# Patient Record
Sex: FEMALE | Race: BLACK OR AFRICAN AMERICAN | NOT HISPANIC OR LATINO | Employment: OTHER | URBAN - METROPOLITAN AREA
[De-identification: names, ages, dates, MRNs, and addresses within clinical notes are randomized per-mention and may not be internally consistent; named-entity substitution may affect disease eponyms.]

---

## 2017-01-10 DIAGNOSIS — I10 ESSENTIAL HYPERTENSION: ICD-10-CM

## 2017-01-10 RX ORDER — LOSARTAN POTASSIUM 50 MG/1
50 TABLET ORAL 2 TIMES DAILY
Qty: 180 TABLET | Refills: 3 | Status: SHIPPED | OUTPATIENT
Start: 2017-01-10 | End: 2017-10-24 | Stop reason: SDUPTHER

## 2017-01-12 ENCOUNTER — OFFICE VISIT (OUTPATIENT)
Dept: PODIATRY | Facility: CLINIC | Age: 77
End: 2017-01-12
Payer: MEDICARE

## 2017-01-12 ENCOUNTER — OFFICE VISIT (OUTPATIENT)
Dept: INTERNAL MEDICINE | Facility: CLINIC | Age: 77
End: 2017-01-12
Payer: MEDICARE

## 2017-01-12 VITALS
DIASTOLIC BLOOD PRESSURE: 72 MMHG | HEART RATE: 64 BPM | SYSTOLIC BLOOD PRESSURE: 124 MMHG | BODY MASS INDEX: 29.55 KG/M2 | WEIGHT: 183.88 LBS | OXYGEN SATURATION: 97 % | HEIGHT: 66 IN

## 2017-01-12 VITALS
WEIGHT: 184.63 LBS | RESPIRATION RATE: 18 BRPM | DIASTOLIC BLOOD PRESSURE: 72 MMHG | SYSTOLIC BLOOD PRESSURE: 154 MMHG | HEIGHT: 66 IN | BODY MASS INDEX: 29.67 KG/M2 | HEART RATE: 61 BPM

## 2017-01-12 DIAGNOSIS — E11.69 MIXED DIABETIC HYPERLIPIDEMIA ASSOCIATED WITH TYPE 2 DIABETES MELLITUS: Chronic | ICD-10-CM

## 2017-01-12 DIAGNOSIS — R80.9 DIABETES MELLITUS WITH PROTEINURIA: Chronic | ICD-10-CM

## 2017-01-12 DIAGNOSIS — L84 CORN OR CALLUS: ICD-10-CM

## 2017-01-12 DIAGNOSIS — I70.0 AORTIC ARCH ATHEROSCLEROSIS: Chronic | ICD-10-CM

## 2017-01-12 DIAGNOSIS — K63.5 POLYP OF COLON, UNSPECIFIED PART OF COLON, UNSPECIFIED TYPE: Chronic | ICD-10-CM

## 2017-01-12 DIAGNOSIS — I77.9 LEFT-SIDED CAROTID ARTERY DISEASE: Chronic | ICD-10-CM

## 2017-01-12 DIAGNOSIS — E04.2 MULTINODULAR THYROID: Chronic | ICD-10-CM

## 2017-01-12 DIAGNOSIS — Z00.00 ENCOUNTER FOR PREVENTIVE HEALTH EXAMINATION: Primary | ICD-10-CM

## 2017-01-12 DIAGNOSIS — F41.9 ANXIETY: ICD-10-CM

## 2017-01-12 DIAGNOSIS — E78.2 MIXED DIABETIC HYPERLIPIDEMIA ASSOCIATED WITH TYPE 2 DIABETES MELLITUS: Chronic | ICD-10-CM

## 2017-01-12 DIAGNOSIS — E11.29 DIABETES MELLITUS WITH PROTEINURIA: Chronic | ICD-10-CM

## 2017-01-12 DIAGNOSIS — N18.30 DIABETES MELLITUS WITH STAGE 3 CHRONIC KIDNEY DISEASE: Chronic | ICD-10-CM

## 2017-01-12 DIAGNOSIS — E11.22 DIABETES MELLITUS WITH STAGE 3 CHRONIC KIDNEY DISEASE: Chronic | ICD-10-CM

## 2017-01-12 DIAGNOSIS — I51.89 LEFT VENTRICULAR DIASTOLIC DYSFUNCTION WITH PRESERVED SYSTOLIC FUNCTION: ICD-10-CM

## 2017-01-12 DIAGNOSIS — K21.9 HIATAL HERNIA WITH GERD: Chronic | ICD-10-CM

## 2017-01-12 DIAGNOSIS — H40.1132 PRIMARY OPEN ANGLE GLAUCOMA OF BOTH EYES, MODERATE STAGE: Chronic | ICD-10-CM

## 2017-01-12 DIAGNOSIS — M20.10 HALLUX ABDUCTO VALGUS, UNSPECIFIED LATERALITY: ICD-10-CM

## 2017-01-12 DIAGNOSIS — B35.1 ONYCHOMYCOSIS DUE TO DERMATOPHYTE: ICD-10-CM

## 2017-01-12 DIAGNOSIS — K44.9 HIATAL HERNIA WITH GERD: Chronic | ICD-10-CM

## 2017-01-12 DIAGNOSIS — E11.49 TYPE II DIABETES MELLITUS WITH NEUROLOGICAL MANIFESTATIONS: Chronic | ICD-10-CM

## 2017-01-12 DIAGNOSIS — I10 HYPERTENSION, ESSENTIAL: Chronic | ICD-10-CM

## 2017-01-12 DIAGNOSIS — E11.49 TYPE II DIABETES MELLITUS WITH NEUROLOGICAL MANIFESTATIONS: Primary | ICD-10-CM

## 2017-01-12 PROCEDURE — 99999 PR PBB SHADOW E&M-EST. PATIENT-LVL III: CPT | Mod: PBBFAC,,, | Performed by: PODIATRIST

## 2017-01-12 PROCEDURE — 3045F PR MOST RECENT HEMOGLOBIN A1C LEVEL 7.0-9.0%: CPT | Mod: S$GLB,,, | Performed by: PODIATRIST

## 2017-01-12 PROCEDURE — 3078F DIAST BP <80 MM HG: CPT | Mod: S$GLB,,, | Performed by: PODIATRIST

## 2017-01-12 PROCEDURE — 3074F SYST BP LT 130 MM HG: CPT | Mod: S$GLB,,, | Performed by: INTERNAL MEDICINE

## 2017-01-12 PROCEDURE — 11721 DEBRIDE NAIL 6 OR MORE: CPT | Mod: 59,Q9,S$GLB, | Performed by: PODIATRIST

## 2017-01-12 PROCEDURE — 1160F RVW MEDS BY RX/DR IN RCRD: CPT | Mod: S$GLB,,, | Performed by: PODIATRIST

## 2017-01-12 PROCEDURE — 1126F AMNT PAIN NOTED NONE PRSNT: CPT | Mod: S$GLB,,, | Performed by: PODIATRIST

## 2017-01-12 PROCEDURE — 11056 PARNG/CUTG B9 HYPRKR LES 2-4: CPT | Mod: Q9,S$GLB,, | Performed by: PODIATRIST

## 2017-01-12 PROCEDURE — 99499 UNLISTED E&M SERVICE: CPT | Mod: S$GLB,,, | Performed by: INTERNAL MEDICINE

## 2017-01-12 PROCEDURE — 99999 PR PBB SHADOW E&M-EST. PATIENT-LVL IV: CPT | Mod: PBBFAC,,, | Performed by: INTERNAL MEDICINE

## 2017-01-12 PROCEDURE — 3077F SYST BP >= 140 MM HG: CPT | Mod: S$GLB,,, | Performed by: PODIATRIST

## 2017-01-12 PROCEDURE — 1159F MED LIST DOCD IN RCRD: CPT | Mod: S$GLB,,, | Performed by: PODIATRIST

## 2017-01-12 PROCEDURE — 3066F NEPHROPATHY DOC TX: CPT | Mod: S$GLB,,, | Performed by: PODIATRIST

## 2017-01-12 PROCEDURE — G0439 PPPS, SUBSEQ VISIT: HCPCS | Mod: S$GLB,,, | Performed by: INTERNAL MEDICINE

## 2017-01-12 PROCEDURE — 1157F ADVNC CARE PLAN IN RCRD: CPT | Mod: S$GLB,,, | Performed by: PODIATRIST

## 2017-01-12 PROCEDURE — 3078F DIAST BP <80 MM HG: CPT | Mod: S$GLB,,, | Performed by: INTERNAL MEDICINE

## 2017-01-12 PROCEDURE — 99499 UNLISTED E&M SERVICE: CPT | Mod: S$GLB,,, | Performed by: PODIATRIST

## 2017-01-12 PROCEDURE — 99213 OFFICE O/P EST LOW 20 MIN: CPT | Mod: 25,S$GLB,, | Performed by: PODIATRIST

## 2017-01-12 NOTE — MR AVS SNAPSHOT
[facility]  [address] [facility] [address]  Phone: [phone]  Fax: [phone]                  Saul Kirkpatrick   2017 1:00 PM   Office Visit    Description:  Female : 1940   Provider:  Shannen Muñoz DPM   Department:  [facility]           Reason for Visit     Follow-up                To Do List           Future Appointments        Provider Department Dept Phone    2017 2:00 PM Carina Aparicio MD [facility] [phone]    2/10/2017 9:15 AM PERIMETRY, [facility] Ophthalmology [phone]    2/10/2017 9:45 AM Lina Taveras MD [facility] Ophthalmology [phone]    2017 1:20 PM Shannen Muñoz DPM [facility] [phone]      Goals (5 Years of Data)              10/26/16    6/29/16    11/17/15    HEMOGLOBIN A1C < 7.0   8.0  9.4  7.5      [facility] On Call     [facility] On Call Nurse Care Line - / Assistance  Registered nurses in the [facility] On Call Center provide clinical advisement, health education, appointment booking, and other advisory services.  Call for this free service at [phone].             Medications           Message regarding Medications     Verify the changes and/or additions to your medication regime listed below are the same as discussed with your clinician today.  If any of these changes or additions are incorrect, please notify your healthcare provider.             Verify that the below list of medications is an accurate representation of the medications you are currently taking.  If none reported, the list may be blank. If incorrect, please contact your healthcare provider. Carry this list with you in case of emergency.           Current Medications     ACCU-CHEK PENNY PLUS TEST STRP Strp 1 strip by Misc.(Non-Drug; Combo Route) route 2 (two) times daily.    ACCU-CHEK FASTCLIX Misc 1 each by Misc.(Non-Drug; Combo Route) route 2 (two) times daily.    alcohol swabs PadM Apply 1 each  "topically as needed.    amlodipine (NORVASC) 10 MG tablet Take 1 tablet (10 mg total) by mouth once daily.    aspirin (ECOTRIN) 81 MG EC tablet Take 1 tablet (81 mg total) by mouth once daily.    atorvastatin (LIPITOR) 80 MG tablet Take 1 tablet (80 mg total) by mouth once daily.    BD ULTRA-FINE CELSO PEN NEEDLES 32 gauge x 5/32" Ndle Uses once daily, on daily insulin injections    blood sugar diagnostic Strp 1 strip by Misc.(Non-Drug; Combo Route) route 3 (three) times daily. PT CHOICE    BLOOD-GLUCOSE METER (CONTOUR USB MISC) by Misc.(Non-Drug; Combo Route) route.    brimonidine 0.2% (ALPHAGAN) 0.2 % Drop Place 1 drop into both eyes 3 (three) times daily.    carvedilol (COREG) 12.5 MG tablet Take 1 tablet (12.5 mg total) by mouth 2 (two) times daily with meals.    cholecalciferol, vitamin D3, 1,000 unit capsule Take 2 capsules (2,000 Units total) by mouth once daily.    clopidogrel (PLAVIX) 75 mg tablet TAKE 1 TABLET BY MOUTH EVERY DAY (BLOOD THINNER)    glimepiride (AMARYL) 4 MG tablet TAKE 1 TABLET BY MOUTH IN THE MORNING WITH BREAKFAST    hydrochlorothiazide (HYDRODIURIL) 12.5 MG Tab Take 1 (12.5 mg) po qd    hyoscyamine (LEVSIN/SL) 0.125 mg Subl Take one or two tablets every 4 hours as needed for abdominal discomfort    losartan (COZAAR) 50 MG tablet Take 1 tablet (50 mg total) by mouth 2 (two) times daily.    meclizine (ANTIVERT) 25 mg tablet Take 1 tablet (25 mg total) by mouth 3 (three) times daily as needed.    metformin (GLUCOPHAGE-XR) 500 MG 24 hr tablet Take 2 tablets (1,000 mg total) by mouth 2 (two) times daily with meals.    alprazolam (XANAX) 0.5 MG tablet Take 1 tablet (0.5 mg total) by mouth 3 (three) times daily as needed for Anxiety.           Clinical Reference Information           Vital Signs - Last Recorded  Most recent update: 1/12/2017 12:58 PM by Neeraj Young LPN    BP Pulse Resp Ht Wt BMI    (!) 154/72 61 18 5' 6" (1.676 m) 83.7 kg (184 lb 10.2 oz) 29.8 kg/m2      Blood Pressure      "     Most Recent Value    BP  (!)  154/72      Allergies as of 1/12/2017     Pravachol [Pravastatin]      Immunizations Administered on Date of Encounter - 1/12/2017     None

## 2017-01-12 NOTE — PROGRESS NOTES
Subjective:      Patient ID: Saul Kirkpatrick is a 76 y.o. female.    Chief Complaint: Follow-up ([facility])    Saul is a 76 y.o. female who presents to the clinic for evaluation and treatment of high risk feet. 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 The patient's chief complaint is long, thick toenails and calluses. This patient has documented high risk feet requiring routine maintenance secondary to diabetes mellitis and those secondary complications of diabetes, as mentioned..    PCP: Penny Randolph MD    Date Last Seen by PCP: 9/20/16    Current shoe gear:  Affected Foot: Extra depth shoes with custome accommodative insoles     Unaffected Foot: Extra depth shoes with custome accommodative insoles    Hemoglobin A1C   Date Value Ref Range Status   10/26/2016 8.0 (H) 4.5 - 6.2 % Final     Comment:     According to ADA guidelines, hemoglobin A1C <7.0% represents  optimal control in non-pregnant diabetic patients.  Different  metrics may apply to specific populations.   Standards of Medical Care in Diabetes - 2016.  For the purpose of screening for the presence of diabetes:  <5.7%     Consistent with the absence of  diabetes  5.7-6.4%  Consistent with increasing risk for diabetes   (prediabetes)  >or=6.5%  Consistent with diabetes  Currently no consensus exists for use of hemoglobin A1C  for diagnosis of diabetes for children.     06/29/2016 9.4 (H) 4.5 - 6.2 % Final   11/17/2015 7.5 (H) 4.5 - 6.2 % Final       ROS        Objective:      Physical Exam   Constitutional: She is oriented to person, place, and time. She appears well-developed and well-nourished. No distress.   Cardiovascular:   Pulses:       Dorsalis pedis pulses are 1+ on the right side, and 1+ on the left side.        Posterior tibial pulses are 1+ on the right side, and 0 on the left side.   Capillary fill time 3-5 seconds to digits bilateral feet.   Musculoskeletal:   Lower extremities:    Deformities: hallux abductovalgus both feet    Normal arch height and rectus feet bilaterally.     5/5 muscle strength and tone in all four quadrants bilaterally.     Pain-free range of motion in all four quadrants with stiffness and limitation bilaterally.     Pain on palpation: None     Neurological: She is alert and oriented to person, place, and time. She displays no Babinski's sign on the right side. She displays no Babinski's sign on the left side.   Plantar protective threshold sensation by touch via 5.07 SWMF diminished to the digits bilaterally.      Skin:   Lower extremities:    Thin, dry, atrophic bilaterally. Digital hair absent bilaterally. Digits cool with proximal foot warmth.    Mild bilateral foot and ankle edema.    No varicosities, pigmentary changes bilaterally.     No wounds, drainage, malodor, erythema, interdigital maceration were noted.     Skin lesions: sub met head 5 bilaterally.     Nails are thick, dystrophic and discolored bilateral feet.   Psychiatric: She has a normal mood and affect.   Nursing note and vitals reviewed.            Assessment:       Encounter Diagnoses   Name Primary?    Type II diabetes mellitus with neurological manifestations  Yes    Onychomycosis due to dermatophyte     Corn or callus     Hallux abducto valgus, unspecified laterality          Plan:       Saul was seen today for follow-up.    Diagnoses and all orders for this visit:    Type II diabetes mellitus with neurological manifestations    Onychomycosis due to dermatophyte    Corn or callus    Hallux abducto valgus, unspecified laterality      I counseled the patient on her conditions, their implications and medical management.    After cleansing with alcohol prep pad, the above mentioned hyperkeratotic lesions were sharply excisionally debrided x2 utilizing #15 blade to a smooth base without incident. Patient tolerated procedure well and reported comfort to the debridement sites. Off-loading cushioned pads were applied and dispensed to the patient with information on how to order additional pads for maintenance care of keratotic build up. Patient will continue to apply prescription urea lotion or over the counter alternatives to areas of recurrent skin build up.    - Shoe inspection. Diabetic Foot Education. Patient reminded of the importance of good nutrition and blood sugar control to help prevent podiatric complications of diabetes. Patient instructed on proper foot hygeine. We discussed wearing proper shoe gear, daily foot inspections, never walking without protective shoe gear, never putting sharp instruments to feet, routine podiatric nail visits every 2-3 months.      - With patient's permission, nails were aggressively reduced and debrided x 10 to their soft tissue attachment mechanically and with electric , removing all offending nail and debris. Patient relates relief following the procedure. She will continue to monitor the areas daily, inspect her feet, wear protective shoe gear when ambulatory, moisturizer to maintain skin integrity and follow in this office in approximately 2-3 months, sooner p.r.n.

## 2017-01-12 NOTE — MR AVS SNAPSHOT
[facility]  [address] [facility] [address]  Phone: [phone]  Fax: [phone]                  Saul Kirkpatrick   2017 2:00 PM   Office Visit    Description:  Female : 1940   Provider:  Carina Aparicio MD   Department:  [facility]           Reason for Visit     Health Risk Assessment           Diagnoses this Visit        Comments    Encounter for preventive health examination    -  Primary     Type 2 diabetes, uncontrolled, with background retinopathy with macular edema         Type II diabetes mellitus with neurological manifestations         Mixed diabetic hyperlipidemia associated with type 2 diabetes mellitus         Diabetes mellitus with proteinuria         Diabetes mellitus with stage 3 chronic kidney disease         Hypertension, essential         Aortic arch atherosclerosis         Primary open angle glaucoma of both eyes, moderate stage         Left-sided carotid artery disease         Left ventricular diastolic dysfunction with preserved systolic function         Multinodular thyroid         Hiatal hernia with GERD         Anxiety         Polyp of colon, unspecified part of colon, unspecified type                To Do List           Future Appointments        Provider Department Dept Phone    2/10/2017 9:15 AM PERIMETRY, [facility] Ophthalmology [phone]    2/10/2017 9:45 AM Lina Taveras MD [facility] Ophthalmology [phone]    2017 1:20 PM Shannen Muñoz DPM [facility] Podiatry [phone]      Goals (5 Years of Data)              10/26/16    6/29/16    11/17/15    HEMOGLOBIN A1C < 7.0   8.0  9.4  7.5      Follow-Up and Disposition     Return in about 2 months (around 3/12/2017).      [facility] On Call     [facility] On Call Nurse Care Line - 24/7 Assistance  Registered nurses in the [facility] On Call Center provide clinical advisement, health education, appointment booking, and other advisory services.  Call  "for this free service at [phone].             Medications           Message regarding Medications     Verify the changes and/or additions to your medication regime listed below are the same as discussed with your clinician today.  If any of these changes or additions are incorrect, please notify your healthcare provider.             Verify that the below list of medications is an accurate representation of the medications you are currently taking.  If none reported, the list may be blank. If incorrect, please contact your healthcare provider. Carry this list with you in case of emergency.           Current Medications     ACCU-CHEK PENNY PLUS TEST STRP Strp 1 strip by Misc.(Non-Drug; Combo Route) route 2 (two) times daily.    ACCU-CHEK FASTCLIX Misc 1 each by Misc.(Non-Drug; Combo Route) route 2 (two) times daily.    alcohol swabs PadM Apply 1 each topically as needed.    amlodipine (NORVASC) 10 MG tablet Take 1 tablet (10 mg total) by mouth once daily.    aspirin (ECOTRIN) 81 MG EC tablet Take 1 tablet (81 mg total) by mouth once daily.    atorvastatin (LIPITOR) 80 MG tablet Take 1 tablet (80 mg total) by mouth once daily.    BD ULTRA-FINE CELSO PEN NEEDLES 32 gauge x 5/32" Ndle Uses once daily, on daily insulin injections    blood sugar diagnostic Strp 1 strip by Misc.(Non-Drug; Combo Route) route 3 (three) times daily. PT CHOICE    BLOOD-GLUCOSE METER (CONTOUR USB MISC) by Misc.(Non-Drug; Combo Route) route.    brimonidine 0.2% (ALPHAGAN) 0.2 % Drop Place 1 drop into both eyes 3 (three) times daily.    carvedilol (COREG) 12.5 MG tablet Take 1 tablet (12.5 mg total) by mouth 2 (two) times daily with meals.    cholecalciferol, vitamin D3, 1,000 unit capsule Take 2 capsules (2,000 Units total) by mouth once daily.    clopidogrel (PLAVIX) 75 mg tablet TAKE 1 TABLET BY MOUTH EVERY DAY (BLOOD THINNER)    glimepiride (AMARYL) 4 MG tablet TAKE 1 TABLET BY MOUTH IN THE MORNING WITH BREAKFAST    hydrochlorothiazide " "(HYDRODIURIL) 12.5 MG Tab Take 1 (12.5 mg) po qd    hyoscyamine (LEVSIN/SL) 0.125 mg Subl Take one or two tablets every 4 hours as needed for abdominal discomfort    losartan (COZAAR) 50 MG tablet Take 1 tablet (50 mg total) by mouth 2 (two) times daily.    meclizine (ANTIVERT) 25 mg tablet Take 1 tablet (25 mg total) by mouth 3 (three) times daily as needed.    metformin (GLUCOPHAGE-XR) 500 MG 24 hr tablet Take 2 tablets (1,000 mg total) by mouth 2 (two) times daily with meals.    alprazolam (XANAX) 0.5 MG tablet Take 1 tablet (0.5 mg total) by mouth 3 (three) times daily as needed for Anxiety.           Clinical Reference Information           Vital Signs - Last Recorded  Most recent update: 1/12/2017  2:10 PM by Gerri Riggins MA    BP Pulse Ht Wt SpO2 BMI    124/72 (BP Location: Left arm, Patient Position: Sitting, BP Method: Manual) 64 5' 6" (1.676 m) 83.4 kg (183 lb 13.8 oz) 97% 29.68 kg/m2      Blood Pressure          Most Recent Value    BP  124/72      Allergies as of 1/12/2017     Pravachol [Pravastatin]      Immunizations Administered on Date of Encounter - 1/12/2017     None      Instructions      Counseling and Referral of Other Preventative  (Italic type indicates deductible and co-insurance are waived)    Patient Name: Saul Kirkpatrick  Today's Date: 1/12/2017      SERVICE LIMITATIONS RECOMMENDATION    Vaccines    · Pneumococcal (once after 65)    · Influenza (annually)    · Hepatitis B (if medium/high risk)    · Prevnar 13      Hepatitis B medium/high risk factors:       - End-stage renal disease       - Hemophiliacs who received Factor VII or         IX concentrates       - Clients of institutions for the mentally             retarded       - Persons who live in the same house as          a HepB carrier       - Homosexual men       - Illicit injectable drug abusers     Pneumococcal: Done, no repeat necessary     Influenza: Done, repeat in one year     Hepatitis B: N/A no risks     Prevnar 13: Done no " repeat necessary    Mammogram (biennial age 50-74)  Annually (age 40 or over)  N/A 75 years old    Pap (up to age 70 and after 70 if unknown history or abnormal study last 10 years)    N/A due to age and s/p hysterectomy     The USPSTF recommends against screening for cervical cancer in women who have had a hysterectomy with removal of the cervix and who do not have a history of a high-grade precancerous lesion (cervical intraepithelial neoplasia [SHANNON] grade 2 or 3) or cervical cancer.     Colorectal cancer screening (to age 75)    · Fecal occult blood test (annual)  · Flexible sigmoidoscopy (5y)  · Screening colonoscopy (10y)  · Barium enema   Last done 9/22/16, recommend to repeat every 5  years    Diabetes self-management training (no USPSTF recommendations)  Requires referral by treating physician for patient with diabetes or renal disease. 10 hours of initial DSMT sessions of no less than 30 minutes each in a continuous 12-month period. 2 hours of follow-up DSMT in subsequent years.  Last done 2016, recommend to repeat every year as needed     Bone mass measurements (age 65 & older, biennial)  Requires diagnosis related to osteoporosis or estrogen deficiency. Biennial benefit unless patient has history of long-term glucocorticoid  Last done 5/7/2013, recommend to repeat every 5  years    Glaucoma screening (no USPSTF recommendation)  Diabetes mellitus, family history   , age 50 or over    American, age 65 or over  Scheduled, see appointments    Medical nutrition therapy for diabetes or renal disease (no recommended schedule)  Requires referral by treating physician for patient with diabetes or renal disease or kidney transplant within the past 3 years.  Can be provided in same year as diabetes self-management training (DSMT), and CMS recommends medical nutrition therapy take place after DSMT. Up to 3 hours for initial year and 2 hours in subsequent years.  Last done 2016, recommend to  repeat every as per PCP based on clinical situation      Cardiovascular screening blood tests (every 5 years)  · Fasting lipid panel  Order as a panel if possible  Last done 10/26/16 , recommend to repeat every 1  years    Diabetes screening tests (at least every 3 years, Medicare covers annually or at 6-month intervals for prediabetic patients)  · Fasting blood sugar (FBS) or glucose tolerance test (GTT)  Patient must be diagnosed with one of the following:       - Hypertension       - Dyslipidemia       - Obesity (BMI 30kg/m2)       - Previous elevated impaired FBS or GTT       ... or any two of the following:       - Overweight (BMI 25 but <30)       - Family history of diabetes       - Age 65 or older       - History of gestational diabetes or birth of baby weighing more than 9 pounds  N/A Has known Diabetes    Abdominal aortic aneurysm screening (once)  · Sonogram   Limited to patients who meet one of the following criteria:       - Men who are 65-75 years old and have smoked more than 100 cigarette in their lifetime       - Anyone with a family history of abdominal aortic aneurysm       - Anyone recommended for screening by the USPSTF  N/A non smoker    HIV screening (annually for increased risk patients)  · HIV-1 and HIV-2 by EIA, or CARMENZA, rapid antibody test or oral mucosa transudate  Patients must be at increased risk for HIV infection per USPSTF guidelines or pregnant. Tests covered annually for patient at increased risk or as requested by the patient. Pregnant patients may receive up to 3 tests during pregnancy.  Risks discussed, screening is not recommended    Smoking cessation counseling (up to 8 sessions per year)  Patients must be asymptomatic of tobacco-related conditions to receive as a preventative service.  nonsmoker    Subsequent annual wellness visit  At least 12 months since last AWV  Return in one year     The following information is provided to all patients.  This information is to help you  find resources for any of the problems found today that may be affecting your health:                Living healthy guide: www.[facility].[email or web address].[facility]      Understanding Diabetes: www.diabetes.org      Eating healthy: www.cdc.gov/healthyweight      CDC home safety checklist: www.cdc.gov/steadi/patient.html      Agency on Aging: www.goea.[place].[facility]      Alcoholics anonymous (AA): www.aa.org      Physical Activity: www.dameon.nih.gov/ys7eupo      Tobacco use: www.quitwithusla.org

## 2017-01-12 NOTE — PATIENT INSTRUCTIONS
Remember the importance of good nutrition and blood sugar control to help prevent foot complications of diabetes and see your internist at least ever six months. Always wear proper shoe gear. Inspect your feet daily. Always call the clinic immediately if you notice something on your feet that is not normal such as a blister, wound, or foreign object stuck in your foot.

## 2017-01-12 NOTE — PATIENT INSTRUCTIONS
Counseling and Referral of Other Preventative  (Italic type indicates deductible and co-insurance are waived)    Patient Name: Saul Kirkpatrick  Today's Date: 1/12/2017      SERVICE LIMITATIONS RECOMMENDATION    Vaccines    · Pneumococcal (once after 65)    · Influenza (annually)    · Hepatitis B (if medium/high risk)    · Prevnar 13      Hepatitis B medium/high risk factors:       - End-stage renal disease       - Hemophiliacs who received Factor VII or         IX concentrates       - Clients of institutions for the mentally             retarded       - Persons who live in the same house as          a HepB carrier       - Homosexual men       - Illicit injectable drug abusers     Pneumococcal: Done, no repeat necessary     Influenza: Done, repeat in one year     Hepatitis B: N/A no risks     Prevnar 13: Done no repeat necessary    Mammogram (biennial age 50-74)  Annually (age 40 or over)  N/A 75 years old    Pap (up to age 70 and after 70 if unknown history or abnormal study last 10 years)    N/A due to age and s/p hysterectomy     The USPSTF recommends against screening for cervical cancer in women who have had a hysterectomy with removal of the cervix and who do not have a history of a high-grade precancerous lesion (cervical intraepithelial neoplasia [SHANNON] grade 2 or 3) or cervical cancer.     Colorectal cancer screening (to age 75)    · Fecal occult blood test (annual)  · Flexible sigmoidoscopy (5y)  · Screening colonoscopy (10y)  · Barium enema   Last done 9/22/16, recommend to repeat every 5  years    Diabetes self-management training (no USPSTF recommendations)  Requires referral by treating physician for patient with diabetes or renal disease. 10 hours of initial DSMT sessions of no less than 30 minutes each in a continuous 12-month period. 2 hours of follow-up DSMT in subsequent years.  Last done 2016, recommend to repeat every year as needed     Bone mass measurements (age 65 & older, biennial)  Requires  diagnosis related to osteoporosis or estrogen deficiency. Biennial benefit unless patient has history of long-term glucocorticoid  Last done 5/7/2013, recommend to repeat every 5  years    Glaucoma screening (no USPSTF recommendation)  Diabetes mellitus, family history   , age 50 or over    American, age 65 or over  Scheduled, see appointments    Medical nutrition therapy for diabetes or renal disease (no recommended schedule)  Requires referral by treating physician for patient with diabetes or renal disease or kidney transplant within the past 3 years.  Can be provided in same year as diabetes self-management training (DSMT), and CMS recommends medical nutrition therapy take place after DSMT. Up to 3 hours for initial year and 2 hours in subsequent years.  Last done 2016, recommend to repeat every as per PCP based on clinical situation      Cardiovascular screening blood tests (every 5 years)  · Fasting lipid panel  Order as a panel if possible  Last done 10/26/16 , recommend to repeat every 1  years    Diabetes screening tests (at least every 3 years, Medicare covers annually or at 6-month intervals for prediabetic patients)  · Fasting blood sugar (FBS) or glucose tolerance test (GTT)  Patient must be diagnosed with one of the following:       - Hypertension       - Dyslipidemia       - Obesity (BMI 30kg/m2)       - Previous elevated impaired FBS or GTT       ... or any two of the following:       - Overweight (BMI 25 but <30)       - Family history of diabetes       - Age 65 or older       - History of gestational diabetes or birth of baby weighing more than 9 pounds  N/A Has known Diabetes    Abdominal aortic aneurysm screening (once)  · Sonogram   Limited to patients who meet one of the following criteria:       - Men who are 65-75 years old and have smoked more than 100 cigarette in their lifetime       - Anyone with a family history of abdominal aortic aneurysm       - Anyone  recommended for screening by the USPSTF  N/A non smoker    HIV screening (annually for increased risk patients)  · HIV-1 and HIV-2 by EIA, or CARMENZA, rapid antibody test or oral mucosa transudate  Patients must be at increased risk for HIV infection per USPSTF guidelines or pregnant. Tests covered annually for patient at increased risk or as requested by the patient. Pregnant patients may receive up to 3 tests during pregnancy.  Risks discussed, screening is not recommended    Smoking cessation counseling (up to 8 sessions per year)  Patients must be asymptomatic of tobacco-related conditions to receive as a preventative service.  nonsmoker    Subsequent annual wellness visit  At least 12 months since last AWV  Return in one year     The following information is provided to all patients.  This information is to help you find resources for any of the problems found today that may be affecting your health:                Living healthy guide: www.[facility].[place].gov      Understanding Diabetes: www.diabetes.org      Eating healthy: www.cdc.gov/healthyweight      CDC home safety checklist: www.cdc.gov/steadi/patient.html      Agency on Aging: www.goea.[place].[facility]      Alcoholics anonymous (AA): www.aa.org      Physical Activity: www.dameon.nih.gov/sd3kkzr      Tobacco use: www.quitwithusla.org

## 2017-01-14 NOTE — PROGRESS NOTES
"Saul Kirkpatrick presented for a  Medicare AWV and comprehensive Health Risk Assessment today. The following components were reviewed and updated:    · Medical history  · Family History  · Social history  · Allergies and Current Medications  · Health Risk Assessment  · Health Maintenance  · Care Team     ** See Completed Assessments for Annual Wellness Visit within the encounter summary.**       The following assessments were completed:  · Living Situation  · CAGE  · Depression Screening  · Timed Get Up and Go  · Whisper Test  · Cognitive Function Screening  · Nutrition Screening  · ADL Screening  · PAQ Screening    Physical Exam    PE:   Visit Vitals    /72 (BP Location: Left arm, Patient Position: Sitting, BP Method: Manual)    Pulse 64    Ht 5' 6" (1.676 m)    Wt 83.4 kg (183 lb 13.8 oz)    SpO2 97%    BMI 29.68 kg/m2       APPEARANCE: Patient is a 76 y.o.female /Black . Awake, alert, in no distress, good historian.   HEENT: PERRLA, EOMI. No facial asymmetry.Tongue midline. Dentures  NECK: Supple. Carotids: 2+, no bruits. No thyromegaly. No cervical adenopathy.   HEART: Regular rate and rhythm with  No murmur , rubs or gallops.   CHEST: Lungs clear to auscultation.   BACK:  No spinous tenderness. No flank tenderness.   ABDOMEN: Bowel sounds normal. Not distended. Soft.   EXTREMITIES: No clubbing, cyanosis or edema. Peripheral pulses intact.Bilateral bunions  NEURO:  Hearing intact.Motor: Symmetric  grasp, flexion and extension of feet. Toes downgoing. Sensory intact to monofilament testing, slightly less but present in feet, symmetric. Finger to nose is intact with no tremor. No spasticity or muscle fasciculations. Gait: No ataxia.   SKIN:  No suspicious lesions or ulcerations.       Diagnoses and health risks identified today and associated recommendations/orders:    1. Encounter for preventive health examination  In terms of above screenings.she feels her food intake is less but her " weight did go up. Copy of weight graph given to patient. Follow up with PCP recommended.    2. Type 2 diabetes, uncontrolled, with background retinopathy with macular edema  This problem is currently not controlled. On metformin, glimepiride. Self checks glucose every other day. Reports sugars in 130-150 range.Please follow up with your PCP as planned to discuss adjustments to your treatment plan.  Had diabetic teaching this past year and regularly.       Ref Range & Units   10/26/16   6/29/16   11/17/15   7/9/15   4/29/15   2/3/15   10/31/14   HgbA1C 4.5 - 6.2 % 8.0 (H) 9.4 (H) 7.5 (H) 8.2 (H) 9.2 (H) 8.3 (H) 8.1 (H)   Est Avg Gluc 68 - 131 mg/dL 183 (H) 223 (H) 169 (H) 189 (H) 217 (H) 192 (H) 186 (H)             3. Type II diabetes mellitus with neurological manifestations  Stable and controlled neuropathy. DM as per #2. .Does get numbness and tingling and burning in feet at times- not enough to take medications. Sensation is intact in toes. Continue current treatment plan as previously prescribed with your PCP.     4. 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 Diabetes control as per #2.  Continue current treatment plan as previously prescribed with your PCP.   Component      Latest Ref Rng & Units 10/26/2016   Cholesterol      120 - 199 mg/dL 168   Triglycerides      30 - 150 mg/dL 43   HDL      40 - 75 mg/dL 53   LDL Cholesterol      63.0 - 159.0 mg/dL 106.4     5. Diabetes mellitus with proteinuria  Ongoing problem. She is on ARB- losartan. Followed by Dr Valenzuela, Nephrology.        Ref Range & Units   10/26/16   9/8/16   Pro Ur Rand 0 - 15 mg/dL 102 (H) 89 (H)   Cr, Ran Ur 15.0 - 325.0 mg/dL 47.0 53.0   Prot/Creat Ratio, Ur 0.00 - 0.20 2.17 (H) 1.68 (H)           6. Diabetes mellitus with stage 3 chronic kidney disease  Stable and controlled CKD3. DM as per #2.  Questions about CKD 3 answered and copy of the labs in table below given to  patient. Continue current treatment plan as previously prescribed with your nephrologist, Dr Valenzuela.       Ref Range & Units   10/26/16   8/24/16   6/29/16   11/17/15   9/9/15   7/9/15   Gluc 70 - 110 mg/dL 165 (H) 195 (H) 245 (H) 156 (H) 205 (H) 145 (H)   Bun 8 - 23 mg/dL 27 (H) 18 13 20 13 11   Cr 0.5 - 1.4 mg/dL 1.2 1.2 1.1 1.2 1.3 0.9   eGFR  Afr Am >60 mL/min/1.73 m^2 51.1 (A) 51.1 (A) 56.8 (A) 51.4 (A) 47 (A) >60.0           7. Hypertension, essential  Stable and controlled.On amlodipine, carvedilol, losartan and HCTZ.  Continue current treatment plan as previously prescribed with your physicians.     8. Aortic arch atherosclerosis  Stable and controlled. CXR from 10/30/2013-documented.--Atherosclerosis is seen in the aortic arch. Not emergent. Discussed need to control BP. Lipids, glucose to avoid further arterial wall buildup. She does not smoke. She already takes statin aspirin and plavix. No TIA or CVA. Possible claudication. Last exercise LORENA was normal from 10/2/2014. Follow up with PCP.     9. Primary open angle glaucoma of both eyes, moderate stage  Stable and controlled on Alphagan eye drops. Continue current treatment plan as previously prescribed with your ophthalmologist.     10. Left-sided carotid artery disease  Stable and controlled. On plavix and aspirin.  Carotid US 7/12/2016-- 0 - 19% right Internal Carotid stenosis.( which is not significant) .. 20 - 39% left Internal Carotid stenosis. Discussed and answered her questions. No TIA or CVA. This is just something to be aware of and monitor over time. Not hemodynamically significant. - just a risk indicator.  Continue current treatment plan as previously prescribed with your PCP.     11. Left ventricular diastolic dysfunction with preserved systolic function  Stable and controlled.  Echo 8/14/2014-Normal LVEF 60-65%.  Normal RV systolic function. + Left ventricular diastolic dysfunction. Continue current treatment plan as previously prescribed  with your physicians.    12. Multinodular thyroid: thyroid u/s 7/16  Stable and controlled. Continue current treatment plan as previously prescribed with your PCP.     13. Hiatal hernia with GERD  Stable and controlled. Continue current treatment plan as previously prescribed with your PCP.     14. Anxiety  Stable and controlled. Uses prn Xanax about twice a year.  Continue current treatment plan as previously prescribed with your PCP.     15. Polyp of colon, unspecified part of colon, unspecified type  Stable and controlled. Last colonoscopy 9/22/2016. Continue current treatment plan as previously prescribed with your PCP.       Provided Mrs Kirkpatrick with a 5-10 year written screening schedule and personal prevention plan. Recommendations were developed using the USPSTF age appropriate recommendations. Education, counseling, and referrals were provided as needed. After Visit Summary printed and given to patient which includes a list of additional screenings\tests needed.    Return in about 2 months (around 3/12/2017).Patient plans to schedule on own -Can do via [facility].    Carina Aparicio MD

## 2017-02-10 ENCOUNTER — CLINICAL SUPPORT (OUTPATIENT)
Dept: OPHTHALMOLOGY | Facility: CLINIC | Age: 77
End: 2017-02-10
Attending: OPHTHALMOLOGY
Payer: MEDICARE

## 2017-02-10 DIAGNOSIS — E11.3299 BACKGROUND DIABETIC RETINOPATHY(362.01): ICD-10-CM

## 2017-02-10 DIAGNOSIS — H40.1132 PRIMARY OPEN ANGLE GLAUCOMA OF BOTH EYES, MODERATE STAGE: Primary | ICD-10-CM

## 2017-02-10 DIAGNOSIS — Z96.1 PSEUDOPHAKIA: ICD-10-CM

## 2017-02-10 DIAGNOSIS — H40.1132 PRIMARY OPEN ANGLE GLAUCOMA OF BOTH EYES, MODERATE STAGE: ICD-10-CM

## 2017-02-10 DIAGNOSIS — H25.11 NUCLEAR SCLEROSIS, RIGHT: ICD-10-CM

## 2017-02-10 DIAGNOSIS — G45.3 AMAUROSIS FUGAX: ICD-10-CM

## 2017-02-10 DIAGNOSIS — G51.4 EYELID MYOKYMIA: ICD-10-CM

## 2017-02-10 PROCEDURE — 99499 UNLISTED E&M SERVICE: CPT | Mod: S$GLB,,, | Performed by: OPHTHALMOLOGY

## 2017-02-10 PROCEDURE — 92133 CPTRZD OPH DX IMG PST SGM ON: CPT | Mod: S$GLB,,, | Performed by: OPHTHALMOLOGY

## 2017-02-10 PROCEDURE — 99999 PR PBB SHADOW E&M-EST. PATIENT-LVL III: CPT | Mod: PBBFAC,,, | Performed by: OPHTHALMOLOGY

## 2017-02-10 PROCEDURE — 92083 EXTENDED VISUAL FIELD XM: CPT | Mod: S$GLB,,, | Performed by: OPHTHALMOLOGY

## 2017-02-10 PROCEDURE — 92014 COMPRE OPH EXAM EST PT 1/>: CPT | Mod: S$GLB,,, | Performed by: OPHTHALMOLOGY

## 2017-02-10 NOTE — LETTER
February 10, 2017      Lorena Duke MD  [address]  [facility] [address]           [facility]  [address]  [facility] [address]  Phone: [phone]  Fax: [phone]          Patient: Saul Kirkpatrick   MR Number: [id number]   YOB: 1940   Date of Visit: 2/10/2017       Dear Dr. Lorena Duke:    Thank you for referring Saul Kirkpatrick to me for evaluation. Attached you will find relevant portions of my assessment and plan of care.    If you have questions, please do not hesitate to call me. I look forward to following Saul Kirkpatrick along with you.    Sincerely,    Lina Taveras MD    Enclosure  CC:  No Recipients    If you would like to receive this communication electronically, please contact [email or web address] or [phone] to request more information on [employer] Link access.    For providers and/or their staff who would like to refer a patient to [facility], please contact us through our one-stop-shop provider referral line, [facility], at [phone].    If you feel you have received this communication in error or would no longer like to receive these types of communications, please e-mail [email or web address]

## 2017-02-10 NOTE — PROGRESS NOTES
HPI     DLS: 10/14/16    77 Y/O F here today for her 4 mo Glaucoma ck with HVF   review. PT states' I feel my near vision is getting worse since last   visit. stj     Meds:  Brimonidine BID OU = PT USES BID AND TID OU    LBS 71 before breakfast 02/09/17  Hemoglobin A1C       Date                     Value               Ref Range             Status                10/26/2016               8.0 (H)             4.5 - 6.2 %           Final              Comment:    According to ADA guidelines, hemoglobin A1C <7.0% represents  optimal   control in non-pregnant diabetic patients.  Different  metrics may apply   to specific populations.   Standards of Medical Care in Diabetes -   2016.  For the purpose of screening for the presence of diabetes:  <5.7%       Consistent with the absence of diabetes  5.7-6.4%  Consistent with   increasing risk for diabetes   (prediabetes)  >or=6.5%  Consistent with   diabetes  Currently no consensus exists for use of hemoglobin A1C  for   diagnosis of diabetes for children.         06/29/2016               9.4 (H)             4.5 - 6.2 %           Final                 11/17/2015               7.5 (H)             4.5 - 6.2 %           Final            ----------    POHx:   1) POAG  2) DM with BDR  3) RLL lesion  4) NS OD  5) PCIOL OS           Last edited by Kady Barclay MA on 2/10/2017 10:46 AM.         Assessment /Plan     For exam results, see Encounter Report.    Primary open angle glaucoma of both eyes, moderate stage    Nuclear sclerosis, right    Background diabetic retinopathy(362.01)    Amaurosis fugax    Eyelid myokymia    Pseudophakia      1. POAG ou   H/O poor compliance   First HVF 1997   First photos 1998     Family history none   Glaucoma meds Has used alphagan in past - poor compliance-stopped on her own   H/O adverse rxn to glaucoma drops none   LASERS No glaucoma laser (+h/o focal OU for BDR)   GLAUCOMA SURGERIES none   OTHER EYE SURGERIES CE/PCIOL OS 2/27/13  CDR 0.8/0.75  "  Tbase 16-20/16-22   Tmax 20/22   Ttarget 17/17   HVF 1997 to 2012 - inf arc/NS od // +changes ? 2/2 focal laser   Gonio +3-4   /621- thick ou (- 4.5 ou)   OCT 2006, 2009, 2012 - RNFL nl ou   HRT 4  tests: 2009 to 2016 Bord inf/temp od // decinf bord temp os /// CDR 0.72 od // 0.72 os  Disc photos 1998, 2000, 2001, 2003, 2003, 2004, 2005, 2006- slides   12/9/2010 - OIS     - Ttoday 19/18   - Test done today HVF/OCT/DFE    2. 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 NS OD    Remove prn   BCVA 20/40   BAT 20/60    4. Pseudophakia OS   S/p CE/PCIOL OS   IOL SN60 WF 20.5  -VA limited 2/2 hx of CSME s/p focal scars  BCVA  20/25  Give Rx glasses - 7/2/2013  Had re-bound iritis - resolved    4. H/O RLL lesion - S/P excision with Jovanni     5. Post Nelda lives in [place] - but still likes to come to [place] for care     6. amaurosis fugax / H/O CVA's  Patient reports stroke like symptoms and amaurosis fugax 10/13-  She was admitted to hospital with very elevated BP-  Patient had MRI at the time showing ischemic disease- last Carotid U/S done 11/12- patient reports that PCP gets one yearly- last U/S showed minimal plaque disease-  H/o stroke- discussed that amaurosis was from elevated BP and if ever recurs needs to report to ER immediately- she voiced understanding    7. Eyelid myokymia  - intermittent - patient also reports eyelid "twitching  "- discussed with patient that myokymia is usually from lack of sleep, caffeine intake, or stress- however nothing to worry about  -  Did not have any "twitching" on exam today    Plan:  Increase brimonidine to TID ou   MRx given- patient would like to think re CEIOL OD- can consider CEIOL with Rey given poor compliance with gtts and moderate glaucoma.   HVF's are inconsistent with the ON exam / OCT ect   Pt wants to wait on phaco od for now // has done well os     Avoid PG's if possible 2/2 h/oCME 2/2 BDR    Refraction  Post op os  is more myopic " than expected - review IOL calc if needs 2nd eye done    Repeat OCT of macula  5/7/2013 - - done no CME os      F/U 6 months - HRT/Gonio    refer to retina - +BDR - s/p laser - Yung    I have seen and personally examined the patient.  I agree with the findings, assessment and plan of the resident and/or fellow.     Lina Taveras MD

## 2017-02-10 NOTE — PROGRESS NOTES
hvf done    Assessment /Plan     For exam results, see Encounter Report.    Primary open angle glaucoma of both eyes, moderate stage  -     Wen Visual Field - OU - Extended - Both Eyes

## 2017-03-09 ENCOUNTER — TELEPHONE (OUTPATIENT)
Dept: INTERNAL MEDICINE | Facility: CLINIC | Age: 77
End: 2017-03-09

## 2017-03-09 DIAGNOSIS — E78.2 MIXED DIABETIC HYPERLIPIDEMIA ASSOCIATED WITH TYPE 2 DIABETES MELLITUS: Chronic | ICD-10-CM

## 2017-03-09 DIAGNOSIS — N18.30 DIABETES MELLITUS WITH STAGE 3 CHRONIC KIDNEY DISEASE: Primary | Chronic | ICD-10-CM

## 2017-03-09 DIAGNOSIS — E11.69 MIXED DIABETIC HYPERLIPIDEMIA ASSOCIATED WITH TYPE 2 DIABETES MELLITUS: Chronic | ICD-10-CM

## 2017-03-09 DIAGNOSIS — E11.22 DIABETES MELLITUS WITH STAGE 3 CHRONIC KIDNEY DISEASE: Primary | Chronic | ICD-10-CM

## 2017-03-09 DIAGNOSIS — E04.2 MULTINODULAR THYROID: Chronic | ICD-10-CM

## 2017-03-09 RX ORDER — GLIMEPIRIDE 4 MG/1
TABLET ORAL
Qty: 90 TABLET | Refills: 0 | Status: SHIPPED | OUTPATIENT
Start: 2017-03-09 | End: 2017-06-07 | Stop reason: SDUPTHER

## 2017-03-10 ENCOUNTER — PATIENT MESSAGE (OUTPATIENT)
Dept: INTERNAL MEDICINE | Facility: CLINIC | Age: 77
End: 2017-03-10

## 2017-05-16 ENCOUNTER — OFFICE VISIT (OUTPATIENT)
Dept: INTERNAL MEDICINE | Facility: CLINIC | Age: 77
End: 2017-05-16
Payer: MEDICARE

## 2017-05-16 ENCOUNTER — TELEPHONE (OUTPATIENT)
Dept: INTERNAL MEDICINE | Facility: CLINIC | Age: 77
End: 2017-05-16

## 2017-05-16 ENCOUNTER — LAB VISIT (OUTPATIENT)
Dept: LAB | Facility: HOSPITAL | Age: 77
End: 2017-05-16
Payer: MEDICARE

## 2017-05-16 VITALS
HEART RATE: 65 BPM | HEIGHT: 66 IN | TEMPERATURE: 98 F | WEIGHT: 175.94 LBS | BODY MASS INDEX: 28.27 KG/M2 | SYSTOLIC BLOOD PRESSURE: 140 MMHG | DIASTOLIC BLOOD PRESSURE: 73 MMHG | OXYGEN SATURATION: 97 %

## 2017-05-16 DIAGNOSIS — E55.9 VITAMIN D INSUFFICIENCY: ICD-10-CM

## 2017-05-16 DIAGNOSIS — T46.6X5A STATIN MYOPATHY: ICD-10-CM

## 2017-05-16 DIAGNOSIS — E11.22 DIABETES MELLITUS WITH STAGE 3 CHRONIC KIDNEY DISEASE: Primary | Chronic | ICD-10-CM

## 2017-05-16 DIAGNOSIS — M60.9 MYOSITIS, UNSPECIFIED MYOSITIS TYPE, UNSPECIFIED SITE: ICD-10-CM

## 2017-05-16 DIAGNOSIS — M79.652 PAIN IN BOTH THIGHS: Primary | ICD-10-CM

## 2017-05-16 DIAGNOSIS — M79.651 PAIN IN BOTH THIGHS: Primary | ICD-10-CM

## 2017-05-16 DIAGNOSIS — I10 HYPERTENSION, ESSENTIAL: Chronic | ICD-10-CM

## 2017-05-16 DIAGNOSIS — N18.30 DIABETES MELLITUS WITH STAGE 3 CHRONIC KIDNEY DISEASE: Primary | Chronic | ICD-10-CM

## 2017-05-16 DIAGNOSIS — G72.0 STATIN MYOPATHY: ICD-10-CM

## 2017-05-16 DIAGNOSIS — M79.652 PAIN IN BOTH THIGHS: ICD-10-CM

## 2017-05-16 DIAGNOSIS — M79.651 PAIN IN BOTH THIGHS: ICD-10-CM

## 2017-05-16 LAB
ALBUMIN SERPL BCP-MCNC: 3.5 G/DL
ALP SERPL-CCNC: 76 U/L
ALT SERPL W/O P-5'-P-CCNC: 9 U/L
ANION GAP SERPL CALC-SCNC: 9 MMOL/L
AST SERPL-CCNC: 16 U/L
BILIRUB SERPL-MCNC: 0.6 MG/DL
BUN SERPL-MCNC: 17 MG/DL
CALCIUM SERPL-MCNC: 9.2 MG/DL
CHLORIDE SERPL-SCNC: 107 MMOL/L
CK SERPL-CCNC: 482 U/L
CO2 SERPL-SCNC: 27 MMOL/L
CREAT SERPL-MCNC: 1.3 MG/DL
EST. GFR  (AFRICAN AMERICAN): 46 ML/MIN/1.73 M^2
EST. GFR  (NON AFRICAN AMERICAN): 39.9 ML/MIN/1.73 M^2
GLUCOSE SERPL-MCNC: 134 MG/DL
POTASSIUM SERPL-SCNC: 4.9 MMOL/L
PROT SERPL-MCNC: 6.9 G/DL
SODIUM SERPL-SCNC: 143 MMOL/L

## 2017-05-16 PROCEDURE — 99999 PR PBB SHADOW E&M-EST. PATIENT-LVL V: CPT | Mod: PBBFAC,,, | Performed by: NURSE PRACTITIONER

## 2017-05-16 PROCEDURE — 1159F MED LIST DOCD IN RCRD: CPT | Mod: S$GLB,,, | Performed by: NURSE PRACTITIONER

## 2017-05-16 PROCEDURE — 80053 COMPREHEN METABOLIC PANEL: CPT

## 2017-05-16 PROCEDURE — 99213 OFFICE O/P EST LOW 20 MIN: CPT | Mod: S$GLB,,, | Performed by: NURSE PRACTITIONER

## 2017-05-16 PROCEDURE — 3077F SYST BP >= 140 MM HG: CPT | Mod: S$GLB,,, | Performed by: NURSE PRACTITIONER

## 2017-05-16 PROCEDURE — 36415 COLL VENOUS BLD VENIPUNCTURE: CPT

## 2017-05-16 PROCEDURE — 1160F RVW MEDS BY RX/DR IN RCRD: CPT | Mod: S$GLB,,, | Performed by: NURSE PRACTITIONER

## 2017-05-16 PROCEDURE — 3078F DIAST BP <80 MM HG: CPT | Mod: S$GLB,,, | Performed by: NURSE PRACTITIONER

## 2017-05-16 PROCEDURE — 1125F AMNT PAIN NOTED PAIN PRSNT: CPT | Mod: S$GLB,,, | Performed by: NURSE PRACTITIONER

## 2017-05-16 PROCEDURE — 99499 UNLISTED E&M SERVICE: CPT | Mod: S$GLB,,, | Performed by: NURSE PRACTITIONER

## 2017-05-16 PROCEDURE — 82550 ASSAY OF CK (CPK): CPT

## 2017-05-16 RX ORDER — ACETAMINOPHEN 325 MG/1
325 TABLET ORAL EVERY 6 HOURS PRN
Refills: 0 | COMMUNITY
Start: 2017-05-16 | End: 2018-02-22

## 2017-05-16 NOTE — MR AVS SNAPSHOT
[facility]  [address]  [facility] [address]  Phone: [phone]  Fax: [phone]                  Saul Kirkpatrick   2017 2:30 PM   Office Visit    Description:  Female : 1940   Provider:  Sneha Moncada NP   [facility]           Reason for Visit     Leg Pain           Diagnoses this Visit        Comments    Pain in both thighs    -  Primary            To Do List           Future Appointments        Provider Department Dept Phone    2017 2:30 PM Sneha Moncada NP [facility] Internal Medicine [phone]    2017 1:20 PM Shannen Muñoz DPM [facility] Podiatry [phone]      Goals (5 Years of Data)              10/26/16    6/29/16    11/17/15    HEMOGLOBIN A1C < 7.0   8.0  9.4  7.5      PURCHASE these Medications (No prescription required)        Start End    acetaminophen (TYLENOL) 325 MG tablet 2017     Sig - Route: Take 1 tablet (325 mg total) by mouth every 6 (six) hours as needed for Pain. - Oral    Class: OTC      [facility]     [facility] Care Line -  Assistance  Unless otherwise directed by your provider, please contact [facility], our nurse care line that is available for  assistance.     Registered nurses in the [facility] provide: appointment scheduling, clinical advisement, health education, and other advisory services.  Call: [phone] (toll free)               Medications           Message regarding Medications     Verify the changes and/or additions to your medication regime listed below are the same as discussed with your clinician today.  If any of these changes or additions are incorrect, please notify your healthcare provider.        START taking these NEW medications        Refills    acetaminophen (TYLENOL) 325 MG tablet 0    Sig: Take 1 tablet (325 mg total) by mouth every 6 (six) hours as needed for Pain.    Class: OTC    Route: Oral           Verify  that the below list of medications is an accurate representation of the medications you are currently taking.  If none reported, the list may be blank. If incorrect, please contact your healthcare provider. Carry this list with you in case of emergency.           Current Medications     alprazolam (XANAX) 0.5 MG tablet Take 1 tablet (0.5 mg total) by mouth 3 (three) times daily as needed for Anxiety.    amlodipine (NORVASC) 10 MG tablet Take 1 tablet (10 mg total) by mouth once daily.    aspirin (ECOTRIN) 81 MG EC tablet Take 1 tablet (81 mg total) by mouth once daily.    atorvastatin (LIPITOR) 80 MG tablet Take 1 tablet (80 mg total) by mouth once daily.    brimonidine 0.2% (ALPHAGAN) 0.2 % Drop Place 1 drop into both eyes 3 (three) times daily.    carvedilol (COREG) 12.5 MG tablet Take 1 tablet (12.5 mg total) by mouth 2 (two) times daily with meals.    cholecalciferol, vitamin D3, 1,000 unit capsule Take 2 capsules (2,000 Units total) by mouth once daily.    clopidogrel (PLAVIX) 75 mg tablet TAKE 1 TABLET BY MOUTH EVERY DAY (BLOOD THINNER)    glimepiride (AMARYL) 4 MG tablet TAKE 1 TABLET BY MOUTH IN THE MORNING WITH BREAKFAST    hydrochlorothiazide (HYDRODIURIL) 12.5 MG Tab Take 1 (12.5 mg) po qd    losartan (COZAAR) 50 MG tablet Take 1 tablet (50 mg total) by mouth 2 (two) times daily.    meclizine (ANTIVERT) 25 mg tablet Take 1 tablet (25 mg total) by mouth 3 (three) times daily as needed.    metformin (GLUCOPHAGE-XR) 500 MG 24 hr tablet Take 2 tablets (1,000 mg total) by mouth 2 (two) times daily with meals.    ACCU-CHEK PENNY PLUS TEST STRP Strp 1 strip by Misc.(Non-Drug; Combo Route) route 2 (two) times daily.    ACCU-CHEK FASTCLIX Misc 1 each by Misc.(Non-Drug; Combo Route) route 2 (two) times daily.    acetaminophen (TYLENOL) 325 MG tablet Take 1 tablet (325 mg total) by mouth every 6 (six) hours as needed for Pain.    alcohol swabs PadM Apply 1 each topically as needed.    BD ULTRA-FINE CELSO PEN NEEDLES  "32 gauge x 5/32" Ndle Uses once daily, on daily insulin injections    blood sugar diagnostic Strp 1 strip by Misc.(Non-Drug; Combo Route) route 3 (three) times daily. PT CHOICE    BLOOD-GLUCOSE METER (CONTOUR USB MISC) by Misc.(Non-Drug; Combo Route) route.    hyoscyamine (LEVSIN/SL) 0.125 mg Subl Take one or two tablets every 4 hours as needed for abdominal discomfort           Clinical Reference Information           Your Vitals Were     BP Pulse Temp Height Weight SpO2    140/73 65 98.2 °F (36.8 °C) (Oral) 5' 6" (1.676 m) 79.8 kg (175 lb 14.8 oz) 97%    BMI                28.4 kg/m2          Blood Pressure          Most Recent Value    BP  (!)  140/73      Allergies as of 5/16/2017     Pravachol [Pravastatin]      Immunizations Administered on Date of Encounter - 5/16/2017     None      Orders Placed During Today's Visit     Future Labs/Procedures Expected by Expires    CK  5/16/2017 7/15/2018    Comprehensive metabolic panel  5/16/2017 5/16/2018      Language Assistance Services     ATTENTION: Language assistance services are available, free of charge. Please call [phone].      ATENCIÓN: Si habla guadalupe, tiene a aaron disposición servicios gratuitos de asistencia lingüística. Llame al [phone].     CHÚ Ý: N?u b?n nói Ti?ng Vi?t, có các d?ch v? h? tr? ngôn ng? mi?n phí dành cho b?n. G?i s? [phone].         [facility] complies with applicable Federal civil rights laws and does not discriminate on the basis of race, color, national origin, age, disability, or sex.        "

## 2017-05-16 NOTE — PROGRESS NOTES
Subjective:       Patient ID: Saul Kirkpatrick is a 77 y.o. female.    Chief Complaint: Leg Pain (Bilateral leg pain, left knee pain)    Ms Kirkpatrick presents today with bilateral, anterior thigh pain.  It began Sunday evening in the left thigh and she felt the pain Monday in the right, though less badly.  She also has pain in the left knee. On mother's day she picked up her disabled daughter and had to lift the wheelchair into there truck.  She has done this before without incident, but can think of no other activities that would have caused muscle strain.      HPI  Review of Systems   Constitutional: Negative for fever.   HENT: Negative for facial swelling.    Eyes: Negative for visual disturbance.   Respiratory: Negative for shortness of breath.    Cardiovascular: Negative for chest pain and leg swelling.   Gastrointestinal: Negative for diarrhea, nausea and vomiting.   Genitourinary: Negative for dysuria.   Musculoskeletal: Positive for arthralgias and myalgias. Negative for gait problem.   Skin: Negative for rash.   Neurological: Negative for headaches.   Psychiatric/Behavioral: Negative for confusion.       Objective:      Physical Exam   Constitutional: She is oriented to person, place, and time. She appears well-developed and well-nourished. No distress.   HENT:   Head: Normocephalic and atraumatic.   Eyes: No scleral icterus.   Neck: Normal range of motion.   Cardiovascular: Normal rate, regular rhythm and normal heart sounds.    Pulses:       Femoral pulses are 2+ on the right side, and 2+ on the left side.       Posterior tibial pulses are 2+ on the right side, and 2+ on the left side.   Pulmonary/Chest: Effort normal and breath sounds normal. No respiratory distress. She has no wheezes.   Musculoskeletal:        Right upper leg: She exhibits no tenderness, no bony tenderness, no swelling, no edema, no deformity and no laceration.        Left upper leg: She exhibits no tenderness, no bony tenderness, no  swelling, no edema, no deformity and no laceration.   Neurological: She is alert and oriented to person, place, and time.   Skin: Skin is warm and dry. She is not diaphoretic.   Psychiatric: She has a normal mood and affect. Her behavior is normal.       Assessment:       1. Pain in both thighs    2. Statin myopathy        Plan:   1. Pain in both thighs  - Comprehensive metabolic panel; Future  - CK; Future  - acetaminophen (TYLENOL) 325 MG tablet; Take 1 tablet (325 mg total) by mouth every 6 (six) hours as needed for Pain.; Refill: 0    2. Statin myopathy  - D/C atorvastatin. PCP and diabetes team notified.  Will coordinate re-starting statin once pain has resolved.     Pt has been given instructions populated from [employer] database and has verbalized understanding of the after visit summary and information contained wherein.    Follow up with a primary care provider. May go to ER for acute shortness of breath, lightheadedness, fever, or any other emergent complaints or changes in condition.

## 2017-05-16 NOTE — TELEPHONE ENCOUNTER
She needs to see me in August BUT she needs labs in 1 month as a follow up from Sneha Boykin in thanks    Please let me know when scheduled

## 2017-05-17 NOTE — TELEPHONE ENCOUNTER
----- Message from Romana Lagunas sent at 5/17/2017  3:33 PM CDT -----  Contact: Self/ [phone]   Type: Returning a call    Who left a message? Claudia     When did the practice call? Today     Comments: pt stated she miss a call from the office. Please call and advise     Thank you

## 2017-05-18 NOTE — TELEPHONE ENCOUNTER
She needs to see me in August for EP BUT she needs labs in 3 weeks as a follow up from Sneha Moncada     Orders in thanks     Please let me know when scheduled, thanks

## 2017-05-31 ENCOUNTER — OFFICE VISIT (OUTPATIENT)
Dept: PODIATRY | Facility: CLINIC | Age: 77
End: 2017-05-31
Payer: MEDICARE

## 2017-05-31 VITALS
HEIGHT: 66 IN | SYSTOLIC BLOOD PRESSURE: 161 MMHG | BODY MASS INDEX: 28.19 KG/M2 | WEIGHT: 175.38 LBS | DIASTOLIC BLOOD PRESSURE: 85 MMHG | HEART RATE: 57 BPM

## 2017-05-31 DIAGNOSIS — B35.1 ONYCHOMYCOSIS DUE TO DERMATOPHYTE: ICD-10-CM

## 2017-05-31 DIAGNOSIS — E11.49 TYPE II DIABETES MELLITUS WITH NEUROLOGICAL MANIFESTATIONS: Primary | ICD-10-CM

## 2017-05-31 DIAGNOSIS — M20.10 HALLUX ABDUCTO VALGUS, UNSPECIFIED LATERALITY: ICD-10-CM

## 2017-05-31 DIAGNOSIS — L84 CORN OR CALLUS: ICD-10-CM

## 2017-05-31 DIAGNOSIS — L90.9 FAT PAD ATROPHY OF FOOT: ICD-10-CM

## 2017-05-31 PROCEDURE — 99499 UNLISTED E&M SERVICE: CPT | Mod: S$GLB,,, | Performed by: PODIATRIST

## 2017-05-31 PROCEDURE — 11056 PARNG/CUTG B9 HYPRKR LES 2-4: CPT | Mod: Q8,S$GLB,, | Performed by: PODIATRIST

## 2017-05-31 PROCEDURE — 1126F AMNT PAIN NOTED NONE PRSNT: CPT | Mod: S$GLB,,, | Performed by: PODIATRIST

## 2017-05-31 PROCEDURE — 11721 DEBRIDE NAIL 6 OR MORE: CPT | Mod: 59,Q8,S$GLB, | Performed by: PODIATRIST

## 2017-05-31 PROCEDURE — 99213 OFFICE O/P EST LOW 20 MIN: CPT | Mod: 25,S$GLB,, | Performed by: PODIATRIST

## 2017-05-31 PROCEDURE — 1159F MED LIST DOCD IN RCRD: CPT | Mod: S$GLB,,, | Performed by: PODIATRIST

## 2017-05-31 PROCEDURE — 99999 PR PBB SHADOW E&M-EST. PATIENT-LVL III: CPT | Mod: PBBFAC,,, | Performed by: PODIATRIST

## 2017-05-31 NOTE — PATIENT INSTRUCTIONS
Remember the importance of good nutrition and blood sugar control to help prevent foot complications of diabetes and see your internist at least ever six months. Always wear proper shoe gear. Inspect your feet daily. Always call the clinic immediately if you notice something on your feet that is not normal such as a blister, wound, or foreign object stuck in your foot.     [facility]    [address] [place] [id number]  Phone: [phone]  Fax: [phone]

## 2017-05-31 NOTE — PROGRESS NOTES
Subjective:      Patient ID: Saul Kirkpatrick is a 77 y.o. female.    Chief Complaint: Diabetic Foot Exam    Saul is a 77 y.o. female who presents to the clinic for evaluation and treatment of high risk feet. Saul has a past medical history of Anemia; Anxiety; Back pain; Cataract; Cerebral atherosclerosis (9/17/2015); Cerebrovascular disease; Colon polyp (2012); Degenerative disc disease; Degenerative disc disease; Depression; Diabetes with neurologic complications; Diabetic retinopathy; Diverticulosis; Gastroesophageal reflux disease without esophagitis (8/13/2015); GERD (gastroesophageal reflux disease); Glaucoma; Hyperlipidemia; Hypertension; Iritis - Left Eye (4/15/2013); Microalbuminuria; Multinodular thyroid; Polyneuropathy; Screening (9/22/2016); Stroke (1999); Thyroid disease; Trouble in sleeping; Type 2 diabetes with peripheral circulatory disorder, controlled; Type 2 diabetes, uncontrolled, with background retinopathy with macular edema (11/18/2015); Type II or unspecified type diabetes mellitus with ophthalmic manifestations, uncontrolled; Type II or unspecified type diabetes mellitus with renal manifestations, uncontrolled; and UARS (upper airway resistance syndrome) (2/5/2015). The patient's chief complaint is long, thick toenails. This patient has documented high risk feet requiring routine maintenance secondary to diabetes mellitis and those secondary complications of diabetes, as mentioned..  She also explains that she is having more pain in her feet.  She had been following up in clinic regularly for routine diabetic foot check but somehow was lost to follow-up.  She now relates her pain in her feet as a burning 5 out of 10 and especially along the pads of her feet.  She states that weightbearing has been very difficult and inquires about diabetic shoes and insoles for better support.     PCP: Penny Randolph MD    Date Last Seen by PCP: 5/16/17  Current shoe gear:  Affected Foot: Casual  shoes     Unaffected Foot: Casual shoes    Hemoglobin A1C   Date Value Ref Range Status   10/26/2016 8.0 (H) 4.5 - 6.2 % Final     Comment:     According to ADA guidelines, hemoglobin A1C <7.0% represents  optimal control in non-pregnant diabetic patients.  Different  metrics may apply to specific populations.   Standards of Medical Care in Diabetes - 2016.  For the purpose of screening for the presence of diabetes:  <5.7%     Consistent with the absence of diabetes  5.7-6.4%  Consistent with increasing risk for diabetes   (prediabetes)  >or=6.5%  Consistent with diabetes  Currently no consensus exists for use of hemoglobin A1C  for diagnosis of diabetes for children.     06/29/2016 9.4 (H) 4.5 - 6.2 % Final   11/17/2015 7.5 (H) 4.5 - 6.2 % Final       Review of Systems   Constitution: Negative for chills and fever.   Cardiovascular: Negative for chest pain.   Respiratory: Negative for shortness of breath.    Gastrointestinal: Negative for nausea and vomiting.           Objective:      Physical Exam   Constitutional: She is oriented to person, place, and time. She appears well-developed and well-nourished. No distress.   Cardiovascular:   Pulses:       Dorsalis pedis pulses are 1+ on the right side, and 1+ on the left side.        Posterior tibial pulses are 1+ on the right side, and 0 on the left side.   Capillary fill time 3-5 seconds to digits bilateral feet.   Musculoskeletal:   Lower extremities:    Deformities: hallux abductovalgus both feet with plantar fat pad atrophy.    Normal arch height and rectus feet bilaterally.     5/5 muscle strength and tone in all four quadrants bilaterally.     Pain-free range of motion in all four quadrants with stiffness and limitation bilaterally.     Pain on palpation: None     Neurological: She is alert and oriented to person, place, and time. She displays no Babinski's sign on the right side. She displays no Babinski's sign on the left side.   Plantar protective threshold  sensation by touch via 5.07 SWMF diminished to the digits bilaterally.      Skin:   Lower extremities:    Thin, dry, atrophic bilaterally. Digital hair absent bilaterally. Digits cool with proximal foot warmth.    Mild bilateral foot and ankle edema.    No varicosities, pigmentary changes bilaterally.     No wounds, drainage, malodor, erythema, interdigital maceration were noted.     Skin lesions: sub met head 5 bilaterally.     Nails are thick, dystrophic and discolored bilateral feet.   Psychiatric: She has a normal mood and affect.   Nursing note and vitals reviewed.            Assessment:       Encounter Diagnoses   Name Primary?    Type II diabetes mellitus with neurological manifestations Yes    Onychomycosis due to dermatophyte     Corn or callus     Hallux abducto valgus, unspecified laterality     Fat pad atrophy of foot          Plan:       Saul was seen today for diabetic foot exam.    Diagnoses and all orders for this visit:    Type II diabetes mellitus with neurological manifestations  -     DIABETIC SHOES FOR HOME USE    Onychomycosis due to dermatophyte  -     DIABETIC SHOES FOR HOME USE    Corn or callus  -     DIABETIC SHOES FOR HOME USE    Hallux abducto valgus, unspecified laterality  -     DIABETIC SHOES FOR HOME USE    Fat pad atrophy of foot      I counseled the patient on her conditions, their implications and medical management.    For fat pad atrophy, patient was guided on appropriate arch support to off load pressure along the ball of her feet. We discussed the use of orthotic inserts to improve weight and pressure distribution along the bottom of the feet. Patient was given a prescription for diabetic extra depth shoes with custom inserts to be casted and fitted for inserts and shoes to accommodate her bunion deformity and fat pad atrophy. Patient will followup after shoe and insert dispensal to check for any discomfort or irritations.    After cleansing with alcohol prep pad, the  above mentioned hyperkeratotic lesions were sharply trimmed x2 utilizing #15 blade to a smooth base without incident. Patient tolerated procedure well and reported comfort to the debridement sites. Patient will continue to use padding and apply prescription urea lotion or over the counter alternatives to areas of recurrent skin build up.    - Shoe inspection. Diabetic Foot Education. Patient reminded of the importance of good nutrition and blood sugar control to help prevent podiatric complications of diabetes. Patient instructed on proper foot hygeine. We discussed wearing proper shoe gear, daily foot inspections, never walking without protective shoe gear, never putting sharp instruments to feet, routine podiatric nail visits every 2-3 months.      - With patient's permission, nails were aggressively reduced and debrided x 10 to their soft tissue attachment mechanically and with electric , removing all offending nail and debris. Patient relates relief following the procedure. She will continue to monitor the areas daily, inspect her feet, wear protective shoe gear when ambulatory, moisturizer to maintain skin integrity and follow in this office in approximately 2-3 months, sooner p.r.n.

## 2017-06-05 ENCOUNTER — TELEPHONE (OUTPATIENT)
Dept: INTERNAL MEDICINE | Facility: CLINIC | Age: 77
End: 2017-06-05

## 2017-06-05 ENCOUNTER — LAB VISIT (OUTPATIENT)
Dept: LAB | Facility: HOSPITAL | Age: 77
End: 2017-06-05
Attending: INTERNAL MEDICINE
Payer: MEDICARE

## 2017-06-05 DIAGNOSIS — E11.22 DIABETES MELLITUS WITH STAGE 3 CHRONIC KIDNEY DISEASE: Chronic | ICD-10-CM

## 2017-06-05 DIAGNOSIS — E55.9 VITAMIN D INSUFFICIENCY: ICD-10-CM

## 2017-06-05 DIAGNOSIS — N18.30 DIABETES MELLITUS WITH STAGE 3 CHRONIC KIDNEY DISEASE: Chronic | ICD-10-CM

## 2017-06-05 DIAGNOSIS — M60.9 MYOSITIS, UNSPECIFIED MYOSITIS TYPE, UNSPECIFIED SITE: ICD-10-CM

## 2017-06-05 LAB
25(OH)D3+25(OH)D2 SERPL-MCNC: 35 NG/ML
ALBUMIN SERPL BCP-MCNC: 3.7 G/DL
ALP SERPL-CCNC: 78 U/L
ALT SERPL W/O P-5'-P-CCNC: 10 U/L
ANION GAP SERPL CALC-SCNC: 8 MMOL/L
AST SERPL-CCNC: 14 U/L
BASOPHILS # BLD AUTO: 0.02 K/UL
BASOPHILS NFR BLD: 0.4 %
BILIRUB SERPL-MCNC: 0.4 MG/DL
BUN SERPL-MCNC: 25 MG/DL
CALCIUM SERPL-MCNC: 9.4 MG/DL
CHLORIDE SERPL-SCNC: 103 MMOL/L
CHOLEST/HDLC SERPL: 3.9 {RATIO}
CK SERPL-CCNC: 460 U/L
CO2 SERPL-SCNC: 26 MMOL/L
CREAT SERPL-MCNC: 1.2 MG/DL
DIFFERENTIAL METHOD: ABNORMAL
EOSINOPHIL # BLD AUTO: 0.2 K/UL
EOSINOPHIL NFR BLD: 3.5 %
ERYTHROCYTE [DISTWIDTH] IN BLOOD BY AUTOMATED COUNT: 14.7 %
EST. GFR  (AFRICAN AMERICAN): 50.7 ML/MIN/1.73 M^2
EST. GFR  (NON AFRICAN AMERICAN): 44 ML/MIN/1.73 M^2
GLUCOSE SERPL-MCNC: 111 MG/DL
HCT VFR BLD AUTO: 33.6 %
HDL/CHOLESTEROL RATIO: 25.9 %
HDLC SERPL-MCNC: 205 MG/DL
HDLC SERPL-MCNC: 53 MG/DL
HGB BLD-MCNC: 11.1 G/DL
LDLC SERPL CALC-MCNC: 135.2 MG/DL
LYMPHOCYTES # BLD AUTO: 1.8 K/UL
LYMPHOCYTES NFR BLD: 37.7 %
MCH RBC QN AUTO: 28.5 PG
MCHC RBC AUTO-ENTMCNC: 33 %
MCV RBC AUTO: 86 FL
MONOCYTES # BLD AUTO: 0.3 K/UL
MONOCYTES NFR BLD: 6.8 %
NEUTROPHILS # BLD AUTO: 2.5 K/UL
NEUTROPHILS NFR BLD: 51.4 %
NONHDLC SERPL-MCNC: 152 MG/DL
PLATELET # BLD AUTO: 219 K/UL
PMV BLD AUTO: 10.7 FL
POTASSIUM SERPL-SCNC: 4 MMOL/L
PROT SERPL-MCNC: 7.2 G/DL
RBC # BLD AUTO: 3.89 M/UL
SODIUM SERPL-SCNC: 137 MMOL/L
TRIGL SERPL-MCNC: 84 MG/DL
TSH SERPL DL<=0.005 MIU/L-ACNC: 1.14 UIU/ML
WBC # BLD AUTO: 4.86 K/UL

## 2017-06-05 PROCEDURE — 82550 ASSAY OF CK (CPK): CPT

## 2017-06-05 PROCEDURE — 80053 COMPREHEN METABOLIC PANEL: CPT

## 2017-06-05 PROCEDURE — 80061 LIPID PANEL: CPT

## 2017-06-05 PROCEDURE — 85025 COMPLETE CBC W/AUTO DIFF WBC: CPT

## 2017-06-05 PROCEDURE — 36415 COLL VENOUS BLD VENIPUNCTURE: CPT | Mod: PO

## 2017-06-05 PROCEDURE — 84443 ASSAY THYROID STIM HORMONE: CPT

## 2017-06-05 PROCEDURE — 82306 VITAMIN D 25 HYDROXY: CPT

## 2017-06-05 PROCEDURE — 83036 HEMOGLOBIN GLYCOSYLATED A1C: CPT

## 2017-06-05 RX ORDER — GLIMEPIRIDE 4 MG/1
TABLET ORAL
Qty: 90 TABLET | Refills: 1 | Status: CANCELLED | OUTPATIENT
Start: 2017-06-05

## 2017-06-06 ENCOUNTER — PATIENT MESSAGE (OUTPATIENT)
Dept: INTERNAL MEDICINE | Facility: CLINIC | Age: 77
End: 2017-06-06

## 2017-06-06 LAB
ESTIMATED AVG GLUCOSE: 166 MG/DL
HBA1C MFR BLD HPLC: 7.4 %

## 2017-06-06 NOTE — TELEPHONE ENCOUNTER
Spoke to pt and advised. Pt advised that she was seen by Sneha Moncada NP for thigh pain and advised to stop Lipitor because of the pain. Placed on recall list for annual in September.

## 2017-06-06 NOTE — TELEPHONE ENCOUNTER
Labs stable other than her cholesterol is high.    Please confirm whether she is taking 80 mg of Lipitor    Also, she needs to see me in September for her annual exam, please schedule and let me know thanks

## 2017-06-07 DIAGNOSIS — E11.22 DIABETES MELLITUS WITH STAGE 3 CHRONIC KIDNEY DISEASE: Chronic | ICD-10-CM

## 2017-06-07 DIAGNOSIS — N18.30 DIABETES MELLITUS WITH STAGE 3 CHRONIC KIDNEY DISEASE: Chronic | ICD-10-CM

## 2017-06-07 RX ORDER — GLIMEPIRIDE 4 MG/1
TABLET ORAL
Qty: 90 TABLET | Refills: 0 | Status: SHIPPED | OUTPATIENT
Start: 2017-06-07 | End: 2017-10-24 | Stop reason: SDUPTHER

## 2017-07-20 ENCOUNTER — TELEPHONE (OUTPATIENT)
Dept: INTERNAL MEDICINE | Facility: CLINIC | Age: 77
End: 2017-07-20

## 2017-07-20 NOTE — TELEPHONE ENCOUNTER
----- Message from Hernan Liz sent at 7/20/2017  8:41 AM CDT -----  Contact: [facility] [phone]  Requesting a call back regarding the above pt, stated that she was admitted last night and did not bring her medication list, would like to know what she's currently taken, please call and advice    Thanks

## 2017-07-20 NOTE — TELEPHONE ENCOUNTER
Spoke to Kathy and she stated that had elevated BP but she will be discharged today the pt stated that she haven't been taken med

## 2017-07-25 ENCOUNTER — HOSPITAL ENCOUNTER (OUTPATIENT)
Dept: CARDIOLOGY | Facility: CLINIC | Age: 77
Discharge: HOME | End: 2017-07-25
Payer: MEDICARE

## 2017-07-25 ENCOUNTER — OFFICE VISIT (OUTPATIENT)
Dept: INTERNAL MEDICINE | Facility: CLINIC | Age: 77
End: 2017-07-25
Payer: MEDICARE

## 2017-07-25 VITALS
HEIGHT: 65 IN | SYSTOLIC BLOOD PRESSURE: 125 MMHG | BODY MASS INDEX: 28.65 KG/M2 | WEIGHT: 171.94 LBS | DIASTOLIC BLOOD PRESSURE: 70 MMHG

## 2017-07-25 DIAGNOSIS — E66.3 OVERWEIGHT (BMI 25.0-29.9): ICD-10-CM

## 2017-07-25 DIAGNOSIS — E78.2 MIXED DIABETIC HYPERLIPIDEMIA ASSOCIATED WITH TYPE 2 DIABETES MELLITUS: Chronic | ICD-10-CM

## 2017-07-25 DIAGNOSIS — N18.30 CKD STAGE 3 DUE TO TYPE 2 DIABETES MELLITUS: ICD-10-CM

## 2017-07-25 DIAGNOSIS — E04.2 MULTINODULAR THYROID: Chronic | ICD-10-CM

## 2017-07-25 DIAGNOSIS — E11.69 MIXED DIABETIC HYPERLIPIDEMIA ASSOCIATED WITH TYPE 2 DIABETES MELLITUS: Chronic | ICD-10-CM

## 2017-07-25 DIAGNOSIS — N18.30 DIABETES MELLITUS WITH STAGE 3 CHRONIC KIDNEY DISEASE: Chronic | ICD-10-CM

## 2017-07-25 DIAGNOSIS — M79.10 MUSCLE PAIN: ICD-10-CM

## 2017-07-25 DIAGNOSIS — E11.22 CKD STAGE 3 DUE TO TYPE 2 DIABETES MELLITUS: ICD-10-CM

## 2017-07-25 DIAGNOSIS — I67.2 CEREBRAL ATHEROSCLEROSIS: ICD-10-CM

## 2017-07-25 DIAGNOSIS — R80.9 DIABETES MELLITUS WITH PROTEINURIA: Chronic | ICD-10-CM

## 2017-07-25 DIAGNOSIS — M81.0 OSTEOPOROSIS WITHOUT CURRENT PATHOLOGICAL FRACTURE, UNSPECIFIED OSTEOPOROSIS TYPE: ICD-10-CM

## 2017-07-25 DIAGNOSIS — I10 HYPERTENSION, ESSENTIAL: Chronic | ICD-10-CM

## 2017-07-25 DIAGNOSIS — I63.00 CEREBROVASCULAR ACCIDENT (CVA) DUE TO THROMBOSIS OF PRECEREBRAL ARTERY: ICD-10-CM

## 2017-07-25 DIAGNOSIS — I10 ESSENTIAL HYPERTENSION: ICD-10-CM

## 2017-07-25 DIAGNOSIS — E11.49 TYPE II DIABETES MELLITUS WITH NEUROLOGICAL MANIFESTATIONS: Chronic | ICD-10-CM

## 2017-07-25 DIAGNOSIS — E55.9 VITAMIN D INSUFFICIENCY: ICD-10-CM

## 2017-07-25 DIAGNOSIS — I77.9 LEFT-SIDED CAROTID ARTERY DISEASE: Chronic | ICD-10-CM

## 2017-07-25 DIAGNOSIS — I67.89 CEREBRAL MICROVASCULAR DISEASE: ICD-10-CM

## 2017-07-25 DIAGNOSIS — I70.0 AORTIC ARCH ATHEROSCLEROSIS: Chronic | ICD-10-CM

## 2017-07-25 DIAGNOSIS — E11.29 DIABETES MELLITUS WITH PROTEINURIA: Chronic | ICD-10-CM

## 2017-07-25 DIAGNOSIS — D53.9 NUTRITIONAL ANEMIA: ICD-10-CM

## 2017-07-25 DIAGNOSIS — D64.9 ANEMIA, UNSPECIFIED TYPE: ICD-10-CM

## 2017-07-25 DIAGNOSIS — E11.22 DIABETES MELLITUS WITH STAGE 3 CHRONIC KIDNEY DISEASE: Chronic | ICD-10-CM

## 2017-07-25 DIAGNOSIS — I51.89 LEFT VENTRICULAR DIASTOLIC DYSFUNCTION WITH PRESERVED SYSTOLIC FUNCTION: ICD-10-CM

## 2017-07-25 DIAGNOSIS — I16.0 HYPERTENSIVE URGENCY: ICD-10-CM

## 2017-07-25 DIAGNOSIS — I63.00 CEREBROVASCULAR ACCIDENT (CVA) DUE TO THROMBOSIS OF PRECEREBRAL ARTERY: Primary | ICD-10-CM

## 2017-07-25 LAB
DIASTOLIC DYSFUNCTION: YES
ESTIMATED PA SYSTOLIC PRESSURE: 29.01
MITRAL VALVE REGURGITATION: ABNORMAL
RETIRED EF AND QEF - SEE NOTES: 60 (ref 55–65)
TRICUSPID VALVE REGURGITATION: ABNORMAL

## 2017-07-25 PROCEDURE — 99215 OFFICE O/P EST HI 40 MIN: CPT | Mod: S$GLB,,, | Performed by: INTERNAL MEDICINE

## 2017-07-25 PROCEDURE — 1125F AMNT PAIN NOTED PAIN PRSNT: CPT | Mod: S$GLB,,, | Performed by: INTERNAL MEDICINE

## 2017-07-25 PROCEDURE — 1159F MED LIST DOCD IN RCRD: CPT | Mod: S$GLB,,, | Performed by: INTERNAL MEDICINE

## 2017-07-25 PROCEDURE — 99999 PR PBB SHADOW E&M-EST. PATIENT-LVL IV: CPT | Mod: PBBFAC,,, | Performed by: INTERNAL MEDICINE

## 2017-07-25 PROCEDURE — 93306 TTE W/DOPPLER COMPLETE: CPT | Mod: S$GLB,,, | Performed by: INTERNAL MEDICINE

## 2017-07-25 PROCEDURE — 99499 UNLISTED E&M SERVICE: CPT | Mod: S$GLB,,, | Performed by: INTERNAL MEDICINE

## 2017-07-25 RX ORDER — ASPIRIN 81 MG/1
81 TABLET ORAL DAILY
Refills: 0 | COMMUNITY
Start: 2017-07-25 | End: 2018-08-14 | Stop reason: SDUPTHER

## 2017-07-25 RX ORDER — ALPRAZOLAM 0.5 MG/1
0.5 TABLET ORAL NIGHTLY PRN
Qty: 30 TABLET | Refills: 0
Start: 2017-07-25 | End: 2017-11-21 | Stop reason: SDUPTHER

## 2017-07-25 RX ORDER — AMLODIPINE BESYLATE 5 MG/1
5 TABLET ORAL DAILY
Qty: 90 TABLET | Refills: 3 | Status: SHIPPED | OUTPATIENT
Start: 2017-07-25 | End: 2017-10-24 | Stop reason: SDUPTHER

## 2017-07-25 NOTE — PROGRESS NOTES
Transitional Care Note  Subjective:       Patient ID: Saul Kirkpatrick is a 77 y.o. female.  Chief Complaint: Hospital Follow Up    Family and/or Caretaker present at visit?  No.  Diagnostic tests reviewed/disposition: No diagnosic tests pending after this hospitalization.  Disease/illness education: yes  Home health/community services discussion/referrals: Patient does not have home health established from hospital visit.  They do not need home health.  If needed, we will set up home health for the patient.   Establishment or re-establishment of referral orders for community resources: No other necessary community resources.   Discussion with other health care providers: No discussion with other health care providers necessary.     Admitted with hypertensive emergency.  [facility].  Records reveal CVA due to thrombosis of precerebral artery.  Hospital chart reveals anemia with a hemoglobin of 10.9 and hematocrit of 32.9.  Creatinine was 1.37.  Other labs acceptable.  Head CT revealed mild periventricular white matter microangiopathic change but no acute intracranial abnormalities.  EKG revealed sinus bradycardia.  Chest x-ray showed no findings.  MRI revealed acute infarction at the left posterior baylee.  She denies any medication non compliance. CT scan, EKG, labs and MRI all done.  Medications adjusted and she was discharged.    BP now doing well.    Carotids to be reassessed.      Labs in June all acceptable other than CKD and chronic anemia; CPK at 460.  Still on lipid meds.      It was not recommended that she see a neurologist.  Medications were adjusted.  No further symptoms since hospitalization.  Ambulating well and eating and drinking well.    She seems to divide her care getting most of her care in [place] but then chooses to come here for her primary care in ophthalmology.  This has led to some lack of coordination of care which I'm trying to work with her about but she does not want to get  her primary care Gerry Fuentes nor her other subspecialty care here.    Patient Active Problem List   Diagnosis    Mixed diabetic hyperlipidemia associated with type 2 diabetes mellitus    Degenerative disc disease    Colon polyp: tubular adenoma 5/13    Multinodular thyroid: thyroid u/s 7/16    POAG (primary open-angle glaucoma) - Both Eyes    Amaurosis fugax    Nuclear sclerosis - Right Eye    Eyelid myokymia    Cerebral microvascular disease: stroke ? 1999 elsewhere; TIA 10/13    Left-sided carotid artery disease    Vitamin D insufficiency    Left ventricular diastolic dysfunction with preserved systolic function    UARS (upper airway resistance syndrome)    Hypertension, essential    Hiatal hernia with GERD    Type 2 diabetes, uncontrolled, with background retinopathy with macular edema    Anxiety    Overweight (BMI 25.0-29.9)    Diabetes mellitus with proteinuria    Type II diabetes mellitus with neurological manifestations    Aortic arch atherosclerosis    Abnormal ankle brachial index    Diabetes mellitus with stage 3 chronic kidney disease    Cerebrovascular accident (CVA) due to thrombosis of precerebral artery    Hypertensive urgency         HPI  Review of Systems   Constitutional: Positive for fatigue. Negative for activity change, appetite change, chills and fever.   HENT: Negative for congestion, hearing loss, sinus pressure and sore throat.    Eyes: Negative for visual disturbance.   Respiratory: Negative for apnea, cough, shortness of breath and wheezing.    Cardiovascular: Negative for chest pain, palpitations and leg swelling.   Gastrointestinal: Negative for abdominal distention, abdominal pain, constipation, diarrhea, nausea and vomiting.   Genitourinary: Negative for dysuria, frequency, hematuria and vaginal bleeding.   Musculoskeletal: Negative for gait problem, joint swelling and myalgias.   Skin: Negative for rash.   Neurological: Negative for dizziness, weakness,  light-headedness and headaches.        No new sx  She states she had a little bit of lightheadedness which is why she went to the emergency room in the first place at [facility]    No headaches.  No blurred vision, double vision, slurred speech or focal weakness.   Hematological: Negative for adenopathy. Does not bruise/bleed easily.   Psychiatric/Behavioral: Negative for confusion, hallucinations, sleep disturbance and suicidal ideas.       Objective:      Physical Exam   Constitutional: She is oriented to person, place, and time. She appears well-developed and well-nourished.   HENT:   Head: Normocephalic and atraumatic.   Right Ear: External ear normal.   Left Ear: External ear normal.   Nose: Nose normal.   Mouth/Throat: Oropharynx is clear and moist. No oropharyngeal exudate.   Eyes: Conjunctivae and EOM are normal. No scleral icterus.   Neck: Normal range of motion. Neck supple. No JVD present. No thyromegaly present.   Carotid bruits bilatrally   Cardiovascular: Normal rate, regular rhythm, normal heart sounds and intact distal pulses.  Exam reveals no gallop.    No murmur heard.  Pulmonary/Chest: Effort normal and breath sounds normal. No respiratory distress. She has no wheezes.   Abdominal: Soft. Bowel sounds are normal. She exhibits no distension and no mass. There is no tenderness. There is no rebound and no guarding.   Musculoskeletal: Normal range of motion. She exhibits no edema or tenderness.   Lymphadenopathy:     She has no cervical adenopathy.   Neurological: She is alert and oriented to person, place, and time. She displays normal reflexes. No cranial nerve deficit. Coordination normal.   Skin: Skin is warm. No rash noted. No erythema.   Psychiatric: She has a normal mood and affect. Her behavior is normal. Judgment and thought content normal.   Nursing note and vitals reviewed.      Assessment:       1. Cerebrovascular accident (CVA) due to thrombosis of precerebral artery    2. Type  II diabetes mellitus with neurological manifestations    3. Cerebral microvascular disease: stroke ? 1999 elsewhere; TIA 10/13    4. Aortic arch atherosclerosis    5. Hypertensive urgency    6. Left-sided carotid artery disease    7. Left ventricular diastolic dysfunction with preserved systolic function    8. Hypertension, essential    9. Diabetes mellitus with proteinuria    10. Diabetes mellitus with stage 3 chronic kidney disease    11. Type 2 diabetes, uncontrolled, with background retinopathy with macular edema    12. Mixed diabetic hyperlipidemia associated with type 2 diabetes mellitus    13. Multinodular thyroid: thyroid u/s 7/16    14. Overweight (BMI 25.0-29.9)    15. Vitamin D insufficiency    16. Anemia, unspecified type    17. Essential hypertension    18. CKD stage 3 due to type 2 diabetes mellitus    19. Nutritional anemia     20. Osteoporosis without current pathological fracture, unspecified osteoporosis type    21. Muscle pain        Plan:         Cerebrovascular accident (CVA) due to thrombosis of precerebral artery  -     CANE FOR HOME USE  -     Ambulatory Referral to Neurology  -     2D Echo w/ Color Flow Doppler; Future    Type II diabetes mellitus with neurological manifestations: Continue regimen, labs and review    Cerebral microvascular disease: stroke ? 1999 elsewhere; TIA 10/13  -     aspirin (ECOTRIN) 81 MG EC tablet; Take 1 tablet (81 mg total) by mouth once daily.; Refill: 0    Aortic arch atherosclerosis: Echo and review    Hypertensive urgency: BP stable at present, continue to monitor closely    Left-sided carotid artery disease; carotid ultrasound    Left ventricular diastolic dysfunction with preserved systolic function: Echo    Hypertension, essential: Continue regimen    Diabetes mellitus with proteinuria    Diabetes mellitus with stage 3 chronic kidney disease; compliance with regimen urged.  Gentle hydration.  Nephrology follow-up  -     Ambulatory consult to  Nephrology    Type 2 diabetes, uncontrolled, with background retinopathy with macular edema: A1c and review    Mixed diabetic hyperlipidemia associated with type 2 diabetes mellitus    Multinodular thyroid: thyroid u/s 7/16  -     US Soft Tissue Head Neck Thyroid; Future; Expected date: 07/25/2017    Overweight (BMI 25.0-29.9); steadily improving.  Activity as tolerated.  Diet reviewed    Vitamin D insufficiency    Anemia, unspecified type  -     Protein electrophoresis, serum; Future; Expected date: 07/25/2017  -     Iron and TIBC; Future; Expected date: 07/25/2017  -     Ferritin; Future; Expected date: 07/25/2017  -     CBC auto differential; Future; Expected date: 07/25/2017  -     Vitamin B12; Future; Expected date: 07/25/2017    Essential hypertension  -     aspirin (ECOTRIN) 81 MG EC tablet; Take 1 tablet (81 mg total) by mouth once daily.; Refill: 0  -     amlodipine (NORVASC) 5 MG tablet; Take 1 tablet (5 mg total) by mouth once daily.  Dispense: 90 tablet; Refill: 3    CKD stage 3 due to type 2 diabetes mellitus  -     Renal function panel; Future; Expected date: 07/25/2017    Nutritional anemia   -     Vitamin B12; Future; Expected date: 07/25/2017    Osteoporosis without current pathological fracture, unspecified osteoporosis type  -     DXA Bone Density Spine And Hip; Future; Expected date: 07/25/2017    Muscle pain  -     CK; Future; Expected date: 07/25/2017    Other orders  -     alprazolam (XANAX) 0.5 MG tablet; Take 1 tablet (0.5 mg total) by mouth nightly as needed for Anxiety (may take no more than 1-2 x weekly for anxiety).  Dispense: 30 tablet; Refill: 0    I will review all studies and determine further tx depending on findings    EP 6 weeks    Patient evaluated for over 60 minutes with this appoinment, including diagnostic testing and treatment.  All questions answered,  chart reviewed and care coordinated.

## 2017-07-28 ENCOUNTER — TELEPHONE (OUTPATIENT)
Dept: INTERNAL MEDICINE | Facility: CLINIC | Age: 77
End: 2017-07-28

## 2017-07-28 DIAGNOSIS — D50.1 IRON DEFICIENCY ANEMIA DUE TO SIDEROPENIC DYSPHAGIA: ICD-10-CM

## 2017-07-28 RX ORDER — OMEPRAZOLE 20 MG/1
20 CAPSULE, DELAYED RELEASE ORAL DAILY
Qty: 30 CAPSULE | Refills: 11 | Status: SHIPPED | OUTPATIENT
Start: 2017-07-28 | End: 2017-07-31 | Stop reason: SDUPTHER

## 2017-07-28 RX ORDER — FERROUS SULFATE 325(65) MG
325 TABLET ORAL 2 TIMES DAILY
Qty: 60 TABLET | Refills: 12
Start: 2017-07-28 | End: 2017-07-31 | Stop reason: SDUPTHER

## 2017-07-28 NOTE — TELEPHONE ENCOUNTER
Please let her know that her labs are acceptable other than anemia and low iron.  She needs to get back on iron twice daily.  When she comes to see me, I will want her to have additional blood work done (orders in).    The anemia may be related to all the labs that were drawn when she was in the hospital, but if it does not improve we may have to do an endoscopy.     She should also take Prilosec 20 mg daily    Be sure to keep all other follow-up appointments.  thanks

## 2017-07-31 RX ORDER — OMEPRAZOLE 20 MG/1
20 CAPSULE, DELAYED RELEASE ORAL DAILY
Qty: 30 CAPSULE | Refills: 11 | Status: SHIPPED | OUTPATIENT
Start: 2017-07-31 | End: 2018-07-31

## 2017-07-31 RX ORDER — FERROUS SULFATE 325(65) MG
325 TABLET ORAL 2 TIMES DAILY
Qty: 60 TABLET | Refills: 12 | Status: SHIPPED | OUTPATIENT
Start: 2017-07-31 | End: 2018-08-02 | Stop reason: SDUPTHER

## 2017-08-02 ENCOUNTER — HOSPITAL ENCOUNTER (OUTPATIENT)
Dept: RADIOLOGY | Facility: HOSPITAL | Age: 77
Discharge: HOME | End: 2017-08-02
Attending: INTERNAL MEDICINE
Payer: MEDICARE

## 2017-08-02 ENCOUNTER — PATIENT MESSAGE (OUTPATIENT)
Dept: INTERNAL MEDICINE | Facility: CLINIC | Age: 77
End: 2017-08-02

## 2017-08-02 DIAGNOSIS — E04.2 MULTINODULAR THYROID: Chronic | ICD-10-CM

## 2017-08-02 PROCEDURE — 76536 US EXAM OF HEAD AND NECK: CPT | Mod: 26,,, | Performed by: RADIOLOGY

## 2017-08-02 PROCEDURE — 76536 US EXAM OF HEAD AND NECK: CPT | Mod: TC,PO

## 2017-08-03 ENCOUNTER — OFFICE VISIT (OUTPATIENT)
Dept: NEPHROLOGY | Facility: CLINIC | Age: 77
End: 2017-08-03
Payer: MEDICARE

## 2017-08-03 VITALS
HEART RATE: 60 BPM | HEIGHT: 66 IN | WEIGHT: 171.75 LBS | DIASTOLIC BLOOD PRESSURE: 62 MMHG | BODY MASS INDEX: 27.6 KG/M2 | SYSTOLIC BLOOD PRESSURE: 132 MMHG

## 2017-08-03 DIAGNOSIS — E11.29 PROTEINURIA DUE TO TYPE 2 DIABETES MELLITUS: ICD-10-CM

## 2017-08-03 DIAGNOSIS — R80.9 PROTEINURIA DUE TO TYPE 2 DIABETES MELLITUS: ICD-10-CM

## 2017-08-03 DIAGNOSIS — N18.30 CKD (CHRONIC KIDNEY DISEASE) STAGE 3, GFR 30-59 ML/MIN: Primary | ICD-10-CM

## 2017-08-03 PROCEDURE — 99214 OFFICE O/P EST MOD 30 MIN: CPT | Mod: S$GLB,,, | Performed by: INTERNAL MEDICINE

## 2017-08-03 PROCEDURE — 1159F MED LIST DOCD IN RCRD: CPT | Mod: S$GLB,,, | Performed by: INTERNAL MEDICINE

## 2017-08-03 PROCEDURE — 99999 PR PBB SHADOW E&M-EST. PATIENT-LVL IV: CPT | Mod: PBBFAC,,, | Performed by: INTERNAL MEDICINE

## 2017-08-03 PROCEDURE — 99499 UNLISTED E&M SERVICE: CPT | Mod: S$GLB,,, | Performed by: INTERNAL MEDICINE

## 2017-08-03 PROCEDURE — 1126F AMNT PAIN NOTED NONE PRSNT: CPT | Mod: S$GLB,,, | Performed by: INTERNAL MEDICINE

## 2017-08-03 NOTE — PATIENT INSTRUCTIONS
Hydrate with 60-65 ounces of water per day.       Avoid NSAID pain medications such as advil, aleve, motrin, ibuprofen, naprosyn, meloxicam, diclofenac, mobic.

## 2017-08-03 NOTE — PROGRESS NOTES
PROGRESS NOTE FOR ESTABLISHED PATIENT    PHYSICIAN REQUESTING THE CONSULT: Dr. Penny Randolph    REASON FOR CONSULTATION: Renal insufficiency    77 y.o. female with history of CKD 3, DM2, HTN, diabetic retinopathy, diastolic CHF, CVA, GERD presents to the renal clinic for evaluation of renal insufficiency.     Patient was last seen by Dr. Valenzuela on 10/31/16.    She presents to renal clinic for follow up.    Patient today has no complaints.           Past Medical History:   Diagnosis Date    Anemia     sickle cell trait    Anxiety     Back pain     Cataract     Cerebral atherosclerosis 9/17/2015    Cerebrovascular disease     Colon polyp 2012    colonoscopy 9/22/2016    Degenerative disc disease     Degenerative disc disease     Depression     Diabetes with neurologic complications     Diabetic retinopathy     Diverticulosis     colonoscopy 9/22/2016    Gastroesophageal reflux disease without esophagitis 8/13/2015    GERD (gastroesophageal reflux disease)     Glaucoma     Hyperlipidemia     Hypertension     Iritis - Left Eye 4/15/2013    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    Microalbuminuria     Multinodular thyroid     Polyneuropathy     Screening 9/22/2016    Stroke 1999    affected her memory but did not have lateralized complaints    Thyroid disease     Trouble in sleeping     Type 2 diabetes with peripheral circulatory disorder, controlled     Type 2 diabetes, uncontrolled, 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/18/2015    Type II or unspecified type diabetes mellitus with ophthalmic manifestations, uncontrolled     Diabetic retinopathy [362.0]    Type II or unspecified type diabetes mellitus with renal manifestations, uncontrolled     UARS (upper airway resistance syndrome) 2/5/2015       Past Surgical History:   Procedure Laterality Date    CATARACT EXTRACTION W/  INTRAOCULAR LENS IMPLANT Left 2/27/13    OS Dr. Taveras    COLONOSCOPY       "COLONOSCOPY N/A 9/22/2016    Procedure: COLONOSCOPY;  Surgeon: Jd Ashton MD;  Location: [facility] ([address] [place] [address]);  Service: Endoscopy;  Laterality: N/A;  OK per Dr Randolph for pt to hold Plavix 5 days prior to procedure/see telephone encounter dated 8/29/16/svn    EYE SURGERY Bilateral 2002 approx    Laser for glaucoma    HYSTERECTOMY  1963     AMEENA/USO- fibroids; no cancer    OOPHORECTOMY  1963    unknown, only removed one       Review of patient's allergies indicates:   Allergen Reactions    Pravachol [pravastatin] Nausea Only       Current Outpatient Prescriptions   Medication Sig Dispense Refill    ACCU-CHEK PENNY PLUS TEST STRP Strp 1 strip by Misc.(Non-Drug; Combo Route) route 2 (two) times daily. 100 strip 6    ACCU-CHEK FASTCLIX Misc 1 each by Misc.(Non-Drug; Combo Route) route 2 (two) times daily. 100 each 6    acetaminophen (TYLENOL) 325 MG tablet Take 1 tablet (325 mg total) by mouth every 6 (six) hours as needed for Pain.  0    alcohol swabs PadM Apply 1 each topically as needed. 100 each 11    alprazolam (XANAX) 0.5 MG tablet Take 1 tablet (0.5 mg total) by mouth nightly as needed for Anxiety (may take no more than 1-2 x weekly for anxiety). 30 tablet 0    amlodipine (NORVASC) 5 MG tablet Take 1 tablet (5 mg total) by mouth once daily. 90 tablet 3    aspirin (ECOTRIN) 81 MG EC tablet Take 1 tablet (81 mg total) by mouth once daily.  0    atorvastatin (LIPITOR) 80 MG tablet Take 1 tablet (80 mg total) by mouth once daily. 90 tablet 2    BD ULTRA-FINE CELSO PEN NEEDLES 32 gauge x 5/32" Ndle Uses once daily, on daily insulin injections 90 each 2    blood sugar diagnostic Strp 1 strip by Misc.(Non-Drug; Combo Route) route 3 (three) times daily. PT CHOICE 300 each 11    BLOOD-GLUCOSE METER (CONTOUR USB MISC) by Misc.(Non-Drug; Combo Route) route.      brimonidine 0.2% (ALPHAGAN) 0.2 % Drop Place 1 drop into both eyes 3 (three) times daily. 10 mL 11    cholecalciferol, vitamin D3, 1,000 unit " capsule Take 2 capsules (2,000 Units total) by mouth once daily. 60 capsule 12    clopidogrel (PLAVIX) 75 mg tablet TAKE 1 TABLET BY MOUTH EVERY DAY (BLOOD THINNER) 90 tablet 3    ferrous sulfate 325 mg (65 mg iron) Tab tablet Take 1 tablet (325 mg total) by mouth 2 (two) times daily. 60 tablet 12    glimepiride (AMARYL) 4 MG tablet TAKE 1 TABLET BY MOUTH IN THE MORNING WITH BREAKFAST 90 tablet 0    hydrochlorothiazide (HYDRODIURIL) 12.5 MG Tab Take 1 (12.5 mg) po qd 30 tablet 11    losartan (COZAAR) 50 MG tablet Take 1 tablet (50 mg total) by mouth 2 (two) times daily. 180 tablet 3    meclizine (ANTIVERT) 25 mg tablet Take 1 tablet (25 mg total) by mouth 3 (three) times daily as needed. 90 tablet 3    metformin (GLUCOPHAGE-XR) 500 MG 24 hr tablet Take 2 tablets (1,000 mg total) by mouth 2 (two) times daily with meals. 360 tablet 2    omeprazole (PRILOSEC) 20 MG capsule Take 1 capsule (20 mg total) by mouth once daily. 30 capsule 11     No current facility-administered medications for this visit.        Family History   Problem Relation Age of Onset    Stroke Mother     Mental illness Mother     Hypertension Mother     Stroke Father     Diabetes Maternal Grandmother     Drug abuse Daughter     Cancer Sister 68     gyn    Cancer Maternal Uncle      type not known    Heart disease Paternal Grandmother     Glaucoma Neg Hx     Macular degeneration Neg Hx     Alcohol abuse Neg Hx     COPD Neg Hx     Asthma Neg Hx        Social History     Social History    Marital status:      Spouse name: N/A    Number of children: N/A    Years of education: N/A     Occupational History    Not on file.     Social History Main Topics    Smoking status: Never Smoker    Smokeless tobacco: Never Used    Alcohol use No    Drug use: No    Sexual activity: No     Other Topics Concern    Not on file     Social History Narrative    , lives alone. 1 Child who lives in nursing home. She has a  "grandson who lives in [place].. Retired from [employer] . Still drives. Does not have a Living Will or advanced directive.       Review of Systems:  1. GENERAL: patient denies any fever, weight changes, generalized weakness, dizziness.  2. HEENT: patient denies headaches, visual disturbances, swallowing problems, sinus pain, nasal congestion.  3. CARDIOVASCULAR: patient denies chest pain, palpitations.  4. PULMONARY: patient denies SOB, coughing, hemoptysis, wheezing.  5. GASTROINTESTINAL: patient denies abdominal pain, nausea, vomiting, diarrhea.  6. GENITOURINARY: patient denies dysuria, hematuria, hesitancy, frequency.  7. EXTREMITIES: patient denies LE edema, LE cramping.  8. DERMATOLOGY: patient denies rashes, ulcers.  9. NEURO: patient denies tremors, extremity weakness, extremity numbness/tingling.  10. MUSCULOSKELETAL: patient denies joint pain, joint swelling.  11. HEMATOLOGY: patient denies rectal or gum bleeding.  12: PSYCH: patient denies anxiety, depression.      PHYSICAL EXAM:  /62   Pulse 60   Ht 5' 6" (1.676 m)   Wt 77.9 kg (171 lb 11.8 oz)   BMI 27.72 kg/m²     GENERAL: Pleasant well groomed lady presents to clinic with non-labored breathing.  HEENT: PER, no nasal discharge, no icterus, no oral exudates, moist mucosal membranes.  NECK: no thyroid mass, no lymphadenopathy.  HEART: RRR S1/S2, no rubs, good peripheral pulses.  LUNGS: CTA bilaterally, no wheezing, breathing is nonlabored.  ABDOMEN: soft, nontender, not distended, bowel sounds are present, no abdominal hernia.  EXTREM: no LE edema.  SKIN: no rashes, skin is warm and dry.  NEURO: A & O x 3, no obvious focal signs.    LABORATORY RESULTS:    Lab Results   Component Value Date    CREATININE 1.4 07/25/2017    BUN 21 07/25/2017     07/25/2017    K 3.8 07/25/2017     07/25/2017    CO2 26 07/25/2017      Lab Results   Component Value Date    CALCIUM 9.6 07/25/2017    PHOS 3.4 07/25/2017     Lab Results   Component Value " Date    ALBUMIN 3.9 07/25/2017     Lab Results   Component Value Date    WBC 7.49 07/25/2017    HGB 10.8 (L) 07/25/2017    HCT 32.7 (L) 07/25/2017    MCV 87 07/25/2017     07/25/2017     Protein Creatinine Ratios:    Creatinine, Random Ur   Date Value Ref Range Status   10/26/2016 47.0 15.0 - 325.0 mg/dL Final     Comment:     The random urine reference ranges provided were established   for 24 hour urine collections.  No reference ranges exist for  random urine specimens.  Correlate clinically.     09/08/2016 53.0 15.0 - 325.0 mg/dL Final     Comment:     The random urine reference ranges provided were established   for 24 hour urine collections.  No reference ranges exist for  random urine specimens.  Correlate clinically.     02/05/2015 132.0 15.0 - 325.0 mg/dL Final     Comment:     The random urine reference ranges provided were established   for 24 hour urine collections.  No reference ranges exist for  random urine specimens.  Correlate clinically.       Protein, Urine Random   Date Value Ref Range Status   10/26/2016 102 (H) 0 - 15 mg/dL Final     Comment:     The random urine reference ranges provided were established   for 24 hour urine collections.  No reference ranges exist for  random urine specimens.  Correlate clinically.     09/08/2016 89 (H) 0 - 15 mg/dL Final     Comment:     The random urine reference ranges provided were established   for 24 hour urine collections.  No reference ranges exist for  random urine specimens.  Correlate clinically.       Prot/Creat Ratio, Ur   Date Value Ref Range Status   10/26/2016 2.17 (H) 0.00 - 0.20 Final   09/08/2016 1.68 (H) 0.00 - 0.20 Final           ASSESSMENT AND PLAN:  77 y.o. female with history of CKD 3, DM2, HTN, diabetic retinopathy, diastolic CHF, CVA, GERD presents to the renal clinic for evaluation of renal insufficiency.     1. Renal insufficiency: Patient presents with mild renal insufficiency, consistent with CKD stage 3. Last creatinine  was 1.4. Likely cause of her renal insufficiency is her long-standing DM2 given her proteinuria. Patient's renal function will be monitore closely and she will return to the clinic in 3 months for follow up. Patient was advised to avoid NSAID pain medications such as advil, aleve, motrin, ibuprofen, naprosyn, meloxicam, diclofenac, mobic and to hydrate with 60-65 ounces of water per day.     2. Electrolytes: Within normal limits.    3. Acid base status: No acute issues.    4. Volume: Euvolemic.     5. Hypertension: Good BP control.     6. Medications: Reviewed. Agree with current medical regimen.     7. Proteinuria: continue Losartan.     8. Anemia: monitor for now.     9. DM2: Fair blood glucose control with HgA1c at 7.4 (6/5/17).

## 2017-08-03 NOTE — LETTER
August 3, 2017      Penny Randolph MD  [address]  [place] [address]           [facility]ph[facility]  [address] [facility] [address]  [place] [address]  Phone: [phone]  Fax: [phone]          Patient: Saul Kirkpatrick   MR Number: [id number]   YOB: 1940   Date of Visit: 8/3/2017       Dear Dr. Penny Randolph:    Thank you for referring Saul Kirkpatrick to me for evaluation. Attached you will find relevant portions of my assessment and plan of care.    If you have questions, please do not hesitate to call me. I look forward to following Saul Kirkpatrick along with you.    Sincerely,    Alex Dean MD    Enclosure  CC:  No Recipients    If you would like to receive this communication electronically, please contact [email or web address] or [phone] to request more information on [employer] access.    For providers and/or their staff who would like to refer a patient to [facility], please contact us through our one-stop-shop provider referral line, [facility] , at [phone].    If you feel you have received this communication in error or would no longer like to receive these types of communications, please e-mail [email or web address]

## 2017-08-08 ENCOUNTER — APPOINTMENT (OUTPATIENT)
Dept: RADIOLOGY | Facility: CLINIC | Age: 77
End: 2017-08-08
Attending: INTERNAL MEDICINE
Payer: MEDICARE

## 2017-08-08 DIAGNOSIS — M81.0 OSTEOPOROSIS WITHOUT CURRENT PATHOLOGICAL FRACTURE, UNSPECIFIED OSTEOPOROSIS TYPE: ICD-10-CM

## 2017-08-08 PROCEDURE — 77080 DXA BONE DENSITY AXIAL: CPT | Mod: TC,PO

## 2017-08-08 PROCEDURE — 77080 DXA BONE DENSITY AXIAL: CPT | Mod: 26,,, | Performed by: INTERNAL MEDICINE

## 2017-08-17 ENCOUNTER — OFFICE VISIT (OUTPATIENT)
Dept: NEUROLOGY | Facility: CLINIC | Age: 77
End: 2017-08-17
Payer: MEDICARE

## 2017-08-17 ENCOUNTER — OFFICE VISIT (OUTPATIENT)
Dept: OPHTHALMOLOGY | Facility: CLINIC | Age: 77
End: 2017-08-17
Payer: MEDICARE

## 2017-08-17 ENCOUNTER — PATIENT MESSAGE (OUTPATIENT)
Dept: INTERNAL MEDICINE | Facility: CLINIC | Age: 77
End: 2017-08-17

## 2017-08-17 ENCOUNTER — CLINICAL SUPPORT (OUTPATIENT)
Dept: OPHTHALMOLOGY | Facility: CLINIC | Age: 77
End: 2017-08-17
Payer: MEDICARE

## 2017-08-17 VITALS
HEIGHT: 66 IN | SYSTOLIC BLOOD PRESSURE: 126 MMHG | WEIGHT: 175.94 LBS | DIASTOLIC BLOOD PRESSURE: 66 MMHG | HEART RATE: 66 BPM | BODY MASS INDEX: 28.27 KG/M2

## 2017-08-17 DIAGNOSIS — R00.2 PALPITATIONS: ICD-10-CM

## 2017-08-17 DIAGNOSIS — G51.4 EYELID MYOKYMIA: ICD-10-CM

## 2017-08-17 DIAGNOSIS — G45.3 AMAUROSIS FUGAX: ICD-10-CM

## 2017-08-17 DIAGNOSIS — H25.11 NUCLEAR SCLEROSIS, RIGHT: ICD-10-CM

## 2017-08-17 DIAGNOSIS — Z96.1 PSEUDOPHAKIA: ICD-10-CM

## 2017-08-17 DIAGNOSIS — I51.7 LAE (LEFT ATRIAL ENLARGEMENT): ICD-10-CM

## 2017-08-17 DIAGNOSIS — Z86.73 HISTORY OF RECENT STROKE: Primary | ICD-10-CM

## 2017-08-17 DIAGNOSIS — H40.1132 PRIMARY OPEN ANGLE GLAUCOMA OF BOTH EYES, MODERATE STAGE: Primary | ICD-10-CM

## 2017-08-17 PROCEDURE — 99999 PR PBB SHADOW E&M-EST. PATIENT-LVL III: CPT | Mod: PBBFAC,,, | Performed by: PHYSICIAN ASSISTANT

## 2017-08-17 PROCEDURE — 1159F MED LIST DOCD IN RCRD: CPT | Mod: S$GLB,,, | Performed by: PHYSICIAN ASSISTANT

## 2017-08-17 PROCEDURE — 99999 PR PBB SHADOW E&M-EST. PATIENT-LVL I: CPT | Mod: PBBFAC,,, | Performed by: OPHTHALMOLOGY

## 2017-08-17 PROCEDURE — 92020 GONIOSCOPY: CPT | Mod: S$GLB,,, | Performed by: OPHTHALMOLOGY

## 2017-08-17 PROCEDURE — 99499 UNLISTED E&M SERVICE: CPT | Mod: S$GLB,,, | Performed by: OPHTHALMOLOGY

## 2017-08-17 PROCEDURE — 92012 INTRM OPH EXAM EST PATIENT: CPT | Mod: S$GLB,,, | Performed by: OPHTHALMOLOGY

## 2017-08-17 PROCEDURE — 1125F AMNT PAIN NOTED PAIN PRSNT: CPT | Mod: S$GLB,,, | Performed by: PHYSICIAN ASSISTANT

## 2017-08-17 PROCEDURE — 99499 UNLISTED E&M SERVICE: CPT | Mod: S$GLB,,, | Performed by: PHYSICIAN ASSISTANT

## 2017-08-17 PROCEDURE — 3074F SYST BP LT 130 MM HG: CPT | Mod: S$GLB,,, | Performed by: PHYSICIAN ASSISTANT

## 2017-08-17 PROCEDURE — 3078F DIAST BP <80 MM HG: CPT | Mod: S$GLB,,, | Performed by: PHYSICIAN ASSISTANT

## 2017-08-17 PROCEDURE — 3008F BODY MASS INDEX DOCD: CPT | Mod: S$GLB,,, | Performed by: PHYSICIAN ASSISTANT

## 2017-08-17 PROCEDURE — 99215 OFFICE O/P EST HI 40 MIN: CPT | Mod: S$GLB,,, | Performed by: PHYSICIAN ASSISTANT

## 2017-08-17 PROCEDURE — 92134 CPTRZ OPH DX IMG PST SGM RTA: CPT | Mod: S$GLB,,, | Performed by: OPHTHALMOLOGY

## 2017-08-17 RX ORDER — BRIMONIDINE TARTRATE 2 MG/ML
1 SOLUTION/ DROPS OPHTHALMIC 3 TIMES DAILY
Qty: 30 ML | Refills: 3 | Status: SHIPPED | OUTPATIENT
Start: 2017-08-17 | End: 2018-02-22 | Stop reason: SDUPTHER

## 2017-08-17 NOTE — PROGRESS NOTES
[facility], Department of Neurology   [facility] [place] [address]  Phone:[phone]  Fax: [phone]    Patient name: Saul Kirkpatrick  : 1940  MRN: [id number]    2017      Chief complaint:   Chief Complaint   Patient presents with    Consult       PCP: Penny Randolph MD    Assessment:     ICD-10-CM ICD-9-CM    1. History of recent stroke Z86.73 V12.54 Cardiac event monitor      Ambulatory consult to Physical Therapy   2. LAE (left atrial enlargement) I51.7 429.3 Cardiac event monitor   3. Palpitations  R00.2 785.1 Cardiac event monitor       Dx: hx left pontine stroke, likely small vessel   Stroke risk factors: HTN, HLD, DMII, hx stroke     Plan:  -have asked pt to have records sent   -continue current medications  -30 day monitor, mild LAE  -add physical therapy   -Continue f/u with PCP regarding stroke risk factor modification as outlined below and discussed with patient.   -RTC 2 mos     Stroke education: Continue to address with PCP these risk factor guidelines: SBP<130, FBS<100, LDL<70 mg/dL, antiplatelet. Continue exercise as tolerated, avoid tobacco, abstain from ETOH (<3 bevs optimal a week), stay well hydrated, avoid PO intake of NaCl, regular sleep. Will have patient continue to address this with PCP. Discussed CVA symptoms with patient at length, etiology of CVA and need to remain compliant on all medications. Mentioned that medication is preventative however nothing is absolute. Robust recovery first 4-6 months up to a year for noticeable improvement. If symptomatic, will need to report to ED in <2h for prompt eval. Patient voiced understanding.  All questions answered. The patient indicates understanding of these issues and agrees to the plan.    HPI:  Saul Kirkpatrick is a 76 yo female with HLD (), DMII (a1c 7.4), hx stroke no residual physical deficits (), hx TIA referred by PCP for recent stroke. Pt presented to [facility] with lip numbness, was admitted  "for CVA L posterior baylee and hypertensive emergency. Also notes mild lower extremity weakness and dizziness. Dizziness has resolved, feels LE still slightly weak, using cane. Denies falls.     Per PCP Dr. Randolph note 7/25/17  "Head CT revealed mild periventricular white matter microangiopathic change but no acute intracranial abnormalities.  EKG revealed sinus bradycardia.  Chest x-ray showed no findings.  MRI revealed acute infarction at the left posterior baylee.  She denies any medication non compliance."    Denies new stroke signs/symptoms. Does not occasional palpitations upon questioning x yrs. Occasional HA, relieved with ASA. Sleep is ok, denies depression. Current meds include ASA 81 mg qd and Lipitor 80 mg qd.   Carotid US has been ordered by PCP.     Cerebrovascular history:   As above       Medications:   Current Outpatient Prescriptions   Medication Sig Dispense Refill    ACCU-CHEK PENNY PLUS TEST STRP Strp 1 strip by Misc.(Non-Drug; Combo Route) route 2 (two) times daily. 100 strip 6    ACCU-CHEK FASTCLIX Misc 1 each by Misc.(Non-Drug; Combo Route) route 2 (two) times daily. 100 each 6    acetaminophen (TYLENOL) 325 MG tablet Take 1 tablet (325 mg total) by mouth every 6 (six) hours as needed for Pain.  0    alcohol swabs PadM Apply 1 each topically as needed. 100 each 11    alprazolam (XANAX) 0.5 MG tablet Take 1 tablet (0.5 mg total) by mouth nightly as needed for Anxiety (may take no more than 1-2 x weekly for anxiety). 30 tablet 0    amlodipine (NORVASC) 5 MG tablet Take 1 tablet (5 mg total) by mouth once daily. 90 tablet 3    aspirin (ECOTRIN) 81 MG EC tablet Take 1 tablet (81 mg total) by mouth once daily.  0    atorvastatin (LIPITOR) 80 MG tablet Take 1 tablet (80 mg total) by mouth once daily. 90 tablet 2    BD ULTRA-FINE CELSO PEN NEEDLES 32 gauge x 5/32" Ndle Uses once daily, on daily insulin injections 90 each 2    blood sugar diagnostic Strp 1 strip by Misc.(Non-Drug; Combo Route) " route 3 (three) times daily. PT CHOICE 300 each 11    BLOOD-GLUCOSE METER (CONTOUR USB MISC) by Misc.(Non-Drug; Combo Route) route.      cholecalciferol, vitamin D3, 1,000 unit capsule Take 2 capsules (2,000 Units total) by mouth once daily. 60 capsule 12    clopidogrel (PLAVIX) 75 mg tablet TAKE 1 TABLET BY MOUTH EVERY DAY (BLOOD THINNER) 90 tablet 3    ferrous sulfate 325 mg (65 mg iron) Tab tablet Take 1 tablet (325 mg total) by mouth 2 (two) times daily. 60 tablet 12    glimepiride (AMARYL) 4 MG tablet TAKE 1 TABLET BY MOUTH IN THE MORNING WITH BREAKFAST 90 tablet 0    hydrochlorothiazide (HYDRODIURIL) 12.5 MG Tab Take 1 (12.5 mg) po qd 30 tablet 11    losartan (COZAAR) 50 MG tablet Take 1 tablet (50 mg total) by mouth 2 (two) times daily. 180 tablet 3    meclizine (ANTIVERT) 25 mg tablet Take 1 tablet (25 mg total) by mouth 3 (three) times daily as needed. 90 tablet 3    metformin (GLUCOPHAGE-XR) 500 MG 24 hr tablet Take 2 tablets (1,000 mg total) by mouth 2 (two) times daily with meals. 360 tablet 2    omeprazole (PRILOSEC) 20 MG capsule Take 1 capsule (20 mg total) by mouth once daily. 30 capsule 11    brimonidine 0.2% (ALPHAGAN) 0.2 % Drop Place 1 drop into both eyes 3 (three) times daily. Disp 3 months supply = 30 mls 30 mL 3     No current facility-administered medications for this visit.        Allergies:  Review of patient's allergies indicates:   Allergen Reactions    Pravachol [pravastatin] Nausea Only       PMHx:  Past Medical History:   Diagnosis Date    Anemia     sickle cell trait    Anxiety     Back pain     Cataract     Cerebral atherosclerosis 9/17/2015    Cerebrovascular disease     Colon polyp 2012    colonoscopy 9/22/2016    Degenerative disc disease     Degenerative disc disease     Depression     Diabetes with neurologic complications     Diabetic retinopathy     Diverticulosis     colonoscopy 9/22/2016    Gastroesophageal reflux disease without esophagitis  8/13/2015    GERD (gastroesophageal reflux disease)     Glaucoma     Hyperlipidemia     Hypertension     Iritis - Left Eye 4/15/2013    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    Microalbuminuria     Multinodular thyroid     Polyneuropathy     Screening 9/22/2016    Stroke 1999    affected her memory but did not have lateralized complaints    Thyroid disease     Trouble in sleeping     Type 2 diabetes with peripheral circulatory disorder, controlled     Type 2 diabetes, uncontrolled, 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/18/2015    Type II or unspecified type diabetes mellitus with ophthalmic manifestations, uncontrolled     Diabetic retinopathy [362.0]    Type II or unspecified type diabetes mellitus with renal manifestations, uncontrolled     UARS (upper airway resistance syndrome) 2/5/2015     Past Surgical History:   Procedure Laterality Date    CATARACT EXTRACTION W/  INTRAOCULAR LENS IMPLANT Left 2/27/13    OS Dr. Taveras    COLONOSCOPY      COLONOSCOPY N/A 9/22/2016    Procedure: COLONOSCOPY;  Surgeon: Jd Ashton MD;  Location: [facility] ([address] [place] [address]);  Service: Endoscopy;  Laterality: N/A;  OK per Dr Randolph for pt to hold Plavix 5 days prior to procedure/see telephone encounter dated 8/29/16/svn    EYE SURGERY Bilateral 2002 approx    Laser for glaucoma    HYSTERECTOMY  1963     AMEENA/USO- fibroids; no cancer    OOPHORECTOMY  1963    unknown, only removed one       Fhx:  Family History   Problem Relation Age of Onset    Stroke Mother     Mental illness Mother     Hypertension Mother     Stroke Father     Diabetes Maternal Grandmother     Drug abuse Daughter     Cancer Sister 68     gyn    Cancer Maternal Uncle      type not known    Heart disease Paternal Grandmother     Glaucoma Neg Hx     Macular degeneration Neg Hx     Alcohol abuse Neg Hx     COPD Neg Hx     Asthma Neg Hx        Shx:   Social History     Social History     Marital status:      Spouse name: N/A    Number of children: N/A    Years of education: N/A     Occupational History    Not on file.     Social History Main Topics    Smoking status: Never Smoker    Smokeless tobacco: Never Used    Alcohol use No    Drug use: No    Sexual activity: No     Other Topics Concern    Not on file     Social History Narrative    , lives alone. 1 Child who lives in nursing home. She has a grandson who lives in [place].. Retired from [employer] . Still drives. Does not have a Living Will or advanced directive.       Labs:  Results for MARIAMA CONWAY (MRN [id number]) as of 8/17/2017 10:10   Ref. Range 6/5/2017 09:03   Cholesterol Latest Ref Range: 120 - 199 mg/dL 205 (H)   HDL Latest Ref Range: 40 - 75 mg/dL 53   LDL Cholesterol Latest Ref Range: 63.0 - 159.0 mg/dL 135.2   Total Cholesterol/HDL Ratio Latest Ref Range: 2.0 - 5.0  3.9   Triglycerides Latest Ref Range: 30 - 150 mg/dL 84   Results for MARIAMA CONWAY (MRN [id number]) as of 8/17/2017 10:10   Ref. Range 6/5/2017 09:03   Hemoglobin A1C Latest Ref Range: 4.5 - 6.2 % 7.4 (H)   Estimated Avg Glucose Latest Ref Range: 68 - 131 mg/dL 166 (H)   TSH Latest Ref Range: 0.400 - 4.000 uIU/mL 1.138         Imaging:    Echo 7/25/17  CONCLUSIONS     1 - Normal left ventricular systolic function (EF 60-65%).     2 - Normal right ventricular systolic function .     3 - The estimated PA systolic pressure is 29 mmHg.     4 - Trivial mitral regurgitation.     5 - Trivial tricuspid regurgitation.     6 - Concentric remodeling.     7 - Mild left atrial enlargement.     8 - Impaired LV relaxation, elevated LAP (grade 2 diastolic dysfunction).     Carotid US 7/12/16  CONCLUSIONS   There is 0 - 19% right Internal Carotid stenosis.  There is 20 - 39% left Internal Carotid stenosis.      ROS:   Review Of Systems (questions asked, positive or additions in BOLD)  Gen: Weight change, fatigue/malaise, pyrexia   HEENT: Tinnitus, headache,   "blurred vision, eye pain, diplopia, photophobia   Card: Palpitations, CP   Pulm: SOB   Vas: Easy bruising, easy bleeding   GI: N/V/D/C, incontinence, hematemesis, hematochezia    : incontinence, hematuria   Endocrine: Temp intolerance, polyuria, polydipsia   M/S: Neck pain, myalgia, back pain, joint pain, falls    Neuro: PER HPI   PSY: Memory loss, confusion, depression, anxiety, trouble in sleep      Physical Exam:  /66   Pulse 66   Ht 5' 6" (1.676 m)   Wt 79.8 kg (175 lb 14.8 oz)   BMI 28.40 kg/m²     Well developed, well nourished male  Extremities: no edema    NIH Stroke Scale:  Level of Consciousness: 0 - alert  LOC Questions: 0 - answers both correctly  LOC Commands: 0 - performs both correctly  Best Gaze: 0 - normal  Visual: 0 - no visual loss  Facial Palsy: 0 - normal  Motor Left Arm: 0 - no drift  Motor Right Arm: 0 - no drift  Motor Left Le - no drift  Motor Right Le - no drift  Limb Ataxia: 0 - absent  Sensory: 0 - normal  Best Language: 0 - no aphasia  Dysarthria: 0 - normal articulation  Extinction and Inattention: 0 - no neglect    NIH: 0  Mental status:                        Awake, alert and appropriately oriented                        Normal recent and remote memory                        Normal attention and concentration                        Normal speech and language                        Normal fund of knowledge                        No extinction  Cranial nerves:                        PERRLA                        EOMF without nystagmus                        VFF                        Normal facial sensation                        Normal facial movements                        Intact hearing bilaterally                        Palate elevates symmetrically                        Normal SCM and trapezius strength                        Tongue midline  Motor:                        No pronator drift                        Normal FF movements bilaterally                   "      Normal muscle tone, bulk and power                        No abnormal movements  Sensory                        Intact to LT  DTRs                        2+ and symmetric  Coordination                        Intact to FNF and HTS  Gait                        Normal base and gait        Attending, Dr. Varner, was available during today's encounter. Any change to plan along with cosign to appear in the EMR.       This document has been electronically signed by Azalia Galaviz PA-C on 8/17/2017, 10:09 AM. I have personally typed this message using the EMR.       Azalia Galaviz PA-C  Department of Neurology   [facility] [place], [place]

## 2017-08-17 NOTE — LETTER
August 17, 2017      Penny Randolph MD  [address]  [facility] [address]           [facility]rology  [address]  [facility] [address]  Phone: [phone]  Fax: [phone]          Patient: Saul Kirkpatrick   MR Number: [id number]   YOB: 1940   Date of Visit: 8/17/2017       Dear Dr. Penny Randolph:    Thank you for referring Saul Kirkpatrick to me for evaluation. Attached you will find relevant portions of my assessment and plan of care.    If you have questions, please do not hesitate to call me. I look forward to following Saul Kirkpatrick along with you.    Sincerely,    Azalia Galaviz PA-C    Enclosure  CC:  No Recipients    If you would like to receive this communication electronically, please contact [email or web address] or [phone] to request more information on [employer] Link access.    For providers and/or their staff who would like to refer a patient to [facility], please contact us through our one-stop-shop provider referral line, [facility] , at [phone].    If you feel you have received this communication in error or would no longer like to receive these types of communications, please e-mail [email or web address]

## 2017-08-17 NOTE — PROGRESS NOTES
HPI     DLS: 2/10/17    Pt here for HRT review;    Meds: Brimonidine tid ou    1. Primary open angle glaucoma of both eyes, moderate stage  2. Nuclear sclerosis, right  3. Background diabetic retinopathy  4. Amaurosis fugax  5. Eyelid myokymia  6. Pseudophakia     Last edited by Shauna Christopher on 8/17/2017  1:57 PM. (History)          Assessment /Plan     For exam results, see Encounter Report.    Primary open angle glaucoma of both eyes, moderate stage    Nuclear sclerosis, right    Amaurosis fugax    Eyelid myokymia    Pseudophakia        1. POAG ou   H/O poor compliance   First HVF 1997   First photos 1998     Family history none   Glaucoma meds Has used alphagan in past - poor compliance-stopped on her own   H/O adverse rxn to glaucoma drops none   LASERS No glaucoma laser (+h/o focal OU for BDR)   GLAUCOMA SURGERIES none   OTHER EYE SURGERIES CE/PCIOL OS 2/27/13  CDR 0.8/0.75   Tbase 16-20/16-22   Tmax 20/22   Ttarget 17/17   HVF 1997 to 2012 - inf arc/NS od // +changes ? 2/2 focal laser   Gonio +3   /621- thick ou (- 4.5 ou)   OCT 2006, 2009, 2012 - RNFL nl ou   HRT 5  tests: 2009 to 2016 Bord inf/temp od // dec superior os /// CDR 0.72 od // 0.72 os  Disc photos 1998, 2000, 2001, 2003, 2003, 2004, 2005, 2006- slides   12/9/2010 - OIS     - Ttoday 13/13   - Test done today HRT / Gonio    2. 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 NS OD    Remove prn   BCVA 20/40   BAT 20/60    4. Pseudophakia OS   S/p CE/PCIOL OS   IOL SN60 WF 20.5  -VA limited 2/2 hx of CSME s/p focal scars  BCVA  20/25  Give Rx glasses - 7/2/2013  Had re-bound iritis - resolved    4. H/O RLL lesion - S/P excision with Jovanni     5. Post Nelda lives in [place] - but still likes to come to [place] for care     6. amaurosis fugax / H/O CVA's  Patient reports stroke like symptoms and amaurosis fugax 10/13-  She was admitted to hospital with very elevated BP-  Patient had MRI at the time showing ischemic disease-  "last Carotid U/S done 11/12- patient reports that PCP gets one yearly- last U/S showed minimal plaque disease-  H/o stroke- discussed that amaurosis was from elevated BP and if ever recurs needs to report to ER immediately- she voiced understanding    7. Eyelid myokymia  - intermittent - patient also reports eyelid "twitching  "- discussed with patient that myokymia is usually from lack of sleep, caffeine intake, or stress- however nothing to worry about  -  Did not have any "twitching" on exam today    Plan:  BrimonidineTID ou   MRx given- patient would like to think re CEIOL OD- can consider CEIOL with [place] given poor compliance with gtts and moderate glaucoma.   HVF's are inconsistent with the ON exam / OCT ect   Pt wants to wait on phaco od for now // has done well os     Avoid PG's if possible 2/2 h/o CME 2/2 BDR    Refraction Post op os  is more myopic than expected - review IOL calc if needs 2nd eye done    Repeat OCT of macula  5/7/2013 - - done no CME os      F/U 6 months - HVF / DFE / Photos    refer to retina - +BDR - s/p laser - Yung    I have seen and personally examined the patient.  I agree with the findings, assessment and plan of the resident and/or fellow.     Lina Taveras MD                    "

## 2017-08-18 ENCOUNTER — CLINICAL SUPPORT (OUTPATIENT)
Dept: ELECTROPHYSIOLOGY | Facility: CLINIC | Age: 77
End: 2017-08-18
Payer: MEDICARE

## 2017-08-18 DIAGNOSIS — R00.2 PALPITATIONS: ICD-10-CM

## 2017-08-18 DIAGNOSIS — Z86.73 HISTORY OF RECENT STROKE: ICD-10-CM

## 2017-08-18 DIAGNOSIS — I51.7 LAE (LEFT ATRIAL ENLARGEMENT): ICD-10-CM

## 2017-09-14 ENCOUNTER — TELEPHONE (OUTPATIENT)
Dept: INTERNAL MEDICINE | Facility: CLINIC | Age: 77
End: 2017-09-14

## 2017-09-14 NOTE — TELEPHONE ENCOUNTER
----- Message from Génesis Brock sent at 9/14/2017 12:00 PM CDT -----  Contact: self/ [phone]  Patient states that her right hand has lost strength since the stroke in July and she wanted the therapist to be aware of that.

## 2017-10-03 PROCEDURE — 93268 ECG RECORD/REVIEW: CPT | Mod: S$GLB,,, | Performed by: INTERNAL MEDICINE

## 2017-10-06 ENCOUNTER — OFFICE VISIT (OUTPATIENT)
Dept: INTERNAL MEDICINE | Facility: CLINIC | Age: 77
End: 2017-10-06
Payer: MEDICARE

## 2017-10-06 VITALS
DIASTOLIC BLOOD PRESSURE: 72 MMHG | HEIGHT: 64 IN | HEART RATE: 67 BPM | SYSTOLIC BLOOD PRESSURE: 150 MMHG | WEIGHT: 170.63 LBS | TEMPERATURE: 96 F | BODY MASS INDEX: 29.13 KG/M2

## 2017-10-06 DIAGNOSIS — M79.18 PAIN IN LEFT BUTTOCK: Primary | ICD-10-CM

## 2017-10-06 DIAGNOSIS — E11.49 TYPE II DIABETES MELLITUS WITH NEUROLOGICAL MANIFESTATIONS: Chronic | ICD-10-CM

## 2017-10-06 DIAGNOSIS — M54.10 RADICULAR LEG PAIN: ICD-10-CM

## 2017-10-06 DIAGNOSIS — E11.22 DIABETES MELLITUS WITH STAGE 3 CHRONIC KIDNEY DISEASE: Chronic | ICD-10-CM

## 2017-10-06 DIAGNOSIS — I10 HYPERTENSION, ESSENTIAL: Chronic | ICD-10-CM

## 2017-10-06 DIAGNOSIS — N18.30 DIABETES MELLITUS WITH STAGE 3 CHRONIC KIDNEY DISEASE: Chronic | ICD-10-CM

## 2017-10-06 PROCEDURE — 96372 THER/PROPH/DIAG INJ SC/IM: CPT | Mod: S$GLB,,, | Performed by: NURSE PRACTITIONER

## 2017-10-06 PROCEDURE — 99213 OFFICE O/P EST LOW 20 MIN: CPT | Mod: 25,S$GLB,, | Performed by: NURSE PRACTITIONER

## 2017-10-06 PROCEDURE — 99999 PR PBB SHADOW E&M-EST. PATIENT-LVL V: CPT | Mod: PBBFAC,,, | Performed by: NURSE PRACTITIONER

## 2017-10-06 PROCEDURE — 99499 UNLISTED E&M SERVICE: CPT | Mod: S$GLB,,, | Performed by: NURSE PRACTITIONER

## 2017-10-06 RX ORDER — KETOROLAC TROMETHAMINE 30 MG/ML
30 INJECTION, SOLUTION INTRAMUSCULAR; INTRAVENOUS
Status: COMPLETED | OUTPATIENT
Start: 2017-10-06 | End: 2017-10-06

## 2017-10-06 RX ORDER — NAPROXEN 500 MG/1
500 TABLET ORAL 2 TIMES DAILY WITH MEALS
Qty: 10 TABLET | Refills: 0 | Status: SHIPPED | OUTPATIENT
Start: 2017-10-06 | End: 2017-11-01

## 2017-10-06 RX ADMIN — KETOROLAC TROMETHAMINE 30 MG: 30 INJECTION, SOLUTION INTRAMUSCULAR; INTRAVENOUS at 02:10

## 2017-10-06 NOTE — PATIENT INSTRUCTIONS
Start naproxen tomorrow. If additional pain relief is needed take tylenol  Use warm compresses/heating pad  Muscle rub to affected muscles  Gentle stretching exercises

## 2017-10-06 NOTE — PROGRESS NOTES
"Subjective:       Patient ID: Saul Kirkpatrick is a 77 y.o. female.    Chief Complaint: Hip Pain (left hip)    Pt presents with symptoms of left hip pain x 4 days    She reports that she does recall helping her daughter in and out of wheelchair and lifting a lot of her belongings Saturday - Monday. The pain started Tuesday. No falls or other trauma. No hx of hip/buttock/sciatic nerve pain Pain shoots from left buttock and radiates down her left leg. Pain is 10/10. " it feels like a toothache". She states that the pain is worse when walking and from sitting to standing, but the pain is constant. She has taken tylenol arthritis twice and it helped transiently. She is limping.       Review of Systems   Constitutional: Positive for activity change. Negative for chills, fatigue and fever.   HENT: Negative for congestion, ear pain, postnasal drip, rhinorrhea, sinus pressure, sneezing and sore throat.    Eyes: Negative for photophobia, pain and visual disturbance.   Respiratory: Negative for cough, chest tightness and shortness of breath.    Cardiovascular: Negative for chest pain, palpitations and leg swelling.   Gastrointestinal: Negative for abdominal pain, constipation, diarrhea, nausea and vomiting.   Genitourinary: Negative for dysuria and frequency.   Musculoskeletal: Positive for gait problem and myalgias (left buttock with radiation into left leg ). Negative for arthralgias.   Neurological: Negative for dizziness, light-headedness and headaches.   Psychiatric/Behavioral: Negative for sleep disturbance.       Objective:      Physical Exam   Cardiovascular:   Pulses:       Dorsalis pedis pulses are 2+ on the left side.   Musculoskeletal:        Left upper leg: She exhibits tenderness. She exhibits no bony tenderness, no swelling, no edema, no deformity and no laceration.        Legs:      Assessment:       1. Pain in left buttock    2. Radicular leg pain    3. Type II diabetes mellitus with neurological manifestations  "   4. Diabetes mellitus with stage 3 chronic kidney disease    5. Hypertension, essential        Plan:   Pain in left buttock  -     ketorolac injection 30 mg; Inject 30 mg into the muscle one time.  -     naproxen (NAPROSYN) 500 MG tablet; Take 1 tablet (500 mg total) by mouth 2 (two) times daily with meals.  Dispense: 10 tablet; Refill: 0    Radicular leg pain  -     ketorolac injection 30 mg; Inject 30 mg into the muscle one time.  -     naproxen (NAPROSYN) 500 MG tablet; Take 1 tablet (500 mg total) by mouth 2 (two) times daily with meals.  Dispense: 10 tablet; Refill: 0    Type II diabetes mellitus with neurological manifestations    Diabetes mellitus with stage 3 chronic kidney disease    Hypertension, essential    as above  Gentle use of NSAIDs  Monitor BP  Start naproxen tomorrow. If additional pain relief is needed take tylenol  Use warm compresses/heating pad  Muscle rub to affected muscles  Gentle stretching exercises   Call or return to clinic if pain continues of worsens in the next 7-10 days

## 2017-10-07 DIAGNOSIS — E11.610 TYPE 2 DIABETES MELLITUS WITH DIABETIC NEUROPATHIC ARTHROPATHY, UNSPECIFIED LONG TERM INSULIN USE STATUS: ICD-10-CM

## 2017-10-09 RX ORDER — METFORMIN HYDROCHLORIDE 500 MG/1
1000 TABLET, EXTENDED RELEASE ORAL 2 TIMES DAILY WITH MEALS
Qty: 360 TABLET | Refills: 2 | Status: SHIPPED | OUTPATIENT
Start: 2017-10-09 | End: 2017-10-24 | Stop reason: SDUPTHER

## 2017-10-19 ENCOUNTER — LAB VISIT (OUTPATIENT)
Dept: LAB | Facility: HOSPITAL | Age: 77
End: 2017-10-19
Attending: INTERNAL MEDICINE
Payer: MEDICARE

## 2017-10-19 DIAGNOSIS — N18.30 CKD (CHRONIC KIDNEY DISEASE) STAGE 3, GFR 30-59 ML/MIN: ICD-10-CM

## 2017-10-19 LAB
ALBUMIN SERPL BCP-MCNC: 3.4 G/DL
ANION GAP SERPL CALC-SCNC: 10 MMOL/L
BASOPHILS # BLD AUTO: 0.03 K/UL
BASOPHILS NFR BLD: 0.6 %
BUN SERPL-MCNC: 22 MG/DL
CALCIUM SERPL-MCNC: 9.4 MG/DL
CHLORIDE SERPL-SCNC: 100 MMOL/L
CO2 SERPL-SCNC: 26 MMOL/L
CREAT SERPL-MCNC: 1.4 MG/DL
DIFFERENTIAL METHOD: ABNORMAL
EOSINOPHIL # BLD AUTO: 0.3 K/UL
EOSINOPHIL NFR BLD: 5.5 %
ERYTHROCYTE [DISTWIDTH] IN BLOOD BY AUTOMATED COUNT: 15.3 %
EST. GFR  (AFRICAN AMERICAN): 42.1 ML/MIN/1.73 M^2
EST. GFR  (NON AFRICAN AMERICAN): 36.5 ML/MIN/1.73 M^2
GLUCOSE SERPL-MCNC: 217 MG/DL
HCT VFR BLD AUTO: 28.9 %
HGB BLD-MCNC: 9.3 G/DL
IMM GRANULOCYTES # BLD AUTO: 0.02 K/UL
IMM GRANULOCYTES NFR BLD AUTO: 0.4 %
LYMPHOCYTES # BLD AUTO: 2.1 K/UL
LYMPHOCYTES NFR BLD: 37.8 %
MCH RBC QN AUTO: 29.1 PG
MCHC RBC AUTO-ENTMCNC: 32.2 G/DL
MCV RBC AUTO: 90 FL
MONOCYTES # BLD AUTO: 0.5 K/UL
MONOCYTES NFR BLD: 10 %
NEUTROPHILS # BLD AUTO: 2.5 K/UL
NEUTROPHILS NFR BLD: 45.7 %
NRBC BLD-RTO: 0 /100 WBC
PHOSPHATE SERPL-MCNC: 3.7 MG/DL
PLATELET # BLD AUTO: 222 K/UL
PMV BLD AUTO: 11.3 FL
POTASSIUM SERPL-SCNC: 5.2 MMOL/L
RBC # BLD AUTO: 3.2 M/UL
SODIUM SERPL-SCNC: 136 MMOL/L
WBC # BLD AUTO: 5.42 K/UL

## 2017-10-19 PROCEDURE — 80069 RENAL FUNCTION PANEL: CPT

## 2017-10-19 PROCEDURE — 85025 COMPLETE CBC W/AUTO DIFF WBC: CPT

## 2017-10-19 PROCEDURE — 36415 COLL VENOUS BLD VENIPUNCTURE: CPT | Mod: PO

## 2017-10-24 ENCOUNTER — OFFICE VISIT (OUTPATIENT)
Dept: INTERNAL MEDICINE | Facility: CLINIC | Age: 77
End: 2017-10-24
Payer: MEDICARE

## 2017-10-24 ENCOUNTER — IMMUNIZATION (OUTPATIENT)
Dept: INTERNAL MEDICINE | Facility: CLINIC | Age: 77
End: 2017-10-24
Payer: MEDICARE

## 2017-10-24 VITALS
WEIGHT: 171.94 LBS | BODY MASS INDEX: 29.35 KG/M2 | DIASTOLIC BLOOD PRESSURE: 60 MMHG | SYSTOLIC BLOOD PRESSURE: 120 MMHG | HEIGHT: 64 IN

## 2017-10-24 DIAGNOSIS — I51.89 LEFT VENTRICULAR DIASTOLIC DYSFUNCTION WITH PRESERVED SYSTOLIC FUNCTION: ICD-10-CM

## 2017-10-24 DIAGNOSIS — E11.610 TYPE 2 DIABETES MELLITUS WITH DIABETIC NEUROPATHIC ARTHROPATHY, UNSPECIFIED LONG TERM INSULIN USE STATUS: ICD-10-CM

## 2017-10-24 DIAGNOSIS — E11.22 DIABETES MELLITUS WITH STAGE 3 CHRONIC KIDNEY DISEASE: Chronic | ICD-10-CM

## 2017-10-24 DIAGNOSIS — N18.30 DIABETES MELLITUS WITH STAGE 3 CHRONIC KIDNEY DISEASE: Chronic | ICD-10-CM

## 2017-10-24 DIAGNOSIS — E78.5 HYPERLIPIDEMIA, UNSPECIFIED HYPERLIPIDEMIA TYPE: ICD-10-CM

## 2017-10-24 DIAGNOSIS — I10 HYPERTENSION, ESSENTIAL: Chronic | ICD-10-CM

## 2017-10-24 DIAGNOSIS — I10 ESSENTIAL HYPERTENSION: ICD-10-CM

## 2017-10-24 DIAGNOSIS — D50.1 IRON DEFICIENCY ANEMIA DUE TO SIDEROPENIC DYSPHAGIA: ICD-10-CM

## 2017-10-24 PROCEDURE — 99214 OFFICE O/P EST MOD 30 MIN: CPT | Mod: S$GLB,,, | Performed by: NURSE PRACTITIONER

## 2017-10-24 PROCEDURE — 99499 UNLISTED E&M SERVICE: CPT | Mod: S$GLB,,, | Performed by: NURSE PRACTITIONER

## 2017-10-24 PROCEDURE — 99999 PR PBB SHADOW E&M-EST. PATIENT-LVL III: CPT | Mod: PBBFAC,,, | Performed by: NURSE PRACTITIONER

## 2017-10-24 PROCEDURE — G0008 ADMIN INFLUENZA VIRUS VAC: HCPCS | Mod: S$GLB,,, | Performed by: INTERNAL MEDICINE

## 2017-10-24 PROCEDURE — 90662 IIV NO PRSV INCREASED AG IM: CPT | Mod: S$GLB,,, | Performed by: INTERNAL MEDICINE

## 2017-10-24 RX ORDER — GLIMEPIRIDE 4 MG/1
TABLET ORAL
Qty: 90 TABLET | Refills: 0 | Status: SHIPPED | OUTPATIENT
Start: 2017-10-24 | End: 2018-01-05 | Stop reason: SDUPTHER

## 2017-10-24 RX ORDER — ATORVASTATIN CALCIUM 80 MG/1
80 TABLET, FILM COATED ORAL DAILY
Qty: 90 TABLET | Refills: 2 | Status: SHIPPED | OUTPATIENT
Start: 2017-10-24 | End: 2018-08-14 | Stop reason: SDUPTHER

## 2017-10-24 RX ORDER — LOSARTAN POTASSIUM 50 MG/1
50 TABLET ORAL 2 TIMES DAILY
Qty: 180 TABLET | Refills: 3 | Status: SHIPPED | OUTPATIENT
Start: 2017-10-24 | End: 2018-04-02

## 2017-10-24 RX ORDER — AMLODIPINE BESYLATE 5 MG/1
5 TABLET ORAL DAILY
Qty: 90 TABLET | Refills: 3 | Status: SHIPPED | OUTPATIENT
Start: 2017-10-24 | End: 2018-08-14 | Stop reason: SDUPTHER

## 2017-10-24 RX ORDER — METFORMIN HYDROCHLORIDE 500 MG/1
1000 TABLET, EXTENDED RELEASE ORAL 2 TIMES DAILY WITH MEALS
Qty: 360 TABLET | Refills: 2 | Status: SHIPPED | OUTPATIENT
Start: 2017-10-24 | End: 2018-02-07 | Stop reason: SDUPTHER

## 2017-10-24 RX ORDER — HYDROCHLOROTHIAZIDE 12.5 MG/1
TABLET ORAL
Qty: 30 TABLET | Refills: 11 | Status: SHIPPED | OUTPATIENT
Start: 2017-10-24 | End: 2018-08-14 | Stop reason: SDUPTHER

## 2017-10-24 NOTE — PROGRESS NOTES
INTERNAL MEDICINE PROGRESS/URGENT CARE NOTE    CHIEF COMPLAINT     Chief Complaint   Patient presents with    Follow-up     med refill        HPI     Saul Kirkpatrick is a 77 y.o. female who presents for an urgent/follow up visit today.    DM- CBG readings 140-150. No hypoglycemia. +polyuria mostly at night. Denies polyphagia an dipsia. Complaint with Amaryl and metformin.   Eye exam- 8/2017  Foot- 5/2017    HTN- complaint with losartan and norvasc. No headaches, blurry vision. No lightheadedness, dizziness.     HLd- complaint with lipitor. No myalgias and arthralgias.     Left sciatica- seen by NP in [place]. See previous note. Treated with naproxen x 1 week. Treating now with Icy-Hot. Still with left sided buttock pain radiating down the left leg. Is improving but not resolved.     Past Medical History:  Past Medical History:   Diagnosis Date    Anemia     sickle cell trait    Anxiety     Back pain     Cataract     Cerebral atherosclerosis 9/17/2015    Cerebrovascular disease     Colon polyp 2012    colonoscopy 9/22/2016    Degenerative disc disease     Degenerative disc disease     Depression     Diabetes with neurologic complications     Diabetic retinopathy     Diverticulosis     colonoscopy 9/22/2016    Gastroesophageal reflux disease without esophagitis 8/13/2015    GERD (gastroesophageal reflux disease)     Glaucoma     Hyperlipidemia     Hypertension     Iritis - Left Eye 4/15/2013    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    Microalbuminuria     Multinodular thyroid     Polyneuropathy     Screening 9/22/2016    Stroke 1999    affected her memory but did not have lateralized complaints    Thyroid disease     Trouble in sleeping     Type 2 diabetes with peripheral circulatory disorder, controlled     Type 2 diabetes, uncontrolled, 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/18/2015    Type II or unspecified type diabetes mellitus with ophthalmic  manifestations, uncontrolled(250.52)     Diabetic retinopathy [362.0]    Type II or unspecified type diabetes mellitus with renal manifestations, uncontrolled(250.42)     UARS (upper airway resistance syndrome) 2/5/2015       Home Medications:  Prior to Admission medications    Medication Sig Start Date End Date Taking? Authorizing Provider   ACCU-CHEK PENNY PLUS TEST STRP Strp 1 strip by Misc.(Non-Drug; Combo Route) route 2 (two) times daily. 7/14/16  Yes Penny Randolph MD   ACCU-CHEK FASTCLIX Misc 1 each by Misc.(Non-Drug; Combo Route) route 2 (two) times daily. 7/14/16  Yes Penny Randolph MD   acetaminophen (TYLENOL) 325 MG tablet Take 1 tablet (325 mg total) by mouth every 6 (six) hours as needed for Pain. 5/16/17  Yes Sneha Moncada NP   alcohol swabs PadM Apply 1 each topically as needed. 12/16/15  Yes Sonja Brock MD   amlodipine (NORVASC) 5 MG tablet Take 1 tablet (5 mg total) by mouth once daily. 7/25/17  Yes Penny Randolph MD   aspirin (ECOTRIN) 81 MG EC tablet Take 1 tablet (81 mg total) by mouth once daily. 7/25/17 7/25/18 Yes Penny Randolph MD   atorvastatin (LIPITOR) 80 MG tablet Take 1 tablet (80 mg total) by mouth once daily.  Patient taking differently: Take 80 mg by mouth every evening.  12/29/16  Yes Penny Randolph MD   brimonidine 0.2% (ALPHAGAN) 0.2 % Drop Place 1 drop into both eyes 3 (three) times daily. Disp 3 months supply = 30 mls 8/17/17  Yes Lina Taveras MD   cholecalciferol, vitamin D3, 1,000 unit capsule Take 2 capsules (2,000 Units total) by mouth once daily. 9/20/16  Yes Penny Randolph MD   clopidogrel (PLAVIX) 75 mg tablet TAKE 1 TABLET BY MOUTH EVERY DAY (BLOOD THINNER) 12/8/16  Yes Penny Randolph MD   ferrous sulfate 325 mg (65 mg iron) Tab tablet Take 1 tablet (325 mg total) by mouth 2 (two) times daily. 7/31/17  Yes Penny Randolph MD   glimepiride (AMARYL) 4 MG tablet TAKE 1 TABLET BY MOUTH IN THE MORNING WITH BREAKFAST 6/7/17  Yes Penny Randolph MD    hydrochlorothiazide (HYDRODIURIL) 12.5 MG Tab Take 1 (12.5 mg) po qd 8/24/16  Yes Giselle Valenzuela MD   losartan (COZAAR) 50 MG tablet Take 1 tablet (50 mg total) by mouth 2 (two) times daily. 1/10/17  Yes Penny Randolph MD   meclizine (ANTIVERT) 25 mg tablet Take 1 tablet (25 mg total) by mouth 3 (three) times daily as needed. 4/2/15  Yes Penny Randolph MD   metformin (GLUCOPHAGE-XR) 500 MG 24 hr tablet TAKE 2 TABLETS (1,000 MG TOTAL) BY MOUTH 2 (TWO) TIMES DAILY WITH MEALS. 10/9/17  Yes Penny Randolph MD   naproxen (NAPROSYN) 500 MG tablet Take 1 tablet (500 mg total) by mouth 2 (two) times daily with meals. 10/6/17  Yes Gertrude Gonzalez NP   omeprazole (PRILOSEC) 20 MG capsule Take 1 capsule (20 mg total) by mouth once daily. 7/31/17 7/31/18 Yes Penny Randolph MD   alprazolam (XANAX) 0.5 MG tablet Take 1 tablet (0.5 mg total) by mouth nightly as needed for Anxiety (may take no more than 1-2 x weekly for anxiety). 7/25/17 9/11/17  Penny Randolph MD       Review of Systems:  Review of Systems   Constitutional: Positive for fatigue. Negative for chills, fever and unexpected weight change.   Respiratory: Positive for shortness of breath ([place] with walking fast, at baseline. 3 blocks ). Negative for cough and wheezing.    Cardiovascular: Positive for leg swelling (bilateral feet swelling x 3 weeks ). Negative for chest pain and palpitations.   Gastrointestinal: Negative for abdominal pain, constipation, diarrhea, nausea and vomiting.   Endocrine: Positive for polyuria. Negative for polydipsia and polyphagia.   Genitourinary: Negative.    Musculoskeletal: Positive for arthralgias and back pain. Negative for joint swelling, myalgias, neck pain and neck stiffness.   Neurological: Positive for numbness. Negative for dizziness, light-headedness and headaches.   Psychiatric/Behavioral: Negative for sleep disturbance.       Health Maintainence:   Immunizations:  Health Maintenance       Date Due Completion Date     "Zoster Vaccine 05/15/2001 ---    Influenza Vaccine 08/01/2017 9/20/2016    Override on 12/2/2013: Done    Override on 12/18/2012: Done    Hemoglobin A1c 12/05/2017 6/5/2017    Foot Exam 05/31/2018 5/31/2017 (Done)    Override on 5/31/2017: Done    Override on 1/12/2017: Done    Override on 4/7/2015: Done    Lipid Panel 06/05/2018 6/5/2017    Eye Exam 08/17/2018 8/17/2017    Colonoscopy 09/22/2019 9/22/2016    Override on 9/22/2016: Done    TETANUS VACCINE 01/14/2021 1/14/2011    DEXA SCAN 08/08/2021 8/8/2017           PHYSICAL EXAM     /60   Ht 5' 4" (1.626 m)   Wt 78 kg (171 lb 15.3 oz)   BMI 29.52 kg/m²     Physical Exam   Constitutional: She is oriented to person, place, and time. She appears well-developed and well-nourished.   HENT:   Head: Normocephalic.   Right Ear: External ear normal.   Left Ear: External ear normal.   Nose: Nose normal.   Mouth/Throat: Oropharynx is clear and moist. No oropharyngeal exudate.   Eyes: Pupils are equal, round, and reactive to light.   Neck: Neck supple. No JVD present. No tracheal deviation present. No thyromegaly present.   Cardiovascular: Normal rate, regular rhythm, normal heart sounds and intact distal pulses.  Exam reveals no gallop and no friction rub.    No murmur heard.  Pulmonary/Chest: Effort normal and breath sounds normal. No respiratory distress. She has no wheezes. She has no rales.   Abdominal: Soft. Bowel sounds are normal. She exhibits no distension. There is no tenderness.   Musculoskeletal: Normal range of motion. She exhibits no edema or tenderness.        Lumbar back: She exhibits pain. She exhibits normal range of motion, no tenderness, no bony tenderness, no swelling, no edema, no deformity, no laceration, no spasm and normal pulse.        Back:    Lymphadenopathy:     She has no cervical adenopathy.   Neurological: She is alert and oriented to person, place, and time.   Skin: Skin is warm and dry. No rash noted.   Psychiatric: She has a normal " mood and affect. Her behavior is normal.   Vitals reviewed.      LABS     Lab Results   Component Value Date    HGBA1C 7.4 (H) 06/05/2017     CMP  Sodium   Date Value Ref Range Status   10/19/2017 136 136 - 145 mmol/L Final     Potassium   Date Value Ref Range Status   10/19/2017 5.2 (H) 3.5 - 5.1 mmol/L Final     Chloride   Date Value Ref Range Status   10/19/2017 100 95 - 110 mmol/L Final     CO2   Date Value Ref Range Status   10/19/2017 26 23 - 29 mmol/L Final     Glucose   Date Value Ref Range Status   10/19/2017 217 (H) 70 - 110 mg/dL Final     BUN, Bld   Date Value Ref Range Status   10/19/2017 22 8 - 23 mg/dL Final     Creatinine   Date Value Ref Range Status   10/19/2017 1.4 0.5 - 1.4 mg/dL Final     Calcium   Date Value Ref Range Status   10/19/2017 9.4 8.7 - 10.5 mg/dL Final     Total Protein   Date Value Ref Range Status   09/11/2017 7.3 6.0 - 8.4 g/dL Final     Albumin   Date Value Ref Range Status   10/19/2017 3.4 (L) 3.5 - 5.2 g/dL Final     Total Bilirubin   Date Value Ref Range Status   09/11/2017 0.6 0.2 - 1.3 mg/dL Final     Alkaline Phosphatase   Date Value Ref Range Status   09/11/2017 70 38 - 145 U/L Final     AST   Date Value Ref Range Status   09/11/2017 27 14 - 36 U/L Final     ALT   Date Value Ref Range Status   09/11/2017 24 10 - 44 U/L Final     Anion Gap   Date Value Ref Range Status   10/19/2017 10 8 - 16 mmol/L Final     eGFR if    Date Value Ref Range Status   10/19/2017 42.1 (A) >60 mL/min/1.73 m^2 Final     eGFR if non    Date Value Ref Range Status   10/19/2017 36.5 (A) >60 mL/min/1.73 m^2 Final     Comment:     Calculation used to obtain the estimated glomerular filtration  rate (eGFR) is the CKD-EPI equation. Since race is unknown   in our information system, the eGFR values for   -American and Non--American patients are given   for each creatinine result.       Lab Results   Component Value Date    WBC 5.42 10/19/2017    HGB 9.3 (L)  10/19/2017    HCT 28.9 (L) 10/19/2017    MCV 90 10/19/2017     10/19/2017     Lab Results   Component Value Date    CHOL 205 (H) 06/05/2017    CHOL 168 10/26/2016    CHOL 165 06/29/2016     Lab Results   Component Value Date    HDL 53 06/05/2017    HDL 53 10/26/2016    HDL 46 06/29/2016     Lab Results   Component Value Date    LDLCALC 135.2 06/05/2017    LDLCALC 106.4 10/26/2016    LDLCALC 102.8 06/29/2016     Lab Results   Component Value Date    TRIG 84 06/05/2017    TRIG 43 10/26/2016    TRIG 81 06/29/2016     Lab Results   Component Value Date    CHOLHDL 25.9 06/05/2017    CHOLHDL 31.5 10/26/2016    CHOLHDL 27.9 06/29/2016     Lab Results   Component Value Date    TSH 1.050 09/11/2017    L4ERTNI 87 03/12/2009       ASSESSMENT/PLAN     Saul Kirkpatrick is a 77 y.o. female with  Past Medical History:   Diagnosis Date    Anemia     sickle cell trait    Anxiety     Back pain     Cataract     Cerebral atherosclerosis 9/17/2015    Cerebrovascular disease     Colon polyp 2012    colonoscopy 9/22/2016    Degenerative disc disease     Degenerative disc disease     Depression     Diabetes with neurologic complications     Diabetic retinopathy     Diverticulosis     colonoscopy 9/22/2016    Gastroesophageal reflux disease without esophagitis 8/13/2015    GERD (gastroesophageal reflux disease)     Glaucoma     Hyperlipidemia     Hypertension     Iritis - Left Eye 4/15/2013    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    Microalbuminuria     Multinodular thyroid     Polyneuropathy     Screening 9/22/2016    Stroke 1999    affected her memory but did not have lateralized complaints    Thyroid disease     Trouble in sleeping     Type 2 diabetes with peripheral circulatory disorder, controlled     Type 2 diabetes, uncontrolled, 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/18/2015    Type II or unspecified type diabetes mellitus with ophthalmic manifestations,  uncontrolled(250.52)     Diabetic retinopathy [362.0]    Type II or unspecified type diabetes mellitus with renal manifestations, uncontrolled(250.42)     UARS (upper airway resistance syndrome) 2/5/2015     Type 2 diabetes, uncontrolled, with background retinopathy with macular edema  Diabetes mellitus with stage 3 chronic kidney disease- will check A1c cont amaryl and metformin. Low sugar diet.   -     glimepiride (AMARYL) 4 MG tablet; TAKE 1 TABLET BY MOUTH IN THE MORNING WITH BREAKFAST  Dispense: 90 tablet; Refill: 0  -     Hemoglobin A1c; Future; Expected date: 11/07/2017    Type 2 diabetes mellitus with diabetic neuropathic arthropathy, unspecified long term insulin use status  -     metFORMIN (GLUCOPHAGE-XR) 500 MG 24 hr tablet; Take 2 tablets (1,000 mg total) by mouth 2 (two) times daily with meals.  Dispense: 360 tablet; Refill: 2    Hyperlipidemia, unspecified hyperlipidemia type- cont stat. LDL near goal. Advised low sugar and carb diet.   -     atorvastatin (LIPITOR) 80 MG tablet; Take 1 tablet (80 mg total) by mouth once daily.  Dispense: 90 tablet; Refill: 2  -     CBC auto differential; Future; Expected date: 11/07/2017  -     Basic metabolic panel; Future; Expected date: 11/07/2017    Essential hypertension- at goal. Cont current meds.   -     amLODIPine (NORVASC) 5 MG tablet; Take 1 tablet (5 mg total) by mouth once daily.  Dispense: 90 tablet; Refill: 3  -     losartan (COZAAR) 50 MG tablet; Take 1 tablet (50 mg total) by mouth 2 (two) times daily.  Dispense: 180 tablet; Refill: 3  -     hydroCHLOROthiazide (HYDRODIURIL) 12.5 MG Tab; Take 1 (12.5 mg) po qd  Dispense: 30 tablet; Refill: 11    Left ventricular diastolic dysfunction with preserved systolic function- Echo reviewed and stable. Cont losartan and HCTZ. Will check labs today     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- H&H lower at last check. Will recheck today. Cont ferrous sulfate.             Follow up with PCP in 3  months     Patient education provided from [facility]. Patient was counseled on when and how to seek emergent care.       Grace MORALES, ALISA, FNP-c   Department of Internal Medicine - [facility]  11:23 AM

## 2017-11-01 ENCOUNTER — OFFICE VISIT (OUTPATIENT)
Dept: NEPHROLOGY | Facility: CLINIC | Age: 77
End: 2017-11-01
Payer: MEDICARE

## 2017-11-01 VITALS
SYSTOLIC BLOOD PRESSURE: 142 MMHG | WEIGHT: 174.63 LBS | BODY MASS INDEX: 29.81 KG/M2 | HEART RATE: 72 BPM | HEIGHT: 64 IN | DIASTOLIC BLOOD PRESSURE: 78 MMHG

## 2017-11-01 DIAGNOSIS — R80.9 PROTEINURIA DUE TO TYPE 2 DIABETES MELLITUS: ICD-10-CM

## 2017-11-01 DIAGNOSIS — N18.30 ANEMIA OF CHRONIC RENAL FAILURE, STAGE 3 (MODERATE): ICD-10-CM

## 2017-11-01 DIAGNOSIS — E87.5 HYPERKALEMIA: ICD-10-CM

## 2017-11-01 DIAGNOSIS — N18.30 CKD (CHRONIC KIDNEY DISEASE) STAGE 3, GFR 30-59 ML/MIN: Primary | ICD-10-CM

## 2017-11-01 DIAGNOSIS — E11.29 PROTEINURIA DUE TO TYPE 2 DIABETES MELLITUS: ICD-10-CM

## 2017-11-01 DIAGNOSIS — D63.1 ANEMIA OF CHRONIC RENAL FAILURE, STAGE 3 (MODERATE): ICD-10-CM

## 2017-11-01 PROCEDURE — 99999 PR PBB SHADOW E&M-EST. PATIENT-LVL IV: CPT | Mod: PBBFAC,,, | Performed by: INTERNAL MEDICINE

## 2017-11-01 PROCEDURE — 99499 UNLISTED E&M SERVICE: CPT | Mod: S$GLB,,, | Performed by: INTERNAL MEDICINE

## 2017-11-01 PROCEDURE — 99214 OFFICE O/P EST MOD 30 MIN: CPT | Mod: S$GLB,,, | Performed by: INTERNAL MEDICINE

## 2017-11-01 NOTE — PATIENT INSTRUCTIONS
Avoid NSAID pain medications such as advil, aleve, motrin, ibuprofen, naprosyn, meloxicam, diclofenac, mobic.

## 2017-11-01 NOTE — PROGRESS NOTES
PROGRESS NOTE FOR ESTABLISHED PATIENT    PHYSICIAN REQUESTING THE CONSULT: Dr. Penny Randolph    REASON FOR CONSULTATION: Renal insufficiency    77 y.o. female with history of CKD 3, DM2, HTN, diabetic retinopathy, diastolic CHF, CVA, GERD presents to the renal clinic for evaluation of renal insufficiency.       Patient today denies any complaints.           Past Medical History:   Diagnosis Date    Anemia     sickle cell trait    Anxiety     Back pain     Cataract     Cerebral atherosclerosis 9/17/2015    Cerebrovascular disease     Colon polyp 2012    colonoscopy 9/22/2016    Degenerative disc disease     Degenerative disc disease     Depression     Diabetes with neurologic complications     Diabetic retinopathy     Diverticulosis     colonoscopy 9/22/2016    Gastroesophageal reflux disease without esophagitis 8/13/2015    GERD (gastroesophageal reflux disease)     Glaucoma     Hyperlipidemia     Hypertension     Iritis - Left Eye 4/15/2013    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    Microalbuminuria     Multinodular thyroid     Polyneuropathy     Screening 9/22/2016    Stroke 1999    affected her memory but did not have lateralized complaints    Thyroid disease     Trouble in sleeping     Type 2 diabetes with peripheral circulatory disorder, controlled     Type 2 diabetes, uncontrolled, 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/18/2015    Type II or unspecified type diabetes mellitus with ophthalmic manifestations, uncontrolled(250.52)     Diabetic retinopathy [362.0]    Type II or unspecified type diabetes mellitus with renal manifestations, uncontrolled(250.42)     UARS (upper airway resistance syndrome) 2/5/2015       Past Surgical History:   Procedure Laterality Date    CATARACT EXTRACTION W/  INTRAOCULAR LENS IMPLANT Left 2/27/13    OS Dr. Taveras    COLONOSCOPY      COLONOSCOPY N/A 9/22/2016    Procedure: COLONOSCOPY;  Surgeon: Jd JORGE  MD Daisha;  Location: [facility] ([address] [place] [address]);  Service: Endoscopy;  Laterality: N/A;  OK per Dr Randolph for pt to hold Plavix 5 days prior to procedure/see telephone encounter dated 8/29/16/svn    EYE SURGERY Bilateral 2002 approx    Laser for glaucoma    HYSTERECTOMY  1963     AMEENA/USO- fibroids; no cancer    OOPHORECTOMY  1963    unknown, only removed one       Review of patient's allergies indicates:   Allergen Reactions    Pravachol [pravastatin] Nausea Only       Current Outpatient Prescriptions   Medication Sig Dispense Refill    ACCU-CHEK PENNY PLUS TEST STRP Strp 1 strip by Misc.(Non-Drug; Combo Route) route 2 (two) times daily. 100 strip 6    ACCU-CHEK FASTCLIX Misc 1 each by Misc.(Non-Drug; Combo Route) route 2 (two) times daily. 100 each 6    acetaminophen (TYLENOL) 325 MG tablet Take 1 tablet (325 mg total) by mouth every 6 (six) hours as needed for Pain.  0    alcohol swabs PadM Apply 1 each topically as needed. 100 each 11    alprazolam (XANAX) 0.5 MG tablet Take 1 tablet (0.5 mg total) by mouth nightly as needed for Anxiety (may take no more than 1-2 x weekly for anxiety). 30 tablet 0    amLODIPine (NORVASC) 5 MG tablet Take 1 tablet (5 mg total) by mouth once daily. 90 tablet 3    aspirin (ECOTRIN) 81 MG EC tablet Take 1 tablet (81 mg total) by mouth once daily.  0    atorvastatin (LIPITOR) 80 MG tablet Take 1 tablet (80 mg total) by mouth once daily. 90 tablet 2    brimonidine 0.2% (ALPHAGAN) 0.2 % Drop Place 1 drop into both eyes 3 (three) times daily. Disp 3 months supply = 30 mls 30 mL 3    cholecalciferol, vitamin D3, 1,000 unit capsule Take 2 capsules (2,000 Units total) by mouth once daily. 60 capsule 12    clopidogrel (PLAVIX) 75 mg tablet TAKE 1 TABLET BY MOUTH EVERY DAY (BLOOD THINNER) 90 tablet 3    ferrous sulfate 325 mg (65 mg iron) Tab tablet Take 1 tablet (325 mg total) by mouth 2 (two) times daily. 60 tablet 12    glimepiride (AMARYL) 4 MG tablet TAKE 1 TABLET BY MOUTH IN  THE MORNING WITH BREAKFAST 90 tablet 0    hydroCHLOROthiazide (HYDRODIURIL) 12.5 MG Tab Take 1 (12.5 mg) po qd 30 tablet 11    losartan (COZAAR) 50 MG tablet Take 1 tablet (50 mg total) by mouth 2 (two) times daily. 180 tablet 3    meclizine (ANTIVERT) 25 mg tablet Take 1 tablet (25 mg total) by mouth 3 (three) times daily as needed. 90 tablet 3    metFORMIN (GLUCOPHAGE-XR) 500 MG 24 hr tablet Take 2 tablets (1,000 mg total) by mouth 2 (two) times daily with meals. 360 tablet 2    naproxen (NAPROSYN) 500 MG tablet Take 1 tablet (500 mg total) by mouth 2 (two) times daily with meals. 10 tablet 0    omeprazole (PRILOSEC) 20 MG capsule Take 1 capsule (20 mg total) by mouth once daily. 30 capsule 11     No current facility-administered medications for this visit.        Family History   Problem Relation Age of Onset    Stroke Mother     Mental illness Mother     Hypertension Mother     Stroke Father     Diabetes Maternal Grandmother     Drug abuse Daughter     Cancer Sister 68     gyn    Cancer Maternal Uncle      type not known    Heart disease Paternal Grandmother     Glaucoma Neg Hx     Macular degeneration Neg Hx     Alcohol abuse Neg Hx     COPD Neg Hx     Asthma Neg Hx        Social History     Social History    Marital status:      Spouse name: N/A    Number of children: N/A    Years of education: N/A     Occupational History    Not on file.     Social History Main Topics    Smoking status: Never Smoker    Smokeless tobacco: Never Used    Alcohol use No    Drug use: No    Sexual activity: No     Other Topics Concern    Not on file     Social History Narrative    , lives alone. 1 Child who lives in nursing home. She has a grandson who lives in [place].. Retired from [employer] . Still drives. Does not have a Living Will or advanced directive.       Review of Systems:  1. GENERAL: patient denies any fever, weight changes, generalized weakness, dizziness.  2. HEENT:  "patient denies headaches, visual disturbances, swallowing problems, sinus pain, nasal congestion.  3. CARDIOVASCULAR: patient denies chest pain, palpitations.  4. PULMONARY: patient denies SOB, coughing, hemoptysis, wheezing.  5. GASTROINTESTINAL: patient denies abdominal pain, nausea, vomiting, diarrhea.  6. GENITOURINARY: patient denies dysuria, hematuria, hesitancy, frequency.  7. EXTREMITIES: patient denies LE edema, LE cramping.  8. DERMATOLOGY: patient denies rashes, ulcers.  9. NEURO: patient denies tremors, extremity weakness, extremity numbness/tingling.  10. MUSCULOSKELETAL: patient denies joint pain, joint swelling.  11. HEMATOLOGY: patient denies rectal or gum bleeding.  12: PSYCH: patient denies anxiety, depression.      PHYSICAL EXAM:  BP (!) 142/78   Pulse 72   Ht 5' 4" (1.626 m)   Wt 79.2 kg (174 lb 9.7 oz)   BMI 29.97 kg/m²     GENERAL: Pleasant well groomed lady presents to clinic with non-labored breathing.  HEENT: PER, no nasal discharge, no icterus, no oral exudates, moist mucosal membranes.  NECK: no thyroid mass, no lymphadenopathy.  HEART: RRR S1/S2, no rubs, good peripheral pulses.  LUNGS: CTA bilaterally, no wheezing, breathing is nonlabored.  ABDOMEN: soft, nontender, not distended, bowel sounds are present, no abdominal hernia.  EXTREM: no LE edema.  SKIN: no rashes, skin is warm and dry.  NEURO: A & O x 3, no obvious focal signs.    LABORATORY RESULTS:    Lab Results   Component Value Date    CREATININE 1.4 10/19/2017    BUN 22 10/19/2017     10/19/2017    K 5.2 (H) 10/19/2017     10/19/2017    CO2 26 10/19/2017      Lab Results   Component Value Date    CALCIUM 9.4 10/19/2017    PHOS 3.7 10/19/2017     Lab Results   Component Value Date    ALBUMIN 3.4 (L) 10/19/2017     Lab Results   Component Value Date    WBC 5.42 10/19/2017    HGB 9.3 (L) 10/19/2017    HCT 28.9 (L) 10/19/2017    MCV 90 10/19/2017     10/19/2017     Protein Creatinine Ratios:    Creatinine, " Random Ur   Date Value Ref Range Status   10/19/2017 59.0 15.0 - 325.0 mg/dL Final     Comment:     The random urine reference ranges provided were established   for 24 hour urine collections.  No reference ranges exist for  random urine specimens.  Correlate clinically.     10/26/2016 47.0 15.0 - 325.0 mg/dL Final     Comment:     The random urine reference ranges provided were established   for 24 hour urine collections.  No reference ranges exist for  random urine specimens.  Correlate clinically.     09/08/2016 53.0 15.0 - 325.0 mg/dL Final     Comment:     The random urine reference ranges provided were established   for 24 hour urine collections.  No reference ranges exist for  random urine specimens.  Correlate clinically.       Protein, Urine Random   Date Value Ref Range Status   10/19/2017 45 (H) 0 - 15 mg/dL Final     Comment:     The random urine reference ranges provided were established   for 24 hour urine collections.  No reference ranges exist for  random urine specimens.  Correlate clinically.     10/26/2016 102 (H) 0 - 15 mg/dL Final     Comment:     The random urine reference ranges provided were established   for 24 hour urine collections.  No reference ranges exist for  random urine specimens.  Correlate clinically.     09/08/2016 89 (H) 0 - 15 mg/dL Final     Comment:     The random urine reference ranges provided were established   for 24 hour urine collections.  No reference ranges exist for  random urine specimens.  Correlate clinically.       Prot/Creat Ratio, Ur   Date Value Ref Range Status   10/19/2017 0.76 (H) 0.00 - 0.20 Final   10/26/2016 2.17 (H) 0.00 - 0.20 Final   09/08/2016 1.68 (H) 0.00 - 0.20 Final           ASSESSMENT AND PLAN:  77 y.o. female with history of CKD 3, DM2, HTN, diabetic retinopathy, diastolic CHF, CVA, GERD presents to the renal clinic for evaluation of renal insufficiency.     1. Renal insufficiency: Patient presents with mild renal insufficiency, consistent  with CKD stage 3. Last creatinine was 1.4. Likely cause of her renal insufficiency is her long-standing DM2 given her proteinuria. Patient's renal function will be monitore closely and she will return to the clinic in 4 months for follow up. Patient was advised to avoid NSAID pain medications such as advil, aleve, motrin, ibuprofen, naprosyn, meloxicam, diclofenac, mobic and to hydrate with 60-65 ounces of water per day.     2. Electrolytes: Borderline hyperkalemia: low K diet was started.     3. Acid base status: No acute issues.    4. Volume: Euvolemic.     5. Hypertension: Good BP control.     6. Medications: Reviewed. Agree with current medical regimen.     7. Proteinuria: continue Losartan.     8. Anemia: monitor for now.     9. DM2: Fair blood glucose control with HgA1c at 7.4 (6/5/17).

## 2017-11-06 ENCOUNTER — LAB VISIT (OUTPATIENT)
Dept: LAB | Facility: HOSPITAL | Age: 77
End: 2017-11-06
Attending: NURSE PRACTITIONER
Payer: MEDICARE

## 2017-11-06 DIAGNOSIS — E11.22 DIABETES MELLITUS WITH STAGE 3 CHRONIC KIDNEY DISEASE: Chronic | ICD-10-CM

## 2017-11-06 DIAGNOSIS — D50.1 IRON DEFICIENCY ANEMIA DUE TO SIDEROPENIC DYSPHAGIA: ICD-10-CM

## 2017-11-06 DIAGNOSIS — N18.30 DIABETES MELLITUS WITH STAGE 3 CHRONIC KIDNEY DISEASE: Chronic | ICD-10-CM

## 2017-11-06 DIAGNOSIS — E78.5 HYPERLIPIDEMIA, UNSPECIFIED HYPERLIPIDEMIA TYPE: ICD-10-CM

## 2017-11-06 LAB
ANION GAP SERPL CALC-SCNC: 7 MMOL/L
BASOPHILS # BLD AUTO: 0.05 K/UL
BASOPHILS NFR BLD: 1 %
BUN SERPL-MCNC: 25 MG/DL
CALCIUM SERPL-MCNC: 9.8 MG/DL
CHLORIDE SERPL-SCNC: 105 MMOL/L
CHOLEST SERPL-MCNC: 173 MG/DL
CHOLEST/HDLC SERPL: 3.9 {RATIO}
CO2 SERPL-SCNC: 29 MMOL/L
CREAT SERPL-MCNC: 1.3 MG/DL
DIFFERENTIAL METHOD: ABNORMAL
EOSINOPHIL # BLD AUTO: 0.2 K/UL
EOSINOPHIL NFR BLD: 3.7 %
ERYTHROCYTE [DISTWIDTH] IN BLOOD BY AUTOMATED COUNT: 14.6 %
EST. GFR  (AFRICAN AMERICAN): 46 ML/MIN/1.73 M^2
EST. GFR  (NON AFRICAN AMERICAN): 39.9 ML/MIN/1.73 M^2
ESTIMATED AVG GLUCOSE: 151 MG/DL
FERRITIN SERPL-MCNC: 204 NG/ML
GLUCOSE SERPL-MCNC: 127 MG/DL
HBA1C MFR BLD HPLC: 6.9 %
HCT VFR BLD AUTO: 31 %
HDLC SERPL-MCNC: 44 MG/DL
HDLC SERPL: 25.4 %
HGB BLD-MCNC: 10.1 G/DL
IMM GRANULOCYTES # BLD AUTO: 0 K/UL
IMM GRANULOCYTES NFR BLD AUTO: 0 %
IRON SERPL-MCNC: 62 UG/DL
LDLC SERPL CALC-MCNC: 116.6 MG/DL
LYMPHOCYTES # BLD AUTO: 1.8 K/UL
LYMPHOCYTES NFR BLD: 36.5 %
MCH RBC QN AUTO: 28.8 PG
MCHC RBC AUTO-ENTMCNC: 32.6 G/DL
MCV RBC AUTO: 88 FL
MONOCYTES # BLD AUTO: 0.5 K/UL
MONOCYTES NFR BLD: 9.3 %
NEUTROPHILS # BLD AUTO: 2.4 K/UL
NEUTROPHILS NFR BLD: 49.5 %
NONHDLC SERPL-MCNC: 129 MG/DL
NRBC BLD-RTO: 0 /100 WBC
PLATELET # BLD AUTO: 226 K/UL
PMV BLD AUTO: 11.4 FL
POTASSIUM SERPL-SCNC: 4.7 MMOL/L
RBC # BLD AUTO: 3.51 M/UL
SATURATED IRON: 20 %
SODIUM SERPL-SCNC: 141 MMOL/L
TOTAL IRON BINDING CAPACITY: 317 UG/DL
TRANSFERRIN SERPL-MCNC: 214 MG/DL
TRIGL SERPL-MCNC: 62 MG/DL
WBC # BLD AUTO: 4.93 K/UL

## 2017-11-06 PROCEDURE — 85025 COMPLETE CBC W/AUTO DIFF WBC: CPT

## 2017-11-06 PROCEDURE — 82728 ASSAY OF FERRITIN: CPT

## 2017-11-06 PROCEDURE — 83036 HEMOGLOBIN GLYCOSYLATED A1C: CPT

## 2017-11-06 PROCEDURE — 83540 ASSAY OF IRON: CPT

## 2017-11-06 PROCEDURE — 80048 BASIC METABOLIC PNL TOTAL CA: CPT

## 2017-11-06 PROCEDURE — 80061 LIPID PANEL: CPT

## 2017-11-06 PROCEDURE — 36415 COLL VENOUS BLD VENIPUNCTURE: CPT | Mod: PO

## 2017-11-07 ENCOUNTER — PATIENT MESSAGE (OUTPATIENT)
Dept: INTERNAL MEDICINE | Facility: CLINIC | Age: 77
End: 2017-11-07

## 2017-11-08 ENCOUNTER — TELEPHONE (OUTPATIENT)
Dept: INTERNAL MEDICINE | Facility: CLINIC | Age: 77
End: 2017-11-08

## 2017-11-08 NOTE — TELEPHONE ENCOUNTER
Please let her know that I have reviewed her labs.  They look better.  Continue on her same regimen.  I would like to see her back in January or February for an EP, please schedule.  Thank you

## 2017-11-09 ENCOUNTER — TELEPHONE (OUTPATIENT)
Dept: INTERNAL MEDICINE | Facility: CLINIC | Age: 77
End: 2017-11-09

## 2017-11-09 NOTE — TELEPHONE ENCOUNTER
----- Message from Danish Morrell sent at 11/9/2017  7:31 AM CST -----  Contact: self/ [phone] home  Type: Returning a call    Who left a message? Jessica    When did the practice call? 11/08/2017    Comments: Pt returned call and is waiting on a call back.  Please call and advise.    Thank you

## 2017-11-09 NOTE — TELEPHONE ENCOUNTER
Call pt no answer left detail message on voice mail and correct fax number to have the form fax to us

## 2017-11-09 NOTE — TELEPHONE ENCOUNTER
----- Message from Jihan Clayton sent at 11/9/2017  8:38 AM CST -----  Contact: patient [phone]  Pt called to speak with the nurse. She is trying to get new diabetic shoes and the company she is using [employer]  [place] [phone]. Patient is there now and asked them to refax this. Pt said that this has been going on for months and pt needs shoes.

## 2017-11-16 ENCOUNTER — OFFICE VISIT (OUTPATIENT)
Dept: PODIATRY | Facility: CLINIC | Age: 77
End: 2017-11-16
Payer: MEDICARE

## 2017-11-16 ENCOUNTER — TELEPHONE (OUTPATIENT)
Dept: INTERNAL MEDICINE | Facility: CLINIC | Age: 77
End: 2017-11-16

## 2017-11-16 VITALS
HEIGHT: 64 IN | WEIGHT: 170.44 LBS | DIASTOLIC BLOOD PRESSURE: 72 MMHG | BODY MASS INDEX: 29.1 KG/M2 | SYSTOLIC BLOOD PRESSURE: 149 MMHG | HEART RATE: 64 BPM | TEMPERATURE: 97 F | RESPIRATION RATE: 18 BRPM

## 2017-11-16 DIAGNOSIS — L84 CORN OR CALLUS: ICD-10-CM

## 2017-11-16 DIAGNOSIS — B35.1 ONYCHOMYCOSIS DUE TO DERMATOPHYTE: ICD-10-CM

## 2017-11-16 DIAGNOSIS — E11.49 TYPE II DIABETES MELLITUS WITH NEUROLOGICAL MANIFESTATIONS: Primary | ICD-10-CM

## 2017-11-16 PROCEDURE — 99499 UNLISTED E&M SERVICE: CPT | Mod: S$GLB,,, | Performed by: PODIATRIST

## 2017-11-16 PROCEDURE — 99999 PR PBB SHADOW E&M-EST. PATIENT-LVL III: CPT | Mod: PBBFAC,,, | Performed by: PODIATRIST

## 2017-11-16 PROCEDURE — 11056 PARNG/CUTG B9 HYPRKR LES 2-4: CPT | Mod: Q9,S$GLB,, | Performed by: PODIATRIST

## 2017-11-16 PROCEDURE — 11721 DEBRIDE NAIL 6 OR MORE: CPT | Mod: 59,Q9,S$GLB, | Performed by: PODIATRIST

## 2017-11-16 NOTE — TELEPHONE ENCOUNTER
----- Message from Ana Luisa Parker sent at 11/16/2017  7:47 AM CST -----  Contact: Call her at Mobile: [phone]   Have you received a request for her diabetic shoes?   Please call her.

## 2017-11-16 NOTE — PROGRESS NOTES
Office Visit 11/16/2017  Last encounter in this department: 5/31/2017    Subjective:      Patient ID: Saul Mar is a 77 y.o. female.    Chief Complaint: Type II diabetes mellitus with Neurological manifestations (Dr. Anuradha Randolph )    Saul is a 77 y.o. female who presents to the clinic for evaluation and treatment of high risk feet. Saul has a past medical history of Anemia; Anxiety; Back pain; Cataract; Cerebral atherosclerosis (9/17/2015); Cerebrovascular disease; Colon polyp (2012); Degenerative disc disease; Degenerative disc disease; Depression; Diabetes with neurologic complications; Diabetic retinopathy; Diverticulosis; Gastroesophageal reflux disease without esophagitis (8/13/2015); GERD (gastroesophageal reflux disease); Glaucoma; Hyperlipidemia; Hypertension; Iritis - Left Eye (4/15/2013); 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 (7/28/2017); Microalbuminuria; Multinodular thyroid; Polyneuropathy; Screening (9/22/2016); Stroke (1999); Thyroid disease; Trouble in sleeping; Type 2 diabetes with peripheral circulatory disorder, controlled; Type 2 diabetes, uncontrolled, with background retinopathy with macular edema (11/18/2015); Type II or unspecified type diabetes mellitus with ophthalmic manifestations, uncontrolled(250.52); Type II or unspecified type diabetes mellitus with renal manifestations, uncontrolled(250.42); and UARS (upper airway resistance syndrome) (2/5/2015). The patient's chief complaint is long, thick toenails and calluses. This patient has documented high risk feet requiring routine maintenance secondary to diabetes mellitis and those secondary complications of diabetes, as mentioned..  Patient relates that somehow paper work was lost so she has still not received her diabetic shoes and insoles.  She will try to contact her primary care doctor once more to sign off on paperwork.    PCP: Penny Randolph MD    Date Last Seen by PCP: 10/2017    Current shoe gear:   Affected Foot: Tennis shoes     Unaffected Foot: Tennis shoes    Hemoglobin A1C   Date Value Ref Range Status   11/06/2017 6.9 (H) 4.0 - 5.6 % Final     Comment:     According to ADA guidelines, hemoglobin A1c <7.0% represents  optimal control in non-pregnant diabetic patients. Different  metrics may apply to specific patient populations.   Standards of Medical Care in Diabetes-2016.  For the purpose of screening for the presence of diabetes:  <5.7%     Consistent with the absence of diabetes  5.7-6.4%  Consistent with increasing risk for diabetes   (prediabetes)  >or=6.5%  Consistent with diabetes  Currently, no consensus exists for use of hemoglobin A1c  for diagnosis of diabetes for children.  This Hemoglobin A1c assay has significant interference with fetal   hemoglobin   (HbF). The results are invalid for patients with abnormal amounts of   HbF,   including those with known Hereditary Persistence   of Fetal Hemoglobin. Heterozygous hemoglobin variants (HbAS, HbAC,   HbAD, HbAE, HbA2) do not significantly interfere with this assay;   however, presence of multiple variants in a sample may impact the %   interference.     06/05/2017 7.4 (H) 4.5 - 6.2 % Final     Comment:     According to ADA guidelines, hemoglobin A1C <7.0% represents  optimal control in non-pregnant diabetic patients.  Different  metrics may apply to specific populations.   Standards of Medical Care in Diabetes - 2016.  For the purpose of screening for the presence of diabetes:  <5.7%     Consistent with the absence of diabetes  5.7-6.4%  Consistent with increasing risk for diabetes   (prediabetes)  >or=6.5%  Consistent with diabetes  Currently no consensus exists for use of hemoglobin A1C  for diagnosis of diabetes for children.     10/26/2016 8.0 (H) 4.5 - 6.2 % Final     Comment:     According to ADA guidelines, hemoglobin A1C <7.0% represents  optimal control in non-pregnant diabetic patients.  Different  metrics may apply to specific  populations.   Standards of Medical Care in Diabetes - 2016.  For the purpose of screening for the presence of diabetes:  <5.7%     Consistent with the absence of diabetes  5.7-6.4%  Consistent with increasing risk for diabetes   (prediabetes)  >or=6.5%  Consistent with diabetes  Currently no consensus exists for use of hemoglobin A1C  for diagnosis of diabetes for children.         Review of Systems   Constitution: Negative for chills and fever.   Cardiovascular: Negative for chest pain.   Respiratory: Negative for shortness of breath.    Gastrointestinal: Negative for nausea and vomiting.           Objective:      Physical Exam   Constitutional: She is oriented to person, place, and time. She appears well-developed and well-nourished. No distress.   Cardiovascular:   Pulses:       Dorsalis pedis pulses are 1+ on the right side, and 1+ on the left side.        Posterior tibial pulses are 1+ on the right side, and 0 on the left side.   Capillary fill time 3-5 seconds to digits bilateral feet.   Musculoskeletal:   Lower extremities:    Deformities: hallux abductovalgus both feet with plantar fat pad atrophy.    Normal arch height and rectus feet bilaterally.     5/5 muscle strength and tone in all four quadrants bilaterally.     Pain-free range of motion in all four quadrants with stiffness and limitation bilaterally.     Pain on palpation: None     Neurological: She is alert and oriented to person, place, and time. She displays no Babinski's sign on the right side. She displays no Babinski's sign on the left side.   Plantar protective threshold sensation by touch via 5.07 SWMF diminished to the digits bilaterally.      Skin:   Lower extremities:    Thin, dry, atrophic bilaterally. Digital hair absent bilaterally. Digits cool with proximal foot warmth.    Mild bilateral foot and ankle edema.    No varicosities, pigmentary changes bilaterally.     No wounds, drainage, malodor, erythema, interdigital maceration were  noted.     Skin lesions: sub met head 5 bilaterally.     Nails are thick, dystrophic and discolored bilateral feet.   Psychiatric: She has a normal mood and affect.   Nursing note and vitals reviewed.            X-rays: not indicated    Assessment:       Encounter Diagnoses   Name Primary?    Type II diabetes mellitus with neurological manifestations Yes    Onychomycosis due to dermatophyte     Corn or callus          Plan:       Saul was seen today for type ii diabetes mellitus with neurological manifestations.    Diagnoses and all orders for this visit:    Type II diabetes mellitus with neurological manifestations    Onychomycosis due to dermatophyte    Corn or callus      I counseled the patient on her conditions, their implications and medical management.    - Shoe inspection. Diabetic Foot Education. Patient reminded of the importance of good nutrition and blood sugar control to help prevent podiatric complications of diabetes. Patient instructed on proper foot hygeine. We discussed wearing proper shoe gear, daily foot inspections, never walking without protective shoe gear, never putting sharp instruments to feet, routine podiatric nail visits every 2-3 months.      - With patient's permission, nails were aggressively reduced and debrided x 10 to their soft tissue attachment mechanically and with electric , removing all offending nail and debris. Patient relates relief following the procedure. She will continue to monitor the areas daily, inspect her feet, wear protective shoe gear when ambulatory, moisturizer to maintain skin integrity and follow in this office in approximately 2-3 months, sooner p.r.n.    After cleansing with alcohol prep pad, the above mentioned hyperkeratotic lesions were sharply trimmed x2 utilizing #15 blade to a smooth base without incident. Patient tolerated procedure well and reported comfort to the debridement sites. Patient will continue to use padding and apply  prescription urea lotion or over the counter alternatives to areas of recurrent skin build up.

## 2017-11-21 DIAGNOSIS — N18.30 DIABETES MELLITUS WITH STAGE 3 CHRONIC KIDNEY DISEASE: Primary | Chronic | ICD-10-CM

## 2017-11-21 DIAGNOSIS — E11.22 DIABETES MELLITUS WITH STAGE 3 CHRONIC KIDNEY DISEASE: Primary | Chronic | ICD-10-CM

## 2017-11-21 RX ORDER — ALPRAZOLAM 0.5 MG/1
0.5 TABLET ORAL NIGHTLY PRN
Qty: 30 TABLET | Refills: 0 | Status: SHIPPED | OUTPATIENT
Start: 2017-11-21 | End: 2018-02-07 | Stop reason: SDUPTHER

## 2017-11-21 NOTE — TELEPHONE ENCOUNTER
----- Message from Barbara Collier sent at 11/21/2017  9:09 AM CST -----  Contact: self/[phone]  Patient called in regards needing to talk with Dr Randolph about her diabetic shoes. Patient need referral to be fax to [phone] with most recent chart notes. Fax attention - [employer]. Please call and advise.       Thank you!!!

## 2017-11-21 NOTE — TELEPHONE ENCOUNTER
----- Message from Bibiana Healy sent at 11/21/2017 10:51 AM CST -----  Contact: Patient [phone]  Prescription Request:     Name of medication: alprazolam (XANAX) 0.5 MG tablet    Reason for request: Refill    Please notify patient when Rx is ready to be picked up.    Thank You

## 2017-12-21 ENCOUNTER — OFFICE VISIT (OUTPATIENT)
Dept: INTERNAL MEDICINE | Facility: CLINIC | Age: 77
End: 2017-12-21
Payer: MEDICARE

## 2017-12-21 VITALS
OXYGEN SATURATION: 98 % | BODY MASS INDEX: 29.5 KG/M2 | HEART RATE: 74 BPM | TEMPERATURE: 97 F | WEIGHT: 172.81 LBS | HEIGHT: 64 IN | SYSTOLIC BLOOD PRESSURE: 146 MMHG | DIASTOLIC BLOOD PRESSURE: 76 MMHG

## 2017-12-21 DIAGNOSIS — S63.641A SPRAIN OF METACARPOPHALANGEAL (MCP) JOINT OF RIGHT THUMB, INITIAL ENCOUNTER: Primary | ICD-10-CM

## 2017-12-21 PROCEDURE — 99999 PR PBB SHADOW E&M-EST. PATIENT-LVL III: CPT | Mod: PBBFAC,,, | Performed by: FAMILY MEDICINE

## 2017-12-21 PROCEDURE — 99213 OFFICE O/P EST LOW 20 MIN: CPT | Mod: S$GLB,,, | Performed by: FAMILY MEDICINE

## 2017-12-21 NOTE — PROGRESS NOTES
Subjective:   Patient ID: Saul Mar is a 77 y.o. female.  Chief Complaint:  Injury (right thumb)      Slip/fall with right thumb injury yesterday.  Hit someone wall.  Likely bent backwards.  No pop/snap.  Denies previous injury.  Pain with movement of thumb.  Not tender palpation of bone.  No pain in the wrist.  Difficult for patient to clench fist.  Reports weakness in hand opening and closing jars, chronic, predates this event.  No numbness or tingling.        Current Outpatient Prescriptions:     ACCU-CHEK PENNY PLUS TEST STRP Strp, 1 strip by Misc.(Non-Drug; Combo Route) route 2 (two) times daily., Disp: 100 strip, Rfl: 6    ACCU-CHEK FASTCLIX Misc, 1 each by Misc.(Non-Drug; Combo Route) route 2 (two) times daily., Disp: 100 each, Rfl: 6    acetaminophen (TYLENOL) 325 MG tablet, Take 1 tablet (325 mg total) by mouth every 6 (six) hours as needed for Pain., Disp: , Rfl: 0    alcohol swabs PadM, Apply 1 each topically as needed., Disp: 100 each, Rfl: 11    ALPRAZolam (XANAX) 0.5 MG tablet, Take 1 tablet (0.5 mg total) by mouth nightly as needed for Anxiety (may take no more than 1-2 x weekly for anxiety)., Disp: 30 tablet, Rfl: 0    amLODIPine (NORVASC) 5 MG tablet, Take 1 tablet (5 mg total) by mouth once daily., Disp: 90 tablet, Rfl: 3    aspirin (ECOTRIN) 81 MG EC tablet, Take 1 tablet (81 mg total) by mouth once daily., Disp: , Rfl: 0    atorvastatin (LIPITOR) 80 MG tablet, Take 1 tablet (80 mg total) by mouth once daily., Disp: 90 tablet, Rfl: 2    brimonidine 0.2% (ALPHAGAN) 0.2 % Drop, Place 1 drop into both eyes 3 (three) times daily. Disp 3 months supply = 30 mls, Disp: 30 mL, Rfl: 3    cholecalciferol, vitamin D3, 1,000 unit capsule, Take 2 capsules (2,000 Units total) by mouth once daily., Disp: 60 capsule, Rfl: 12    clopidogrel (PLAVIX) 75 mg tablet, TAKE 1 TABLET BY MOUTH EVERY DAY (BLOOD THINNER), Disp: 90 tablet, Rfl: 3    ferrous sulfate 325 mg (65 mg iron) Tab tablet,  "Take 1 tablet (325 mg total) by mouth 2 (two) times daily., Disp: 60 tablet, Rfl: 12    glimepiride (AMARYL) 4 MG tablet, TAKE 1 TABLET BY MOUTH IN THE MORNING WITH BREAKFAST, Disp: 90 tablet, Rfl: 0    hydroCHLOROthiazide (HYDRODIURIL) 12.5 MG Tab, Take 1 (12.5 mg) po qd, Disp: 30 tablet, Rfl: 11    losartan (COZAAR) 50 MG tablet, Take 1 tablet (50 mg total) by mouth 2 (two) times daily., Disp: 180 tablet, Rfl: 3    meclizine (ANTIVERT) 25 mg tablet, Take 1 tablet (25 mg total) by mouth 3 (three) times daily as needed., Disp: 90 tablet, Rfl: 3    metFORMIN (GLUCOPHAGE-XR) 500 MG 24 hr tablet, Take 2 tablets (1,000 mg total) by mouth 2 (two) times daily with meals., Disp: 360 tablet, Rfl: 2    omeprazole (PRILOSEC) 20 MG capsule, Take 1 capsule (20 mg total) by mouth once daily., Disp: 30 capsule, Rfl: 11  Review of Systems   Constitutional: Negative for chills and fever.   Gastrointestinal: Negative for abdominal pain, diarrhea, nausea and vomiting.   Musculoskeletal: Positive for arthralgias and joint swelling.   Skin: Negative for color change, pallor, rash and wound.   Neurological: Positive for weakness. Negative for numbness.     Objective:   BP (!) 146/76 (BP Location: Right arm, Patient Position: Sitting, BP Method: Medium (Manual))   Pulse 74   Temp 97.2 °F (36.2 °C) (Tympanic)   Ht 5' 4" (1.626 m)   Wt 78.4 kg (172 lb 13.5 oz)   SpO2 98%   BMI 29.67 kg/m²     Physical Exam   Constitutional: She is oriented to person, place, and time. She appears well-developed and well-nourished. No distress.   Musculoskeletal:        Right wrist: Normal. She exhibits normal range of motion, no tenderness, no bony tenderness, no swelling, no effusion, no crepitus and no deformity.        Right hand: She exhibits swelling (1st MCP area). She exhibits normal range of motion, no bony tenderness, normal capillary refill, no deformity and no laceration. Normal sensation noted. Normal strength noted.   Full range of " motion right wrist no pain.  No tenderness to palpation over distal radius ulna, scaphoid, first metatarsal, first phalynx  Pain greatest with thumb extension and flexion.  Especially against resistance.  Testing negative for game keepers thumb/collateral ligament tear.   Neurological: She is alert and oriented to person, place, and time.   Skin: Skin is warm and dry. No rash noted. No erythema. No pallor.   Psychiatric: She has a normal mood and affect.   Nursing note and vitals reviewed.    Assessment:     1. Sprain of metacarpophalangeal (MCP) joint of right thumb, initial encounter      Plan:   Sprain of metacarpophalangeal (MCP) joint of right thumb, initial encounter  -     ORTHOPEDIC BRACING FOR HOME USE - UPPER EXTREMITY    Discussed based on no bony tenderness, fracture unlikely.  No gross deformities to suggest dislocation.  Splint for comfort.  Ice.  Tylenol for pain.  If not significantly improved by Tuesday, return to clinic for additional evaluation and x-rays.  Patient expresses understanding agrees to above plan.    Blood pressure mildly elevated.  Question  pain related.  Advised follow-up 1 month with PCP.

## 2018-01-04 ENCOUNTER — OFFICE VISIT (OUTPATIENT)
Dept: URGENT CARE | Facility: CLINIC | Age: 78
End: 2018-01-04
Payer: MEDICARE

## 2018-01-04 VITALS
HEIGHT: 64 IN | HEART RATE: 94 BPM | TEMPERATURE: 98 F | RESPIRATION RATE: 17 BRPM | BODY MASS INDEX: 28 KG/M2 | DIASTOLIC BLOOD PRESSURE: 72 MMHG | WEIGHT: 164 LBS | SYSTOLIC BLOOD PRESSURE: 138 MMHG

## 2018-01-04 DIAGNOSIS — J40 BRONCHITIS: Primary | ICD-10-CM

## 2018-01-04 PROCEDURE — 99214 OFFICE O/P EST MOD 30 MIN: CPT | Mod: 25,S$GLB,, | Performed by: FAMILY MEDICINE

## 2018-01-04 PROCEDURE — 94640 AIRWAY INHALATION TREATMENT: CPT | Mod: S$GLB,,, | Performed by: FAMILY MEDICINE

## 2018-01-04 PROCEDURE — 99999 PR PBB SHADOW E&M-EST. PATIENT-LVL III: CPT | Mod: PBBFAC,,, | Performed by: FAMILY MEDICINE

## 2018-01-04 RX ORDER — LEVALBUTEROL INHALATION SOLUTION 1.25 MG/3ML
1.25 SOLUTION RESPIRATORY (INHALATION)
Status: COMPLETED | OUTPATIENT
Start: 2018-01-04 | End: 2018-01-04

## 2018-01-04 RX ORDER — ALBUTEROL SULFATE 90 UG/1
2 AEROSOL, METERED RESPIRATORY (INHALATION) EVERY 6 HOURS PRN
Qty: 1 INHALER | Refills: 0 | Status: SHIPPED | OUTPATIENT
Start: 2018-01-04 | End: 2018-08-14 | Stop reason: SDUPTHER

## 2018-01-04 RX ADMIN — LEVALBUTEROL INHALATION SOLUTION 1.25 MG: 1.25 SOLUTION RESPIRATORY (INHALATION) at 01:01

## 2018-01-04 NOTE — PROGRESS NOTES
"Subjective:       Patient ID: Salu Mar is a 77 y.o. female.    Chief Complaint: Generalized Body Aches    /72 (BP Location: Right arm, Patient Position: Sitting, BP Method: Large (Manual))   Pulse 94   Temp 98 °F (36.7 °C) (Tympanic)   Resp 17   Ht 5' 4" (1.626 m)   Wt 74.4 kg (164 lb 0.4 oz)   BMI 28.15 kg/m²     HPI  Patient presented with lingering cough and congestion since getting a cold 2 weeks ago. Seen at another facility 12/30 , received z pack, medrol dose pack and promethazine cough syrup. States its getting worse    Review of Systems   Constitutional: Positive for fatigue. Negative for chills and fever.   HENT: Positive for congestion and sinus pressure. Negative for ear pain, facial swelling, rhinorrhea, sinus pain and sore throat.    Eyes: Negative.    Respiratory: Positive for cough and chest tightness.    Cardiovascular: Negative.    Musculoskeletal: Negative for myalgias.   Neurological: Negative.        Objective:      Physical Exam   Constitutional: She is oriented to person, place, and time. She appears well-developed and well-nourished. No distress.   HENT:   Head: Normocephalic and atraumatic.   Mouth/Throat: No oropharyngeal exudate.   congested   Eyes: EOM are normal. Pupils are equal, round, and reactive to light. Right eye exhibits no discharge. Left eye exhibits no discharge.   Neck: Normal range of motion. Neck supple.   Cardiovascular: Regular rhythm and normal heart sounds.    Pulmonary/Chest: Effort normal. She has wheezes.   Scant rhonchi   Pox 98% RA   Musculoskeletal: Normal range of motion.   Lymphadenopathy:     She has no cervical adenopathy.   Neurological: She is alert and oriented to person, place, and time.   Skin: Skin is warm and dry. She is not diaphoretic.   Nursing note and vitals reviewed.      Assessment:       1. Bronchitis        Plan:     Saul was seen today for generalized body aches.    Diagnoses and all orders for this " visit:    Bronchitis  -     albuterol 90 mcg/actuation inhaler; Inhale 2 puffs into the lungs every 6 (six) hours as needed for Wheezing.  -     levalbuterol nebulizer solution 1.25 mg; Take 3 mLs (1.25 mg total) by nebulization one time.      1. Continue your zithromax, medrol dose pack and cough syrup  2. Rx albuterol inhaler, use for cough, wheeze and shortness of breath        Discussed with pt/family all information and results pertaining to this visit. Discussed diagnosis and plan of treatment.  All questions and concerns were addressed at this time. Pt/family expresses understanding of information and instructions.  Care and follow up instruction provided.

## 2018-01-04 NOTE — PATIENT INSTRUCTIONS
1. Continue your zithromax, medrol dose pack and cough syrup  2. Rx albuterol inhaler, use for cough, wheeze and shortness of breath

## 2018-01-05 DIAGNOSIS — E11.22 DIABETES MELLITUS WITH STAGE 3 CHRONIC KIDNEY DISEASE: Chronic | ICD-10-CM

## 2018-01-05 DIAGNOSIS — N18.30 DIABETES MELLITUS WITH STAGE 3 CHRONIC KIDNEY DISEASE: Chronic | ICD-10-CM

## 2018-01-05 RX ORDER — GLIMEPIRIDE 4 MG/1
TABLET ORAL
Qty: 90 TABLET | Refills: 0 | Status: SHIPPED | OUTPATIENT
Start: 2018-01-05 | End: 2018-07-26

## 2018-01-05 NOTE — TELEPHONE ENCOUNTER
----- Message from Anamaria Bevaniket sent at 1/5/2018 12:12 PM CST -----  Contact: Patient [phone]  Rx Name:glimepiride (AMARYL) 4 MG tablet    Refill:glimepiride (AMARYL) 4 MG tablet    Pharmacy: [facility]/pharmacy #[id number] - [place] - [address] AT AT [facility]    Comments: Needs authorization from you.     Please call and advise.    Thank You

## 2018-01-05 NOTE — TELEPHONE ENCOUNTER
----- Message from Bertha Knight sent at 1/5/2018 12:03 PM CST -----  Contact: self [phone]  Type: Rx    Name of medication(s):  carvedilol (COREG) 12.5 MG tablet    Is this a refill? New rx? New    Who prescribed medication? Agustín    Pharmacy Name, Phone, & Location: [facility]/pharmacy #[id number]     Comments: Patient is calling to request a new prescription on above medication, she has no more refills.    Thank you

## 2018-01-09 ENCOUNTER — TELEPHONE (OUTPATIENT)
Dept: INTERNAL MEDICINE | Facility: CLINIC | Age: 78
End: 2018-01-09

## 2018-01-09 DIAGNOSIS — G47.8 UARS (UPPER AIRWAY RESISTANCE SYNDROME): Primary | Chronic | ICD-10-CM

## 2018-01-09 DIAGNOSIS — R05.9 COUGH: ICD-10-CM

## 2018-01-09 NOTE — TELEPHONE ENCOUNTER
----- Message from Bibiana Healy sent at 1/9/2018  3:57 PM CST -----  Contact: Patient [phone]  Requesting Orders    Orders being requested: Chest XRAY    Reason for request: bronchitis    Does patient have future appt with PCP: No    Please advise.    Thank You

## 2018-01-09 NOTE — TELEPHONE ENCOUNTER
I have placed an order for chest x-ray and also pulmonary appointment, okay to schedule.  Thank you

## 2018-01-09 NOTE — TELEPHONE ENCOUNTER
Pt is requesting a Chest Xray stating that she has been having  A severe cough and she think she have Bronchitis is been since Dec 22 nd  She has been to  twice in [place] she had antibiotic and breathing treatment and also a cough syrup and nothing has help   Please advise

## 2018-01-10 ENCOUNTER — TELEPHONE (OUTPATIENT)
Dept: INTERNAL MEDICINE | Facility: CLINIC | Age: 78
End: 2018-01-10

## 2018-01-10 ENCOUNTER — HOSPITAL ENCOUNTER (OUTPATIENT)
Dept: RADIOLOGY | Facility: HOSPITAL | Age: 78
Discharge: HOME | End: 2018-01-10
Attending: INTERNAL MEDICINE
Payer: MEDICARE

## 2018-01-10 DIAGNOSIS — G47.8 UARS (UPPER AIRWAY RESISTANCE SYNDROME): Chronic | ICD-10-CM

## 2018-01-10 DIAGNOSIS — R05.9 COUGH: ICD-10-CM

## 2018-01-10 PROCEDURE — 71046 X-RAY EXAM CHEST 2 VIEWS: CPT | Mod: TC

## 2018-01-10 NOTE — TELEPHONE ENCOUNTER
Her chest xray is clear    However she has been sick for a long time and does not have her appt in Pulmonary for several weeks    OK to offer an appt with me this Friday at 4:30 or work in next week, or any other provider

## 2018-01-12 ENCOUNTER — OFFICE VISIT (OUTPATIENT)
Dept: INTERNAL MEDICINE | Facility: CLINIC | Age: 78
End: 2018-01-12
Payer: MEDICARE

## 2018-01-12 VITALS
HEART RATE: 63 BPM | TEMPERATURE: 98 F | OXYGEN SATURATION: 95 % | BODY MASS INDEX: 27.03 KG/M2 | SYSTOLIC BLOOD PRESSURE: 146 MMHG | HEIGHT: 66 IN | DIASTOLIC BLOOD PRESSURE: 80 MMHG | WEIGHT: 168.19 LBS

## 2018-01-12 DIAGNOSIS — R05.9 COUGH: ICD-10-CM

## 2018-01-12 DIAGNOSIS — J40 BRONCHITIS: Primary | ICD-10-CM

## 2018-01-12 DIAGNOSIS — M43.14 SPONDYLOLISTHESIS OF THORACIC REGION: ICD-10-CM

## 2018-01-12 PROCEDURE — 99999 PR PBB SHADOW E&M-EST. PATIENT-LVL V: CPT | Mod: PBBFAC,,, | Performed by: NURSE PRACTITIONER

## 2018-01-12 PROCEDURE — 99213 OFFICE O/P EST LOW 20 MIN: CPT | Mod: S$GLB,,, | Performed by: NURSE PRACTITIONER

## 2018-01-12 RX ORDER — BENZONATATE 100 MG/1
100 CAPSULE ORAL 3 TIMES DAILY PRN
Qty: 30 CAPSULE | Refills: 0 | Status: SHIPPED | OUTPATIENT
Start: 2018-01-12 | End: 2018-01-22

## 2018-01-12 NOTE — PATIENT INSTRUCTIONS
mucinex (plain) twice daily with large glass of water     Continue inhaler as needed for shortness of breath and wheezing.     Will start benzonatate as needed for cough

## 2018-01-12 NOTE — PROGRESS NOTES
INTERNAL MEDICINE PROGRESS/URGENT CARE NOTE    CHIEF COMPLAINT     Chief Complaint   Patient presents with    Cough     12/23/2017 mucas clear    Cyst     back x3 weeks ago        HPI     Saul Mar is a 77 y.o. female with DDD, DM, HLD, carotid artery disease, HTN, CKD, MNG, vit d def, ANTOINETTE, and h/o CVA  who presents for an urgent visit today.  She is an established pt of Dr. Randolph.     Here with c/o cough x >2 weeks. Cough is mildly prod with clear mucous. +rhinnorhea. +subjective fever and chills. +wheezing and SOB. +hoarseness   Treating with cough syrup   Seen in urgent care 1/4/2018 for bronchitis: azithromycin, medrol dose pack, albuterol inhaler and cough syrup. Completed these.     Knot to the back x 3 weeks     Past Medical History:  Past Medical History:   Diagnosis Date    Anemia     sickle cell trait    Anxiety     Back pain     Cataract     Cerebral atherosclerosis 9/17/2015    Cerebrovascular disease     Colon polyp 2012    colonoscopy 9/22/2016    Degenerative disc disease     Degenerative disc disease     Depression     Diabetes with neurologic complications     Diabetic retinopathy     Diverticulosis     colonoscopy 9/22/2016    Gastroesophageal reflux disease without esophagitis 8/13/2015    GERD (gastroesophageal reflux disease)     Glaucoma     Hyperlipidemia     Hypertension     Iritis - Left Eye 4/15/2013    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    Microalbuminuria     Multinodular thyroid     Polyneuropathy     Screening 9/22/2016    Stroke 1999    affected her memory but did not have lateralized complaints    Thyroid disease     Trouble in sleeping     Type 2 diabetes with peripheral circulatory disorder, controlled     Type 2 diabetes, uncontrolled, 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/18/2015    Type II or unspecified type diabetes mellitus with ophthalmic manifestations, uncontrolled(250.52)     Diabetic  retinopathy [362.0]    Type II or unspecified type diabetes mellitus with renal manifestations, uncontrolled(250.42)     UARS (upper airway resistance syndrome) 2/5/2015       Home Medications:  Prior to Admission medications    Medication Sig Start Date End Date Taking? Authorizing Provider   ACCU-CHEK PENNY PLUS TEST STRP Strp 1 strip by Misc.(Non-Drug; Combo Route) route 2 (two) times daily. 7/14/16  Yes Penny Randolph MD   ACCU-CHEK FASTCLIX Misc 1 each by Misc.(Non-Drug; Combo Route) route 2 (two) times daily. 7/14/16  Yes Penny Randolph MD   albuterol 90 mcg/actuation inhaler Inhale 2 puffs into the lungs every 6 (six) hours as needed for Wheezing. 1/4/18  Yes Lorie Payne MD   alcohol swabs PadM Apply 1 each topically as needed. 12/16/15  Yes Sonja Brock MD   amLODIPine (NORVASC) 5 MG tablet Take 1 tablet (5 mg total) by mouth once daily. 10/24/17  Yes Grace Murrell NP   aspirin (ECOTRIN) 81 MG EC tablet Take 1 tablet (81 mg total) by mouth once daily. 7/25/17 7/25/18 Yes Penny Randolph MD   atorvastatin (LIPITOR) 80 MG tablet Take 1 tablet (80 mg total) by mouth once daily. 10/24/17  Yes Grace Murrell NP   brimonidine 0.2% (ALPHAGAN) 0.2 % Drop Place 1 drop into both eyes 3 (three) times daily. Disp 3 months supply = 30 mls 8/17/17  Yes Lina Taveras MD   cholecalciferol, vitamin D3, 1,000 unit capsule Take 2 capsules (2,000 Units total) by mouth once daily. 9/20/16  Yes Penny Randolph MD   clopidogrel (PLAVIX) 75 mg tablet TAKE 1 TABLET BY MOUTH EVERY DAY (BLOOD THINNER) 12/8/16  Yes Penny Randolph MD   ferrous sulfate 325 mg (65 mg iron) Tab tablet Take 1 tablet (325 mg total) by mouth 2 (two) times daily. 7/31/17  Yes Penny Randolph MD   glimepiride (AMARYL) 4 MG tablet TAKE 1 TABLET BY MOUTH IN THE MORNING WITH BREAKFAST 1/5/18  Yes Penny Randolph MD   hydroCHLOROthiazide (HYDRODIURIL) 12.5 MG Tab Take 1 (12.5 mg) po qd 10/24/17  Yes Grace Murrell, NP    losartan (COZAAR) 50 MG tablet Take 1 tablet (50 mg total) by mouth 2 (two) times daily. 10/24/17  Yes Grace Murrell NP   metFORMIN (GLUCOPHAGE-XR) 500 MG 24 hr tablet Take 2 tablets (1,000 mg total) by mouth 2 (two) times daily with meals. 10/24/17  Yes Grace Murrell NP   omeprazole (PRILOSEC) 20 MG capsule Take 1 capsule (20 mg total) by mouth once daily. 7/31/17 7/31/18 Yes Penny Randolph MD   acetaminophen (TYLENOL) 325 MG tablet Take 1 tablet (325 mg total) by mouth every 6 (six) hours as needed for Pain. 5/16/17   Sneha Moncada NP   ALPRAZolam (XANAX) 0.5 MG tablet Take 1 tablet (0.5 mg total) by mouth nightly as needed for Anxiety (may take no more than 1-2 x weekly for anxiety). 11/21/17 12/21/17  Penny Randolph MD   meclizine (ANTIVERT) 25 mg tablet Take 1 tablet (25 mg total) by mouth 3 (three) times daily as needed. 4/2/15   Penny Randolph MD       Review of Systems:  Review of Systems   Constitutional: Positive for chills and fever. Negative for diaphoresis, fatigue and unexpected weight change.   HENT: Positive for postnasal drip, rhinorrhea and voice change. Negative for congestion, ear discharge, ear pain, sinus pain, sinus pressure, sneezing and sore throat.    Eyes: Negative for visual disturbance.   Respiratory: Positive for cough, shortness of breath and wheezing.    Cardiovascular: Negative for chest pain and palpitations.   Gastrointestinal: Negative for abdominal pain, blood in stool, constipation, diarrhea and nausea.   Genitourinary: Negative.    Skin: Positive for rash.   Neurological: Negative for dizziness, light-headedness and headaches.       Health Maintainence:   Immunizations:  Health Maintenance       Date Due Completion Date    Zoster Vaccine 05/15/2001 ---    Hemoglobin A1c 05/06/2018 11/6/2017    Foot Exam 05/31/2018 5/31/2017 (Done)    Override on 5/31/2017: Done    Override on 1/12/2017: Done    Override on 4/7/2015: Done    Eye Exam 08/17/2018  "8/17/2017    Lipid Panel 11/06/2018 11/6/2017    Colonoscopy 09/22/2019 9/22/2016    Override on 9/22/2016: Done    TETANUS VACCINE 01/14/2021 1/14/2011    DEXA SCAN 08/08/2021 8/8/2017           PHYSICAL EXAM     BP (!) 146/80 (BP Location: Left arm, Patient Position: Sitting, BP Method: Large (Manual))   Pulse 63   Temp 98.2 °F (36.8 °C) (Oral)   Ht 5' 6" (1.676 m)   Wt 76.3 kg (168 lb 3.4 oz)   SpO2 95%   BMI 27.15 kg/m²     Physical Exam   Constitutional: She is oriented to person, place, and time. She appears well-developed and well-nourished.   HENT:   Head: Normocephalic.   Right Ear: External ear normal.   Left Ear: External ear normal.   Nose: Nose normal.   Mouth/Throat: Oropharynx is clear and moist. No oropharyngeal exudate.   Eyes: Pupils are equal, round, and reactive to light.   Neck: Neck supple. No JVD present. No tracheal deviation present. No thyromegaly present.   Cardiovascular: Normal rate, regular rhythm, normal heart sounds and intact distal pulses.  Exam reveals no gallop and no friction rub.    No murmur heard.  Pulmonary/Chest: Effort normal and breath sounds normal. No respiratory distress. She has no wheezes. She has no rales. She exhibits no tenderness.   Abdominal: Soft. Bowel sounds are normal. She exhibits no distension and no mass. There is no tenderness. There is no rebound and no guarding. No hernia.   Musculoskeletal: Normal range of motion. She exhibits no edema or tenderness.   Lymphadenopathy:     She has no cervical adenopathy.   Neurological: She is alert and oriented to person, place, and time.   Skin: Skin is warm and dry. No rash noted.   Psychiatric: She has a normal mood and affect. Her behavior is normal.   Vitals reviewed.      LABS     Lab Results   Component Value Date    HGBA1C 6.9 (H) 11/06/2017     CMP  Sodium   Date Value Ref Range Status   11/06/2017 141 136 - 145 mmol/L Final     Potassium   Date Value Ref Range Status   11/06/2017 4.7 3.5 - 5.1 mmol/L " Final     Chloride   Date Value Ref Range Status   11/06/2017 105 95 - 110 mmol/L Final     CO2   Date Value Ref Range Status   11/06/2017 29 23 - 29 mmol/L Final     Glucose   Date Value Ref Range Status   11/06/2017 127 (H) 70 - 110 mg/dL Final     BUN, Bld   Date Value Ref Range Status   11/06/2017 25 (H) 8 - 23 mg/dL Final     Creatinine   Date Value Ref Range Status   11/06/2017 1.3 0.5 - 1.4 mg/dL Final     Calcium   Date Value Ref Range Status   11/06/2017 9.8 8.7 - 10.5 mg/dL Final     Total Protein   Date Value Ref Range Status   09/11/2017 7.3 6.0 - 8.4 g/dL Final     Albumin   Date Value Ref Range Status   10/19/2017 3.4 (L) 3.5 - 5.2 g/dL Final     Total Bilirubin   Date Value Ref Range Status   09/11/2017 0.6 0.2 - 1.3 mg/dL Final     Alkaline Phosphatase   Date Value Ref Range Status   09/11/2017 70 38 - 145 U/L Final     AST   Date Value Ref Range Status   09/11/2017 27 14 - 36 U/L Final     ALT   Date Value Ref Range Status   09/11/2017 24 10 - 44 U/L Final     Anion Gap   Date Value Ref Range Status   11/06/2017 7 (L) 8 - 16 mmol/L Final     eGFR if    Date Value Ref Range Status   11/06/2017 46.0 (A) >60 mL/min/1.73 m^2 Final     eGFR if non    Date Value Ref Range Status   11/06/2017 39.9 (A) >60 mL/min/1.73 m^2 Final     Comment:     Calculation used to obtain the estimated glomerular filtration  rate (eGFR) is the CKD-EPI equation.        Lab Results   Component Value Date    WBC 4.93 11/06/2017    HGB 10.1 (L) 11/06/2017    HCT 31.0 (L) 11/06/2017    MCV 88 11/06/2017     11/06/2017     Lab Results   Component Value Date    CHOL 173 11/06/2017    CHOL 205 (H) 06/05/2017    CHOL 168 10/26/2016     Lab Results   Component Value Date    HDL 44 11/06/2017    HDL 53 06/05/2017    HDL 53 10/26/2016     Lab Results   Component Value Date    LDLCALC 116.6 11/06/2017    LDLCALC 135.2 06/05/2017    LDLCALC 106.4 10/26/2016     Lab Results   Component Value Date     TRIG 62 11/06/2017    TRIG 84 06/05/2017    TRIG 43 10/26/2016     Lab Results   Component Value Date    CHOLHDL 25.4 11/06/2017    CHOLHDL 25.9 06/05/2017    CHOLHDL 31.5 10/26/2016     Lab Results   Component Value Date    TSH 1.050 09/11/2017    [id number] 87 03/12/2009       ASSESSMENT/PLAN     Saul Mar is a 77 y.o. female with  Past Medical History:   Diagnosis Date    Anemia     sickle cell trait    Anxiety     Back pain     Cataract     Cerebral atherosclerosis 9/17/2015    Cerebrovascular disease     Colon polyp 2012    colonoscopy 9/22/2016    Degenerative disc disease     Degenerative disc disease     Depression     Diabetes with neurologic complications     Diabetic retinopathy     Diverticulosis     colonoscopy 9/22/2016    Gastroesophageal reflux disease without esophagitis 8/13/2015    GERD (gastroesophageal reflux disease)     Glaucoma     Hyperlipidemia     Hypertension     Iritis - Left Eye 4/15/2013    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    Microalbuminuria     Multinodular thyroid     Polyneuropathy     Screening 9/22/2016    Stroke 1999    affected her memory but did not have lateralized complaints    Thyroid disease     Trouble in sleeping     Type 2 diabetes with peripheral circulatory disorder, controlled     Type 2 diabetes, uncontrolled, 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/18/2015    Type II or unspecified type diabetes mellitus with ophthalmic manifestations, uncontrolled(250.52)     Diabetic retinopathy [362.0]    Type II or unspecified type diabetes mellitus with renal manifestations, uncontrolled(250.42)     UARS (upper airway resistance syndrome) 2/5/2015     Bronchitis- continue albuterol as needed. mucinex with full glass of water. Increase rest and fluids.     Cough- alfred use tessalon pearls as needed   -     benzonatate (TESSALON) 100 MG capsule; Take 1 capsule (100 mg total) by mouth 3 (three) times  daily as needed for Cough.  Dispense: 30 capsule; Refill: 0    Spondylolisthesis of thoracic region- stable. No pain.           Follow up as needed     Patient education provided from Kash. Patient was counseled on when and how to seek emergent care.       Grace MORALES, ALISA, FNP-c   Department of Internal Medicine - [facility]  1:58 PM

## 2018-01-25 ENCOUNTER — TELEPHONE (OUTPATIENT)
Dept: PODIATRY | Facility: CLINIC | Age: 78
End: 2018-01-25

## 2018-01-25 NOTE — TELEPHONE ENCOUNTER
----- Message from Mitch Eng sent at 1/25/2018  9:13 AM CST -----  Contact: self [phone]  Would like to consult with nurse status of request for diabetic shoes at [facility]. Please call back at [phone]. Md Cara

## 2018-01-25 NOTE — TELEPHONE ENCOUNTER
Returned pt's call about the diabetic shoe information and I told her I would refax it over to [facility]. Call ended pleasantly.

## 2018-02-02 RX ORDER — CLOPIDOGREL BISULFATE 75 MG/1
TABLET ORAL
Qty: 90 TABLET | Refills: 3 | Status: SHIPPED | OUTPATIENT
Start: 2018-02-02 | End: 2018-08-14 | Stop reason: SDUPTHER

## 2018-02-07 ENCOUNTER — OFFICE VISIT (OUTPATIENT)
Dept: INTERNAL MEDICINE | Facility: CLINIC | Age: 78
End: 2018-02-07
Payer: MEDICARE

## 2018-02-07 VITALS
DIASTOLIC BLOOD PRESSURE: 78 MMHG | BODY MASS INDEX: 27.28 KG/M2 | SYSTOLIC BLOOD PRESSURE: 132 MMHG | HEIGHT: 66 IN | WEIGHT: 169.75 LBS

## 2018-02-07 DIAGNOSIS — E04.2 MULTINODULAR THYROID: Chronic | ICD-10-CM

## 2018-02-07 DIAGNOSIS — I77.9 LEFT-SIDED CAROTID ARTERY DISEASE: Chronic | ICD-10-CM

## 2018-02-07 DIAGNOSIS — E11.610 TYPE 2 DIABETES MELLITUS WITH DIABETIC NEUROPATHIC ARTHROPATHY, UNSPECIFIED LONG TERM INSULIN USE STATUS: ICD-10-CM

## 2018-02-07 DIAGNOSIS — N18.30 DIABETES MELLITUS WITH STAGE 3 CHRONIC KIDNEY DISEASE: Chronic | ICD-10-CM

## 2018-02-07 DIAGNOSIS — D57.3 SICKLE CELL TRAIT SYNDROME: ICD-10-CM

## 2018-02-07 DIAGNOSIS — I67.89 CEREBRAL MICROVASCULAR DISEASE: Primary | ICD-10-CM

## 2018-02-07 DIAGNOSIS — I63.00 CEREBROVASCULAR ACCIDENT (CVA) DUE TO THROMBOSIS OF PRECEREBRAL ARTERY: ICD-10-CM

## 2018-02-07 DIAGNOSIS — K44.9 HIATAL HERNIA WITH GERD: Chronic | ICD-10-CM

## 2018-02-07 DIAGNOSIS — D12.2 ADENOMATOUS POLYP OF ASCENDING COLON: Chronic | ICD-10-CM

## 2018-02-07 DIAGNOSIS — E11.69 MIXED DIABETIC HYPERLIPIDEMIA ASSOCIATED WITH TYPE 2 DIABETES MELLITUS: Chronic | ICD-10-CM

## 2018-02-07 DIAGNOSIS — K21.9 HIATAL HERNIA WITH GERD: Chronic | ICD-10-CM

## 2018-02-07 DIAGNOSIS — E11.22 DIABETES MELLITUS WITH STAGE 3 CHRONIC KIDNEY DISEASE: Chronic | ICD-10-CM

## 2018-02-07 DIAGNOSIS — I70.0 AORTIC ARCH ATHEROSCLEROSIS: Chronic | ICD-10-CM

## 2018-02-07 DIAGNOSIS — N18.30 CKD (CHRONIC KIDNEY DISEASE) STAGE 3, GFR 30-59 ML/MIN: ICD-10-CM

## 2018-02-07 DIAGNOSIS — E78.2 MIXED HYPERLIPIDEMIA: ICD-10-CM

## 2018-02-07 DIAGNOSIS — E11.49 TYPE II DIABETES MELLITUS WITH NEUROLOGICAL MANIFESTATIONS: Chronic | ICD-10-CM

## 2018-02-07 DIAGNOSIS — I10 HYPERTENSION, ESSENTIAL: Chronic | ICD-10-CM

## 2018-02-07 DIAGNOSIS — D64.9 ANEMIA, UNSPECIFIED TYPE: ICD-10-CM

## 2018-02-07 DIAGNOSIS — E78.2 MIXED DIABETIC HYPERLIPIDEMIA ASSOCIATED WITH TYPE 2 DIABETES MELLITUS: Chronic | ICD-10-CM

## 2018-02-07 PROBLEM — I16.0 HYPERTENSIVE URGENCY: Status: RESOLVED | Noted: 2017-07-19 | Resolved: 2018-02-07

## 2018-02-07 PROCEDURE — 1126F AMNT PAIN NOTED NONE PRSNT: CPT | Mod: S$GLB,,, | Performed by: INTERNAL MEDICINE

## 2018-02-07 PROCEDURE — 3008F BODY MASS INDEX DOCD: CPT | Mod: S$GLB,,, | Performed by: INTERNAL MEDICINE

## 2018-02-07 PROCEDURE — 99214 OFFICE O/P EST MOD 30 MIN: CPT | Mod: S$GLB,,, | Performed by: INTERNAL MEDICINE

## 2018-02-07 PROCEDURE — 99999 PR PBB SHADOW E&M-EST. PATIENT-LVL V: CPT | Mod: PBBFAC,,, | Performed by: INTERNAL MEDICINE

## 2018-02-07 PROCEDURE — 1159F MED LIST DOCD IN RCRD: CPT | Mod: S$GLB,,, | Performed by: INTERNAL MEDICINE

## 2018-02-07 RX ORDER — ALPRAZOLAM 0.5 MG/1
0.5 TABLET ORAL NIGHTLY PRN
Qty: 30 TABLET | Refills: 0 | Status: SHIPPED | OUTPATIENT
Start: 2018-02-07 | End: 2018-08-14 | Stop reason: SDUPTHER

## 2018-02-07 RX ORDER — METFORMIN HYDROCHLORIDE 500 MG/1
1000 TABLET, EXTENDED RELEASE ORAL DAILY
Qty: 180 TABLET | Refills: 2 | Status: SHIPPED | OUTPATIENT
Start: 2018-02-07 | End: 2018-08-14 | Stop reason: SDUPTHER

## 2018-02-07 NOTE — PATIENT INSTRUCTIONS

## 2018-02-07 NOTE — PROGRESS NOTES
Subjective:       Patient ID: Saul Mar is a 77 y.o. female.    Chief Complaint: Follow-up    Multiple issues    Seen in dental office and found to have a flat lesion under her tongue on the right side.  This was done with oral cancer screening.  She had no sx.  She has never smoked.  She was mainly going in for dentures/partial.    Masters degree in museum; student loan.  Has a form for me to fill out to forgive the loan due to disability.    She is due for her carotid follow-up.    She had her thyroid assessed in August and a bone density also in the fall of 2017, acceptable.  She was seen in neurology in August and is due currently.  She had a nephrology assessment in November and is due in the next month.    Diabetes is doing well to the point where think we can reduce her metformin to once daily, particularly with her CK D.  Anemia is stable.  She does not have low iron currently but is on iron once or twice a day.  She does she may have sickle cell trait.  She denies any GI bleeding.    Patient Active Problem List:     Mixed diabetic hyperlipidemia associated with type 2 diabetes mellitus     Degenerative disc disease     Colon polyp: tubular adenoma 5/13, repeated 2016 due 2019     Multinodular thyroid: thyroid u/s 7/16     POAG (primary open-angle glaucoma) - Both Eyes     Amaurosis fugax     Nuclear sclerosis - Right Eye     Eyelid myokymia     Cerebral microvascular disease: stroke ? 1999 elsewhere; TIA 10/13     Left-sided carotid artery disease     Vitamin D insufficiency     Left ventricular diastolic dysfunction with preserved systolic function     Hypertension, essential     Hiatal hernia with GERD     Type 2 diabetes, uncontrolled, with background retinopathy with macular edema     Anxiety     Overweight (BMI 25.0-29.9)     Diabetes mellitus with proteinuria     Type II diabetes mellitus with neurological manifestations     Aortic arch atherosclerosis     Abnormal ankle brachial index      Diabetes mellitus with stage 3 chronic kidney disease     Cerebrovascular accident (CVA) due to thrombosis of precerebral artery     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     CKD (chronic kidney disease) stage 3, GFR 30-59 ml/min     Sickle cell trait syndrome     Mixed hyperlipidemia     Type 2 diabetes mellitus with diabetic neuropathic arthropathy        Review of Systems   Constitutional: Negative for activity change, appetite change, chills, fatigue and fever.   HENT: Negative for congestion, hearing loss, sinus pressure and sore throat.    Eyes: Negative for visual disturbance.   Respiratory: Negative for apnea, cough, shortness of breath and wheezing.    Cardiovascular: Negative for chest pain, palpitations and leg swelling.   Gastrointestinal: Negative for abdominal distention, abdominal pain, constipation, diarrhea, nausea and vomiting.   Genitourinary: Negative for dysuria, frequency, hematuria and vaginal bleeding.   Musculoskeletal: Positive for arthralgias. Negative for gait problem, joint swelling and myalgias.   Skin: Negative for rash.   Neurological: Positive for weakness. Negative for dizziness, light-headedness and headaches.        Non focal   Hematological: Negative for adenopathy. Does not bruise/bleed easily.   Psychiatric/Behavioral: Negative for confusion, hallucinations, sleep disturbance and suicidal ideas.       Objective:      Physical Exam   Constitutional: She is oriented to person, place, and time. She appears well-developed and well-nourished.   HENT:   Head: Normocephalic and atraumatic.   Right Ear: External ear normal.   Left Ear: External ear normal.   Nose: Nose normal.   Mouth/Throat: Oropharynx is clear and moist. No oropharyngeal exudate.   Eyes: Conjunctivae and EOM are normal. No scleral icterus.   Neck: Normal range of motion. Neck supple. No JVD present. Thyromegaly present.   Cardiovascular: Normal rate, regular rhythm, normal heart sounds and intact  distal pulses.  Exam reveals no gallop.    No murmur heard.  Pulmonary/Chest: Effort normal and breath sounds normal. No respiratory distress. She has no wheezes.   Abdominal: Soft. Bowel sounds are normal. She exhibits no distension and no mass. There is no tenderness. There is no rebound and no guarding.   Musculoskeletal: Normal range of motion. She exhibits no edema or tenderness.   Lymphadenopathy:     She has no cervical adenopathy.   Neurological: She is alert and oriented to person, place, and time. No cranial nerve deficit. Coordination normal.   Skin: Skin is warm. No rash noted. No erythema.   Psychiatric: She has a normal mood and affect. Her behavior is normal. Judgment and thought content normal.   Nursing note and vitals reviewed.      Assessment:       1. Cerebral microvascular disease: stroke ? 1999 elsewhere; TIA 10/13    2. Type 2 diabetes, uncontrolled, with background retinopathy with macular edema    3. Aortic arch atherosclerosis    4. Hypertension, essential    5. Left-sided carotid artery disease    6. Mixed diabetic hyperlipidemia associated with type 2 diabetes mellitus    7. CKD (chronic kidney disease) stage 3, GFR 30-59 ml/min    8. Diabetes mellitus with stage 3 chronic kidney disease    9. Multinodular thyroid: thyroid u/s 7/16    10. Adenomatous polyp of ascending colon    11. Type II diabetes mellitus with neurological manifestations    12. Hiatal hernia with GERD    13. Type 2 diabetes mellitus with diabetic neuropathic arthropathy, unspecified long term insulin use status    14. Anemia, unspecified type    15. Mixed hyperlipidemia    16. Cerebrovascular accident (CVA) due to thrombosis of precerebral artery    17. Sickle cell trait syndrome        Plan:         Cerebral microvascular disease: stroke ? 1999 elsewhere; TIA 10/13: Stable on regimen  -     Ambulatory consult to Neurology    Type 2 diabetes, uncontrolled, with background retinopathy with macular edema: Keep  ophthalmology follow-up    Aortic arch atherosclerosis: Continue current medical regimen; lipid goals reviewed, she is not quite at target range but compliance with medication reviewed    Hypertension, essential: Low salt diet, 5-10 pounds weight loss.  Goal of /80    Left-sided carotid artery disease: Carotid u/s and review    Mixed diabetic hyperlipidemia associated with type 2 diabetes mellitus: Labs and review    CKD (chronic kidney disease) stage 3, GFR 30-59 ml/min  -     Vitamin D; Future; Expected date: 06/07/2018  -     Comprehensive metabolic panel; Future; Expected date: 06/07/2018    Diabetes mellitus with stage 3 chronic kidney disease: Gentle hydration, avoid nephrotoxins.  Keep nephrology follow-up    Multinodular thyroid: thyroid u/s 7/16: Acceptable ultrasound 2017, anticipate following up in the next 2 years  -     TSH; Future; Expected date: 06/07/2018    Adenomatous polyp of ascending colon up-to-date with screenings:     Type II diabetes mellitus with neurological manifestations: Hydration, diabetes diet reviewed  -     Hemoglobin A1c; Future; Expected date: 06/07/2018    Hiatal hernia with GERD: Stable without alarm symptoms    Type 2 diabetes mellitus with diabetic neuropathic arthropathy, unspecified long term insulin use status: Improved.  Continue with hydration, diet and lifestyle modification.  Reduce metformin to once daily  -     metFORMIN (GLUCOPHAGE-XR) 500 MG 24 hr tablet; Take 2 tablets (1,000 mg total) by mouth once daily.  Dispense: 180 tablet; Refill: 2    Anemia, unspecified type: Likely multifactorial, has been stable.  Nothing to suggest GI bleeding or iron deficiency currently  -     Ferritin; Future; Expected date: 06/07/2018  -     Iron and TIBC; Future; Expected date: 06/07/2018  -     CBC auto differential; Future; Expected date: 06/07/2018  -     Vitamin B12; Future; Expected date: 06/07/2018  -     Protein electrophoresis, serum; Future; Expected date:  02/07/2018  -     Hemoglobin Electrophoresis Cascade, Blood; Future; Expected date: 02/07/2018    Mixed hyperlipidemia: Lipid goals reviewed.  Compliance with medication urged  -     Lipid panel; Future; Expected date: 06/07/2018    Cerebrovascular accident (CVA) due to thrombosis of precerebral artery: Continue regimen, strict control of blood pressure.  Keep neurology follow-up    Sickle cell trait syndrome: Labs and review    Other orders  -     ALPRAZolam (XANAX) 0.5 MG tablet; Take 1 tablet (0.5 mg total) by mouth nightly as needed for Anxiety (may take no more than 1-2 x weekly for anxiety).  Dispense: 30 tablet; Refill: 0    In terms of her oral lesion, she will be seeing her dentist.  Advised her to apprise me should she need to see ENT    I will review all studies and determine further tx depending on findings

## 2018-02-20 ENCOUNTER — LAB VISIT (OUTPATIENT)
Dept: LAB | Facility: HOSPITAL | Age: 78
End: 2018-02-20
Attending: INTERNAL MEDICINE
Payer: MEDICARE

## 2018-02-20 DIAGNOSIS — N18.30 CKD (CHRONIC KIDNEY DISEASE) STAGE 3, GFR 30-59 ML/MIN: ICD-10-CM

## 2018-02-20 DIAGNOSIS — E87.5 HYPERKALEMIA: ICD-10-CM

## 2018-02-20 DIAGNOSIS — N18.30 ANEMIA OF CHRONIC RENAL FAILURE, STAGE 3 (MODERATE): ICD-10-CM

## 2018-02-20 DIAGNOSIS — D63.1 ANEMIA OF CHRONIC RENAL FAILURE, STAGE 3 (MODERATE): ICD-10-CM

## 2018-02-20 LAB
ALBUMIN SERPL BCP-MCNC: 3.3 G/DL
ANION GAP SERPL CALC-SCNC: 11 MMOL/L
BASOPHILS # BLD AUTO: 0.06 K/UL
BASOPHILS NFR BLD: 1.2 %
BUN SERPL-MCNC: 21 MG/DL
CALCIUM SERPL-MCNC: 9.1 MG/DL
CHLORIDE SERPL-SCNC: 106 MMOL/L
CO2 SERPL-SCNC: 21 MMOL/L
CREAT SERPL-MCNC: 1.2 MG/DL
DIFFERENTIAL METHOD: ABNORMAL
EOSINOPHIL # BLD AUTO: 0.3 K/UL
EOSINOPHIL NFR BLD: 6.6 %
ERYTHROCYTE [DISTWIDTH] IN BLOOD BY AUTOMATED COUNT: 15.1 %
EST. GFR  (AFRICAN AMERICAN): 50.7 ML/MIN/1.73 M^2
EST. GFR  (NON AFRICAN AMERICAN): 44 ML/MIN/1.73 M^2
GLUCOSE SERPL-MCNC: 111 MG/DL
HCT VFR BLD AUTO: 32 %
HGB BLD-MCNC: 10.3 G/DL
IMM GRANULOCYTES # BLD AUTO: 0.01 K/UL
IMM GRANULOCYTES NFR BLD AUTO: 0.2 %
LYMPHOCYTES # BLD AUTO: 1.9 K/UL
LYMPHOCYTES NFR BLD: 37.4 %
MCH RBC QN AUTO: 28.5 PG
MCHC RBC AUTO-ENTMCNC: 32.2 G/DL
MCV RBC AUTO: 89 FL
MONOCYTES # BLD AUTO: 0.4 K/UL
MONOCYTES NFR BLD: 8.6 %
NEUTROPHILS # BLD AUTO: 2.4 K/UL
NEUTROPHILS NFR BLD: 46 %
NRBC BLD-RTO: 0 /100 WBC
PHOSPHATE SERPL-MCNC: 3.1 MG/DL
PLATELET # BLD AUTO: 190 K/UL
PMV BLD AUTO: 11.4 FL
POTASSIUM SERPL-SCNC: 4.3 MMOL/L
RBC # BLD AUTO: 3.61 M/UL
SODIUM SERPL-SCNC: 138 MMOL/L
WBC # BLD AUTO: 5.14 K/UL

## 2018-02-20 PROCEDURE — 85025 COMPLETE CBC W/AUTO DIFF WBC: CPT

## 2018-02-20 PROCEDURE — 80069 RENAL FUNCTION PANEL: CPT

## 2018-02-20 PROCEDURE — 36415 COLL VENOUS BLD VENIPUNCTURE: CPT | Mod: PO

## 2018-02-22 ENCOUNTER — CLINICAL SUPPORT (OUTPATIENT)
Dept: OPHTHALMOLOGY | Facility: CLINIC | Age: 78
End: 2018-02-22
Payer: MEDICARE

## 2018-02-22 ENCOUNTER — OFFICE VISIT (OUTPATIENT)
Dept: OPHTHALMOLOGY | Facility: CLINIC | Age: 78
End: 2018-02-22
Payer: MEDICARE

## 2018-02-22 DIAGNOSIS — H25.11 NUCLEAR SCLEROSIS, RIGHT: ICD-10-CM

## 2018-02-22 DIAGNOSIS — H40.1132 PRIMARY OPEN ANGLE GLAUCOMA OF BOTH EYES, MODERATE STAGE: Primary | ICD-10-CM

## 2018-02-22 DIAGNOSIS — G51.4 EYELID MYOKYMIA: ICD-10-CM

## 2018-02-22 DIAGNOSIS — Z96.1 PSEUDOPHAKIA: ICD-10-CM

## 2018-02-22 DIAGNOSIS — H40.1132 PRIMARY OPEN ANGLE GLAUCOMA OF BOTH EYES, MODERATE STAGE: ICD-10-CM

## 2018-02-22 DIAGNOSIS — G45.3 AMAUROSIS FUGAX: ICD-10-CM

## 2018-02-22 PROCEDURE — 92014 COMPRE OPH EXAM EST PT 1/>: CPT | Mod: S$GLB,,, | Performed by: OPHTHALMOLOGY

## 2018-02-22 PROCEDURE — 92083 EXTENDED VISUAL FIELD XM: CPT | Mod: S$GLB,,, | Performed by: OPHTHALMOLOGY

## 2018-02-22 PROCEDURE — 99999 PR PBB SHADOW E&M-EST. PATIENT-LVL I: CPT | Mod: PBBFAC,,, | Performed by: OPHTHALMOLOGY

## 2018-02-22 PROCEDURE — 92250 FUNDUS PHOTOGRAPHY W/I&R: CPT | Mod: S$GLB,,, | Performed by: OPHTHALMOLOGY

## 2018-02-22 RX ORDER — BRIMONIDINE TARTRATE 2 MG/ML
1 SOLUTION/ DROPS OPHTHALMIC 3 TIMES DAILY
Qty: 30 ML | Refills: 3 | Status: SHIPPED | OUTPATIENT
Start: 2018-02-22 | End: 2018-08-10 | Stop reason: SDUPTHER

## 2018-02-24 NOTE — PROGRESS NOTES
HPI     Glaucoma    Additional comments: HVf review today           Comments   DLS: 8/17/17    1) POAG  2) DM with BDR OU  3) Hx RLL lesion  4) NS OD  5) PCIOL OS  6) Hx CVA   7) Hx Amaurosis Fugax  8) Eyelid Myokymia     Meds:  Brimonidine TID OU = PT USES BID USUALLY       Last edited by Zuri Winkler MA on 2/22/2018  3:18 PM. (History)            Assessment /Plan     For exam results, see Encounter Report.    Type 2 diabetes, uncontrolled, with background retinopathy with macular edema    Primary open angle glaucoma of both eyes, moderate stage  -     Color Fundus Photography - OU - Both Eyes  -     brimonidine 0.2% (ALPHAGAN) 0.2 % Drop; Place 1 drop into both eyes 3 (three) times daily. Disp 3 months supply = 30 mls  Dispense: 30 mL; Refill: 3    Nuclear sclerosis, right    Amaurosis fugax    Eyelid myokymia    Pseudophakia          1. POAG ou   H/O poor compliance   First HVF 1997   First photos 1998     Family history none   Glaucoma meds Has used alphagan in past - poor compliance-stopped on her own   H/O adverse rxn to glaucoma drops none   LASERS No glaucoma laser (+h/o focal OU for BDR)   GLAUCOMA SURGERIES none   OTHER EYE SURGERIES CE/PCIOL OS 2/27/13  CDR 0.8/0.75   Tbase 16-20/16-22   Tmax 20/22   Ttarget 17/17   HVF 14 test 1997 to 2018  - inf arc/NS od //  Sup paracentral loss and INS os  (+changes ? 2/2 focal laser )   Gonio +3   /621- thick ou (- 4.5 ou)   OCT 2006, 2009, 2012 - RNFL nl ou   HRT 5  tests: 2009 to 2016 Bord inf/temp od // dec superior os /// CDR 0.72 od // 0.72 os  Disc photos 1998, 2000, 2001, 2003, 2003, 2004, 2005, 2006- slides   12/9/2010 - OIS     - Ttoday 12/12  - Test done today HVF / DFE / photos     2. 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 NS OD    Remove prn   BCVA 20/40   BAT 20/60    4. Pseudophakia OS    S/p CE/PCIOL OS    IOL SN60 WF 20.5   -VA limited 2/2 hx of CSME s/p focal scars   BCVA  20/25   Give Rx glasses - 7/2/2013   Had re-bound  "iritis - resolved    4. H/O RLL lesion - S/P excision with Jovanni     5. Post Nelda lives in [place] - but still likes to come to [place] for care     6. amaurosis fugax / H/O CVA's   Patient reports stroke like symptoms and amaurosis fugax 10/13   -  She was admitted to hospital with very elevated BP   -  Patient had MRI at the time showing ischemic disease   - last Carotid U/S done 11/12- patient reports that     - PCP gets one yearly- last U/S showed minimal plaque disease   -  H/o stroke- discussed that amaurosis was from elevated BP and if ever recurs needs to report to ER immediately- she voiced understanding    7. Eyelid myokymia  - intermittent - patient also reports eyelid "twitching  "- discussed with patient that myokymia is usually from lack of sleep, caffeine intake, or stress- however nothing to worry about  -  Did not have any "twitching" on exam today    Plan:  BrimonidineTID ou   MRx given- patient would like to think re CEIOL OD- can consider CEIOL with Jazlynk given poor compliance with gtts and moderate glaucoma.   HVF's are inconsistent with the ON exam / OCT ect   Pt wants to wait on phaco od for now // has done well os     Avoid PG's if possible 2/2 h/o CME 2/2 BDR    Refraction Post op os  is more myopic than expected - review IOL calc if needs 2nd eye done    Repeat OCT of macula  5/7/2013 - - done no CME os    F/U 6 months - HRT / gonio   - try and find the photo file - not found / pulled on 2/23/2018 - may have been looking for the wrong first or last name . Casimiro is the last name // first name is Saul ( leave out the Zaid part)      refer to retina - +BDR - s/p laser - Yung - then use to see arenestuardo - can refer to Christa in future     I have seen and personally examined the patient.  I agree with the findings, assessment and plan of the resident and/or fellow.     Lina Taveras MD     "

## 2018-02-27 ENCOUNTER — OFFICE VISIT (OUTPATIENT)
Dept: NEPHROLOGY | Facility: CLINIC | Age: 78
End: 2018-02-27
Payer: MEDICARE

## 2018-02-27 VITALS
DIASTOLIC BLOOD PRESSURE: 74 MMHG | HEIGHT: 66 IN | WEIGHT: 173.5 LBS | SYSTOLIC BLOOD PRESSURE: 138 MMHG | HEART RATE: 60 BPM | BODY MASS INDEX: 27.88 KG/M2

## 2018-02-27 DIAGNOSIS — E11.29 PROTEINURIA DUE TO TYPE 2 DIABETES MELLITUS: ICD-10-CM

## 2018-02-27 DIAGNOSIS — N18.30 CKD (CHRONIC KIDNEY DISEASE) STAGE 3, GFR 30-59 ML/MIN: Primary | ICD-10-CM

## 2018-02-27 DIAGNOSIS — R80.9 PROTEINURIA DUE TO TYPE 2 DIABETES MELLITUS: ICD-10-CM

## 2018-02-27 PROCEDURE — 99999 PR PBB SHADOW E&M-EST. PATIENT-LVL III: CPT | Mod: PBBFAC,,, | Performed by: INTERNAL MEDICINE

## 2018-02-27 PROCEDURE — 1126F AMNT PAIN NOTED NONE PRSNT: CPT | Mod: S$GLB,,, | Performed by: INTERNAL MEDICINE

## 2018-02-27 PROCEDURE — 3008F BODY MASS INDEX DOCD: CPT | Mod: S$GLB,,, | Performed by: INTERNAL MEDICINE

## 2018-02-27 PROCEDURE — 99213 OFFICE O/P EST LOW 20 MIN: CPT | Mod: S$GLB,,, | Performed by: INTERNAL MEDICINE

## 2018-02-27 PROCEDURE — 1159F MED LIST DOCD IN RCRD: CPT | Mod: S$GLB,,, | Performed by: INTERNAL MEDICINE

## 2018-02-27 NOTE — PROGRESS NOTES
PROGRESS NOTE FOR ESTABLISHED PATIENT    PHYSICIAN REQUESTING THE CONSULT: Dr. Penny Randolph    REASON FOR CONSULTATION: Renal insufficiency    77 y.o. female with history of CKD 3, DM2, HTN, diabetic retinopathy, diastolic CHF, CVA, GERD presents to the renal clinic for evaluation of renal insufficiency.       Patient is without complaints.           Past Medical History:   Diagnosis Date    Anemia     sickle cell trait    Anxiety     Back pain     Cataract     Cerebral atherosclerosis 9/17/2015    Cerebrovascular disease     Colon polyp 2012    colonoscopy 9/22/2016    Degenerative disc disease     Degenerative disc disease     Depression     Diabetes with neurologic complications     Diabetic retinopathy     Diverticulosis     colonoscopy 9/22/2016    Gastroesophageal reflux disease without esophagitis 8/13/2015    GERD (gastroesophageal reflux disease)     Glaucoma     Hyperlipidemia     Hypertension     Iritis - Left Eye 4/15/2013    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    Microalbuminuria     Multinodular thyroid     Polyneuropathy     Screening 9/22/2016    Stroke 1999    affected her memory but did not have lateralized complaints    Thyroid disease     Trouble in sleeping     Type 2 diabetes with peripheral circulatory disorder, controlled     Type 2 diabetes, uncontrolled, 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/18/2015    Type II or unspecified type diabetes mellitus with ophthalmic manifestations, uncontrolled(250.52)     Diabetic retinopathy [362.0]    Type II or unspecified type diabetes mellitus with renal manifestations, uncontrolled(250.42)     UARS (upper airway resistance syndrome) 2/5/2015       Past Surgical History:   Procedure Laterality Date    CATARACT EXTRACTION W/  INTRAOCULAR LENS IMPLANT Left 2/27/13    OS Dr. Taveras    COLONOSCOPY      COLONOSCOPY N/A 9/22/2016    Procedure: COLONOSCOPY;  Surgeon: Jd Ashton,  MD;  Location: [facility] ([address] [place] [address]);  Service: Endoscopy;  Laterality: N/A;  OK per Dr Randolph for pt to hold Plavix 5 days prior to procedure/see telephone encounter dated 8/29/16/svn    EYE SURGERY Bilateral 2002 approx    Laser for glaucoma    HYSTERECTOMY  1963     AMEENA/USO- fibroids; no cancer    OOPHORECTOMY  1963    unknown, only removed one       Review of patient's allergies indicates:   Allergen Reactions    Pravachol [pravastatin] Nausea Only       Current Outpatient Prescriptions   Medication Sig Dispense Refill    ACCU-CHEK PENNY PLUS TEST STRP Strp 1 strip by Misc.(Non-Drug; Combo Route) route 2 (two) times daily. 100 strip 6    ACCU-CHEK FASTCLIX Misc 1 each by Misc.(Non-Drug; Combo Route) route 2 (two) times daily. 100 each 6    albuterol 90 mcg/actuation inhaler Inhale 2 puffs into the lungs every 6 (six) hours as needed for Wheezing. 1 Inhaler 0    ALPRAZolam (XANAX) 0.5 MG tablet Take 1 tablet (0.5 mg total) by mouth nightly as needed for Anxiety (may take no more than 1-2 x weekly for anxiety). 30 tablet 0    amLODIPine (NORVASC) 5 MG tablet Take 1 tablet (5 mg total) by mouth once daily. 90 tablet 3    aspirin (ECOTRIN) 81 MG EC tablet Take 1 tablet (81 mg total) by mouth once daily.  0    atorvastatin (LIPITOR) 80 MG tablet Take 1 tablet (80 mg total) by mouth once daily. 90 tablet 2    brimonidine 0.2% (ALPHAGAN) 0.2 % Drop Place 1 drop into both eyes 3 (three) times daily. Disp 3 months supply = 30 mls 30 mL 3    cholecalciferol, vitamin D3, 1,000 unit capsule Take 2 capsules (2,000 Units total) by mouth once daily. 60 capsule 12    clopidogrel (PLAVIX) 75 mg tablet TAKE 1 TABLET BY MOUTH EVERY DAY (BLOOD THINNER) 90 tablet 3    ferrous sulfate 325 mg (65 mg iron) Tab tablet Take 1 tablet (325 mg total) by mouth 2 (two) times daily. 60 tablet 12    glimepiride (AMARYL) 4 MG tablet TAKE 1 TABLET BY MOUTH IN THE MORNING WITH BREAKFAST 90 tablet 0    losartan (COZAAR) 50 MG tablet  Take 1 tablet (50 mg total) by mouth 2 (two) times daily. 180 tablet 3    meclizine (ANTIVERT) 25 mg tablet Take 1 tablet (25 mg total) by mouth 3 (three) times daily as needed. 90 tablet 3    metFORMIN (GLUCOPHAGE-XR) 500 MG 24 hr tablet Take 2 tablets (1,000 mg total) by mouth once daily. 180 tablet 2    omeprazole (PRILOSEC) 20 MG capsule Take 1 capsule (20 mg total) by mouth once daily. 30 capsule 11    hydroCHLOROthiazide (HYDRODIURIL) 12.5 MG Tab Take 1 (12.5 mg) po qd 30 tablet 11     No current facility-administered medications for this visit.        Family History   Problem Relation Age of Onset    Stroke Mother     Mental illness Mother     Hypertension Mother     Stroke Father     Diabetes Maternal Grandmother     Drug abuse Daughter     Cancer Sister 68     gyn    Cancer Maternal Uncle      type not known    Heart disease Paternal Grandmother     Glaucoma Neg Hx     Macular degeneration Neg Hx     Alcohol abuse Neg Hx     COPD Neg Hx     Asthma Neg Hx        Social History     Social History    Marital status:      Spouse name: N/A    Number of children: N/A    Years of education: N/A     Occupational History    Not on file.     Social History Main Topics    Smoking status: Never Smoker    Smokeless tobacco: Never Used    Alcohol use No    Drug use: No    Sexual activity: No     Other Topics Concern    Not on file     Social History Narrative    , lives alone. 1 Child who lives in nursing home. She has a grandson who lives in [place].. Retired from [employer] . Still drives. Does not have a Living Will or advanced directive.       Review of Systems:  1. GENERAL: patient denies any fever, weight changes, generalized weakness, dizziness.  2. HEENT: patient denies headaches, visual disturbances, swallowing problems, sinus pain, nasal congestion.  3. CARDIOVASCULAR: patient denies chest pain, palpitations.  4. PULMONARY: patient denies SOB, coughing,  "hemoptysis, wheezing.  5. GASTROINTESTINAL: patient denies abdominal pain, nausea, vomiting, diarrhea.  6. GENITOURINARY: patient denies dysuria, hematuria, hesitancy, frequency.  7. EXTREMITIES: patient denies LE edema, LE cramping.  8. DERMATOLOGY: patient denies rashes, ulcers.  9. NEURO: patient denies tremors, extremity weakness, extremity numbness/tingling.  10. MUSCULOSKELETAL: patient denies joint pain, joint swelling.  11. HEMATOLOGY: patient denies rectal or gum bleeding.  12: PSYCH: patient denies anxiety, depression.      PHYSICAL EXAM:  /74   Pulse 60   Ht 5' 6" (1.676 m)   Wt 78.7 kg (173 lb 8 oz)   BMI 28.00 kg/m²     GENERAL: Pleasant well groomed lady presents to clinic with non-labored breathing.  HEENT: PER, no nasal discharge, no icterus, no oral exudates, moist mucosal membranes.  NECK: no thyroid mass, no lymphadenopathy.  HEART: RRR S1/S2, no rubs, good peripheral pulses.  LUNGS: CTA bilaterally, no wheezing, breathing is nonlabored.  ABDOMEN: soft, nontender, not distended, bowel sounds are present, no abdominal hernia.  EXTREM: no LE edema.  SKIN: no rashes, skin is warm and dry.  NEURO: A & O x 3, no obvious focal signs.    LABORATORY RESULTS:    Lab Results   Component Value Date    CREATININE 1.2 02/20/2018    BUN 21 02/20/2018     02/20/2018    K 4.3 02/20/2018     02/20/2018    CO2 21 (L) 02/20/2018      Lab Results   Component Value Date    CALCIUM 9.1 02/20/2018    PHOS 3.1 02/20/2018     Lab Results   Component Value Date    ALBUMIN 3.3 (L) 02/20/2018     Lab Results   Component Value Date    WBC 5.14 02/20/2018    HGB 10.3 (L) 02/20/2018    HCT 32.0 (L) 02/20/2018    MCV 89 02/20/2018     02/20/2018     Protein Creatinine Ratios:    Creatinine, Random Ur   Date Value Ref Range Status   02/20/2018 37.0 15.0 - 325.0 mg/dL Final     Comment:     The random urine reference ranges provided were established   for 24 hour urine collections.  No reference ranges " exist for  random urine specimens.  Correlate clinically.     10/19/2017 59.0 15.0 - 325.0 mg/dL Final     Comment:     The random urine reference ranges provided were established   for 24 hour urine collections.  No reference ranges exist for  random urine specimens.  Correlate clinically.     10/26/2016 47.0 15.0 - 325.0 mg/dL Final     Comment:     The random urine reference ranges provided were established   for 24 hour urine collections.  No reference ranges exist for  random urine specimens.  Correlate clinically.       Protein, Urine Random   Date Value Ref Range Status   02/20/2018 24 (H) 0 - 15 mg/dL Final     Comment:     The random urine reference ranges provided were established   for 24 hour urine collections.  No reference ranges exist for  random urine specimens.  Correlate clinically.     10/19/2017 45 (H) 0 - 15 mg/dL Final     Comment:     The random urine reference ranges provided were established   for 24 hour urine collections.  No reference ranges exist for  random urine specimens.  Correlate clinically.     10/26/2016 102 (H) 0 - 15 mg/dL Final     Comment:     The random urine reference ranges provided were established   for 24 hour urine collections.  No reference ranges exist for  random urine specimens.  Correlate clinically.       Prot/Creat Ratio, Ur   Date Value Ref Range Status   02/20/2018 0.65 (H) 0.00 - 0.20 Final   10/19/2017 0.76 (H) 0.00 - 0.20 Final   10/26/2016 2.17 (H) 0.00 - 0.20 Final           ASSESSMENT AND PLAN:  77 y.o. female with history of CKD 3, DM2, HTN, diabetic retinopathy, diastolic CHF, CVA, GERD presents to the renal clinic for evaluation of renal insufficiency.     1. Renal insufficiency: Patient presents with mild renal insufficiency, consistent with CKD stage 3. Last creatinine was 1.2. Likely cause of her renal insufficiency is her long-standing DM2 given her proteinuria. Patient is on Losartan to combat her proteinuria. Patient's renal function will be  monitore closely and she will return to the clinic in 4 months for follow up. Patient was advised to avoid NSAID pain medications such as advil, aleve, motrin, ibuprofen, naprosyn, meloxicam, diclofenac, mobic and to hydrate with 60-65 ounces of water per day.     2. Electrolytes: Borderline hyperkalemia has resolved with low K diet which will be continued.     3. Acid base status: borderline acidosis, monitor.    4. Volume: Euvolemic.     5. Hypertension: Good BP control.     6. Medications: Reviewed. Agree with current medical regimen.     7. Proteinuria: continue Losartan.     8. Anemia: monitor for now.     9. DM2: Fair blood glucose control with HgA1c at 7.4 (6/5/17).

## 2018-02-28 ENCOUNTER — OFFICE VISIT (OUTPATIENT)
Dept: PULMONOLOGY | Facility: CLINIC | Age: 78
End: 2018-02-28
Payer: MEDICARE

## 2018-02-28 VITALS
SYSTOLIC BLOOD PRESSURE: 140 MMHG | DIASTOLIC BLOOD PRESSURE: 80 MMHG | HEART RATE: 62 BPM | RESPIRATION RATE: 18 BRPM | HEIGHT: 66 IN | OXYGEN SATURATION: 98 % | BODY MASS INDEX: 28.91 KG/M2 | WEIGHT: 179.88 LBS

## 2018-02-28 DIAGNOSIS — R05.9 COUGH: ICD-10-CM

## 2018-02-28 PROCEDURE — 99203 OFFICE O/P NEW LOW 30 MIN: CPT | Mod: S$GLB,,, | Performed by: INTERNAL MEDICINE

## 2018-02-28 PROCEDURE — 99999 PR PBB SHADOW E&M-EST. PATIENT-LVL III: CPT | Mod: PBBFAC,,, | Performed by: INTERNAL MEDICINE

## 2018-02-28 NOTE — PATIENT INSTRUCTIONS
Deep Coughing    Deep coughing helps keep your lungs clear. If youve had surgery, this will help you get better faster. Deep coughing also helps you breathe easier and may prevent a lung infection. Follow these steps to do deep coughing.  Step 1  · Sit on the edge of a bed or a chair. You can also lie on your back with your knees slightly bent.  · Lean forward slightly.  · If you've had surgery on your chest or stomach, hold a pillow or rolled-up towel firmly against your incision with both hands. The concept is to hug the pillow.  · Breathe out normally.  Step 2  · Breathe in slowly and deeply through your nose.  · Then breathe out fully through your mouth. Repeat this breathing in and out a second time.  · For the third time, take a slow, deep breath through your nose. Fill your lungs with as much air as you can.  Step 3  · Cough 2 or 3 times in a row. Try to push all of the air out of your lungs as you cough.  · Relax and breathe normally.  · Repeat the above steps as directed.  Follow-up care  Make a follow-up appointment as directed by our staff.  When to call your healthcare provider  Call your healthcare provider right away if you have any of the following:  · Fever of 100.4°F (38°C) or higher, or as directed by your healthcare provider  · Signs of infection, if you've had surgery. These include redness, swelling, drainage, or warmth at your incision site, or pus or fluid draining from the site  · Brownish, white, or bloody sputum  · Nausea or vomiting  · Increasing pain  · Dizziness or weakness  · Fast or irregular heartbeat  Call 911  Shortness of breath may be a sign of a serious health problem. Call 911 if you have shortness of breath that gets worse or have trouble breathing, especially with any of the symptoms below:  · Confusion or trouble staying awake  · Loss of consciousness or fainting  · Chest pain or tightness  · Trouble breathing or wheezing  · Bluish skin  · Coughing up blood  · Severe pain    Date Last Reviewed: 1/1/2017  © 5300-7757 [employer], [employer]. [address] [place] [address], [place] [address]. All rights reserved. This information is not intended as a substitute for professional medical care. Always follow your healthcare professional's instructions.

## 2018-02-28 NOTE — LETTER
February 28, 2018      Penny Randolph MD  [address]  [facility] [address]           [facility]  [address] [place] [id number]  Phone: [phone]  Fax: [phone]          Patient: Saul Mar   MR Number: [id number]   YOB: 1940   Date of Visit: 2/28/2018       Dear Dr. Penny Randolph:    Thank you for referring Saul Mar to me for evaluation. Attached you will find relevant portions of my assessment and plan of care.    If you have questions, please do not hesitate to call me. I look forward to following Saul Mar along with you.    Sincerely,    Efren Bhatt MD    Enclosure  CC:  No Recipients    If you would like to receive this communication electronically, please contact [email or web address] or [phone] to request more information on [employer] Link access.    For providers and/or their staff who would like to refer a patient to [facility], please contact us through our one-stop-shop provider referral line, [facility], at [phone].    If you feel you have received this communication in error or would no longer like to receive these types of communications, please e-mail [email or web address]

## 2018-02-28 NOTE — PROGRESS NOTES
Subjective:      Patient ID: Saul Mar is a 77 y.o. female.  Patient Active Problem List   Diagnosis    Mixed diabetic hyperlipidemia associated with type 2 diabetes mellitus    Degenerative disc disease    Colon polyp: tubular adenoma 5/13, repeated 2016 due 2019    Multinodular thyroid: thyroid u/s 7/16    POAG (primary open-angle glaucoma) - Both Eyes    Amaurosis fugax    Nuclear sclerosis - Right Eye    Eyelid myokymia    Cerebral microvascular disease: stroke ? 1999 elsewhere; TIA 10/13    Left-sided carotid artery disease    Vitamin D insufficiency    Left ventricular diastolic dysfunction with preserved systolic function    Hypertension, essential    Hiatal hernia with GERD    Type 2 diabetes, uncontrolled, with background retinopathy with macular edema    Anxiety    Overweight (BMI 25.0-29.9)    Diabetes mellitus with proteinuria    Type II diabetes mellitus with neurological manifestations    Aortic arch atherosclerosis    Abnormal ankle brachial index    Diabetes mellitus with stage 3 chronic kidney disease    Cerebrovascular accident (CVA) due to thrombosis of precerebral artery    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    Chief Complaint: Cough    HPI    She reports that she has URI with cough  from December through January. Symptoms have now resolved.        Previous Report Reviewed: historical medical records and radiology reports     The following portions of the patient's history were reviewed and updated as appropriate: She  has a past medical history of Anemia; Anxiety; Back pain; Cataract; Cerebral atherosclerosis (9/17/2015); Cerebrovascular disease; Colon polyp (2012); Degenerative disc disease; Degenerative disc disease;  Depression; Diabetes with neurologic complications; Diabetic retinopathy; Diverticulosis; Gastroesophageal reflux disease without esophagitis (8/13/2015); GERD (gastroesophageal reflux disease); Glaucoma; Hyperlipidemia; Hypertension; Iritis - Left Eye (4/15/2013); 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 (7/28/2017); Microalbuminuria; Multinodular thyroid; Polyneuropathy; Screening (9/22/2016); Stroke (1999); Thyroid disease; Trouble in sleeping; Type 2 diabetes with peripheral circulatory disorder, controlled; Type 2 diabetes, uncontrolled, with background retinopathy with macular edema (11/18/2015); Type II or unspecified type diabetes mellitus with ophthalmic manifestations, uncontrolled(250.52); Type II or unspecified type diabetes mellitus with renal manifestations, uncontrolled(250.42); and UARS (upper airway resistance syndrome) (2/5/2015).  She  has a past surgical history that includes Oophorectomy (1963); Colonoscopy; Colonoscopy (N/A, 9/22/2016); Cataract extraction w/  intraocular lens implant (Left, 2/27/13); Hysterectomy (1963 ); and Eye surgery (Bilateral, 2002 approx).  Her family history includes Cancer in her maternal uncle; Cancer (age of onset: 68) in her sister; Diabetes in her maternal grandmother; Drug abuse in her daughter; Heart disease in her paternal grandmother; Hypertension in her mother; Mental illness in her mother; Stroke in her father and mother.  She  reports that she has never smoked. She has never used smokeless tobacco. She reports that she does not drink alcohol or use drugs.  She has a current medication list which includes the following prescription(s): accu-chek annabelle plus test strp, accu-chek fastclix, albuterol, alprazolam, amlodipine, aspirin, atorvastatin, brimonidine 0.2%, cholecalciferol (vitamin d3), clopidogrel, ferrous sulfate, glimepiride, hydrochlorothiazide, losartan, meclizine, metformin, and omeprazole.  She is allergic to pravachol  "[pravastatin]..    Review of Systems   Constitutional: Negative for fever, fatigue and night sweats.   HENT: Negative for nosebleeds, postnasal drip and hearing loss.    Respiratory: Negative for cough and dyspnea on extertion.    Cardiovascular: Negative for chest pain and leg swelling.   Endocrine: Negative for polydipsia and heat intolerance.    Neurological: Negative for light-headedness and headaches.   Psychiatric/Behavioral: The patient is not nervous/anxious.    All other systems reviewed and are negative.       Objective:   BP (!) 140/80   Pulse 62   Resp 18   Ht 5' 6" (1.676 m)   Wt 81.6 kg (179 lb 14.3 oz)   SpO2 98%   BMI 29.04 kg/m²   Body mass index is 29.04 kg/m².    Physical Exam   Constitutional: She is oriented to person, place, and time. She appears well-developed and well-nourished.   HENT:   Head: Normocephalic and atraumatic.   Mouth/Throat: No oropharyngeal exudate.   Eyes: Conjunctivae and EOM are normal. No scleral icterus.   Neck: Normal range of motion. Neck supple. No JVD present. No thyromegaly present.   Cardiovascular: Normal rate.  Exam reveals no gallop.    No murmur heard.  Pulmonary/Chest: Effort normal. No respiratory distress. She has no wheezes.   Abdominal: Soft. Bowel sounds are normal.   Musculoskeletal: Normal range of motion. She exhibits no edema or tenderness.   Lymphadenopathy:     She has no cervical adenopathy.   Neurological: She is alert and oriented to person, place, and time. No cranial nerve deficit. Coordination normal.   Skin: Skin is warm. Capillary refill takes less than 2 seconds.   Nursing note and vitals reviewed.      Personal Diagnostic Review    CXR 01/10/18:   Heart size is normal.  Aortic arch calcification is present.  The lung fields appear clear.    Mild thoracic spondylosis is present.      Assessment / plan:     Discussed diagnosis, its evaluation, treatment and usual course. All questions answered.      Cough  Now resolved.     Follow up " with PCP as scheduled.         TIME SPENT WITH PATIENT: Time spent: 25 minutes in face to face  discussion concerning diagnosis, prognosis, review of lab and test results, benefits of treatment as well as management of disease, counseling of patient and coordination of care between various health  care providers . Greater than half the time spent was used for coordination of care and counseling of patient.     Follow-up if symptoms worsen or fail to improve, for Cough.

## 2018-03-26 ENCOUNTER — TELEPHONE (OUTPATIENT)
Dept: INTERNAL MEDICINE | Facility: CLINIC | Age: 78
End: 2018-03-26

## 2018-03-26 NOTE — TELEPHONE ENCOUNTER
----- Message from Bertha Knight sent at 3/26/2018 12:46 PM CDT -----  Contact: self [phone]  Patient is calling to speak with someone regarding a pain she's been having on the back of her head down her neck  for the last week and would like to speak with someone regarding that. Please call her back and advise.    Thank you

## 2018-03-27 ENCOUNTER — OFFICE VISIT (OUTPATIENT)
Dept: INTERNAL MEDICINE | Facility: CLINIC | Age: 78
End: 2018-03-27
Payer: MEDICARE

## 2018-03-27 VITALS
RESPIRATION RATE: 16 BRPM | WEIGHT: 177.94 LBS | SYSTOLIC BLOOD PRESSURE: 122 MMHG | HEART RATE: 55 BPM | TEMPERATURE: 98 F | OXYGEN SATURATION: 97 % | DIASTOLIC BLOOD PRESSURE: 66 MMHG | BODY MASS INDEX: 28.6 KG/M2 | HEIGHT: 66 IN

## 2018-03-27 DIAGNOSIS — S16.1XXA ACUTE STRAIN OF NECK MUSCLE, INITIAL ENCOUNTER: Primary | ICD-10-CM

## 2018-03-27 DIAGNOSIS — G44.52 NEW DAILY PERSISTENT HEADACHE: ICD-10-CM

## 2018-03-27 PROBLEM — K57.90 DIVERTICULOSIS: Status: ACTIVE | Noted: 2018-03-27

## 2018-03-27 PROBLEM — E78.2 MIXED HYPERLIPIDEMIA: Chronic | Status: ACTIVE | Noted: 2018-02-07

## 2018-03-27 PROBLEM — N18.30 CKD (CHRONIC KIDNEY DISEASE) STAGE 3, GFR 30-59 ML/MIN: Chronic | Status: ACTIVE | Noted: 2017-11-01

## 2018-03-27 PROBLEM — I63.00 CEREBROVASCULAR ACCIDENT (CVA) DUE TO THROMBOSIS OF PRECEREBRAL ARTERY: Chronic | Status: ACTIVE | Noted: 2017-07-19

## 2018-03-27 PROBLEM — K57.90 DIVERTICULOSIS: Chronic | Status: ACTIVE | Noted: 2018-03-27

## 2018-03-27 PROBLEM — F41.8 MIXED ANXIETY DEPRESSIVE DISORDER: Status: ACTIVE | Noted: 2018-03-27

## 2018-03-27 PROCEDURE — 3078F DIAST BP <80 MM HG: CPT | Mod: CPTII,S$GLB,, | Performed by: PHYSICIAN ASSISTANT

## 2018-03-27 PROCEDURE — 3074F SYST BP LT 130 MM HG: CPT | Mod: CPTII,S$GLB,, | Performed by: PHYSICIAN ASSISTANT

## 2018-03-27 PROCEDURE — 99213 OFFICE O/P EST LOW 20 MIN: CPT | Mod: S$GLB,,, | Performed by: PHYSICIAN ASSISTANT

## 2018-03-27 PROCEDURE — 99999 PR PBB SHADOW E&M-EST. PATIENT-LVL V: CPT | Mod: PBBFAC,,, | Performed by: PHYSICIAN ASSISTANT

## 2018-03-27 RX ORDER — METHYLPREDNISOLONE 4 MG/1
TABLET ORAL
Qty: 1 PACKAGE | Refills: 0 | Status: SHIPPED | OUTPATIENT
Start: 2018-03-27 | End: 2018-07-26

## 2018-03-27 RX ORDER — ACETAMINOPHEN 500 MG
500 TABLET ORAL
COMMUNITY
End: 2018-08-14 | Stop reason: SDUPTHER

## 2018-03-27 NOTE — PROGRESS NOTES
Subjective:       Patient ID: Saul Mar is a 77 y.o.B/ female.    Chief Complaint: Neck Pain and Back Pain    HPI         She comes in by herself today and has the above problem.  She has been having left-sided headache and left neck ache for about 7 days now.  She had a stroke 10 or 12 years ago and at that time she was having headaches just like she is now.  She is a little worried she may be having another stroke.  She has been checking her blood pressure at home and her blood pressure has been elevated.  She was really surprised this morning when she had her blood pressure checked here and it was normal.  Her headache is a 8/10 level of pain.  The neck is about the same.  She's had no recent trauma or falls or injuries or auto accidents.  She still drives her own car.  She has not run out of her blood pressure medicine.  She takes it like she supposed to.  She has been falling asleep every night sitting up in a chair watching television instead of going to bed.  She usually wakes herself up by her body starting to fall forward in the chair when she is deeply asleep.  She also awakens with a stiff neck.        She denies any radiation of discomfort into her shoulders or upper extremities.  There has been no fasciculations or paresthesias but she says her left hand is weak.    Review of Systems    Otherwise negative concerning the NEUROLOGIC, MUSCULOSKELETAL, ORTHOPEDIC, VASCULAR, OPHTHALMIC system review.    Objective:      Physical Exam    HEAD: NC/AT.  She doesn't have any muscular spasm present in the neck at this time.  NECK: FROM but when she tilts toward the right she has pain in the left posterior aspect of her neck.  It's worse when she tilts back toward the left and also when she extends her neck.  There is no grating or crepitance with these movements.  NEUROLOGIC: She is alert, oriented ×3, cooperative and quite pleasant.  CN II through XII are intact and equal.  Motor and neuro sensory  functions are all intact.  Her  strength is strong and equal in her hands.  She also has good strength in her biceps and her triceps.  CAROTIDS: Quiet without bruit.  HEART: S1 is greater than S2.  RSR.  Pulse rate is 56 bpm and regular.  All vitals were normal including blood pressure which was 122/66.  She has no peripheral edema.  There is no murmur or gallop.    Assessment:       1. Acute strain of neck muscle, initial encounter    2. New daily persistent headache        Plan:     1.  She has no x-rays concerning her spine anywhere in the chart.  2.  With her history of CK D I will need to put her on Medrol Dosepak decreasing dose each over 7 days.  Take this medicine with food.  I warned her that this will probably cause some slight elevation to her blood sugar.  3.  Low heat with a heating pad to the neck.  Continue using her baby aspirin as directed and also take Tylenol every 4 hours as needed for headache.  4.  If she's going to continue following asleep in the chair watching television night she needs to get a horseshoe pillow to put around her neck.

## 2018-03-31 DIAGNOSIS — I10 ESSENTIAL HYPERTENSION: ICD-10-CM

## 2018-04-02 ENCOUNTER — TELEPHONE (OUTPATIENT)
Dept: URGENT CARE | Facility: CLINIC | Age: 78
End: 2018-04-02

## 2018-04-02 ENCOUNTER — HOSPITAL ENCOUNTER (OUTPATIENT)
Dept: RADIOLOGY | Facility: HOSPITAL | Age: 78
Discharge: HOME | End: 2018-04-02
Attending: PHYSICIAN ASSISTANT
Payer: MEDICARE

## 2018-04-02 ENCOUNTER — OFFICE VISIT (OUTPATIENT)
Dept: URGENT CARE | Facility: CLINIC | Age: 78
End: 2018-04-02
Payer: MEDICARE

## 2018-04-02 VITALS
RESPIRATION RATE: 16 BRPM | OXYGEN SATURATION: 96 % | HEART RATE: 69 BPM | TEMPERATURE: 98 F | DIASTOLIC BLOOD PRESSURE: 62 MMHG | BODY MASS INDEX: 29.58 KG/M2 | SYSTOLIC BLOOD PRESSURE: 128 MMHG | HEIGHT: 65 IN | WEIGHT: 177.56 LBS

## 2018-04-02 DIAGNOSIS — M54.2 NECK PAIN: Primary | ICD-10-CM

## 2018-04-02 DIAGNOSIS — M54.2 NECK PAIN: ICD-10-CM

## 2018-04-02 PROCEDURE — 3078F DIAST BP <80 MM HG: CPT | Mod: CPTII,S$GLB,, | Performed by: PHYSICIAN ASSISTANT

## 2018-04-02 PROCEDURE — 99999 PR PBB SHADOW E&M-EST. PATIENT-LVL IV: CPT | Mod: PBBFAC,,, | Performed by: PHYSICIAN ASSISTANT

## 2018-04-02 PROCEDURE — 72050 X-RAY EXAM NECK SPINE 4/5VWS: CPT | Mod: TC,FY,PO

## 2018-04-02 PROCEDURE — 72050 X-RAY EXAM NECK SPINE 4/5VWS: CPT | Mod: 26,,, | Performed by: RADIOLOGY

## 2018-04-02 PROCEDURE — 99214 OFFICE O/P EST MOD 30 MIN: CPT | Mod: S$GLB,,, | Performed by: PHYSICIAN ASSISTANT

## 2018-04-02 PROCEDURE — 3074F SYST BP LT 130 MM HG: CPT | Mod: CPTII,S$GLB,, | Performed by: PHYSICIAN ASSISTANT

## 2018-04-02 RX ORDER — LOSARTAN POTASSIUM 50 MG/1
TABLET ORAL
Qty: 180 TABLET | Refills: 2 | Status: SHIPPED | OUTPATIENT
Start: 2018-04-02 | End: 2018-08-14 | Stop reason: SDUPTHER

## 2018-04-02 RX ORDER — TIZANIDINE 2 MG/1
2 TABLET ORAL EVERY 8 HOURS PRN
Qty: 14 TABLET | Refills: 0 | Status: SHIPPED | OUTPATIENT
Start: 2018-04-02 | End: 2018-04-12

## 2018-04-02 NOTE — PROGRESS NOTES
"Subjective:       Patient ID: Saul Mar is a 77 y.o. female.    Chief Complaint: Headache and Neck Pain    Neck Pain    This is a new problem. The current episode started 1 to 4 weeks ago (now about 2 weeks of pain, saw PCP last week diagnosed with neck strain given medrol dose pack. No improvement in pain has been using support pillow as instructed). The problem occurs intermittently. The problem has been unchanged. The pain is associated with nothing. The pain is present in the left side. Quality: if she turns her head it will cause a stabbing jolt into the left side of her neck. The pain is at a severity of 8/10. The symptoms are aggravated by twisting and position. The pain is same all the time. Associated symptoms include headaches (pain starts over left forehead radiates down into neck and top of shoulder). Pertinent negatives include no chest pain, fever, numbness, paresis, photophobia, syncope, tingling, trouble swallowing, visual change or weakness. Treatments tried: medrol dose pack. The treatment provided no relief.     Review of Systems   Constitutional: Negative for chills, fatigue and fever.   HENT: Negative for rhinorrhea, sore throat and trouble swallowing.    Eyes: Negative for photophobia, pain and redness.   Respiratory: Negative for cough and shortness of breath.    Cardiovascular: Negative for chest pain, leg swelling and syncope.   Gastrointestinal: Negative for abdominal pain, nausea and vomiting.   Musculoskeletal: Positive for neck pain. Negative for myalgias.   Skin: Negative for rash.   Neurological: Positive for headaches (pain starts over left forehead radiates down into neck and top of shoulder). Negative for tingling, weakness and numbness.       Objective:      /62 (BP Location: Right arm, Patient Position: Sitting, BP Method: Large (Manual))   Pulse 69   Temp 98.3 °F (36.8 °C) (Tympanic)   Resp 16   Ht 5' 5" (1.651 m)   Wt 80.6 kg (177 lb 9.3 oz)   SpO2 96%  "  BMI 29.55 kg/m²   Physical Exam   Constitutional: She is oriented to person, place, and time. She appears well-developed and well-nourished. No distress.   HENT:   Head: Normocephalic.   Right Ear: External ear normal.   Left Ear: External ear normal.   Nose: Nose normal.   Normal temporal artery pulse without thickening or tenderness   Eyes: Conjunctivae and EOM are normal. Right eye exhibits no discharge. Left eye exhibits no discharge.   Neck: Normal range of motion. Neck supple.   Musculoskeletal:        Cervical back: She exhibits normal range of motion, no tenderness, no bony tenderness, no swelling and no spasm.   5/5 strength bilaterally  Sensation intact to light touch bilaterally  Full active ROM. Strength C5-8 5/5. External/internal rotation 5/5. Positive Spurling test. Relief with manual distraction.      Neurological: She is alert and oriented to person, place, and time. She has normal reflexes. She displays normal reflexes. Coordination normal.   Skin: Skin is warm and dry. No rash noted. She is not diaphoretic. No erythema.   Vitals reviewed.      Assessment:       1. Neck pain        Plan:       Neck pain  -     X-Ray Cervical Spine Complete 5 view; Future; Expected date: 04/02/2018  -     tiZANidine (ZANAFLEX) 2 MG tablet; Take 1 tablet (2 mg total) by mouth every 8 (eight) hours as needed (neck pain and spasm).  Dispense: 14 tablet; Refill: 0    Possible left prolapsing disc considering positive Spurling test and relief with distraction. S/p course of steroids without improvement. XR today and trial of mild muscle relaxant, consider referral to Physiatry.      Heather Trant PA-C [facility]

## 2018-04-02 NOTE — PATIENT INSTRUCTIONS
General Neck and Back Pain    Both neck and back pain are usually caused by injury to the muscles or ligaments of the spine. Sometimes the disks that separate each bone of the spine may cause pain by pressing on a nearby nerve. Back and neck pain may appear after a sudden twisting or bending force (such as in a car accident), or sometimes after a simple awkward movement. In either case, muscle spasm is often present and adds to the pain.  Acute neck and back pain usually gets better in 1 to 2 weeks. Pain related to disk disease, arthritis in the spinal joints or spinal stenosis (narrowing of the spinal canal) can become chronic and last for months or years.  Back and neck pain are common problems. Most people feel better in 1 or 2 weeks, and most of the rest in 1 to 2 months. Most people can remain active.  People experience and describe pain differently.  · Pain can be sharp, stabbing, shooting, aching, cramping, or burning  · Movement, standing, bending, lifting, sitting, or walking may worsen the pain  · Pain can be localized to one spot or area, or it can be more generalized  · Pain can spread or radiate upwards, downwards, to the front, or go down your arms  · Muscle spasm may occur.  Most of the time mechanical problems with the muscles or spine cause the pain. it is usually caused by an injury, whether known or not, to the muscles or ligaments. While illnesses can cause back pain, it is usually not caused by a serious illness. Pain is usually related to physical activity, whether sports, exercise, work, or normal activity. Sometimes it can occur without an identifiable cause. This can happen simply by stretching or moving wrong, without noting pain at the time. Other causes include:  · Overexertion, lifting, pushing, pulling incorrectly or too aggressively.  · Sudden twisting, bending or stretching from an accident (car or fall), or accidental movement.  · Poor posture  · Poor conditioning, lack of regular  exercise  · Spinal disc disease or arthritis  · Stress  · Pregnancy, or illness like appendicitis, bladder or kidney infection, pelvic infections   Home care  · For neck pain: Use a comfortable pillow that supports the head and keeps the spine in a neutral position. The position of the head should not be tilted forward or backward.  · When in bed, try to find a position of comfort. A firm mattress is best. Try lying flat on your back with pillows under your knees. You can also try lying on your side with your knees bent up towards your chest and a pillow between your knees.  · At first, do not try to stretch out the sore spots. If there is a strain, it is not like the good soreness you get after exercising without an injury. In this case, stretching may make it worse.  · Avoid prolonged sitting, long car rides or travel. This puts more stress on the lower back than standing or walking.  · During the first 24 to 72 hours after an injury, apply an ice pack to the painful area for 20 minutes and then remove it for 20 minutes over a period of 60 to 90 minutes or several times a day.   · You can alternate ice and heat therapies. Talk with your healthcare provider about the best treatment for your back or neck pain. As a safety precaution, do not use a heating pad at bedtime. Sleeping with a heating pad can lead to skin burns or tissue damage.  · Therapeutic massage can help relax the back and neck muscles without stretching them.  · Be aware of safe lifting methods and do not lift anything over 15 pounds until all the pain is gone.  Medications  Talk to your healthcare provider before using medicine, especially if you have other medical problems or are taking other medicines.  · You may use over-the-counter medicine to control pain, unless another pain medicine was prescribed. If you have chronic conditions like diabetes, liver or kidney disease, stomach ulcers,  gastrointestinal bleeding, or are taking blood thinner  medicines.  · Be careful if you are given pain medicines, narcotics, or medicine for muscle spasm. They can cause drowsiness, and can affect your coordination, reflexes, and judgment. Do not drive or operate heavy machinery.  Follow-up care  Follow up with your healthcare provider, or as advised. Physical therapy or further tests may be needed.  If X-rays were taken, you will be notified of any new findings that may affect your care.  Call 911  Seek emergency medical care if any of the following occur:  · Trouble breathing  · Confusion  · Very drowsy or trouble awakening  · Fainting or loss of consciousness  · Rapid or very slow heart rate  · Loss of bowel or bladder control  When to seek medical advice  Call your healthcare provider right away if any of these occur:  · Pain becomes worse or spreads into your arms or legs  · Weakness, numbness or pain in one or both arms or legs  · Numbness in the groin area  · Difficulty walking  · Fever of 100.4ºF (38ºC) or higher, or as directed by your healthcare provider  Date Last Reviewed: 7/1/2016 © 2000-2017 [employer]. [address] [place] [address]. All rights reserved. This information is not intended as a substitute for professional medical care. Always follow your healthcare professional's instructions.      -  Heat (hot shower, hot bath or heating pad) also works well for muscle spasm.     -  Do chey stretches (side to side, chin to chest) several times per day.         - Maintain good posture. For example, sit straight with the shoulders held back, drive with the arms slightly shrugged (eg, on arm rests), and avoid carrying bags or gear with straps over the shoulders.       - Sleep position is important. The head and neck should be aligned with the body, best accomplished with a small pillow under the neck. A helpful sleep position is to lie flat on your back with thighs elevated on pillows, thereby flattening the long spinal  muscles.       - If you have a sedentary job, make an effort to transition to standing and walking frequently, perform neck range of motion exercises, and maintain a neutral posture whenever possible.       - Follow up with Primary Care Provider if neck pain does not progressively improve or worsens in any way or if pain is not gradually relieved by treatment.       - Seek immediate emergency care if you have severely worsening pain, arm weakness, or numbness/tingling in the arms.

## 2018-04-04 ENCOUNTER — TELEPHONE (OUTPATIENT)
Dept: INTERNAL MEDICINE | Facility: CLINIC | Age: 78
End: 2018-04-04

## 2018-04-04 NOTE — TELEPHONE ENCOUNTER
----- Message from Alexandria Schmitz sent at 4/4/2018 10:00 AM CDT -----  Contact: Patient [phone]  She requested a sooner appointment than 5/7/18. A follow up from an urgent care visit.    Thank you

## 2018-04-16 RX ORDER — MECLIZINE HYDROCHLORIDE 25 MG/1
25 TABLET ORAL 3 TIMES DAILY PRN
Qty: 90 TABLET | Refills: 0 | Status: SHIPPED | OUTPATIENT
Start: 2018-04-16 | End: 2018-07-10 | Stop reason: SDUPTHER

## 2018-04-16 NOTE — TELEPHONE ENCOUNTER
----- Message from Brent Yun sent at 4/16/2018  1:30 PM CDT -----  Contact: self/[phone]  Pt needs a prescription meclizine (ANTIVERT) 25 mg tablet, sent to her local pharmacy [id number] - [place] - [address] AT AT [facility]. Please advise.      Thanks

## 2018-05-07 ENCOUNTER — OFFICE VISIT (OUTPATIENT)
Dept: PODIATRY | Facility: CLINIC | Age: 78
End: 2018-05-07
Payer: MEDICARE

## 2018-05-07 VITALS
WEIGHT: 177.69 LBS | HEIGHT: 65 IN | DIASTOLIC BLOOD PRESSURE: 76 MMHG | BODY MASS INDEX: 29.61 KG/M2 | HEART RATE: 60 BPM | SYSTOLIC BLOOD PRESSURE: 179 MMHG

## 2018-05-07 DIAGNOSIS — L84 CORN OR CALLUS: ICD-10-CM

## 2018-05-07 DIAGNOSIS — E11.49 TYPE II DIABETES MELLITUS WITH NEUROLOGICAL MANIFESTATIONS: Primary | ICD-10-CM

## 2018-05-07 DIAGNOSIS — B35.1 ONYCHOMYCOSIS DUE TO DERMATOPHYTE: ICD-10-CM

## 2018-05-07 DIAGNOSIS — L90.9 FAT PAD ATROPHY OF FOOT: ICD-10-CM

## 2018-05-07 DIAGNOSIS — M20.10 HALLUX ABDUCTO VALGUS, UNSPECIFIED LATERALITY: ICD-10-CM

## 2018-05-07 PROCEDURE — 99999 PR PBB SHADOW E&M-EST. PATIENT-LVL III: CPT | Mod: PBBFAC,,, | Performed by: PODIATRIST

## 2018-05-07 PROCEDURE — 11056 PARNG/CUTG B9 HYPRKR LES 2-4: CPT | Mod: Q8,S$GLB,, | Performed by: PODIATRIST

## 2018-05-07 PROCEDURE — 11721 DEBRIDE NAIL 6 OR MORE: CPT | Mod: 59,Q8,S$GLB, | Performed by: PODIATRIST

## 2018-05-07 PROCEDURE — 99499 UNLISTED E&M SERVICE: CPT | Mod: S$GLB,,, | Performed by: PODIATRIST

## 2018-05-11 NOTE — PROGRESS NOTES
Last encounter in this department: 11/16/2017    Subjective:      Patient ID: Saul Mar is a 77 y.o. female.    Chief Complaint: Routine Foot Care (Last visit with PCP Dr. Randolph 2/7/18)    Saul is a 77 y.o. female who presents to the clinic for evaluation and treatment of high risk feet. Saul has a past medical history of Back pain; Degenerative disc disease; Diverticulosis; GERD (gastroesophageal reflux disease); 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 (7/28/2017); Multinodular thyroid; Polyneuropathy; Type 2 diabetes with peripheral circulatory disorder, controlled; and Type 2 diabetes, uncontrolled, with background retinopathy with macular edema (11/18/2015). The patient's chief complaint is long, thick toenails and calluses. This patient has documented high risk feet requiring routine maintenance secondary to diabetes mellitis and those secondary complications of diabetes, as mentioned..    PCP: Penny Randolph MD    Date Last Seen by PCP: 2/17/18    Current shoe gear:  Affected Foot: Tennis shoes     Unaffected Foot: Tennis shoes    Hemoglobin A1C   Date Value Ref Range Status   11/06/2017 6.9 (H) 4.0 - 5.6 % Final     Comment:     According to ADA guidelines, hemoglobin A1c <7.0% represents  optimal control in non-pregnant diabetic patients. Different  metrics may apply to specific patient populations.   Standards of Medical Care in Diabetes-2016.  For the purpose of screening for the presence of diabetes:  <5.7%     Consistent with the absence of diabetes  5.7-6.4%  Consistent with increasing risk for diabetes   (prediabetes)  >or=6.5%  Consistent with diabetes  Currently, no consensus exists for use of hemoglobin A1c  for diagnosis of diabetes for children.  This Hemoglobin A1c assay has significant interference with fetal   hemoglobin   (HbF). The results are invalid for patients with abnormal amounts of   HbF,   including those with known Hereditary Persistence   of Fetal  Hemoglobin. Heterozygous hemoglobin variants (HbAS, HbAC,   HbAD, HbAE, HbA2) do not significantly interfere with this assay;   however, presence of multiple variants in a sample may impact the %   interference.     06/05/2017 7.4 (H) 4.5 - 6.2 % Final     Comment:     According to ADA guidelines, hemoglobin A1C <7.0% represents  optimal control in non-pregnant diabetic patients.  Different  metrics may apply to specific populations.   Standards of Medical Care in Diabetes - 2016.  For the purpose of screening for the presence of diabetes:  <5.7%     Consistent with the absence of diabetes  5.7-6.4%  Consistent with increasing risk for diabetes   (prediabetes)  >or=6.5%  Consistent with diabetes  Currently no consensus exists for use of hemoglobin A1C  for diagnosis of diabetes for children.     10/26/2016 8.0 (H) 4.5 - 6.2 % Final     Comment:     According to ADA guidelines, hemoglobin A1C <7.0% represents  optimal control in non-pregnant diabetic patients.  Different  metrics may apply to specific populations.   Standards of Medical Care in Diabetes - 2016.  For the purpose of screening for the presence of diabetes:  <5.7%     Consistent with the absence of diabetes  5.7-6.4%  Consistent with increasing risk for diabetes   (prediabetes)  >or=6.5%  Consistent with diabetes  Currently no consensus exists for use of hemoglobin A1C  for diagnosis of diabetes for children.         Review of Systems   Constitution: Negative for chills and fever.   Cardiovascular: Negative for chest pain.   Respiratory: Negative for shortness of breath.    Gastrointestinal: Negative for nausea and vomiting.           Objective:      Physical Exam   Constitutional: She is oriented to person, place, and time. She appears well-developed and well-nourished. No distress.   Cardiovascular:   Pulses:       Dorsalis pedis pulses are 1+ on the right side, and 1+ on the left side.        Posterior tibial pulses are 1+ on the right side, and 0 on  the left side.   Capillary fill time 3-5 seconds to digits bilateral feet.   Musculoskeletal:   Lower extremities:    Deformities: hallux abductovalgus both feet with plantar fat pad atrophy.    Normal arch height and rectus feet bilaterally.     5/5 muscle strength and tone in all four quadrants bilaterally.     Pain-free range of motion in all four quadrants with stiffness and limitation bilaterally.     Pain on palpation: None     Neurological: She is alert and oriented to person, place, and time. She displays no Babinski's sign on the right side. She displays no Babinski's sign on the left side.   Plantar protective threshold sensation by touch via 5.07 SWMF diminished to the digits bilaterally.      Skin:   Lower extremities:    Thin, dry, atrophic bilaterally. Digital hair absent bilaterally. Digits cool with proximal foot warmth.    Mild bilateral foot and ankle edema.    No varicosities, pigmentary changes bilaterally.     No wounds, drainage, malodor, erythema, interdigital maceration were noted.     Skin lesions: sub met head 5 bilaterally.     Nails are thick, dystrophic and discolored bilateral feet.   Psychiatric: She has a normal mood and affect.   Nursing note and vitals reviewed.            X-rays: not indicated    Assessment:       Encounter Diagnoses   Name Primary?    Type II diabetes mellitus with neurological manifestations Yes    Onychomycosis due to dermatophyte     Corn or callus     Hallux abducto valgus, unspecified laterality     Fat pad atrophy of foot          Plan:       Saul was seen today for routine foot care.    Diagnoses and all orders for this visit:    Type II diabetes mellitus with neurological manifestations    Onychomycosis due to dermatophyte    Corn or callus    Hallux abducto valgus, unspecified laterality    Fat pad atrophy of foot      I counseled the patient on her conditions, their implications and medical management.    - Shoe inspection. Diabetic Foot Education.  Patient reminded of the importance of good nutrition and blood sugar control to help prevent podiatric complications of diabetes. Patient instructed on proper foot hygeine. We discussed wearing proper shoe gear, daily foot inspections, never walking without protective shoe gear, never putting sharp instruments to feet, routine podiatric nail visits every 2-3 months.      - With patient's permission, nails were aggressively reduced and debrided x 10 to their soft tissue attachment mechanically and with electric , removing all offending nail and debris. Patient relates relief following the procedure. She will continue to monitor the areas daily, inspect her feet, wear protective shoe gear when ambulatory, moisturizer to maintain skin integrity and follow in this office in approximately 2-3 months, sooner p.r.n.    After cleansing with alcohol prep pad, the above mentioned hyperkeratotic lesions were sharply trimmed x2 utilizing #15 blade to a smooth base without incident. Patient tolerated procedure well and reported comfort to the debridement sites. Patient will continue to use padding and apply prescription urea lotion or over the counter alternatives to areas of recurrent skin build up.

## 2018-06-07 ENCOUNTER — TELEPHONE (OUTPATIENT)
Dept: INTERNAL MEDICINE | Facility: CLINIC | Age: 78
End: 2018-06-07

## 2018-06-07 DIAGNOSIS — Z12.31 VISIT FOR SCREENING MAMMOGRAM: Primary | ICD-10-CM

## 2018-06-07 NOTE — TELEPHONE ENCOUNTER
Spoke to pt and Mammogram orders are in place   Pt  Will go to the Hospital in [place] To have it done   She will call to scheduled it

## 2018-06-07 NOTE — TELEPHONE ENCOUNTER
----- Message from Jihan Blount sent at 6/7/2018 11:48 AM CDT -----  Contact: self/[phone]  Pt called in regards to wanting to know when do she take her next mammogram and can she do it in [place].      Please advise

## 2018-06-26 ENCOUNTER — TELEPHONE (OUTPATIENT)
Dept: INTERNAL MEDICINE | Facility: CLINIC | Age: 78
End: 2018-06-26

## 2018-06-26 NOTE — TELEPHONE ENCOUNTER
----- Message from Alexandria Schmitz sent at 6/26/2018  8:19 AM CDT -----  Contact: Patient [phone]  The patient said that her sister has the shingles therefore, she wants to know if you recommend that she have the vaccination.    Thank you

## 2018-06-26 NOTE — TELEPHONE ENCOUNTER
I do not think that she is going to be able to get the shingles vaccine, it is not available.    She is overdue for blood work, please schedule labs at her earliest convenience- all the labs from 2/7/18 thanks    Please let me know when scheduled

## 2018-06-26 NOTE — TELEPHONE ENCOUNTER
Pt wants to know if she should have the Shingles Vacc her sister had shingles and she has been around her   Please advise

## 2018-06-28 ENCOUNTER — HOSPITAL ENCOUNTER (OUTPATIENT)
Dept: RADIOLOGY | Facility: HOSPITAL | Age: 78
Discharge: HOME | End: 2018-06-28
Attending: INTERNAL MEDICINE
Payer: MEDICARE

## 2018-06-28 DIAGNOSIS — Z12.31 VISIT FOR SCREENING MAMMOGRAM: ICD-10-CM

## 2018-06-28 PROCEDURE — 77063 BREAST TOMOSYNTHESIS BI: CPT | Mod: 26,,, | Performed by: RADIOLOGY

## 2018-06-28 PROCEDURE — 77067 SCR MAMMO BI INCL CAD: CPT | Mod: TC

## 2018-06-28 PROCEDURE — 77067 SCR MAMMO BI INCL CAD: CPT | Mod: 26,,, | Performed by: RADIOLOGY

## 2018-06-29 ENCOUNTER — LAB VISIT (OUTPATIENT)
Dept: LAB | Facility: HOSPITAL | Age: 78
End: 2018-06-29
Attending: INTERNAL MEDICINE
Payer: MEDICARE

## 2018-06-29 DIAGNOSIS — E78.2 MIXED HYPERLIPIDEMIA: ICD-10-CM

## 2018-06-29 DIAGNOSIS — E04.2 MULTINODULAR THYROID: Chronic | ICD-10-CM

## 2018-06-29 DIAGNOSIS — N18.30 CKD (CHRONIC KIDNEY DISEASE) STAGE 3, GFR 30-59 ML/MIN: ICD-10-CM

## 2018-06-29 DIAGNOSIS — D64.9 ANEMIA, UNSPECIFIED TYPE: ICD-10-CM

## 2018-06-29 DIAGNOSIS — E11.49 TYPE II DIABETES MELLITUS WITH NEUROLOGICAL MANIFESTATIONS: Chronic | ICD-10-CM

## 2018-06-29 LAB
25(OH)D3+25(OH)D2 SERPL-MCNC: 27 NG/ML
ALBUMIN SERPL BCP-MCNC: 3.6 G/DL
ALP SERPL-CCNC: 98 U/L
ALT SERPL W/O P-5'-P-CCNC: 11 U/L
ANION GAP SERPL CALC-SCNC: 7 MMOL/L
AST SERPL-CCNC: 14 U/L
BASOPHILS # BLD AUTO: 0.05 K/UL
BASOPHILS NFR BLD: 1 %
BILIRUB SERPL-MCNC: 0.5 MG/DL
BUN SERPL-MCNC: 20 MG/DL
CALCIUM SERPL-MCNC: 9.5 MG/DL
CHLORIDE SERPL-SCNC: 107 MMOL/L
CHOLEST SERPL-MCNC: 187 MG/DL
CHOLEST/HDLC SERPL: 3.3 {RATIO}
CO2 SERPL-SCNC: 27 MMOL/L
CREAT SERPL-MCNC: 1.4 MG/DL
DIFFERENTIAL METHOD: ABNORMAL
EOSINOPHIL # BLD AUTO: 0.3 K/UL
EOSINOPHIL NFR BLD: 5.3 %
ERYTHROCYTE [DISTWIDTH] IN BLOOD BY AUTOMATED COUNT: 14.4 %
EST. GFR  (AFRICAN AMERICAN): 41.8 ML/MIN/1.73 M^2
EST. GFR  (NON AFRICAN AMERICAN): 36.3 ML/MIN/1.73 M^2
ESTIMATED AVG GLUCOSE: 180 MG/DL
FERRITIN SERPL-MCNC: 166 NG/ML
GLUCOSE SERPL-MCNC: 167 MG/DL
HBA1C MFR BLD HPLC: 7.9 %
HCT VFR BLD AUTO: 33.1 %
HDLC SERPL-MCNC: 56 MG/DL
HDLC SERPL: 29.9 %
HGB BLD-MCNC: 10.7 G/DL
IMM GRANULOCYTES # BLD AUTO: 0.01 K/UL
IMM GRANULOCYTES NFR BLD AUTO: 0.2 %
IRON SERPL-MCNC: 55 UG/DL
LDLC SERPL CALC-MCNC: 117.2 MG/DL
LYMPHOCYTES # BLD AUTO: 1.7 K/UL
LYMPHOCYTES NFR BLD: 34.1 %
MCH RBC QN AUTO: 28.7 PG
MCHC RBC AUTO-ENTMCNC: 32.3 G/DL
MCV RBC AUTO: 89 FL
MONOCYTES # BLD AUTO: 0.4 K/UL
MONOCYTES NFR BLD: 7.7 %
NEUTROPHILS # BLD AUTO: 2.5 K/UL
NEUTROPHILS NFR BLD: 51.7 %
NONHDLC SERPL-MCNC: 131 MG/DL
NRBC BLD-RTO: 0 /100 WBC
PLATELET # BLD AUTO: 213 K/UL
PMV BLD AUTO: 11.4 FL
POTASSIUM SERPL-SCNC: 4.1 MMOL/L
PROT SERPL-MCNC: 6.8 G/DL
RBC # BLD AUTO: 3.73 M/UL
SATURATED IRON: 18 %
SODIUM SERPL-SCNC: 141 MMOL/L
TOTAL IRON BINDING CAPACITY: 300 UG/DL
TRANSFERRIN SERPL-MCNC: 203 MG/DL
TRIGL SERPL-MCNC: 69 MG/DL
TSH SERPL DL<=0.005 MIU/L-ACNC: 1.04 UIU/ML
VIT B12 SERPL-MCNC: 378 PG/ML
WBC # BLD AUTO: 4.92 K/UL

## 2018-06-29 PROCEDURE — 82607 VITAMIN B-12: CPT

## 2018-06-29 PROCEDURE — 83021 HEMOGLOBIN CHROMOTOGRAPHY: CPT

## 2018-06-29 PROCEDURE — 85660 RBC SICKLE CELL TEST: CPT

## 2018-06-29 PROCEDURE — 84165 PROTEIN E-PHORESIS SERUM: CPT

## 2018-06-29 PROCEDURE — 85025 COMPLETE CBC W/AUTO DIFF WBC: CPT

## 2018-06-29 PROCEDURE — 84165 PROTEIN E-PHORESIS SERUM: CPT | Mod: 26,,, | Performed by: PATHOLOGY

## 2018-06-29 PROCEDURE — 80061 LIPID PANEL: CPT

## 2018-06-29 PROCEDURE — 83036 HEMOGLOBIN GLYCOSYLATED A1C: CPT

## 2018-06-29 PROCEDURE — 82306 VITAMIN D 25 HYDROXY: CPT

## 2018-06-29 PROCEDURE — 84443 ASSAY THYROID STIM HORMONE: CPT

## 2018-06-29 PROCEDURE — 83540 ASSAY OF IRON: CPT

## 2018-06-29 PROCEDURE — 82728 ASSAY OF FERRITIN: CPT

## 2018-06-29 PROCEDURE — 80053 COMPREHEN METABOLIC PANEL: CPT

## 2018-07-02 LAB
ALBUMIN SERPL ELPH-MCNC: 3.83 G/DL
ALPHA1 GLOB SERPL ELPH-MCNC: 0.26 G/DL
ALPHA2 GLOB SERPL ELPH-MCNC: 0.71 G/DL
B-GLOBULIN SERPL ELPH-MCNC: 0.82 G/DL
GAMMA GLOB SERPL ELPH-MCNC: 0.98 G/DL
PATHOLOGIST INTERPRETATION SPE: NORMAL
PROT SERPL-MCNC: 6.6 G/DL

## 2018-07-03 LAB
HGB A MFR BLD ELPH: 57.2 % (ref 95.8–98)
HGB A2 MFR BLD: 2.8 % (ref 2–3.3)
HGB F MFR BLD: 0.5 % (ref 0–0.9)
HGB FRACT BLD ELPH-IMP: ABNORMAL
HGB OTHER MFR BLD ELPH: ABNORMAL %
HGB S BLD QL: POSITIVE
THEVP VARIANT 2: ABNORMAL
THEVP VARIANT 3: ABNORMAL

## 2018-07-05 ENCOUNTER — TELEPHONE (OUTPATIENT)
Dept: INTERNAL MEDICINE | Facility: CLINIC | Age: 78
End: 2018-07-05

## 2018-07-05 DIAGNOSIS — N18.30 DIABETES MELLITUS WITH STAGE 3 CHRONIC KIDNEY DISEASE: Chronic | ICD-10-CM

## 2018-07-05 DIAGNOSIS — D53.9 NUTRITIONAL ANEMIA: ICD-10-CM

## 2018-07-05 DIAGNOSIS — N18.30 CKD (CHRONIC KIDNEY DISEASE) STAGE 3, GFR 30-59 ML/MIN: Chronic | ICD-10-CM

## 2018-07-05 DIAGNOSIS — E11.22 DIABETES MELLITUS WITH STAGE 3 CHRONIC KIDNEY DISEASE: Chronic | ICD-10-CM

## 2018-07-05 DIAGNOSIS — E55.9 VITAMIN D INSUFFICIENCY: Primary | ICD-10-CM

## 2018-07-05 DIAGNOSIS — D57.3 HEMOGLOBIN S TRAIT: ICD-10-CM

## 2018-07-05 RX ORDER — LANOLIN ALCOHOL/MO/W.PET/CERES
1000 CREAM (GRAM) TOPICAL DAILY
Qty: 30 TABLET | Refills: 12 | Status: SHIPPED | OUTPATIENT
Start: 2018-07-05 | End: 2018-08-14 | Stop reason: SDUPTHER

## 2018-07-05 NOTE — TELEPHONE ENCOUNTER
Please call to let her know the following:    Labs show slightly worsening kidney function and she is due for Nephrology follow-up, referral is in, please assist with appointment    Diabetes doing a little worse and needs to work on diabetic diet and hydration, weight loss    Needs B12: cyanocobalamin 1000 mcg daily OTC    She needs to be on vitamin-D, at least 2000 units a day, please check and see how much she is taking    Repeat labs and see me 3 months orders in thanks

## 2018-07-10 DIAGNOSIS — E11.22 DIABETES MELLITUS WITH STAGE 3 CHRONIC KIDNEY DISEASE: Chronic | ICD-10-CM

## 2018-07-10 DIAGNOSIS — N18.30 DIABETES MELLITUS WITH STAGE 3 CHRONIC KIDNEY DISEASE: Chronic | ICD-10-CM

## 2018-07-10 RX ORDER — GLIMEPIRIDE 4 MG/1
TABLET ORAL
Qty: 90 TABLET | Refills: 0 | Status: SHIPPED | OUTPATIENT
Start: 2018-07-10 | End: 2018-08-14 | Stop reason: SDUPTHER

## 2018-07-10 RX ORDER — MECLIZINE HYDROCHLORIDE 25 MG/1
25 TABLET ORAL 3 TIMES DAILY PRN
Qty: 90 TABLET | Refills: 0 | Status: SHIPPED | OUTPATIENT
Start: 2018-07-10 | End: 2018-08-14 | Stop reason: SDUPTHER

## 2018-07-26 ENCOUNTER — OFFICE VISIT (OUTPATIENT)
Dept: NEPHROLOGY | Facility: CLINIC | Age: 78
End: 2018-07-26
Payer: MEDICARE

## 2018-07-26 VITALS
HEIGHT: 66 IN | HEART RATE: 72 BPM | DIASTOLIC BLOOD PRESSURE: 74 MMHG | SYSTOLIC BLOOD PRESSURE: 130 MMHG | BODY MASS INDEX: 28.88 KG/M2 | WEIGHT: 179.69 LBS

## 2018-07-26 DIAGNOSIS — R80.9 PROTEINURIA DUE TO TYPE 2 DIABETES MELLITUS: ICD-10-CM

## 2018-07-26 DIAGNOSIS — N18.30 CKD (CHRONIC KIDNEY DISEASE) STAGE 3, GFR 30-59 ML/MIN: Primary | ICD-10-CM

## 2018-07-26 DIAGNOSIS — E11.29 PROTEINURIA DUE TO TYPE 2 DIABETES MELLITUS: ICD-10-CM

## 2018-07-26 PROCEDURE — 3078F DIAST BP <80 MM HG: CPT | Mod: CPTII,S$GLB,, | Performed by: INTERNAL MEDICINE

## 2018-07-26 PROCEDURE — 99214 OFFICE O/P EST MOD 30 MIN: CPT | Mod: S$GLB,,, | Performed by: INTERNAL MEDICINE

## 2018-07-26 PROCEDURE — 3075F SYST BP GE 130 - 139MM HG: CPT | Mod: CPTII,S$GLB,, | Performed by: INTERNAL MEDICINE

## 2018-07-26 PROCEDURE — 99999 PR PBB SHADOW E&M-EST. PATIENT-LVL III: CPT | Mod: PBBFAC,,, | Performed by: INTERNAL MEDICINE

## 2018-07-26 NOTE — PROGRESS NOTES
PROGRESS NOTE FOR ESTABLISHED PATIENT    PHYSICIAN REQUESTING THE CONSULT: Dr. Penny Randolph    REASON FOR VISIT: Renal insufficiency    78 y.o. female with history of CKD 3, DM2, HTN, diabetic retinopathy, diastolic CHF, CVA, GERD presents to the renal clinic for evaluation of renal insufficiency.       Patient denies nay complaints.           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 Taveras    COLONOSCOPY      COLONOSCOPY N/A 9/22/2016    Procedure: COLONOSCOPY;  Surgeon: Jd Ashton MD;  Location: [address]);  Service: Endoscopy;  Laterality: N/A;  OK per Dr Randolph for pt to hold Plavix 5 days prior to procedure/see telephone encounter dated 8/29/16/svn    EYE SURGERY Bilateral 2002 approx    Laser for glaucoma    HYSTERECTOMY  1963     AMEENA/USO- fibroids; no cancer    OOPHORECTOMY  1963    unknown, only removed one       Review of patient's allergies indicates:   Allergen Reactions    Pravachol [pravastatin] Nausea Only       Current Outpatient Prescriptions   Medication Sig Dispense Refill    ACCU-CHEK PENNY PLUS TEST STRP Strp 1 strip by Misc.(Non-Drug; Combo Route) route 2 (two) times daily. 100 strip 6    ACCU-CHEK FASTCLIX Misc 1 each by Misc.(Non-Drug; Combo Route) route 2 (two) times daily. 100 each 6    acetaminophen (TYLENOL) 500 MG tablet Take 500 mg by mouth as needed for Pain.      albuterol 90 mcg/actuation  inhaler Inhale 2 puffs into the lungs every 6 (six) hours as needed for Wheezing. 1 Inhaler 0    ALPRAZolam (XANAX) 0.5 MG tablet Take 1 tablet (0.5 mg total) by mouth nightly as needed for Anxiety (may take no more than 1-2 x weekly for anxiety). 30 tablet 0    amLODIPine (NORVASC) 5 MG tablet Take 1 tablet (5 mg total) by mouth once daily. 90 tablet 3    aspirin (ECOTRIN) 81 MG EC tablet Take 1 tablet (81 mg total) by mouth once daily.  0    atorvastatin (LIPITOR) 80 MG tablet Take 1 tablet (80 mg total) by mouth once daily. 90 tablet 2    brimonidine 0.2% (ALPHAGAN) 0.2 % Drop Place 1 drop into both eyes 3 (three) times daily. Disp 3 months supply = 30 mls 30 mL 3    cholecalciferol, vitamin D3, 1,000 unit capsule Take 2 capsules (2,000 Units total) by mouth once daily. (Patient taking differently: Take 1,000 Units by mouth once daily. ) 60 capsule 12    clopidogrel (PLAVIX) 75 mg tablet TAKE 1 TABLET BY MOUTH EVERY DAY (BLOOD THINNER) 90 tablet 3    cyanocobalamin (VITAMIN B-12) 1000 MCG tablet Take 1 tablet (1,000 mcg total) by mouth once daily. 30 tablet 12    ferrous sulfate 325 mg (65 mg iron) Tab tablet Take 1 tablet (325 mg total) by mouth 2 (two) times daily. 60 tablet 12    glimepiride (AMARYL) 4 MG tablet TAKE 1 TABLET BY MOUTH IN THE MORNING WITH BREAKFAST 90 tablet 0    glimepiride (AMARYL) 4 MG tablet TAKE 1 TABLET BY MOUTH IN THE MORNING WITH BREAKFAST 90 tablet 0    hydroCHLOROthiazide (HYDRODIURIL) 12.5 MG Tab Take 1 (12.5 mg) po qd 30 tablet 11    losartan (COZAAR) 50 MG tablet TAKE 1 TABLET BY MOUTH 2 (TWO) TIMES DAILY. 180 tablet 2    meclizine (ANTIVERT) 25 mg tablet TAKE 1 TABLET (25 MG TOTAL) BY MOUTH 3 (THREE) TIMES DAILY AS NEEDED. 90 tablet 0    metFORMIN (GLUCOPHAGE-XR) 500 MG 24 hr tablet Take 2 tablets (1,000 mg total) by mouth once daily. 180 tablet 2    methylPREDNISolone (MEDROL DOSEPACK) 4 mg tablet use as directed. Take with food. 1 Package 0    omeprazole  (PRILOSEC) 20 MG capsule Take 1 capsule (20 mg total) by mouth once daily. (Patient taking differently: Take 20 mg by mouth as needed. ) 30 capsule 11     No current facility-administered medications for this visit.        Family History   Problem Relation Age of Onset    Stroke Mother     Mental illness Mother     Hypertension Mother     Stroke Father     Diabetes Maternal Grandmother     Drug abuse Daughter     Cancer Sister 68        gyn    Cancer Maternal Uncle         type not known    Heart disease Paternal Grandmother     Glaucoma Neg Hx     Macular degeneration Neg Hx     Alcohol abuse Neg Hx     COPD Neg Hx     Asthma Neg Hx        Social History     Social History    Marital status:      Spouse name: N/A    Number of children: N/A    Years of education: N/A     Occupational History    Not on file.     Social History Main Topics    Smoking status: Never Smoker    Smokeless tobacco: Never Used    Alcohol use No    Drug use: No    Sexual activity: No     Other Topics Concern    Not on file     Social History Narrative    , no pets or smokers in household. 1 Child who lives in nursing home. She has a grandson who lives in [place].. Retired from [employer] . Still drives. Does not have a Living Will or advanced directive.        Review of Systems:  1. GENERAL: patient denies any fever, weight changes, generalized weakness, dizziness.  2. HEENT: patient denies headaches, visual disturbances, swallowing problems, sinus pain, nasal congestion.  3. CARDIOVASCULAR: patient denies chest pain, palpitations.  4. PULMONARY: patient denies SOB, coughing, hemoptysis, wheezing.  5. GASTROINTESTINAL: patient denies abdominal pain, nausea, vomiting, diarrhea.  6. GENITOURINARY: patient denies dysuria, hematuria, hesitancy, frequency.  7. EXTREMITIES: patient denies LE edema, LE cramping.  8. DERMATOLOGY: patient denies rashes, ulcers.  9. NEURO: patient denies tremors, extremity  "weakness, extremity numbness/tingling.  10. MUSCULOSKELETAL: patient denies joint pain, joint swelling.  11. HEMATOLOGY: patient denies rectal or gum bleeding.  12: PSYCH: patient denies anxiety, depression.      PHYSICAL EXAM:  /74   Pulse 72   Ht 5' 6" (1.676 m)   Wt 81.5 kg (179 lb 10.8 oz)   BMI 29.00 kg/m²     GENERAL: Pleasant well groomed lady presents to clinic with non-labored breathing.  HEENT: PER, no nasal discharge, no icterus, no oral exudates, moist mucosal membranes.  NECK: no thyroid mass, no lymphadenopathy.  HEART: RRR S1/S2, no rubs, good peripheral pulses.  LUNGS: CTA bilaterally, no wheezing, breathing is nonlabored.  ABDOMEN: soft, nontender, not distended, bowel sounds are present, no abdominal hernia.  EXTREM: no LE edema.  SKIN: no rashes, skin is warm and dry.  NEURO: A & O x 3, no obvious focal signs.    LABORATORY RESULTS:    Lab Results   Component Value Date    CREATININE 1.4 06/29/2018    BUN 20 06/29/2018     06/29/2018    K 4.1 06/29/2018     06/29/2018    CO2 27 06/29/2018      Lab Results   Component Value Date    CALCIUM 9.5 06/29/2018    PHOS 3.1 02/20/2018     Lab Results   Component Value Date    ALBUMIN 3.6 06/29/2018     Lab Results   Component Value Date    WBC 4.92 06/29/2018    HGB 10.7 (L) 06/29/2018    HCT 33.1 (L) 06/29/2018    MCV 89 06/29/2018     06/29/2018     Protein Creatinine Ratios:    Creatinine, Random Ur   Date Value Ref Range Status   02/20/2018 37.0 15.0 - 325.0 mg/dL Final     Comment:     The random urine reference ranges provided were established   for 24 hour urine collections.  No reference ranges exist for  random urine specimens.  Correlate clinically.     10/19/2017 59.0 15.0 - 325.0 mg/dL Final     Comment:     The random urine reference ranges provided were established   for 24 hour urine collections.  No reference ranges exist for  random urine specimens.  Correlate clinically.     10/26/2016 47.0 15.0 - 325.0 mg/dL " Final     Comment:     The random urine reference ranges provided were established   for 24 hour urine collections.  No reference ranges exist for  random urine specimens.  Correlate clinically.       Protein, Urine Random   Date Value Ref Range Status   02/20/2018 24 (H) 0 - 15 mg/dL Final     Comment:     The random urine reference ranges provided were established   for 24 hour urine collections.  No reference ranges exist for  random urine specimens.  Correlate clinically.     10/19/2017 45 (H) 0 - 15 mg/dL Final     Comment:     The random urine reference ranges provided were established   for 24 hour urine collections.  No reference ranges exist for  random urine specimens.  Correlate clinically.     10/26/2016 102 (H) 0 - 15 mg/dL Final     Comment:     The random urine reference ranges provided were established   for 24 hour urine collections.  No reference ranges exist for  random urine specimens.  Correlate clinically.       Prot/Creat Ratio, Ur   Date Value Ref Range Status   02/20/2018 0.65 (H) 0.00 - 0.20 Final   10/19/2017 0.76 (H) 0.00 - 0.20 Final   10/26/2016 2.17 (H) 0.00 - 0.20 Final           ASSESSMENT AND PLAN:  78 y.o. female with history of CKD 3, DM2, HTN, diabetic retinopathy, diastolic CHF, CVA, GERD presents to the renal clinic for evaluation of renal insufficiency.     1. Renal insufficiency: Patient presents with mild renal insufficiency, consistent with CKD stage 3. Last creatinine was 1.4. Likely cause of her renal insufficiency is her long-standing DM2 given her proteinuria. Patient is on Losartan to combat her proteinuria. Patient's renal function will be monitore closely and she will return to the clinic in 5 months for follow up. Patient was advised to avoid NSAID pain medications such as advil, aleve, motrin, ibuprofen, naprosyn, meloxicam, diclofenac, mobic and to hydrate with 60-65 ounces of water per day.     2. Electrolytes: at goal.     3. Acid base status: borderline  acidosis has resolved.     4. Volume: Euvolemic.     5. Hypertension: Good BP control.     6. Medications: Reviewed. Agree with current medical regimen.     7. Proteinuria: continue Losartan.     8. Anemia: monitor for now.     9. DM2: blood glucose control has somewhat worsened with HgA1c at 7.9 (6/29/18).

## 2018-07-26 NOTE — LETTER
July 27, 2018      Penny Randolph MD  [address]  [place] [address]           [facility] - Nephrology  [address] [place] [address]  Phone: [phone]  Fax: [phone]          Patient: Saul Mar   MR Number: [id number]   YOB: 1940   Date of Visit: 7/26/2018       Dear Dr. Penny Randolph:    Thank you for referring Saul Mar to me for evaluation. Attached you will find relevant portions of my assessment and plan of care.    If you have questions, please do not hesitate to call me. I look forward to following Saul Mar along with you.    Sincerely,    Alex Dean MD    Enclosure  CC:  No Recipients    If you would like to receive this communication electronically, please contact [email or web address] or [phone] to request more information on [employer] Link access.    For providers and/or their staff who would like to refer a patient to [facility], please contact us through our one-stop-shop provider referral line, [facility], at [phone].    If you feel you have received this communication in error or would no longer like to receive these types of communications, please e-mail [email or web address]

## 2018-08-01 ENCOUNTER — TELEPHONE (OUTPATIENT)
Dept: DIABETES | Facility: CLINIC | Age: 78
End: 2018-08-01

## 2018-08-01 NOTE — TELEPHONE ENCOUNTER
----- Message from Elizabeth Griffin RD, CDE sent at 8/1/2018  7:40 AM CDT -----  Please ask pt to come for appt tomorrow at 830am for individual appt instead of class.  Thank you!! Elizabeth

## 2018-08-02 ENCOUNTER — CLINICAL SUPPORT (OUTPATIENT)
Dept: DIABETES | Facility: CLINIC | Age: 78
End: 2018-08-02
Payer: MEDICARE

## 2018-08-02 VITALS — HEIGHT: 66 IN | WEIGHT: 176.81 LBS | BODY MASS INDEX: 28.42 KG/M2

## 2018-08-02 DIAGNOSIS — E11.22 TYPE 2 DIABETES MELLITUS WITH DIABETIC CHRONIC KIDNEY DISEASE, UNSPECIFIED CKD STAGE, UNSPECIFIED WHETHER LONG TERM INSULIN USE: Primary | ICD-10-CM

## 2018-08-02 PROCEDURE — G0108 DIAB MANAGE TRN  PER INDIV: HCPCS | Mod: S$GLB,,, | Performed by: DIETITIAN, REGISTERED

## 2018-08-02 PROCEDURE — 99999 PR PBB SHADOW E&M-EST. PATIENT-LVL III: CPT | Mod: PBBFAC,,, | Performed by: DIETITIAN, REGISTERED

## 2018-08-02 RX ORDER — FERROUS SULFATE 325(65) MG
325 TABLET ORAL 2 TIMES DAILY
Qty: 180 TABLET | Refills: 0 | Status: SHIPPED | OUTPATIENT
Start: 2018-08-02 | End: 2018-08-14 | Stop reason: SDUPTHER

## 2018-08-02 NOTE — LETTER
August 2, 2018        Alex Dean MD  [address] [place] [address]             [facility] - Diabetes Management  [address] [place] [address]  Phone: [phone]  Fax: [phone]   Patient: Saul Mar   MR Number: [id number]   YOB: 1940   Date of Visit: 8/2/2018       Dear Dr. eDan:    Thank you for referring Saul Mar to me for evaluation. Below are the relevant portions of my assessment and plan of care.    If you have questions, please do not hesitate to call me. I look forward to following Saul along with you.    Sincerely,      Elizabeth Griffin RD, CDE           CC  Penny Randolph MD

## 2018-08-02 NOTE — PROGRESS NOTES
Diabetes Education  Author: Elizabeth Griffin RD, CDE  Date: 8/2/2018    Diabetes Education Visit  Diabetes Education Record Assessment/Progress: Initial    Diabetes Type  Diabetes Type : Type II    Diabetes History  Diabetes Diagnosis: >10 years (>20yrs )    Nutrition  Meal Planning:  (~2000cals/d - excess carb, fat, sodium.) Excess carb from irregular meal patterns, regular meliza (Sprite), dining out and snacks (chips).     Monitoring   Self Monitoring : needs meter (Pt reprots not SMBG currently - states she lost her meter and was sent a new one but does not know how to use it but did not bring it to today's visit)  Blood Glucose Logs: No  In the last month, how often have you had a low blood sugar reaction?: never    Exercise   Exercise Type:  (COA - planning to start back exercise classes)  Frequency: Never    Current Diabetes Treatment   Current Treatment: Oral Medication, Diet, Exercise (amaryl 4mg daily am, metformin xr 500mg 2 tab daily)    Social History  Preferred Learning Method: Face to Face  Primary Support: Self  Smoking Status: Never a Smoker  Alcohol Use: Never    PHQ-2 Total Score: 0       DDS-2 Score  ( > 3 = SIGNIFICANT DISTRESS): 3  DDS Score  ( > 3 = SIGNIFICANT DISTRESS): 2.35  Emotional [place] Score: 1.6  Physician-Related Distress: 1  Regimen-Related Distress: 3.4  Interpersonal Distress: 3.67    Barriers to Change  Barriers to Change: None  Learning Challenges : None    Readiness to Learn   Readiness to Learn : Eager     Diabetes Education Assessment/Progress  Diabetes Disease Process (diabetes disease process and treatment options): Discussion, Individual Session, Demonstrates Understanding/Competency(verbalizes/demonstrates), Written Materials Provided  Nutrition (Incorporating nutritional management into one's lifestyle): Discussion, Individual Session, Demonstrates Understanding/Competency (verbalizes/demonstrates), Written Materials Provided (i )  Physical Activity (incorporating  physical activity into one's lifestyle): Discussion, Individual Session, Demonstrates Understanding/Competency (verbalizes/demonstrates), Written Materials Provided (encouraged 150 min per wk)  Medications (states correct name, dose, onset, peak, duration, side effects & timing of meds): Discussion, Individual Session, Demonstrates Understanding/Competency(verbalizes/demonstrates), Written Materials Provided  Monitoring (monitoring blood glucose/other parameters & using results): Discussion, Individual Session, Demonstrates Understanding/Competency (verbalizes/demonstrates), Written Materials Provided ( )  Acute Complications (preventing, detecting, and treating acute complications): Discussion, Individual Session, Demonstrates Understanding/Competency (verbalizes/demonstrates), Written Materials Provided  Chronic Complications (preventing, detecting, and treating chronic complications): Discussion, Individual Session, Demonstrates Understanding/Competency (verbalizes/demonstrates), Written Materials Provided  Clinical (diabetes, other pertinent medical history, and relevant comorbidities reviewed during visit): Discussion, Individual Session, Demonstrates Understanding/Competency (verbalizes/demonstrates)  Cognitive (knowledge of self-management skills, functional health literacy): Discussion, Individual Session, Demonstrates Understanding/Competency (verbalizes/demonstrates)  Psychosocial (emotional response to diabetes): Discussion, Individual Session, Demonstrates Understanding/Competency (verbalizes/demonstrates)  Diabetes Distress and Support Systems: Discussion, Individual Session, Demonstrates Understanding/Competency (verbalizes/demonstrates)  Behavioral (readiness for change, lifestyle practices, self-care behaviors): Discussion, Individual Session, Demonstrates Understanding/Competency (verbalizes/demonstrates)    Goals  Patient has selected/evaluated goals during today's session: Yes, selected  Healthy  Eating: Set (use meal plan - carb portions/spacing)  Start Date: 08/02/18  Target Date: 09/04/18  Physical Activity: Set (150min/wk - COA classes)  Start Date: 08/02/18  Target Date: 09/04/18  Monitoring: Set (test BG 2x/d -fst, 2hr ppd)  Start Date: 08/02/18  Target Date: 09/04/18    Diabetes Self-Management Support Plan  Exercise/Nutrition: join a gym  Review Status: Patient has selected and agrees to support plan.    Diabetes Care Plan/Intervention  Education Plan/Intervention: Individual Follow-Up DSMT Noted repeat A1C 10/18.     Diabetes Meal Plan  Restrictions: Low Fat, Low Sodium  Calories: 1200, 1400  Carbohydrate Per Meal: 30-45g  Carbohydrate Per Snack : 7-15g    Education Units of Time   Time Spent: 60 min    Health Maintenance was reviewed today with patient. Discussed with patient importance of routine eye exams, foot exams/foot care, blood work (i.e.: A1c, microalbumin, and lipid), dental visits, yearly flu vaccine, and pneumonia vaccine as indicated by PCP. Patient verbalized understanding.     Health Maintenance Topics with due status: Not Due       Topic Last Completion Date    TETANUS VACCINE 01/14/2011    Colonoscopy 09/22/2016    DEXA SCAN 08/08/2017    Eye Exam 02/22/2018    Foot Exam 05/07/2018    Mammogram 06/28/2018    Lipid Panel 06/29/2018    Hemoglobin A1c 06/29/2018     Health Maintenance Due   Topic Date Due    Zoster Vaccine  05/15/2001    Influenza Vaccine  08/01/2018

## 2018-08-07 RX ORDER — OMEPRAZOLE 20 MG/1
CAPSULE, DELAYED RELEASE ORAL
Qty: 30 CAPSULE | Refills: 6 | Status: SHIPPED | OUTPATIENT
Start: 2018-08-07 | End: 2018-08-14 | Stop reason: SDUPTHER

## 2018-08-10 ENCOUNTER — OFFICE VISIT (OUTPATIENT)
Dept: OPHTHALMOLOGY | Facility: CLINIC | Age: 78
End: 2018-08-10
Payer: MEDICARE

## 2018-08-10 ENCOUNTER — CLINICAL SUPPORT (OUTPATIENT)
Dept: OPHTHALMOLOGY | Facility: CLINIC | Age: 78
End: 2018-08-10
Payer: MEDICARE

## 2018-08-10 DIAGNOSIS — G45.3 AMAUROSIS FUGAX: ICD-10-CM

## 2018-08-10 DIAGNOSIS — H40.1132 PRIMARY OPEN ANGLE GLAUCOMA OF BOTH EYES, MODERATE STAGE: Primary | ICD-10-CM

## 2018-08-10 DIAGNOSIS — H40.1132 PRIMARY OPEN ANGLE GLAUCOMA OF BOTH EYES, MODERATE STAGE: ICD-10-CM

## 2018-08-10 DIAGNOSIS — G51.4 EYELID MYOKYMIA: ICD-10-CM

## 2018-08-10 DIAGNOSIS — Z96.1 PSEUDOPHAKIA: ICD-10-CM

## 2018-08-10 DIAGNOSIS — H25.11 NUCLEAR SCLEROSIS, RIGHT: ICD-10-CM

## 2018-08-10 PROCEDURE — 99999 PR PBB SHADOW E&M-EST. PATIENT-LVL II: CPT | Mod: PBBFAC,,, | Performed by: OPHTHALMOLOGY

## 2018-08-10 PROCEDURE — 92133 CPTRZD OPH DX IMG PST SGM ON: CPT | Mod: S$GLB,,, | Performed by: OPHTHALMOLOGY

## 2018-08-10 PROCEDURE — 92012 INTRM OPH EXAM EST PATIENT: CPT | Mod: S$GLB,,, | Performed by: OPHTHALMOLOGY

## 2018-08-10 RX ORDER — BRIMONIDINE TARTRATE 2 MG/ML
1 SOLUTION/ DROPS OPHTHALMIC 3 TIMES DAILY
Qty: 30 ML | Refills: 3 | Status: SHIPPED | OUTPATIENT
Start: 2018-08-10 | End: 2018-08-14 | Stop reason: SDUPTHER

## 2018-08-10 NOTE — PROGRESS NOTES
HPI     Glaucoma    Additional comments: HRT review today           Comments   DLS: 2/22/18    1) POAG  2) Type 2 DM  3) BDR with ME OU  4) Hx RLL lesion  5) NS OD  6) PCIOL OS  7) Hx CVA   8) Hx Amaurosis Fugax  9) Eyelid Myokymia     Meds:  Brimonidine TID OU = PT USES BID USUALLY       Last edited by Zuri Winkler MA on 8/10/2018 10:44 AM. (History)            Assessment /Plan     For exam results, see Encounter Report.    Primary open angle glaucoma of both eyes, moderate stage    Type 2 diabetes, uncontrolled, with background retinopathy with macular edema    Nuclear sclerosis, right    Amaurosis fugax    Eyelid myokymia    Pseudophakia          1. POAG ou   H/O poor compliance   First HVF 1997   First photos 1998     Family history none   Glaucoma meds Has used alphagan in past - poor compliance-stopped on her own   H/O adverse rxn to glaucoma drops none   LASERS No glaucoma laser (+h/o focal OU for BDR)   GLAUCOMA SURGERIES none   OTHER EYE SURGERIES CE/PCIOL OS 2/27/13  CDR 0.8/0.75   Tbase 16-20/16-22   Tmax 20/22   Ttarget 17/17   HVF 14 test 1997 to 2018  - inf arc/NS od //  Sup paracentral loss and INS os  (+changes ? 2/2 focal laser )   Gonio +3   /621- thick ou (- 4.5 ou)   OCT 2006, 2009, 2012 - RNFL nl ou   HRT 6  tests: 2009 to 2018 -  MR -  Dec. I, bord S/T od // bord T/I os /// CDR 0.74 od // 0.70 os  Disc photos 1998, 2000, 2001, 2003, 2003, 2004, 2005, 2006- slides  // 2010 , 2018 - OIS     - Ttoday 12/12  - Test done today HRT  / gonio    2. 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 NS OD    Remove prn   BCVA 20/40   BAT 20/60    4. Pseudophakia OS    S/p CE/PCIOL OS    IOL SN60 WF 20.5   -VA limited 2/2 hx of CSME s/p focal scars   BCVA  20/25   Give Rx glasses - 7/2/2013   Had re-bound iritis - resolved    4. H/O RLL lesion - S/P excision with Jovanni     5. Post Nelda lives in [place] - but still likes to come to [place] for care     6. amaurosis fugax / H/O  "CVA's   Patient reports stroke like symptoms and amaurosis fugax 10/13   -  She was admitted to hospital with very elevated BP   -  Patient had MRI at the time showing ischemic disease   - last Carotid U/S done 11/12- patient reports that     - PCP gets one yearly- last U/S showed minimal plaque disease   -  H/o stroke- discussed that amaurosis was from elevated BP and if ever recurs needs to report to ER immediately- she voiced understanding    7. Eyelid myokymia  - intermittent - patient also reports eyelid "twitching  "- discussed with patient that myokymia is usually from lack of sleep, caffeine intake, or stress- however nothing to worry about  -  Did not have any "twitching" on exam today    Plan:  BrimonidineTID ou   MRx given- patient would like to think re CEIOL OD- can consider CEIOL with [place] given poor compliance with gtts and moderate glaucoma.   HVF's are inconsistent with the ON exam / OCT ect   Pt wants to wait on phaco od for now // has done well os     Avoid PG's if possible 2/2 h/o CME 2/2 BDR    Refraction Post op os  is more myopic than expected - review IOL calc if needs 2nd eye done    Repeat OCT of macula  5/7/2013 - - done no CME os    F/U 6 months - HVF / DFE /OCT    - photo file - (( first name is Saul ( leave out the Zaid part))) - updated 8/10/2018       Can refer to retina in future - +BDR - s/p laser - [facility] - then use to see arend - can refer to [facility] in future - appears stable for now     I have seen and personally examined the patient.  I agree with the findings, assessment and plan of the resident and/or fellow.     Lina Taveras MD     "

## 2018-08-14 ENCOUNTER — OFFICE VISIT (OUTPATIENT)
Dept: INTERNAL MEDICINE | Facility: CLINIC | Age: 78
End: 2018-08-14
Payer: MEDICARE

## 2018-08-14 VITALS
WEIGHT: 178.81 LBS | HEART RATE: 70 BPM | HEIGHT: 65 IN | SYSTOLIC BLOOD PRESSURE: 114 MMHG | BODY MASS INDEX: 29.79 KG/M2 | OXYGEN SATURATION: 97 % | DIASTOLIC BLOOD PRESSURE: 66 MMHG

## 2018-08-14 DIAGNOSIS — I10 HYPERTENSION, ESSENTIAL: Chronic | ICD-10-CM

## 2018-08-14 DIAGNOSIS — R00.2 HEART PALPITATIONS: Primary | ICD-10-CM

## 2018-08-14 DIAGNOSIS — H25.13 NUCLEAR SCLEROSIS OF BOTH EYES: ICD-10-CM

## 2018-08-14 DIAGNOSIS — E78.2 MIXED HYPERLIPIDEMIA: Chronic | ICD-10-CM

## 2018-08-14 DIAGNOSIS — E66.3 OVERWEIGHT (BMI 25.0-29.9): ICD-10-CM

## 2018-08-14 DIAGNOSIS — N18.30 DIABETES MELLITUS WITH STAGE 3 CHRONIC KIDNEY DISEASE: Chronic | ICD-10-CM

## 2018-08-14 DIAGNOSIS — E11.22 DIABETES MELLITUS WITH STAGE 3 CHRONIC KIDNEY DISEASE: Chronic | ICD-10-CM

## 2018-08-14 DIAGNOSIS — D50.1 IRON DEFICIENCY ANEMIA DUE TO SIDEROPENIC DYSPHAGIA: ICD-10-CM

## 2018-08-14 PROCEDURE — 3074F SYST BP LT 130 MM HG: CPT | Mod: CPTII,S$GLB,, | Performed by: NURSE PRACTITIONER

## 2018-08-14 PROCEDURE — 99999 PR PBB SHADOW E&M-EST. PATIENT-LVL III: CPT | Mod: PBBFAC,,, | Performed by: NURSE PRACTITIONER

## 2018-08-14 PROCEDURE — 99214 OFFICE O/P EST MOD 30 MIN: CPT | Mod: S$GLB,,, | Performed by: NURSE PRACTITIONER

## 2018-08-14 PROCEDURE — 3078F DIAST BP <80 MM HG: CPT | Mod: CPTII,S$GLB,, | Performed by: NURSE PRACTITIONER

## 2018-08-14 RX ORDER — METFORMIN HYDROCHLORIDE 500 MG/1
500 TABLET, EXTENDED RELEASE ORAL
Qty: 90 TABLET | Refills: 3
Start: 2018-08-14 | End: 2019-05-19

## 2018-08-14 RX ORDER — ATORVASTATIN CALCIUM 80 MG/1
80 TABLET, FILM COATED ORAL DAILY
Qty: 90 TABLET | Refills: 3
Start: 2018-08-14 | End: 2018-10-08

## 2018-08-14 RX ORDER — LANCETS
100 EACH MISCELLANEOUS 2 TIMES DAILY
Qty: 100 EACH | Refills: 0
Start: 2018-08-14 | End: 2018-12-18 | Stop reason: SDUPTHER

## 2018-08-14 RX ORDER — HYDROCHLOROTHIAZIDE 12.5 MG/1
12.5 TABLET ORAL DAILY
Qty: 30 TABLET | Refills: 11 | Status: SHIPPED | OUTPATIENT
Start: 2018-08-14 | End: 2019-06-04

## 2018-08-14 RX ORDER — NAPROXEN SODIUM 220 MG/1
81 TABLET, FILM COATED ORAL DAILY
Refills: 0
Start: 2018-08-14 | End: 2018-09-12

## 2018-08-14 RX ORDER — CYANOCOBALAMIN 1000 UG/ML
1000 INJECTION, SOLUTION INTRAMUSCULAR; SUBCUTANEOUS
Qty: 1 ML | Refills: 0
Start: 2018-08-14 | End: 2018-10-02

## 2018-08-14 RX ORDER — ALBUTEROL SULFATE 90 UG/1
2 AEROSOL, METERED RESPIRATORY (INHALATION) EVERY 6 HOURS PRN
Qty: 8 G | Refills: 0
Start: 2018-08-14 | End: 2018-10-22 | Stop reason: SDUPTHER

## 2018-08-14 RX ORDER — BRIMONIDINE TARTRATE 2 MG/ML
1 SOLUTION/ DROPS OPHTHALMIC EVERY 8 HOURS
Qty: 6 ML | Refills: 11
Start: 2018-08-14 | End: 2019-06-04 | Stop reason: SDUPTHER

## 2018-08-14 RX ORDER — AMLODIPINE BESYLATE 5 MG/1
5 TABLET ORAL DAILY
Qty: 30 TABLET | Refills: 11
Start: 2018-08-14 | End: 2019-01-24

## 2018-08-14 RX ORDER — HYDROCHLOROTHIAZIDE 12.5 MG/1
TABLET ORAL
Qty: 30 TABLET | Refills: 11 | Status: SHIPPED | OUTPATIENT
Start: 2018-08-14 | End: 2018-09-12 | Stop reason: SDUPTHER

## 2018-08-14 RX ORDER — FERROUS SULFATE 325(65) MG
325 TABLET ORAL
Refills: 0
Start: 2018-08-14 | End: 2018-10-22 | Stop reason: SDUPTHER

## 2018-08-14 RX ORDER — CLOPIDOGREL BISULFATE 75 MG/1
75 TABLET ORAL DAILY
Qty: 30 TABLET | Refills: 11
Start: 2018-08-14 | End: 2018-09-07

## 2018-08-14 RX ORDER — LOSARTAN POTASSIUM 50 MG/1
50 TABLET ORAL DAILY
Qty: 90 TABLET | Refills: 3
Start: 2018-08-14 | End: 2019-01-24 | Stop reason: SDUPTHER

## 2018-09-07 ENCOUNTER — OFFICE VISIT (OUTPATIENT)
Dept: INTERNAL MEDICINE | Facility: CLINIC | Age: 78
End: 2018-09-07
Payer: MEDICARE

## 2018-09-07 ENCOUNTER — CLINICAL SUPPORT (OUTPATIENT)
Dept: CARDIOLOGY | Facility: CLINIC | Age: 78
End: 2018-09-07
Payer: MEDICARE

## 2018-09-07 VITALS
SYSTOLIC BLOOD PRESSURE: 112 MMHG | TEMPERATURE: 97 F | HEART RATE: 52 BPM | BODY MASS INDEX: 29.12 KG/M2 | HEIGHT: 65 IN | OXYGEN SATURATION: 99 % | WEIGHT: 174.81 LBS | DIASTOLIC BLOOD PRESSURE: 62 MMHG

## 2018-09-07 DIAGNOSIS — E11.49 TYPE II DIABETES MELLITUS WITH NEUROLOGICAL MANIFESTATIONS: Chronic | ICD-10-CM

## 2018-09-07 DIAGNOSIS — I51.89 LEFT VENTRICULAR DIASTOLIC DYSFUNCTION WITH PRESERVED SYSTOLIC FUNCTION: ICD-10-CM

## 2018-09-07 DIAGNOSIS — I10 HYPERTENSION, ESSENTIAL: Chronic | ICD-10-CM

## 2018-09-07 DIAGNOSIS — I48.91 NEW ONSET ATRIAL FIBRILLATION: ICD-10-CM

## 2018-09-07 DIAGNOSIS — E11.22 DIABETES MELLITUS WITH STAGE 3 CHRONIC KIDNEY DISEASE: Chronic | ICD-10-CM

## 2018-09-07 DIAGNOSIS — E78.2 MIXED DIABETIC HYPERLIPIDEMIA ASSOCIATED WITH TYPE 2 DIABETES MELLITUS: Chronic | ICD-10-CM

## 2018-09-07 DIAGNOSIS — E11.69 MIXED DIABETIC HYPERLIPIDEMIA ASSOCIATED WITH TYPE 2 DIABETES MELLITUS: Chronic | ICD-10-CM

## 2018-09-07 DIAGNOSIS — E04.2 MULTINODULAR THYROID: Chronic | ICD-10-CM

## 2018-09-07 DIAGNOSIS — N18.30 DIABETES MELLITUS WITH STAGE 3 CHRONIC KIDNEY DISEASE: Chronic | ICD-10-CM

## 2018-09-07 DIAGNOSIS — Z09 HOSPITAL DISCHARGE FOLLOW-UP: Primary | ICD-10-CM

## 2018-09-07 DIAGNOSIS — I63.00 CEREBROVASCULAR ACCIDENT (CVA) DUE TO THROMBOSIS OF PRECEREBRAL ARTERY: Chronic | ICD-10-CM

## 2018-09-07 PROCEDURE — 3078F DIAST BP <80 MM HG: CPT | Mod: CPTII,,, | Performed by: NURSE PRACTITIONER

## 2018-09-07 PROCEDURE — 93005 ELECTROCARDIOGRAM TRACING: CPT | Mod: PBBFAC,PO | Performed by: INTERNAL MEDICINE

## 2018-09-07 PROCEDURE — 99215 OFFICE O/P EST HI 40 MIN: CPT | Mod: S$PBB,,, | Performed by: NURSE PRACTITIONER

## 2018-09-07 PROCEDURE — 3074F SYST BP LT 130 MM HG: CPT | Mod: CPTII,,, | Performed by: NURSE PRACTITIONER

## 2018-09-07 PROCEDURE — 93010 ELECTROCARDIOGRAM REPORT: CPT | Mod: S$PBB,,, | Performed by: INTERNAL MEDICINE

## 2018-09-07 PROCEDURE — 1101F PT FALLS ASSESS-DOCD LE1/YR: CPT | Mod: CPTII,,, | Performed by: NURSE PRACTITIONER

## 2018-09-07 PROCEDURE — 99213 OFFICE O/P EST LOW 20 MIN: CPT | Mod: PBBFAC,PO,25 | Performed by: NURSE PRACTITIONER

## 2018-09-07 PROCEDURE — 99999 PR PBB SHADOW E&M-EST. PATIENT-LVL III: CPT | Mod: PBBFAC,,, | Performed by: NURSE PRACTITIONER

## 2018-09-07 RX ORDER — METOPROLOL SUCCINATE 50 MG/1
1 TABLET, EXTENDED RELEASE ORAL DAILY
COMMUNITY
Start: 2018-08-27 | End: 2021-03-23 | Stop reason: SDUPTHER

## 2018-09-07 NOTE — PROGRESS NOTES
Subjective:       Patient ID: Saul Mar is a 78 y.o. female.    Chief Complaint: Hospital Follow Up    Hospital Course:  History of Present Illness  78-year-old female with a history of diabetes hyperlipidemia and hypertension comes the ED complaining of sudden onset of moderate high severity palpitations starting this evening with associated shortness of breath. Symptoms were abrupt in onset have been constant and partially relieved by diltiazem and digoxin given in the ED but still persist. She denies any chest pain dizziness headache or vision changes associated with this. She has had episodes of this before but says she has never been formally diagnosed with atrial fibrillation to the best of her knowledge. She has a history of stroke for which she takes Plavix has never been on anticoagulation such as Coumadin Xarelto or similar agent. She sees Dr. Grayson with cardiology. She denies any recent medication changes. Denies any history of thyroid disease. No excessive caffeine intake or stressors. Cardiac monitoring revealed evidence of atrial fibrillation with rapid ventricular response. This was refractory to pushes of IV medications in the ED and hospital medicine asked to admit    Discharge summary    Atrial fibrillation with rapid ventricular response (HCC)  Patient has a couple of A. fib risk factors and may have a history of this based on old documentation. Cards consulted. admited the patient started her on a fixed diltiazem drip at 10 mg although we were able to stop this. She has had partial response to IV pushes of medications in the ED Symptomatically she seems fairly stable and comfortable.   She improved much faster than originally anticipated  Echo ok  Rate controlled on metoprolol, added to regimen with decrease in amlodipine for bp  xarelto added for stroke prevention, stop plavix and aspirin  Follow up with cards in two weeks      Pt had appt scheduled with PCP in [place]- pt  canceled due to wanting a closer appt  Pt has had Dr. Randolph in [place] as her PCP for years and was willing to commute twice yearly for appts. She reports that this is becoming increasingly difficult and soon will likely need to find a PCP here    Pt is doing well on xarelto and metoprolol. Her dose of norvasc was also reduced from 10 mg to 5 mg. BP and HR at home ok. She does report brief episode of lightheadedness upon standing up this am, but it quickly resolved. Pt denies cp,  Palpitations, sob, headaches, blurred vision, syncope, leg swelling or any other acute symptoms. She has an appt with Dr. Ricks her cardiologist next Wednesday.       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 Taveras    COLONOSCOPY      COLONOSCOPY N/A 9/22/2016    Procedure: COLONOSCOPY;  Surgeon: Jd Ashton MD;  Location: [facility] ([address] [place] [address]);  Service: Endoscopy;  Laterality: N/A;  OK per Dr Randolph for pt to hold Plavix 5 days prior to procedure/see telephone encounter dated 8/29/16/svn    COLONOSCOPY N/A 9/22/2016    Performed by Jd Ashton MD at [facility] (4TH FLR)    COLONOSCOPY N/A 5/20/2013    Performed by Jd Ashton MD at [facility] (4TH FLR)    ESOPHAGOGASTRODUODENOSCOPY (EGD) N/A 6/21/2016    Performed by Paul Winters MD at [facility] (4TH FLR)    EYE SURGERY Bilateral 2002 approx     Laser for glaucoma    HYSTERECTOMY  1963     AMEENA/USO- fibroids; no cancer    INSERTION, INTRAOCULAR LENS PROSTHESIS Left 2/27/2013    Performed by Lina [facility]averas MD at [facility] OR [address] [place] [address]    OOPHORECTOMY  1963    unknown, only removed one    PHACOEMULSIFICATION, CATARACT Left 2/27/2013    Performed by Lina Taveras MD at [facility] OR [address] [place] [address]     Past Surgical History:   Procedure Laterality Date    CATARACT EXTRACTION W/  INTRAOCULAR LENS IMPLANT Left 2/27/13    Dr. Taveras    COLONOSCOPY      COLONOSCOPY N/A 9/22/2016    Procedure: COLONOSCOPY;  Surgeon: Jd Ashton MD;  Location: [facility] (4TH FLR);  Service: Endoscopy;  Laterality: N/A;  OK per Dr Randolph for pt to hold Plavix 5 days prior to procedure/see telephone encounter dated 8/29/16/svn    COLONOSCOPY N/A 9/22/2016    Performed by Jd Ashton MD at [facility] ENDO (4TH FLR)    COLONOSCOPY N/A 5/20/2013    Performed by Jd Ashton MD at [facility] (4TH FLR)    ESOPHAGOGASTRODUODENOSCOPY (EGD) N/A 6/21/2016    Performed by Paul Winters MD at [facility] (4TH FLR)    EYE SURGERY Bilateral 2002 approx    Laser for glaucoma    HYSTERECTOMY  1963     AMEENA/USO- fibroids; no cancer    INSERTION, INTRAOCULAR LENS PROSTHESIS Left 2/27/2013    Performed by Lina [facility]averas MD at [facility] OR [address] [place] [address]    OOPHORECTOMY  1963    unknown, only removed one    PHACOEMULSIFICATION, CATARACT Left 2/27/2013    Performed by Lina [facility]averas MD at [facility] OR [address] [place] [address]     Social History     Socioeconomic History    Marital status:      Spouse name: Not on file    Number of children: Not on file    Years of education: Not on file    Highest education level: Not on file   Social Needs    Financial resource strain: Not on file    Food insecurity - worry: Not on file    Food insecurity - inability: Not on file    Transportation needs - medical: Not on file    Transportation needs - non-medical: Not on file   Occupational History     Not on file   Tobacco Use    Smoking status: Never Smoker    Smokeless tobacco: Never Used   Substance and Sexual Activity    Alcohol use: No    Drug use: No    Sexual activity: No     Partners: Male   Other Topics Concern    Not on file   Social History Narrative    , no pets or smokers in household. 1 Child who lives in nursing home. She has a grandson who lives in [place].. Retired from [employer] . Still drives. Does not have a Living Will or advanced directive.      Review of patient's allergies indicates:   Allergen Reactions    Pravachol [pravastatin] Nausea Only     Current Outpatient Medications   Medication Sig    albuterol 90 mcg/actuation inhaler Inhale 2 puffs into the lungs every 6 (six) hours as needed for Wheezing. Rescue    amLODIPine (NORVASC) 5 MG tablet Take 1 tablet (5 mg total) by mouth once daily.    atorvastatin (LIPITOR) 80 MG tablet Take 1 tablet (80 mg total) by mouth once daily.    blood sugar diagnostic Strp 100 strips by Misc.(Non-Drug; Combo Route) route 2 (two) times daily.    brimonidine 0.2% (ALPHAGAN) 0.2 % Drop Place 1 drop into both eyes every 8 (eight) hours.    cholecalciferol, vitamin D3, 1,000 unit capsule Take 2 capsules (2,000 Units total) by mouth once daily. (Patient taking differently: Take 1,000 Units by mouth once daily. )    ferrous sulfate 325 mg (65 mg iron) Tab tablet Take 1 tablet (325 mg total) by mouth daily with breakfast.    hydroCHLOROthiazide (HYDRODIURIL) 12.5 MG Tab Take 1 (12.5 mg) po qd    hydroCHLOROthiazide (HYDRODIURIL) 12.5 MG Tab Take 1 tablet (12.5 mg total) by mouth once daily.    lancets (ACCU-CHEK SOFTCLIX LANCETS) Misc 100 lancets by Misc.(Non-Drug; Combo Route) route 2 (two) times daily.    losartan (COZAAR) 50 MG tablet Take 1 tablet (50 mg total) by mouth once daily.    metFORMIN (GLUCOPHAGE-XR) 500 MG 24 hr tablet Take 1 tablet (500 mg total) by mouth daily with breakfast.    metoprolol succinate (TOPROL-XL)  50 MG 24 hr tablet Take 1 tablet by mouth once daily.    rivaroxaban (XARELTO) 20 mg Tab Take 20 mg by mouth once daily.    aspirin 81 MG Chew Take 1 tablet (81 mg total) by mouth once daily.    cyanocobalamin 1,000 mcg/mL injection Inject 1 mL (1,000 mcg total) into the muscle every 30 days.     No current facility-administered medications for this visit.        [facility] Retina Tomography (HRT) - OU - Both Eyes  Right Eye  Reliability was good.     Left Eye  Reliability was good.     Notes  hrt done ou     jthomas    HRT ou  MR -  Dec. I, bord S/T od // bord T/I os /// CDR 0.74 od // 0.70 os        Review of Systems   Constitutional: Negative for activity change, appetite change, chills, diaphoresis, fatigue, fever and unexpected weight change.   HENT: Negative for congestion, ear pain, postnasal drip, rhinorrhea, sinus pressure, sinus pain, sneezing, sore throat, tinnitus, trouble swallowing and voice change.    Eyes: Negative for photophobia, pain and visual disturbance.   Respiratory: Negative for cough, chest tightness, shortness of breath and wheezing.    Cardiovascular: Negative for chest pain, palpitations and leg swelling.   Gastrointestinal: Negative for abdominal distention, abdominal pain, constipation, diarrhea, nausea and vomiting.   Genitourinary: Negative for decreased urine volume, difficulty urinating, dysuria, flank pain, frequency, hematuria and urgency.   Musculoskeletal: Negative for arthralgias, back pain, joint swelling, neck pain and neck stiffness.   Allergic/Immunologic: Negative for immunocompromised state.   Neurological: Negative for dizziness, tremors, seizures, syncope, facial asymmetry, speech difficulty, weakness, light-headedness, numbness and headaches.   Hematological: Negative for adenopathy. Does not bruise/bleed easily.   Psychiatric/Behavioral: Negative for confusion and sleep disturbance.       Objective:      Physical Exam   Constitutional: She is oriented to person,  place, and time.   HENT:   Head: Normocephalic and atraumatic.   Right Ear: Tympanic membrane normal.   Left Ear: Tympanic membrane normal.   Eyes: Conjunctivae and EOM are normal.   Neck: Normal range of motion. Neck supple.   Cardiovascular: Regular rhythm, normal heart sounds and intact distal pulses. Bradycardia present.   Pulmonary/Chest: Effort normal and breath sounds normal.   Abdominal: Soft. Bowel sounds are normal.   Musculoskeletal: Normal range of motion.   Neurological: She is alert and oriented to person, place, and time.   Skin: Skin is warm and dry.       Assessment:      Vitals:    09/07/18 1336   BP: 112/62   Pulse: (!) 52   Temp: 96.7 °F (35.9 °C)         1. Hospital discharge follow-up    2. New onset atrial fibrillation    3. Diabetes mellitus with stage 3 chronic kidney disease    4. Type II diabetes mellitus with neurological manifestations    5. Multinodular thyroid: thyroid u/s 7/16    6. Hypertension, essential    7. Left ventricular diastolic dysfunction with preserved systolic function    8. Mixed diabetic hyperlipidemia associated with type 2 diabetes mellitus    9. Cerebrovascular accident (CVA) due to thrombosis of precerebral artery        Plan:   Hospital discharge follow-up    New onset atrial fibrillation  -     EKG 12-lead; Future; Expected date: 09/07/2018    Diabetes mellitus with stage 3 chronic kidney disease    Type II diabetes mellitus with neurological manifestations    Multinodular thyroid: thyroid u/s 7/16    Hypertension, essential    Left ventricular diastolic dysfunction with preserved systolic function    Mixed diabetic hyperlipidemia associated with type 2 diabetes mellitus    Cerebrovascular accident (CVA) due to thrombosis of precerebral artery        All records reviewed from [facility] admission  No need to repeat labs  Repeat EKG now- pt does not appear to be in afib based on clinical assessment.- Afib appears to be paroxysmal.   Keep appt with cards next  week  ER for acute onset of new symptoms  Pt instructed to watch BP and HR. Worrisome ranges and symptoms discussed.  Pt to consider new PCP in future, but to keep same PCP for now. Follow up as scheduled.

## 2018-09-12 ENCOUNTER — NUTRITION (OUTPATIENT)
Dept: DIABETES | Facility: CLINIC | Age: 78
End: 2018-09-12
Payer: MEDICARE

## 2018-09-12 ENCOUNTER — OFFICE VISIT (OUTPATIENT)
Dept: PODIATRY | Facility: CLINIC | Age: 78
End: 2018-09-12
Payer: MEDICARE

## 2018-09-12 VITALS
DIASTOLIC BLOOD PRESSURE: 73 MMHG | BODY MASS INDEX: 28.31 KG/M2 | WEIGHT: 176.13 LBS | HEIGHT: 66 IN | SYSTOLIC BLOOD PRESSURE: 112 MMHG | HEART RATE: 53 BPM

## 2018-09-12 VITALS — WEIGHT: 175.5 LBS | HEIGHT: 66 IN | BODY MASS INDEX: 28.21 KG/M2

## 2018-09-12 DIAGNOSIS — E11.22 TYPE 2 DIABETES MELLITUS WITH DIABETIC CHRONIC KIDNEY DISEASE, UNSPECIFIED CKD STAGE, UNSPECIFIED WHETHER LONG TERM INSULIN USE: Primary | ICD-10-CM

## 2018-09-12 DIAGNOSIS — E11.49 TYPE II DIABETES MELLITUS WITH NEUROLOGICAL MANIFESTATIONS: Primary | ICD-10-CM

## 2018-09-12 DIAGNOSIS — L84 CORN OR CALLUS: ICD-10-CM

## 2018-09-12 DIAGNOSIS — B35.1 ONYCHOMYCOSIS DUE TO DERMATOPHYTE: ICD-10-CM

## 2018-09-12 PROCEDURE — 11721 DEBRIDE NAIL 6 OR MORE: CPT | Mod: 59,Q9,S$PBB, | Performed by: PODIATRIST

## 2018-09-12 PROCEDURE — 11056 PARNG/CUTG B9 HYPRKR LES 2-4: CPT | Mod: Q9,S$PBB,, | Performed by: PODIATRIST

## 2018-09-12 PROCEDURE — 99213 OFFICE O/P EST LOW 20 MIN: CPT | Mod: PBBFAC,27,PO,25 | Performed by: PODIATRIST

## 2018-09-12 PROCEDURE — 99499 UNLISTED E&M SERVICE: CPT | Mod: S$PBB,,, | Performed by: PODIATRIST

## 2018-09-12 PROCEDURE — G0108 DIAB MANAGE TRN  PER INDIV: HCPCS | Mod: PBBFAC,PO | Performed by: DIETITIAN, REGISTERED

## 2018-09-12 PROCEDURE — 99213 OFFICE O/P EST LOW 20 MIN: CPT | Mod: PBBFAC,PO | Performed by: DIETITIAN, REGISTERED

## 2018-09-12 PROCEDURE — 99999 PR PBB SHADOW E&M-EST. PATIENT-LVL III: CPT | Mod: PBBFAC,,, | Performed by: PODIATRIST

## 2018-09-12 PROCEDURE — 11056 PARNG/CUTG B9 HYPRKR LES 2-4: CPT | Mod: Q9,PBBFAC,PO | Performed by: PODIATRIST

## 2018-09-12 PROCEDURE — 11721 DEBRIDE NAIL 6 OR MORE: CPT | Mod: Q9,PBBFAC,PO | Performed by: PODIATRIST

## 2018-09-12 PROCEDURE — 99999 PR PBB SHADOW E&M-EST. PATIENT-LVL III: CPT | Mod: PBBFAC,,, | Performed by: DIETITIAN, REGISTERED

## 2018-09-12 NOTE — LETTER
September 12, 2018        Alex Dean MD  [address] [facility] [address] [place] [address]             [facility]  [address] [facility] [place] [address]  Phone: [phone]  Fax: [phone]   Patient: Saul Mar   MR Number: [id number]   YOB: 1940   Date of Visit: 9/12/2018       Dear Dr. Dean:    Thank you for referring Saul Mar to me for evaluation. Below are the relevant portions of my assessment and plan of care.    If you have questions, please do not hesitate to call me. I look forward to following Saul along with you.    Sincerely,      Elizabeth Griffin, ORA, CDE           CC  No Recipients

## 2018-09-12 NOTE — PROGRESS NOTES
Diabetes Education  Author: Elizabeth Griffin RD, CDE  Date: 9/12/2018    Diabetes Education Visit  Diabetes Education Record Assessment/Progress: Comprehensive/Group    Diabetes Type  Diabetes Type : Type II      Nutrition  Meal Planning: (~1600-2000cals/d - excess carb from regular meliza (sprite). Excess sodium from chips. Improving nonstarchy vegetable intake. )    Monitoring   Self Monitoring : Per recall, 127-140, pm 140-160  Blood Glucose Logs: No  In the last month, how often have you had a low blood sugar reaction?: never    Exercise   Frequency: Never    Current Diabetes Treatment   Current Treatment: Diet, Exercise, Oral Medication(metformin xr 500mg 1 tab daily am)    Social History  Preferred Learning Method: Face to Face  Primary Support: Self  Alcohol Use: Never    PHQ-2 Total Score: 1       DDS-2 Score  ( > 3 = SIGNIFICANT DISTRESS): 2.5       Barriers to Change  Barriers to Change: None  Learning Challenges : None    Readiness to Learn   Readiness to Learn : Eager    Cultural Influences  Cultural Influences: No    Diabetes Education Assessment/Progress  Diabetes Disease Process (diabetes disease process and treatment options): Discussion, Individual Session, Demonstrates Understanding/Competency(verbalizes/demonstrates)  Nutrition (Incorporating nutritional management into one's lifestyle): Discussion, Individual Session, Demonstrates Understanding/Competency (verbalizes/demonstrates), Written Materials Provided(i )  Physical Activity (incorporating physical activity into one's lifestyle): Discussion, Individual Session, Demonstrates Understanding/Competency (verbalizes/demonstrates)(encouraged 150 min per wk)  Medications (states correct name, dose, onset, peak, duration, side effects & timing of meds): Discussion, Individual Session, Demonstrates Understanding/Competency(verbalizes/demonstrates), Written Materials Provided  Monitoring (monitoring blood glucose/other parameters & using results):  Discussion, Individual Session, Demonstrates Understanding/Competency (verbalizes/demonstrates)( )  Acute Complications (preventing, detecting, and treating acute complications): Discussion, Individual Session, Demonstrates Understanding/Competency (verbalizes/demonstrates)  Chronic Complications (preventing, detecting, and treating chronic complications): Discussion, Individual Session, Demonstrates Understanding/Competency (verbalizes/demonstrates)  Clinical (diabetes, other pertinent medical history, and relevant comorbidities reviewed during visit): Discussion, Individual Session, Demonstrates Understanding/Competency (verbalizes/demonstrates)  Cognitive (knowledge of self-management skills, functional health literacy): Discussion, Individual Session, Demonstrates Understanding/Competency (verbalizes/demonstrates)  Psychosocial (emotional response to diabetes): Discussion, Individual Session, Demonstrates Understanding/Competency (verbalizes/demonstrates)  Diabetes Distress and Support Systems: Discussion, Individual Session, Demonstrates Understanding/Competency (verbalizes/demonstrates)  Behavioral (readiness for change, lifestyle practices, self-care behaviors): Discussion, Individual Session, Demonstrates Understanding/Competency (verbalizes/demonstrates)    Goals  Patient has selected/evaluated goals during today's session: Yes, evaluated  Healthy Eating: Set(use meal plan - carb portions/spacing)  Met Percentage : 50%  Start Date: 09/12/18  Target Date: 10/25/18  Physical Activity: % Met(150min/wk - COA classes)  Met Percentage : 0%  Monitoring: % Met(test BG 2x/d -fst, 2hr ppd)  Met Percentage : 75%    Diabetes Care Plan/Intervention  Education Plan/Intervention: Individual Follow-Up DSMT Noted upcoming repeat A1C 10/5.     Today's Diabetes Self-Management Plan was developed with the patient's input and was based on identified barriers from today's assessment.      Short-term goals written today along  with the patient/caregiver and the patient has agreed to working towards the above goals to improve his overall diabetes control.      Provided pt with a copy of today's self-management plan and goals. Pt verbalized understanding of the care plan, goals, and all of today's instructions.      Encouraged pt to communicate with his/her physician and care team regarding his/her condition(s) and treatment.  Provided my contact information today and encouraged pt to call me for questions or message me through the patient portal as needed.     Diabetes Meal Plan  Restrictions: Low Fat, Low Sodium  Calories: 1200, 1400  Carbohydrate Per Meal: 30-45g  Carbohydrate Per Snack : 7-15g    Education Units of Time   Time Spent: 30 min    Health Maintenance was reviewed today with patient. Discussed with patient importance of routine eye exams, foot exams/foot care, blood work (i.e.: A1c, microalbumin, and lipid), dental visits, yearly flu vaccine, and pneumonia vaccine as indicated by PCP. Patient verbalized understanding.     Health Maintenance Topics with due status: Not Due       Topic Last Completion Date    TETANUS VACCINE 01/14/2011    Colonoscopy 09/22/2016    DEXA SCAN 08/08/2017    Foot Exam 05/07/2018    Mammogram 06/28/2018    Lipid Panel 06/29/2018    Hemoglobin A1c 06/29/2018    Eye Exam 08/10/2018     Health Maintenance Due   Topic Date Due    Zoster Vaccine  05/15/2001    Influenza Vaccine  08/01/2018

## 2018-09-12 NOTE — PROGRESS NOTES
Subjective:     Patient ID: Saul Mar is a 78 y.o. female.    Chief Complaint: Routine Foot Care (PCP: LEN Randolph 2/2018  , left great toenail needs to be checked )    Saul is a 78 y.o. female who presents to the clinic upon referral from Dr. Yuridia neri. provider found  for evaluation and treatment of diabetic feet. 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 Patient complaints is left nig toenail check and nail care.     PCP: Penny Randolph MD    Date Last Seen by PCP: 2/7/18    Current shoe gear: Tennis shoes    Hemoglobin A1C   Date Value Ref Range Status   06/29/2018 7.9 (H) 4.0 - 5.6 % Final     Comment:     ADA Screening Guidelines:  5.7-6.4%  Consistent with prediabetes  >or=6.5%  Consistent with diabetes  High levels of fetal hemoglobin interfere with the HbA1C  assay. Heterozygous hemoglobin variants (HbS, HgC, etc)do  not significantly interfere with this assay.   However, presence of multiple variants may affect accuracy.     11/06/2017 6.9 (H) 4.0 - 5.6 % Final     Comment:     According to ADA guidelines, hemoglobin A1c <7.0% represents  optimal control in non-pregnant diabetic patients. Different  metrics may apply to specific patient populations.   Standards of Medical Care in Diabetes-2016.  For the purpose of screening for the presence of diabetes:  <5.7%     Consistent with the absence of diabetes  5.7-6.4%  Consistent with increasing risk for diabetes   (prediabetes)  >or=6.5%  Consistent with diabetes  Currently, no consensus exists for use of hemoglobin A1c  for  diagnosis of diabetes for children.  This Hemoglobin A1c assay has significant interference with fetal   hemoglobin   (HbF). The results are invalid for patients with abnormal amounts of   HbF,   including those with known Hereditary Persistence   of Fetal Hemoglobin. Heterozygous hemoglobin variants (HbAS, HbAC,   HbAD, HbAE, HbA2) do not significantly interfere with this assay;   however, presence of multiple variants in a sample may impact the %   interference.     06/05/2017 7.4 (H) 4.5 - 6.2 % Final     Comment:     According to ADA guidelines, hemoglobin A1C <7.0% represents  optimal control in non-pregnant diabetic patients.  Different  metrics may apply to specific populations.   Standards of Medical Care in Diabetes - 2016.  For the purpose of screening for the presence of diabetes:  <5.7%     Consistent with the absence of diabetes  5.7-6.4%  Consistent with increasing risk for diabetes   (prediabetes)  >or=6.5%  Consistent with diabetes  Currently no consensus exists for use of hemoglobin A1C  for diagnosis of diabetes for children.                  Patient Active Problem List   Diagnosis    Mixed diabetic hyperlipidemia associated with type 2 diabetes mellitus    Degenerative disc disease    Colon polyp: tubular adenoma 5/13, repeated 2016 due 2019    Multinodular thyroid: thyroid u/s 7/16    POAG (primary open-angle glaucoma) - Both Eyes    Amaurosis fugax    Nuclear sclerosis - Right Eye    Eyelid myokymia    Cerebral microvascular disease: stroke ? 1999 elsewhere; TIA 10/13    Left-sided carotid artery disease    Vitamin D insufficiency    Left ventricular diastolic dysfunction with preserved systolic function    Hypertension, essential    Gastroesophageal reflux disease without esophagitis    Type 2 diabetes, uncontrolled, with background retinopathy with macular edema    Overweight (BMI 25.0-29.9)    Diabetes mellitus with proteinuria    Type II diabetes mellitus with neurological  manifestations    Aortic arch atherosclerosis    Abnormal ankle brachial index    Diabetes mellitus with stage 3 chronic kidney disease    Cerebrovascular accident (CVA) due to thrombosis of precerebral artery    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 If you have any questions, ask your nurse or doctor.               CHANGE how you take these medications    cholecalciferol (vitamin D3) 1,000 unit capsule  Commonly known as:  VITAMIN D3  Take 2 capsules (2,000 Units total) by mouth once daily.  What changed:  how much to take        CONTINUE taking these medications    albuterol 90 mcg/actuation inhaler  Commonly known as:  PROVENTIL/VENTOLIN HFA  Inhale 2 puffs into the lungs every 6 (six) hours as needed for Wheezing. Rescue     amLODIPine 5 MG tablet  Commonly known as:  NORVASC  Take 1 tablet (5 mg total) by mouth once daily.     atorvastatin 80 MG tablet  Commonly known as:  LIPITOR  Take 1 tablet (80 mg total) by mouth once daily.     blood sugar diagnostic Strp  100 strips by Misc.(Non-Drug; Combo Route) route 2 (two) times daily.     brimonidine 0.2% 0.2 % Drop  Commonly known as:  ALPHAGAN  Place 1 drop into both eyes every 8 (eight) hours.     cyanocobalamin 1,000 mcg/mL injection  Inject 1 mL (1,000 mcg total) into the muscle every 30 days.     ferrous sulfate 325 mg (65 mg iron) Tab tablet  Commonly known as:  FEOSOL  Take 1 tablet (325 mg total) by mouth daily with breakfast.     hydroCHLOROthiazide 12.5 MG Tab  Commonly known as:  HYDRODIURIL  Take 1 tablet (12.5 mg total) by mouth once daily.     lancets Misc  Commonly known as:  ACCU-CHEK SOFTCLIX LANCETS  100 lancets by Misc.(Non-Drug; Combo Route) route 2 (two) times  daily.     losartan 50 MG tablet  Commonly known as:  COZAAR  Take 1 tablet (50 mg total) by mouth once daily.     metFORMIN 500 MG 24 hr tablet  Commonly known as:  GLUCOPHAGE-XR  Take 1 tablet (500 mg total) by mouth daily with breakfast.     metoprolol succinate 50 MG 24 hr tablet  Commonly known as:  TOPROL-XL     rivaroxaban 20 mg Tab  Commonly known as:  XARELTO        STOP taking these medications    aspirin 81 MG Chew  Stopped by:  Elizabeth Griffin RD, CDE            Review of patient's allergies indicates:   Allergen Reactions    Pravachol [pravastatin] Nausea Only       Past Surgical History:   Procedure Laterality Date    CATARACT EXTRACTION W/  INTRAOCULAR LENS IMPLANT Left 2/27/13    Dr. Taveras    COLONOSCOPY      COLONOSCOPY N/A 9/22/2016    Procedure: COLONOSCOPY;  Surgeon: Jd Ashton MD;  Location: [facility] (4TH FLR);  Service: Endoscopy;  Laterality: N/A;  OK per Dr Randolph for pt to hold Plavix 5 days prior to procedure/see telephone encounter dated 8/29/16/svn    COLONOSCOPY N/A 9/22/2016    Performed by Jd Ashton MD at [facility] (4TH FLR)    COLONOSCOPY N/A 5/20/2013    Performed by Jd Ashton MD at [facility] (4TH FLR)    ESOPHAGOGASTRODUODENOSCOPY (EGD) N/A 6/21/2016    Performed by Paul Winters MD at [facility] (4TH FLR)    EYE SURGERY Bilateral 2002 approx    Laser for glaucoma    HYSTERECTOMY  1963     AMEENA/USO- fibroids; no cancer    INSERTION, IOL PROSTHESIS Left 2/27/2013    Performed [facility] at [facility]ST FLR    OOPHORECTOMY  1963    unknown, only removed one    PHACOEMULSIFICATION, CATARACT Left 2/27/2013    Performed by L[facility]as MD at [facility] OR 1ST FLR       Family History   Problem Relation Age of Onset    Stroke Mother     Mental illness Mother     Hypertension Mother     Stroke Father     Diabetes Maternal Grandmother     Drug abuse Daughter     Cancer Sister 68        gyn    Cancer Maternal Uncle   "       type not known    Heart disease Paternal Grandmother     Glaucoma Neg Hx     Macular degeneration Neg Hx     Alcohol abuse Neg Hx     COPD Neg Hx     Asthma Neg Hx     Amblyopia Neg Hx     Blindness Neg Hx     Cataracts Neg Hx     Retinal detachment Neg Hx     Strabismus Neg Hx        Social History     Socioeconomic History    Marital status:      Spouse name: Not on file    Number of children: Not on file    Years of education: Not on file    Highest education level: Not on file   Social Needs    Financial resource strain: Not on file    Food insecurity - worry: Not on file    Food insecurity - inability: Not on file    Transportation needs - medical: Not on file    Transportation needs - non-medical: Not on file   Occupational History    Not on file   Tobacco Use    Smoking status: Never Smoker    Smokeless tobacco: Never Used   Substance and Sexual Activity    Alcohol use: No    Drug use: No    Sexual activity: No     Partners: Male   Other Topics Concern    Not on file   Social History Narrative    , no pets or smokers in household. 1 Child who lives in nursing home. She has a grandson who lives in [place].. Retired from [employer] . Still drives. Does not have a Living Will or advanced directive.        Vitals:    09/12/18 1523   BP: 112/73   Pulse: (!) 53   Weight: 79.9 kg (176 lb 2.4 oz)   Height: 5' 5.5" (1.664 m)   PainSc: 0-No pain       Hemoglobin A1C   Date Value Ref Range Status   06/29/2018 7.9 (H) 4.0 - 5.6 % Final     Comment:     ADA Screening Guidelines:  5.7-6.4%  Consistent with prediabetes  >or=6.5%  Consistent with diabetes  High levels of fetal hemoglobin interfere with the HbA1C  assay. Heterozygous hemoglobin variants (HbS, HgC, etc)do  not significantly interfere with this assay.   However, presence of multiple variants may affect accuracy.     11/06/2017 6.9 (H) 4.0 - 5.6 % Final     Comment:     According to ADA guidelines, hemoglobin " A1c <7.0% represents  optimal control in non-pregnant diabetic patients. Different  metrics may apply to specific patient populations.   Standards of Medical Care in Diabetes-2016.  For the purpose of screening for the presence of diabetes:  <5.7%     Consistent with the absence of diabetes  5.7-6.4%  Consistent with increasing risk for diabetes   (prediabetes)  >or=6.5%  Consistent with diabetes  Currently, no consensus exists for use of hemoglobin A1c  for diagnosis of diabetes for children.  This Hemoglobin A1c assay has significant interference with fetal   hemoglobin   (HbF). The results are invalid for patients with abnormal amounts of   HbF,   including those with known Hereditary Persistence   of Fetal Hemoglobin. Heterozygous hemoglobin variants (HbAS, HbAC,   HbAD, HbAE, HbA2) do not significantly interfere with this assay;   however, presence of multiple variants in a sample may impact the %   interference.     06/05/2017 7.4 (H) 4.5 - 6.2 % Final     Comment:     According to ADA guidelines, hemoglobin A1C <7.0% represents  optimal control in non-pregnant diabetic patients.  Different  metrics may apply to specific populations.   Standards of Medical Care in Diabetes - 2016.  For the purpose of screening for the presence of diabetes:  <5.7%     Consistent with the absence of diabetes  5.7-6.4%  Consistent with increasing risk for diabetes   (prediabetes)  >or=6.5%  Consistent with diabetes  Currently no consensus exists for use of hemoglobin A1C  for diagnosis of diabetes for children.         Review of Systems   Constitutional: Negative for chills and fever.   Respiratory: Negative for shortness of breath.    Cardiovascular: Negative for chest pain, palpitations, orthopnea, claudication and leg swelling.   Gastrointestinal: Negative for diarrhea, nausea and vomiting.   Musculoskeletal: Negative for joint pain.   Skin: Negative for rash.   Neurological: Positive for sensory change. Negative for  dizziness, tingling, focal weakness and weakness.   Psychiatric/Behavioral: Negative.              Objective:   PHYSICAL EXAM: Apperance: Alert and orient in no distress,well developed, and with good attention to grooming and body habits  Patient presents ambulating in tennis shoes.  LOWER EXTREMITY EXAM:  VASCULAR: Dorsalis pedis pulses 0/4 left 1/4 right and Posterior Tibial pulses 0/4 left and 1/4 right. Capillary fill time <4 seconds bilateral. Mild edema observed bilateral. Varicosities present bilateral. Skin temperature of the lower extremities is warm to cool, proximal to distal. Hair growth absent bilateral.  DERMATOLOGICAL: No skin rashes, subcutaneous nodules, lesions, or ulcers observed bilateral. Nails 1,2,3,4,5, bilateral elongated, thickened, and discolored with subungual debris. Webspaces 1,2,3,4 bilateral clean, dry and without evidence of break in skin integrity. Mild hyperkeratotic lesions noted to bilateral 5th plantar 5th submetatarsal.    NEUROLOGICAL: Light touch, sharp-dull, proprioception all present and equal bilaterally.  Vibratory sensation diminished at bilateral hallux. Protective sensation absent at toe sites as tested with a [place] 5.07 monofilament.   MUSCULOSKELETAL: Muscle strength is 5/5 for foot inverters, everters, plantarflexors, and dorsiflexors. Muscle tone is normal.     Assessment:   Type II diabetes mellitus with neurological manifestations    Onychomycosis due to dermatophyte    Corn or callus          Plan:   Type II diabetes mellitus with neurological manifestations    Onychomycosis due to dermatophyte    Corn or callus      I counseled the patient on her conditions, regarding findings of my examination, my impressions, and usual treatment plan.   Greater than 50% of this visit spent on counseling and coordination of care.  Greater than 15 minutes of a 20 minute appointment spent on education about the diabetic foot, neuropathy, and prevention of limb  loss.  Shoe inspection. Diabetic Foot Education. Patient reminded of the importance of good nutrition and blood sugar control to help prevent podiatric complications of diabetes. Patient instructed on proper foot hygeine. We discussed wearing proper shoe gear, daily foot inspections, never walking without protective shoe gear, never putting sharp instruments to feet.    With patient's permission, nails 1-5 bilateral were debrided/trimmed in length and thickness aggressively to their soft tissue attachment mechanically and with electric , removing all offending nail and debris. Patient relates relief following the procedure.  With patient's permission, bilateral callus trimmed in thickness with #15 blade in thickness without incident.   Patient  will continue to monitor the areas daily, inspect feet, wear protective shoe gear when ambulatory, moisturizer to maintain skin integrity. Patient reminded of the importance of good nutrition and blood sugar control to help prevent podiatric complications of diabetes.  Patient to return 3 months or sooner if needed.                 Zuleima Howell DPM  [facility]

## 2018-09-21 DIAGNOSIS — I10 ESSENTIAL HYPERTENSION: ICD-10-CM

## 2018-09-21 RX ORDER — AMLODIPINE BESYLATE 5 MG/1
5 TABLET ORAL DAILY
Qty: 90 TABLET | Refills: 3 | Status: SHIPPED | OUTPATIENT
Start: 2018-09-21 | End: 2018-10-02 | Stop reason: SDUPTHER

## 2018-10-02 ENCOUNTER — OFFICE VISIT (OUTPATIENT)
Dept: URGENT CARE | Facility: CLINIC | Age: 78
End: 2018-10-02
Payer: MEDICARE

## 2018-10-02 ENCOUNTER — IMMUNIZATION (OUTPATIENT)
Dept: INTERNAL MEDICINE | Facility: CLINIC | Age: 78
End: 2018-10-02
Payer: MEDICARE

## 2018-10-02 VITALS
TEMPERATURE: 99 F | BODY MASS INDEX: 28.11 KG/M2 | SYSTOLIC BLOOD PRESSURE: 132 MMHG | HEIGHT: 66 IN | HEART RATE: 62 BPM | WEIGHT: 174.94 LBS | DIASTOLIC BLOOD PRESSURE: 80 MMHG | OXYGEN SATURATION: 98 % | RESPIRATION RATE: 18 BRPM

## 2018-10-02 DIAGNOSIS — M79.621 PAIN IN RIGHT AXILLA: ICD-10-CM

## 2018-10-02 DIAGNOSIS — L02.411 ABSCESS OF AXILLA, RIGHT: Primary | ICD-10-CM

## 2018-10-02 PROCEDURE — 99212 OFFICE O/P EST SF 10 MIN: CPT | Mod: PBBFAC,PO,25

## 2018-10-02 PROCEDURE — 99999 PR PBB SHADOW E&M-EST. PATIENT-LVL V: CPT | Mod: PBBFAC,,, | Performed by: NURSE PRACTITIONER

## 2018-10-02 PROCEDURE — 99213 OFFICE O/P EST LOW 20 MIN: CPT | Mod: S$PBB,,, | Performed by: NURSE PRACTITIONER

## 2018-10-02 PROCEDURE — 90662 IIV NO PRSV INCREASED AG IM: CPT | Mod: PBBFAC,PO

## 2018-10-02 PROCEDURE — 99999 PR PBB SHADOW E&M-EST. PATIENT-LVL II: CPT | Mod: PBBFAC,,,

## 2018-10-02 PROCEDURE — 3079F DIAST BP 80-89 MM HG: CPT | Mod: CPTII,,, | Performed by: NURSE PRACTITIONER

## 2018-10-02 PROCEDURE — 99215 OFFICE O/P EST HI 40 MIN: CPT | Mod: PBBFAC,27,PO,25 | Performed by: NURSE PRACTITIONER

## 2018-10-02 PROCEDURE — 1101F PT FALLS ASSESS-DOCD LE1/YR: CPT | Mod: CPTII,,, | Performed by: NURSE PRACTITIONER

## 2018-10-02 PROCEDURE — 3075F SYST BP GE 130 - 139MM HG: CPT | Mod: CPTII,,, | Performed by: NURSE PRACTITIONER

## 2018-10-02 RX ORDER — MUPIROCIN 20 MG/G
OINTMENT TOPICAL 3 TIMES DAILY
Qty: 22 G | Refills: 0 | Status: SHIPPED | OUTPATIENT
Start: 2018-10-02 | End: 2023-05-16

## 2018-10-02 RX ORDER — SULFAMETHOXAZOLE AND TRIMETHOPRIM 800; 160 MG/1; MG/1
1 TABLET ORAL 2 TIMES DAILY
Qty: 14 TABLET | Refills: 0 | Status: SHIPPED | OUTPATIENT
Start: 2018-10-02 | End: 2018-10-09

## 2018-10-02 NOTE — PROGRESS NOTES
Subjective:       Patient ID: Saul Mar is a 78 y.o. female.    Chief Complaint: Abscess    78 year old female presents to Urgent Care with reports of painful area under right arm that has been present for about a week. Denies any other problems or concerns at this time.       Abscess   Chronicity:  New  Abscess location: abcess under right arm.  Associated Symptoms: no fever, no chills  Characteristics: painful    Pain Scale:  3/10  Treatments Tried:  Nothing  Relieved by:  Nothing  Worsened by:  Nothing    Review of Systems   Constitutional: Negative for appetite change, chills and fever.   HENT: Negative for ear pain, sinus pressure, sore throat and trouble swallowing.    Eyes: Negative for visual disturbance.   Respiratory: Negative for shortness of breath.    Cardiovascular: Negative for chest pain.   Gastrointestinal: Negative for abdominal pain, diarrhea, nausea and vomiting.   Endocrine: Negative for cold intolerance, polyphagia and polyuria.   Genitourinary: Negative for decreased urine volume and dysuria.   Musculoskeletal: Negative for back pain.   Skin: Negative for rash.   Allergic/Immunologic: Negative for environmental allergies and food allergies.   Neurological: Negative for dizziness, tremors, weakness and numbness.   Hematological: Does not bruise/bleed easily.   Psychiatric/Behavioral: Negative for confusion and hallucinations. The patient is not nervous/anxious and is not hyperactive.    All other systems reviewed and are negative.      Objective:     Physical Exam   Constitutional: She is oriented to person, place, and time. She appears well-developed and well-nourished.   HENT:   Head: Normocephalic and atraumatic.   Right Ear: External ear normal.   Left Ear: External ear normal.   Nose: Nose normal.   Mouth/Throat: Oropharynx is clear and moist.   Eyes: Conjunctivae and EOM are normal. Pupils are equal, round, and reactive to light.   Neck: Normal range of motion. Neck supple.    Cardiovascular: Normal rate, regular rhythm, normal heart sounds and intact distal pulses.   No murmur heard.  Pulmonary/Chest: Effort normal and breath sounds normal. She has no wheezes.   Abdominal: Soft. Bowel sounds are normal. There is no tenderness.   Musculoskeletal: Normal range of motion.   Neurological: She is alert and oriented to person, place, and time. She has normal reflexes.   Skin: Skin is warm and dry. No rash noted.        Psychiatric: She has a normal mood and affect. Her behavior is normal. Judgment and thought content normal.   Nursing note and vitals reviewed.    Assessment:     1. Abscess of axilla, right    2. Pain in right axilla      Plan:   Saul was seen today for abscess.    Diagnoses and all orders for this visit:    Abscess of axilla, right    Pain in right axilla    Other orders  -     sulfamethoxazole-trimethoprim 800-160mg (BACTRIM DS) 800-160 mg Tab; Take 1 tablet by mouth 2 (two) times daily. for 7 days  -     mupirocin (BACTROBAN) 2 % ointment; Apply topically 3 (three) times daily.

## 2018-10-02 NOTE — PATIENT INSTRUCTIONS
Abscess (Antibiotic Treatment Only)  An abscess (sometimes called a boil) happens when bacteria get trapped under the skin and start to grow. Pus forms inside the abscess as the body responds to the bacteria. An abscess can happen with an insect bite, ingrown hair, blocked oil gland, pimple, cyst, or puncture wound.  In the early stages, your wound may be red and tender. For this stage, you may get antibiotics. If the abscess does not get better with antibiotics, it will need to be drained with a small cut.  Home care  These tips will help you care for your abscess at home:  · Soak the wound in hot water or apply hot packs (small towel soaked in hot water) to the area for 20 minutes at a time. Do this 3 to 4 times a day.  · Do not cut, squeeze, or pop the boil yourself.  · Apply antibiotic cream or ointment to the skin 3 to 4 times a day, unless something else was prescribed. Some ointments include an antibiotic plus a pain reliever.  · If your doctor prescribed antibiotics, do not stop taking them until you have finished the medicine or the doctor tells you to stop.  · You may use an over-the-counter pain medicine to control pain, unless another pain medicine was prescribed. If you have chronic liver or kidney disease or ever had a stomach ulcer or gastrointestinal bleeding, talk with your doctor before using these any of these.  Follow-up care  Follow up with your healthcare provider, or as advised. Check your wound each day for the signs of worsening infection listed below.  When to seek medical advice  Get prompt medical attention if any of these occur:  · An increase in redness or swelling  · Red streaks in the skin leading away from the abscess  · An increase in local pain or swelling  · Fever of 100.4ºF (38ºC) or higher, or as directed by your healthcare provider  · Pus or fluid coming from the abscess  · Boil returns after getting better  Date Last Reviewed: 9/1/2016  © 9726-7069 [employer] [address] [place] [address]. All rights reserved. This information is not intended as a substitute for professional medical care. Always follow your healthcare professional's instructions.

## 2018-10-05 ENCOUNTER — LAB VISIT (OUTPATIENT)
Dept: LAB | Facility: HOSPITAL | Age: 78
End: 2018-10-05
Attending: INTERNAL MEDICINE
Payer: MEDICARE

## 2018-10-05 DIAGNOSIS — E11.22 DIABETES MELLITUS WITH STAGE 3 CHRONIC KIDNEY DISEASE: Chronic | ICD-10-CM

## 2018-10-05 DIAGNOSIS — D53.9 NUTRITIONAL ANEMIA: ICD-10-CM

## 2018-10-05 DIAGNOSIS — E55.9 VITAMIN D INSUFFICIENCY: ICD-10-CM

## 2018-10-05 DIAGNOSIS — N18.30 CKD (CHRONIC KIDNEY DISEASE) STAGE 3, GFR 30-59 ML/MIN: Chronic | ICD-10-CM

## 2018-10-05 DIAGNOSIS — N18.30 DIABETES MELLITUS WITH STAGE 3 CHRONIC KIDNEY DISEASE: Chronic | ICD-10-CM

## 2018-10-05 LAB
25(OH)D3+25(OH)D2 SERPL-MCNC: 28 NG/ML
ALBUMIN SERPL BCP-MCNC: 3.7 G/DL
ANION GAP SERPL CALC-SCNC: 10 MMOL/L
BUN SERPL-MCNC: 17 MG/DL
CALCIUM SERPL-MCNC: 9.5 MG/DL
CHLORIDE SERPL-SCNC: 108 MMOL/L
CO2 SERPL-SCNC: 22 MMOL/L
CREAT SERPL-MCNC: 1.5 MG/DL
EST. GFR  (AFRICAN AMERICAN): 38.5 ML/MIN/1.73 M^2
EST. GFR  (NON AFRICAN AMERICAN): 33.4 ML/MIN/1.73 M^2
ESTIMATED AVG GLUCOSE: 197 MG/DL
GLUCOSE SERPL-MCNC: 108 MG/DL
HBA1C MFR BLD HPLC: 8.5 %
PHOSPHATE SERPL-MCNC: 3.6 MG/DL
POTASSIUM SERPL-SCNC: 4.2 MMOL/L
SODIUM SERPL-SCNC: 140 MMOL/L
VIT B12 SERPL-MCNC: 1553 PG/ML

## 2018-10-05 PROCEDURE — 80069 RENAL FUNCTION PANEL: CPT

## 2018-10-05 PROCEDURE — 36415 COLL VENOUS BLD VENIPUNCTURE: CPT | Mod: PO

## 2018-10-05 PROCEDURE — 82607 VITAMIN B-12: CPT

## 2018-10-05 PROCEDURE — 83036 HEMOGLOBIN GLYCOSYLATED A1C: CPT

## 2018-10-05 PROCEDURE — 82306 VITAMIN D 25 HYDROXY: CPT

## 2018-10-06 DIAGNOSIS — E78.5 HYPERLIPIDEMIA, UNSPECIFIED HYPERLIPIDEMIA TYPE: ICD-10-CM

## 2018-10-08 RX ORDER — ATORVASTATIN CALCIUM 80 MG/1
80 TABLET, FILM COATED ORAL DAILY
Qty: 90 TABLET | Refills: 0 | Status: SHIPPED | OUTPATIENT
Start: 2018-10-08 | End: 2019-01-02 | Stop reason: SDUPTHER

## 2018-10-09 ENCOUNTER — PATIENT MESSAGE (OUTPATIENT)
Dept: INTERNAL MEDICINE | Facility: CLINIC | Age: 78
End: 2018-10-09

## 2018-10-09 ENCOUNTER — TELEPHONE (OUTPATIENT)
Dept: INTERNAL MEDICINE | Facility: CLINIC | Age: 78
End: 2018-10-09

## 2018-10-09 NOTE — TELEPHONE ENCOUNTER
Diabetes is not well controlled.  Please ask her to come in for an appointment to see me within the next 2 weeks to review her test results and go over treatment.  Thank you (EP ok to work in)

## 2018-10-13 DIAGNOSIS — N18.30 DIABETES MELLITUS WITH STAGE 3 CHRONIC KIDNEY DISEASE: Chronic | ICD-10-CM

## 2018-10-13 DIAGNOSIS — E11.22 DIABETES MELLITUS WITH STAGE 3 CHRONIC KIDNEY DISEASE: Chronic | ICD-10-CM

## 2018-10-15 RX ORDER — GLIMEPIRIDE 4 MG/1
TABLET ORAL
Qty: 90 TABLET | Refills: 0 | Status: SHIPPED | OUTPATIENT
Start: 2018-10-15 | End: 2019-02-15 | Stop reason: SDUPTHER

## 2018-10-22 ENCOUNTER — CLINICAL SUPPORT (OUTPATIENT)
Dept: CARDIOLOGY | Facility: CLINIC | Age: 78
End: 2018-10-22
Attending: INTERNAL MEDICINE
Payer: MEDICARE

## 2018-10-22 ENCOUNTER — OFFICE VISIT (OUTPATIENT)
Dept: INTERNAL MEDICINE | Facility: CLINIC | Age: 78
End: 2018-10-22
Payer: MEDICARE

## 2018-10-22 VITALS
SYSTOLIC BLOOD PRESSURE: 120 MMHG | DIASTOLIC BLOOD PRESSURE: 70 MMHG | HEIGHT: 65 IN | BODY MASS INDEX: 29.38 KG/M2 | WEIGHT: 176.38 LBS

## 2018-10-22 DIAGNOSIS — E78.2 MIXED DIABETIC HYPERLIPIDEMIA ASSOCIATED WITH TYPE 2 DIABETES MELLITUS: Chronic | ICD-10-CM

## 2018-10-22 DIAGNOSIS — I10 HYPERTENSION, ESSENTIAL: Chronic | ICD-10-CM

## 2018-10-22 DIAGNOSIS — I48.91 ATRIAL FIBRILLATION, UNSPECIFIED TYPE: Primary | ICD-10-CM

## 2018-10-22 DIAGNOSIS — I67.2 CEREBRAL ATHEROSCLEROSIS: ICD-10-CM

## 2018-10-22 DIAGNOSIS — E66.3 OVERWEIGHT (BMI 25.0-29.9): ICD-10-CM

## 2018-10-22 DIAGNOSIS — I77.9 LEFT-SIDED CAROTID ARTERY DISEASE, UNSPECIFIED TYPE: Chronic | ICD-10-CM

## 2018-10-22 DIAGNOSIS — D57.3 HEMOGLOBIN S TRAIT: ICD-10-CM

## 2018-10-22 DIAGNOSIS — N18.30 DIABETES MELLITUS WITH STAGE 3 CHRONIC KIDNEY DISEASE: Chronic | ICD-10-CM

## 2018-10-22 DIAGNOSIS — D50.1 IRON DEFICIENCY ANEMIA DUE TO SIDEROPENIC DYSPHAGIA: ICD-10-CM

## 2018-10-22 DIAGNOSIS — E55.9 VITAMIN D DEFICIENCY: Chronic | ICD-10-CM

## 2018-10-22 DIAGNOSIS — E04.2 MULTINODULAR THYROID: Chronic | ICD-10-CM

## 2018-10-22 DIAGNOSIS — E11.69 MIXED DIABETIC HYPERLIPIDEMIA ASSOCIATED WITH TYPE 2 DIABETES MELLITUS: Chronic | ICD-10-CM

## 2018-10-22 DIAGNOSIS — I51.89 LEFT VENTRICULAR DIASTOLIC DYSFUNCTION WITH PRESERVED SYSTOLIC FUNCTION: ICD-10-CM

## 2018-10-22 DIAGNOSIS — E11.22 DIABETES MELLITUS WITH STAGE 3 CHRONIC KIDNEY DISEASE: Chronic | ICD-10-CM

## 2018-10-22 DIAGNOSIS — N18.30 CKD (CHRONIC KIDNEY DISEASE) STAGE 3, GFR 30-59 ML/MIN: Chronic | ICD-10-CM

## 2018-10-22 PROBLEM — K57.30 DIVERTICULOSIS OF LARGE INTESTINE WITHOUT HEMORRHAGE: Status: ACTIVE | Noted: 2018-03-27

## 2018-10-22 LAB — INTERNAL CAROTID STENOSIS: ABNORMAL

## 2018-10-22 PROCEDURE — 99999 PR PBB SHADOW E&M-EST. PATIENT-LVL V: CPT | Mod: PBBFAC,,, | Performed by: INTERNAL MEDICINE

## 2018-10-22 PROCEDURE — 93880 EXTRACRANIAL BILAT STUDY: CPT | Mod: PBBFAC | Performed by: INTERNAL MEDICINE

## 2018-10-22 PROCEDURE — 99214 OFFICE O/P EST MOD 30 MIN: CPT | Mod: S$PBB,,, | Performed by: INTERNAL MEDICINE

## 2018-10-22 PROCEDURE — 3074F SYST BP LT 130 MM HG: CPT | Mod: CPTII,,, | Performed by: INTERNAL MEDICINE

## 2018-10-22 PROCEDURE — 3078F DIAST BP <80 MM HG: CPT | Mod: CPTII,,, | Performed by: INTERNAL MEDICINE

## 2018-10-22 PROCEDURE — 99215 OFFICE O/P EST HI 40 MIN: CPT | Mod: PBBFAC | Performed by: INTERNAL MEDICINE

## 2018-10-22 PROCEDURE — 1101F PT FALLS ASSESS-DOCD LE1/YR: CPT | Mod: CPTII,,, | Performed by: INTERNAL MEDICINE

## 2018-10-22 RX ORDER — FERROUS SULFATE 325(65) MG
325 TABLET ORAL 2 TIMES DAILY
Qty: 60 TABLET | Refills: 12 | Status: SHIPPED | OUTPATIENT
Start: 2018-10-22 | End: 2019-05-23

## 2018-10-22 RX ORDER — VIT C/E/ZN/COPPR/LUTEIN/ZEAXAN 250MG-90MG
1000 CAPSULE ORAL DAILY
Qty: 30 CAPSULE | Refills: 12 | Status: SHIPPED | OUTPATIENT
Start: 2018-10-22

## 2018-10-22 RX ORDER — ALBUTEROL SULFATE 90 UG/1
2 AEROSOL, METERED RESPIRATORY (INHALATION) EVERY 6 HOURS PRN
Qty: 8 G | Refills: 12 | Status: SHIPPED | OUTPATIENT
Start: 2018-10-22 | End: 2019-06-04 | Stop reason: SDUPTHER

## 2018-10-22 NOTE — PATIENT INSTRUCTIONS
Diet: Diabetes  Food is an important tool that you can use to control diabetes and stay healthy. Eating well-balanced meals in the correct amounts will help you control your blood glucose levels and prevent low blood sugar reactions. It will also help you reduce the health risks of diabetes. There is no one specific diet that is right for everyone with diabetes. But there are general guidelines to follow. A registered dietitian (RD) will create a tailored diet approach thats just right for you. He or she will also help you plan healthy meals and snacks. If you have any questions, call your dietitian for advice.     Guidelines for success  Talk with your healthcare provider before starting a diabetes diet or weight loss program. If you haven't talked with a dietitian yet, ask your provider for a referral. The following guidelines can help you succeed:  · Select foods from the 6 food groups below. Your dietitian will help you find food choices within each group. He or she will also show you serving sizes and how many servings you can have at each meal.  ¨ Grains, beans, and starchy vegetables  ¨ Vegetables  ¨ Fruit  ¨ Milk or yogurt  ¨ Meat, poultry, fish, or tofu  ¨ Healthy fats  · Check your blood sugar levels as directed by your provider. Take any medicine as prescribed by your provider.  · Learn to read food labels and pick the right portion sizes.  · Eat only the amount of food in your meal plan. Eat about the same amount of food at regular times each day. Dont skip meals. Eat meals 4 to 5 hours apart, with snacks in between.  · Limit alcohol. It raises blood sugar levels. Drink water or calorie-free diet drinks that use safe sweeteners.  · Eat less fat to help lower your risk of heart disease. Use nonfat or low-fat dairy products and lean meats. Avoid fried foods. Use cooking oils that are unsaturated, such as olive, canola, or peanut oil.  · Talk with your dietitian about safe sugar substitutes.  · Avoid  added salt. It can contribute to high blood pressure, which can cause heart disease. People with diabetes already have a risk of high blood pressure and heart disease.  · Stay at a healthy weight. If you need to lose weight, cut down on your portion sizes. But dont skip meals. Exercise is an important part of any weight management program. Talk with your provider about an exercise program thats right for you.  · For more information about the best diet plan for you, talk with a registered dietitian (RD). To find an RD in your area, contact:  ¨ Academy of Nutrition and Dietetics www.eatright.org  ¨ The American Diabetes Association 111-192-7286 www.diabetes.org  Date Last Reviewed: 8/1/2016 © 2000-2017 [employer] [address] [place] [address], [place] [address]. All rights reserved. This information is not intended as a substitute for professional medical care. Always follow your healthcare professional's instructions.    [employer]

## 2018-10-24 ENCOUNTER — PATIENT MESSAGE (OUTPATIENT)
Dept: INTERNAL MEDICINE | Facility: CLINIC | Age: 78
End: 2018-10-24

## 2018-10-31 ENCOUNTER — NUTRITION (OUTPATIENT)
Dept: DIABETES | Facility: CLINIC | Age: 78
End: 2018-10-31
Payer: MEDICARE

## 2018-10-31 VITALS — WEIGHT: 177.25 LBS | HEIGHT: 65 IN | BODY MASS INDEX: 29.53 KG/M2

## 2018-10-31 DIAGNOSIS — E11.22 TYPE 2 DIABETES MELLITUS WITH DIABETIC CHRONIC KIDNEY DISEASE, UNSPECIFIED CKD STAGE, UNSPECIFIED WHETHER LONG TERM INSULIN USE: Primary | ICD-10-CM

## 2018-10-31 PROCEDURE — 99213 OFFICE O/P EST LOW 20 MIN: CPT | Mod: PBBFAC,PO | Performed by: DIETITIAN, REGISTERED

## 2018-10-31 PROCEDURE — 99999 PR PBB SHADOW E&M-EST. PATIENT-LVL III: CPT | Mod: PBBFAC,,, | Performed by: DIETITIAN, REGISTERED

## 2018-10-31 PROCEDURE — G0108 DIAB MANAGE TRN  PER INDIV: HCPCS | Mod: PBBFAC,PO | Performed by: DIETITIAN, REGISTERED

## 2018-10-31 NOTE — LETTER
October 31, 2018        Penny Randolph MD  [address]  [place] [address]             [facility]  [address] [facility] [address] [place] [address]  Phone: [phone]  Fax: [phone]   Patient: Saul Mar   MR Number: [id number]   YOB: 1940   Date of Visit: 10/31/2018       Dear Dr. Randolph:    Thank you for referring Saul Mar to me for evaluation. Below are the relevant portions of my assessment and plan of care.     If you have questions, please do not hesitate to call me. I look forward to following Saul along with you.    Sincerely,      Elizabeth Griffin RD, CDE           CC  Alex Dean MD

## 2018-10-31 NOTE — PROGRESS NOTES
Diabetes Education  Author: Elizabeth Griffin RD, CDE  Date: 10/31/2018    Diabetes Care Management Summary  Diabetes Education Record Assessment/Progress: Comprehensive/Group  Current Diabetes Risk Level: Moderate     Last A1c:   Lab Results   Component Value Date    HGBA1C 8.5 (H) 10/05/2018     Last visit with Diabetes Educator: : 10/31/2018      Diabetes Type  Diabetes Type : Type II    Diabetes History  Diabetes Diagnosis: (>20yrs)  Current Treatment: Oral Medication, Diet, Exercise(amaryl 4mg bfst daily, metformin xr 500 mg 1tab twice daily )  Reviewed Problem List with Patient: Yes    Health Maintenance was reviewed today with patient. Discussed with patient importance of routine eye exams, foot exams/foot care, blood work (i.e.: A1c, microalbumin, and lipid), dental visits, yearly flu vaccine, and pneumonia vaccine as indicated by PCP. Patient verbalized understanding.     Health Maintenance Topics with due status: Not Due       Topic Last Completion Date    TETANUS VACCINE 01/14/2011    Colonoscopy 09/22/2016    DEXA SCAN 08/08/2017    Mammogram 06/28/2018    Lipid Panel 06/29/2018    Eye Exam 08/10/2018    Foot Exam 09/12/2018    Hemoglobin A1c 10/05/2018     Health Maintenance Due   Topic Date Due    Zoster Vaccine  05/15/2001       Nutrition  Meal Planning: (Pt is working to limit carb portions; however, she is unsure of total amount needed per meal. Likes nonstarchy vegetables. Reduced juices to couple oz daily.)  Meal Plan 24 Hour Recall - Breakfast: albert's oatmeal w/ raisins (pt recently stopped) - albert's switched to sausage mcmuffin OR home sausage, grit - juice (occs), water  Meal Plan 24 Hour Recall - Lunch: smoked saus, rice, broccoli OR COA (every other day)  Meal Plan 24 Hour Recall - Dinner: leftovers   Meal Plan 24 Hour Recall - Snack: pecans, stopped potato chips; meliza: mostly water     Monitoring   Self Monitoring : Per recall, 130-140  Blood Glucose Logs: No  In the last month,  how often have you had a low blood sugar reaction?: never    Exercise   Exercise Type: (none past mos; planning to start back mall walking (got out of routine))    Current Diabetes Treatment   Current Treatment: Oral Medication, Diet, Exercise(amaryl 4mg bfst daily, metformin xr 500 mg 1tab twice daily )    Social History  Preferred Learning Method: Face to Face  Primary Support: Self  Smoking Status: Never a Smoker  Alcohol Use: Never    PHQ-2 Total Score: 1       DDS-2 Score  ( > 3 = SIGNIFICANT DISTRESS): 2        Barriers to Change  Barriers to Change: None  Learning Challenges : None    Readiness to Learn   Readiness to Learn : Eager    Cultural Influences  Cultural Influences: No    Diabetes Education Assessment/Progress  Diabetes Disease Process (diabetes disease process and treatment options): Discussion, Individual Session, Demonstrates Understanding/Competency(verbalizes/demonstrates)  Nutrition (Incorporating nutritional management into one's lifestyle): Discussion, Individual Session, Demonstrates Understanding/Competency (verbalizes/demonstrates), Written Materials Provided  Physical Activity (incorporating physical activity into one's lifestyle): Discussion, Individual Session, Demonstrates Understanding/Competency (verbalizes/demonstrates)  Medications (states correct name, dose, onset, peak, duration, side effects & timing of meds): Discussion, Individual Session, Demonstrates Understanding/Competency(verbalizes/demonstrates), Written Materials Provided  Monitoring (monitoring blood glucose/other parameters & using results): Discussion, Individual Session, Demonstrates Understanding/Competency (verbalizes/demonstrates), Written Materials Provided( )  Acute Complications (preventing, detecting, and treating acute complications): Discussion, Individual Session, Demonstrates Understanding/Competency (verbalizes/demonstrates), Written Materials Provided  Chronic Complications (preventing, detecting, and  treating chronic complications): Discussion, Individual Session, Demonstrates Understanding/Competency (verbalizes/demonstrates)  Clinical (diabetes, other pertinent medical history, and relevant comorbidities reviewed during visit): Discussion, Individual Session, Demonstrates Understanding/Competency (verbalizes/demonstrates)  Cognitive (knowledge of self-management skills, functional health literacy): Discussion, Individual Session, Demonstrates Understanding/Competency (verbalizes/demonstrates)  Psychosocial (emotional response to diabetes): Discussion, Individual Session, Demonstrates Understanding/Competency (verbalizes/demonstrates)  Diabetes Distress and Support Systems: Discussion, Individual Session, Demonstrates Understanding/Competency (verbalizes/demonstrates)  Behavioral (readiness for change, lifestyle practices, self-care behaviors): Discussion, Individual Session, Demonstrates Understanding/Competency (verbalizes/demonstrates)    Goals  Patient has selected/evaluated goals during today's session: Yes, evaluated  Healthy Eating: % Met(use meal plan - carb portions/spacing)  Met Percentage : 25%  Start Date: 10/31/18  Target Date: 12/13/18  Physical Activity: Set(150min/wk - COA classes/mall walking)  Met Percentage : 0%  Start Date: 10/31/18  Target Date: 12/13/18  Monitoring: Set(test BG 2x/d -fst, acd or 2hr ppd; bring meter to clinic)  Met Percentage : 25%  Start Date: 10/31/18  Target Date: 12/13/18     Diabetes Care Plan/Intervention  Education Plan/Intervention: Individual Follow-Up DSMT, Endocrine Provider Visit Set Up(Will coord diabetes CHRISTIE eval for medical mgmt and add micro/cr to labs on 1/22/19.)    Diabetes Meal Plan  Restrictions: Low Fat, Low Sodium  Calories: 1200, 1400  Carbohydrate Per Meal: 30-45g  Carbohydrate Per Snack : 7-15g    Today's Self-Management Care Plan was developed with the patient's input and is based on barriers identified during today's assessment.    The long and  short-term goals in the care plan were written with the patient/caregiver's input. The patient has agreed to work toward these goals to improve her overall diabetes control.      The patient received a copy of today's self-management plan and verbalized understanding of the care plan, goals, and all of today's instructions.      The patient was encouraged to communicate with her physician and care team regarding her condition(s) and treatment.  I provided the patient with my contact information today and encouraged her to contact me via phone or patient portal as needed.     Education Units of Time   Time Spent: 30 min

## 2018-11-01 NOTE — TELEPHONE ENCOUNTER
Pt informed of pt portal messgae advice, received an alert that pt has not read message. Pt had no questions or concerns.

## 2018-11-02 RX ORDER — FERROUS SULFATE 325(65) MG
325 TABLET ORAL 2 TIMES DAILY
Qty: 180 TABLET | Refills: 0 | Status: SHIPPED | OUTPATIENT
Start: 2018-11-02 | End: 2019-02-11 | Stop reason: SDUPTHER

## 2018-12-18 ENCOUNTER — NUTRITION (OUTPATIENT)
Dept: DIABETES | Facility: CLINIC | Age: 78
End: 2018-12-18
Payer: MEDICARE

## 2018-12-18 VITALS — BODY MASS INDEX: 30.34 KG/M2 | WEIGHT: 182.13 LBS | HEIGHT: 65 IN

## 2018-12-18 DIAGNOSIS — E11.22 TYPE 2 DIABETES MELLITUS WITH DIABETIC CHRONIC KIDNEY DISEASE, UNSPECIFIED CKD STAGE, UNSPECIFIED WHETHER LONG TERM INSULIN USE: Primary | ICD-10-CM

## 2018-12-18 DIAGNOSIS — N18.30 DIABETES MELLITUS WITH STAGE 3 CHRONIC KIDNEY DISEASE: Chronic | ICD-10-CM

## 2018-12-18 DIAGNOSIS — E11.22 DIABETES MELLITUS WITH STAGE 3 CHRONIC KIDNEY DISEASE: Chronic | ICD-10-CM

## 2018-12-18 PROCEDURE — 99999 PR PBB SHADOW E&M-EST. PATIENT-LVL III: CPT | Mod: PBBFAC,,, | Performed by: DIETITIAN, REGISTERED

## 2018-12-18 PROCEDURE — G0108 DIAB MANAGE TRN  PER INDIV: HCPCS | Mod: S$GLB,,, | Performed by: DIETITIAN, REGISTERED

## 2018-12-18 RX ORDER — LANCETS
100 EACH MISCELLANEOUS 2 TIMES DAILY
Qty: 100 EACH | Refills: 11 | Status: SHIPPED | OUTPATIENT
Start: 2018-12-18 | End: 2020-05-04 | Stop reason: SDUPTHER

## 2018-12-18 RX ORDER — INSULIN PUMP SYRINGE, 3 ML
EACH MISCELLANEOUS
Qty: 1 EACH | Refills: 0 | Status: SHIPPED | OUTPATIENT
Start: 2018-12-18 | End: 2023-07-12

## 2018-12-18 NOTE — LETTER
December 18, 2018        Penny Randolph MD  [address]  [place] [address]             [facility]  [address] [facility] [address] [place] [place] [address]  Phone: [phone]  Fax: [phone]   Patient: Saul Mar   MR Number: [id number]   YOB: 1940   Date of Visit: 12/18/2018       Dear Dr. Randolph:    Thank you for referring Saul Mar to me for evaluation. Below are the relevant portions of my assessment and plan of care.     If you have questions, please do not hesitate to call me. I look forward to following Saul along with you.    Sincerely,      Elizabeth Griffin, ORA, CDE           CC  No Recipients

## 2018-12-18 NOTE — PROGRESS NOTES
Diabetes Education  Author: Elizabeth Griffin RD, CDE  Date: 12/18/2018    Diabetes Care Management Summary  Diabetes Education Record Assessment/Progress: Comprehensive/Group  Current Diabetes Risk Level: Moderate     Last A1c:   Lab Results   Component Value Date    HGBA1C 8.5 (H) 10/05/2018     Last visit with Diabetes Educator: : 12/18/2018    Diabetes Type  Diabetes Type : Type II    Diabetes History  Current Treatment: Oral Medication, Diet, Exercise(amaryl 4mg bfst daily, metformin xr 500 mg 1tab twice daily )  Reviewed Problem List with Patient: Yes    Health Maintenance was reviewed today with patient. Discussed with patient importance of routine eye exams, foot exams/foot care, blood work (i.e.: A1c, microalbumin, and lipid), dental visits, yearly flu vaccine, and pneumonia vaccine as indicated by PCP. Patient verbalized understanding.     Health Maintenance Topics with due status: Not Due       Topic Last Completion Date    TETANUS VACCINE 01/14/2011    Colonoscopy 09/22/2016    DEXA SCAN 08/08/2017    Mammogram 06/28/2018    Lipid Panel 06/29/2018    Eye Exam 08/10/2018    Foot Exam 09/12/2018    Hemoglobin A1c 10/05/2018     Health Maintenance Due   Topic Date Due    Zoster Vaccine  05/15/2001       Nutrition  Meal Planning: (Intake ~2626-4830 cals/d. Excess carb, fat and sodium from dining out. Pt doesn't use salt at home but reports rare cooking.)  Meal Plan 24 Hour Recall - Breakfast: albert's mcmuffin w/ sausage (3d/wk) OR albert oatmeal (1d/wk) OR cornflakes, 2% milk (3d/wk) - water, coffee  Meal Plan 24 Hour Recall - Lunch: COA (3d/wk) fish, greens, beans OR fish sandwich ([employer] radha), fries - sprite, water   Meal Plan 24 Hour Recall - Dinner: tuna on crackers OR bkd fish, green beans, turnip greens, [place] toast ([place]) - water   Meal Plan 24 Hour Recall - Snack: pecans; meliza: mostly water     Monitoring   Self Monitoring : Per recall, misplaced glucometer past week and needs  replacement. She reports random readings 140-150 range. Pt denies hypoglycemia.   Blood Glucose Logs: No    Exercise   Exercise Type: (Walking in [employer] 30min ~3d/wk)    Current Diabetes Treatment   Current Treatment: Oral Medication, Diet, Exercise(amaryl 4mg bfst daily, metformin xr 500 mg 1tab twice daily )    Social History  Preferred Learning Method: Face to Face  Primary Support: Self  Smoking Status: Never a Smoker  Alcohol Use: Never      Barriers to Change  Barriers to Change: None  Learning Challenges : None    Readiness to Learn   Readiness to Learn : Eager    Cultural Influences  Cultural Influences: No    Diabetes Education Assessment/Progress  Diabetes Disease Process (diabetes disease process and treatment options): Discussion, Individual Session, Demonstrates Understanding/Competency(verbalizes/demonstrates)  Nutrition (Incorporating nutritional management into one's lifestyle): Discussion, Individual Session, Demonstrates Understanding/Competency (verbalizes/demonstrates), Written Materials Provided  Physical Activity (incorporating physical activity into one's lifestyle): Discussion, Individual Session, Demonstrates Understanding/Competency (verbalizes/demonstrates)  Medications (states correct name, dose, onset, peak, duration, side effects & timing of meds): Discussion, Individual Session, Demonstrates Understanding/Competency(verbalizes/demonstrates), Written Materials Provided  Monitoring (monitoring blood glucose/other parameters & using results): Discussion, Individual Session, Demonstrates Understanding/Competency (verbalizes/demonstrates), Written Materials Provided( )  Acute Complications (preventing, detecting, and treating acute complications): Discussion, Individual Session, Demonstrates Understanding/Competency (verbalizes/demonstrates), Written Materials Provided  Chronic Complications (preventing, detecting, and treating chronic complications): Discussion, Individual Session,  Demonstrates Understanding/Competency (verbalizes/demonstrates)  Clinical (diabetes, other pertinent medical history, and relevant comorbidities reviewed during visit): Discussion, Individual Session, Demonstrates Understanding/Competency (verbalizes/demonstrates)  Cognitive (knowledge of self-management skills, functional health literacy): Discussion, Individual Session, Demonstrates Understanding/Competency (verbalizes/demonstrates)  Psychosocial (emotional response to diabetes): Discussion, Individual Session, Demonstrates Understanding/Competency (verbalizes/demonstrates)  Diabetes Distress and Support Systems: Discussion, Individual Session, Demonstrates Understanding/Competency (verbalizes/demonstrates)  Behavioral (readiness for change, lifestyle practices, self-care behaviors): Discussion, Individual Session, Demonstrates Understanding/Competency (verbalizes/demonstrates)    Goals  Patient has selected/evaluated goals during today's session: Yes, evaluated  Healthy Eating: Set((use meal plan - carb portions/spacing)  Met Percentage : 50%  Start Date: 12/18/18(Decrease dining out freq, use MR entrees (list provided) to assist )  Target Date: 01/24/19  Physical Activity: Set(150min/wk - COA classes/mall walking)  Met Percentage : 75%  Start Date: 12/18/18  Target Date: 01/24/19  Monitoring: Set(test BG 2x/d -fst, acd or 2hr ppd; bring meter to clinic)  Met Percentage : 75%(Reviewed BG goals and to call clinic 1-2 wks for review)  Start Date: 12/18/18  Target Date: 01/24/19     Diabetes Care Plan/Intervention  Education Plan/Intervention: Individual Follow-Up DSMT, Endocrine Provider Visit Set Up(Will coord diabetes CHRISTIE eval for medical mgmt. Encouraged pt to further reduce dining out meals to assist w/ BG stability and kidney health.) Encouraged pt to call clinic 1 week for BG review; she may need increase amaryl 4mg 1tab bfst, 1/2 tab dinner. Will continue to monitor and support.    Diabetes Meal  Plan  Restrictions: Low Fat, Low Sodium  Calories: 1200, 1400  Carbohydrate Per Meal: 30-45g  Carbohydrate Per Snack : 7-15g    Today's Self-Management Care Plan was developed with the patient's input and is based on barriers identified during today's assessment.    The long and short-term goals in the care plan were written with the patient/caregiver's input. The patient has agreed to work toward these goals to improve her overall diabetes control.      The patient received a copy of today's self-management plan and verbalized understanding of the care plan, goals, and all of today's instructions.      The patient was encouraged to communicate with her physician and care team regarding her condition(s) and treatment.  I provided the patient with my contact information today and encouraged her to contact me via phone or patient portal as needed.     Education Units of Time   Time Spent: 30 min

## 2018-12-31 DIAGNOSIS — E78.5 HYPERLIPIDEMIA, UNSPECIFIED HYPERLIPIDEMIA TYPE: ICD-10-CM

## 2019-01-02 DIAGNOSIS — E78.5 HYPERLIPIDEMIA, UNSPECIFIED HYPERLIPIDEMIA TYPE: ICD-10-CM

## 2019-01-02 RX ORDER — ATORVASTATIN CALCIUM 80 MG/1
80 TABLET, FILM COATED ORAL DAILY
Qty: 90 TABLET | Refills: 3 | OUTPATIENT
Start: 2019-01-02

## 2019-01-02 RX ORDER — ATORVASTATIN CALCIUM 80 MG/1
80 TABLET, FILM COATED ORAL DAILY
Qty: 90 TABLET | Refills: 0 | Status: CANCELLED | OUTPATIENT
Start: 2019-01-02

## 2019-01-02 RX ORDER — ATORVASTATIN CALCIUM 80 MG/1
80 TABLET, FILM COATED ORAL DAILY
Qty: 90 TABLET | Refills: 0 | Status: SHIPPED | OUTPATIENT
Start: 2019-01-02 | End: 2019-03-26 | Stop reason: SDUPTHER

## 2019-01-04 DIAGNOSIS — I10 ESSENTIAL HYPERTENSION: ICD-10-CM

## 2019-01-04 RX ORDER — LOSARTAN POTASSIUM 50 MG/1
TABLET ORAL
Qty: 180 TABLET | Refills: 2 | Status: SHIPPED | OUTPATIENT
Start: 2019-01-04 | End: 2019-06-04

## 2019-01-22 ENCOUNTER — LAB VISIT (OUTPATIENT)
Dept: LAB | Facility: HOSPITAL | Age: 79
End: 2019-01-22
Attending: INTERNAL MEDICINE
Payer: MEDICARE

## 2019-01-22 DIAGNOSIS — N18.30 DIABETES MELLITUS WITH STAGE 3 CHRONIC KIDNEY DISEASE: Chronic | ICD-10-CM

## 2019-01-22 DIAGNOSIS — E11.22 DIABETES MELLITUS WITH STAGE 3 CHRONIC KIDNEY DISEASE: Chronic | ICD-10-CM

## 2019-01-22 DIAGNOSIS — I10 HYPERTENSION, ESSENTIAL: Chronic | ICD-10-CM

## 2019-01-22 LAB
ALBUMIN SERPL BCP-MCNC: 3.7 G/DL
ALP SERPL-CCNC: 107 U/L
ALT SERPL W/O P-5'-P-CCNC: 17 U/L
ANION GAP SERPL CALC-SCNC: 9 MMOL/L
AST SERPL-CCNC: 21 U/L
BASOPHILS # BLD AUTO: 0.06 K/UL
BASOPHILS NFR BLD: 1 %
BILIRUB SERPL-MCNC: 0.4 MG/DL
BUN SERPL-MCNC: 18 MG/DL
CALCIUM SERPL-MCNC: 9.8 MG/DL
CHLORIDE SERPL-SCNC: 105 MMOL/L
CHOLEST SERPL-MCNC: 169 MG/DL
CHOLEST/HDLC SERPL: 2.9 {RATIO}
CO2 SERPL-SCNC: 27 MMOL/L
CREAT SERPL-MCNC: 1.3 MG/DL
DIFFERENTIAL METHOD: ABNORMAL
EOSINOPHIL # BLD AUTO: 0.4 K/UL
EOSINOPHIL NFR BLD: 5.8 %
ERYTHROCYTE [DISTWIDTH] IN BLOOD BY AUTOMATED COUNT: 14.2 %
EST. GFR  (AFRICAN AMERICAN): 45.7 ML/MIN/1.73 M^2
EST. GFR  (NON AFRICAN AMERICAN): 39.7 ML/MIN/1.73 M^2
ESTIMATED AVG GLUCOSE: 171 MG/DL
GLUCOSE SERPL-MCNC: 148 MG/DL
HBA1C MFR BLD HPLC: 7.6 %
HCT VFR BLD AUTO: 35 %
HDLC SERPL-MCNC: 59 MG/DL
HDLC SERPL: 34.9 %
HGB BLD-MCNC: 11.1 G/DL
IMM GRANULOCYTES # BLD AUTO: 0.01 K/UL
IMM GRANULOCYTES NFR BLD AUTO: 0.2 %
LDLC SERPL CALC-MCNC: 90 MG/DL
LYMPHOCYTES # BLD AUTO: 1.9 K/UL
LYMPHOCYTES NFR BLD: 31.9 %
MCH RBC QN AUTO: 28.6 PG
MCHC RBC AUTO-ENTMCNC: 31.7 G/DL
MCV RBC AUTO: 90 FL
MONOCYTES # BLD AUTO: 0.4 K/UL
MONOCYTES NFR BLD: 7.1 %
NEUTROPHILS # BLD AUTO: 3.3 K/UL
NEUTROPHILS NFR BLD: 54 %
NONHDLC SERPL-MCNC: 110 MG/DL
NRBC BLD-RTO: 0 /100 WBC
PLATELET # BLD AUTO: 199 K/UL
PMV BLD AUTO: 12.3 FL
POTASSIUM SERPL-SCNC: 4.8 MMOL/L
PROT SERPL-MCNC: 7.2 G/DL
RBC # BLD AUTO: 3.88 M/UL
SODIUM SERPL-SCNC: 141 MMOL/L
TRIGL SERPL-MCNC: 100 MG/DL
WBC # BLD AUTO: 6.08 K/UL

## 2019-01-22 PROCEDURE — 80053 COMPREHEN METABOLIC PANEL: CPT

## 2019-01-22 PROCEDURE — 80061 LIPID PANEL: CPT

## 2019-01-22 PROCEDURE — 36415 COLL VENOUS BLD VENIPUNCTURE: CPT

## 2019-01-22 PROCEDURE — 85025 COMPLETE CBC W/AUTO DIFF WBC: CPT

## 2019-01-22 PROCEDURE — 83036 HEMOGLOBIN GLYCOSYLATED A1C: CPT

## 2019-01-23 NOTE — PROGRESS NOTES
PCP: Penny Randolph MD    Subjective:    Patient ID: Saul Mar is a 78 y.o. female.    PCP: Penny Randolph MD      Saul Mar is a pleasant 78 y.o. female presenting for her initial evaluation with me to establish care and evaluation on diabetes mellitus. She has had diabetes for 20 or more years. Since diagnosis she has struggled to maintain improvement in her glycemia. Currently she is on Metformin and Glimepiride. Her blood sugar range fasting has been (56)  and 2 hour post meal has been 180-200+, and she has been monitoring 0-2 times per day. Her current concerns are glycemic control.  She is to be enrolled in diabetes education classes and has attended diabetes education in the past.     Her comorbid conditions are, Diabetes Type 2, Hypertension, Hyperlipidemia and Obesity     She has the following diabetic complications, without complications    She denies any hospital admissions, emergency room visits, hypoglycemia, syncope, diaphoresis, chest pain, or dyspnea.    She has lost 1 pounds since last visit. Her BMI is 29.31 kg/m²    Her blood sugar in the clinic today was:   Lab Results   Component Value Date    POCGLU 161 (A) 01/24/2019       We discussed the American diabetes Association recommendations:  hemoglobin A1c below 7.0%; all diabetics should be on statins unless contraindicated; one aspirin daily unless contraindicated; fasting blood sugar between 80 and 130 mg/dL; postprandial blood sugar below 180 mg/dl; prevention of hypoglycemia, may adjust goals to higher levels if persistent; ACE or ARB therapy if not contraindicated; and maintain in an ideal body weight with BMI below 25.    Saul is compliant most of the time with DM medications.     Saul is noncompliant some of the time with lifestyle modifications to include activity and meal planning.       Current Outpatient Medications:     albuterol (PROVENTIL/VENTOLIN HFA) 90 mcg/actuation inhaler, Inhale 2 puffs  into the lungs every 6 (six) hours as needed for Wheezing. Rescue, Disp: 8 g, Rfl: 12    atorvastatin (LIPITOR) 80 MG tablet, Take 1 tablet (80 mg total) by mouth once daily., Disp: 90 tablet, Rfl: 0    blood sugar diagnostic Strp, 100 strips by Misc.(Non-Drug; Combo Route) route 2 (two) times daily., Disp: 100 strip, Rfl: 0    blood sugar diagnostic Strp, 1 strip by Misc.(Non-Drug; Combo Route) route 2 (two) times daily. Insurance preferred test stripes DX:E11.22, Disp: 100 strip, Rfl: 11    blood-glucose meter kit, Insurance preferred meter dx:E11.22, Disp: 1 each, Rfl: 0    brimonidine 0.2% (ALPHAGAN) 0.2 % Drop, Place 1 drop into both eyes every 8 (eight) hours., Disp: 6 mL, Rfl: 11    cholecalciferol, vitamin D3, (VITAMIN D3) 1,000 unit capsule, Take 1 capsule (1,000 Units total) by mouth once daily., Disp: 30 capsule, Rfl: 12    ferrous sulfate (FEOSOL) 325 mg (65 mg iron) Tab tablet, Take 1 tablet (325 mg total) by mouth 2 (two) times daily., Disp: 60 tablet, Rfl: 12    ferrous sulfate (FEOSOL) 325 mg (65 mg iron) Tab tablet, TAKE 1 TABLET (325 MG TOTAL) BY MOUTH 2 (TWO) TIMES DAILY., Disp: 180 tablet, Rfl: 0    glimepiride (AMARYL) 4 MG tablet, TAKE 1 TABLET BY MOUTH IN THE MORNING WITH BREAKFAST, Disp: 90 tablet, Rfl: 0    hydroCHLOROthiazide (HYDRODIURIL) 12.5 MG Tab, Take 1 tablet (12.5 mg total) by mouth once daily., Disp: 30 tablet, Rfl: 11    lancets (ACCU-CHEK SOFTCLIX LANCETS) Misc, 100 lancets by Misc.(Non-Drug; Combo Route) route 2 (two) times daily. Insurance preferred lancets Dx:E11.22, Disp: 100 each, Rfl: 11    losartan (COZAAR) 50 MG tablet, TAKE 1 TABLET BY MOUTH 2 (TWO) TIMES DAILY., Disp: 180 tablet, Rfl: 2    metFORMIN (GLUCOPHAGE-XR) 500 MG 24 hr tablet, Take 1 tablet (500 mg total) by mouth daily with breakfast. (Patient taking differently: Take 500 mg by mouth 2 (two) times daily with meals. ), Disp: 90 tablet, Rfl: 3    metoprolol succinate (TOPROL-XL) 50 MG 24 hr tablet,  Take 1 tablet by mouth once daily., Disp: , Rfl:     mupirocin (BACTROBAN) 2 % ointment, Apply topically 3 (three) times daily., Disp: 22 g, Rfl: 0    rivaroxaban (XARELTO) 20 mg Tab, Take 20 mg by mouth once daily., Disp: , Rfl:     amLODIPine (NORVASC) 10 MG tablet, Take 1 tablet (10 mg total) by mouth once daily., Disp: 30 tablet, Rfl: 11    Past Medical History:   Diagnosis Date    A-fib     Atrial fibrillation 10/22/2018    Back pain     Cataract     Degenerative disc disease     Diverticulosis     colonoscopy 9/22/2016    GERD (gastroesophageal reflux disease)     Glaucoma     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    Hypertension     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    Multinodular thyroid     Polyneuropathy     Type 2 diabetes with peripheral circulatory disorder, controlled     Type 2 diabetes, uncontrolled, 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/18/2015       Family History   Problem Relation Age of Onset    Stroke Mother     Mental illness Mother     Hypertension Mother     Stroke Father     Diabetes Maternal Grandmother     Drug abuse Daughter     Cancer Sister 68        gyn    Cancer Maternal Uncle         type not known    Heart disease Paternal Grandmother     Glaucoma Neg Hx     Macular degeneration Neg Hx     Alcohol abuse Neg Hx     COPD Neg Hx     Asthma Neg Hx     Amblyopia Neg Hx     Blindness Neg Hx     Cataracts Neg Hx     Retinal detachment Neg Hx     Strabismus Neg Hx          Social History     Socioeconomic History    Marital status:      Spouse name: Not on file    Number of children: Not on file    Years of education: Not on file    Highest education level: Not on file   Social Needs    Financial resource strain: Not on file    Food insecurity - worry: Not on file    Food insecurity - inability: Not on file    Transportation needs - medical: Not on file    Transportation needs - non-medical: Not on file    Occupational History    Not on file   Tobacco Use    Smoking status: Never Smoker    Smokeless tobacco: Never Used   Substance and Sexual Activity    Alcohol use: No    Drug use: No    Sexual activity: No     Partners: Male   Other Topics Concern    Not on file   Social History Narrative    , no pets or smokers in household. 1 Child who lives in nursing home. She has a grandson who lives in [place].. Retired from [employer] . Still drives. Does not have a Living Will or advanced directive.          STANDARDS OF CARE:  Eye doctor: Dr. Patterson, last exam 8/18/2018.  Dental exam: Recommend regular exams; denies gums bleeding.  Podiatry doctor:  ACE/ARB: Yes  Statin: Yes    ACTIVITY LEVEL: She exercises rarely.  BLOOD GLUCOSE TESTING: Self-monitoring with   SOCIAL HISTORY: , no pets or smokers in household. 1 Child who lives in nursing home. She has a grandson who lives in [place].. Retired from [employer] . Still drives. Does not have a Living Will or advanced directive.     Health Maintenance   Topic Date Due    Zoster Vaccine  05/15/2001    Hemoglobin A1c  07/22/2019    Eye Exam  08/10/2019    Colonoscopy  09/22/2019    Lipid Panel  01/22/2020    Foot Exam  01/24/2020    Mammogram  06/28/2020    TETANUS VACCINE  01/14/2021    DEXA SCAN  08/08/2021    Influenza Vaccine  Completed     Diabetes Management Status    Statin: Taking  ACE/ARB: Taking    Screening or Prevention Patient's value Goal Complete/Controlled?   HgA1C Testing and Control   Lab Results   Component Value Date    HGBA1C 7.6 (H) 01/22/2019      Annually/Less than 8% Yes   Lipid profile : 01/22/2019 Annually Yes   LDL control Lab Results   Component Value Date    LDLCALC 90.0 01/22/2019    Annually/Less than 100 mg/dl  Yes   Nephropathy screening Lab Results   Component Value Date    LABMICR 69.0 02/05/2015     Lab Results   Component Value Date    PROTEINUA 1+ (A) 10/19/2017    Annually No   Blood pressure BP  Readings from Last 1 Encounters:   01/24/19 122/84    Less than 140/90 No   Dilated retinal exam : 08/10/2018 Annually Yes   Foot exam   : 01/24/2019 Annually Yes     The following results were reviewed with patient.    Lab Results   Component Value Date    HGBA1C 7.6 (H) 01/22/2019    HGBA1C 8.5 (H) 10/05/2018    HGBA1C 7.9 (H) 06/29/2018       Lab Results   Component Value Date    WBC 6.08 01/22/2019    HGB 11.1 (L) 01/22/2019    HCT 35.0 (L) 01/22/2019     01/22/2019    CHOL 169 01/22/2019    TRIG 100 01/22/2019    HDL 59 01/22/2019    LDLCALC 90.0 01/22/2019    ALT 17 01/22/2019    AST 21 01/22/2019     01/22/2019    K 4.8 01/22/2019     01/22/2019    ANIONGAP 9 01/22/2019    CREATININE 1.3 01/22/2019    ESTGFRAFRICA 45.7 (A) 01/22/2019    EGFRNONAA 39.7 (A) 01/22/2019    BUN 18 01/22/2019    CO2 27 01/22/2019    TSH 1.042 06/29/2018    INR 0.9 10/30/2013     (H) 01/22/2019    UTPCR 0.65 (H) 02/20/2018       Lab Results   Component Value Date    CPEPTIDE 1.8 03/12/2009    FREET4 1.27 08/13/2015    TSH 1.042 06/29/2018    IRON 55 06/29/2018    TIBC 300 06/29/2018    FERRITIN 166 06/29/2018    LJAVPXHT56 1553 (H) 10/05/2018    CALCIUM 9.8 01/22/2019    PHOS 3.6 10/05/2018       Review of patient's allergies indicates:   Allergen Reactions    Pravachol [pravastatin] Nausea Only       Past Medical History:   Diagnosis Date    A-fib     Atrial fibrillation 10/22/2018    Back pain     Cataract     Degenerative disc disease     Diverticulosis     colonoscopy 9/22/2016    GERD (gastroesophageal reflux disease)     Glaucoma     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  Negative for activity change, appetite change, chills, diaphoresis, fatigue, fever and unexpected weight change.   HENT: Negative.  Negative for congestion, dental problem, drooling, ear discharge, ear pain, facial swelling, hearing loss, mouth sores, nosebleeds, postnasal drip, rhinorrhea, sinus pressure, sneezing, sore throat, tinnitus, trouble swallowing and voice change.    Eyes: Negative.  Negative for photophobia, pain, discharge, redness, itching and visual disturbance.   Respiratory: Negative.  Negative for apnea, cough, choking, chest tightness, shortness of breath, wheezing and stridor.    Cardiovascular: Negative.  Negative for chest pain, palpitations and leg swelling.   Gastrointestinal: Negative.  Negative for abdominal distention, abdominal pain, anal bleeding, blood in stool, constipation, diarrhea, nausea, rectal pain and vomiting.   Endocrine: Negative.  Negative for cold intolerance, heat intolerance, polydipsia, polyphagia and polyuria.   Genitourinary: Negative.  Negative for decreased urine volume, difficulty urinating, dyspareunia, dysuria, enuresis, flank pain, frequency, genital sores, hematuria, menstrual problem, pelvic pain, urgency, vaginal bleeding, vaginal discharge and vaginal pain.   Musculoskeletal: Negative.  Negative for arthralgias, back pain, gait problem, joint swelling, myalgias, neck pain and neck stiffness.   Skin: Negative.  Negative for color change, pallor, rash and wound.   Allergic/Immunologic: Negative.  Negative for environmental allergies, food allergies and immunocompromised state.   Neurological: Negative.  Negative for dizziness, tremors, seizures, syncope, facial asymmetry, speech difficulty, weakness, light-headedness, numbness and headaches.   Hematological: Negative.  Negative for adenopathy. Does not bruise/bleed easily.   Psychiatric/Behavioral: Negative.  Negative for agitation, behavioral problems, confusion, decreased concentration, dysphoric  "mood, hallucinations, self-injury, sleep disturbance and suicidal ideas. The patient is not nervous/anxious and is not hyperactive.           Objective:   Last 3 sets of Vitals:  Vitals - 1 value per visit 1/24/2019 1/24/2019 1/24/2019   SYSTOLIC 146 - 122   DIASTOLIC 72 - 84   PULSE - - 92   TEMPERATURE - - 96.4   RESPIRATIONS - - -   SPO2 - - 96   Weight (lb) 176.15 176.15 176.59   Weight (kg) 79.9 79.9 80.1   HEIGHT 5' 5" 5' 5" 5' 5"   BODY MASS INDEX 29.31 29.31 29.39   VISIT REPORT - - -   Pain Score  5 0 0   Some recent data might be hidden          Physical Exam   Constitutional: She is oriented to person, place, and time. She appears well-developed and well-nourished. She is cooperative.  Non-toxic appearance. She does not have a sickly appearance. She does not appear ill. No distress. She is not intubated.   HENT:   Head: Normocephalic and atraumatic. Not macrocephalic and not microcephalic. Head is without raccoon's eyes, without Lea's sign, without abrasion, without contusion, without laceration, without right periorbital erythema and without left periorbital erythema. Hair is normal.   Right Ear: Hearing, tympanic membrane, external ear and ear canal normal. No lacerations. No drainage, swelling or tenderness. No foreign bodies. No mastoid tenderness. Tympanic membrane is not injected, not scarred, not perforated, not erythematous, not retracted and not bulging. Tympanic membrane mobility is normal. No middle ear effusion. No hemotympanum. No decreased hearing is noted.   Left Ear: Hearing, tympanic membrane, external ear and ear canal normal. No lacerations. No drainage, swelling or tenderness. No foreign bodies. No mastoid tenderness. Tympanic membrane is not injected, not scarred, not perforated, not erythematous, not retracted and not bulging. Tympanic membrane mobility is normal.  No middle ear effusion. No hemotympanum. No decreased hearing is noted.   Nose: Nose normal. No mucosal edema, " rhinorrhea, nose lacerations, sinus tenderness, nasal deformity, septal deviation or nasal septal hematoma. No epistaxis.  No foreign bodies. Right sinus exhibits no maxillary sinus tenderness and no frontal sinus tenderness. Left sinus exhibits no maxillary sinus tenderness and no frontal sinus tenderness.   Mouth/Throat: Oropharynx is clear and moist. No oropharyngeal exudate.   Eyes: Conjunctivae and EOM are normal. Pupils are equal, round, and reactive to light. Right eye exhibits no chemosis, no discharge and no exudate. No foreign body present in the right eye. Left eye exhibits no chemosis, no discharge, no exudate and no hordeolum. No foreign body present in the left eye. Right conjunctiva is not injected. Right conjunctiva has no hemorrhage. Left conjunctiva is not injected. Left conjunctiva has no hemorrhage. No scleral icterus. Right eye exhibits normal extraocular motion and no nystagmus. Left eye exhibits normal extraocular motion and no nystagmus. Right pupil is round and reactive. Left pupil is round and reactive. Pupils are equal.   Neck: Trachea normal, normal range of motion and full passive range of motion without pain. Neck supple. Normal carotid pulses, no hepatojugular reflux and no JVD present. No tracheal tenderness, no spinous process tenderness and no muscular tenderness present. Carotid bruit is not present. No neck rigidity. No tracheal deviation, no edema, no erythema and normal range of motion present. No thyroid mass and no thyromegaly present.   Cardiovascular: Normal rate, regular rhythm, normal heart sounds and intact distal pulses.  No extrasystoles are present. PMI is not displaced. Exam reveals no gallop, no friction rub and no decreased pulses.   No murmur heard.  Pulses:       Dorsalis pedis pulses are 2+ on the right side, and 2+ on the left side.        Posterior tibial pulses are 2+ on the right side, and 2+ on the left side.   Pulmonary/Chest: Effort normal and breath  sounds normal. No accessory muscle usage or stridor. No apnea, no tachypnea and no bradypnea. She is not intubated. No respiratory distress. She has no decreased breath sounds. She has no wheezes. She has no rhonchi. She has no rales. Chest wall is not dull to percussion. She exhibits no mass, no tenderness, no bony tenderness, no laceration, no crepitus, no edema, no deformity, no swelling and no retraction.   Abdominal: Soft. Normal appearance and bowel sounds are normal. She exhibits no shifting dullness, no distension, no pulsatile liver, no fluid wave, no abdominal bruit, no ascites, no pulsatile midline mass and no mass. There is no hepatosplenomegaly, splenomegaly or hepatomegaly. There is no tenderness. There is no rigidity, no rebound, no guarding, no CVA tenderness, no tenderness at McBurney's point and negative Silverio's sign.   Musculoskeletal: Normal range of motion. She exhibits no edema or tenderness.        Right foot: There is normal range of motion and no deformity.        Left foot: There is normal range of motion and no deformity.   Feet:   Right Foot:   Protective Sensation: 5 sites tested. 5 sites sensed.   Skin Integrity: Negative for ulcer, blister, skin breakdown, erythema, warmth, callus or dry skin.   Left Foot:   Protective Sensation: 5 sites tested. 5 sites sensed.   Skin Integrity: Negative for ulcer, blister, skin breakdown, erythema, warmth, callus or dry skin.   Lymphadenopathy:        Head (right side): No submental, no submandibular, no tonsillar, no preauricular, no posterior auricular and no occipital adenopathy present.        Head (left side): No submental, no submandibular, no tonsillar, no preauricular, no posterior auricular and no occipital adenopathy present.     She has no cervical adenopathy.        Right cervical: No superficial cervical, no deep cervical and no posterior cervical adenopathy present.       Left cervical: No superficial cervical, no deep cervical and no  posterior cervical adenopathy present.     She has no axillary adenopathy.   Neurological: She is alert and oriented to person, place, and time. She has normal reflexes. She is not disoriented. She displays no atrophy, no tremor and normal reflexes. No cranial nerve deficit or sensory deficit. She exhibits normal muscle tone. She displays no seizure activity. Coordination and gait normal.   Reflex Scores:       Bicep reflexes are 2+ on the right side and 2+ on the left side.       Brachioradialis reflexes are 2+ on the right side and 2+ on the left side.       Patellar reflexes are 2+ on the right side and 2+ on the left side.  Skin: Skin is warm and dry. No abrasion, no bruising, no burn, no ecchymosis, no laceration, no lesion, no petechiae, no purpura and no rash noted. Rash is not macular, not papular, not maculopapular, not nodular, not pustular, not vesicular and not urticarial. She is not diaphoretic. No cyanosis or erythema. No pallor. Nails show no clubbing.   Psychiatric: She has a normal mood and affect. Her behavior is normal. Judgment and thought content normal. Her mood appears not anxious. Her affect is not angry, not blunt and not labile. Her speech is not rapid and/or pressured, not delayed, not tangential and not slurred. She is not agitated, not aggressive, not hyperactive, not slowed, not withdrawn, not actively hallucinating and not combative. Thought content is not paranoid and not delusional. Cognition and memory are not impaired. She does not express impulsivity or inappropriate judgment. She does not exhibit a depressed mood. She expresses no homicidal and no suicidal ideation. She expresses no suicidal plans and no homicidal plans. She is communicative. She exhibits normal recent memory and normal remote memory. She is attentive.   Nursing note and vitals reviewed.        Assessment:     EDUCATIONAL ASSESSMENT:  1. Impediments in learning class environment - NONE.  2. Needs improvement in  self-care management skills.    1. Diabetes mellitus with stage 3 chronic kidney disease    2. Mixed diabetic hyperlipidemia associated with type 2 diabetes mellitus      Plan:   Saul Mar is seen today for   1. Diabetes mellitus with stage 3 chronic kidney disease    2. Mixed diabetic hyperlipidemia associated with type 2 diabetes mellitus      We have discussed the etiology and treatment options associated with the diagnosis as well as alternatives. Also pertinent to this visit in decision making was hypertension, hyperlipidemia, and adherence..  She has elected the following treatments.     Diabetes mellitus with stage 3 chronic kidney disease  -     POCT Glucose, Hand-Held Device  -     GLUCOSE MONITORING CONTINUOUS MIN 72 HOURS; Future    Mixed diabetic hyperlipidemia associated with type 2 diabetes mellitus  -     POCT Glucose, Hand-Held Device  -     GLUCOSE MONITORING CONTINUOUS MIN 72 HOURS; Future      1.) Patient was instructed to monitor blood glucose four times daily, fasting, post meal and ac meals or at bedtime. Reminded to bring BG records or meter to each visit for review.  2.) Reviewed pathophysiology of diabetes, complications related to the disease, importance of annual dilated eye exam and self daily foot examination.  3.) Continue medications as prescribed Metformin and Glimepiride. [facility] MyChart or Phone review in 1 week with BG records for adjustment of medication.  4.) Refer patient to Dietician/CDE for ongoing diabetes education, meal planning, carbohydrate counting, and diabetes support.  5.) Discussed activity, benefits, methods, and precautions. Recommended patient start/continue some form of exercise and increase as tolerated to 60 minutes per day to facilitate weight loss and aid in control of BGs. Also reminded patient of WHO recommendation of 10,000 steps daily as a goal.   6.) A1C, TSH, Lipid Panel, CMP with eGFR and Micro/Creatinine.  7.) Return to clinic in 2 weeks  for follow up. Advised patient to call clinic with any questions or concerns.    A total of 60 minutes was spent in face to face time, of which 50 % was spent in counseling patient on disease process, complications, treatment, and side effects of medications.    The patient was explained the above plan and given opportunity to ask questions.  She understands, chooses and consents to this plan and accepts all the risks, which include but are not limited to the risks mentioned above.   She understands the alternative of having no testing, interventions or treatments at this time. She left content and without further questions.     Disclaimer:  This note is prepared using voice recognition software and as such is likely to have errors and has not been proof read. Please contact me for questions.

## 2019-01-24 ENCOUNTER — NUTRITION (OUTPATIENT)
Dept: DIABETES | Facility: CLINIC | Age: 79
End: 2019-01-24
Payer: MEDICARE

## 2019-01-24 ENCOUNTER — OFFICE VISIT (OUTPATIENT)
Dept: INTERNAL MEDICINE | Facility: CLINIC | Age: 79
End: 2019-01-24
Payer: MEDICARE

## 2019-01-24 ENCOUNTER — HOSPITAL ENCOUNTER (OUTPATIENT)
Dept: RADIOLOGY | Facility: HOSPITAL | Age: 79
Discharge: HOME | End: 2019-01-24
Attending: NURSE PRACTITIONER
Payer: MEDICARE

## 2019-01-24 ENCOUNTER — OFFICE VISIT (OUTPATIENT)
Dept: DIABETES | Facility: CLINIC | Age: 79
End: 2019-01-24
Payer: MEDICARE

## 2019-01-24 ENCOUNTER — CLINICAL SUPPORT (OUTPATIENT)
Dept: DIABETES | Facility: CLINIC | Age: 79
End: 2019-01-24
Payer: MEDICARE

## 2019-01-24 VITALS
WEIGHT: 176.13 LBS | BODY MASS INDEX: 29.34 KG/M2 | SYSTOLIC BLOOD PRESSURE: 146 MMHG | HEIGHT: 65 IN | BODY MASS INDEX: 29.34 KG/M2 | WEIGHT: 176.13 LBS | HEIGHT: 65 IN | DIASTOLIC BLOOD PRESSURE: 72 MMHG

## 2019-01-24 VITALS
BODY MASS INDEX: 29.42 KG/M2 | HEART RATE: 92 BPM | WEIGHT: 176.56 LBS | OXYGEN SATURATION: 96 % | DIASTOLIC BLOOD PRESSURE: 84 MMHG | HEIGHT: 65 IN | TEMPERATURE: 96 F | SYSTOLIC BLOOD PRESSURE: 122 MMHG

## 2019-01-24 DIAGNOSIS — R42 DIZZINESS: ICD-10-CM

## 2019-01-24 DIAGNOSIS — E11.22 DIABETES MELLITUS WITH STAGE 3 CHRONIC KIDNEY DISEASE: Chronic | ICD-10-CM

## 2019-01-24 DIAGNOSIS — E11.69 MIXED DIABETIC HYPERLIPIDEMIA ASSOCIATED WITH TYPE 2 DIABETES MELLITUS: Chronic | ICD-10-CM

## 2019-01-24 DIAGNOSIS — N18.30 DIABETES MELLITUS WITH STAGE 3 CHRONIC KIDNEY DISEASE: Primary | ICD-10-CM

## 2019-01-24 DIAGNOSIS — E78.2 MIXED DIABETIC HYPERLIPIDEMIA ASSOCIATED WITH TYPE 2 DIABETES MELLITUS: Chronic | ICD-10-CM

## 2019-01-24 DIAGNOSIS — Z86.73 HISTORY OF STROKE: ICD-10-CM

## 2019-01-24 DIAGNOSIS — N18.30 DIABETES MELLITUS WITH STAGE 3 CHRONIC KIDNEY DISEASE: Chronic | ICD-10-CM

## 2019-01-24 DIAGNOSIS — E11.22 DIABETES MELLITUS WITH STAGE 3 CHRONIC KIDNEY DISEASE: Primary | ICD-10-CM

## 2019-01-24 DIAGNOSIS — F41.8 MIXED ANXIETY DEPRESSIVE DISORDER: ICD-10-CM

## 2019-01-24 DIAGNOSIS — E11.49 TYPE II DIABETES MELLITUS WITH NEUROLOGICAL MANIFESTATIONS: Chronic | ICD-10-CM

## 2019-01-24 DIAGNOSIS — Z09 FOLLOW UP: Primary | ICD-10-CM

## 2019-01-24 DIAGNOSIS — I10 HYPERTENSION, ESSENTIAL: Chronic | ICD-10-CM

## 2019-01-24 DIAGNOSIS — N18.30 DIABETES MELLITUS WITH STAGE 3 CHRONIC KIDNEY DISEASE: Primary | Chronic | ICD-10-CM

## 2019-01-24 DIAGNOSIS — D50.1 IRON DEFICIENCY ANEMIA DUE TO SIDEROPENIC DYSPHAGIA: ICD-10-CM

## 2019-01-24 DIAGNOSIS — I48.0 PAF (PAROXYSMAL ATRIAL FIBRILLATION): ICD-10-CM

## 2019-01-24 DIAGNOSIS — E11.22 DIABETES MELLITUS WITH STAGE 3 CHRONIC KIDNEY DISEASE: Primary | Chronic | ICD-10-CM

## 2019-01-24 LAB — GLUCOSE SERPL-MCNC: 161 MG/DL (ref 70–110)

## 2019-01-24 PROCEDURE — 99215 OFFICE O/P EST HI 40 MIN: CPT | Mod: S$GLB,,, | Performed by: PHYSICIAN ASSISTANT

## 2019-01-24 PROCEDURE — 82962 GLUCOSE BLOOD TEST: CPT | Mod: S$GLB,,, | Performed by: PHYSICIAN ASSISTANT

## 2019-01-24 PROCEDURE — 99999 PR PBB SHADOW E&M-EST. PATIENT-LVL III: CPT | Mod: PBBFAC,,, | Performed by: DIETITIAN, REGISTERED

## 2019-01-24 PROCEDURE — 3074F PR MOST RECENT SYSTOLIC BLOOD PRESSURE < 130 MM HG: ICD-10-PCS | Mod: CPTII,S$GLB,, | Performed by: NURSE PRACTITIONER

## 2019-01-24 PROCEDURE — 1101F PT FALLS ASSESS-DOCD LE1/YR: CPT | Mod: CPTII,S$GLB,, | Performed by: PHYSICIAN ASSISTANT

## 2019-01-24 PROCEDURE — 99215 PR OFFICE/OUTPT VISIT, EST, LEVL V, 40-54 MIN: ICD-10-PCS | Mod: S$GLB,,, | Performed by: PHYSICIAN ASSISTANT

## 2019-01-24 PROCEDURE — 82962 POCT GLUCOSE, HAND-HELD DEVICE: ICD-10-PCS | Mod: S$GLB,,, | Performed by: PHYSICIAN ASSISTANT

## 2019-01-24 PROCEDURE — 1101F PT FALLS ASSESS-DOCD LE1/YR: CPT | Mod: CPTII,S$GLB,, | Performed by: NURSE PRACTITIONER

## 2019-01-24 PROCEDURE — G0108 DIAB MANAGE TRN  PER INDIV: HCPCS | Mod: S$GLB,,, | Performed by: DIETITIAN, REGISTERED

## 2019-01-24 PROCEDURE — 1101F PR PT FALLS ASSESS DOC 0-1 FALLS W/OUT INJ PAST YR: ICD-10-PCS | Mod: CPTII,S$GLB,, | Performed by: NURSE PRACTITIONER

## 2019-01-24 PROCEDURE — 70450 CT HEAD/BRAIN W/O DYE: CPT | Mod: 26,,, | Performed by: RADIOLOGY

## 2019-01-24 PROCEDURE — 99999 PR PBB SHADOW E&M-EST. PATIENT-LVL V: ICD-10-PCS | Mod: PBBFAC,,, | Performed by: NURSE PRACTITIONER

## 2019-01-24 PROCEDURE — 99499 UNLISTED E&M SERVICE: CPT | Mod: S$GLB,,, | Performed by: NURSE PRACTITIONER

## 2019-01-24 PROCEDURE — 70450 CT HEAD WITHOUT CONTRAST: ICD-10-PCS | Mod: 26,,, | Performed by: RADIOLOGY

## 2019-01-24 PROCEDURE — 99214 OFFICE O/P EST MOD 30 MIN: CPT | Mod: S$GLB,,, | Performed by: NURSE PRACTITIONER

## 2019-01-24 PROCEDURE — G0108 PR DIAB MANAGE TRN  PER INDIV: ICD-10-PCS | Mod: S$GLB,,, | Performed by: DIETITIAN, REGISTERED

## 2019-01-24 PROCEDURE — 3074F SYST BP LT 130 MM HG: CPT | Mod: CPTII,S$GLB,, | Performed by: NURSE PRACTITIONER

## 2019-01-24 PROCEDURE — 3079F DIAST BP 80-89 MM HG: CPT | Mod: CPTII,S$GLB,, | Performed by: NURSE PRACTITIONER

## 2019-01-24 PROCEDURE — 70450 CT HEAD/BRAIN W/O DYE: CPT | Mod: TC

## 2019-01-24 PROCEDURE — 99499 RISK ADDL DX/OHS AUDIT: ICD-10-PCS | Mod: S$GLB,,, | Performed by: NURSE PRACTITIONER

## 2019-01-24 PROCEDURE — 3074F SYST BP LT 130 MM HG: CPT | Mod: CPTII,S$GLB,, | Performed by: PHYSICIAN ASSISTANT

## 2019-01-24 PROCEDURE — 3078F DIAST BP <80 MM HG: CPT | Mod: CPTII,S$GLB,, | Performed by: PHYSICIAN ASSISTANT

## 2019-01-24 PROCEDURE — 99999 PR PBB SHADOW E&M-EST. PATIENT-LVL III: ICD-10-PCS | Mod: PBBFAC,,, | Performed by: PHYSICIAN ASSISTANT

## 2019-01-24 PROCEDURE — 3079F PR MOST RECENT DIASTOLIC BLOOD PRESSURE 80-89 MM HG: ICD-10-PCS | Mod: CPTII,S$GLB,, | Performed by: NURSE PRACTITIONER

## 2019-01-24 PROCEDURE — 3078F PR MOST RECENT DIASTOLIC BLOOD PRESSURE < 80 MM HG: ICD-10-PCS | Mod: CPTII,S$GLB,, | Performed by: PHYSICIAN ASSISTANT

## 2019-01-24 PROCEDURE — 99999 PR PBB SHADOW E&M-EST. PATIENT-LVL III: CPT | Mod: PBBFAC,,, | Performed by: PHYSICIAN ASSISTANT

## 2019-01-24 PROCEDURE — 99999 PR PBB SHADOW E&M-EST. PATIENT-LVL III: ICD-10-PCS | Mod: PBBFAC,,, | Performed by: DIETITIAN, REGISTERED

## 2019-01-24 PROCEDURE — 99999 PR PBB SHADOW E&M-EST. PATIENT-LVL V: CPT | Mod: PBBFAC,,, | Performed by: NURSE PRACTITIONER

## 2019-01-24 PROCEDURE — 3074F PR MOST RECENT SYSTOLIC BLOOD PRESSURE < 130 MM HG: ICD-10-PCS | Mod: CPTII,S$GLB,, | Performed by: PHYSICIAN ASSISTANT

## 2019-01-24 PROCEDURE — 99214 PR OFFICE/OUTPT VISIT, EST, LEVL IV, 30-39 MIN: ICD-10-PCS | Mod: S$GLB,,, | Performed by: NURSE PRACTITIONER

## 2019-01-24 PROCEDURE — 1101F PR PT FALLS ASSESS DOC 0-1 FALLS W/OUT INJ PAST YR: ICD-10-PCS | Mod: CPTII,S$GLB,, | Performed by: PHYSICIAN ASSISTANT

## 2019-01-24 RX ORDER — AMLODIPINE BESYLATE 10 MG/1
10 TABLET ORAL DAILY
Qty: 30 TABLET | Refills: 11 | Status: SHIPPED | OUTPATIENT
Start: 2019-01-24 | End: 2019-11-18

## 2019-01-24 NOTE — LETTER
January 24, 2019        Penny Randolph MD  [address]  [facility] [address]             [facility]  [address] [place] [address]  Phone: [phone]  Fax: [phone]   Patient: Saul Mar   MR Number: [id number]   YOB: 1940   Date of Visit: 1/24/2019       Dear Dr. Randolph:    Thank you for referring Saul Mar to me for evaluation. Below are the relevant portions of my assessment and plan of care.    If you have questions, please do not hesitate to call me. I look forward to following Saul along with you.    Sincerely,      Elizabeth Griffin RD, CDE           CC  Alan Alfonso Jr., PAELEAZAR

## 2019-01-24 NOTE — PROGRESS NOTES
Diabetes Education  Author: Elizabeth Griffin RD, CDE  Date: 1/24/2019    Diabetes Care Management Summary  Diabetes Education Record Assessment/Progress: Comprehensive/Group  Current Diabetes Risk Level: Moderate     Last A1c:   Lab Results   Component Value Date    HGBA1C 7.6 (H) 01/22/2019     Last visit with Diabetes Educator: : 12/18/2018    Diabetes Type  Diabetes Type : Type II    Diabetes History  Diabetes Diagnosis: (>20yrs )  Current Treatment: Oral Medication, Diet, Exercise(amaryl 4mg bfst daily, metformin xr 500 mg 1tab twice daily)  Reviewed Problem List with Patient: Yes    Health Maintenance was reviewed today with patient. Discussed with patient importance of routine eye exams, foot exams/foot care, blood work (i.e.: A1c, microalbumin, and lipid), dental visits, yearly flu vaccine, and pneumonia vaccine as indicated by PCP. Patient verbalized understanding.     Health Maintenance Topics with due status: Not Due       Topic Last Completion Date    TETANUS VACCINE 01/14/2011    Colonoscopy 09/22/2016    DEXA SCAN 08/08/2017    Mammogram 06/28/2018    Eye Exam 08/10/2018    Lipid Panel 01/22/2019    Hemoglobin A1c 01/22/2019    Foot Exam 01/24/2019     Health Maintenance Due   Topic Date Due    Zoster Vaccine  05/15/2001       Nutrition  Meal Planning: (Intake ~1600cals/d; wt decreased 6 lbs since 12/18. Redued carb intake -switched from reg to sprite zero. Reports limiting sodium intake. Occs misses meal, contributing to hypoglycemia.)  Meal Plan 24 Hour Recall - Breakfast: boiled egg, apple, 1 slc wheat brd OR sausage mcmuffin few times per week - water, coffee  Meal Plan 24 Hour Recall - Lunch: COA (3d/wk) fish, mixed veg, mac/cheese - sprite zero, water  Meal Plan 24 Hour Recall - Dinner: cornflakes, 2% milk, 5 grapes - water  Meal Plan 24 Hour Recall - Snack: pecans; meliza: mostly water, switched to sprite zero    Monitoring   Self Monitoring : Per glucometer review, BG ranges from . Pt  reports hypoglycemia due to irregular meal patterns, which she treats using food.   Blood Glucose Logs: No   Noted Mary pro started today for CGMS study.     Exercise   Frequency: Never    Current Diabetes Treatment   Current Treatment: Oral Medication, Diet, Exercise(amaryl 4mg bfst daily, metformin xr 500 mg 1tab twice daily)    Social History  Preferred Learning Method: Face to Face  Primary Support: Self  Smoking Status: Never a Smoker  Alcohol Use: Never     Barriers to Change  Barriers to Change: None  Learning Challenges : None    Readiness to Learn   Readiness to Learn : Eager    Cultural Influences  Cultural Influences: No    Diabetes Education Assessment/Progress  Diabetes Disease Process (diabetes disease process and treatment options): Discussion, Individual Session, Demonstrates Understanding/Competency(verbalizes/demonstrates)  Nutrition (Incorporating nutritional management into one's lifestyle): Discussion, Individual Session, Demonstrates Understanding/Competency (verbalizes/demonstrates)  Physical Activity (incorporating physical activity into one's lifestyle): Discussion, Individual Session, Demonstrates Understanding/Competency (verbalizes/demonstrates)  Medications (states correct name, dose, onset, peak, duration, side effects & timing of meds): Discussion, Individual Session, Demonstrates Understanding/Competency(verbalizes/demonstrates), Written Materials Provided  Monitoring (monitoring blood glucose/other parameters & using results): Discussion, Individual Session, Demonstrates Understanding/Competency (verbalizes/demonstrates)( )  Acute Complications (preventing, detecting, and treating acute complications): Discussion, Individual Session, Demonstrates Understanding/Competency (verbalizes/demonstrates)  Chronic Complications (preventing, detecting, and treating chronic complications): Discussion, Individual Session, Demonstrates Understanding/Competency (verbalizes/demonstrates)  Clinical  (diabetes, other pertinent medical history, and relevant comorbidities reviewed during visit): Discussion, Individual Session, Demonstrates Understanding/Competency (verbalizes/demonstrates)  Cognitive (knowledge of self-management skills, functional health literacy): Discussion, Individual Session, Demonstrates Understanding/Competency (verbalizes/demonstrates)  Psychosocial (emotional response to diabetes): Discussion, Individual Session, Demonstrates Understanding/Competency (verbalizes/demonstrates)  Diabetes Distress and Support Systems: Discussion, Individual Session, Demonstrates Understanding/Competency (verbalizes/demonstrates)  Behavioral (readiness for change, lifestyle practices, self-care behaviors): Discussion, Individual Session, Demonstrates Understanding/Competency (verbalizes/demonstrates)    Goals  Patient has selected/evaluated goals during today's session: Yes, evaluated  Healthy Eating: Set(Decrease dining out freq, use MR entrees (list provided) to assist)  Met Percentage : 50%  Start Date: 01/24/19  Target Date: 03/07/19  Physical Activity: Set(150min/wk - COA classes/mall walking)  Met Percentage : 25%  Start Date: 01/24/19  Target Date: 03/07/19  Monitoring: Set(test BG 2x/d -fst, acd or 2hr ppd; bring meter to clinic)  Met Percentage : 25%  Start Date: 01/24/19  Target Date: 03/07/19      Diabetes Care Plan/Intervention  Education Plan/Intervention: Individual Follow-Up DSMT, Endocrine Provider Visit Set Up(Maintain visits w/ Mr Ory for medical mgmt. Encouraged progress - noted A1C decrease from 8.5 to 7.6. Emphasis on use of MR, freq small meals to improve BG stability and adding COA chair activities. Pt may benefit from increase metformin. )    Diabetes Meal Plan  Restrictions: Low Fat, Low Sodium  Calories: 1200, 1400  Carbohydrate Per Meal: 30-45g  Carbohydrate Per Snack : 7-15g    Today's Self-Management Care Plan was developed with the patient's input and is based on barriers  identified during today's assessment.    The long and short-term goals in the care plan were written with the patient/caregiver's input. The patient has agreed to work toward these goals to improve her overall diabetes control.      The patient received a copy of today's self-management plan and verbalized understanding of the care plan, goals, and all of today's instructions.      The patient was encouraged to communicate with her physician and care team regarding her condition(s) and treatment.  I provided the patient with my contact information today and encouraged her to contact me via phone or patient portal as needed.     Education Units of Time   Time Spent: 30 min

## 2019-01-24 NOTE — PROGRESS NOTES
Subjective:       Patient ID: Saul Mar is a 78 y.o. female.    Chief Complaint: Follow-up (urgent care)    HPI    Pt presents for urgent care follow up  She was seen at [facility] urgent care for intermittent dizziness  She was given meclizine, EKG and BG was checked, EKG = bradycardia - BG elevated but not severely abnormal.   Pt recently had labs on 1/22 per PCP Dr. Randolph in [place]- all labs reviewed- all normal except HGA1c- it was improved from prior. Pt has been seen by Diabetes/nutrition education this morning.   She admits to skipping meals at times. Reports that meclizine did not help her dizziness. She reports fluctuating BP. She has her BP machine with her today. Readings 140s-190s/80s-100s. She reports that the dizziness occurs regardless of her position. No falls, syncope, chest pain, sob, weakness, confusion, or palpitations. She does have a hx of CVA. She reports compliance with all meds. Cardiologist Dr. Grayson- up to date with follow up. No infectious symptoms. No falls or head trauma.     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 Taveras    COLONOSCOPY      COLONOSCOPY N/A 9/22/2016    [facility] (4TH F[facility])    COLONOSCOPY N/A 5/20/2013    [facility] at [facility] (4TH FLR)     ESOPHAGOGASTRODUODENOSCOPY (EGD) N/A 6/21/2016    [facility] (4[facility])    EYE SURGERY Bilateral 2002 approx    Laser for glaucoma    HYSTERECTOMY  1963     AMEENA/USO- fibroids; no cancer    INSERTION, IOL PROSTHESIS Left 2/27/2013    [facility] at [facility] OR 1ST FLR    OOPHORECTOMY  1963    unknown, only removed one    PHACOEMULSIFICATION, CATARACT Left 2/27/2013    [facility] 1ST FLR     Social History     Socioeconomic History    Marital status:      Spouse name: Not on file    Number of children: Not on file    Years of education: Not on file    Highest education level: Not on file   Social Needs    Financial resource strain: Not on file    Food insecurity - worry: Not on file    Food insecurity - inability: Not on file    Transportation needs - medical: Not on file    Transportation needs - non-medical: Not on file   Occupational History    Not on file   Tobacco Use    Smoking status: Never Smoker    Smokeless tobacco: Never Used   Substance and Sexual Activity    Alcohol use: No    Drug use: No    Sexual activity: No     Partners: Male   Other Topics Concern    Not on file   Social History Narrative    , no pets or smokers in household. 1 Child who lives in nursing home. She has a grandson who lives in [place].. Retired from [employer] . Still drives. Does not have a Living Will or advanced directive.      Review of patient's allergies indicates:   Allergen Reactions    Pravachol [pravastatin] Nausea Only     Current Outpatient Medications   Medication Sig    albuterol (PROVENTIL/VENTOLIN HFA) 90 mcg/actuation inhaler Inhale 2 puffs into the lungs every 6 (six) hours as needed for Wheezing. Rescue    atorvastatin (LIPITOR) 80 MG tablet Take 1 tablet (80 mg total) by mouth once daily.    blood sugar diagnostic Strp 100 strips by Misc.(Non-Drug; Combo Route) route 2 (two)  times daily.    blood sugar diagnostic Strp 1 strip by Misc.(Non-Drug; Combo Route) route 2 (two) times daily. Insurance preferred test stripes DX:E11.22    blood-glucose meter kit Insurance preferred meter dx:E11.22    brimonidine 0.2% (ALPHAGAN) 0.2 % Drop Place 1 drop into both eyes every 8 (eight) hours.    cholecalciferol, vitamin D3, (VITAMIN D3) 1,000 unit capsule Take 1 capsule (1,000 Units total) by mouth once daily.    ferrous sulfate (FEOSOL) 325 mg (65 mg iron) Tab tablet Take 1 tablet (325 mg total) by mouth 2 (two) times daily.    ferrous sulfate (FEOSOL) 325 mg (65 mg iron) Tab tablet TAKE 1 TABLET (325 MG TOTAL) BY MOUTH 2 (TWO) TIMES DAILY.    glimepiride (AMARYL) 4 MG tablet TAKE 1 TABLET BY MOUTH IN THE MORNING WITH BREAKFAST    hydroCHLOROthiazide (HYDRODIURIL) 12.5 MG Tab Take 1 tablet (12.5 mg total) by mouth once daily.    lancets (ACCU-CHEK SOFTCLIX LANCETS) Misc 100 lancets by Misc.(Non-Drug; Combo Route) route 2 (two) times daily. Insurance preferred lancets Dx:E11.22    losartan (COZAAR) 50 MG tablet TAKE 1 TABLET BY MOUTH 2 (TWO) TIMES DAILY.    metFORMIN (GLUCOPHAGE-XR) 500 MG 24 hr tablet Take 1 tablet (500 mg total) by mouth daily with breakfast. (Patient taking differently: Take 500 mg by mouth 2 (two) times daily with meals. )    metoprolol succinate (TOPROL-XL) 50 MG 24 hr tablet Take 1 tablet by mouth once daily.    mupirocin (BACTROBAN) 2 % ointment Apply topically 3 (three) times daily.    rivaroxaban (XARELTO) 20 mg Tab Take 20 mg by mouth once daily.    amLODIPine (NORVASC) 10 MG tablet Take 1 tablet (10 mg total) by mouth once daily.     No current facility-administered medications for this visit.            Review of Systems   Constitutional: Negative for activity change, appetite change, chills, diaphoresis, fatigue, fever and unexpected weight change.   HENT: Negative for congestion, ear pain, postnasal drip, rhinorrhea, sinus pressure, sinus pain, sneezing,  sore throat, tinnitus, trouble swallowing and voice change.    Eyes: Negative for photophobia, pain and visual disturbance.   Respiratory: Negative for cough, chest tightness, shortness of breath and wheezing.    Cardiovascular: Negative for chest pain, palpitations and leg swelling.   Gastrointestinal: Negative for abdominal distention, abdominal pain, constipation, diarrhea, nausea and vomiting.   Genitourinary: Negative for decreased urine volume, difficulty urinating, dysuria, flank pain, frequency, hematuria and urgency.   Musculoskeletal: Negative for arthralgias, back pain, joint swelling, neck pain and neck stiffness.   Allergic/Immunologic: Negative for immunocompromised state.   Neurological: Positive for dizziness and light-headedness. Negative for tremors, seizures, syncope, facial asymmetry, speech difficulty, weakness, numbness and headaches.   Hematological: Negative for adenopathy. Does not bruise/bleed easily.   Psychiatric/Behavioral: Negative for confusion and sleep disturbance.       Objective:      Physical Exam   Constitutional: She is oriented to person, place, and time.   HENT:   Head: Normocephalic and atraumatic.   Right Ear: Tympanic membrane normal.   Left Ear: Tympanic membrane normal.   Eyes: Conjunctivae and EOM are normal.   Neck: Normal range of motion. Neck supple.   Cardiovascular: Normal rate, regular rhythm, normal heart sounds and intact distal pulses.   Pulmonary/Chest: Effort normal and breath sounds normal.   Abdominal: Soft. Bowel sounds are normal.   Musculoskeletal: Normal range of motion.   Neurological: She is alert and oriented to person, place, and time. She has normal strength. She displays normal reflexes. No cranial nerve deficit or sensory deficit. She exhibits normal muscle tone. She displays a negative Romberg sign. Coordination normal. GCS eye subscore is 4. GCS verbal subscore is 5. GCS motor subscore is 6.   Skin: Skin is warm and dry. Capillary refill takes  "less than 2 seconds.   Psychiatric: She has a normal mood and affect. Her speech is normal and behavior is normal. Thought content normal.       Assessment:     Vitals:    01/24/19 1313   BP: 122/84   Pulse: 92   Temp: 96.4 °F (35.8 °C)         1. Follow up    2. Dizziness    3. Hypertension, essential    4. History of stroke    5. Diabetes mellitus with stage 3 chronic kidney disease    6. Type II diabetes mellitus with neurological manifestations    7. 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 Mixed anxiety depressive disorder    9. PAF (paroxysmal atrial fibrillation)        Plan:   Follow up    Dizziness  -     CT Head Without Contrast; Future; Expected date: 01/24/2019    Hypertension, essential  -     amLODIPine (NORVASC) 10 MG tablet; Take 1 tablet (10 mg total) by mouth once daily.  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 Pt still feels "weird" in the head  BP at home consistently elevated >150/90- increase norvasc from 5 to 10 mg daily  BP diary- document specific symptoms w/ blood pressure- (BID)   CT head now  Pt asked again to change to a local PCP- she refuses, but considers having 2 doctors instead  Will have her follow up with MD in 7-10 days.   CT review and recommendations to follow  Symptoms that warrant ER evaluation discussed in detail with patient  "

## 2019-01-24 NOTE — PROGRESS NOTES
Patient is here today in clinic for placement of Continuous Glucose Monitor Sensor (CGMS). Site is the back of her upper right arm site was cleaned and sensor was placed and started. Patient didn't ask any further questions regarding sensor placement. Patient was instructed to continue all daily activities such as shower and also to check BG levels as instructed. Patient was also informed to avoid any imaging procedures and acetaminophen during her procedure. Patient voiced understanding of all instructions given. Patient is scheduled to come back for sensor to be downloaded.

## 2019-01-24 NOTE — LETTER
January 29, 2019      Elizabeth Griffin RD, CDE  [address]  [place] [address]           [facility] - Diabetes Management  [address] [place] [address]  Phone: [phone]  Fax: [phone]          Patient: Saul Mar   MR Number: [id number]   YOB: 1940   Date of Visit: 1/24/2019       Dear Elizabeth Griffin:    Thank you for referring Saul Mar to me for evaluation. Attached you will find relevant portions of my assessment and plan of care.    If you have questions, please do not hesitate to call me. I look forward to following Saul Mar along with you.    Sincerely,    Dustin Thorne  CC:  No Recipients    If you would like to receive this communication electronically, please contact [email or web address] or [phone] to request more information on [facility] Link access.    For providers and/or their staff who would like to refer a patient to [facility], please contact us through our one-stop-shop provider referral line, [facility] , at [phone].    If you feel you have received this communication in error or would no longer like to receive these types of communications, please e-mail [email or web address]

## 2019-02-07 NOTE — PROGRESS NOTES
PCP: Penny Randolph MD    Subjective:    Patient ID: Saul Mar is a 78 y.o. female.    PCP: Penny Randolph MD      Saul Mar is a pleasant 78 y.o. female presenting for her initial evaluation with me to establish care and evaluation on diabetes mellitus. She has had diabetes for 20 or more years. Since diagnosis she has struggled to maintain improvement in her glycemia. Currently she is on Metformin and Glimepiride. Her blood sugar range fasting has been (56)  and 2 hour post meal has been 180-200+, and she has been monitoring 0-2 times per day. Her current concerns are glycemic control.  She is to be enrolled in diabetes education classes and has attended diabetes education in the past.     Her comorbid conditions are, Diabetes Type 2, Hypertension, Hyperlipidemia and Obesity     She has the following diabetic complications, without complications    She denies any hospital admissions, emergency room visits, hypoglycemia, syncope, diaphoresis, chest pain, or dyspnea.    She has lost 1 pounds since last visit. Her BMI is      Her blood sugar in the clinic today was:   Lab Results   Component Value Date    POCGLU 161 (A) 01/24/2019       We discussed the American diabetes Association recommendations:  hemoglobin A1c below 7.0%; all diabetics should be on statins unless contraindicated; one aspirin daily unless contraindicated; fasting blood sugar between 80 and 130 mg/dL; postprandial blood sugar below 180 mg/dl; prevention of hypoglycemia, may adjust goals to higher levels if persistent; ACE or ARB therapy if not contraindicated; and maintain in an ideal body weight with BMI below 25.    Saul is compliant most of the time with DM medications.     Saul is noncompliant some of the time with lifestyle modifications to include activity and meal planning.       Current Outpatient Medications:     albuterol (PROVENTIL/VENTOLIN HFA) 90 mcg/actuation inhaler, Inhale 2 puffs into the  lungs every 6 (six) hours as needed for Wheezing. Rescue, Disp: 8 g, Rfl: 12    amLODIPine (NORVASC) 10 MG tablet, Take 1 tablet (10 mg total) by mouth once daily., Disp: 30 tablet, Rfl: 11    atorvastatin (LIPITOR) 80 MG tablet, Take 1 tablet (80 mg total) by mouth once daily., Disp: 90 tablet, Rfl: 0    blood sugar diagnostic Strp, 100 strips by Misc.(Non-Drug; Combo Route) route 2 (two) times daily., Disp: 100 strip, Rfl: 0    blood sugar diagnostic Strp, 1 strip by Misc.(Non-Drug; Combo Route) route 2 (two) times daily. Insurance preferred test stripes DX:E11.22, Disp: 100 strip, Rfl: 11    blood-glucose meter kit, Insurance preferred meter dx:E11.22, Disp: 1 each, Rfl: 0    brimonidine 0.2% (ALPHAGAN) 0.2 % Drop, Place 1 drop into both eyes every 8 (eight) hours., Disp: 6 mL, Rfl: 11    cholecalciferol, vitamin D3, (VITAMIN D3) 1,000 unit capsule, Take 1 capsule (1,000 Units total) by mouth once daily., Disp: 30 capsule, Rfl: 12    ferrous sulfate (FEOSOL) 325 mg (65 mg iron) Tab tablet, Take 1 tablet (325 mg total) by mouth 2 (two) times daily., Disp: 60 tablet, Rfl: 12    ferrous sulfate (FEOSOL) 325 mg (65 mg iron) Tab tablet, TAKE 1 TABLET (325 MG TOTAL) BY MOUTH 2 (TWO) TIMES DAILY., Disp: 180 tablet, Rfl: 0    glimepiride (AMARYL) 4 MG tablet, TAKE 1 TABLET BY MOUTH IN THE MORNING WITH BREAKFAST, Disp: 90 tablet, Rfl: 0    hydroCHLOROthiazide (HYDRODIURIL) 12.5 MG Tab, Take 1 tablet (12.5 mg total) by mouth once daily., Disp: 30 tablet, Rfl: 11    lancets (ACCU-CHEK SOFTCLIX LANCETS) Misc, 100 lancets by Misc.(Non-Drug; Combo Route) route 2 (two) times daily. Insurance preferred lancets Dx:E11.22, Disp: 100 each, Rfl: 11    losartan (COZAAR) 50 MG tablet, TAKE 1 TABLET BY MOUTH 2 (TWO) TIMES DAILY., Disp: 180 tablet, Rfl: 2    metFORMIN (GLUCOPHAGE-XR) 500 MG 24 hr tablet, Take 1 tablet (500 mg total) by mouth daily with breakfast. (Patient taking differently: Take 500 mg by mouth 2 (two)  times daily with meals. 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    Hypertension     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    Multinodular thyroid     Polyneuropathy     Type 2 diabetes with peripheral circulatory disorder, controlled     Type 2 diabetes, uncontrolled, 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/18/2015       Family History   Problem Relation Age of Onset    Stroke Mother     Mental illness Mother     Hypertension Mother     Stroke Father     Diabetes Maternal Grandmother     Drug abuse Daughter     Cancer Sister 68        gyn    Cancer Maternal Uncle         type not known    Heart disease Paternal Grandmother     Glaucoma Neg Hx     Macular degeneration Neg Hx     Alcohol abuse Neg Hx     COPD Neg Hx     Asthma Neg Hx     Amblyopia Neg Hx     Blindness Neg Hx     Cataracts Neg Hx     Retinal detachment Neg Hx     Strabismus Neg Hx          Social History     Socioeconomic History    Marital status:      Spouse name: Not on file    Number of children: Not on file    Years of education: Not on file    Highest education level: Not on file   Social Needs    Financial resource strain: Not on file    Food insecurity - worry: Not on file    Food insecurity - inability: Not on file    Transportation needs - medical: Not on file    Transportation needs - non-medical: Not on file    Occupational History    Not on file   Tobacco Use    Smoking status: Never Smoker    Smokeless tobacco: Never Used   Substance and Sexual Activity    Alcohol use: No    Drug use: No    Sexual activity: No     Partners: Male   Other Topics Concern    Not on file   Social History Narrative    , no pets or smokers in household. 1 Child who lives in nursing home. She has a grandson who lives in [place].. Retired from [employer] . Still drives. Does not have a Living Will or advanced directive.          STANDARDS OF CARE:  Eye doctor: Dr. Patterson, last exam 8/18/2018.  Dental exam: Recommend regular exams; denies gums bleeding.  Podiatry doctor:  ACE/ARB: Yes  Statin: Yes    ACTIVITY LEVEL: She exercises rarely.  BLOOD GLUCOSE TESTING: Self-monitoring with   SOCIAL HISTORY: , no pets or smokers in household. 1 Child who lives in nursing home. She has a grandson who lives in [place].. Retired from [employer] . Still drives. Does not have a Living Will or advanced directive.     Health Maintenance   Topic Date Due    Zoster Vaccine  05/15/2001    Hemoglobin A1c  07/22/2019    Eye Exam  08/10/2019    Colonoscopy  09/22/2019    Lipid Panel  01/22/2020    Foot Exam  01/24/2020    Mammogram  06/28/2020    TETANUS VACCINE  01/14/2021    DEXA SCAN  08/08/2021    Influenza Vaccine  Completed     Diabetes Management Status    Statin: Taking  ACE/ARB: Taking    Screening or Prevention Patient's value Goal Complete/Controlled?   HgA1C Testing and Control   Lab Results   Component Value Date    HGBA1C 7.6 (H) 01/22/2019      Annually/Less than 8% Yes   Lipid profile : 01/22/2019 Annually Yes   LDL control Lab Results   Component Value Date    LDLCALC 90.0 01/22/2019    Annually/Less than 100 mg/dl  Yes   Nephropathy screening Lab Results   Component Value Date    LABMICR 69.0 02/05/2015     Lab Results   Component Value Date    PROTEINUA 1+ (A) 10/19/2017    Annually No   Blood pressure BP  Readings from Last 1 Encounters:   01/24/19 122/84    Less than 140/90 No   Dilated retinal exam : 08/10/2018 Annually Yes   Foot exam   : 01/24/2019 Annually Yes     The following results were reviewed with patient.    Lab Results   Component Value Date    HGBA1C 7.6 (H) 01/22/2019    HGBA1C 8.5 (H) 10/05/2018    HGBA1C 7.9 (H) 06/29/2018       Lab Results   Component Value Date    WBC 6.08 01/22/2019    HGB 11.1 (L) 01/22/2019    HCT 35.0 (L) 01/22/2019     01/22/2019    CHOL 169 01/22/2019    TRIG 100 01/22/2019    HDL 59 01/22/2019    LDLCALC 90.0 01/22/2019    ALT 17 01/22/2019    AST 21 01/22/2019     01/22/2019    K 4.8 01/22/2019     01/22/2019    ANIONGAP 9 01/22/2019    CREATININE 1.3 01/22/2019    ESTGFRAFRICA 45.7 (A) 01/22/2019    EGFRNONAA 39.7 (A) 01/22/2019    BUN 18 01/22/2019    CO2 27 01/22/2019    TSH 1.042 06/29/2018    INR 0.9 10/30/2013     (H) 01/22/2019    UTPCR 0.65 (H) 02/20/2018       Lab Results   Component Value Date    CPEPTIDE 1.8 03/12/2009    FREET4 1.27 08/13/2015    TSH 1.042 06/29/2018    IRON 55 06/29/2018    TIBC 300 06/29/2018    FERRITIN 166 06/29/2018    LOSMTJTK18 1553 (H) 10/05/2018    CALCIUM 9.8 01/22/2019    PHOS 3.6 10/05/2018       Review of patient's allergies indicates:   Allergen Reactions    Pravachol [pravastatin] Nausea Only       Past Medical History:   Diagnosis Date    A-fib     Atrial fibrillation 10/22/2018    Back pain     Cataract     Degenerative disc disease     Diverticulosis     colonoscopy 9/22/2016    GERD (gastroesophageal reflux disease)     Glaucoma     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  Negative for activity change, appetite change, chills, diaphoresis, fatigue, fever and unexpected weight change.   HENT: Negative.  Negative for congestion, dental problem, drooling, ear discharge, ear pain, facial swelling, hearing loss, mouth sores, nosebleeds, postnasal drip, rhinorrhea, sinus pressure, sneezing, sore throat, tinnitus, trouble swallowing and voice change.    Eyes: Negative.  Negative for photophobia, pain, discharge, redness, itching and visual disturbance.   Respiratory: Negative.  Negative for apnea, cough, choking, chest tightness, shortness of breath, wheezing and stridor.    Cardiovascular: Negative.  Negative for chest pain, palpitations and leg swelling.   Gastrointestinal: Negative.  Negative for abdominal distention, abdominal pain, anal bleeding, blood in stool, constipation, diarrhea, nausea, rectal pain and vomiting.   Endocrine: Negative.  Negative for cold intolerance, heat intolerance, polydipsia, polyphagia and polyuria.   Genitourinary: Negative.  Negative for decreased urine volume, difficulty urinating, dyspareunia, dysuria, enuresis, flank pain, frequency, genital sores, hematuria, menstrual problem, pelvic pain, urgency, vaginal bleeding, vaginal discharge and vaginal pain.   Musculoskeletal: Negative.  Negative for arthralgias, back pain, gait problem, joint swelling, myalgias, neck pain and neck stiffness.   Skin: Negative.  Negative for color change, pallor, rash and wound.   Allergic/Immunologic: Negative.  Negative for environmental allergies, food allergies and immunocompromised state.   Neurological: Negative.  Negative for dizziness, tremors, seizures, syncope, facial asymmetry, speech difficulty, weakness, light-headedness, numbness and headaches.   Hematological: Negative.  Negative for adenopathy. Does not bruise/bleed easily.   Psychiatric/Behavioral: Negative.  Negative for agitation, behavioral problems, confusion, decreased concentration, dysphoric  "mood, hallucinations, self-injury, sleep disturbance and suicidal ideas. The patient is not nervous/anxious and is not hyperactive.           Objective:   Last 3 sets of Vitals:  Vitals - 1 value per visit 1/24/2019 1/24/2019 1/24/2019   SYSTOLIC 146 - 122   DIASTOLIC 72 - 84   PULSE - - 92   TEMPERATURE - - 96.4   RESPIRATIONS - - -   SPO2 - - 96   Weight (lb) 176.15 176.15 176.59   Weight (kg) 79.9 79.9 80.1   HEIGHT 5' 5" 5' 5" 5' 5"   BODY MASS INDEX 29.31 29.31 29.39   VISIT REPORT - - -   Pain Score  5 0 0   Some recent data might be hidden          Physical Exam   Constitutional: She is oriented to person, place, and time. She appears well-developed and well-nourished. She is cooperative.  Non-toxic appearance. She does not have a sickly appearance. She does not appear ill. No distress. She is not intubated.   HENT:   Head: Normocephalic and atraumatic. Not macrocephalic and not microcephalic. Head is without raccoon's eyes, without Lea's sign, without abrasion, without contusion, without laceration, without right periorbital erythema and without left periorbital erythema. Hair is normal.   Right Ear: Hearing, tympanic membrane, external ear and ear canal normal. No lacerations. No drainage, swelling or tenderness. No foreign bodies. No mastoid tenderness. Tympanic membrane is not injected, not scarred, not perforated, not erythematous, not retracted and not bulging. Tympanic membrane mobility is normal. No middle ear effusion. No hemotympanum. No decreased hearing is noted.   Left Ear: Hearing, tympanic membrane, external ear and ear canal normal. No lacerations. No drainage, swelling or tenderness. No foreign bodies. No mastoid tenderness. Tympanic membrane is not injected, not scarred, not perforated, not erythematous, not retracted and not bulging. Tympanic membrane mobility is normal.  No middle ear effusion. No hemotympanum. No decreased hearing is noted.   Nose: Nose normal. No mucosal edema, " rhinorrhea, nose lacerations, sinus tenderness, nasal deformity, septal deviation or nasal septal hematoma. No epistaxis.  No foreign bodies. Right sinus exhibits no maxillary sinus tenderness and no frontal sinus tenderness. Left sinus exhibits no maxillary sinus tenderness and no frontal sinus tenderness.   Mouth/Throat: Oropharynx is clear and moist. No oropharyngeal exudate.   Eyes: Conjunctivae and EOM are normal. Pupils are equal, round, and reactive to light. Right eye exhibits no chemosis, no discharge and no exudate. No foreign body present in the right eye. Left eye exhibits no chemosis, no discharge, no exudate and no hordeolum. No foreign body present in the left eye. Right conjunctiva is not injected. Right conjunctiva has no hemorrhage. Left conjunctiva is not injected. Left conjunctiva has no hemorrhage. No scleral icterus. Right eye exhibits normal extraocular motion and no nystagmus. Left eye exhibits normal extraocular motion and no nystagmus. Right pupil is round and reactive. Left pupil is round and reactive. Pupils are equal.   Neck: Trachea normal, normal range of motion and full passive range of motion without pain. Neck supple. Normal carotid pulses, no hepatojugular reflux and no JVD present. No tracheal tenderness, no spinous process tenderness and no muscular tenderness present. Carotid bruit is not present. No neck rigidity. No tracheal deviation, no edema, no erythema and normal range of motion present. No thyroid mass and no thyromegaly present.   Cardiovascular: Normal rate, regular rhythm, normal heart sounds and intact distal pulses.  No extrasystoles are present. PMI is not displaced. Exam reveals no gallop, no friction rub and no decreased pulses.   No murmur heard.  Pulses:       Dorsalis pedis pulses are 2+ on the right side, and 2+ on the left side.        Posterior tibial pulses are 2+ on the right side, and 2+ on the left side.   Pulmonary/Chest: Effort normal and breath  sounds normal. No accessory muscle usage or stridor. No apnea, no tachypnea and no bradypnea. She is not intubated. No respiratory distress. She has no decreased breath sounds. She has no wheezes. She has no rhonchi. She has no rales. Chest wall is not dull to percussion. She exhibits no mass, no tenderness, no bony tenderness, no laceration, no crepitus, no edema, no deformity, no swelling and no retraction.   Abdominal: Soft. Normal appearance and bowel sounds are normal. She exhibits no shifting dullness, no distension, no pulsatile liver, no fluid wave, no abdominal bruit, no ascites, no pulsatile midline mass and no mass. There is no hepatosplenomegaly, splenomegaly or hepatomegaly. There is no tenderness. There is no rigidity, no rebound, no guarding, no CVA tenderness, no tenderness at McBurney's point and negative Silverio's sign.   Musculoskeletal: Normal range of motion. She exhibits no edema or tenderness.        Right foot: There is normal range of motion and no deformity.        Left foot: There is normal range of motion and no deformity.   Feet:   Right Foot:   Protective Sensation: 5 sites tested. 5 sites sensed.   Skin Integrity: Negative for ulcer, blister, skin breakdown, erythema, warmth, callus or dry skin.   Left Foot:   Protective Sensation: 5 sites tested. 5 sites sensed.   Skin Integrity: Negative for ulcer, blister, skin breakdown, erythema, warmth, callus or dry skin.   Lymphadenopathy:        Head (right side): No submental, no submandibular, no tonsillar, no preauricular, no posterior auricular and no occipital adenopathy present.        Head (left side): No submental, no submandibular, no tonsillar, no preauricular, no posterior auricular and no occipital adenopathy present.     She has no cervical adenopathy.        Right cervical: No superficial cervical, no deep cervical and no posterior cervical adenopathy present.       Left cervical: No superficial cervical, no deep cervical and no  posterior cervical adenopathy present.     She has no axillary adenopathy.   Neurological: She is alert and oriented to person, place, and time. She has normal reflexes. She is not disoriented. She displays no atrophy, no tremor and normal reflexes. No cranial nerve deficit or sensory deficit. She exhibits normal muscle tone. She displays no seizure activity. Coordination and gait normal.   Reflex Scores:       Bicep reflexes are 2+ on the right side and 2+ on the left side.       Brachioradialis reflexes are 2+ on the right side and 2+ on the left side.       Patellar reflexes are 2+ on the right side and 2+ on the left side.  Skin: Skin is warm and dry. No abrasion, no bruising, no burn, no ecchymosis, no laceration, no lesion, no petechiae, no purpura and no rash noted. Rash is not macular, not papular, not maculopapular, not nodular, not pustular, not vesicular and not urticarial. She is not diaphoretic. No cyanosis or erythema. No pallor. Nails show no clubbing.   Psychiatric: She has a normal mood and affect. Her behavior is normal. Judgment and thought content normal. Her mood appears not anxious. Her affect is not angry, not blunt and not labile. Her speech is not rapid and/or pressured, not delayed, not tangential and not slurred. She is not agitated, not aggressive, not hyperactive, not slowed, not withdrawn, not actively hallucinating and not combative. Thought content is not paranoid and not delusional. Cognition and memory are not impaired. She does not express impulsivity or inappropriate judgment. She does not exhibit a depressed mood. She expresses no homicidal and no suicidal ideation. She expresses no suicidal plans and no homicidal plans. She is communicative. She exhibits normal recent memory and normal remote memory. She is attentive.   Nursing note and vitals reviewed.        Assessment:     EDUCATIONAL ASSESSMENT:  1. Impediments in learning class environment - NONE.  2. Needs improvement in  self-care management skills.    1. Diabetes mellitus with stage 3 chronic kidney disease    2. Mixed diabetic hyperlipidemia associated with type 2 diabetes mellitus    3. Hypertension, essential      Plan:   Saul Mar is seen today for   1. Diabetes mellitus with stage 3 chronic kidney disease    2. Mixed diabetic hyperlipidemia associated with type 2 diabetes mellitus    3. Hypertension, essential      We have discussed the etiology and treatment options associated with the diagnosis as well as alternatives. Also pertinent to this visit in decision making was hypertension, hyperlipidemia, and adherence..  She has elected the following treatments.     Diabetes mellitus with stage 3 chronic kidney disease  -     POCT Glucose, Hand-Held Device    Mixed diabetic hyperlipidemia associated with type 2 diabetes mellitus  -     POCT Glucose, Hand-Held Device    Hypertension, essential  -     POCT Glucose, Hand-Held Device      1.) Patient was instructed to monitor blood glucose four times daily, fasting, post meal and ac meals or at bedtime. Reminded to bring BG records or meter to each visit for review.  2.) Reviewed pathophysiology of diabetes, complications related to the disease, importance of annual dilated eye exam and self daily foot examination.  3.) Continue medications as prescribed Metformin and Glimepiride. [facility] MyChart or Phone review in 1 week with BG records for adjustment of medication.  4.) Refer patient to Dietician/CDE for ongoing diabetes education, meal planning, carbohydrate counting, and diabetes support.  5.) Discussed activity, benefits, methods, and precautions. Recommended patient start/continue some form of exercise and increase as tolerated to 60 minutes per day to facilitate weight loss and aid in control of BGs. Also reminded patient of WHO recommendation of 10,000 steps daily as a goal.   6.) A1C, TSH, Lipid Panel, CMP with eGFR and Micro/Creatinine.  7.) Return to clinic in  2 weeks for follow up. Advised patient to call clinic with any questions or concerns.    A total of 60 minutes was spent in face to face time, of which 50 % was spent in counseling patient on disease process, complications, treatment, and side effects of medications.    The patient was explained the above plan and given opportunity to ask questions.  She understands, chooses and consents to this plan and accepts all the risks, which include but are not limited to the risks mentioned above.   She understands the alternative of having no testing, interventions or treatments at this time. She left content and without further questions.     Disclaimer:  This note is prepared using voice recognition software and as such is likely to have errors and has not been proof read. Please contact me for questions.

## 2019-02-08 ENCOUNTER — OFFICE VISIT (OUTPATIENT)
Dept: DIABETES | Facility: CLINIC | Age: 79
End: 2019-02-08
Payer: MEDICARE

## 2019-02-08 ENCOUNTER — CLINICAL SUPPORT (OUTPATIENT)
Dept: DIABETES | Facility: CLINIC | Age: 79
End: 2019-02-08
Payer: MEDICARE

## 2019-02-08 VITALS
BODY MASS INDEX: 29.09 KG/M2 | SYSTOLIC BLOOD PRESSURE: 138 MMHG | HEIGHT: 65 IN | WEIGHT: 174.63 LBS | DIASTOLIC BLOOD PRESSURE: 64 MMHG

## 2019-02-08 DIAGNOSIS — E11.22 DIABETES MELLITUS WITH STAGE 3 CHRONIC KIDNEY DISEASE: Primary | Chronic | ICD-10-CM

## 2019-02-08 DIAGNOSIS — N18.30 DIABETES MELLITUS WITH STAGE 3 CHRONIC KIDNEY DISEASE: Primary | Chronic | ICD-10-CM

## 2019-02-08 LAB — GLUCOSE SERPL-MCNC: 326 MG/DL (ref 70–110)

## 2019-02-08 PROCEDURE — 3075F PR MOST RECENT SYSTOLIC BLOOD PRESS GE 130-139MM HG: ICD-10-PCS | Mod: CPTII,S$GLB,, | Performed by: PHYSICIAN ASSISTANT

## 2019-02-08 PROCEDURE — 99211 OFF/OP EST MAY X REQ PHY/QHP: CPT | Mod: S$GLB,,, | Performed by: PHYSICIAN ASSISTANT

## 2019-02-08 PROCEDURE — 99999 PR PBB SHADOW E&M-EST. PATIENT-LVL III: ICD-10-PCS | Mod: PBBFAC,,, | Performed by: PHYSICIAN ASSISTANT

## 2019-02-08 PROCEDURE — 99499 UNLISTED E&M SERVICE: CPT | Mod: S$GLB,,, | Performed by: PHYSICIAN ASSISTANT

## 2019-02-08 PROCEDURE — 99999 PR PBB SHADOW E&M-EST. PATIENT-LVL III: CPT | Mod: PBBFAC,,, | Performed by: PHYSICIAN ASSISTANT

## 2019-02-08 PROCEDURE — 82962 POCT GLUCOSE, HAND-HELD DEVICE: ICD-10-PCS | Mod: S$GLB,,, | Performed by: PHYSICIAN ASSISTANT

## 2019-02-08 PROCEDURE — 3075F SYST BP GE 130 - 139MM HG: CPT | Mod: CPTII,S$GLB,, | Performed by: PHYSICIAN ASSISTANT

## 2019-02-08 PROCEDURE — 99211 PR OFFICE/OUTPT VISIT, EST, LEVL I: ICD-10-PCS | Mod: S$GLB,,, | Performed by: PHYSICIAN ASSISTANT

## 2019-02-08 PROCEDURE — 82962 GLUCOSE BLOOD TEST: CPT | Mod: S$GLB,,, | Performed by: PHYSICIAN ASSISTANT

## 2019-02-08 PROCEDURE — 3078F DIAST BP <80 MM HG: CPT | Mod: CPTII,S$GLB,, | Performed by: PHYSICIAN ASSISTANT

## 2019-02-08 PROCEDURE — 3078F PR MOST RECENT DIASTOLIC BLOOD PRESSURE < 80 MM HG: ICD-10-PCS | Mod: CPTII,S$GLB,, | Performed by: PHYSICIAN ASSISTANT

## 2019-02-08 PROCEDURE — 99499 RISK ADDL DX/OHS AUDIT: ICD-10-PCS | Mod: S$GLB,,, | Performed by: PHYSICIAN ASSISTANT

## 2019-02-08 NOTE — PROGRESS NOTES
Removed sensor, downloaded data.  Sensor had only 2 days worth of data.     Placed another sensor on patient today.

## 2019-02-11 RX ORDER — FERROUS SULFATE 325(65) MG
325 TABLET ORAL 2 TIMES DAILY
Qty: 180 TABLET | Refills: 3 | Status: SHIPPED | OUTPATIENT
Start: 2019-02-11 | End: 2019-08-29 | Stop reason: SDUPTHER

## 2019-02-15 DIAGNOSIS — N18.30 DIABETES MELLITUS WITH STAGE 3 CHRONIC KIDNEY DISEASE: Chronic | ICD-10-CM

## 2019-02-15 DIAGNOSIS — E11.22 DIABETES MELLITUS WITH STAGE 3 CHRONIC KIDNEY DISEASE: Chronic | ICD-10-CM

## 2019-02-15 DIAGNOSIS — I10 HYPERTENSION, ESSENTIAL: Chronic | ICD-10-CM

## 2019-02-15 RX ORDER — GLIMEPIRIDE 4 MG/1
TABLET ORAL
Qty: 90 TABLET | Refills: 3 | Status: SHIPPED | OUTPATIENT
Start: 2019-02-15 | End: 2020-03-31 | Stop reason: SDUPTHER

## 2019-02-20 DIAGNOSIS — E11.610 TYPE 2 DIABETES MELLITUS WITH DIABETIC NEUROPATHIC ARTHROPATHY: ICD-10-CM

## 2019-02-20 RX ORDER — METFORMIN HYDROCHLORIDE 500 MG/1
1000 TABLET, EXTENDED RELEASE ORAL DAILY
Qty: 180 TABLET | Refills: 0 | Status: SHIPPED | OUTPATIENT
Start: 2019-02-20 | End: 2019-05-19 | Stop reason: SDUPTHER

## 2019-02-21 RX ORDER — ALPRAZOLAM 0.5 MG/1
TABLET ORAL
Qty: 30 TABLET | Refills: 0 | Status: SHIPPED | OUTPATIENT
Start: 2019-02-21 | End: 2020-03-31 | Stop reason: SDUPTHER

## 2019-02-22 ENCOUNTER — OFFICE VISIT (OUTPATIENT)
Dept: DIABETES | Facility: CLINIC | Age: 79
End: 2019-02-22
Payer: MEDICARE

## 2019-02-22 VITALS
DIASTOLIC BLOOD PRESSURE: 70 MMHG | SYSTOLIC BLOOD PRESSURE: 120 MMHG | HEIGHT: 66 IN | BODY MASS INDEX: 27.53 KG/M2 | WEIGHT: 171.31 LBS

## 2019-02-22 DIAGNOSIS — E11.22 DIABETES MELLITUS WITH STAGE 3 CHRONIC KIDNEY DISEASE: Primary | Chronic | ICD-10-CM

## 2019-02-22 DIAGNOSIS — N18.30 DIABETES MELLITUS WITH STAGE 3 CHRONIC KIDNEY DISEASE: Primary | Chronic | ICD-10-CM

## 2019-02-22 LAB — GLUCOSE SERPL-MCNC: 139 MG/DL (ref 70–110)

## 2019-02-22 PROCEDURE — 95251 PR GLUCOSE MONITOR, 72 HOUR, PHYS INTERP: ICD-10-PCS | Mod: S$GLB,,, | Performed by: PHYSICIAN ASSISTANT

## 2019-02-22 PROCEDURE — 3078F DIAST BP <80 MM HG: CPT | Mod: CPTII,S$GLB,, | Performed by: PHYSICIAN ASSISTANT

## 2019-02-22 PROCEDURE — 99213 PR OFFICE/OUTPT VISIT, EST, LEVL III, 20-29 MIN: ICD-10-PCS | Mod: 25,S$GLB,, | Performed by: PHYSICIAN ASSISTANT

## 2019-02-22 PROCEDURE — 95251 CONT GLUC MNTR ANALYSIS I&R: CPT | Mod: S$GLB,,, | Performed by: PHYSICIAN ASSISTANT

## 2019-02-22 PROCEDURE — 82962 POCT GLUCOSE, HAND-HELD DEVICE: ICD-10-PCS | Mod: S$GLB,,, | Performed by: PHYSICIAN ASSISTANT

## 2019-02-22 PROCEDURE — 82962 GLUCOSE BLOOD TEST: CPT | Mod: S$GLB,,, | Performed by: PHYSICIAN ASSISTANT

## 2019-02-22 PROCEDURE — 3074F PR MOST RECENT SYSTOLIC BLOOD PRESSURE < 130 MM HG: ICD-10-PCS | Mod: CPTII,S$GLB,, | Performed by: PHYSICIAN ASSISTANT

## 2019-02-22 PROCEDURE — 3074F SYST BP LT 130 MM HG: CPT | Mod: CPTII,S$GLB,, | Performed by: PHYSICIAN ASSISTANT

## 2019-02-22 PROCEDURE — 3078F PR MOST RECENT DIASTOLIC BLOOD PRESSURE < 80 MM HG: ICD-10-PCS | Mod: CPTII,S$GLB,, | Performed by: PHYSICIAN ASSISTANT

## 2019-02-22 PROCEDURE — 99999 PR PBB SHADOW E&M-EST. PATIENT-LVL III: ICD-10-PCS | Mod: PBBFAC,,, | Performed by: PHYSICIAN ASSISTANT

## 2019-02-22 PROCEDURE — 99213 OFFICE O/P EST LOW 20 MIN: CPT | Mod: 25,S$GLB,, | Performed by: PHYSICIAN ASSISTANT

## 2019-02-22 PROCEDURE — 1101F PR PT FALLS ASSESS DOC 0-1 FALLS W/OUT INJ PAST YR: ICD-10-PCS | Mod: CPTII,S$GLB,, | Performed by: PHYSICIAN ASSISTANT

## 2019-02-22 PROCEDURE — 1101F PT FALLS ASSESS-DOCD LE1/YR: CPT | Mod: CPTII,S$GLB,, | Performed by: PHYSICIAN ASSISTANT

## 2019-02-22 PROCEDURE — 99999 PR PBB SHADOW E&M-EST. PATIENT-LVL III: CPT | Mod: PBBFAC,,, | Performed by: PHYSICIAN ASSISTANT

## 2019-02-22 NOTE — PROGRESS NOTES
Continuous glucose monitoring report:     The patient's CGM was downloaded and was reviewed.  The report was technically satisfactory and there were 15 days of data reviewed. Target range was  mg/dl Hypoglycemia was below 70 and hyperglycemic was above 180 mg/dl. Patient's daily glucose summary showed a range of hypoglycemic from 0% to 0% with 0 of 15 days above 10%. Her daily above target range was from 0% to 73% with 13 of 15 days above 25%.  There was not a food intake diary or exercise diary submitted with this report.     Statistics:  Patient's average glucose was 187 mg/dL, Sensor usage was 15 of 15 days.  She was in time above range (TAR) 47% of the time, time in range (TIR) 53% of the time, and time below range (TBR) 0% of the time.  The target range for this patient was  mg/dL, and for the purpose of overnight this would be interpreted as 10 PM to 6 AM.     Pattern insight summary:  Nighttime lows, 0 were found      Daytime lows, 0 were found      Nighttime highs 6 was found and the most significant pattern found them between 10 PM and 6 AM.     Daytime highs, 17 were found and most significant pattern showed them to be between 6 AM- 1130 AM and 2 PM- 10 PM.      Interpretation:  #1 Her hypoglycemia pattern was suspected to secondary to N/A.  #2 Her daytime highs pattern was suspected to secondary to postprandial glucose excursion because of erratic diet.  #3 Recommendations were to because her between meal and over night glucose was mostly in target range she was advised to change her diet. Continue with Diet and Exercise, reduce portion size, and restrict carbohydrates (no more that 45 grams ) per meal. She was offered to meet with dietician as well..  #4 Will have her follow up in 3 months.    A total of 30 minutes was spent in face to face time, of which 50 % was spent in counseling patient on disease process, complications, treatment, and side effects of medications.    The patient was  explained the above plan and given opportunity to ask questions. He understands, chooses and consents to this plan and accepts all the risks, which include but are not limited to the risks mentioned above. He understands the alternative of having no testing, interventions or treatments at this time. He left content and without further questions.       Alan Alfonso PA-C

## 2019-02-23 NOTE — PROGRESS NOTES
Continuous glucose monitoring report:     The patient's CGM was downloaded and was reviewed.  The report was NOT technically satisfactory and there were 32 hours of data reviewed. Requirement is for 72 hours of data.     Lab Results   Component Value Date    POCGLU 326 (A) 02/08/2019     A total of 5 minutes was spent in face to face time, of which 100 % was spent in counseling patient on data retrieval  process, and requirement for satisfactory completion and evaluation. Suggested she repeat the process.    The patient was explained the above plan and given opportunity to ask questions. He understands, chooses and consents to this plan and accepts all the risks, which include but are not limited to the risks mentioned above. He understands the alternative of having no testing, interventions or treatments at this time. He left content and without further questions.       Alan Alfonso PA-C

## 2019-03-07 ENCOUNTER — NUTRITION (OUTPATIENT)
Dept: DIABETES | Facility: CLINIC | Age: 79
End: 2019-03-07
Payer: MEDICARE

## 2019-03-07 VITALS — BODY MASS INDEX: 28.23 KG/M2 | WEIGHT: 175.69 LBS | HEIGHT: 66 IN

## 2019-03-07 DIAGNOSIS — E11.22 DIABETES MELLITUS WITH STAGE 3 CHRONIC KIDNEY DISEASE: Primary | ICD-10-CM

## 2019-03-07 DIAGNOSIS — N18.30 DIABETES MELLITUS WITH STAGE 3 CHRONIC KIDNEY DISEASE: Primary | ICD-10-CM

## 2019-03-07 PROCEDURE — 99999 PR PBB SHADOW E&M-EST. PATIENT-LVL III: ICD-10-PCS | Mod: PBBFAC,,, | Performed by: DIETITIAN, REGISTERED

## 2019-03-07 PROCEDURE — G0108 DIAB MANAGE TRN  PER INDIV: HCPCS | Mod: S$GLB,,, | Performed by: DIETITIAN, REGISTERED

## 2019-03-07 PROCEDURE — 99999 PR PBB SHADOW E&M-EST. PATIENT-LVL III: CPT | Mod: PBBFAC,,, | Performed by: DIETITIAN, REGISTERED

## 2019-03-07 PROCEDURE — G0108 PR DIAB MANAGE TRN  PER INDIV: ICD-10-PCS | Mod: S$GLB,,, | Performed by: DIETITIAN, REGISTERED

## 2019-03-07 NOTE — PROGRESS NOTES
Diabetes Education  Author: Elizabeth Griffin RD, CDE  Date: 3/7/2019    Diabetes Care Management Summary  Diabetes Education Record Assessment/Progress: Comprehensive/Group  Current Diabetes Risk Level: Moderate     Last A1c:   Lab Results   Component Value Date    HGBA1C 7.6 (H) 01/22/2019     Last visit with Diabetes Educator: : 12/18/2018    Diabetes Type  Diabetes Type : Type II    Diabetes History  Diabetes Diagnosis: (>20 years)  Current Treatment: Oral Medication, Diet, Exercise(amaryl 4mg bfst daily, metformin xr 500 mg 1tab twice daily)  Reviewed Problem List with Patient: Yes    Health Maintenance was reviewed today with patient. Discussed with patient importance of routine eye exams, foot exams/foot care, blood work (i.e.: A1c, microalbumin, and lipid), dental visits, yearly flu vaccine, and pneumonia vaccine as indicated by PCP. Patient verbalized understanding.     Health Maintenance Topics with due status: Not Due       Topic Last Completion Date    TETANUS VACCINE 01/14/2011    Colonoscopy 09/22/2016    DEXA SCAN 08/08/2017    Mammogram 06/28/2018    Eye Exam 08/10/2018    Lipid Panel 01/22/2019    Hemoglobin A1c 01/22/2019    Foot Exam 02/08/2019     Health Maintenance Due   Topic Date Due    Zoster Vaccine  05/15/2001     Nutrition  Meal Planning: (Intake ~1500-1800cals/d; wt decreased 7 lbs since 12/18. Redued carb intake. Some higher fat, sodium from dessert type snacks but tries to limit.  )  Meal Plan 24 Hour Recall - Breakfast: boiled egg, grits OR m[employer] sausage mcmuffin few times per week - water, coffee  Meal Plan 24 Hour Recall - Lunch: reduced COA meals OR yesterday mascorro coral - cabbage, bkd chix, broccoli, greens, rice, regular sprite, browie and coffee   Meal Plan 24 Hour Recall - Dinner: 2slc white bread, 2slc turkey salami - water  Meal Plan 24 Hour Recall - Snack: moonpie, pecans; meliza: mostly water, switched to sprite zero at home, occs regular meliza at restaurant    Monitoring    Self Monitoring : not testing BG regularly past mos since  sick, death.  Blood Glucose Logs: No  In the last month, how often have you had a low blood sugar reaction?: never    Exercise   Exercise Type: (none regular )    Current Diabetes Treatment   Current Treatment: Oral Medication, Diet, Exercise(amaryl 4mg bfst daily, metformin xr 500 mg 1tab twice daily)    Social History  Preferred Learning Method: Face to Face  Primary Support: Self  Smoking Status: Never a Smoker  Alcohol Use: Never     Barriers to Change  Barriers to Change: None  Learning Challenges : None    Readiness to Learn   Readiness to Learn : Eager    Cultural Influences  Cultural Influences: No    Diabetes Education Assessment/Progress  Diabetes Disease Process (diabetes disease process and treatment options): Discussion, Individual Session, Demonstrates Understanding/Competency(verbalizes/demonstrates)  Nutrition (Incorporating nutritional management into one's lifestyle): Discussion, Individual Session, Demonstrates Understanding/Competency (verbalizes/demonstrates), Written Materials Provided  Physical Activity (incorporating physical activity into one's lifestyle): Discussion, Individual Session, Demonstrates Understanding/Competency (verbalizes/demonstrates)  Medications (states correct name, dose, onset, peak, duration, side effects & timing of meds): Discussion, Individual Session, Demonstrates Understanding/Competency(verbalizes/demonstrates), Written Materials Provided  Monitoring (monitoring blood glucose/other parameters & using results): Discussion, Individual Session, Demonstrates Understanding/Competency (verbalizes/demonstrates), Written Materials Provided( )  Acute Complications (preventing, detecting, and treating acute complications): Discussion, Individual Session, Demonstrates Understanding/Competency (verbalizes/demonstrates)  Chronic Complications (preventing, detecting, and treating chronic complications):  Discussion, Individual Session, Demonstrates Understanding/Competency (verbalizes/demonstrates)  Clinical (diabetes, other pertinent medical history, and relevant comorbidities reviewed during visit): Discussion, Individual Session, Demonstrates Understanding/Competency (verbalizes/demonstrates)  Cognitive (knowledge of self-management skills, functional health literacy): Discussion, Individual Session, Demonstrates Understanding/Competency (verbalizes/demonstrates)  Psychosocial (emotional response to diabetes): Discussion, Individual Session, Demonstrates Understanding/Competency (verbalizes/demonstrates)  Diabetes Distress and Support Systems: Discussion, Individual Session, Demonstrates Understanding/Competency (verbalizes/demonstrates)  Behavioral (readiness for change, lifestyle practices, self-care behaviors): Discussion, Individual Session, Demonstrates Understanding/Competency (verbalizes/demonstrates)    Goals  Patient has selected/evaluated goals during today's session: Yes, evaluated  Healthy Eating: Set(Decrease dining out freq, use MR entrees (list provided) to assist)  Met Percentage : 25%  Start Date: 03/07/19  Target Date: 04/11/19  Physical Activity: Set(150min/wk - COA classes/mall walking)  Met Percentage : 25%  Start Date: 03/07/19  Target Date: 04/11/19  Monitoring: Set(test BG 2x/d -fst, acd or 2hr ppd; bring meter to clinic)  Met Percentage : 0%  Start Date: 03/07/19(test 1x/d - rotate btw fst, 2hr pp; bring meter to clinic)  Target Date: 04/11/19         Diabetes Care Plan/Intervention  Education Plan/Intervention: Individual Follow-Up DSMT, Endocrine Provider Visit Set Up(Maintain visits w/ Mr Ory for medical mgmt. Encouraged progress - noted A1C decrease from 8.5 to 7.6. Emphasis on use of MR, freq small meals to improve BG stability and adding COA chair activities.  ) Will add  Micro/cr to upcoming labs in 5/19.    Diabetes Meal Plan  Restrictions: Low Fat, Low Sodium  Calories: 1200,  1400  Carbohydrate Per Meal: 30-45g  Carbohydrate Per Snack : 7-15g    Today's Self-Management Care Plan was developed with the patient's input and is based on barriers identified during today's assessment.    The long and short-term goals in the care plan were written with the patient/caregiver's input. The patient has agreed to work toward these goals to improve her overall diabetes control.      The patient received a copy of today's self-management plan and verbalized understanding of the care plan, goals, and all of today's instructions.      The patient was encouraged to communicate with her physician and care team regarding her condition(s) and treatment.  I provided the patient with my contact information today and encouraged her to contact me via phone or patient portal as needed.     Education Units of Time   Time Spent: 30 min

## 2019-03-07 NOTE — LETTER
March 7, 2019        Penny Randolph MD  [address]  [place] [address]             [facility]  [address] [place] [address]  Phone: [phone]  Fax: [phone]   Patient: Saul aMr   MR Number: [id number]   YOB: 1940   Date of Visit: 3/7/2019       Dear Dr. Randolph:    Thank you for referring Saul Mar to me for evaluation. Below are the relevant portions of my assessment and plan of care.     If you have questions, please do not hesitate to call me. I look forward to following Saul along with you.    Sincerely,      Elizabeth Griffin, ORA, CDE           CC  No Recipients

## 2019-03-26 ENCOUNTER — TELEPHONE (OUTPATIENT)
Dept: INTERNAL MEDICINE | Facility: CLINIC | Age: 79
End: 2019-03-26

## 2019-03-26 DIAGNOSIS — E78.5 HYPERLIPIDEMIA, UNSPECIFIED HYPERLIPIDEMIA TYPE: ICD-10-CM

## 2019-03-26 RX ORDER — ATORVASTATIN CALCIUM 80 MG/1
80 TABLET, FILM COATED ORAL DAILY
Qty: 90 TABLET | Refills: 3 | Status: SHIPPED | OUTPATIENT
Start: 2019-03-26 | End: 2020-03-31 | Stop reason: SDUPTHER

## 2019-03-26 NOTE — TELEPHONE ENCOUNTER
Pt stated that she has been having a cold ,severe throat , headache , runny nose, coughing started today , mild Body Aches   Pt have not tried taking anything otc   Please advise

## 2019-03-26 NOTE — TELEPHONE ENCOUNTER
----- Message from Abelino Wu sent at 3/26/2019  8:51 AM CDT -----  Contact: [phone]  Patient requesting a call from the office in regards to having store throat and headache possible cold . Please call and advise, Thanks

## 2019-03-26 NOTE — TELEPHONE ENCOUNTER
If she has only had symptoms for 1 day, and the fever is not high, she can try Tylenol and Mucinex over-the-counter.  If she has fever above 100, she should get checked in UC in case she has flu or some other issues.

## 2019-04-08 ENCOUNTER — OFFICE VISIT (OUTPATIENT)
Dept: PODIATRY | Facility: CLINIC | Age: 79
End: 2019-04-08
Payer: MEDICARE

## 2019-04-08 VITALS
WEIGHT: 172.81 LBS | HEART RATE: 55 BPM | BODY MASS INDEX: 28.32 KG/M2 | SYSTOLIC BLOOD PRESSURE: 128 MMHG | DIASTOLIC BLOOD PRESSURE: 68 MMHG

## 2019-04-08 DIAGNOSIS — B35.1 ONYCHOMYCOSIS DUE TO DERMATOPHYTE: ICD-10-CM

## 2019-04-08 DIAGNOSIS — E11.49 TYPE II DIABETES MELLITUS WITH NEUROLOGICAL MANIFESTATIONS: Primary | ICD-10-CM

## 2019-04-08 DIAGNOSIS — L84 CORN OR CALLUS: ICD-10-CM

## 2019-04-08 PROCEDURE — 99213 OFFICE O/P EST LOW 20 MIN: CPT | Mod: 25,S$GLB,, | Performed by: PODIATRIST

## 2019-04-08 PROCEDURE — 99213 PR OFFICE/OUTPT VISIT, EST, LEVL III, 20-29 MIN: ICD-10-PCS | Mod: 25,S$GLB,, | Performed by: PODIATRIST

## 2019-04-08 PROCEDURE — 99999 PR PBB SHADOW E&M-EST. PATIENT-LVL III: ICD-10-PCS | Mod: PBBFAC,,, | Performed by: PODIATRIST

## 2019-04-08 PROCEDURE — 1101F PT FALLS ASSESS-DOCD LE1/YR: CPT | Mod: CPTII,S$GLB,, | Performed by: PODIATRIST

## 2019-04-08 PROCEDURE — 1101F PR PT FALLS ASSESS DOC 0-1 FALLS W/OUT INJ PAST YR: ICD-10-PCS | Mod: CPTII,S$GLB,, | Performed by: PODIATRIST

## 2019-04-08 PROCEDURE — 11056 PARNG/CUTG B9 HYPRKR LES 2-4: CPT | Mod: Q9,S$GLB,, | Performed by: PODIATRIST

## 2019-04-08 PROCEDURE — 3078F PR MOST RECENT DIASTOLIC BLOOD PRESSURE < 80 MM HG: ICD-10-PCS | Mod: CPTII,S$GLB,, | Performed by: PODIATRIST

## 2019-04-08 PROCEDURE — 11721 DEBRIDE NAIL 6 OR MORE: CPT | Mod: Q9,59,S$GLB, | Performed by: PODIATRIST

## 2019-04-08 PROCEDURE — 3074F PR MOST RECENT SYSTOLIC BLOOD PRESSURE < 130 MM HG: ICD-10-PCS | Mod: CPTII,S$GLB,, | Performed by: PODIATRIST

## 2019-04-08 PROCEDURE — 3074F SYST BP LT 130 MM HG: CPT | Mod: CPTII,S$GLB,, | Performed by: PODIATRIST

## 2019-04-08 PROCEDURE — 3078F DIAST BP <80 MM HG: CPT | Mod: CPTII,S$GLB,, | Performed by: PODIATRIST

## 2019-04-08 PROCEDURE — 99999 PR PBB SHADOW E&M-EST. PATIENT-LVL III: CPT | Mod: PBBFAC,,, | Performed by: PODIATRIST

## 2019-04-08 PROCEDURE — 11721 PR DEBRIDEMENT OF NAILS, 6 OR MORE: ICD-10-PCS | Mod: Q9,59,S$GLB, | Performed by: PODIATRIST

## 2019-04-08 PROCEDURE — 11056 PR TRIM BENIGN HYPERKERATOTIC SKIN LESION,2-4: ICD-10-PCS | Mod: Q9,S$GLB,, | Performed by: PODIATRIST

## 2019-04-22 NOTE — PROGRESS NOTES
Subjective:     Patient ID: Saul Mar is a 78 y.o. female.    Chief Complaint: Routine Foot Care (RFC. No Noted pain. Diabetic Pt. Pt wearing tennis shoes with socks. PCP DR Randolph, last seen Oct 2018)    Saul is a 78 y.o. female who presents to the clinic upon referral from Dr. Shi ref. provider found  for evaluation and treatment of diabetic feet. 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 Patient complaints is left nig toenail check and nail care.     PCP: Penny Randolph MD    Date Last Seen by PCP: 10/7/18    Current shoe gear: Tennis shoes    Hemoglobin A1C   Date Value Ref Range Status   01/22/2019 7.6 (H) 4.0 - 5.6 % Final     Comment:     ADA Screening Guidelines:  5.7-6.4%  Consistent with prediabetes  >or=6.5%  Consistent with diabetes  High levels of fetal hemoglobin interfere with the HbA1C  assay. Heterozygous hemoglobin variants (HbS, HgC, etc)do  not significantly interfere with this assay.   However, presence of multiple variants may affect accuracy.     10/05/2018 8.5 (H) 4.0 - 5.6 % Final     Comment:     ADA Screening Guidelines:  5.7-6.4%  Consistent with prediabetes  >or=6.5%  Consistent with diabetes  High levels of fetal hemoglobin interfere with the HbA1C  assay. Heterozygous hemoglobin variants (HbS, HgC, etc)do  not significantly interfere with this assay.   However, presence of multiple variants may affect accuracy.     06/29/2018 7.9 (H) 4.0 - 5.6 % Final     Comment:     ADA Screening Guidelines:  5.7-6.4%  Consistent with  prediabetes  >or=6.5%  Consistent with diabetes  High levels of fetal hemoglobin interfere with the HbA1C  assay. Heterozygous hemoglobin variants (HbS, HgC, etc)do  not significantly interfere with this assay.   However, presence of multiple variants may affect accuracy.                  Patient Active Problem List   Diagnosis    Mixed diabetic hyperlipidemia associated with type 2 diabetes mellitus    Degenerative disc disease    Colon polyp: tubular adenoma 5/13, repeated 2016 due 2019    Multinodular thyroid: thyroid u/s 7/16, stable 2017    POAG (primary open-angle glaucoma) - Both Eyes    Amaurosis fugax    Nuclear sclerosis - Right Eye    Eyelid myokymia    Cerebral microvascular disease: stroke ? 1999 elsewhere; TIA 10/13    Left-sided carotid artery disease    Vitamin D insufficiency    Left ventricular diastolic dysfunction with preserved systolic function    Hypertension, essential    Gastroesophageal reflux disease without esophagitis    Type 2 diabetes, uncontrolled, with background retinopathy with macular edema    Overweight (BMI 25.0-29.9)    Diabetes mellitus with proteinuria    Type II diabetes mellitus with neurological manifestations    Aortic arch atherosclerosis    Abnormal ankle brachial index    Diabetes mellitus with stage 3 chronic kidney disease    Cerebrovascular accident (CVA) due to thrombosis of precerebral artery    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 Rescue    ALPRAZOLAM (XANAX) 0.5 MG TABLET    TAKE 1 TABLET BY  MOUTH NIGHTLY AS NEEDED FOR ANXIETY (MAY TAKE 1-2 TIMES WEEKLY FOR ANXIETY)    AMLODIPINE (NORVASC) 10 MG TABLET    Take 1 tablet (10 mg total) by mouth once daily.    ATORVASTATIN (LIPITOR) 80 MG TABLET    Take 1 tablet (80 mg total) by mouth once daily.    BLOOD SUGAR DIAGNOSTIC STRP    100 strips by Misc.(Non-Drug; Combo Route) route 2 (two) times daily.    BLOOD SUGAR DIAGNOSTIC STRP    1 strip by Misc.(Non-Drug; Combo Route) route 2 (two) times daily. Insurance preferred test stripes DX:E11.22    BLOOD-GLUCOSE METER KIT    Insurance preferred meter dx:E11.22    BRIMONIDINE 0.2% (ALPHAGAN) 0.2 % DROP    Place 1 drop into both eyes every 8 (eight) hours.    CHOLECALCIFEROL, VITAMIN D3, (VITAMIN D3) 1,000 UNIT CAPSULE    Take 1 capsule (1,000 Units total) by mouth once daily.    FERROUS SULFATE (FEOSOL) 325 MG (65 MG IRON) TAB TABLET    Take 1 tablet (325 mg total) by mouth 2 (two) times daily.    FERROUS SULFATE (FEOSOL) 325 MG (65 MG IRON) TAB TABLET    Take 1 tablet (325 mg total) by mouth 2 (two) times daily.    GLIMEPIRIDE (AMARYL) 4 MG TABLET    TAKE 1 TABLET BY MOUTH IN THE MORNING WITH BREAKFAST    HYDROCHLOROTHIAZIDE (HYDRODIURIL) 12.5 MG TAB    Take 1 tablet (12.5 mg total) by mouth once daily.    LANCETS (ACCU-CHEK SOFTCLIX LANCETS) MISC    100 lancets by Misc.(Non-Drug; Combo Route) route 2 (two) times daily. Insurance preferred lancets Dx:E11.22    LOSARTAN (COZAAR) 50 MG TABLET    TAKE 1 TABLET BY MOUTH 2 (TWO) TIMES DAILY.    METFORMIN (GLUCOPHAGE-XR) 500 MG 24 HR TABLET    Take 1 tablet (500 mg total) by mouth daily with breakfast.    METFORMIN (GLUCOPHAGE-XR) 500 MG 24 HR TABLET    TAKE 2 TABLETS (1,000 MG TOTAL) BY MOUTH ONCE DAILY.    METOPROLOL SUCCINATE (TOPROL-XL) 50 MG 24 HR TABLET    Take 1 tablet by mouth once daily.    MUPIROCIN (BACTROBAN) 2 % OINTMENT    Apply topically 3 (three) times daily.    RIVAROXABAN (XARELTO) 20 MG TAB    Take 20 mg by mouth once daily.       Review of patient's  allergies indicates:   Allergen Reactions    Pravachol [pravastatin] Nausea Only       Past Surgical History:   Procedure Laterality Date    CATARACT EXTRACTION W/  INTRAOCULAR LENS IMPLANT Left 2/27/13    Dr. Taveras    COLONOSCOPY      COLONOSCOPY N/A 9/22/2016    [facility] ([facility] FLR)    COLONOSCOPY N/A 5/20/2013    [facility] at [facility] (4TH FLR)    ESOPHAGOGASTRODUODENOSCOPY (EGD) N/A 6/21/2016    [facility] ([facility])    EYE SURGERY Bilateral 2002 approx    Laser for glaucoma    HYSTERECTOMY  1963     AMEENA/USO- fibroids; no cancer    INSERTION, IOL PROSTHESIS Left 2/27/2013    Performed by [facility] at [facility] OR [address] [place] [address]    OOPHORECTOMY  1963    unknown, only removed one    PHACOEMULSIFICATION, CATARACT Left 2/27/2013    Performed by [facility] at [facility] OR [address] [place] [address]       Family History   Problem Relation Age of Onset    Stroke Mother     Mental illness Mother     Hypertension Mother     Stroke Father     Diabetes Maternal Grandmother     Drug abuse Daughter     Cancer Sister 68        gyn    Cancer Maternal Uncle         type not known    Heart disease Paternal Grandmother     Glaucoma Neg Hx     Macular degeneration Neg Hx     Alcohol abuse Neg Hx     COPD Neg Hx     Asthma Neg Hx     Amblyopia Neg Hx     Blindness Neg Hx     Cataracts Neg Hx     Retinal detachment Neg Hx     Strabismus Neg Hx        Social History     Socioeconomic History    Marital status:      Spouse name: Not on file    Number of children: Not on file    Years of education: Not on file    Highest education level: Not on file   Occupational History    Not on file   Social Needs    Financial resource strain: Not on file    Food insecurity:     Worry: Not on file     Inability: Not on file    Transportation needs:     Medical: Not on file     Non-medical: Not on file   Tobacco  Use    Smoking status: Never Smoker    Smokeless tobacco: Never Used   Substance and Sexual Activity    Alcohol use: No    Drug use: No    Sexual activity: Never     Partners: Male   Lifestyle    Physical activity:     Days per week: Not on file     Minutes per session: Not on file    Stress: Not on file   Relationships    Social connections:     Talks on phone: Not on file     Gets together: Not on file     Attends Spiritism service: Not on file     Active member of club or organization: Not on file     Attends meetings of clubs or organizations: Not on file     Relationship status: Not on file   Other Topics Concern    Not on file   Social History Narrative    , no pets or smokers in household. 1 Child who lives in nursing home. She has a grandson who lives in [place].. Retired from [employer] . Still drives. Does not have a Living Will or advanced directive.        Vitals:    04/08/19 1500   BP: 128/68   Pulse: (!) 55   Weight: 78.4 kg (172 lb 13.5 oz)   PainSc: 0-No pain       Hemoglobin A1C   Date Value Ref Range Status   01/22/2019 7.6 (H) 4.0 - 5.6 % Final     Comment:     ADA Screening Guidelines:  5.7-6.4%  Consistent with prediabetes  >or=6.5%  Consistent with diabetes  High levels of fetal hemoglobin interfere with the HbA1C  assay. Heterozygous hemoglobin variants (HbS, HgC, etc)do  not significantly interfere with this assay.   However, presence of multiple variants may affect accuracy.     10/05/2018 8.5 (H) 4.0 - 5.6 % Final     Comment:     ADA Screening Guidelines:  5.7-6.4%  Consistent with prediabetes  >or=6.5%  Consistent with diabetes  High levels of fetal hemoglobin interfere with the HbA1C  assay. Heterozygous hemoglobin variants (HbS, HgC, etc)do  not significantly interfere with this assay.   However, presence of multiple variants may affect accuracy.     06/29/2018 7.9 (H) 4.0 - 5.6 % Final     Comment:     ADA Screening Guidelines:  5.7-6.4%  Consistent with  prediabetes  >or=6.5%  Consistent with diabetes  High levels of fetal hemoglobin interfere with the HbA1C  assay. Heterozygous hemoglobin variants (HbS, HgC, etc)do  not significantly interfere with this assay.   However, presence of multiple variants may affect accuracy.         Review of Systems   Constitutional: Negative for chills and fever.   Respiratory: Negative for shortness of breath.    Cardiovascular: Negative for chest pain, palpitations, orthopnea, claudication and leg swelling.   Gastrointestinal: Negative for diarrhea, nausea and vomiting.   Musculoskeletal: Negative for joint pain.   Skin: Negative for rash.   Neurological: Positive for sensory change. Negative for dizziness, tingling, focal weakness and weakness.   Psychiatric/Behavioral: Negative.              Objective:   PHYSICAL EXAM: Apperance: Alert and orient in no distress,well developed, and with good attention to grooming and body habits  Patient presents ambulating in tennis shoes.  LOWER EXTREMITY EXAM:  VASCULAR: Dorsalis pedis pulses 0/4 left 1/4 right and Posterior Tibial pulses 0/4 left and 1/4 right. Capillary fill time <4 seconds bilateral. Mild edema observed bilateral. Varicosities present bilateral. Skin temperature of the lower extremities is warm to cool, proximal to distal. Hair growth absent bilateral.  DERMATOLOGICAL: No skin rashes, subcutaneous nodules, lesions, or ulcers observed bilateral. Nails 1,2,3,4,5, bilateral elongated, thickened, and discolored with subungual debris. Webspaces 1,2,3,4 bilateral clean, dry and without evidence of break in skin integrity. Mild hyperkeratotic lesions noted to bilateral 5th plantar submetatarsal.    NEUROLOGICAL: Light touch, sharp-dull, proprioception all present and equal bilaterally.  Vibratory sensation diminished at bilateral hallux. Protective sensation absent at toe sites as tested with a [place]-Marjan 5.07 monofilament.   MUSCULOSKELETAL: Muscle strength is 5/5 for foot  inverters, everters, plantarflexors, and dorsiflexors. Muscle tone is normal.     Assessment:   Type II diabetes mellitus with neurological manifestations    Onychomycosis due to dermatophyte    Corn or callus          Plan:   Type II diabetes mellitus with neurological manifestations    Onychomycosis due to dermatophyte    Corn or callus      I counseled the patient on her conditions, regarding findings of my examination, my impressions, and usual treatment plan.   Greater than 50% of this visit spent on counseling and coordination of care.  Greater than 15 minutes of a 20 minute appointment spent on education about the diabetic foot, neuropathy, and prevention of limb loss.  Shoe inspection. Diabetic Foot Education. Patient reminded of the importance of good nutrition and blood sugar control to help prevent podiatric complications of diabetes. Patient instructed on proper foot hygeine. We discussed wearing proper shoe gear, daily foot inspections, never walking without protective shoe gear, never putting sharp instruments to feet.    With patient's permission, nails 1-5 bilateral were debrided/trimmed in length and thickness aggressively to their soft tissue attachment mechanically and with electric , removing all offending nail and debris. Patient relates relief following the procedure.  With patient's permission, bilateral callus trimmed in thickness with #15 blade in thickness without incident.   Patient  will continue to monitor the areas daily, inspect feet, wear protective shoe gear when ambulatory, moisturizer to maintain skin integrity. Patient reminded of the importance of good nutrition and blood sugar control to help prevent podiatric complications of diabetes.  Patient to return 3 months or sooner if needed.                 Zuleima Howell DPM  [facility]

## 2019-05-13 ENCOUNTER — TELEPHONE (OUTPATIENT)
Dept: PODIATRY | Facility: CLINIC | Age: 79
End: 2019-05-13

## 2019-05-13 ENCOUNTER — NUTRITION (OUTPATIENT)
Dept: DIABETES | Facility: CLINIC | Age: 79
End: 2019-05-13
Payer: MEDICARE

## 2019-05-13 ENCOUNTER — LAB VISIT (OUTPATIENT)
Dept: LAB | Facility: HOSPITAL | Age: 79
End: 2019-05-13
Attending: PHYSICIAN ASSISTANT
Payer: MEDICARE

## 2019-05-13 VITALS — BODY MASS INDEX: 27.95 KG/M2 | WEIGHT: 173.94 LBS | HEIGHT: 66 IN

## 2019-05-13 DIAGNOSIS — E11.22 DIABETES MELLITUS WITH STAGE 3 CHRONIC KIDNEY DISEASE: Primary | ICD-10-CM

## 2019-05-13 DIAGNOSIS — E11.22 DIABETES MELLITUS WITH STAGE 3 CHRONIC KIDNEY DISEASE: Chronic | ICD-10-CM

## 2019-05-13 DIAGNOSIS — N18.30 DIABETES MELLITUS WITH STAGE 3 CHRONIC KIDNEY DISEASE: Primary | ICD-10-CM

## 2019-05-13 DIAGNOSIS — N18.30 DIABETES MELLITUS WITH STAGE 3 CHRONIC KIDNEY DISEASE: Chronic | ICD-10-CM

## 2019-05-13 LAB
ESTIMATED AVG GLUCOSE: 174 MG/DL (ref 68–131)
HBA1C MFR BLD HPLC: 7.7 % (ref 4–5.6)

## 2019-05-13 PROCEDURE — 36415 COLL VENOUS BLD VENIPUNCTURE: CPT

## 2019-05-13 PROCEDURE — 83036 HEMOGLOBIN GLYCOSYLATED A1C: CPT

## 2019-05-13 PROCEDURE — G0108 DIAB MANAGE TRN  PER INDIV: HCPCS | Mod: S$GLB,,, | Performed by: DIETITIAN, REGISTERED

## 2019-05-13 PROCEDURE — 99999 PR PBB SHADOW E&M-EST. PATIENT-LVL III: ICD-10-PCS | Mod: PBBFAC,,, | Performed by: DIETITIAN, REGISTERED

## 2019-05-13 PROCEDURE — G0108 PR DIAB MANAGE TRN  PER INDIV: ICD-10-PCS | Mod: S$GLB,,, | Performed by: DIETITIAN, REGISTERED

## 2019-05-13 PROCEDURE — 99999 PR PBB SHADOW E&M-EST. PATIENT-LVL III: CPT | Mod: PBBFAC,,, | Performed by: DIETITIAN, REGISTERED

## 2019-05-13 NOTE — LETTER
May 13, 2019        Penny Randolph MD  [address]  [place] [address]             [facility]  [address] [place] [address]  Phone: [phone]  Fax: [phone]   Patient: Saul Mar   MR Number: [id number]   YOB: 1940   Date of Visit: 5/13/2019     Dear Dr. Randolph,     {WILDCARD:[id number]}    Sincerely,      Elizabeth Griffin, RD, CDE            CC  No Recipients    Enclosure

## 2019-05-13 NOTE — TELEPHONE ENCOUNTER
Returned patient's phone call. Requested call back.    ----- Message from Sydni Hoover sent at 5/13/2019  8:58 AM CDT -----  Contact: self  Type:  Sooner Apoointment Request    Caller is requesting a sooner appointment.  Caller declined first available appointment listed below.  Caller will not accept being placed on the waitlist and is requesting a message be sent to doctor.  Name of Caller:jovita jaimes  When is the first available appointment?05/23  Symptoms:ingrown toe nail on right big toe  Would the patient rather a call back or a response via [facility]? Call back  Best Call Back Number:[phone]  Additional Information: none    Thanks,  Sydni Hoover

## 2019-05-13 NOTE — PROGRESS NOTES
Diabetes Education  Author: Elizabeth Griffin RD, CDE  Date: 5/13/2019    Diabetes Care Management Summary  Diabetes Education Record Assessment/Progress: Comprehensive/Group  Current Diabetes Risk Level: Moderate     Last A1c:   Lab Results   Component Value Date    HGBA1C 7.6 (H) 01/22/2019     Last visit with Diabetes Educator: : 12/18/2018    Diabetes Type  Diabetes Type : Type II    Diabetes History  Diabetes Diagnosis: >10 years(>20yrs)  Current Treatment: Oral Medication, Diet, Exercise(amaryl 4mg bfst daily, metformin xr 500 mg 1tab twice daily)  Reviewed Problem List with Patient: Yes    Health Maintenance was reviewed today with patient. Discussed with patient importance of routine eye exams, foot exams/foot care, blood work (i.e.: A1c, microalbumin, and lipid), dental visits, yearly flu vaccine, and pneumonia vaccine as indicated by PCP. Patient verbalized understanding.     Health Maintenance Topics with due status: Not Due       Topic Last Completion Date    TETANUS VACCINE 01/14/2011    Colonoscopy 09/22/2016    DEXA SCAN 08/08/2017    Mammogram 06/28/2018    Eye Exam 08/10/2018    Influenza Vaccine 10/02/2018    Lipid Panel 01/22/2019    Hemoglobin A1c 01/22/2019    Foot Exam 04/08/2019     There are no preventive care reminders to display for this patient.    Nutrition  Meal Planning: (Intake ~1400-1800cals/d; wt decreased 9 lbs since 12/18. Pt working to reduce carb, fat. Excess sodium from processed meats. Irregualr intake nonstarchy vegetables.  )  Meal Plan 24 Hour Recall - Breakfast: cornflakes, 2% milk OR mcdonalds sausage mcmuffin (3d/wk) - water, coffee  Meal Plan 24 Hour Recall - Lunch: COA meals (2d/wk) OR turkey salami or tuna sandwich on white bread OR sausage w/ peas and rice  Meal Plan 24 Hour Recall - Dinner: COA meals (3d/wk) OR cornflakes, 2% milk    Meal Plan 24 Hour Recall - Snack: pecans; meliza: mostly water    Monitoring   Self Monitoring : Per records/meter review, fst BG  104-127, 154; pm 62, , 166; 30d avg 109. One episode hypoglycemia due to inadequate food w/ medication, BG 62, which she treated with juice/candy.    Blood Glucose Logs: Yes    Exercise   Exercise Type: (active lifestyle (errands, chores, walking at [employer]))    Current Diabetes Treatment   Current Treatment: Oral Medication, Diet, Exercise(amaryl 4mg bfst daily, metformin xr 500 mg 1tab twice daily)    Social History  Preferred Learning Method: Face to Face  Primary Support: Self  Smoking Status: Never a Smoker  Alcohol Use: Never       Barriers to Change  Barriers to Change: None  Learning Challenges : None    Readiness to Learn   Readiness to Learn : Eager    Cultural Influences  Cultural Influences: No    Diabetes Education Assessment/Progress  Diabetes Disease Process (diabetes disease process and treatment options): Discussion, Individual Session, Demonstrates Understanding/Competency(verbalizes/demonstrates)  Nutrition (Incorporating nutritional management into one's lifestyle): Discussion, Individual Session, Demonstrates Understanding/Competency (verbalizes/demonstrates), Written Materials Provided  Physical Activity (incorporating physical activity into one's lifestyle): Discussion, Individual Session, Demonstrates Understanding/Competency (verbalizes/demonstrates)  Medications (states correct name, dose, onset, peak, duration, side effects & timing of meds): Discussion, Individual Session, Demonstrates Understanding/Competency(verbalizes/demonstrates), Written Materials Provided  Monitoring (monitoring blood glucose/other parameters & using results): Discussion, Individual Session, Demonstrates Understanding/Competency (verbalizes/demonstrates)( )  Acute Complications (preventing, detecting, and treating acute complications): Discussion, Individual Session, Demonstrates Understanding/Competency (verbalizes/demonstrates)  Chronic Complications (preventing, detecting, and treating chronic  complications): Discussion, Individual Session, Demonstrates Understanding/Competency (verbalizes/demonstrates)  Clinical (diabetes, other pertinent medical history, and relevant comorbidities reviewed during visit): Discussion, Individual Session, Demonstrates Understanding/Competency (verbalizes/demonstrates)  Cognitive (knowledge of self-management skills, functional health literacy): Discussion, Individual Session, Demonstrates Understanding/Competency (verbalizes/demonstrates)  Psychosocial (emotional response to diabetes): Discussion, Individual Session, Demonstrates Understanding/Competency (verbalizes/demonstrates)  Diabetes Distress and Support Systems: Discussion, Individual Session, Demonstrates Understanding/Competency (verbalizes/demonstrates)  Behavioral (readiness for change, lifestyle practices, self-care behaviors): Discussion, Individual Session, Demonstrates Understanding/Competency (verbalizes/demonstrates)    Goals  Patient has selected/evaluated goals during today's session: Yes, evaluated  Healthy Eating: Set(Decrease dining out freq, use MR entrees (list provided) to assist)  Met Percentage : 50%  Start Date: 05/13/19  Target Date: 07/08/19  Physical Activity: Set(150min/wk - COA classes/mall walking)  Met Percentage : 75%  Start Date: 05/13/19  Target Date: 07/08/19  Monitoring: Set(test BG 2x/d -fst, acd or 2hr ppd; bring meter to clinic)  Met Percentage : 100%  Start Date: 05/13/19  Target Date: 07/08/19     Diabetes Care Plan/Intervention  Education Plan/Intervention: Individual Follow-Up DSMT, Endocrine Provider Visit Set Up(Maintain visits w/ Mr Ory for medical mgmt. Encouraged progress. Emphasis on use of MR, reducing processed meats, increasing nonstarchy vegetables. )    Diabetes Meal Plan  Restrictions: Low Fat, Low Sodium  Calories: 1200, 1400  Carbohydrate Per Meal: 30-45g  Carbohydrate Per Snack : 7-15g    Today's Self-Management Care Plan was developed with the patient's input  and is based on barriers identified during today's assessment.    The long and short-term goals in the care plan were written with the patient/caregiver's input. The patient has agreed to work toward these goals to improve her overall diabetes control.      The patient received a copy of today's self-management plan and verbalized understanding of the care plan, goals, and all of today's instructions.      The patient was encouraged to communicate with her physician and care team regarding her condition(s) and treatment.  I provided the patient with my contact information today and encouraged her to contact me via phone or patient portal as needed.     Education Units of Time   Time Spent: 30 min

## 2019-05-13 NOTE — LETTER
May 13, 2019        Penny Randolph MD  [address]  [facility] [address]             [facility]  [address]  [place] [address]  Phone: [phone]  Fax: [phone]   Patient: Saul Mar   MR Number: [id number]   YOB: 1940   Date of Visit: 5/13/2019       Dear Dr. Randolph:    Thank you for referring Saul Mar to me for evaluation. Below are the relevant portions of my assessment and plan of care.     If you have questions, please do not hesitate to call me. I look forward to following Saul along with you.    Sincerely,      Elizabeth Griffin RD, CDE           CC  Alan Alfonso Jr., PAELEAZAR

## 2019-05-19 DIAGNOSIS — E11.610 TYPE 2 DIABETES MELLITUS WITH DIABETIC NEUROPATHIC ARTHROPATHY: ICD-10-CM

## 2019-05-19 RX ORDER — METFORMIN HYDROCHLORIDE 500 MG/1
1000 TABLET, EXTENDED RELEASE ORAL DAILY
Qty: 180 TABLET | Refills: 0 | Status: SHIPPED | OUTPATIENT
Start: 2019-05-19 | End: 2019-08-21 | Stop reason: SDUPTHER

## 2019-05-21 NOTE — PROGRESS NOTES
PCP: Penny Randolph MD    Subjective:    Patient ID: Saul Mar is a 79 y.o. female.    PCP: Penny Randolph MD    Saul Mar is a pleasant 79 y.o. female presenting to follow up on diabetes mellitus. She states she have been doing well with her diabetes.  Also, pertinent to this visit in decision making is hypertension, hyperlipidemia, and adherence. She has had diabetes for 20 or more years. Her last visit in Diabetes Management was 2/22/2019.  Since that time she has been in her usual state of health without significant hyperglycemia or hypoglycemia. She has been checking her blood glucose regularly three times weekly. Also, since that time she has had mild improvement in her glycemia with A1c of 7.7%. She did not bring her glucometer or BG log but from recall her blood sugar range fasting has been 100-130 and fed has been not checking, and she has been monitoring 3 times per week. Her current concerns are glycemic control.    She denies any hospital admissions, emergency room visits, hypoglycemia, syncope, diaphoresis, chest pain, or dyspnea.    She has gained 4 pounds since last visit. Her BMI is 29.01 kg/m²    Her blood sugar in the clinic today was:   Lab Results   Component Value Date    POCGLU 156 (A) 05/23/2019     Saul is compliant most of the time with DM medications.     Saul is noncompliant some of the time with lifestyle modifications to include activity and meal planning.     Diabetes Management Status    Statin: Taking  ACE/ARB: Taking    Screening or Prevention Patient's value Goal Complete/Controlled?   HgA1C Testing and Control   Lab Results   Component Value Date    HGBA1C 7.7 (H) 05/13/2019      Annually/Less than 8% Yes   Lipid profile : 01/22/2019 Annually Yes   LDL control Lab Results   Component Value Date    LDLCALC 90.0 01/22/2019    Annually/Less than 100 mg/dl  Yes   Nephropathy screening Lab Results   Component Value Date    LABMICR 102.0 05/13/2019  "    Lab Results   Component Value Date    PROTEINUA 1+ (A) 10/19/2017    Annually No   Blood pressure BP Readings from Last 1 Encounters:   05/23/19 132/78    Less than 140/90 No   Dilated retinal exam : 08/10/2018 Annually Yes   Foot exam   : 04/08/2019 Annually Yes     The following results were reviewed with patient.    Lab Results   Component Value Date    HGBA1C 7.7 (H) 05/13/2019    HGBA1C 7.6 (H) 01/22/2019    HGBA1C 8.5 (H) 10/05/2018       Review of Systems   Constitutional: Negative for activity change and unexpected weight change.   Eyes: Negative for visual disturbance.   Respiratory: Negative for chest tightness and shortness of breath.    Cardiovascular: Negative for chest pain.   Gastrointestinal: Negative for constipation and diarrhea.   Endocrine: Negative for cold intolerance, heat intolerance, polydipsia, polyphagia and polyuria.   Genitourinary: Negative for frequency.   Skin: Negative for wound.   Neurological: Negative for numbness.   Psychiatric/Behavioral: Negative for confusion.          Objective:   /78   Ht 5' 5.5" (1.664 m)   Wt 80.3 kg (177 lb 0.5 oz)   BMI 29.01 kg/m²      Physical Exam   Constitutional: She is oriented to person, place, and time. She appears well-developed and well-nourished. No distress.   Neck: Normal range of motion. Neck supple. No tracheal deviation present. No thyromegaly present.   Cardiovascular: Normal rate, regular rhythm and normal heart sounds.   Pulmonary/Chest: Effort normal and breath sounds normal.   Abdominal: Soft. Bowel sounds are normal.   Musculoskeletal: She exhibits no edema.   Lymphadenopathy:     She has no cervical adenopathy.   Neurological: She is alert and oriented to person, place, and time.   Skin: She is not diaphoretic.   Psychiatric: She has a normal mood and affect.   Nursing note and vitals reviewed.        Assessment:     1. Diabetes mellitus with stage 3 chronic kidney disease      Plan:   Ruthluanne Kirkpatrick Zaid is seen " today for   1. Diabetes mellitus with stage 3 chronic kidney disease        Diabetes mellitus with stage 3 chronic kidney disease  -     POCT Glucose, Hand-Held Device     - Continue with D&E, reduce portion size, and restrict carbohydrates (no more that 45 grams ) per meal.   - Stable. Continue current treatment plan   - Will concentrate more on lifestyle modifications   - Follow up in 3 months    A total of 60 minutes was spent in face to face time, of which 50 % was spent in counseling patient on disease process, complications, treatment, and side effects of medications.    The patient was explained the above plan and given opportunity to ask questions.  She understands, chooses and consents to this plan and accepts all the risks, which include but are not limited to the risks mentioned above.   She understands the alternative of having no testing, interventions or treatments at this time. She left content and without further questions.     Disclaimer:  This note is prepared using voice recognition software and as such is likely to have errors and has not been proof read. Please contact me for questions.

## 2019-05-23 ENCOUNTER — OFFICE VISIT (OUTPATIENT)
Dept: DIABETES | Facility: CLINIC | Age: 79
End: 2019-05-23
Payer: MEDICARE

## 2019-05-23 VITALS
BODY MASS INDEX: 28.45 KG/M2 | SYSTOLIC BLOOD PRESSURE: 132 MMHG | HEIGHT: 66 IN | DIASTOLIC BLOOD PRESSURE: 78 MMHG | WEIGHT: 177 LBS

## 2019-05-23 DIAGNOSIS — E11.22 DIABETES MELLITUS WITH STAGE 3 CHRONIC KIDNEY DISEASE: Primary | Chronic | ICD-10-CM

## 2019-05-23 DIAGNOSIS — E11.22 DIABETES MELLITUS WITH STAGE 3 CHRONIC KIDNEY DISEASE: Primary | ICD-10-CM

## 2019-05-23 DIAGNOSIS — N18.30 DIABETES MELLITUS WITH STAGE 3 CHRONIC KIDNEY DISEASE: Primary | Chronic | ICD-10-CM

## 2019-05-23 DIAGNOSIS — N18.30 DIABETES MELLITUS WITH STAGE 3 CHRONIC KIDNEY DISEASE: Primary | ICD-10-CM

## 2019-05-23 LAB — GLUCOSE SERPL-MCNC: 156 MG/DL (ref 70–110)

## 2019-05-23 PROCEDURE — 1101F PR PT FALLS ASSESS DOC 0-1 FALLS W/OUT INJ PAST YR: ICD-10-PCS | Mod: CPTII,S$GLB,, | Performed by: PHYSICIAN ASSISTANT

## 2019-05-23 PROCEDURE — 3078F DIAST BP <80 MM HG: CPT | Mod: CPTII,S$GLB,, | Performed by: PHYSICIAN ASSISTANT

## 2019-05-23 PROCEDURE — 99999 PR PBB SHADOW E&M-EST. PATIENT-LVL III: CPT | Mod: PBBFAC,,, | Performed by: PHYSICIAN ASSISTANT

## 2019-05-23 PROCEDURE — 99214 PR OFFICE/OUTPT VISIT, EST, LEVL IV, 30-39 MIN: ICD-10-PCS | Mod: S$GLB,,, | Performed by: PHYSICIAN ASSISTANT

## 2019-05-23 PROCEDURE — 82962 POCT GLUCOSE, HAND-HELD DEVICE: ICD-10-PCS | Mod: S$GLB,,, | Performed by: PHYSICIAN ASSISTANT

## 2019-05-23 PROCEDURE — 3078F PR MOST RECENT DIASTOLIC BLOOD PRESSURE < 80 MM HG: ICD-10-PCS | Mod: CPTII,S$GLB,, | Performed by: PHYSICIAN ASSISTANT

## 2019-05-23 PROCEDURE — 82962 GLUCOSE BLOOD TEST: CPT | Mod: S$GLB,,, | Performed by: PHYSICIAN ASSISTANT

## 2019-05-23 PROCEDURE — 3075F PR MOST RECENT SYSTOLIC BLOOD PRESS GE 130-139MM HG: ICD-10-PCS | Mod: CPTII,S$GLB,, | Performed by: PHYSICIAN ASSISTANT

## 2019-05-23 PROCEDURE — 3075F SYST BP GE 130 - 139MM HG: CPT | Mod: CPTII,S$GLB,, | Performed by: PHYSICIAN ASSISTANT

## 2019-05-23 PROCEDURE — 99999 PR PBB SHADOW E&M-EST. PATIENT-LVL III: ICD-10-PCS | Mod: PBBFAC,,, | Performed by: PHYSICIAN ASSISTANT

## 2019-05-23 PROCEDURE — 1101F PT FALLS ASSESS-DOCD LE1/YR: CPT | Mod: CPTII,S$GLB,, | Performed by: PHYSICIAN ASSISTANT

## 2019-05-23 PROCEDURE — 99214 OFFICE O/P EST MOD 30 MIN: CPT | Mod: S$GLB,,, | Performed by: PHYSICIAN ASSISTANT

## 2019-05-31 NOTE — PROGRESS NOTES
Subjective:       Patient ID: Saul Mar is a 78 y.o. female.    Chief Complaint: Follow-up    Follow-up of multiple medical problems.    Recent hospitalization for new onset AFib.  Has been seen in Cardiology in September.  Stable on current regimen.   Medications have been adjusted.  No side effects that she can tell.  Follows with a cardiologist in .    Abscess under R axilla, resolved.    Diabetes not doing well with an A1c of 8.5.  Admits to some fairly significant dietary indiscretion- reviewed. Oatmeal with apple and raisins in the morning; lots of fruit in general.  Not much exercise either but is planning to start soon.   Is willing to see a diabetes educator.    CKD 3; stable for the most part.  Seen in Nephrology July of 2018.    Blood pressure doing well.    Lipids up- on atorvastatin.      Due for carotid ultrasound.    Podiatry September 2018, Ophthalmology August of 2018.    Patient Active Problem List:     Mixed diabetic hyperlipidemia associated with type 2 diabetes mellitus     Degenerative disc disease     Colon polyp: tubular adenoma 5/13, repeated 2016 due 2019     Multinodular thyroid: thyroid u/s 7/16     POAG (primary open-angle glaucoma) - Both Eyes     Amaurosis fugax     Nuclear sclerosis - Right Eye     Eyelid myokymia     Cerebral microvascular disease: stroke ? 1999 elsewhere; TIA 10/13     Left-sided carotid artery disease     Vitamin D insufficiency     Left ventricular diastolic dysfunction with preserved systolic function     Hypertension, essential     Gastroesophageal reflux disease without esophagitis     Type 2 diabetes, uncontrolled, with background retinopathy with macular edema     Overweight (BMI 25.0-29.9)     Diabetes mellitus with proteinuria     Type II diabetes mellitus with neurological manifestations     Aortic arch atherosclerosis     Abnormal ankle brachial index     Diabetes mellitus with stage 3 chronic kidney disease     Cerebrovascular accident  (CVA) due to thrombosis of precerebral artery     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     CKD (chronic kidney disease) stage 3, GFR 30-59 ml/min     Sickle cell trait syndrome     Mixed anxiety depressive disorder     Diverticulosis of large intestine without hemorrhage     Hemoglobin S trait        Review of Systems   Constitutional: Negative for activity change, appetite change, chills, fatigue and fever.   HENT: Negative for congestion, hearing loss, sinus pressure and sore throat.    Eyes: Negative for visual disturbance.   Respiratory: Negative for apnea, cough, shortness of breath and wheezing.    Cardiovascular: Negative for chest pain, palpitations and leg swelling.        No further issues with palpitations or arrhythmia that she is aware of   Gastrointestinal: Negative for abdominal distention, abdominal pain, constipation, diarrhea, nausea and vomiting.   Endocrine: Negative for polydipsia, polyphagia and polyuria.   Genitourinary: Negative for dysuria, frequency, hematuria and vaginal bleeding.   Musculoskeletal: Negative for gait problem, joint swelling and myalgias.   Skin: Negative for rash.        Lesion resolved   Neurological: Negative for dizziness, weakness, light-headedness and headaches.   Hematological: Negative for adenopathy. Does not bruise/bleed easily.   Psychiatric/Behavioral: Negative for confusion, hallucinations, sleep disturbance and suicidal ideas.       Objective:      Physical Exam   Constitutional: She is oriented to person, place, and time. She appears well-developed and well-nourished.   HENT:   Head: Normocephalic and atraumatic.   Right Ear: External ear normal.   Left Ear: External ear normal.   Nose: Nose normal.   Mouth/Throat: Oropharynx is clear and moist. No oropharyngeal exudate.   Eyes: Conjunctivae and EOM are normal. No scleral icterus.   Neck: Normal range of motion. Neck supple. No JVD present. Thyromegaly present.   Cardiovascular: Normal rate,  regular rhythm, normal heart sounds and intact distal pulses. Exam reveals no gallop.   No murmur heard.  Pulmonary/Chest: Effort normal and breath sounds normal. No respiratory distress. She has no wheezes.   Abdominal: Soft. Bowel sounds are normal. She exhibits no distension and no mass. There is no tenderness. There is no rebound and no guarding.   Musculoskeletal: Normal range of motion. She exhibits no edema or tenderness.   Lymphadenopathy:     She has no cervical adenopathy.   Neurological: She is alert and oriented to person, place, and time. She displays normal reflexes. No cranial nerve deficit. Coordination normal.   Skin: Skin is warm. No rash noted. No erythema.   No abscess seen under R axilla   Psychiatric: She has a normal mood and affect. Her behavior is normal. Judgment and thought content normal.   Nursing note and vitals reviewed.      Assessment:       1. Atrial fibrillation, unspecified type    2. Hypertension, essential    3. Left ventricular diastolic dysfunction with preserved systolic function    4. Left-sided carotid artery disease, unspecified type    5. Mixed diabetic hyperlipidemia associated with type 2 diabetes mellitus    6. CKD (chronic kidney disease) stage 3, GFR 30-59 ml/min    7. Hemoglobin S trait    8. Diabetes mellitus with stage 3 chronic kidney disease    9. Multinodular thyroid: thyroid u/s 7/16, stable 2017    10. Overweight (BMI 25.0-29.9)    11. 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 Vitamin D deficiency    13. Cerebral atherosclerosis         Plan:         Atrial fibrillation, unspecified type:  Back in normal sinus rhythm currently, on anticoagulation.  Reviewed.  Follows closely with cardiologist in [place]    Hypertension, essential:  Stable on regimen  -     CBC auto differential; Future; Expected date: 01/22/2019  -     Comprehensive metabolic panel; Future; Expected date: 01/22/2019    Left ventricular diastolic dysfunction with preserved  9 systolic function:  Stable without alarm symptoms    Left-sided carotid artery disease, unspecified type  -     CAR Ultrasound doppler carotid bilateral; Future; Expected date: 10/22/2018    Mixed diabetic hyperlipidemia associated with type 2 diabetes mellitus:  Continue with atorvastatin    CKD (chronic kidney disease) stage 3, GFR 30-59 ml/min:  Hydration, avoid nephrotoxins; Nephrology follow-up will be due within the next several months    Hemoglobin S trait    Diabetes mellitus with stage 3 chronic kidney disease  -     Ambulatory consult to Diabetic Education  -     Hemoglobin A1c; Future; Expected date: 01/20/2019  -     Lipid panel; Future; Expected date: 01/20/2019    Multinodular thyroid: thyroid u/s 7/16, stable 2017:  Anticipate recheck in the next 2 years    Overweight (BMI 25.0-29.9)  -     albuterol (PROVENTIL/VENTOLIN HFA) 90 mcg/actuation inhaler; Inhale 2 puffs into the lungs every 6 (six) hours as needed for Wheezing. Rescue  Dispense: 8 g; Refill: 12    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  -     ferrous sulfate (FEOSOL) 325 mg (65 mg iron) Tab tablet; Take 1 tablet (325 mg total) by mouth 2 (two) times daily.  Dispense: 60 tablet; Refill: 12    Vitamin D deficiency  -     cholecalciferol, vitamin D3, (VITAMIN D3) 1,000 unit capsule; Take 1 capsule (1,000 Units total) by mouth once daily.  Dispense: 30 capsule; Refill: 12    Cerebral atherosclerosis   -     CAR Ultrasound doppler carotid bilateral; Future; Expected date: 10/22/2018    Shingles vaccine today recommended  Exercise, weight loss, strict attention to diabetic diet, reviewed today  EP with labs in 3 months, return sooner with problems in the interim  Keep Cardiology follow-up

## 2019-06-04 ENCOUNTER — OFFICE VISIT (OUTPATIENT)
Dept: OPHTHALMOLOGY | Facility: CLINIC | Age: 79
End: 2019-06-04
Payer: MEDICARE

## 2019-06-04 ENCOUNTER — LAB VISIT (OUTPATIENT)
Dept: LAB | Facility: HOSPITAL | Age: 79
End: 2019-06-04
Attending: INTERNAL MEDICINE
Payer: MEDICARE

## 2019-06-04 ENCOUNTER — OFFICE VISIT (OUTPATIENT)
Dept: INTERNAL MEDICINE | Facility: CLINIC | Age: 79
End: 2019-06-04
Payer: MEDICARE

## 2019-06-04 VITALS
SYSTOLIC BLOOD PRESSURE: 125 MMHG | HEIGHT: 68 IN | DIASTOLIC BLOOD PRESSURE: 80 MMHG | WEIGHT: 174 LBS | BODY MASS INDEX: 26.37 KG/M2

## 2019-06-04 DIAGNOSIS — N18.30 CKD (CHRONIC KIDNEY DISEASE) STAGE 3, GFR 30-59 ML/MIN: Chronic | ICD-10-CM

## 2019-06-04 DIAGNOSIS — I67.89 CEREBRAL MICROVASCULAR DISEASE: Primary | Chronic | ICD-10-CM

## 2019-06-04 DIAGNOSIS — H25.11 NUCLEAR SCLEROSIS, RIGHT: ICD-10-CM

## 2019-06-04 DIAGNOSIS — E11.22 DIABETES MELLITUS WITH STAGE 3 CHRONIC KIDNEY DISEASE: Chronic | ICD-10-CM

## 2019-06-04 DIAGNOSIS — I10 HYPERTENSION, ESSENTIAL: Chronic | ICD-10-CM

## 2019-06-04 DIAGNOSIS — E55.9 VITAMIN D INSUFFICIENCY: ICD-10-CM

## 2019-06-04 DIAGNOSIS — Z12.31 SCREENING MAMMOGRAM, ENCOUNTER FOR: ICD-10-CM

## 2019-06-04 DIAGNOSIS — E11.29 DIABETES MELLITUS WITH PROTEINURIA: Chronic | ICD-10-CM

## 2019-06-04 DIAGNOSIS — G45.3 AMAUROSIS FUGAX: ICD-10-CM

## 2019-06-04 DIAGNOSIS — D53.9 NUTRITIONAL ANEMIA: ICD-10-CM

## 2019-06-04 DIAGNOSIS — I48.91 ATRIAL FIBRILLATION, UNSPECIFIED TYPE: ICD-10-CM

## 2019-06-04 DIAGNOSIS — Z96.1 PSEUDOPHAKIA: ICD-10-CM

## 2019-06-04 DIAGNOSIS — I70.0 AORTIC ARCH ATHEROSCLEROSIS: Chronic | ICD-10-CM

## 2019-06-04 DIAGNOSIS — N18.30 DIABETES MELLITUS WITH STAGE 3 CHRONIC KIDNEY DISEASE: Chronic | ICD-10-CM

## 2019-06-04 DIAGNOSIS — R41.3 MEMORY DISORDER: ICD-10-CM

## 2019-06-04 DIAGNOSIS — R53.83 FATIGUE, UNSPECIFIED TYPE: ICD-10-CM

## 2019-06-04 DIAGNOSIS — R80.9 DIABETES MELLITUS WITH PROTEINURIA: Chronic | ICD-10-CM

## 2019-06-04 DIAGNOSIS — H40.1132 PRIMARY OPEN ANGLE GLAUCOMA OF BOTH EYES, MODERATE STAGE: Primary | ICD-10-CM

## 2019-06-04 DIAGNOSIS — H25.13 NUCLEAR SCLEROSIS OF BOTH EYES: ICD-10-CM

## 2019-06-04 DIAGNOSIS — D57.3 SICKLE CELL TRAIT SYNDROME: ICD-10-CM

## 2019-06-04 DIAGNOSIS — E04.2 MULTINODULAR THYROID: Chronic | ICD-10-CM

## 2019-06-04 DIAGNOSIS — D57.3 HEMOGLOBIN S TRAIT: ICD-10-CM

## 2019-06-04 DIAGNOSIS — G51.4 EYELID MYOKYMIA: ICD-10-CM

## 2019-06-04 DIAGNOSIS — F41.8 MIXED ANXIETY DEPRESSIVE DISORDER: ICD-10-CM

## 2019-06-04 DIAGNOSIS — I51.89 LEFT VENTRICULAR DIASTOLIC DYSFUNCTION WITH PRESERVED SYSTOLIC FUNCTION: ICD-10-CM

## 2019-06-04 DIAGNOSIS — D50.1 IRON DEFICIENCY ANEMIA DUE TO SIDEROPENIC DYSPHAGIA: ICD-10-CM

## 2019-06-04 DIAGNOSIS — E66.3 OVERWEIGHT (BMI 25.0-29.9): ICD-10-CM

## 2019-06-04 LAB
ALBUMIN SERPL BCP-MCNC: 3.7 G/DL (ref 3.5–5.2)
ALP SERPL-CCNC: 75 U/L (ref 55–135)
ALT SERPL W/O P-5'-P-CCNC: 14 U/L (ref 10–44)
ANION GAP SERPL CALC-SCNC: 10 MMOL/L (ref 8–16)
AST SERPL-CCNC: 19 U/L (ref 10–40)
BASOPHILS # BLD AUTO: 0.03 K/UL (ref 0–0.2)
BASOPHILS NFR BLD: 0.3 % (ref 0–1.9)
BILIRUB SERPL-MCNC: 0.4 MG/DL (ref 0.1–1)
BUN SERPL-MCNC: 16 MG/DL (ref 8–23)
CALCIUM SERPL-MCNC: 9.1 MG/DL (ref 8.7–10.5)
CHLORIDE SERPL-SCNC: 107 MMOL/L (ref 95–110)
CO2 SERPL-SCNC: 22 MMOL/L (ref 23–29)
CREAT SERPL-MCNC: 1.3 MG/DL (ref 0.5–1.4)
DIFFERENTIAL METHOD: ABNORMAL
EOSINOPHIL # BLD AUTO: 0.2 K/UL (ref 0–0.5)
EOSINOPHIL NFR BLD: 1.4 % (ref 0–8)
ERYTHROCYTE [DISTWIDTH] IN BLOOD BY AUTOMATED COUNT: 14.7 % (ref 11.5–14.5)
EST. GFR  (AFRICAN AMERICAN): 45 ML/MIN/1.73 M^2
EST. GFR  (NON AFRICAN AMERICAN): 39 ML/MIN/1.73 M^2
FERRITIN SERPL-MCNC: 73 NG/ML (ref 20–300)
GLUCOSE SERPL-MCNC: 142 MG/DL (ref 70–110)
HCT VFR BLD AUTO: 31.4 % (ref 37–48.5)
HGB BLD-MCNC: 10.4 G/DL (ref 12–16)
IRON SERPL-MCNC: 18 UG/DL (ref 30–160)
LYMPHOCYTES # BLD AUTO: 2.1 K/UL (ref 1–4.8)
LYMPHOCYTES NFR BLD: 20.3 % (ref 18–48)
MCH RBC QN AUTO: 29.1 PG (ref 27–31)
MCHC RBC AUTO-ENTMCNC: 33.1 G/DL (ref 32–36)
MCV RBC AUTO: 88 FL (ref 82–98)
MONOCYTES # BLD AUTO: 0.8 K/UL (ref 0.3–1)
MONOCYTES NFR BLD: 7.8 % (ref 4–15)
NEUTROPHILS # BLD AUTO: 7.3 K/UL (ref 1.8–7.7)
NEUTROPHILS NFR BLD: 70.1 % (ref 38–73)
PLATELET # BLD AUTO: 184 K/UL (ref 150–350)
PMV BLD AUTO: 11.4 FL (ref 9.2–12.9)
POTASSIUM SERPL-SCNC: 3.5 MMOL/L (ref 3.5–5.1)
PROT SERPL-MCNC: 7.1 G/DL (ref 6–8.4)
RBC # BLD AUTO: 3.57 M/UL (ref 4–5.4)
SATURATED IRON: 6 % (ref 20–50)
SODIUM SERPL-SCNC: 139 MMOL/L (ref 136–145)
TOTAL IRON BINDING CAPACITY: 290 UG/DL (ref 250–450)
TRANSFERRIN SERPL-MCNC: 196 MG/DL (ref 200–375)
TSH SERPL DL<=0.005 MIU/L-ACNC: 0.88 UIU/ML (ref 0.4–4)
VIT B12 SERPL-MCNC: 776 PG/ML (ref 210–950)
WBC # BLD AUTO: 10.35 K/UL (ref 3.9–12.7)

## 2019-06-04 PROCEDURE — 99499 RISK ADDL DX/OHS AUDIT: ICD-10-PCS | Mod: S$GLB,,, | Performed by: OPHTHALMOLOGY

## 2019-06-04 PROCEDURE — 84443 ASSAY THYROID STIM HORMONE: CPT

## 2019-06-04 PROCEDURE — 99499 UNLISTED E&M SERVICE: CPT | Mod: S$GLB,,, | Performed by: INTERNAL MEDICINE

## 2019-06-04 PROCEDURE — 99499 UNLISTED E&M SERVICE: CPT | Mod: S$GLB,,, | Performed by: OPHTHALMOLOGY

## 2019-06-04 PROCEDURE — 99999 PR PBB SHADOW E&M-EST. PATIENT-LVL V: CPT | Mod: PBBFAC,,, | Performed by: INTERNAL MEDICINE

## 2019-06-04 PROCEDURE — 1101F PR PT FALLS ASSESS DOC 0-1 FALLS W/OUT INJ PAST YR: ICD-10-PCS | Mod: CPTII,S$GLB,, | Performed by: INTERNAL MEDICINE

## 2019-06-04 PROCEDURE — 3079F DIAST BP 80-89 MM HG: CPT | Mod: CPTII,S$GLB,, | Performed by: INTERNAL MEDICINE

## 2019-06-04 PROCEDURE — 3074F PR MOST RECENT SYSTOLIC BLOOD PRESSURE < 130 MM HG: ICD-10-PCS | Mod: CPTII,S$GLB,, | Performed by: INTERNAL MEDICINE

## 2019-06-04 PROCEDURE — 83540 ASSAY OF IRON: CPT

## 2019-06-04 PROCEDURE — 1101F PT FALLS ASSESS-DOCD LE1/YR: CPT | Mod: CPTII,S$GLB,, | Performed by: INTERNAL MEDICINE

## 2019-06-04 PROCEDURE — 99214 OFFICE O/P EST MOD 30 MIN: CPT | Mod: S$GLB,,, | Performed by: INTERNAL MEDICINE

## 2019-06-04 PROCEDURE — 3074F SYST BP LT 130 MM HG: CPT | Mod: CPTII,S$GLB,, | Performed by: INTERNAL MEDICINE

## 2019-06-04 PROCEDURE — 82306 VITAMIN D 25 HYDROXY: CPT

## 2019-06-04 PROCEDURE — 99499 RISK ADDL DX/OHS AUDIT: ICD-10-PCS | Mod: S$GLB,,, | Performed by: INTERNAL MEDICINE

## 2019-06-04 PROCEDURE — 92012 INTRM OPH EXAM EST PATIENT: CPT | Mod: S$GLB,,, | Performed by: OPHTHALMOLOGY

## 2019-06-04 PROCEDURE — 99999 PR PBB SHADOW E&M-EST. PATIENT-LVL V: ICD-10-PCS | Mod: PBBFAC,,, | Performed by: INTERNAL MEDICINE

## 2019-06-04 PROCEDURE — 99999 PR PBB SHADOW E&M-EST. PATIENT-LVL II: ICD-10-PCS | Mod: PBBFAC,,, | Performed by: OPHTHALMOLOGY

## 2019-06-04 PROCEDURE — 92012 PR EYE EXAM, EST PATIENT,INTERMED: ICD-10-PCS | Mod: S$GLB,,, | Performed by: OPHTHALMOLOGY

## 2019-06-04 PROCEDURE — 82728 ASSAY OF FERRITIN: CPT

## 2019-06-04 PROCEDURE — 80053 COMPREHEN METABOLIC PANEL: CPT

## 2019-06-04 PROCEDURE — 3079F PR MOST RECENT DIASTOLIC BLOOD PRESSURE 80-89 MM HG: ICD-10-PCS | Mod: CPTII,S$GLB,, | Performed by: INTERNAL MEDICINE

## 2019-06-04 PROCEDURE — 82607 VITAMIN B-12: CPT

## 2019-06-04 PROCEDURE — 99214 PR OFFICE/OUTPT VISIT, EST, LEVL IV, 30-39 MIN: ICD-10-PCS | Mod: S$GLB,,, | Performed by: INTERNAL MEDICINE

## 2019-06-04 PROCEDURE — 85025 COMPLETE CBC W/AUTO DIFF WBC: CPT

## 2019-06-04 PROCEDURE — 99999 PR PBB SHADOW E&M-EST. PATIENT-LVL II: CPT | Mod: PBBFAC,,, | Performed by: OPHTHALMOLOGY

## 2019-06-04 PROCEDURE — 36415 COLL VENOUS BLD VENIPUNCTURE: CPT

## 2019-06-04 RX ORDER — ALBUTEROL SULFATE 90 UG/1
2 AEROSOL, METERED RESPIRATORY (INHALATION) EVERY 6 HOURS PRN
Qty: 8 G | Refills: 12 | Status: SHIPPED | OUTPATIENT
Start: 2019-06-04 | End: 2019-06-06

## 2019-06-04 RX ORDER — BRIMONIDINE TARTRATE 2 MG/ML
1 SOLUTION/ DROPS OPHTHALMIC 3 TIMES DAILY
Qty: 10 ML | Refills: 11 | Status: SHIPPED | OUTPATIENT
Start: 2019-06-04 | End: 2019-10-18 | Stop reason: SDUPTHER

## 2019-06-04 NOTE — PROGRESS NOTES
HPI     DLS: 8/10/18    Pt here for Glaucoma follow up;  Pt wasn't scheduled for a HVF test today and nothing is available.   Pt states she needs refills on her eye drop. Pt wants RX for glasses   today.     Meds: Brimonidine bid ou    1) POAG   2) Type 2 DM   3) BDR with ME OU   4) Hx RLL lesion   5) NS OD   6) PCIOL OS   7) Hx CVA   8) Hx Amaurosis Fugax   9) Eyelid Myokymia     Last edited by Shauna Christopher on 6/4/2019  8:43 AM. (History)              Assessment /Plan     For exam results, see Encounter Report.    Primary open angle glaucoma of both eyes, moderate stage    Type 2 diabetes, uncontrolled, with background retinopathy with macular edema    Nuclear sclerosis, right    Amaurosis fugax    Eyelid myokymia    Pseudophakia        1. POAG ou   H/O poor compliance   First HVF 1997   First photos 1998     Family history none   Glaucoma meds Has used alphagan in past - poor compliance-stopped on her own   H/O adverse rxn to glaucoma drops none   LASERS No glaucoma laser (+h/o focal OU for BDR)   GLAUCOMA SURGERIES none   OTHER EYE SURGERIES CE/PCIOL OS 2/27/13  CDR 0.8/0.75   Tbase 16-20/16-22   Tmax 20/22   Ttarget 17/17   HVF 14 test 1997 to 2018  - inf arc/NS od //  Sup paracentral loss and INS os  (+changes ? 2/2 focal laser )   Gonio +3   /621- thick ou (- 4.5 ou)   OCT 2006, 2009, 2012 - RNFL nl ou   HRT 6  tests: 2009 to 2018 -  MR -  Dec. I, bord S/T od // bord T/I os /// CDR 0.74 od // 0.70 os  Disc photos 1998, 2000, 2001, 2003, 2003, 2004, 2005, 2006- slides  // 2010 , 2018 - OIS     - Ttoday 12/12  - Test done today IOP/gonio    2. 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 NS OD    Remove prn   BCVA 20/40   BAT 20/60    4. Pseudophakia OS    S/p CE/PCIOL OS    IOL SN60 WF 20.5   -VA limited 2/2 hx of CSME s/p focal scars   BCVA  20/25   Give Rx glasses - 7/2/2013   Had re-bound iritis - resolved    4. H/O RLL lesion - S/P excision with Jovanni     5. Post Nelda lives in [facility]  "[place] - but still likes to come to [place] for care     6. amaurosis fugax / H/O CVA's   Patient reports stroke like symptoms and amaurosis fugax 10/13   -  She was admitted to hospital with very elevated BP   -  Patient had MRI at the time showing ischemic disease   - last Carotid U/S done 11/12- patient reports that     - PCP gets one yearly- last U/S showed minimal plaque disease   -  H/o stroke- discussed that amaurosis was from elevated BP and if ever recurs needs to report to ER immediately- she voiced understanding    7. Eyelid myokymia  - intermittent - patient also reports eyelid "twitching  "- discussed with patient that myokymia is usually from lack of sleep, caffeine intake, or stress- however nothing to worry about  -  Did not have any "twitching" on exam today    Plan:  BrimonidineTID ou   MRx given- patient would like to think re CEIOL OD- can consider CEIOL with [place] given poor compliance with gtts and moderate glaucoma.   HVF's are inconsistent with the ON exam / OCT ect   Pt wants to wait on phaco od for now // has done well os     Avoid PG's if possible 2/2 h/o CME 2/2 BDR    Refraction Post op os  is more myopic than expected - review IOL calc if needs 2nd eye done    Repeat OCT of macula  5/7/2013 - - done no CME os    F/U 4 months - HVF / DFE /OCT    - photo file - (( first name is Saul ( leave out the Zaid part))) - updated 6/4/2019    Can refer to retina in future - +BDR - s/p laser - [facility] - then use to see [facility] - can refer to [facility] in future - appears stable for now     I have seen and personally examined the patient.  I agree with the findings, assessment and plan of the resident and/or fellow.     Lina Taveras MD                    "

## 2019-06-04 NOTE — PATIENT INSTRUCTIONS
Prevention Guidelines, Women Ages 65 and Older  Screening tests and vaccines are an important part of managing your health. Health counseling is essential, too. Below are guidelines for these, for women ages 65 and older. Talk with your healthcare provider to make sure youre up to date on what you need.  Screening Who needs it How often   Type 2 diabetes or prediabetes All adults beginning at age 45 and adults without symptoms at any age who are overweight or obese and have 1 or more additional risk factors for diabetes At least every 3 years   Alcohol misuse All women in this age group At routine exams   Blood pressure All women in this age group Every 2 years if your blood pressure is less than 120/80 mm Hg; yearly if your systolic blood pressure is 120 to 139 mm Hg, or your diastolic blood pressure reading is 80 to 89 mm Hg   Breast cancer All women in this age group Yearly mammogram and clinical breast exam1   Cervical cancer Only women who had abnormal screening results before age 65 Talk with your healthcare provider   Chlamydia Women at increased risk for infection At routine exams   Colorectal cancer All women in this age group1 Flexible sigmoidoscopy every 5 years, or colonoscopy every 10 years, or double-contrast barium enema every 5 years; yearly fecal occult blood test or fecal immunochemical test; or a stool DNA test as often as your healthcare provider advises; talk with your healthcare provider about which tests are best for you   Depression All women in this age group At routine exams   Gonorrhea Sexually active women at increased risk for infection At routine exams   Hepatitis C Anyone at increased risk; 1 time for those born between 1945 and 1965 At routine exams   High cholesterol or triglycerides All women in this age group who are at risk for coronary artery disease At least every 5 years   HIV Women at increased risk for infection - talk with your healthcare provider At routine exams   Lung  cancer Adults age 55 to 80 who have smoked Yearly screening in smokers with 30 pack-year history of smoking or who quit within 15 years   Obesity All women in this age group At routine exams   Osteoporosis All women in this age group Bone density test at age 65, then follow-up as advised by your healthcare provider   Syphilis Women at increased risk for infection - talk with your healthcare provider At routine exams   Thyroid-Stimulating Hormone (TSH) All women in this age group Every 5 years   Tuberculosis Women at increased risk for infection - talk with your healthcare provider Ask your healthcare provider   Vision All women in this age group Every 1 to 2 years; if you have a chronic health condition, ask your healthcare provider if you need exams more often   Vaccine Who needs it How often   Chickenpox (varicella) All women in this age group who have no record of this infection or vaccine 2 doses; second dose should be given at least 4 weeks after the first dose   Hepatitis A Women at increased risk for infection - talk with your healthcare provider 2 doses given 6 months apart   Hepatitis B Women at increased risk for infection - talk with your healthcare provider 3 doses over 6 months; second dose should be given 1 month after the first dose; the third dose should be given at least 2 months after the second dose and at least 4 months after the first dose   Haemophilus influenza Type B (HIB) Women at increased risk for infection - talk with your healthcare provider 1 to 3 doses   Influenza (flu) All women in this age group Once a year   Pneumococcal conjugate vaccine (PCV13) and pneumococcal polysaccharide vaccine (PPSV23) All women in this age group 1 dose of each vaccine   Tetanus/diphtheria/pertussis (Td/Tdap) booster All women in this age group Td every 10 years, or a one-time dose of Tdap instead of a Td booster after age 18, then Td every 10 years   Zoster All women in this age group 1 dose   Counseling  Who needs it How often   Diet and exercise Women who are overweight or obese When diagnosed, and then at routine exams   Fall prevention (exercise and vitamin D supplements) All women in this age group At routine exams   Sexually transmitted infection prevention Women at increased risk for infection - talk with your healthcare provider At routine exams   Use of daily aspirin Women ages 55 and up in this age group who are at risk for cardiovascular health problems such as stroke When your risk is known   Use of tobacco and the health effects it can cause All women in this age group Every exam   1[employer]  Date Last Reviewed: 8/9/2015  © 0556-9114 [employer]. [address] [place] [address], [place] [address]. All rights reserved. This information is not intended as a substitute for professional medical care. Always follow your healthcare professional's instructions.

## 2019-06-04 NOTE — PROGRESS NOTES
Subjective:       Patient ID: Saul Mar is a 79 y.o. female.    Chief Complaint: Follow-up    Follow-up of multiple medical problems.    Dry cough all the time    Tired, energy low, legs feel weak since January.    When lying on R side sometimes feels like she is SOB- but not when lying on the left side.    Asks about mammogram and thyroid u/s, when due.    She is concerned about reversing numbers- had a problem when her   with reversing numbers.   She thinks this has happened twice (another time sending a letter).   No trouble with paying bills or balancing checkbooks.  She is concerned that she is forgetting things.   No family members have said anything.     Recent hospitalization last year for new onset AFib.  Has been seen in Cardiology in bed reach multiple times, most recently 2019..  Stable on current regimen.   Medications have been adjusted.  No side effects that she can tell.       Diabetes is doing better with an A1c less than 8.  Has followed with the diabetes educator an endocrinology and was seen in the last several months.  Not much exercise either but is planning to start soon.   Is willing to see a diabetes educator.     CKD 3; stable for the most part.  Seen in Nephrology 2018.  Overdue at this time.     Blood pressure doing well.     Lipids-on atorvastatin.       Had carotid ultrasound in 2018, 20-39% on the right and 40-49% on the left.     Podiatry 2019, Ophthalmology May 2019.    Patient Active Problem List:     Mixed diabetic hyperlipidemia associated with type 2 diabetes mellitus     Degenerative disc disease     Colon polyp: tubular adenoma , repeated  to be repeated      Multinodular thyroid: thyroid u/s , stable      POAG (primary open-angle glaucoma) - Both Eyes     Amaurosis fugax     Nuclear sclerosis - Right Eye     Eyelid myokymia     Cerebral microvascular disease: stroke ?  elsewhere; TIA 10/13      Left-sided carotid artery disease     Vitamin D insufficiency     Left ventricular diastolic dysfunction with preserved systolic function     Hypertension, essential     Gastroesophageal reflux disease without esophagitis     Type 2 diabetes, uncontrolled, with background retinopathy with macular edema     Overweight (BMI 25.0-29.9)     Diabetes mellitus with proteinuria     Type II diabetes mellitus with neurological manifestations     Aortic arch atherosclerosis     Abnormal ankle brachial index     Diabetes mellitus with stage 3 chronic kidney disease     Cerebrovascular accident (CVA) due to thrombosis of precerebral artery     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     CKD (chronic kidney disease) stage 3, GFR 30-59 ml/min     Sickle cell trait syndrome     Mixed anxiety depressive disorder     Diverticulosis of large intestine without hemorrhage     Hemoglobin S trait     Atrial fibrillation         Review of Systems   Constitutional: Positive for fatigue. Negative for activity change, appetite change, chills and fever.   HENT: Negative for congestion, hearing loss, sinus pressure and sore throat.    Eyes: Negative for visual disturbance.   Respiratory: Negative for apnea, cough, shortness of breath and wheezing.    Cardiovascular: Negative for chest pain, palpitations and leg swelling.   Gastrointestinal: Negative for abdominal distention, abdominal pain, constipation, diarrhea, nausea and vomiting.   Genitourinary: Negative for dysuria, frequency, hematuria and vaginal bleeding.   Musculoskeletal: Negative for gait problem, joint swelling and myalgias.   Skin: Negative for rash.   Neurological: Negative for dizziness, weakness, light-headedness and headaches.        See above   Hematological: Negative for adenopathy. Does not bruise/bleed easily.   Psychiatric/Behavioral: Positive for sleep disturbance. Negative for confusion, hallucinations and suicidal ideas.        Denies anxiety or  depression  Not sleeping as well   No BRIGIDO sx       Objective:      Physical Exam   Constitutional: She is oriented to person, place, and time. She appears well-developed and well-nourished.   HENT:   Head: Normocephalic and atraumatic.   Right Ear: External ear normal.   Left Ear: External ear normal.   Mouth/Throat: Oropharynx is clear and moist.   Eyes: Conjunctivae are normal.   Neck: Normal range of motion. Neck supple. Thyromegaly present.   Cardiovascular: Normal rate, regular rhythm and normal heart sounds.   Pulmonary/Chest: No respiratory distress. She has no wheezes. She has no rales.   Abdominal: Soft. Bowel sounds are normal.   Musculoskeletal: She exhibits no edema.   Lymphadenopathy:     She has no cervical adenopathy.   Neurological: She is alert and oriented to person, place, and time.   Skin: Skin is warm and dry. No rash noted. No erythema.       Assessment:       1. Cerebral microvascular disease: stroke ? 1999 elsewhere; TIA 10/13    2. Mixed anxiety depressive disorder    3. Type 2 diabetes, uncontrolled, with background retinopathy with macular edema    4. Aortic arch atherosclerosis    5. Atrial fibrillation, unspecified type    6. Hypertension, essential    7. Left ventricular diastolic dysfunction with preserved systolic function    8. CKD (chronic kidney disease) stage 3, GFR 30-59 ml/min    9. Diabetes mellitus with proteinuria    10. Diabetes mellitus with stage 3 chronic kidney disease    11. Multinodular thyroid: thyroid u/s 7/16, stable 2017    12. Overweight (BMI 25.0-29.9)    13. Screening mammogram, encounter for    14. Memory disorder    15. Hemoglobin S trait    16. Sickle cell trait syndrome    17. 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    18. Nutritional anemia    19. Vitamin D insufficiency    20. Fatigue, unspecified type        Plan:         Saul was seen today for follow-up.    Diagnoses and all orders for this visit:    Cerebral microvascular disease: stroke ?  1999 elsewhere; TIA 10/13; see above.  Will get neuropsych testing.  Stress reduction, sleep hygiene and avoiding multitasking were also reviewed.  Word games, number puzzles, exercise.  -     Ambulatory referral to Neurology    Mixed anxiety depressive disorder:  See above  -     Ambulatory referral to Neurology    Type 2 diabetes, uncontrolled, with background retinopathy with macular edema:  Keep ophthalmology follow-up    Aortic arch atherosclerosis:  Continue regimen    Atrial fibrillation, unspecified type:  Continue regimen.  Cardiology follow-up    Hypertension, essential:  Continue regimen  -     Comprehensive metabolic panel; Future    Left ventricular diastolic dysfunction with preserved systolic function:  Continue regimen    CKD (chronic kidney disease) stage 3, GFR 30-59 ml/min:  Hydration, avoid nephrotoxins  -     Ambulatory consult to Nephrology    Diabetes mellitus with proteinuria  -     Ambulatory consult to Nephrology    Diabetes mellitus with stage 3 chronic kidney disease:  Keep endocrinology follow-up    Multinodular thyroid: thyroid u/s 7/16, stable 2017  -      Soft Tissue Head Neck Thyroid; Future    Overweight (BMI 25.0-29.9)  -     albuterol (PROVENTIL/VENTOLIN HFA) 90 mcg/actuation inhaler; Inhale 2 puffs into the lungs every 6 (six) hours as needed for Wheezing. Rescue    Screening mammogram, encounter for  -     Mammo Digital Screening Bilat w/ Riccardo; Standing    Memory disorder  -     Neuropsychological testing; Future  -     Ambulatory consult to Psychology  -     Ambulatory referral to Neurology    Hemoglobin S trait    Sickle cell trait syndrome    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  -     CBC auto differential; Future  -     Ferritin; Future  -     Iron and TIBC; Future    Nutritional anemia  -     Vitamin B12; Future    Vitamin D insufficiency  -     Vitamin D; Future    Fatigue, unspecified type  -     TSH; Future    Stress reduction, meditation, prayer, avoid  multi-tasking  Last testing for memory revealed anxiety in 2013- will repeat  CT scan 1/19 acceptable    I will review all studies and determine further tx depending on findings

## 2019-06-05 ENCOUNTER — TELEPHONE (OUTPATIENT)
Dept: INTERNAL MEDICINE | Facility: CLINIC | Age: 79
End: 2019-06-05

## 2019-06-05 DIAGNOSIS — D50.1 IRON DEFICIENCY ANEMIA DUE TO SIDEROPENIC DYSPHAGIA: Primary | ICD-10-CM

## 2019-06-05 LAB — 25(OH)D3+25(OH)D2 SERPL-MCNC: 36 NG/ML (ref 30–96)

## 2019-06-05 NOTE — TELEPHONE ENCOUNTER
Spoke to pt and advised. She stated that she takes 2 OTC Iron tablets daily. She is unsure of mg but she will call the office back with the info. She stated sometimes but not often she gets a pain in the abdomen. She is ok with completing endoscopy. She also mentioned she find that her energy level is low.    Labs scheduled for 7/5/19

## 2019-06-05 NOTE — TELEPHONE ENCOUNTER
Labs acceptable other than low iron.  Yesterday, she told me she was taking iron twice daily.  Please clarify that that is what she is taking.      I would recommend that she get established with a gastroenterologist.  She also needs an endoscopy.    I have ordered both.    If she having any trouble with her stomach?   I would recommend that she take Zantac 150 mg twice daily and I have sent in to your pharmacy.    Repeat labs in 1 month, orders in; thanks

## 2019-06-06 RX ORDER — LEVALBUTEROL TARTRATE 45 UG/1
1-2 AEROSOL, METERED ORAL EVERY 6 HOURS PRN
Qty: 1 INHALER | Refills: 12 | Status: SHIPPED | OUTPATIENT
Start: 2019-06-06 | End: 2020-03-11

## 2019-06-06 NOTE — TELEPHONE ENCOUNTER
----- Message from Mariana Malone sent at 6/5/2019  5:15 PM CDT -----  Prescription Alternative Needed:     The pharmacy needs alternative on the following RX:    albuterol (PROVENTIL/VENTOLIN HFA) 90 mcg/actuation inhaler    Reason: Drug not covered. Preferred alternative Levalbuterol Tar HFA 45MCG INH    Pharmacy: [facility]AC[facility][id number] - [place] - [address] AT AT [facility]    Please advise.    Thank You

## 2019-06-07 ENCOUNTER — TELEPHONE (OUTPATIENT)
Dept: INTERNAL MEDICINE | Facility: CLINIC | Age: 79
End: 2019-06-07

## 2019-06-07 NOTE — TELEPHONE ENCOUNTER
Patient wants you to know that she was prescribed Ferrous Sulfate 325 mg and she takes 2 per day.

## 2019-06-07 NOTE — TELEPHONE ENCOUNTER
----- Message from Sunitha Mohan sent at 6/7/2019  2:11 PM CDT -----  Contact: [phone]  Patient is requesting a call from the office in regards to medication ferrous sulfate (FEOSOL) 325 mg (65 mg iron) Tab tablet.     Please advise, thanks

## 2019-06-10 RX ORDER — ALBUTEROL SULFATE 90 UG/1
AEROSOL, METERED RESPIRATORY (INHALATION)
COMMUNITY
Start: 2019-06-06 | End: 2019-06-10

## 2019-06-11 NOTE — TELEPHONE ENCOUNTER
OK, to try to increase to 3 x daily    She needs to schedule her EGD (ordered) and the GI consult (also ordered) thanks

## 2019-06-12 ENCOUNTER — PATIENT MESSAGE (OUTPATIENT)
Dept: INTERNAL MEDICINE | Facility: CLINIC | Age: 79
End: 2019-06-12

## 2019-06-12 ENCOUNTER — HOSPITAL ENCOUNTER (OUTPATIENT)
Dept: RADIOLOGY | Facility: HOSPITAL | Age: 79
Discharge: HOME | End: 2019-06-12
Attending: INTERNAL MEDICINE
Payer: MEDICARE

## 2019-06-12 ENCOUNTER — OFFICE VISIT (OUTPATIENT)
Dept: PODIATRY | Facility: CLINIC | Age: 79
End: 2019-06-12
Payer: MEDICARE

## 2019-06-12 VITALS
DIASTOLIC BLOOD PRESSURE: 59 MMHG | HEIGHT: 68 IN | WEIGHT: 174 LBS | BODY MASS INDEX: 26.37 KG/M2 | SYSTOLIC BLOOD PRESSURE: 116 MMHG | HEART RATE: 51 BPM

## 2019-06-12 DIAGNOSIS — L84 CORN OR CALLUS: ICD-10-CM

## 2019-06-12 DIAGNOSIS — E11.49 TYPE II DIABETES MELLITUS WITH NEUROLOGICAL MANIFESTATIONS: Primary | ICD-10-CM

## 2019-06-12 DIAGNOSIS — L60.3 NAIL DYSTROPHY: ICD-10-CM

## 2019-06-12 DIAGNOSIS — E04.2 MULTINODULAR THYROID: ICD-10-CM

## 2019-06-12 DIAGNOSIS — M20.10 HALLUX ABDUCTO VALGUS, UNSPECIFIED LATERALITY: ICD-10-CM

## 2019-06-12 DIAGNOSIS — E04.2 MULTINODULAR THYROID: Chronic | ICD-10-CM

## 2019-06-12 DIAGNOSIS — L90.9 FAT PAD ATROPHY OF FOOT: ICD-10-CM

## 2019-06-12 PROCEDURE — 3078F PR MOST RECENT DIASTOLIC BLOOD PRESSURE < 80 MM HG: ICD-10-PCS | Mod: CPTII,S$GLB,, | Performed by: PODIATRIST

## 2019-06-12 PROCEDURE — 99999 PR PBB SHADOW E&M-EST. PATIENT-LVL III: CPT | Mod: PBBFAC,,, | Performed by: PODIATRIST

## 2019-06-12 PROCEDURE — 3074F PR MOST RECENT SYSTOLIC BLOOD PRESSURE < 130 MM HG: ICD-10-PCS | Mod: CPTII,S$GLB,, | Performed by: PODIATRIST

## 2019-06-12 PROCEDURE — 99999 PR PBB SHADOW E&M-EST. PATIENT-LVL III: ICD-10-PCS | Mod: PBBFAC,,, | Performed by: PODIATRIST

## 2019-06-12 PROCEDURE — 76536 US EXAM OF HEAD AND NECK: CPT | Mod: 26,,, | Performed by: RADIOLOGY

## 2019-06-12 PROCEDURE — 3078F DIAST BP <80 MM HG: CPT | Mod: CPTII,S$GLB,, | Performed by: PODIATRIST

## 2019-06-12 PROCEDURE — 76536 US SOFT TISSUE HEAD NECK THYROID: ICD-10-PCS | Mod: 26,,, | Performed by: RADIOLOGY

## 2019-06-12 PROCEDURE — 99213 PR OFFICE/OUTPT VISIT, EST, LEVL III, 20-29 MIN: ICD-10-PCS | Mod: S$GLB,,, | Performed by: PODIATRIST

## 2019-06-12 PROCEDURE — 1101F PT FALLS ASSESS-DOCD LE1/YR: CPT | Mod: CPTII,S$GLB,, | Performed by: PODIATRIST

## 2019-06-12 PROCEDURE — 76536 US EXAM OF HEAD AND NECK: CPT | Mod: TC

## 2019-06-12 PROCEDURE — 99213 OFFICE O/P EST LOW 20 MIN: CPT | Mod: S$GLB,,, | Performed by: PODIATRIST

## 2019-06-12 PROCEDURE — 1101F PR PT FALLS ASSESS DOC 0-1 FALLS W/OUT INJ PAST YR: ICD-10-PCS | Mod: CPTII,S$GLB,, | Performed by: PODIATRIST

## 2019-06-12 PROCEDURE — 3074F SYST BP LT 130 MM HG: CPT | Mod: CPTII,S$GLB,, | Performed by: PODIATRIST

## 2019-06-14 ENCOUNTER — TELEPHONE (OUTPATIENT)
Dept: ENDOSCOPY | Facility: HOSPITAL | Age: 79
End: 2019-06-14

## 2019-06-14 NOTE — TELEPHONE ENCOUNTER
Pt needs to be scheduled for an EGD.  Pt is currently taking Xarelto prescribed by Dr Ricks at [facility].  Faxed request for holding instructions and most recent clinic visit and ECHO to [phone].  Phone # [phone].

## 2019-06-18 NOTE — TELEPHONE ENCOUNTER
Received ok to hold Xarelto x 3 days from SKY Cherry NP.  Scanned under media tab dated 6/18/19.   Refaxed request for most recent clinic visit and ECHO if available.

## 2019-06-20 ENCOUNTER — TELEPHONE (OUTPATIENT)
Dept: INTERNAL MEDICINE | Facility: CLINIC | Age: 79
End: 2019-06-20

## 2019-06-20 NOTE — TELEPHONE ENCOUNTER
----- Message from Ilya Moore sent at 6/20/2019 11:17 AM CDT -----  Contact: Patient [phone]  Patient is returning a phone call.  Who left a message for the patient: Dr fonseca   Does patient know what this is regarding:  Not sure of call  Comments:     Please call an advise  Thank you

## 2019-06-24 NOTE — PROGRESS NOTES
Subjective:      Patient ID: Saul Mar is a 79 y.o. female.    Chief Complaint: Ingrown Toenail (Possible left lateral hallux ingrown. No noted pain. Diabetic pt. Wears casual shoes w/ socks. PCP Dr. Randolph last seen 6/4/19.)    Saul is a 79 y.o. female who presents to the clinic complaining of painful ingrown toenail on the left foot. Patient states the nail feels like its digging into the side of her skin. Patient states the pain has been present for several days but is better now. Patient states she did soak the foot with epson salt and that helped. Patient does not relate a history of trauma. Patient rates pain 0/10. When asked to indicate where the pain is located, patient points to lateral left nail border. Patient denies other complaints at this time.      Patient Active Problem List   Diagnosis    Mixed diabetic hyperlipidemia associated with type 2 diabetes mellitus    Degenerative disc disease    Colon polyp: tubular adenoma 5/13, repeated 2016 to be repeated 2021    Multinodular thyroid: thyroid u/s 7/16, stable 2017    POAG (primary open-angle glaucoma) - Both Eyes    Amaurosis fugax    Nuclear sclerosis - Right Eye    Eyelid myokymia    Cerebral microvascular disease: stroke ? 1999 elsewhere; TIA 10/13    Left-sided carotid artery disease    Vitamin D insufficiency    Left ventricular diastolic dysfunction with preserved systolic function    Hypertension, essential    Gastroesophageal reflux disease without esophagitis    Type 2 diabetes, uncontrolled, with background retinopathy with macular edema    Overweight (BMI 25.0-29.9)    Diabetes mellitus with proteinuria    Type II diabetes mellitus with neurological manifestations    Aortic arch atherosclerosis    Abnormal ankle brachial index    Diabetes mellitus with stage 3 chronic kidney disease    Cerebrovascular accident (CVA) due to thrombosis of precerebral artery    Iron deficiency anemia due to sideropenic  dysphagia    CKD (chronic kidney disease) stage 3, GFR 30-59 ml/min    Sickle cell trait syndrome    Mixed anxiety depressive disorder    Diverticulosis of large intestine without hemorrhage    Hemoglobin S trait    Atrial fibrillation       Medication List with Changes/Refills   Current Medications    ALPRAZOLAM (XANAX) 0.5 MG TABLET    TAKE 1 TABLET BY MOUTH NIGHTLY AS NEEDED FOR ANXIETY (MAY TAKE 1-2 TIMES WEEKLY FOR ANXIETY)    AMLODIPINE (NORVASC) 10 MG TABLET    Take 1 tablet (10 mg total) by mouth once daily.    ATORVASTATIN (LIPITOR) 80 MG TABLET    Take 1 tablet (80 mg total) by mouth once daily.    BLOOD SUGAR DIAGNOSTIC STRP    1 strip by Misc.(Non-Drug; Combo Route) route 2 (two) times daily. Insurance preferred test stripes DX:E11.22    BLOOD-GLUCOSE METER KIT    Insurance preferred meter dx:E11.22    BRIMONIDINE 0.2% (ALPHAGAN) 0.2 % DROP    Place 1 drop into both eyes 3 (three) times daily.    CHOLECALCIFEROL, VITAMIN D3, (VITAMIN D3) 1,000 UNIT CAPSULE    Take 1 capsule (1,000 Units total) by mouth once daily.    FERROUS SULFATE (FEOSOL) 325 MG (65 MG IRON) TAB TABLET    Take 1 tablet (325 mg total) by mouth 2 (two) times daily.    GLIMEPIRIDE (AMARYL) 4 MG TABLET    TAKE 1 TABLET BY MOUTH IN THE MORNING WITH BREAKFAST    LANCETS (ACCU-CHEK SOFTCLIX LANCETS) MISC    100 lancets by Misc.(Non-Drug; Combo Route) route 2 (two) times daily. Insurance preferred lancets Dx:E11.22    LEVALBUTEROL (XOPENEX HFA) 45 MCG/ACTUATION INHALER    Inhale 1-2 puffs into the lungs every 6 (six) hours as needed for Wheezing. Rescue    METFORMIN (GLUCOPHAGE-XR) 500 MG 24 HR TABLET    TAKE 2 TABLETS (1,000 MG TOTAL) BY MOUTH ONCE DAILY.    METOPROLOL SUCCINATE (TOPROL-XL) 50 MG 24 HR TABLET    Take 1 tablet by mouth once daily.    MUPIROCIN (BACTROBAN) 2 % OINTMENT    Apply topically 3 (three) times daily.    RANITIDINE (ZANTAC) 150 MG TABLET    Take 1 tablet (150 mg total) by mouth 2 (two) times daily.     RIVAROXABAN (XARELTO) 20 MG TAB    Take 20 mg by mouth once daily.       Review of patient's allergies indicates:   Allergen Reactions    Pravachol [pravastatin] Nausea Only       Past Surgical History:   Procedure Laterality Date    CATARACT EXTRACTION W/  INTRAOCULAR LENS IMPLANT Left 2/27/13    Dr. Taveras    COLONOSCOPY      COLONOSCOPY N/A 9/22/2016    [facility]formed [facility] ENDO (4TH FLR)    COLONOSCOPY N/A 5/20/2013    Per[facility] by [facility] at [facility] ENDO (4TH FLR)    ESOPHAGOGASTRODUODENOSCOPY (EGD) N/A 6/21/2016    Performed by Paul Winters [facility] at [facility] ENDO (4TH FLR)    EYE SURGERY Bilateral 2002 approx    Laser for glaucoma    HYSTERECTOMY  1963     AMEENA/USO- fibroids; no cancer    INSERTION, IOL PROSTHESIS Left 2/27/2013    Performed by [facility]as [facility] at [facility] OR 1ST FLR    OOPHORECTOMY  1963    unknown, only removed one    PHACOEMULSIFICATION, CATARACT Left 2/27/2013    Performed by [facility] at [facility] OR 1ST FLR       Family History   Problem Relation Age of Onset    Stroke Mother     Mental illness Mother     Hypertension Mother     Stroke Father     Diabetes Maternal Grandmother     Drug abuse Daughter     Cancer Sister 68        gyn    Cancer Maternal Uncle         type not known    Heart disease Paternal Grandmother     Glaucoma Neg Hx     Macular degeneration Neg Hx     Alcohol abuse Neg Hx     COPD Neg Hx     Asthma Neg Hx     Amblyopia Neg Hx     Blindness Neg Hx     Cataracts Neg Hx     Retinal detachment Neg Hx     Strabismus Neg Hx        Social History     Socioeconomic History    Marital status:      Spouse name: Not on file    Number of children: Not on file    Years of education: Not on file    Highest education level: Not on file   Occupational History    Not on file   Social Needs    Financial resource strain: Not on file    Food insecurity:     Worry: Not on file     Inability: Not on  "file    Transportation needs:     Medical: Not on file     Non-medical: Not on file   Tobacco Use    Smoking status: Never Smoker    Smokeless tobacco: Never Used   Substance and Sexual Activity    Alcohol use: No    Drug use: No    Sexual activity: Never     Partners: Male   Lifestyle    Physical activity:     Days per week: Not on file     Minutes per session: Not on file    Stress: Not on file   Relationships    Social connections:     Talks on phone: Not on file     Gets together: Not on file     Attends Hinduism service: Not on file     Active member of club or organization: Not on file     Attends meetings of clubs or organizations: Not on file     Relationship status: Not on file   Other Topics Concern    Not on file   Social History Narrative    , no pets or smokers in household. 1 Child who lives in nursing home. She has a grandson who lives in [place].. Retired from [employer] . Still drives. Does not have a Living Will or advanced directive.        Vitals:    06/12/19 1450   BP: (!) 116/59   Pulse: (!) 51   Weight: 78.9 kg (174 lb)   Height: 5' 8" (1.727 m)   PainSc: 0-No pain   PainLoc: Foot       Hemoglobin A1C   Date Value Ref Range Status   05/13/2019 7.7 (H) 4.0 - 5.6 % Final     Comment:     ADA Screening Guidelines:  5.7-6.4%  Consistent with prediabetes  >or=6.5%  Consistent with diabetes  High levels of fetal hemoglobin interfere with the HbA1C  assay. Heterozygous hemoglobin variants (HbS, HgC, etc)do  not significantly interfere with this assay.   However, presence of multiple variants may affect accuracy.     01/22/2019 7.6 (H) 4.0 - 5.6 % Final     Comment:     ADA Screening Guidelines:  5.7-6.4%  Consistent with prediabetes  >or=6.5%  Consistent with diabetes  High levels of fetal hemoglobin interfere with the HbA1C  assay. Heterozygous hemoglobin variants (HbS, HgC, etc)do  not significantly interfere with this assay.   However, presence of multiple variants may affect " accuracy.     10/05/2018 8.5 (H) 4.0 - 5.6 % Final     Comment:     ADA Screening Guidelines:  5.7-6.4%  Consistent with prediabetes  >or=6.5%  Consistent with diabetes  High levels of fetal hemoglobin interfere with the HbA1C  assay. Heterozygous hemoglobin variants (HbS, HgC, etc)do  not significantly interfere with this assay.   However, presence of multiple variants may affect accuracy.         Review of Systems   Constitutional: Negative for chills and fever.   Respiratory: Negative for shortness of breath.    Cardiovascular: Negative for chest pain, palpitations, orthopnea, claudication and leg swelling.   Gastrointestinal: Negative for diarrhea, nausea and vomiting.   Musculoskeletal: Negative for joint pain.   Skin: Negative for rash.   Neurological: Positive for sensory change. Negative for dizziness, tingling, focal weakness and weakness.   Psychiatric/Behavioral: Negative.              Objective:   PHYSICAL EXAM: Apperance: Alert and orient in no distress,well developed, and with good attention to grooming and body habits  Patient presents ambulating in casual shoes.  LOWER EXTREMITY EXAM:  VASCULAR: Dorsalis pedis pulses 0/4 left 1/4 right and Posterior Tibial pulses 0/4 left and 1/4 right. Capillary fill time <4 seconds bilateral. Mild edema observed bilateral. Varicosities present bilateral. Skin temperature of the lower extremities is warm to cool, proximal to distal. Hair growth absent bilateral.  DERMATOLOGICAL: No skin rashes, subcutaneous nodules, lesions, or ulcers observed bilateral. Nails 1,2,3,4,5, bilateral thickened, and discolored with subungual debris. Left hallux lateral nail fold clean with no erythema, drainage, or increased temp noted.  Webspaces 1,2,3,4 bilateral clean, dry and without evidence of break in skin integrity. Mild hyperkeratotic lesions noted to bilateral 5th plantar submetatarsal.    NEUROLOGICAL: Light touch, sharp-dull, proprioception all present and equal bilaterally.   Vibratory sensation diminished at bilateral hallux. Protective sensation absent at toe sites as tested with a [place]-Marjan 5.07 monofilament.   MUSCULOSKELETAL: Muscle strength is 5/5 for foot inverters, everters, plantarflexors, and dorsiflexors. Muscle tone is normal.             Assessment:       Type II diabetes mellitus with neurological manifestations  -     DIABETIC SHOES FOR HOME USE    Nail dystrophy - Left Foot    Corn or callus  -     DIABETIC SHOES FOR HOME USE    Hallux abducto valgus, unspecified laterality  -     DIABETIC SHOES FOR HOME USE    Fat pad atrophy of foot  -     DIABETIC SHOES FOR HOME USE          Plan:   Type II diabetes mellitus with neurological manifestations  -     DIABETIC SHOES FOR HOME USE    Nail dystrophy - Left Foot    Corn or callus  -     DIABETIC SHOES FOR HOME USE    Hallux abducto valgus, unspecified laterality  -     DIABETIC SHOES FOR HOME USE    Fat pad atrophy of foot  -     DIABETIC SHOES FOR HOME USE      I counseled the patient on her conditions, regarding findings of my examination, my impressions, and usual treatment plan.   Greater than 15 minutes of a 20 minute appointment spent on education about the diabetic foot, neuropathy, and prevention of limb loss.  Shoe inspection. Diabetic Foot Education. Patient reminded of the importance of good nutrition and blood sugar control to help prevent podiatric complications of diabetes. Patient instructed on proper foot hygeine. We discussed wearing proper shoe gear, daily foot inspections, never walking without protective shoe gear, never putting sharp instruments to feet.    Conservatively we did discuss proper nail cutting for incurvated toenails. Surgically we briefly discussed temporary vs. permanent nail avulsion procedures with patient. The patient elects for conservative management at this time.   Prescription written for Diabetic shoes and inserts.   Patient  will continue to monitor the areas daily, inspect  feet, wear protective shoe gear when ambulatory, moisturizer to maintain skin integrity. Patient reminded of the importance of good nutrition and blood sugar control to help prevent podiatric complications of diabetes.  Patient to return for regularly scheduled nail care in July or sooner if needed.                  Zuleima Howell DPM  [facility]

## 2019-06-25 ENCOUNTER — PATIENT MESSAGE (OUTPATIENT)
Dept: INTERNAL MEDICINE | Facility: CLINIC | Age: 79
End: 2019-06-25

## 2019-07-05 ENCOUNTER — LAB VISIT (OUTPATIENT)
Dept: LAB | Facility: HOSPITAL | Age: 79
End: 2019-07-05
Attending: INTERNAL MEDICINE
Payer: MEDICARE

## 2019-07-05 ENCOUNTER — IMMUNIZATION (OUTPATIENT)
Dept: PHARMACY | Facility: CLINIC | Age: 79
End: 2019-07-05
Payer: MEDICARE

## 2019-07-05 DIAGNOSIS — D50.1 IRON DEFICIENCY ANEMIA DUE TO SIDEROPENIC DYSPHAGIA: ICD-10-CM

## 2019-07-05 LAB
BASOPHILS # BLD AUTO: 0.03 K/UL (ref 0–0.2)
BASOPHILS NFR BLD: 0.5 % (ref 0–1.9)
DIFFERENTIAL METHOD: ABNORMAL
EOSINOPHIL # BLD AUTO: 0.2 K/UL (ref 0–0.5)
EOSINOPHIL NFR BLD: 4 % (ref 0–8)
ERYTHROCYTE [DISTWIDTH] IN BLOOD BY AUTOMATED COUNT: 14.7 % (ref 11.5–14.5)
FERRITIN SERPL-MCNC: 202 NG/ML (ref 20–300)
HCT VFR BLD AUTO: 32.9 % (ref 37–48.5)
HGB BLD-MCNC: 10.5 G/DL (ref 12–16)
IRON SERPL-MCNC: 42 UG/DL (ref 30–160)
LYMPHOCYTES # BLD AUTO: 1.8 K/UL (ref 1–4.8)
LYMPHOCYTES NFR BLD: 29.5 % (ref 18–48)
MCH RBC QN AUTO: 28.2 PG (ref 27–31)
MCHC RBC AUTO-ENTMCNC: 31.9 G/DL (ref 32–36)
MCV RBC AUTO: 88 FL (ref 82–98)
MONOCYTES # BLD AUTO: 0.5 K/UL (ref 0.3–1)
MONOCYTES NFR BLD: 7.7 % (ref 4–15)
NEUTROPHILS # BLD AUTO: 3.5 K/UL (ref 1.8–7.7)
NEUTROPHILS NFR BLD: 58.3 % (ref 38–73)
PLATELET # BLD AUTO: 176 K/UL (ref 150–350)
PMV BLD AUTO: 11.3 FL (ref 9.2–12.9)
RBC # BLD AUTO: 3.72 M/UL (ref 4–5.4)
SATURATED IRON: 15 % (ref 20–50)
TOTAL IRON BINDING CAPACITY: 286 UG/DL (ref 250–450)
TRANSFERRIN SERPL-MCNC: 193 MG/DL (ref 200–375)
WBC # BLD AUTO: 5.96 K/UL (ref 3.9–12.7)

## 2019-07-05 PROCEDURE — 82728 ASSAY OF FERRITIN: CPT

## 2019-07-05 PROCEDURE — 85025 COMPLETE CBC W/AUTO DIFF WBC: CPT

## 2019-07-05 PROCEDURE — 36415 COLL VENOUS BLD VENIPUNCTURE: CPT

## 2019-07-05 PROCEDURE — 86677 HELICOBACTER PYLORI ANTIBODY: CPT

## 2019-07-05 PROCEDURE — 83540 ASSAY OF IRON: CPT

## 2019-07-06 LAB — H PYLORI IGG SERPL QL IA: NEGATIVE

## 2019-07-07 ENCOUNTER — TELEPHONE (OUTPATIENT)
Dept: INTERNAL MEDICINE | Facility: CLINIC | Age: 79
End: 2019-07-07

## 2019-07-07 NOTE — TELEPHONE ENCOUNTER
Please let her know labs better    Continue regimen    Needs EGD and Gastro appt- both ordered months ago    Please check on schedule and let me know thanks

## 2019-07-08 ENCOUNTER — NUTRITION (OUTPATIENT)
Dept: DIABETES | Facility: CLINIC | Age: 79
End: 2019-07-08
Payer: MEDICARE

## 2019-07-08 ENCOUNTER — OFFICE VISIT (OUTPATIENT)
Dept: PODIATRY | Facility: CLINIC | Age: 79
End: 2019-07-08
Payer: MEDICARE

## 2019-07-08 ENCOUNTER — HOSPITAL ENCOUNTER (OUTPATIENT)
Dept: RADIOLOGY | Facility: HOSPITAL | Age: 79
Discharge: HOME | End: 2019-07-08
Attending: INTERNAL MEDICINE
Payer: MEDICARE

## 2019-07-08 VITALS — HEIGHT: 66 IN | WEIGHT: 174.19 LBS | BODY MASS INDEX: 27.99 KG/M2

## 2019-07-08 VITALS
WEIGHT: 174 LBS | BODY MASS INDEX: 27.97 KG/M2 | SYSTOLIC BLOOD PRESSURE: 126 MMHG | DIASTOLIC BLOOD PRESSURE: 62 MMHG | HEART RATE: 56 BPM | HEIGHT: 66 IN

## 2019-07-08 DIAGNOSIS — N18.30 DIABETES MELLITUS WITH STAGE 3 CHRONIC KIDNEY DISEASE: Primary | ICD-10-CM

## 2019-07-08 DIAGNOSIS — Z12.31 SCREENING MAMMOGRAM, ENCOUNTER FOR: ICD-10-CM

## 2019-07-08 DIAGNOSIS — E11.22 DIABETES MELLITUS WITH STAGE 3 CHRONIC KIDNEY DISEASE: Primary | ICD-10-CM

## 2019-07-08 DIAGNOSIS — L84 CORN OR CALLUS: ICD-10-CM

## 2019-07-08 DIAGNOSIS — B35.1 ONYCHOMYCOSIS DUE TO DERMATOPHYTE: ICD-10-CM

## 2019-07-08 DIAGNOSIS — E11.49 TYPE II DIABETES MELLITUS WITH NEUROLOGICAL MANIFESTATIONS: Primary | ICD-10-CM

## 2019-07-08 PROCEDURE — 11721 DEBRIDE NAIL 6 OR MORE: CPT | Mod: 59,Q9,S$GLB, | Performed by: PODIATRIST

## 2019-07-08 PROCEDURE — 99999 PR PBB SHADOW E&M-EST. PATIENT-LVL III: CPT | Mod: PBBFAC,,, | Performed by: DIETITIAN, REGISTERED

## 2019-07-08 PROCEDURE — 77067 SCR MAMMO BI INCL CAD: CPT | Mod: 26,,, | Performed by: RADIOLOGY

## 2019-07-08 PROCEDURE — G0108 PR DIAB MANAGE TRN  PER INDIV: ICD-10-PCS | Mod: S$GLB,,, | Performed by: DIETITIAN, REGISTERED

## 2019-07-08 PROCEDURE — 77067 SCR MAMMO BI INCL CAD: CPT | Mod: TC

## 2019-07-08 PROCEDURE — 11056 PARNG/CUTG B9 HYPRKR LES 2-4: CPT | Mod: Q9,S$GLB,, | Performed by: PODIATRIST

## 2019-07-08 PROCEDURE — 99999 PR PBB SHADOW E&M-EST. PATIENT-LVL III: ICD-10-PCS | Mod: PBBFAC,,, | Performed by: DIETITIAN, REGISTERED

## 2019-07-08 PROCEDURE — 11721 PR DEBRIDEMENT OF NAILS, 6 OR MORE: ICD-10-PCS | Mod: 59,Q9,S$GLB, | Performed by: PODIATRIST

## 2019-07-08 PROCEDURE — 77063 MAMMO DIGITAL SCREENING BILAT WITH TOMOSYNTHESIS_CAD: ICD-10-PCS | Mod: 26,,, | Performed by: RADIOLOGY

## 2019-07-08 PROCEDURE — 99499 RISK ADDL DX/OHS AUDIT: ICD-10-PCS | Mod: S$GLB,,, | Performed by: PODIATRIST

## 2019-07-08 PROCEDURE — G0108 DIAB MANAGE TRN  PER INDIV: HCPCS | Mod: S$GLB,,, | Performed by: DIETITIAN, REGISTERED

## 2019-07-08 PROCEDURE — 77067 MAMMO DIGITAL SCREENING BILAT WITH TOMOSYNTHESIS_CAD: ICD-10-PCS | Mod: 26,,, | Performed by: RADIOLOGY

## 2019-07-08 PROCEDURE — 77063 BREAST TOMOSYNTHESIS BI: CPT | Mod: 26,,, | Performed by: RADIOLOGY

## 2019-07-08 PROCEDURE — 99999 PR PBB SHADOW E&M-EST. PATIENT-LVL III: CPT | Mod: PBBFAC,,, | Performed by: PODIATRIST

## 2019-07-08 PROCEDURE — 99999 PR PBB SHADOW E&M-EST. PATIENT-LVL III: ICD-10-PCS | Mod: PBBFAC,,, | Performed by: PODIATRIST

## 2019-07-08 PROCEDURE — 11056 PR TRIM BENIGN HYPERKERATOTIC SKIN LESION,2-4: ICD-10-PCS | Mod: Q9,S$GLB,, | Performed by: PODIATRIST

## 2019-07-08 PROCEDURE — 99499 UNLISTED E&M SERVICE: CPT | Mod: S$GLB,,, | Performed by: PODIATRIST

## 2019-07-08 NOTE — TELEPHONE ENCOUNTER
----- Message from Abelino Wu sent at 7/8/2019 10:16 AM CDT -----  Contact: [phone]  Type: Returning a call    Who left a message? Jessica     When did the practice call? 07/08    Comments: please call and advise thanks

## 2019-07-08 NOTE — LETTER
July 8, 2019    Penny Randolph MD  [address]  [facility] [address]         Patient: Saul Mar   MR Number: [id number]   YOB: 1940   Date of Visit: 7/8/2019     Dear Dr. Randolph:    Thank you for referring Saul for diabetes self-management education and support. She has completed all components of our Diabetes Management Program and her Self-Management Support Plan. Below is a summary of her clinical outcomes and goal progress. Please enter new diabetes educ referral to help us start new annual ADA cycle.      Patient Outcomes:    A1c Status: repeat ofe 8/13/19  Lab Results   Component Value Date    HGBA1C 7.7 (H) 05/13/2019    HGBA1C 7.6 (H) 01/22/2019       HGBA1C                                           7.9                             6/29/2018    Goals  Patient has selected/evaluated goals during today's session: Yes, evaluated  Healthy Eating: Set(Decrease dining out freq, use MR entrees (list provided) to assist)  Met Percentage : 75%(Continue COA meal to support meal plan progress)  Start Date: 07/08/19  Target Date: 10/08/19  Physical Activity: Set(150min/wk - COA classes/mall walking)  Met Percentage : 50%(Increase structured activity - COA classes 1-2 times/wk)  Start Date: 07/08/19  Target Date: 10/08/19  Monitoring: Set(test BG 2x/d -fst, acd or 2hr ppd; bring meter to clinic)  Met Percentage : 50%  Start Date: 07/08/19  Target Date: 10/08/19    Diabetes Self-Management Support Plan  Other exercise/nutrition: Utilize COA classes, local [employer] for structured walking    Review Status: Patient has selected and agrees to support plan.    Follow up:   · Saul to follow diabetes support plan indicated above  · Saul to attend medical appointments as scheduled  · Saul to update you on her DM education progress as needed      If you have questions, please do not hesitate to call me. I look forward to providing additional education and support 3mos, as  needed.    Sincerely,    Elizabeth Griffin, ORA, CDE

## 2019-07-08 NOTE — TELEPHONE ENCOUNTER
Spoke to pt and informed. She will call back to get ne number for EGD [phone]  and schedule Gastro appt---she was driving.

## 2019-07-08 NOTE — PROGRESS NOTES
Subjective:     Patient ID: Saul Mar is a 79 y.o. female.    Chief Complaint: Nail Care (no c/o pain. c/o right medial hallux soreness. wears casual shoes with socks. diabetic Pt. last seen on 06/04/19 with PCP Dr. Randolph.)    Saul is a 79 y.o. female who presents to the clinic upon referral from Dr. Yuridia neri. provider found  for evaluation and treatment of diabetic feet. 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 Patient complaints is right ingrown (medial) toenail check and nail care. Patient states she did not hear from [employer] about her diabetic shoes.      PCP: Penny Randolph MD    Date Last Seen by PCP: 06/04/19    Current shoe gear: Tennis shoes    Hemoglobin A1C   Date Value Ref Range Status   05/13/2019 7.7 (H) 4.0 - 5.6 % Final     Comment:     ADA Screening Guidelines:  5.7-6.4%  Consistent with prediabetes  >or=6.5%  Consistent with diabetes  High levels of fetal hemoglobin interfere with the HbA1C  assay. Heterozygous hemoglobin variants (HbS, HgC, etc)do  not significantly interfere with this assay.   However, presence of multiple variants may affect accuracy.     01/22/2019 7.6 (H) 4.0 - 5.6 % Final     Comment:     ADA Screening Guidelines:  5.7-6.4%  Consistent with prediabetes  >or=6.5%  Consistent with diabetes  High levels of fetal hemoglobin interfere with the HbA1C  assay. Heterozygous hemoglobin variants (HbS, HgC, etc)do  not significantly interfere with this assay.   However, presence of multiple variants may affect  accuracy.     10/05/2018 8.5 (H) 4.0 - 5.6 % Final     Comment:     ADA Screening Guidelines:  5.7-6.4%  Consistent with prediabetes  >or=6.5%  Consistent with diabetes  High levels of fetal hemoglobin interfere with the HbA1C  assay. Heterozygous hemoglobin variants (HbS, HgC, etc)do  not significantly interfere with this assay.   However, presence of multiple variants may affect accuracy.                  Patient Active Problem List   Diagnosis    Mixed diabetic hyperlipidemia associated with type 2 diabetes mellitus    Degenerative disc disease    Colon polyp: tubular adenoma 5/13, repeated 2016 to be repeated 2021    Multinodular thyroid: thyroid u/s 7/16, stable 2017    POAG (primary open-angle glaucoma) - Both Eyes    Amaurosis fugax    Nuclear sclerosis - Right Eye    Eyelid myokymia    Cerebral microvascular disease: stroke ? 1999 elsewhere; TIA 10/13    Left-sided carotid artery disease    Vitamin D insufficiency    Left ventricular diastolic dysfunction with preserved systolic function    Hypertension, essential    Gastroesophageal reflux disease without esophagitis    Type 2 diabetes, uncontrolled, with background retinopathy with macular edema    Overweight (BMI 25.0-29.9)    Diabetes mellitus with proteinuria    Type II diabetes mellitus with neurological manifestations    Aortic arch atherosclerosis    Abnormal ankle brachial index    Diabetes mellitus with stage 3 chronic kidney disease    Cerebrovascular accident (CVA) due to thrombosis of precerebral artery    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    ATORVASTATIN (LIPITOR) 80 MG TABLET    Take 1 tablet (80 mg total) by mouth once daily.    BLOOD SUGAR DIAGNOSTIC STRP    1 strip by Misc.(Non-Drug; Combo Route) route 2 (two) times daily. Insurance preferred test stripes DX:E11.22    BLOOD-GLUCOSE METER KIT    Insurance preferred meter dx:E11.22    BRIMONIDINE 0.2% (ALPHAGAN) 0.2 % DROP    Place 1 drop into both eyes 3 (three) times daily.    CHOLECALCIFEROL, VITAMIN D3, (VITAMIN D3) 1,000 UNIT CAPSULE    Take 1 capsule (1,000 Units total) by mouth once daily.    FERROUS SULFATE (FEOSOL) 325 MG (65 MG IRON) TAB TABLET    Take 1 tablet (325 mg total) by mouth 2 (two) times daily.    GLIMEPIRIDE (AMARYL) 4 MG TABLET    TAKE 1 TABLET BY MOUTH IN THE MORNING WITH BREAKFAST    LANCETS (ACCU-CHEK SOFTCLIX LANCETS) MISC    100 lancets by Misc.(Non-Drug; Combo Route) route 2 (two) times daily. Insurance preferred lancets Dx:E11.22    LEVALBUTEROL (XOPENEX HFA) 45 MCG/ACTUATION INHALER    Inhale 1-2 puffs into the lungs every 6 (six) hours as needed for Wheezing. Rescue    METFORMIN (GLUCOPHAGE-XR) 500 MG 24 HR TABLET    TAKE 2 TABLETS (1,000 MG TOTAL) BY MOUTH ONCE DAILY.    METOPROLOL SUCCINATE (TOPROL-XL) 50 MG 24 HR TABLET    Take 1 tablet by mouth once daily.    MUPIROCIN (BACTROBAN) 2 % OINTMENT    Apply topically 3 (three) times daily.    RANITIDINE (ZANTAC) 150 MG TABLET    Take 1 tablet (150 mg total) by mouth 2 (two) times daily.    RIVAROXABAN (XARELTO) 20 MG TAB    Take 20 mg by mouth once daily.       Review of patient's allergies indicates:   Allergen Reactions    Pravachol [pravastatin] Nausea Only       Past Surgical History:   Procedure Laterality Date    CATARACT EXTRACTION W/  INTRAOCULAR LENS IMPLANT Left 2/27/13    Dr. Taveras    COLONOSCOPY      COLONOSCOPY N/A 9/22/2016    Performed by Jd JORGE  [facility]isha [facility] (4TH FLR)    COLONOSCOPY N/A 5/20/2013    Performed by Jd Ashton [facility] at [facility] END[facility] (4TH FLR)    ESOPHAGOGASTRODUODENOSCOPY (EGD) N/A 6/21/2016    Performed by Paul Winters MD at [facility] END[facility] (4TH FLR)    EYE SURGERY Bilateral 2002 approx    Laser for glaucoma    HYSTERECTOMY  1963     AMEENA/USO- fibroids; no cancer    INSERTION, IOL PROSTHESIS Left 2/27/2013    Performed by Lina [facility]averas MD at [facility] OR 1ST FLR    OOPHORECTOMY  1963    unknown, only removed one    PHACOEMULSIFICATION, CATARACT Left 2/27/2013    Performed by Lina [facility]averas MD at [facility] OR 1ST FLR       Family History   Problem Relation Age of Onset    Stroke Mother     Mental illness Mother     Hypertension Mother     Stroke Father     Diabetes Maternal Grandmother     Drug abuse Daughter     Cancer Sister 68        gyn    Ovarian cancer Sister     Cancer Maternal Uncle         type not known    Heart disease Paternal Grandmother     Glaucoma Neg Hx     Macular degeneration Neg Hx     Alcohol abuse Neg Hx     COPD Neg Hx     Asthma Neg Hx     Amblyopia Neg Hx     Blindness Neg Hx     Cataracts Neg Hx     Retinal detachment Neg Hx     Strabismus Neg Hx        Social History     Socioeconomic History    Marital status:      Spouse name: Not on file    Number of children: Not on file    Years of education: Not on file    Highest education level: Not on file   Occupational History    Not on file   Social Needs    Financial resource strain: Not on file    Food insecurity:     Worry: Not on file     Inability: Not on file    Transportation needs:     Medical: Not on file     Non-medical: Not on file   Tobacco Use    Smoking status: Never Smoker    Smokeless tobacco: Never Used   Substance and Sexual Activity    Alcohol use: No    Drug use: No    Sexual activity: Never     Partners: Male   Lifestyle    Physical activity:     Days per week: Not on file     Minutes per  "session: Not on file    Stress: Not on file   Relationships    Social connections:     Talks on phone: Not on file     Gets together: Not on file     Attends Restorationism service: Not on file     Active member of club or organization: Not on file     Attends meetings of clubs or organizations: Not on file     Relationship status: Not on file   Other Topics Concern    Not on file   Social History Narrative    , no pets or smokers in household. 1 Child who lives in nursing home. She has a grandson who lives in [place].. Retired from [employer] . Still drives. Does not have a Living Will or advanced directive.        Vitals:    07/08/19 1440   BP: 126/62   Pulse: (!) 56   Weight: 78.9 kg (174 lb)   Height: 5' 5.5" (1.664 m)   PainSc: 0-No pain   PainLoc: Foot       Hemoglobin A1C   Date Value Ref Range Status   05/13/2019 7.7 (H) 4.0 - 5.6 % Final     Comment:     ADA Screening Guidelines:  5.7-6.4%  Consistent with prediabetes  >or=6.5%  Consistent with diabetes  High levels of fetal hemoglobin interfere with the HbA1C  assay. Heterozygous hemoglobin variants (HbS, HgC, etc)do  not significantly interfere with this assay.   However, presence of multiple variants may affect accuracy.     01/22/2019 7.6 (H) 4.0 - 5.6 % Final     Comment:     ADA Screening Guidelines:  5.7-6.4%  Consistent with prediabetes  >or=6.5%  Consistent with diabetes  High levels of fetal hemoglobin interfere with the HbA1C  assay. Heterozygous hemoglobin variants (HbS, HgC, etc)do  not significantly interfere with this assay.   However, presence of multiple variants may affect accuracy.     10/05/2018 8.5 (H) 4.0 - 5.6 % Final     Comment:     ADA Screening Guidelines:  5.7-6.4%  Consistent with prediabetes  >or=6.5%  Consistent with diabetes  High levels of fetal hemoglobin interfere with the HbA1C  assay. Heterozygous hemoglobin variants (HbS, HgC, etc)do  not significantly interfere with this assay.   However, presence of multiple " variants may affect accuracy.         Review of Systems   Constitutional: Negative for chills and fever.   Respiratory: Negative for shortness of breath.    Cardiovascular: Negative for chest pain, palpitations, orthopnea, claudication and leg swelling.   Gastrointestinal: Negative for diarrhea, nausea and vomiting.   Musculoskeletal: Negative for joint pain.   Skin: Negative for rash.   Neurological: Positive for sensory change. Negative for dizziness, tingling, focal weakness and weakness.   Psychiatric/Behavioral: Negative.              Objective:   PHYSICAL EXAM: Apperance: Alert and orient in no distress,well developed, and with good attention to grooming and body habits  Patient presents ambulating in tennis shoes.  LOWER EXTREMITY EXAM:  VASCULAR: Dorsalis pedis pulses 0/4 left 1/4 right and Posterior Tibial pulses 0/4 left and 1/4 right. Capillary fill time <4 seconds bilateral. Mild edema observed bilateral. Varicosities present bilateral. Skin temperature of the lower extremities is warm to cool, proximal to distal. Hair growth absent bilateral.  DERMATOLOGICAL: No skin rashes, subcutaneous nodules, lesions, or ulcers observed bilateral. Nails 1,2,3,4,5, bilateral elongated, thickened, and discolored with subungual debris. Webspaces 1,2,3,4 bilateral clean, dry and without evidence of break in skin integrity. Mild hyperkeratotic lesions noted to bilateral 5th plantar submetatarsal.    NEUROLOGICAL: Light touch, sharp-dull, proprioception all present and equal bilaterally.  Vibratory sensation diminished at bilateral hallux. Protective sensation absent at toe sites as tested with a Henderson-Marjan 5.07 monofilament.   MUSCULOSKELETAL: Muscle strength is 5/5 for foot inverters, everters, plantarflexors, and dorsiflexors. Muscle tone is normal.     Assessment:   Type II diabetes mellitus with neurological manifestations    Onychomycosis due to dermatophyte    Corn or callus          Plan:   Type II diabetes  mellitus with neurological manifestations    Onychomycosis due to dermatophyte    Corn or callus      I counseled the patient on her conditions, regarding findings of my examination, my impressions, and usual treatment plan.   Greater than 15 minutes of a 20 minute appointment spent on education about the diabetic foot, neuropathy, and prevention of limb loss.  Shoe inspection. Diabetic Foot Education. Patient reminded of the importance of good nutrition and blood sugar control to help prevent podiatric complications of diabetes. Patient instructed on proper foot hygeine. We discussed wearing proper shoe gear, daily foot inspections, never walking without protective shoe gear, never putting sharp instruments to feet.    With patient's permission, nails 1-5 bilateral were debrided/trimmed in length and thickness aggressively to their soft tissue attachment mechanically and with electric , removing all offending nail and debris. Patient relates relief following the procedure.  With patient's permission, bilateral callus trimmed in thickness with #15 blade in thickness without incident.   Prescription for Diabetic shoes and inserts re-faced to [facility].   Patient  will continue to monitor the areas daily, inspect feet, wear protective shoe gear when ambulatory, moisturizer to maintain skin integrity. Patient reminded of the importance of good nutrition and blood sugar control to help prevent podiatric complications of diabetes.  Patient to return 3 months or sooner if needed.                 Zuleima Howell DPM  [facility]

## 2019-07-08 NOTE — PROGRESS NOTES
Diabetes Education  Author: Elizabeth Griffin RD, CDE  Date: 7/8/2019    Diabetes Care Management Summary  Diabetes Education Record Assessment/Progress: Post Program/Follow-up  Current Diabetes Risk Level: Moderate     Last A1c:   Lab Results   Component Value Date    HGBA1C 7.7 (H) 05/13/2019     Last visit with Diabetes Educator: Lata Holland 8/2/18 w/ A1C 7.9 (6/29/18)    Diabetes Type  Diabetes Type : Type II    Diabetes History  Diabetes Diagnosis: >10 years(>20yrs)  Current Treatment: Diet, Oral Medication, Exercise(amaryl 4mg bfst daily, metformin xr 500 mg 1tab twice daily)  Reviewed Problem List with Patient: Yes    Health Maintenance was reviewed today with patient. Discussed with patient importance of routine eye exams, foot exams/foot care, blood work (i.e.: A1c, microalbumin, and lipid), dental visits, yearly flu vaccine, and pneumonia vaccine as indicated by PCP. Patient verbalized understanding.     Health Maintenance Topics with due status: Not Due       Topic Last Completion Date    TETANUS VACCINE 01/14/2011    Colonoscopy 09/22/2016    DEXA SCAN 08/08/2017    Mammogram 06/28/2018    Influenza Vaccine 10/02/2018    Lipid Panel 01/22/2019    Foot Exam 04/08/2019    Hemoglobin A1c 05/13/2019    Eye Exam 06/04/2019     There are no preventive care reminders to display for this patient.    Nutrition  Meal Planning: (1400-1700cals/d - pt is reducing carb, fat and sodium from decreased dining out to 2-3x/wk. She utilizes COA meals to support meal plan. Adequate nonstarchy veg. )  Meal Plan 24 Hour Recall - Breakfast: cornflakes, 2% milk OR grits, boiled egg OR mcdonalds sausage mcmuffin (2-3d/wk) - water, coffee  Meal Plan 24 Hour Recall - Lunch: COA meals OR stir-mercado veggies w/ chix or sausage (occ rice) turkey salami sandwich OR tuna, crackers  Meal Plan 24 Hour Recall - Dinner: COA meals (3d/wk) OR cornflakes, 2% milk    Meal Plan 24 Hour Recall - Snack: pecans, biscotti, fruit; meliza: mostly  water    Monitoring   Self Monitoring : Per recall, fst -140; no other testing.   Blood Glucose Logs: No  In the last month, how often have you had a low blood sugar reaction?: never    Exercise   Exercise Type: ([employer] 1x/wk; active walking within daily activites)    Current Diabetes Treatment   Current Treatment: Diet, Oral Medication, Exercise(amaryl 4mg bfst daily, metformin xr 500 mg 1tab twice daily)    Social History  Preferred Learning Method: Face to Face  Primary Support: Self  Smoking Status: Never a Smoker  Alcohol Use: Never     DDS-2 Score  ( > 3 = SIGNIFICANT DISTRESS): 1.5          Barriers to Change  Barriers to Change: None  Learning Challenges : None    Readiness to Learn   Readiness to Learn : Eager    Cultural Influences  Cultural Influences: No    Diabetes Education Assessment/Progress  Diabetes Disease Process (diabetes disease process and treatment options): Demonstrates Understanding/Competency(verbalizes/demonstrates)  Nutrition (Incorporating nutritional management into one's lifestyle): Discussion, Individual Session, Demonstrates Understanding/Competency (verbalizes/demonstrates)  Physical Activity (incorporating physical activity into one's lifestyle): Discussion, Individual Session, Demonstrates Understanding/Competency (verbalizes/demonstrates)  Medications (states correct name, dose, onset, peak, duration, side effects & timing of meds): Discussion, Individual Session, Demonstrates Understanding/Competency(verbalizes/demonstrates), Written Materials Provided  Monitoring (monitoring blood glucose/other parameters & using results): Discussion, Individual Session, Demonstrates Understanding/Competency (verbalizes/demonstrates)( )  Acute Complications (preventing, detecting, and treating acute complications): Discussion, Individual Session, Demonstrates Understanding/Competency (verbalizes/demonstrates)  Chronic Complications (preventing, detecting, and treating chronic  complications): Demonstrates Understanding/Competency (verbalizes/demonstrates)  Clinical (diabetes, other pertinent medical history, and relevant comorbidities reviewed during visit): Demonstrates Understanding/Competency (verbalizes/demonstrates)  Cognitive (knowledge of self-management skills, functional health literacy): Demonstrates Understanding/Competency (verbalizes/demonstrates)  Psychosocial (emotional response to diabetes): Discussion, Individual Session, Demonstrates Understanding/Competency (verbalizes/demonstrates)  Diabetes Distress and Support Systems: Discussion, Individual Session, Demonstrates Understanding/Competency (verbalizes/demonstrates)  Behavioral (readiness for change, lifestyle practices, self-care behaviors): Discussion, Individual Session, Demonstrates Understanding/Competency (verbalizes/demonstrates)    Goals  Patient has selected/evaluated goals during today's session: Yes, evaluated  Healthy Eating: Set(Decrease dining out freq, use MR entrees (list provided) to assist)  Met Percentage : 75%(Continue COA meal to support meal plan progress)  Start Date: 07/08/19  Target Date: 10/08/19  Physical Activity: Set(150min/wk - COA classes/mall walking)  Met Percentage : 50%(Increase structured activity - COA classes 1-2 times/wk)  Start Date: 07/08/19  Target Date: 10/08/19  Monitoring: Set(test BG 2x/d -fst, acd or 2hr ppd; bring meter to clinic)  Met Percentage : 50%  Start Date: 07/08/19  Target Date: 10/08/19    Diabetes Self-Management Support Plan  Other exercise/nutrition: Utilize COA classes, local [employer] for structured walking    Review Status: Patient has selected and agrees to support plan.    Diabetes Care Plan/Intervention  Education Plan/Intervention: Individual Follow-Up DSMT, Endocrine Provider Visit Set Up(Maintain visits w/ Mr Ory for medical mgmt. Will coord repeat A1C. )    Diabetes Meal Plan  Restrictions: Low Fat, Low Sodium  Calories: 1200, 1400  Carbohydrate Per  Meal: 30-45g  Carbohydrate Per Snack : 7-15g    Today's Self-Management Care Plan was developed with the patient's input and is based on barriers identified during today's assessment.    The long and short-term goals in the care plan were written with the patient/caregiver's input. The patient has agreed to work toward these goals to improve her overall diabetes control.      The patient received a copy of today's self-management plan and verbalized understanding of the care plan, goals, and all of today's instructions.      The patient was encouraged to communicate with her physician and care team regarding her condition(s) and treatment.  I provided the patient with my contact information today and encouraged her to contact me via phone or patient portal as needed.     Education Units of Time   Time Spent: 30 min

## 2019-07-09 ENCOUNTER — TELEPHONE (OUTPATIENT)
Dept: OPHTHALMOLOGY | Facility: CLINIC | Age: 79
End: 2019-07-09

## 2019-07-09 RX ORDER — LOSARTAN POTASSIUM 50 MG/1
TABLET ORAL
COMMUNITY
Start: 2019-07-07 | End: 2019-08-19

## 2019-07-09 RX ORDER — FLECAINIDE ACETATE 50 MG/1
50 TABLET ORAL 2 TIMES DAILY
Refills: 11 | COMMUNITY
Start: 2019-05-28 | End: 2020-03-31 | Stop reason: SDUPTHER

## 2019-07-09 RX ORDER — HYDROCHLOROTHIAZIDE 12.5 MG/1
TABLET ORAL
COMMUNITY
Start: 2019-06-29 | End: 2019-12-23

## 2019-07-09 NOTE — TELEPHONE ENCOUNTER
Left a voicemail for patient, and informed her that Dr. Seay has passed away. Also, let her know that someone will be calling her to reschedule her appt, and to establish her with a new provider.Sent an e-mail also via the portal.

## 2019-08-13 ENCOUNTER — LAB VISIT (OUTPATIENT)
Dept: LAB | Facility: HOSPITAL | Age: 79
End: 2019-08-13
Attending: PHYSICIAN ASSISTANT
Payer: MEDICARE

## 2019-08-13 DIAGNOSIS — N18.30 DIABETES MELLITUS WITH STAGE 3 CHRONIC KIDNEY DISEASE: ICD-10-CM

## 2019-08-13 DIAGNOSIS — E11.22 DIABETES MELLITUS WITH STAGE 3 CHRONIC KIDNEY DISEASE: ICD-10-CM

## 2019-08-13 LAB
ESTIMATED AVG GLUCOSE: 180 MG/DL (ref 68–131)
HBA1C MFR BLD HPLC: 7.9 % (ref 4–5.6)

## 2019-08-13 PROCEDURE — 83036 HEMOGLOBIN GLYCOSYLATED A1C: CPT

## 2019-08-13 PROCEDURE — 36415 COLL VENOUS BLD VENIPUNCTURE: CPT

## 2019-08-14 ENCOUNTER — TELEPHONE (OUTPATIENT)
Dept: INTERNAL MEDICINE | Facility: CLINIC | Age: 79
End: 2019-08-14

## 2019-08-14 RX ORDER — ALBUTEROL SULFATE 90 UG/1
AEROSOL, METERED RESPIRATORY (INHALATION)
COMMUNITY
Start: 2019-07-31 | End: 2020-03-11

## 2019-08-14 NOTE — TELEPHONE ENCOUNTER
Needs EGD and Gastro appt- both ordered months ago     Please check and schedule and let me know    Also EP with me 12/19 or 1/20 thanks

## 2019-08-14 NOTE — TELEPHONE ENCOUNTER
Spoke to pt and advised. Gastro appt scheduled for 10/30/19. Phone number provided to schedule EGD.    Pt placed on recall list for appt with PCP

## 2019-08-19 ENCOUNTER — OFFICE VISIT (OUTPATIENT)
Dept: NEPHROLOGY | Facility: CLINIC | Age: 79
End: 2019-08-19
Payer: MEDICARE

## 2019-08-19 VITALS
SYSTOLIC BLOOD PRESSURE: 130 MMHG | DIASTOLIC BLOOD PRESSURE: 62 MMHG | HEART RATE: 58 BPM | HEIGHT: 61 IN | BODY MASS INDEX: 32.72 KG/M2 | WEIGHT: 173.31 LBS | RESPIRATION RATE: 18 BRPM

## 2019-08-19 DIAGNOSIS — R80.9 PROTEINURIA, UNSPECIFIED TYPE: Primary | ICD-10-CM

## 2019-08-19 DIAGNOSIS — R80.9 PROTEINURIA DUE TO TYPE 2 DIABETES MELLITUS: ICD-10-CM

## 2019-08-19 DIAGNOSIS — N18.30 CKD (CHRONIC KIDNEY DISEASE) STAGE 3, GFR 30-59 ML/MIN: ICD-10-CM

## 2019-08-19 DIAGNOSIS — E11.29 PROTEINURIA DUE TO TYPE 2 DIABETES MELLITUS: ICD-10-CM

## 2019-08-19 PROCEDURE — 3078F DIAST BP <80 MM HG: CPT | Mod: CPTII,S$GLB,, | Performed by: INTERNAL MEDICINE

## 2019-08-19 PROCEDURE — 99499 UNLISTED E&M SERVICE: CPT | Mod: S$GLB,,, | Performed by: INTERNAL MEDICINE

## 2019-08-19 PROCEDURE — 99499 RISK ADDL DX/OHS AUDIT: ICD-10-PCS | Mod: S$GLB,,, | Performed by: INTERNAL MEDICINE

## 2019-08-19 PROCEDURE — 99999 PR PBB SHADOW E&M-EST. PATIENT-LVL III: CPT | Mod: PBBFAC,,, | Performed by: INTERNAL MEDICINE

## 2019-08-19 PROCEDURE — 3075F PR MOST RECENT SYSTOLIC BLOOD PRESS GE 130-139MM HG: ICD-10-PCS | Mod: CPTII,S$GLB,, | Performed by: INTERNAL MEDICINE

## 2019-08-19 PROCEDURE — 99214 PR OFFICE/OUTPT VISIT, EST, LEVL IV, 30-39 MIN: ICD-10-PCS | Mod: S$GLB,,, | Performed by: INTERNAL MEDICINE

## 2019-08-19 PROCEDURE — 1101F PT FALLS ASSESS-DOCD LE1/YR: CPT | Mod: CPTII,S$GLB,, | Performed by: INTERNAL MEDICINE

## 2019-08-19 PROCEDURE — 3075F SYST BP GE 130 - 139MM HG: CPT | Mod: CPTII,S$GLB,, | Performed by: INTERNAL MEDICINE

## 2019-08-19 PROCEDURE — 99214 OFFICE O/P EST MOD 30 MIN: CPT | Mod: S$GLB,,, | Performed by: INTERNAL MEDICINE

## 2019-08-19 PROCEDURE — 99999 PR PBB SHADOW E&M-EST. PATIENT-LVL III: ICD-10-PCS | Mod: PBBFAC,,, | Performed by: INTERNAL MEDICINE

## 2019-08-19 PROCEDURE — 3078F PR MOST RECENT DIASTOLIC BLOOD PRESSURE < 80 MM HG: ICD-10-PCS | Mod: CPTII,S$GLB,, | Performed by: INTERNAL MEDICINE

## 2019-08-19 PROCEDURE — 1101F PR PT FALLS ASSESS DOC 0-1 FALLS W/OUT INJ PAST YR: ICD-10-PCS | Mod: CPTII,S$GLB,, | Performed by: INTERNAL MEDICINE

## 2019-08-19 RX ORDER — CANDESARTAN 4 MG/1
4 TABLET ORAL DAILY
Qty: 30 TABLET | Refills: 6 | Status: SHIPPED | OUTPATIENT
Start: 2019-08-19 | End: 2020-02-19

## 2019-08-19 NOTE — LETTER
August 19, 2019      Penny Randolph MD  [address]  [facility] [address]           [facility]  [address]  [place] [address]  Phone: [phone]  Fax: [phone]          Patient: Saul Mar   MR Number: [id number]   YOB: 1940   Date of Visit: 8/19/2019       Dear Dr. Penny Randolph:    Thank you for referring Saul Mar to me for evaluation. Attached you will find relevant portions of my assessment and plan of care.    If you have questions, please do not hesitate to call me. I look forward to following Saul Mar along with you.    Sincerely,    Alex Dean MD    Enclosure  CC:  No Recipients    If you would like to receive this communication electronically, please contact [email or web address] or [phone] to request more information on [employer] Link access.    For providers and/or their staff who would like to refer a patient to [facility], please contact us through our one-stop-shop provider referral line, [facility], at [phone].    If you feel you have received this communication in error or would no longer like to receive these types of communications, please e-mail [email or web address]

## 2019-08-19 NOTE — PROGRESS NOTES
PROGRESS NOTE FOR ESTABLISHED PATIENT    PHYSICIAN REQUESTING THE CONSULT: Dr. Penny Randolph    REASON FOR VISIT: Renal insufficiency    79 y.o. female with history of CKD 3, DM2, HTN, diabetic retinopathy, diastolic CHF, CVA, GERD presents to the renal clinic for evaluation of renal insufficiency.       Patient has no complaints today. She reports that she stopped her losartan because her pharmacy reported contamination issues with her batch of Losartan.           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 Taveras    COLONOSCOPY      COLONOSCOPY N/A 9/22/2016    Performed by Jd Ashton [facility] at [facility] ENDO (4TH FLR)    COLONOSCOPY N/A 5/20/2013    Performed by Jd [facility]hton [facility] at [facility] ENDO (4TH FLR)    ESOPHAGOGASTRODUODENOSCOPY (EGD) N/A 6/21/2016    [facility] ([facility])    EYE SURGERY Bilateral 2002 approx    Laser for glaucoma    HYSTERECTOMY  1963     AMEENA/USO- fibroids; no cancer    INSERTION, IOL PROSTHESIS Left 2/27/2013    Performed by Lina Taveras MD at [facility] OR 1ST FLR    OOPHORECTOMY  1963    unknown, only removed one    PHACOEMULSIFICATION, CATARACT Left 2/27/2013    Performed by L[facility]averas MD at [facility] OR 1ST FLR       Review of patient's allergies indicates:   Allergen Reactions     Pravachol [pravastatin] Nausea Only       Current Outpatient Medications   Medication Sig Dispense Refill    albuterol (PROVENTIL/VENTOLIN HFA) 90 mcg/actuation inhaler       ALPRAZolam (XANAX) 0.5 MG tablet TAKE 1 TABLET BY MOUTH NIGHTLY AS NEEDED FOR ANXIETY (MAY TAKE 1-2 TIMES WEEKLY FOR ANXIETY) 30 tablet 0    amLODIPine (NORVASC) 10 MG tablet Take 1 tablet (10 mg total) by mouth once daily. 30 tablet 11    atorvastatin (LIPITOR) 80 MG tablet Take 1 tablet (80 mg total) by mouth once daily. 90 tablet 3    blood sugar diagnostic Strp 1 strip by Misc.(Non-Drug; Combo Route) route 2 (two) times daily. Insurance preferred test stripes DX:E11.22 100 strip 11    blood-glucose meter kit Insurance preferred meter dx:E11.22 1 each 0    brimonidine 0.2% (ALPHAGAN) 0.2 % Drop Place 1 drop into both eyes 3 (three) times daily. 10 mL 11    cholecalciferol, vitamin D3, (VITAMIN D3) 1,000 unit capsule Take 1 capsule (1,000 Units total) by mouth once daily. 30 capsule 12    ferrous sulfate (FEOSOL) 325 mg (65 mg iron) Tab tablet Take 1 tablet (325 mg total) by mouth 2 (two) times daily. 180 tablet 3    flecainide (TAMBOCOR) 50 MG Tab Take 50 mg by mouth 2 (two) times daily.  11    glimepiride (AMARYL) 4 MG tablet TAKE 1 TABLET BY MOUTH IN THE MORNING WITH BREAKFAST 90 tablet 3    hydroCHLOROthiazide (HYDRODIURIL) 12.5 MG Tab       lancets (ACCU-CHEK SOFTCLIX LANCETS) Misc 100 lancets by Misc.(Non-Drug; Combo Route) route 2 (two) times daily. Insurance preferred lancets Dx:E11.22 100 each 11    levalbuterol (XOPENEX HFA) 45 mcg/actuation inhaler Inhale 1-2 puffs into the lungs every 6 (six) hours as needed for Wheezing. Rescue 1 Inhaler 12    metFORMIN (GLUCOPHAGE-XR) 500 MG 24 hr tablet TAKE 2 TABLETS (1,000 MG TOTAL) BY MOUTH ONCE DAILY. 180 tablet 0    metoprolol succinate (TOPROL-XL) 50 MG 24 hr tablet Take 1 tablet by mouth once daily.      mupirocin (BACTROBAN) 2 % ointment Apply topically 3 (three) times  daily. 22 g 0    ranitidine (ZANTAC) 150 MG tablet Take 1 tablet (150 mg total) by mouth 2 (two) times daily. 60 tablet 11    rivaroxaban (XARELTO) 20 mg Tab Take 20 mg by mouth once daily.       No current facility-administered medications for this visit.        Family History   Problem Relation Age of Onset    Stroke Mother     Mental illness Mother     Hypertension Mother     Stroke Father     Diabetes Maternal Grandmother     Drug abuse Daughter     Cancer Sister 68        gyn    Ovarian cancer Sister     Cancer Maternal Uncle         type not known    Heart disease Paternal Grandmother     Glaucoma Neg Hx     Macular degeneration Neg Hx     Alcohol abuse Neg Hx     COPD Neg Hx     Asthma Neg Hx     Amblyopia Neg Hx     Blindness Neg Hx     Cataracts Neg Hx     Retinal detachment Neg Hx     Strabismus Neg Hx        Social History     Socioeconomic History    Marital status:      Spouse name: Not on file    Number of children: Not on file    Years of education: Not on file    Highest education level: Not on file   Occupational History    Not on file   Social Needs    Financial resource strain: Not on file    Food insecurity:     Worry: Not on file     Inability: Not on file    Transportation needs:     Medical: Not on file     Non-medical: Not on file   Tobacco Use    Smoking status: Never Smoker    Smokeless tobacco: Never Used   Substance and Sexual Activity    Alcohol use: No    Drug use: No    Sexual activity: Never     Partners: Male   Lifestyle    Physical activity:     Days per week: Not on file     Minutes per session: Not on file    Stress: Not on file   Relationships    Social connections:     Talks on phone: Not on file     Gets together: Not on file     Attends [employer] service: Not on file     Active member of club or organization: Not on file     Attends meetings of clubs or organizations: Not on file     Relationship status: Not on file   Other Topics  "Concern    Not on file   Social History Narrative    , no pets or smokers in household. 1 Child who lives in nursing home. She has a grandson who lives in [place].. Retired from [employer] . Still drives. Does not have a Living Will or advanced directive.        Review of Systems:  1. GENERAL: patient denies any fever, weight changes, generalized weakness, dizziness.  2. HEENT: patient denies headaches, visual disturbances, swallowing problems, sinus pain, nasal congestion.  3. CARDIOVASCULAR: patient denies chest pain, palpitations.  4. PULMONARY: patient denies SOB, coughing, hemoptysis, wheezing.  5. GASTROINTESTINAL: patient denies abdominal pain, nausea, vomiting, diarrhea.  6. GENITOURINARY: patient denies dysuria, hematuria, hesitancy, frequency.  7. EXTREMITIES: patient denies LE edema, LE cramping.  8. DERMATOLOGY: patient denies rashes, ulcers.  9. NEURO: patient denies tremors, extremity weakness, extremity numbness/tingling.  10. MUSCULOSKELETAL: patient denies joint pain, joint swelling.  11. HEMATOLOGY: patient denies rectal or gum bleeding.  12: PSYCH: patient denies anxiety, depression.      PHYSICAL EXAM:  /62   Pulse (!) 58   Resp 18   Ht 5' 0.5" (1.537 m)   Wt 78.6 kg (173 lb 4.5 oz)   BMI 33.28 kg/m²     GENERAL: Pleasant well groomed lady presents to clinic with non-labored breathing.  HEENT: PER, no nasal discharge, no icterus, no oral exudates, moist mucosal membranes.  NECK: no thyroid mass, no lymphadenopathy.  HEART: RRR S1/S2, no rubs, good peripheral pulses.  LUNGS: CTA bilaterally, no wheezing, breathing is nonlabored.  ABDOMEN: soft, nontender, not distended, bowel sounds are present, no abdominal hernia.  EXTREM: no LE edema.  SKIN: no rashes, skin is warm and dry.  NEURO: A & O x 3, no obvious focal signs.    LABORATORY RESULTS:    Lab Results   Component Value Date    CREATININE 1.3 06/04/2019    BUN 16 06/04/2019     06/04/2019    K 3.5 06/04/2019    CL " 107 06/04/2019    CO2 22 (L) 06/04/2019      Lab Results   Component Value Date    CALCIUM 9.1 06/04/2019    PHOS 3.6 10/05/2018     Lab Results   Component Value Date    ALBUMIN 3.7 06/04/2019     Lab Results   Component Value Date    WBC 5.96 07/05/2019    HGB 10.5 (L) 07/05/2019    HCT 32.9 (L) 07/05/2019    MCV 88 07/05/2019     07/05/2019     Protein Creatinine Ratios:    Creatinine, Random Ur   Date Value Ref Range Status   05/13/2019 50.0 15.0 - 325.0 mg/dL Final     Comment:     The random urine reference ranges provided were established   for 24 hour urine collections.  No reference ranges exist for  random urine specimens.  Correlate clinically.     02/20/2018 37.0 15.0 - 325.0 mg/dL Final     Comment:     The random urine reference ranges provided were established   for 24 hour urine collections.  No reference ranges exist for  random urine specimens.  Correlate clinically.     10/19/2017 59.0 15.0 - 325.0 mg/dL Final     Comment:     The random urine reference ranges provided were established   for 24 hour urine collections.  No reference ranges exist for  random urine specimens.  Correlate clinically.       Protein, Urine Random   Date Value Ref Range Status   02/20/2018 24 (H) 0 - 15 mg/dL Final     Comment:     The random urine reference ranges provided were established   for 24 hour urine collections.  No reference ranges exist for  random urine specimens.  Correlate clinically.     10/19/2017 45 (H) 0 - 15 mg/dL Final     Comment:     The random urine reference ranges provided were established   for 24 hour urine collections.  No reference ranges exist for  random urine specimens.  Correlate clinically.     10/26/2016 102 (H) 0 - 15 mg/dL Final     Comment:     The random urine reference ranges provided were established   for 24 hour urine collections.  No reference ranges exist for  random urine specimens.  Correlate clinically.       Prot/Creat Ratio, Ur   Date Value Ref Range Status    02/20/2018 0.65 (H) 0.00 - 0.20 Final   10/19/2017 0.76 (H) 0.00 - 0.20 Final   10/26/2016 2.17 (H) 0.00 - 0.20 Final           ASSESSMENT AND PLAN:  79 y.o. female with history of CKD 3, DM2, HTN, diabetic retinopathy, diastolic CHF, CVA, GERD presents to the renal clinic for evaluation of renal insufficiency.     1. Renal insufficiency: Patient presents with mild renal insufficiency, consistent with CKD stage 3. Last creatinine was 1.3. Likely cause of her renal insufficiency is her long-standing DM2 given her proteinuria. Patient stopped her losartan because of contamination issues. Will start Candesartan 4 mg po daily instead for proteinuria. Patient's renal function will be monitored closely and she will return to the clinic in 5 months for follow up. Patient was advised to avoid NSAID pain medications such as advil, aleve, motrin, ibuprofen, naprosyn, meloxicam, diclofenac, mobic and to hydrate with 60-65 ounces of water per day.     2. Electrolytes: at goal.     3. Acid base status: borderline acidosis, monitor.     4. Volume: Euvolemic.     5. Hypertension: Good BP control.     6. Medications: Reviewed. Agree with current medical regimen.     7. Proteinuria: start Candesartan 4 mg po daily.     8. Anemia: monitor for now.     9. DM2: blood glucose control has somewhat worsened with HgA1c at 7.9 (8/13/19).

## 2019-08-21 DIAGNOSIS — E11.610 TYPE 2 DIABETES MELLITUS WITH DIABETIC NEUROPATHIC ARTHROPATHY: ICD-10-CM

## 2019-08-21 RX ORDER — METFORMIN HYDROCHLORIDE 500 MG/1
TABLET, EXTENDED RELEASE ORAL
Qty: 180 TABLET | Refills: 0 | Status: SHIPPED | OUTPATIENT
Start: 2019-08-21 | End: 2019-11-21 | Stop reason: SDUPTHER

## 2019-08-29 ENCOUNTER — OFFICE VISIT (OUTPATIENT)
Dept: DIABETES | Facility: CLINIC | Age: 79
End: 2019-08-29
Payer: MEDICARE

## 2019-08-29 VITALS
HEART RATE: 57 BPM | WEIGHT: 177.69 LBS | BODY MASS INDEX: 29.61 KG/M2 | HEIGHT: 65 IN | SYSTOLIC BLOOD PRESSURE: 128 MMHG | DIASTOLIC BLOOD PRESSURE: 64 MMHG

## 2019-08-29 LAB — GLUCOSE SERPL-MCNC: 175 MG/DL (ref 70–110)

## 2019-08-29 PROCEDURE — 3074F PR MOST RECENT SYSTOLIC BLOOD PRESSURE < 130 MM HG: ICD-10-PCS | Mod: CPTII,S$GLB,, | Performed by: PHYSICIAN ASSISTANT

## 2019-08-29 PROCEDURE — 1101F PT FALLS ASSESS-DOCD LE1/YR: CPT | Mod: CPTII,S$GLB,, | Performed by: PHYSICIAN ASSISTANT

## 2019-08-29 PROCEDURE — 1101F PR PT FALLS ASSESS DOC 0-1 FALLS W/OUT INJ PAST YR: ICD-10-PCS | Mod: CPTII,S$GLB,, | Performed by: PHYSICIAN ASSISTANT

## 2019-08-29 PROCEDURE — 3074F SYST BP LT 130 MM HG: CPT | Mod: CPTII,S$GLB,, | Performed by: PHYSICIAN ASSISTANT

## 2019-08-29 PROCEDURE — 82962 POCT GLUCOSE, HAND-HELD DEVICE: ICD-10-PCS | Mod: S$GLB,,, | Performed by: PHYSICIAN ASSISTANT

## 2019-08-29 PROCEDURE — 3078F PR MOST RECENT DIASTOLIC BLOOD PRESSURE < 80 MM HG: ICD-10-PCS | Mod: CPTII,S$GLB,, | Performed by: PHYSICIAN ASSISTANT

## 2019-08-29 PROCEDURE — 99999 PR PBB SHADOW E&M-EST. PATIENT-LVL III: ICD-10-PCS | Mod: PBBFAC,,, | Performed by: PHYSICIAN ASSISTANT

## 2019-08-29 PROCEDURE — 99214 PR OFFICE/OUTPT VISIT, EST, LEVL IV, 30-39 MIN: ICD-10-PCS | Mod: S$GLB,,, | Performed by: PHYSICIAN ASSISTANT

## 2019-08-29 PROCEDURE — 99214 OFFICE O/P EST MOD 30 MIN: CPT | Mod: S$GLB,,, | Performed by: PHYSICIAN ASSISTANT

## 2019-08-29 PROCEDURE — 3078F DIAST BP <80 MM HG: CPT | Mod: CPTII,S$GLB,, | Performed by: PHYSICIAN ASSISTANT

## 2019-08-29 PROCEDURE — 82962 GLUCOSE BLOOD TEST: CPT | Mod: S$GLB,,, | Performed by: PHYSICIAN ASSISTANT

## 2019-08-29 PROCEDURE — 99999 PR PBB SHADOW E&M-EST. PATIENT-LVL III: CPT | Mod: PBBFAC,,, | Performed by: PHYSICIAN ASSISTANT

## 2019-08-29 RX ORDER — ACARBOSE 25 MG/1
25 TABLET ORAL
Qty: 90 TABLET | Refills: 11 | Status: SHIPPED | OUTPATIENT
Start: 2019-08-29 | End: 2021-07-22

## 2019-08-29 RX ORDER — AMLODIPINE BESYLATE 5 MG/1
5 TABLET ORAL DAILY
Refills: 3 | COMMUNITY
Start: 2019-08-20 | End: 2019-11-18

## 2019-08-29 RX ORDER — FERROUS SULFATE 325(65) MG
325 TABLET ORAL 2 TIMES DAILY
Qty: 180 TABLET | Refills: 3 | Status: SHIPPED | OUTPATIENT
Start: 2019-08-29 | End: 2020-02-17

## 2019-08-29 NOTE — PROGRESS NOTES
PCP: Penny Randolph MD    Subjective:    Patient ID: Saul Mar is a 79 y.o. female.    PCP: Penny Randolph MD        Saul Mar is a pleasant 79 y.o. female presenting to follow up on Type 2 diabetes mellitus.  Also, pertinent to this visit in decision making is CKD stage 3b/a2, hypertension, hyperlipidemia, and adherence.  She has had diabetes for 20 or more years.  Her last visit in Diabetes Management was 5/23/2019 .   Since that time she has been in her usual state of health without significant hyperglycemia or hypoglycemia. She has been checking her blood glucose regularly two times daily, before meals and HS.  Since that time she has had mild regression in her glycemia with A1c of 7.9%. Of note she is euglycemic fasting and between meals on CGM. This is an ideal situation for Alpha-Glucosidase Inhibitor if she can tolerate the GI side effects.   She is currently on Glimepiride and Metformin.  Her current concerns are glycemic control.    She denies any hospital admissions, emergency room visits, hypoglycemia, syncope, diaphoresis, chest pain, or dyspnea.    She has gained 4 pounds since last visit. Her BMI is 29.57 kg/m²    Her blood sugar in the clinic today was:   Lab Results   Component Value Date    POCGLU 175 (A) 08/29/2019     Saul is compliant most of the time with DM medications.     Saul is noncompliant some of the time with lifestyle modifications to include activity and meal planning.     Diabetes Management Status    Statin: Taking  ACE/ARB: Taking    Screening or Prevention Patient's value Goal Complete/Controlled?   HgA1C Testing and Control   Lab Results   Component Value Date    HGBA1C 7.9 (H) 08/13/2019      Annually/Less than 8% Yes   Lipid profile : 01/22/2019 Annually Yes   LDL control Lab Results   Component Value Date    LDLCALC 90.0 01/22/2019    Annually/Less than 100 mg/dl  Yes   Nephropathy screening Lab Results   Component Value Date    LABMICR 102.0  "05/13/2019     Lab Results   Component Value Date    PROTEINUA 1+ (A) 10/19/2017    Annually No   Blood pressure BP Readings from Last 1 Encounters:   08/29/19 128/64    Less than 140/90 No   Dilated retinal exam : 06/04/2019 Annually Yes   Foot exam   : 04/08/2019 Annually Yes     The following results were reviewed with patient.    Lab Results   Component Value Date    HGBA1C 7.9 (H) 08/13/2019    HGBA1C 7.7 (H) 05/13/2019    HGBA1C 7.6 (H) 01/22/2019       Review of Systems   Constitutional: Negative for activity change and unexpected weight change.   Eyes: Negative for visual disturbance.   Respiratory: Negative for chest tightness and shortness of breath.    Cardiovascular: Negative for chest pain.   Gastrointestinal: Negative for constipation and diarrhea.   Endocrine: Negative for cold intolerance, heat intolerance, polydipsia, polyphagia and polyuria.   Genitourinary: Negative for frequency.   Skin: Negative for wound.   Neurological: Negative for numbness.   Psychiatric/Behavioral: Negative for confusion.          Objective:   /64 (BP Location: Left arm, Patient Position: Sitting, BP Method: Medium (Automatic))   Pulse (!) 57   Ht 5' 5" (1.651 m)   Wt 80.6 kg (177 lb 11.1 oz)   BMI 29.57 kg/m²      Physical Exam   Constitutional: She is oriented to person, place, and time. She appears well-developed and well-nourished. No distress.   Neck: Normal range of motion. Neck supple. No tracheal deviation present. No thyromegaly present.   Cardiovascular: Normal rate, regular rhythm and normal heart sounds.   Pulmonary/Chest: Effort normal and breath sounds normal.   Abdominal: Soft. Bowel sounds are normal.   Musculoskeletal: She exhibits no edema.   Lymphadenopathy:     She has no cervical adenopathy.   Neurological: She is alert and oriented to person, place, and time.   Skin: She is not diaphoretic.   Psychiatric: She has a normal mood and affect.   Nursing note and vitals reviewed.        Assessment: "     1. Diabetes mellitus type 2, uncontrolled, with complications      Plan:   Saul aMr is seen today for   1. Diabetes mellitus type 2, uncontrolled, with complications        Diabetes mellitus type 2, uncontrolled, with complications  -     POCT Glucose, Hand-Held Device  -     ferrous sulfate (FEOSOL) 325 mg (65 mg iron) Tab tablet; Take 1 tablet (325 mg total) by mouth 2 (two) times daily.  Dispense: 180 tablet; Refill: 3    Other orders  -     acarbose (PRECOSE) 25 MG Tab; Take 1 tablet (25 mg total) by mouth 3 (three) times daily with meals.  Dispense: 90 tablet; Refill: 11     - Continue with D&E, reduce portion size, and restrict carbohydrates (no more that 45 grams ) per meal.  ·  - Patient's CGM revealed she is euglycemic between meals and overnight but she has significant post meal glycemic excursion. Will attempt to delay carbohydrate absorption with Alpha-Glucosidase Inhibitor to allow lower post meal glycemia. She will start low and go slow when introducing the medication.    - Stable. Continue current treatment plan   - Will concentrate more on lifestyle modifications   - Follow up in 3 months    A total of 30 minutes was spent in face to face time, of which 50 % was spent in counseling patient on disease process, complications, treatment, and side effects of medications.    The patient was explained the above plan and given opportunity to ask questions.  She understands, chooses and consents to this plan and accepts all the risks, which include but are not limited to the risks mentioned above.   She understands the alternative of having no testing, interventions or treatments at this time. She left content and without further questions.     Disclaimer:  This note is prepared using voice recognition software and as such is likely to have errors and has not been proof read. Please contact me for questions.

## 2019-10-03 DIAGNOSIS — I10 ESSENTIAL HYPERTENSION: ICD-10-CM

## 2019-10-03 RX ORDER — LOSARTAN POTASSIUM 50 MG/1
TABLET ORAL
Qty: 180 TABLET | Refills: 2 | OUTPATIENT
Start: 2019-10-03

## 2019-10-04 DIAGNOSIS — I10 HYPERTENSION, ESSENTIAL: Chronic | ICD-10-CM

## 2019-10-04 RX ORDER — HYDROCHLOROTHIAZIDE 12.5 MG/1
12.5 TABLET ORAL DAILY
Qty: 90 TABLET | Refills: 3 | Status: SHIPPED | OUTPATIENT
Start: 2019-10-04 | End: 2020-09-24

## 2019-10-08 ENCOUNTER — IMMUNIZATION (OUTPATIENT)
Dept: PHARMACY | Facility: CLINIC | Age: 79
End: 2019-10-08
Payer: MEDICARE

## 2019-10-08 ENCOUNTER — OFFICE VISIT (OUTPATIENT)
Dept: PODIATRY | Facility: CLINIC | Age: 79
End: 2019-10-08
Payer: MEDICARE

## 2019-10-08 VITALS
SYSTOLIC BLOOD PRESSURE: 153 MMHG | DIASTOLIC BLOOD PRESSURE: 72 MMHG | HEART RATE: 56 BPM | BODY MASS INDEX: 29.27 KG/M2 | WEIGHT: 175.69 LBS | HEIGHT: 65 IN

## 2019-10-08 DIAGNOSIS — B35.1 ONYCHOMYCOSIS DUE TO DERMATOPHYTE: ICD-10-CM

## 2019-10-08 DIAGNOSIS — L60.3 NAIL DYSTROPHY: ICD-10-CM

## 2019-10-08 DIAGNOSIS — E11.49 TYPE II DIABETES MELLITUS WITH NEUROLOGICAL MANIFESTATIONS: Primary | ICD-10-CM

## 2019-10-08 DIAGNOSIS — L84 CORN OR CALLUS: ICD-10-CM

## 2019-10-08 PROCEDURE — 99499 UNLISTED E&M SERVICE: CPT | Mod: S$GLB,,, | Performed by: PODIATRIST

## 2019-10-08 PROCEDURE — 11056 PR TRIM BENIGN HYPERKERATOTIC SKIN LESION,2-4: ICD-10-PCS | Mod: Q9,S$GLB,, | Performed by: PODIATRIST

## 2019-10-08 PROCEDURE — 11721 DEBRIDE NAIL 6 OR MORE: CPT | Mod: Q9,59,S$GLB, | Performed by: PODIATRIST

## 2019-10-08 PROCEDURE — 99999 PR PBB SHADOW E&M-EST. PATIENT-LVL III: CPT | Mod: PBBFAC,,, | Performed by: PODIATRIST

## 2019-10-08 PROCEDURE — 99999 PR PBB SHADOW E&M-EST. PATIENT-LVL III: ICD-10-PCS | Mod: PBBFAC,,, | Performed by: PODIATRIST

## 2019-10-08 PROCEDURE — 11056 PARNG/CUTG B9 HYPRKR LES 2-4: CPT | Mod: Q9,S$GLB,, | Performed by: PODIATRIST

## 2019-10-08 PROCEDURE — 99499 NO LOS: ICD-10-PCS | Mod: S$GLB,,, | Performed by: PODIATRIST

## 2019-10-08 PROCEDURE — 11721 PR DEBRIDEMENT OF NAILS, 6 OR MORE: ICD-10-PCS | Mod: Q9,59,S$GLB, | Performed by: PODIATRIST

## 2019-10-08 NOTE — PROGRESS NOTES
Subjective:     Patient ID: Saul Mar is a 79 y.o. female.    Chief Complaint: Nail Care (3 month nail care, pain 0/10, wear casual shoes w/ socks,diabetic pt, PCP Dr. Randolph)    Saul is a 79 y.o. female who presents to the clinic upon referral from Dr. Yuridia neri. provider found  for evaluation and treatment of diabetic feet. 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 Patient complaints is right ingrown (medial) toenail check and nail care. Patient states she did not hear from [facility] about her diabetic shoes.      PCP: Penny Randolph MD    Date Last Seen by PCP: 06/04/19    Current shoe gear: Tennis shoes    Hemoglobin A1C   Date Value Ref Range Status   08/13/2019 7.9 (H) 4.0 - 5.6 % Final     Comment:     ADA Screening Guidelines:  5.7-6.4%  Consistent with prediabetes  >or=6.5%  Consistent with diabetes  High levels of fetal hemoglobin interfere with the HbA1C  assay. Heterozygous hemoglobin variants (HbS, HgC, etc)do  not significantly interfere with this assay.   However, presence of multiple variants may affect accuracy.     05/13/2019 7.7 (H) 4.0 - 5.6 % Final     Comment:     ADA Screening Guidelines:  5.7-6.4%  Consistent with prediabetes  >or=6.5%  Consistent with diabetes  High levels of fetal hemoglobin interfere with the HbA1C  assay. Heterozygous hemoglobin variants (HbS, HgC, etc)do  not significantly interfere with this assay.   However, presence of multiple variants may affect accuracy.     01/22/2019 7.6 (H) 4.0 - 5.6 % Final      Comment:     ADA Screening Guidelines:  5.7-6.4%  Consistent with prediabetes  >or=6.5%  Consistent with diabetes  High levels of fetal hemoglobin interfere with the HbA1C  assay. Heterozygous hemoglobin variants (HbS, HgC, etc)do  not significantly interfere with this assay.   However, presence of multiple variants may affect accuracy.                  Patient Active Problem List   Diagnosis    Mixed diabetic hyperlipidemia associated with type 2 diabetes mellitus    Degenerative disc disease    Colon polyp: tubular adenoma 5/13, repeated 2016 to be repeated 2021    Multinodular thyroid: thyroid u/s 7/16, stable 2017    POAG (primary open-angle glaucoma) - Both Eyes    Amaurosis fugax    Nuclear sclerosis - Right Eye    Eyelid myokymia    Cerebral microvascular disease: stroke ? 1999 elsewhere; TIA 10/13    Left-sided carotid artery disease    Vitamin D insufficiency    Left ventricular diastolic dysfunction with preserved systolic function    Hypertension, essential    Gastroesophageal reflux disease without esophagitis    Type 2 diabetes, uncontrolled, with background retinopathy with macular edema    Overweight (BMI 25.0-29.9)    Diabetes mellitus with proteinuria    Type II diabetes mellitus with neurological manifestations    Aortic arch atherosclerosis    Abnormal ankle brachial index    Diabetes mellitus with stage 3 chronic kidney disease    Cerebrovascular accident (CVA) due to thrombosis of precerebral artery    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    ALBUTEROL  (PROVENTIL/VENTOLIN HFA) 90 MCG/ACTUATION INHALER        ALPRAZOLAM (XANAX) 0.5 MG TABLET    TAKE 1 TABLET BY MOUTH NIGHTLY AS NEEDED FOR ANXIETY (MAY TAKE 1-2 TIMES WEEKLY FOR ANXIETY)    AMLODIPINE (NORVASC) 10 MG TABLET    Take 1 tablet (10 mg total) by mouth once daily.    AMLODIPINE (NORVASC) 5 MG TABLET    Take 5 mg by mouth once daily.    ATORVASTATIN (LIPITOR) 80 MG TABLET    Take 1 tablet (80 mg total) by mouth once daily.    BLOOD SUGAR DIAGNOSTIC STRP    1 strip by Misc.(Non-Drug; Combo Route) route 2 (two) times daily. Insurance preferred test stripes DX:E11.22    BLOOD-GLUCOSE METER KIT    Insurance preferred meter dx:E11.22    BRIMONIDINE 0.2% (ALPHAGAN) 0.2 % DROP    Place 1 drop into both eyes 3 (three) times daily.    CANDESARTAN (ATACAND) 4 MG TABLET    Take 1 tablet (4 mg total) by mouth once daily.    CHOLECALCIFEROL, VITAMIN D3, (VITAMIN D3) 1,000 UNIT CAPSULE    Take 1 capsule (1,000 Units total) by mouth once daily.    FERROUS SULFATE (FEOSOL) 325 MG (65 MG IRON) TAB TABLET    Take 1 tablet (325 mg total) by mouth 2 (two) times daily.    FLECAINIDE (TAMBOCOR) 50 MG TAB    Take 50 mg by mouth 2 (two) times daily.    GLIMEPIRIDE (AMARYL) 4 MG TABLET    TAKE 1 TABLET BY MOUTH IN THE MORNING WITH BREAKFAST    HYDROCHLOROTHIAZIDE (HYDRODIURIL) 12.5 MG TAB        HYDROCHLOROTHIAZIDE (HYDRODIURIL) 12.5 MG TAB    TAKE 1 TABLET (12.5 MG TOTAL) BY MOUTH ONCE DAILY.    LANCETS (ACCU-CHEK SOFTCLIX LANCETS) MISC    100 lancets by Misc.(Non-Drug; Combo Route) route 2 (two) times daily. Insurance preferred lancets Dx:E11.22    LEVALBUTEROL (XOPENEX HFA) 45 MCG/ACTUATION INHALER    Inhale 1-2 puffs into the lungs every 6 (six) hours as needed for Wheezing. Rescue    METFORMIN (GLUCOPHAGE-XR) 500 MG 24 HR TABLET    TAKE 2 TABLETS BY MOUTH DAILY    METOPROLOL SUCCINATE (TOPROL-XL) 50 MG 24 HR TABLET    Take 1 tablet by mouth once daily.    MUPIROCIN (BACTROBAN) 2 % OINTMENT    Apply topically 3 (three) times  daily.    RANITIDINE (ZANTAC) 150 MG TABLET    Take 1 tablet (150 mg total) by mouth 2 (two) times daily.    RIVAROXABAN (XARELTO) 20 MG TAB    Take 20 mg by mouth once daily.       Review of patient's allergies indicates:   Allergen Reactions    Pravachol [pravastatin] Nausea Only       Past Surgical History:   Procedure Laterality Date    CATARACT EXTRACTION W/  INTRAOCULAR LENS IMPLANT Left 2/27/13    Dr. Taveras    COLONOSCOPY      COLONOSCOPY N/A 9/22/2016    Procedure: COLONOSCOPY;  Surgeon: Jd Ashton MD;  Location: [facility] ([address] [place] [address]);  Service: Endoscopy;  Laterality: N/A;  OK per Dr Randolph for pt to hold Plavix 5 days prior to procedure/see telephone encounter dated 8/29/16/svn    EYE SURGERY Bilateral 2002 approx    Laser for glaucoma    HYSTERECTOMY  1963     AMEENA/USO- fibroids; no cancer    OOPHORECTOMY  1963    unknown, only removed one       Family History   Problem Relation Age of Onset    Stroke Mother     Mental illness Mother     Hypertension Mother     Stroke Father     Diabetes Maternal Grandmother     Drug abuse Daughter     Cancer Sister 68        gyn    Ovarian cancer Sister     Cancer Maternal Uncle         type not known    Heart disease Paternal Grandmother     Glaucoma Neg Hx     Macular degeneration Neg Hx     Alcohol abuse Neg Hx     COPD Neg Hx     Asthma Neg Hx     Amblyopia Neg Hx     Blindness Neg Hx     Cataracts Neg Hx     Retinal detachment Neg Hx     Strabismus Neg Hx        Social History     Socioeconomic History    Marital status:      Spouse name: Not on file    Number of children: Not on file    Years of education: Not on file    Highest education level: Not on file   Occupational History    Not on file   Social Needs    Financial resource strain: Not on file    Food insecurity:     Worry: Not on file     Inability: Not on file    Transportation needs:     Medical: Not on file     Non-medical: Not on file   Tobacco Use     "Smoking status: Never Smoker    Smokeless tobacco: Never Used   Substance and Sexual Activity    Alcohol use: No    Drug use: No    Sexual activity: Never     Partners: Male   Lifestyle    Physical activity:     Days per week: Not on file     Minutes per session: Not on file    Stress: Not on file   Relationships    Social connections:     Talks on phone: Not on file     Gets together: Not on file     Attends Jew service: Not on file     Active member of club or organization: Not on file     Attends meetings of clubs or organizations: Not on file     Relationship status: Not on file   Other Topics Concern    Not on file   Social History Narrative    , no pets or smokers in household. 1 Child who lives in nursing home. She has a grandson who lives in [place].. Retired from [employer] . Still drives. Does not have a Living Will or advanced directive.        Vitals:    10/08/19 1008   BP: (!) 153/72   Pulse: (!) 56   Weight: 79.7 kg (175 lb 11.3 oz)   Height: 5' 5" (1.651 m)   PainSc: 0-No pain       Hemoglobin A1C   Date Value Ref Range Status   08/13/2019 7.9 (H) 4.0 - 5.6 % Final     Comment:     ADA Screening Guidelines:  5.7-6.4%  Consistent with prediabetes  >or=6.5%  Consistent with diabetes  High levels of fetal hemoglobin interfere with the HbA1C  assay. Heterozygous hemoglobin variants (HbS, HgC, etc)do  not significantly interfere with this assay.   However, presence of multiple variants may affect accuracy.     05/13/2019 7.7 (H) 4.0 - 5.6 % Final     Comment:     ADA Screening Guidelines:  5.7-6.4%  Consistent with prediabetes  >or=6.5%  Consistent with diabetes  High levels of fetal hemoglobin interfere with the HbA1C  assay. Heterozygous hemoglobin variants (HbS, HgC, etc)do  not significantly interfere with this assay.   However, presence of multiple variants may affect accuracy.     01/22/2019 7.6 (H) 4.0 - 5.6 % Final     Comment:     ADA Screening Guidelines:  5.7-6.4%  " Consistent with prediabetes  >or=6.5%  Consistent with diabetes  High levels of fetal hemoglobin interfere with the HbA1C  assay. Heterozygous hemoglobin variants (HbS, HgC, etc)do  not significantly interfere with this assay.   However, presence of multiple variants may affect accuracy.         Review of Systems   Constitutional: Negative for chills and fever.   Respiratory: Negative for shortness of breath.    Cardiovascular: Negative for chest pain, palpitations, orthopnea, claudication and leg swelling.   Gastrointestinal: Negative for diarrhea, nausea and vomiting.   Musculoskeletal: Negative for joint pain.   Skin: Negative for rash.   Neurological: Positive for sensory change. Negative for dizziness, tingling, focal weakness and weakness.   Psychiatric/Behavioral: Negative.              Objective:   PHYSICAL EXAM: Apperance: Alert and orient in no distress,well developed, and with good attention to grooming and body habits  Patient presents ambulating in tennis shoes.  LOWER EXTREMITY EXAM:  VASCULAR: Dorsalis pedis pulses 0/4 left 1/4 right and Posterior Tibial pulses 0/4 left and 1/4 right. Capillary fill time <4 seconds bilateral. Mild edema observed bilateral. Varicosities present bilateral. Skin temperature of the lower extremities is warm to cool, proximal to distal. Hair growth absent bilateral.  DERMATOLOGICAL: No skin rashes, subcutaneous nodules, lesions, or ulcers observed bilateral. Nails 1,2,3,4,5, bilateral elongated, thickened, and discolored with subungual debris. Webspaces 1,2,3,4 bilateral clean, dry and without evidence of break in skin integrity. Mild hyperkeratotic lesions noted to bilateral 5th plantar submetatarsal.    NEUROLOGICAL: Light touch, sharp-dull, proprioception all present and equal bilaterally.  Vibratory sensation diminished at bilateral hallux. Protective sensation absent at toe sites as tested with a Hemingway-Marjan 5.07 monofilament.   MUSCULOSKELETAL: Muscle strength  is 5/5 for foot inverters, everters, plantarflexors, and dorsiflexors. Muscle tone is normal.     Assessment:   Type II diabetes mellitus with neurological manifestations    Onychomycosis due to dermatophyte    Corn or callus    Nail dystrophy - Left Foot          Plan:   Type II diabetes mellitus with neurological manifestations    Onychomycosis due to dermatophyte    Corn or callus    Nail dystrophy - Left Foot      I counseled the patient on her conditions, regarding findings of my examination, my impressions, and usual treatment plan.   Greater than 15 minutes of a 20 minute appointment spent on education about the diabetic foot, neuropathy, and prevention of limb loss.  Shoe inspection. Diabetic Foot Education. Patient reminded of the importance of good nutrition and blood sugar control to help prevent podiatric complications of diabetes. Patient instructed on proper foot hygeine. We discussed wearing proper shoe gear, daily foot inspections, never walking without protective shoe gear, never putting sharp instruments to feet.    With patient's permission, nails 1-5 bilateral were debrided/trimmed in length and thickness aggressively to their soft tissue attachment mechanically and with electric , removing all offending nail and debris. Patient relates relief following the procedure.  With patient's permission, bilateral callus trimmed in thickness with #15 blade in thickness without incident.   Prescription for Diabetic shoes and inserts re-faced to [facility].   Patient  will continue to monitor the areas daily, inspect feet, wear protective shoe gear when ambulatory, moisturizer to maintain skin integrity. Patient reminded of the importance of good nutrition and blood sugar control to help prevent podiatric complications of diabetes.  Patient to return 3 months or sooner if needed.                 Zuleima Howell DPM  [facility]

## 2019-10-14 NOTE — PROGRESS NOTES
HPI     Glaucoma      Additional comments: HVF and OCT review today              Comments     DLS: 6/4/19    1) POAG  2) DM with BDR OU  3) Hx RLL lesion  4) NS OD  5) PCIOL OS  6) Hx CVA   7) Hx Amaurosis Fugax  8) Eyelid Myokymia     Meds:  Brimonidine TID OU           Last edited by Zuri Winkler MA on 10/18/2019 10:10 AM. (History)            Assessment /Plan     For exam results, see Encounter Report.    Primary open angle glaucoma of both eyes, moderate stage    Type 2 diabetes, uncontrolled, with background retinopathy with macular edema    Nuclear sclerosis, right    Amaurosis fugax    Eyelid myokymia    Pseudophakia          1. POAG ou   H/O poor compliance   First HVF 1997   First photos 1998     Family history none   Glaucoma meds Has used alphagan in past - poor compliance-stopped on her own   H/O adverse rxn to glaucoma drops none   LASERS No glaucoma laser (+h/o focal OU for BDR)   GLAUCOMA SURGERIES none   OTHER EYE SURGERIES CE/PCIOL OS 2/27/13  CDR 0.8/0.75   Tbase 16-20/16-22   Tmax 20/22   Ttarget 17/17   HVF 15 test 1997 to 2019  - inf arc/NS od //  Sup paracentral loss and INS os  (+changes ? 2/2 focal laser )   Gonio +3   /621- thick ou (- 4.5 ou)   OCT 4 test 2006 to 2019  - RNFL - nl od // dec TI os   HRT 6  tests: 2009 to 2018 -  MR -  Dec. I, bord S/T od // bord T/I os /// CDR 0.74 od // 0.70 os  Disc photos 1998, 2000, 2001, 2003, 2003, 2004, 2005, 2006- slides  // 2010 , 2018 - OIS     - Ttoday 13/12  - Test done today  HVF / DFE / OCT     2. 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 NS OD    Remove prn   BCVA 20/40   BAT 20/60    4. Pseudophakia OS    S/p CE/PCIOL OS    IOL SN60 WF 20.5   -VA limited 2/2 hx of CSME s/p focal scars   BCVA  20/25   Give Rx glasses - 7/2/2013   Had re-bound iritis - resolved    4. H/O RLL lesion - S/P excision with Jovanni     5. Post Nelda lives in [place] - but still likes to come to [place] for care     6. amaurosis fugax  "/ H/O CVA's   Patient reports stroke like symptoms and amaurosis fugax 10/13   -  She was admitted to hospital with very elevated BP   -  Patient had MRI at the time showing ischemic disease   - last Carotid U/S done 11/12- patient reports that     - PCP gets one yearly- last U/S showed minimal plaque disease   -  H/o stroke- discussed that amaurosis was from elevated BP and if ever recurs needs to report to ER immediately- she voiced understanding    7. Eyelid myokymia  - intermittent - patient also reports eyelid "twitching  "- discussed with patient that myokymia is usually from lack of sleep, caffeine intake, or stress- however nothing to worry about  -  Did not have any "twitching" on exam today    Plan:  BrimonidineTID ou   MRx given- patient would like to think re CEIOL OD- can consider CEIOL with [place] given poor compliance with gtts and moderate glaucoma.   HVF's are inconsistent with the ON exam / OCT ect   Pt wants to wait on phaco od for now // has done well os     Avoid PG's if possible 2/2 h/o CME 2/2 BDR    Refraction Post op os  is more myopic than expected - review IOL calc if needs 2nd eye done    Repeat OCT of macula  5/7/2013 - - done no CME os    F/U 4 months - HRT    - photo file - (( first name is Saul ( leave out the Zaid part))) - updated 6/4/2019    Can refer to retina in future - +BDR - s/p laser - [facility] - then use to see arend - can refer to [facility] in future - appears stable for now     I have seen and personally examined the patient.  I agree with the findings, assessment and plan of the resident and/or fellow.     Lina Taveras MD     "

## 2019-10-18 ENCOUNTER — CLINICAL SUPPORT (OUTPATIENT)
Dept: OPHTHALMOLOGY | Facility: CLINIC | Age: 79
End: 2019-10-18
Payer: MEDICARE

## 2019-10-18 ENCOUNTER — OFFICE VISIT (OUTPATIENT)
Dept: OPHTHALMOLOGY | Facility: CLINIC | Age: 79
End: 2019-10-18
Payer: MEDICARE

## 2019-10-18 DIAGNOSIS — H25.11 NUCLEAR SCLEROSIS, RIGHT: ICD-10-CM

## 2019-10-18 DIAGNOSIS — Z96.1 PSEUDOPHAKIA: ICD-10-CM

## 2019-10-18 DIAGNOSIS — G51.4 EYELID MYOKYMIA: ICD-10-CM

## 2019-10-18 DIAGNOSIS — H25.13 NUCLEAR SCLEROSIS OF BOTH EYES: ICD-10-CM

## 2019-10-18 DIAGNOSIS — H40.1132 PRIMARY OPEN ANGLE GLAUCOMA OF BOTH EYES, MODERATE STAGE: Primary | ICD-10-CM

## 2019-10-18 DIAGNOSIS — H40.1132 PRIMARY OPEN ANGLE GLAUCOMA OF BOTH EYES, MODERATE STAGE: ICD-10-CM

## 2019-10-18 DIAGNOSIS — G45.3 AMAUROSIS FUGAX: ICD-10-CM

## 2019-10-18 PROCEDURE — 99999 PR PBB SHADOW E&M-EST. PATIENT-LVL II: CPT | Mod: PBBFAC,,, | Performed by: OPHTHALMOLOGY

## 2019-10-18 PROCEDURE — 92083 EXTENDED VISUAL FIELD XM: CPT | Mod: S$GLB,,, | Performed by: OPHTHALMOLOGY

## 2019-10-18 PROCEDURE — 92083 HUMPHREY VISUAL FIELD - OU - BOTH EYES: ICD-10-PCS | Mod: S$GLB,,, | Performed by: OPHTHALMOLOGY

## 2019-10-18 PROCEDURE — 92133 POSTERIOR SEGMENT OCT OPTIC NERVE(OCULAR COHERENCE TOMOGRAPHY) - OU - BOTH EYES: ICD-10-PCS | Mod: S$GLB,,, | Performed by: OPHTHALMOLOGY

## 2019-10-18 PROCEDURE — 92014 COMPRE OPH EXAM EST PT 1/>: CPT | Mod: S$GLB,,, | Performed by: OPHTHALMOLOGY

## 2019-10-18 PROCEDURE — 92133 CPTRZD OPH DX IMG PST SGM ON: CPT | Mod: S$GLB,,, | Performed by: OPHTHALMOLOGY

## 2019-10-18 PROCEDURE — 92014 PR EYE EXAM, EST PATIENT,COMPREHESV: ICD-10-PCS | Mod: S$GLB,,, | Performed by: OPHTHALMOLOGY

## 2019-10-18 PROCEDURE — 99999 PR PBB SHADOW E&M-EST. PATIENT-LVL II: ICD-10-PCS | Mod: PBBFAC,,, | Performed by: OPHTHALMOLOGY

## 2019-10-18 RX ORDER — BRIMONIDINE TARTRATE 2 MG/ML
1 SOLUTION/ DROPS OPHTHALMIC 3 TIMES DAILY
Qty: 10 ML | Refills: 11 | Status: SHIPPED | OUTPATIENT
Start: 2019-10-18 | End: 2021-06-30

## 2019-10-30 ENCOUNTER — TELEPHONE (OUTPATIENT)
Dept: INTERNAL MEDICINE | Facility: CLINIC | Age: 79
End: 2019-10-30

## 2019-10-30 NOTE — TELEPHONE ENCOUNTER
She canceled her Gastro appointment  Letter sent encouraging her to reschedule and to see me in 3 months

## 2019-10-30 NOTE — LETTER
October 30, 2019    Saul Mar  [address]  [facility] [address]             [facility]  [address]  [facility] [address]  Phone: [phone]  Fax: [phone] Dear Mrs. Saul Mar:    We are sorry that you missed your appointment with Gastroenterology on 10/30/2019. Your health and follow-up medical care are important to us. Please call their office as soon as possible so that you may reschedule your appointment.     Please also schedule follow-up with me in the next 3 months time.  If you need any assistance, please do not hesitate to call my office.    Sincerely,        Penny Randolph MD

## 2019-11-17 DIAGNOSIS — I10 ESSENTIAL HYPERTENSION: ICD-10-CM

## 2019-11-18 RX ORDER — AMLODIPINE BESYLATE 5 MG/1
TABLET ORAL
Qty: 90 TABLET | Refills: 3 | Status: SHIPPED | OUTPATIENT
Start: 2019-11-18 | End: 2020-03-06 | Stop reason: SDUPTHER

## 2019-11-21 DIAGNOSIS — E11.610 TYPE 2 DIABETES MELLITUS WITH DIABETIC NEUROPATHIC ARTHROPATHY: ICD-10-CM

## 2019-11-21 RX ORDER — METFORMIN HYDROCHLORIDE 500 MG/1
TABLET, EXTENDED RELEASE ORAL
Qty: 180 TABLET | Refills: 0 | Status: SHIPPED | OUTPATIENT
Start: 2019-11-21 | End: 2020-05-24

## 2019-12-23 ENCOUNTER — IMMUNIZATION (OUTPATIENT)
Dept: PHARMACY | Facility: CLINIC | Age: 79
End: 2019-12-23
Payer: MEDICARE

## 2019-12-23 ENCOUNTER — LAB VISIT (OUTPATIENT)
Dept: LAB | Facility: HOSPITAL | Age: 79
End: 2019-12-23
Attending: INTERNAL MEDICINE
Payer: MEDICARE

## 2019-12-23 ENCOUNTER — OFFICE VISIT (OUTPATIENT)
Dept: INTERNAL MEDICINE | Facility: CLINIC | Age: 79
End: 2019-12-23
Payer: MEDICARE

## 2019-12-23 VITALS
SYSTOLIC BLOOD PRESSURE: 120 MMHG | WEIGHT: 176.38 LBS | HEIGHT: 66 IN | DIASTOLIC BLOOD PRESSURE: 60 MMHG | BODY MASS INDEX: 28.34 KG/M2

## 2019-12-23 DIAGNOSIS — I10 HYPERTENSION, ESSENTIAL: Chronic | ICD-10-CM

## 2019-12-23 DIAGNOSIS — N18.30 CKD (CHRONIC KIDNEY DISEASE) STAGE 3, GFR 30-59 ML/MIN: Chronic | ICD-10-CM

## 2019-12-23 DIAGNOSIS — D50.1 IRON DEFICIENCY ANEMIA DUE TO SIDEROPENIC DYSPHAGIA: ICD-10-CM

## 2019-12-23 DIAGNOSIS — E11.22 DIABETES MELLITUS WITH STAGE 3 CHRONIC KIDNEY DISEASE: Chronic | ICD-10-CM

## 2019-12-23 DIAGNOSIS — N18.30 DIABETES MELLITUS WITH STAGE 3 CHRONIC KIDNEY DISEASE: Chronic | ICD-10-CM

## 2019-12-23 DIAGNOSIS — I63.00 CEREBROVASCULAR ACCIDENT (CVA) DUE TO THROMBOSIS OF PRECEREBRAL ARTERY: Chronic | ICD-10-CM

## 2019-12-23 DIAGNOSIS — I48.91 ATRIAL FIBRILLATION, UNSPECIFIED TYPE: ICD-10-CM

## 2019-12-23 DIAGNOSIS — S46.812S TRAPEZIUS MUSCLE STRAIN, LEFT, SEQUELA: ICD-10-CM

## 2019-12-23 LAB
ALBUMIN SERPL BCP-MCNC: 3.8 G/DL (ref 3.5–5.2)
ANION GAP SERPL CALC-SCNC: 8 MMOL/L (ref 8–16)
BASOPHILS # BLD AUTO: 0.04 K/UL (ref 0–0.2)
BASOPHILS NFR BLD: 0.6 % (ref 0–1.9)
BUN SERPL-MCNC: 17 MG/DL (ref 8–23)
CALCIUM SERPL-MCNC: 9.4 MG/DL (ref 8.7–10.5)
CHLORIDE SERPL-SCNC: 106 MMOL/L (ref 95–110)
CO2 SERPL-SCNC: 26 MMOL/L (ref 23–29)
CREAT SERPL-MCNC: 1.6 MG/DL (ref 0.5–1.4)
DIFFERENTIAL METHOD: ABNORMAL
EOSINOPHIL # BLD AUTO: 0.3 K/UL (ref 0–0.5)
EOSINOPHIL NFR BLD: 4.7 % (ref 0–8)
ERYTHROCYTE [DISTWIDTH] IN BLOOD BY AUTOMATED COUNT: 14.5 % (ref 11.5–14.5)
EST. GFR  (AFRICAN AMERICAN): 35.3 ML/MIN/1.73 M^2
EST. GFR  (NON AFRICAN AMERICAN): 30.6 ML/MIN/1.73 M^2
FERRITIN SERPL-MCNC: 172 NG/ML (ref 20–300)
GLUCOSE SERPL-MCNC: 182 MG/DL (ref 70–110)
HCT VFR BLD AUTO: 33.7 % (ref 37–48.5)
HGB BLD-MCNC: 11.1 G/DL (ref 12–16)
IRON SERPL-MCNC: 70 UG/DL (ref 30–160)
LYMPHOCYTES # BLD AUTO: 3 K/UL (ref 1–4.8)
LYMPHOCYTES NFR BLD: 46.5 % (ref 18–48)
MCH RBC QN AUTO: 28.8 PG (ref 27–31)
MCHC RBC AUTO-ENTMCNC: 32.9 G/DL (ref 32–36)
MCV RBC AUTO: 88 FL (ref 82–98)
MONOCYTES # BLD AUTO: 0.5 K/UL (ref 0.3–1)
MONOCYTES NFR BLD: 8.2 % (ref 4–15)
NEUTROPHILS # BLD AUTO: 2.6 K/UL (ref 1.8–7.7)
NEUTROPHILS NFR BLD: 40 % (ref 38–73)
PHOSPHATE SERPL-MCNC: 3.1 MG/DL (ref 2.7–4.5)
PLATELET # BLD AUTO: 182 K/UL (ref 150–350)
PMV BLD AUTO: 11 FL (ref 9.2–12.9)
POTASSIUM SERPL-SCNC: 4.5 MMOL/L (ref 3.5–5.1)
RBC # BLD AUTO: 3.85 M/UL (ref 4–5.4)
SATURATED IRON: 22 % (ref 20–50)
SODIUM SERPL-SCNC: 140 MMOL/L (ref 136–145)
TOTAL IRON BINDING CAPACITY: 312 UG/DL (ref 250–450)
TRANSFERRIN SERPL-MCNC: 211 MG/DL (ref 200–375)
WBC # BLD AUTO: 6.45 K/UL (ref 3.9–12.7)

## 2019-12-23 PROCEDURE — 3074F SYST BP LT 130 MM HG: CPT | Mod: CPTII,S$GLB,, | Performed by: INTERNAL MEDICINE

## 2019-12-23 PROCEDURE — 99214 PR OFFICE/OUTPT VISIT, EST, LEVL IV, 30-39 MIN: ICD-10-PCS | Mod: S$GLB,,, | Performed by: INTERNAL MEDICINE

## 2019-12-23 PROCEDURE — 36415 COLL VENOUS BLD VENIPUNCTURE: CPT

## 2019-12-23 PROCEDURE — 80069 RENAL FUNCTION PANEL: CPT

## 2019-12-23 PROCEDURE — 1159F PR MEDICATION LIST DOCUMENTED IN MEDICAL RECORD: ICD-10-PCS | Mod: S$GLB,,, | Performed by: INTERNAL MEDICINE

## 2019-12-23 PROCEDURE — 1159F MED LIST DOCD IN RCRD: CPT | Mod: S$GLB,,, | Performed by: INTERNAL MEDICINE

## 2019-12-23 PROCEDURE — 99499 UNLISTED E&M SERVICE: CPT | Mod: S$GLB,,, | Performed by: INTERNAL MEDICINE

## 2019-12-23 PROCEDURE — 99999 PR PBB SHADOW E&M-EST. PATIENT-LVL V: CPT | Mod: PBBFAC,,, | Performed by: INTERNAL MEDICINE

## 2019-12-23 PROCEDURE — 3078F DIAST BP <80 MM HG: CPT | Mod: CPTII,S$GLB,, | Performed by: INTERNAL MEDICINE

## 2019-12-23 PROCEDURE — 99214 OFFICE O/P EST MOD 30 MIN: CPT | Mod: S$GLB,,, | Performed by: INTERNAL MEDICINE

## 2019-12-23 PROCEDURE — 3078F PR MOST RECENT DIASTOLIC BLOOD PRESSURE < 80 MM HG: ICD-10-PCS | Mod: CPTII,S$GLB,, | Performed by: INTERNAL MEDICINE

## 2019-12-23 PROCEDURE — 85025 COMPLETE CBC W/AUTO DIFF WBC: CPT

## 2019-12-23 PROCEDURE — 82728 ASSAY OF FERRITIN: CPT

## 2019-12-23 PROCEDURE — 1126F AMNT PAIN NOTED NONE PRSNT: CPT | Mod: S$GLB,,, | Performed by: INTERNAL MEDICINE

## 2019-12-23 PROCEDURE — 3074F PR MOST RECENT SYSTOLIC BLOOD PRESSURE < 130 MM HG: ICD-10-PCS | Mod: CPTII,S$GLB,, | Performed by: INTERNAL MEDICINE

## 2019-12-23 PROCEDURE — 1126F PR PAIN SEVERITY QUANTIFIED, NO PAIN PRESENT: ICD-10-PCS | Mod: S$GLB,,, | Performed by: INTERNAL MEDICINE

## 2019-12-23 PROCEDURE — 99499 RISK ADDL DX/OHS AUDIT: ICD-10-PCS | Mod: S$GLB,,, | Performed by: INTERNAL MEDICINE

## 2019-12-23 PROCEDURE — 99999 PR PBB SHADOW E&M-EST. PATIENT-LVL V: ICD-10-PCS | Mod: PBBFAC,,, | Performed by: INTERNAL MEDICINE

## 2019-12-23 PROCEDURE — 1101F PR PT FALLS ASSESS DOC 0-1 FALLS W/OUT INJ PAST YR: ICD-10-PCS | Mod: CPTII,S$GLB,, | Performed by: INTERNAL MEDICINE

## 2019-12-23 PROCEDURE — 1101F PT FALLS ASSESS-DOCD LE1/YR: CPT | Mod: CPTII,S$GLB,, | Performed by: INTERNAL MEDICINE

## 2019-12-23 PROCEDURE — 83540 ASSAY OF IRON: CPT

## 2019-12-23 RX ORDER — METHOCARBAMOL 500 MG/1
500 TABLET, FILM COATED ORAL 2 TIMES DAILY
COMMUNITY
Start: 2019-12-06 | End: 2021-08-10 | Stop reason: SDUPTHER

## 2019-12-23 RX ORDER — MECLIZINE HYDROCHLORIDE 25 MG/1
TABLET ORAL
COMMUNITY
Start: 2019-12-10 | End: 2021-12-20

## 2019-12-23 NOTE — PATIENT INSTRUCTIONS
Prevention Guidelines, Women Ages 65 and Older  Screening tests and vaccines are an important part of managing your health. Health counseling is essential, too. Below are guidelines for these, for women ages 65 and older. Talk with your healthcare provider to make sure youre up to date on what you need.  Screening Who needs it How often   Type 2 diabetes or prediabetes All adults beginning at age 45 and adults without symptoms at any age who are overweight or obese and have 1 or more additional risk factors for diabetes At least every 3 years   Alcohol misuse All women in this age group At routine exams   Blood pressure All women in this age group Every 2 years if your blood pressure is less than 120/80 mm Hg; yearly if your systolic blood pressure is 120 to 139 mm Hg, or your diastolic blood pressure reading is 80 to 89 mm Hg   Breast cancer All women in this age group Yearly mammogram and clinical breast exam1   Cervical cancer Only women who had abnormal screening results before age 65 Talk with your healthcare provider   Chlamydia Women at increased risk for infection At routine exams   Colorectal cancer All women in this age group1 Flexible sigmoidoscopy every 5 years, or colonoscopy every 10 years, or double-contrast barium enema every 5 years; yearly fecal occult blood test or fecal immunochemical test; or a stool DNA test as often as your healthcare provider advises; talk with your healthcare provider about which tests are best for you   Depression All women in this age group At routine exams   Gonorrhea Sexually active women at increased risk for infection At routine exams   Hepatitis C Anyone at increased risk; 1 time for those born between 1945 and 1965 At routine exams   High cholesterol or triglycerides All women in this age group who are at risk for coronary artery disease At least every 5 years   HIV Women at increased risk for infection - talk with your healthcare provider At routine exams   Lung  cancer Adults age 55 to 80 who have smoked Yearly screening in smokers with 30 pack-year history of smoking or who quit within 15 years   Obesity All women in this age group At routine exams   Osteoporosis All women in this age group Bone density test at age 65, then follow-up as advised by your healthcare provider   Syphilis Women at increased risk for infection - talk with your healthcare provider At routine exams   Thyroid-Stimulating Hormone (TSH) All women in this age group Every 5 years   Tuberculosis Women at increased risk for infection - talk with your healthcare provider Ask your healthcare provider   Vision All women in this age group Every 1 to 2 years; if you have a chronic health condition, ask your healthcare provider if you need exams more often   Vaccine Who needs it How often   Chickenpox (varicella) All women in this age group who have no record of this infection or vaccine 2 doses; second dose should be given at least 4 weeks after the first dose   Hepatitis A Women at increased risk for infection - talk with your healthcare provider 2 doses given 6 months apart   Hepatitis B Women at increased risk for infection - talk with your healthcare provider 3 doses over 6 months; second dose should be given 1 month after the first dose; the third dose should be given at least 2 months after the second dose and at least 4 months after the first dose   Haemophilus influenza Type B (HIB) Women at increased risk for infection - talk with your healthcare provider 1 to 3 doses   Influenza (flu) All women in this age group Once a year   Pneumococcal conjugate vaccine (PCV13) and pneumococcal polysaccharide vaccine (PPSV23) All women in this age group 1 dose of each vaccine   Tetanus/diphtheria/pertussis (Td/Tdap) booster All women in this age group Td every 10 years, or a one-time dose of Tdap instead of a Td booster after age 18, then Td every 10 years   Zoster All women in this age group 1 dose   Counseling  Who needs it How often   Diet and exercise Women who are overweight or obese When diagnosed, and then at routine exams   Fall prevention (exercise and vitamin D supplements) All women in this age group At routine exams   Sexually transmitted infection prevention Women at increased risk for infection - talk with your healthcare provider At routine exams   Use of daily aspirin Women ages 55 and up in this age group who are at risk for cardiovascular health problems such as stroke When your risk is known   Use of tobacco and the health effects it can cause All women in this age group Every exam   1American [employer] Society  Date Last Reviewed: 8/9/2015  © 0518-7448 [employer]. [address] [place] [address], [place] [address]. All rights reserved. This information is not intended as a substitute for professional medical care. Always follow your healthcare professional's instructions.

## 2019-12-23 NOTE — PROGRESS NOTES
Subjective:       Patient ID: Saul Mar is a 79 y.o. female.    Chief Complaint: Follow-up    Follow-up multiple medical issues    In the ER due to hip pain.  Was given a muscle relaxant.  A day later she had some vertigo and went back to the ER.  Also got a prescription for this.    She lives in [place].   She drives and has not had any safety concerns.    At last evaluation several months ago, she was supposed to have a Gastro consultation and an endoscopy which were never completed.  Recall colonoscopy was done in 2016 in 3-5 year follow-up was recommended; EGD was also done at the same time.    At last visit multiple concerns:  Dry cough, fatigue.  Had a thyroid ultrasound which was acceptable.  Had a mammogram that was likewise acceptable.  Was supposed to have neuropsych testing.  This did not transpire.  Also new onset AFib.  Has been seen in Cardiology in [place] multiple times, most recently August 2019.  Stable on current regimen.  Medications have been adjusted.  No side effects that she can tell.       Carotid 10/18:   There is 20 - 39% right Internal Carotid stenosis.  There is 40 - 49% left Internal Carotid stenosis       Ophtho October 2019  Endocrinology August 2019  Cardiology August 2019  Nephrology August 2019  Podiatry July 2019    Patient Active Problem List:     Mixed diabetic hyperlipidemia associated with type 2 diabetes mellitus     Degenerative disc disease     Colon polyp: tubular adenoma 5/13, repeated 2016 to be repeated 2021     Multinodular thyroid: thyroid u/s 7/16, stable 2017 and 2019     POAG (primary open-angle glaucoma) - Both Eyes     Amaurosis fugax     Nuclear sclerosis - Right Eye     Eyelid myokymia     Cerebral microvascular disease: stroke ? 1999 elsewhere; TIA 10/13     Left-sided carotid artery disease     Vitamin D insufficiency     Left ventricular diastolic dysfunction with preserved systolic function     Hypertension, essential      Gastroesophageal reflux disease without esophagitis     Type 2 diabetes, uncontrolled, with background retinopathy with macular edema     Overweight (BMI 25.0-29.9)     Diabetes mellitus with proteinuria     Type II diabetes mellitus with neurological manifestations     Aortic arch atherosclerosis     Abnormal ankle brachial index     Diabetes mellitus with stage 3 chronic kidney disease     Cerebrovascular accident (CVA) due to thrombosis of precerebral artery     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     CKD (chronic kidney disease) stage 3, GFR 30-59 ml/min     Sickle cell trait syndrome     Mixed anxiety depressive disorder     Diverticulosis of large intestine without hemorrhage     Hemoglobin S trait     Atrial fibrillation      Review of Systems   Constitutional: Negative for fatigue and fever.   Gastrointestinal: Negative for abdominal pain.   Genitourinary: Negative for difficulty urinating and hematuria.   Musculoskeletal: Positive for neck pain. Negative for arthralgias and back pain.        Neck symptoms improved   Skin: Negative for rash.   Neurological: Positive for dizziness. Negative for weakness and numbness.        Occasional vertigo   Psychiatric/Behavioral: Negative for decreased concentration. The patient is not nervous/anxious.         See above.  No issues with driving.  Family members have not been concerned about her memory.  Mood stable       Objective:      Physical Exam   Constitutional: She is oriented to person, place, and time. She appears well-developed and well-nourished.   HENT:   Head: Normocephalic and atraumatic.   Right Ear: External ear normal.   Left Ear: External ear normal.   Mouth/Throat: Oropharynx is clear and moist.   Faint bruit on left   Eyes: Conjunctivae are normal.   Neck: Normal range of motion. Neck supple. Thyromegaly present.   Cardiovascular: Normal rate, regular rhythm and normal heart sounds.   Pulmonary/Chest: No respiratory distress. She has  no wheezes. She has no rales.   Abdominal: Soft. Bowel sounds are normal.   Musculoskeletal: She exhibits no edema.   Slight spasm over the left trapezius   Lymphadenopathy:     She has no cervical adenopathy.   Neurological: She is alert and oriented to person, place, and time.   Skin: Skin is warm and dry. No rash noted. No erythema.       Assessment:       1. Type 2 diabetes, uncontrolled, with background retinopathy with macular edema    2. Atrial fibrillation, unspecified type    3. Hypertension, essential    4. CKD (chronic kidney disease) stage 3, GFR 30-59 ml/min    5. 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    6. Diabetes mellitus with stage 3 chronic kidney disease    7. Cerebrovascular accident (CVA) due to thrombosis of precerebral artery    8. Trapezius muscle strain, left, sequela        Plan:         Saul was seen today for follow-up.    Diagnoses and all orders for this visit:    Type 2 diabetes, uncontrolled, with background retinopathy with macular edema:  Keep endocrinology follow-up.  Hydration, diet reviewed    Atrial fibrillation, unspecified type; in NSR now    Hypertension, essential:  Continue rest    CKD (chronic kidney disease) stage 3, GFR 30-59 ml/min; hydration, avoid nephrotoxins  -     Renal function panel; Future    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:  She did not schedule Gastro or EGD; will recheck labs to see whether anemia is improved.  She is not having any symptoms and does not want to pursue endoscopy if not absolutely necessary  -     CBC auto differential; Future  -     Ferritin; Future  -     Iron and TIBC; Future    Diabetes mellitus with stage 3 chronic kidney disease:  Continue regimen.  Keep Endocrine and Nephrology follow-up    Cerebrovascular accident (CVA) due to thrombosis of precerebral artery  -     US Carotid Bilateral; Future    Shingles # 2 today  Schedule neuropsych testing  I will review all studies and determine further tx  depending on findings  Keep follow-ups in Endocrinology and Nephrology, Cardiology etc    Trapezius muscle spasm reviewed.  Overall better.  Continue with muscle relaxant sparingly, cautions and side effects reviewed.  Heating pad to be used for a few minutes a few times a day; natural history reviewed, follow-up poor results

## 2019-12-26 ENCOUNTER — TELEPHONE (OUTPATIENT)
Dept: RADIOLOGY | Facility: HOSPITAL | Age: 79
End: 2019-12-26

## 2019-12-27 ENCOUNTER — HOSPITAL ENCOUNTER (OUTPATIENT)
Dept: RADIOLOGY | Facility: HOSPITAL | Age: 79
Discharge: HOME | End: 2019-12-27
Attending: INTERNAL MEDICINE
Payer: MEDICARE

## 2019-12-27 ENCOUNTER — PATIENT MESSAGE (OUTPATIENT)
Dept: INTERNAL MEDICINE | Facility: CLINIC | Age: 79
End: 2019-12-27

## 2019-12-27 DIAGNOSIS — I63.00 CEREBROVASCULAR ACCIDENT (CVA) DUE TO THROMBOSIS OF PRECEREBRAL ARTERY: ICD-10-CM

## 2019-12-27 PROCEDURE — 93880 EXTRACRANIAL BILAT STUDY: CPT | Mod: TC

## 2019-12-27 PROCEDURE — 93880 EXTRACRANIAL BILAT STUDY: CPT | Mod: 26,,, | Performed by: RADIOLOGY

## 2019-12-27 PROCEDURE — 93880 US CAROTID BILATERAL: ICD-10-PCS | Mod: 26,,, | Performed by: RADIOLOGY

## 2020-01-13 ENCOUNTER — OFFICE VISIT (OUTPATIENT)
Dept: URGENT CARE | Facility: CLINIC | Age: 80
End: 2020-01-13
Payer: MEDICARE

## 2020-01-13 VITALS
RESPIRATION RATE: 20 BRPM | TEMPERATURE: 98 F | SYSTOLIC BLOOD PRESSURE: 157 MMHG | WEIGHT: 176.38 LBS | DIASTOLIC BLOOD PRESSURE: 50 MMHG | HEART RATE: 57 BPM | OXYGEN SATURATION: 100 % | HEIGHT: 66 IN | BODY MASS INDEX: 28.34 KG/M2

## 2020-01-13 DIAGNOSIS — M54.2 NECK PAIN: Primary | ICD-10-CM

## 2020-01-13 PROCEDURE — 99214 PR OFFICE/OUTPT VISIT, EST, LEVL IV, 30-39 MIN: ICD-10-PCS | Mod: S$GLB,,, | Performed by: NURSE PRACTITIONER

## 2020-01-13 PROCEDURE — 72040 X-RAY EXAM NECK SPINE 2-3 VW: CPT | Mod: S$GLB,,, | Performed by: RADIOLOGY

## 2020-01-13 PROCEDURE — 72040 XR CERVICAL SPINE 2 OR 3 VIEWS: ICD-10-PCS | Mod: S$GLB,,, | Performed by: RADIOLOGY

## 2020-01-13 PROCEDURE — 99214 OFFICE O/P EST MOD 30 MIN: CPT | Mod: S$GLB,,, | Performed by: NURSE PRACTITIONER

## 2020-01-13 NOTE — PATIENT INSTRUCTIONS
Please return to clinic or go to the Emergency department for any concerns or worsening of your symptoms. If you are not improving after treatment please follow up with your primary care md for further evaluation.   General Neck and Back Pain    Both neck and back pain are usually caused by injury to the muscles or ligaments of the spine. Sometimes the disks that separate each bone of the spine may cause pain by pressing on a nearby nerve. Back and neck pain may appear after a sudden twisting or bending force (such as in a car accident), or sometimes after a simple awkward movement. In either case, muscle spasm is often present and adds to the pain.  Acute neck and back pain usually gets better in 1 to 2 weeks. Pain related to disk disease, arthritis in the spinal joints or spinal stenosis (narrowing of the spinal canal) can become chronic and last for months or years.  Back and neck pain are common problems. Most people feel better in 1 or 2 weeks, and most of the rest in 1 to 2 months. Most people can remain active.  People experience and describe pain differently.  · Pain can be sharp, stabbing, shooting, aching, cramping, or burning  · Movement, standing, bending, lifting, sitting, or walking may worsen the pain  · Pain can be localized to one spot or area, or it can be more generalized  · Pain can spread or radiate upwards, downwards, to the front, or go down your arms  · Muscle spasm may occur.  Most of the time mechanical problems with the muscles or spine cause the pain. it is usually caused by an injury, whether known or not, to the muscles or ligaments. While illnesses can cause back pain, it is usually not caused by a serious illness. Pain is usually related to physical activity, whether sports, exercise, work, or normal activity. Sometimes it can occur without an identifiable cause. This can happen simply by stretching or moving wrong, without noting pain at the time. Other causes  include:  · Overexertion, lifting, pushing, pulling incorrectly or too aggressively.  · Sudden twisting, bending or stretching from an accident (car or fall), or accidental movement.  · Poor posture  · Poor conditioning, lack of regular exercise  · Spinal disc disease or arthritis  · Stress  · Pregnancy, or illness like appendicitis, bladder or kidney infection, pelvic infections   Home care  · For neck pain: Use a comfortable pillow that supports the head and keeps the spine in a neutral position. The position of the head should not be tilted forward or backward.  · When in bed, try to find a position of comfort. A firm mattress is best. Try lying flat on your back with pillows under your knees. You can also try lying on your side with your knees bent up towards your chest and a pillow between your knees.  · At first, do not try to stretch out the sore spots. If there is a strain, it is not like the good soreness you get after exercising without an injury. In this case, stretching may make it worse.  · Avoid prolonged sitting, long car rides or travel. This puts more stress on the lower back than standing or walking.  · During the first 24 to 72 hours after an injury, apply an ice pack to the painful area for 20 minutes and then remove it for 20 minutes over a period of 60 to 90 minutes or several times a day.   · You can alternate ice and heat therapies. Talk with your healthcare provider about the best treatment for your back or neck pain. As a safety precaution, do not use a heating pad at bedtime. Sleeping with a heating pad can lead to skin burns or tissue damage.  · Therapeutic massage can help relax the back and neck muscles without stretching them.  · Be aware of safe lifting methods and do not lift anything over 15 pounds until all the pain is gone.  Medications  Talk to your healthcare provider before using medicine, especially if you have other medical problems or are taking other medicines.  · You may  use over-the-counter medicine to control pain, unless another pain medicine was prescribed. If you have chronic conditions like diabetes, liver or kidney disease, stomach ulcers,  gastrointestinal bleeding, or are taking blood thinner medicines.  · Be careful if you are given pain medicines, narcotics, or medicine for muscle spasm. They can cause drowsiness, and can affect your coordination, reflexes, and judgment. Do not drive or operate heavy machinery.  Follow-up care  Follow up with your healthcare provider, or as advised. Physical therapy or further tests may be needed.  If X-rays were taken, you will be notified of any new findings that may affect your care.  Call 911  Seek emergency medical care if any of the following occur:  · Trouble breathing  · Confusion  · Very drowsy or trouble awakening  · Fainting or loss of consciousness  · Rapid or very slow heart rate  · Loss of bowel or bladder control  When to seek medical advice  Call your healthcare provider right away if any of these occur:  · Pain becomes worse or spreads into your arms or legs  · Weakness, numbness or pain in one or both arms or legs  · Numbness in the groin area  · Difficulty walking  · Fever of 100.4ºF (38ºC) or higher, or as directed by your healthcare provider  Date Last Reviewed: 7/1/2016  © 5056-6639 [employer]. [address] [place] [address], [place] [id number]. All rights reserved. This information is not intended as a substitute for professional medical care. Always follow your healthcare professional's instructions.        Nonsurgical Treatment of Cervical Spine Problems  Cervical spine problems can often be treated without surgery. Choices include rest, medicines, or injections. Your healthcare provider may also suggest physical therapy or certain exercises. These may all help to relieve your symptoms. These treatments are often successful. But if your symptoms dont subside, talk to your healthcare provider. He or she may  tell you that surgery is your best choice.  Relieving your symptoms  Your healthcare provider may recommend:  · Medicines. These help to reduce pain and inflammation.  · Epidural steroid injections. These are injections into the spinal canal near the spinal cord. They may relieve severe pain and reduce inflammation.  · Restricting aggravating activities. This can help to give your cervical spine time to heal.  · A soft cervical collar. This can help to support your head while keeping your cervical spine aligned.  · Traction. This may help relieve pressure on the irritated nerves.  Restoring mobility and strength  Your healthcare provider may recommend that you work with a physical therapist. This can help you regain mobility and strength in your neck. Physical therapy may last for 4 to 8 weeks. It may include:  · Exercises to improve your necks range of motion and strength.  · Evaluation and correction of posture and body movements. This can correct problems that affect your cervical spine.  · Heat, massage, and traction to help relieve your symptoms.  Self-care  Youll take an active role in your own therapy. To protect your neck from further injury:  · Follow any exercise program given to you by your healthcare provider or physical therapist.  · Practice good posture while sitting, standing, or moving.  · Have your workspace evaluated. Rearrange it if needed.  · When lying down, support your neck. You can use a special cervical pillow or rolled-up towel.   Date Last Reviewed: 10/5/2015  © 9863-7320 [employer]. [address] [place] [id number]. All rights reserved. This information is not intended as a substitute for professional medical care. Always follow your healthcare professional's instructions.

## 2020-01-13 NOTE — PROGRESS NOTES
"Subjective:       Patient ID: Saul Mar is a 79 y.o. female.    Vitals:  height is 5' 6" (1.676 m) and weight is 80 kg (176 lb 5.9 oz). Her temperature is 98 °F (36.7 °C). Her blood pressure is 157/50 (abnormal) and her pulse is 57 (abnormal). Her respiration is 20 and oxygen saturation is 100%.     Chief Complaint: Neck Pain    Patient states she has neck pain for two months. She states she has used tylenol extra strength and bought a neck pillow which she states helps but then symptoms return. She states it hurts worse when moving head side to side.     Neck Pain    This is a new problem. The current episode started more than 1 month ago. The problem occurs constantly. The problem has been unchanged. The pain is associated with an unknown factor. The pain is present in the right side and left side. The quality of the pain is described as stabbing and aching. The pain is at a severity of 7/10. The pain is moderate. The symptoms are aggravated by twisting. The pain is same all the time. Pertinent negatives include no chest pain, fever, headaches or weakness. She has tried nothing for the symptoms. The treatment provided no relief.       Constitution: Negative for chills, fatigue and fever.   HENT: Negative for congestion and sore throat.    Neck: Positive for neck pain. Negative for painful lymph nodes.   Cardiovascular: Negative for chest pain and leg swelling.   Eyes: Negative for double vision and blurred vision.   Respiratory: Negative for cough and shortness of breath.    Gastrointestinal: Negative for nausea, vomiting and diarrhea.   Genitourinary: Negative for dysuria, frequency, urgency and history of kidney stones.   Musculoskeletal: Negative for joint pain, joint swelling, muscle cramps and muscle ache.   Skin: Negative for color change, pale, rash and bruising.   Allergic/Immunologic: Negative for seasonal allergies.   Neurological: Negative for dizziness, history of vertigo, " light-headedness, passing out and headaches.   Hematologic/Lymphatic: Negative for swollen lymph nodes.   Psychiatric/Behavioral: Negative for nervous/anxious, sleep disturbance and depression. The patient is not nervous/anxious.        Objective:      Physical Exam   Constitutional: She is oriented to person, place, and time. Vital signs are normal. She appears well-developed and well-nourished. She is active and cooperative. No distress.   HENT:   Head: Normocephalic and atraumatic.   Nose: Nose normal.   Mouth/Throat: Oropharynx is clear and moist and mucous membranes are normal.   Eyes: Conjunctivae and lids are normal.   Neck: Trachea normal, normal range of motion, full passive range of motion without pain and phonation normal. Neck supple.   Cardiovascular: Normal rate, regular rhythm, normal heart sounds, intact distal pulses and normal pulses.   Pulses:       Carotid pulses are 2+ on the right side with bruit, and 2+ on the left side with bruit.  Pulmonary/Chest: Effort normal and breath sounds normal.   Abdominal: Soft. Normal appearance and bowel sounds are normal. She exhibits no abdominal bruit, no pulsatile midline mass and no mass.   Musculoskeletal: Normal range of motion. She exhibits no edema or deformity.        Cervical back: She exhibits tenderness, pain and spasm. She exhibits normal range of motion.        Back:    Neurological: She is alert and oriented to person, place, and time. She has normal strength and normal reflexes. No sensory deficit.   Skin: Skin is warm, dry, intact and not diaphoretic.   Psychiatric: She has a normal mood and affect. Her speech is normal and behavior is normal. Judgment and thought content normal. Cognition and memory are normal.   Nursing note and vitals reviewed.    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  Stenosis estimates are per NASCET measurement criteria. COMPARISON: 12/04/2013 FINDINGS: Right: Peak systolic velocity ICA: 83 cm/sec ICA/CCA velocity ratio: 0.9. Plaque formation: Mild plaque in carotid bulb and proximal ICA. Vertebral artery: Antegrade flow and normal waveform. Left: Peak systolic velocity ICA: 116 cm/sec ICA/CCA velocity ratio: 1.2. Plaque formation: Mild plaque in carotid bulb and proximal ICA. Vertebral artery: Antegrade flow and normal waveform.     No evidence of significant carotid stenosis. Electronically signed by: WENDIE Enamorado MD Date:    12/27/2019 Time:    13:38    X-ray Cervical Spine 2 Or 3 Views    Result Date: 1/13/2020  EXAMINATION: XR CERVICAL SPINE 2 OR 3 VIEWS CLINICAL HISTORY: Cervicalgia TECHNIQUE: AP, lateral and open mouth views of the cervical spine were performed. COMPARISON: 04/02/2018 FINDINGS: Three views. Lateral imaging demonstrates adequate alignment of the cervical spine without significant vertebral body height loss or disc space height loss.  The facet joints are aligned.  There is prominent anterior marginal osteophytosis involving C4, C5, and C6.  AP spinal alignment is unremarkable.  The odontoid is intact.  The lateral masses of C1 are in anatomic relationship with C2.  The paraspinous and hypopharyngeal soft tissues are unremarkable.     1. No acute displaced fracture or dislocation of the cervical spine. Electronically signed by: Jarad Ornelas MD Date:    01/13/2020 Time:    12:15          Assessment:       1. Neck pain        Plan:         Neck pain  -     X-Ray Cervical Spine 2 or 3 Views; Future; Expected date: 01/13/2020         Patient Instructions     Please return to clinic or go to the Emergency department for any concerns or worsening of your symptoms. If you are not improving after treatment please follow up with your primary care md for further  evaluation.   General Neck and Back Pain    Both neck and back pain are usually caused by injury to the muscles or ligaments of the spine. Sometimes the disks that separate each bone of the spine may cause pain by pressing on a nearby nerve. Back and neck pain may appear after a sudden twisting or bending force (such as in a car accident), or sometimes after a simple awkward movement. In either case, muscle spasm is often present and adds to the pain.  Acute neck and back pain usually gets better in 1 to 2 weeks. Pain related to disk disease, arthritis in the spinal joints or spinal stenosis (narrowing of the spinal canal) can become chronic and last for months or years.  Back and neck pain are common problems. Most people feel better in 1 or 2 weeks, and most of the rest in 1 to 2 months. Most people can remain active.  People experience and describe pain differently.  · Pain can be sharp, stabbing, shooting, aching, cramping, or burning  · Movement, standing, bending, lifting, sitting, or walking may worsen the pain  · Pain can be localized to one spot or area, or it can be more generalized  · Pain can spread or radiate upwards, downwards, to the front, or go down your arms  · Muscle spasm may occur.  Most of the time mechanical problems with the muscles or spine cause the pain. it is usually caused by an injury, whether known or not, to the muscles or ligaments. While illnesses can cause back pain, it is usually not caused by a serious illness. Pain is usually related to physical activity, whether sports, exercise, work, or normal activity. Sometimes it can occur without an identifiable cause. This can happen simply by stretching or moving wrong, without noting pain at the time. Other causes include:  · Overexertion, lifting, pushing, pulling incorrectly or too aggressively.  · Sudden twisting, bending or stretching from an accident (car or fall), or accidental movement.  · Poor posture  · Poor conditioning, lack  of regular exercise  · Spinal disc disease or arthritis  · Stress  · Pregnancy, or illness like appendicitis, bladder or kidney infection, pelvic infections   Home care  · For neck pain: Use a comfortable pillow that supports the head and keeps the spine in a neutral position. The position of the head should not be tilted forward or backward.  · When in bed, try to find a position of comfort. A firm mattress is best. Try lying flat on your back with pillows under your knees. You can also try lying on your side with your knees bent up towards your chest and a pillow between your knees.  · At first, do not try to stretch out the sore spots. If there is a strain, it is not like the good soreness you get after exercising without an injury. In this case, stretching may make it worse.  · Avoid prolonged sitting, long car rides or travel. This puts more stress on the lower back than standing or walking.  · During the first 24 to 72 hours after an injury, apply an ice pack to the painful area for 20 minutes and then remove it for 20 minutes over a period of 60 to 90 minutes or several times a day.   · You can alternate ice and heat therapies. Talk with your healthcare provider about the best treatment for your back or neck pain. As a safety precaution, do not use a heating pad at bedtime. Sleeping with a heating pad can lead to skin burns or tissue damage.  · Therapeutic massage can help relax the back and neck muscles without stretching them.  · Be aware of safe lifting methods and do not lift anything over 15 pounds until all the pain is gone.  Medications  Talk to your healthcare provider before using medicine, especially if you have other medical problems or are taking other medicines.  · You may use over-the-counter medicine to control pain, unless another pain medicine was prescribed. If you have chronic conditions like diabetes, liver or kidney disease, stomach ulcers,  gastrointestinal bleeding, or are taking blood  thinner medicines.  · Be careful if you are given pain medicines, narcotics, or medicine for muscle spasm. They can cause drowsiness, and can affect your coordination, reflexes, and judgment. Do not drive or operate heavy machinery.  Follow-up care  Follow up with your healthcare provider, or as advised. Physical therapy or further tests may be needed.  If X-rays were taken, you will be notified of any new findings that may affect your care.  Call 911  Seek emergency medical care if any of the following occur:  · Trouble breathing  · Confusion  · Very drowsy or trouble awakening  · Fainting or loss of consciousness  · Rapid or very slow heart rate  · Loss of bowel or bladder control  When to seek medical advice  Call your healthcare provider right away if any of these occur:  · Pain becomes worse or spreads into your arms or legs  · Weakness, numbness or pain in one or both arms or legs  · Numbness in the groin area  · Difficulty walking  · Fever of 100.4ºF (38ºC) or higher, or as directed by your healthcare provider  Date Last Reviewed: 7/1/2016 © 2000-2017 [employer]. [address] [place] [id number]. All rights reserved. This information is not intended as a substitute for professional medical care. Always follow your healthcare professional's instructions.        Nonsurgical Treatment of Cervical Spine Problems  Cervical spine problems can often be treated without surgery. Choices include rest, medicines, or injections. Your healthcare provider may also suggest physical therapy or certain exercises. These may all help to relieve your symptoms. These treatments are often successful. But if your symptoms dont subside, talk to your healthcare provider. He or she may tell you that surgery is your best choice.  Relieving your symptoms  Your healthcare provider may recommend:  · Medicines. These help to reduce pain and inflammation.  · Epidural steroid injections. These are injections into  the spinal canal near the spinal cord. They may relieve severe pain and reduce inflammation.  · Restricting aggravating activities. This can help to give your cervical spine time to heal.  · A soft cervical collar. This can help to support your head while keeping your cervical spine aligned.  · Traction. This may help relieve pressure on the irritated nerves.  Restoring mobility and strength  Your healthcare provider may recommend that you work with a physical therapist. This can help you regain mobility and strength in your neck. Physical therapy may last for 4 to 8 weeks. It may include:  · Exercises to improve your necks range of motion and strength.  · Evaluation and correction of posture and body movements. This can correct problems that affect your cervical spine.  · Heat, massage, and traction to help relieve your symptoms.  Self-care  Youll take an active role in your own therapy. To protect your neck from further injury:  · Follow any exercise program given to you by your healthcare provider or physical therapist.  · Practice good posture while sitting, standing, or moving.  · Have your workspace evaluated. Rearrange it if needed.  · When lying down, support your neck. You can use a special cervical pillow or rolled-up towel.   Date Last Reviewed: 10/5/2015  © 1066-6564 [employer]. [address] [place] [address], [place] [address]. All rights reserved. This information is not intended as a substitute for professional medical care. Always follow your healthcare professional's instructions.

## 2020-01-15 ENCOUNTER — OFFICE VISIT (OUTPATIENT)
Dept: NEPHROLOGY | Facility: CLINIC | Age: 80
End: 2020-01-15
Payer: MEDICARE

## 2020-01-15 VITALS
WEIGHT: 182.13 LBS | DIASTOLIC BLOOD PRESSURE: 60 MMHG | BODY MASS INDEX: 29.27 KG/M2 | HEART RATE: 70 BPM | HEIGHT: 66 IN | SYSTOLIC BLOOD PRESSURE: 132 MMHG

## 2020-01-15 DIAGNOSIS — N18.30 CKD (CHRONIC KIDNEY DISEASE) STAGE 3, GFR 30-59 ML/MIN: Primary | ICD-10-CM

## 2020-01-15 DIAGNOSIS — N17.9 AKI (ACUTE KIDNEY INJURY): ICD-10-CM

## 2020-01-15 PROCEDURE — 3075F SYST BP GE 130 - 139MM HG: CPT | Mod: CPTII,S$GLB,, | Performed by: INTERNAL MEDICINE

## 2020-01-15 PROCEDURE — 3075F PR MOST RECENT SYSTOLIC BLOOD PRESS GE 130-139MM HG: ICD-10-PCS | Mod: CPTII,S$GLB,, | Performed by: INTERNAL MEDICINE

## 2020-01-15 PROCEDURE — 99999 PR PBB SHADOW E&M-EST. PATIENT-LVL III: ICD-10-PCS | Mod: PBBFAC,,, | Performed by: INTERNAL MEDICINE

## 2020-01-15 PROCEDURE — 99214 PR OFFICE/OUTPT VISIT, EST, LEVL IV, 30-39 MIN: ICD-10-PCS | Mod: S$GLB,,, | Performed by: INTERNAL MEDICINE

## 2020-01-15 PROCEDURE — 1159F PR MEDICATION LIST DOCUMENTED IN MEDICAL RECORD: ICD-10-PCS | Mod: S$GLB,,, | Performed by: INTERNAL MEDICINE

## 2020-01-15 PROCEDURE — 99499 UNLISTED E&M SERVICE: CPT | Mod: S$GLB,,, | Performed by: INTERNAL MEDICINE

## 2020-01-15 PROCEDURE — 1159F MED LIST DOCD IN RCRD: CPT | Mod: S$GLB,,, | Performed by: INTERNAL MEDICINE

## 2020-01-15 PROCEDURE — 99999 PR PBB SHADOW E&M-EST. PATIENT-LVL III: CPT | Mod: PBBFAC,,, | Performed by: INTERNAL MEDICINE

## 2020-01-15 PROCEDURE — 3078F DIAST BP <80 MM HG: CPT | Mod: CPTII,S$GLB,, | Performed by: INTERNAL MEDICINE

## 2020-01-15 PROCEDURE — 3078F PR MOST RECENT DIASTOLIC BLOOD PRESSURE < 80 MM HG: ICD-10-PCS | Mod: CPTII,S$GLB,, | Performed by: INTERNAL MEDICINE

## 2020-01-15 PROCEDURE — 1101F PR PT FALLS ASSESS DOC 0-1 FALLS W/OUT INJ PAST YR: ICD-10-PCS | Mod: CPTII,S$GLB,, | Performed by: INTERNAL MEDICINE

## 2020-01-15 PROCEDURE — 99214 OFFICE O/P EST MOD 30 MIN: CPT | Mod: S$GLB,,, | Performed by: INTERNAL MEDICINE

## 2020-01-15 PROCEDURE — 1125F PR PAIN SEVERITY QUANTIFIED, PAIN PRESENT: ICD-10-PCS | Mod: S$GLB,,, | Performed by: INTERNAL MEDICINE

## 2020-01-15 PROCEDURE — 1101F PT FALLS ASSESS-DOCD LE1/YR: CPT | Mod: CPTII,S$GLB,, | Performed by: INTERNAL MEDICINE

## 2020-01-15 PROCEDURE — 1125F AMNT PAIN NOTED PAIN PRSNT: CPT | Mod: S$GLB,,, | Performed by: INTERNAL MEDICINE

## 2020-01-15 PROCEDURE — 99499 RISK ADDL DX/OHS AUDIT: ICD-10-PCS | Mod: S$GLB,,, | Performed by: INTERNAL MEDICINE

## 2020-01-15 NOTE — PROGRESS NOTES
PROGRESS NOTE FOR ESTABLISHED PATIENT    PHYSICIAN REQUESTING THE CONSULT: Dr. Penny Randolph    REASON FOR VISIT: Renal insufficiency    79 y.o. female with history of CKD 3, DM2, HTN, diabetic retinopathy, diastolic CHF, CVA, GERD presents to the renal clinic for evaluation of renal insufficiency.       August 19, 2019:  Patient has no complaints today. She reports that she stopped her losartan because her pharmacy reported contamination issues with her batch of Losartan.     January 15, 2020:  Patient presents without any complaints/issues. Creatinine has slightly increased to 1.6 (from 1.3).           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 Taveras    COLONOSCOPY      COLONOSCOPY N/A 9/22/2016    Procedure: COLONOSCOPY;  Surgeon: Jd Ashton MD;  Location: [address]);  Service: Endoscopy;  Laterality: N/A;  OK per Dr Randolph for pt to hold Plavix 5 days prior to procedure/see telephone encounter dated 8/29/16/svn    EYE SURGERY Bilateral 2002 approx    Laser for glaucoma    HYSTERECTOMY  1963     AMEENA/USO- fibroids; no cancer    OOPHORECTOMY  1963    unknown, only removed one       Review of patient's allergies indicates:   Allergen Reactions    Pravachol [pravastatin] Nausea Only       Current Outpatient Medications   Medication Sig  Dispense Refill    acarbose (PRECOSE) 25 MG Tab Take 1 tablet (25 mg total) by mouth 3 (three) times daily with meals. 90 tablet 11    albuterol (PROVENTIL/VENTOLIN HFA) 90 mcg/actuation inhaler       ALPRAZolam (XANAX) 0.5 MG tablet TAKE 1 TABLET BY MOUTH NIGHTLY AS NEEDED FOR ANXIETY (MAY TAKE 1-2 TIMES WEEKLY FOR ANXIETY) 30 tablet 0    amLODIPine (NORVASC) 5 MG tablet TAKE 1 TABLET BY MOUTH EVERY DAY 90 tablet 3    atorvastatin (LIPITOR) 80 MG tablet Take 1 tablet (80 mg total) by mouth once daily. 90 tablet 3    blood sugar diagnostic Strp 1 strip by Misc.(Non-Drug; Combo Route) route 2 (two) times daily. Insurance preferred test stripes DX:E11.22 100 strip 11    blood-glucose meter kit Insurance preferred meter dx:E11.22 1 each 0    brimonidine 0.2% (ALPHAGAN) 0.2 % Drop Place 1 drop into both eyes 3 (three) times daily. 10 mL 11    candesartan (ATACAND) 4 MG tablet Take 1 tablet (4 mg total) by mouth once daily. 30 tablet 6    cholecalciferol, vitamin D3, (VITAMIN D3) 1,000 unit capsule Take 1 capsule (1,000 Units total) by mouth once daily. 30 capsule 12    ferrous sulfate (FEOSOL) 325 mg (65 mg iron) Tab tablet Take 1 tablet (325 mg total) by mouth 2 (two) times daily. 180 tablet 3    flecainide (TAMBOCOR) 50 MG Tab Take 50 mg by mouth 2 (two) times daily.  11    glimepiride (AMARYL) 4 MG tablet TAKE 1 TABLET BY MOUTH IN THE MORNING WITH BREAKFAST 90 tablet 3    hydroCHLOROthiazide (HYDRODIURIL) 12.5 MG Tab TAKE 1 TABLET (12.5 MG TOTAL) BY MOUTH ONCE DAILY. 90 tablet 3    lancets (ACCU-CHEK SOFTCLIX LANCETS) Misc 100 lancets by Misc.(Non-Drug; Combo Route) route 2 (two) times daily. Insurance preferred lancets Dx:E11.22 100 each 11    levalbuterol (XOPENEX HFA) 45 mcg/actuation inhaler Inhale 1-2 puffs into the lungs every 6 (six) hours as needed for Wheezing. Rescue 1 Inhaler 12    meclizine (ANTIVERT) 25 mg tablet       metFORMIN (GLUCOPHAGE-XR) 500 MG 24 hr tablet TAKE 2 TABLETS BY  MOUTH EVERY  tablet 0    methocarbamol (ROBAXIN) 500 MG Tab Take 500 mg by mouth 2 (two) times daily.      metoprolol succinate (TOPROL-XL) 50 MG 24 hr tablet Take 1 tablet by mouth once daily.      mupirocin (BACTROBAN) 2 % ointment Apply topically 3 (three) times daily. 22 g 0    ranitidine (ZANTAC) 150 MG tablet Take 1 tablet (150 mg total) by mouth 2 (two) times daily. 60 tablet 11    rivaroxaban (XARELTO) 20 mg Tab Take 20 mg by mouth once daily.       No current facility-administered medications for this visit.        Family History   Problem Relation Age of Onset    Stroke Mother     Mental illness Mother     Hypertension Mother     Stroke Father     Diabetes Maternal Grandmother     Drug abuse Daughter     Cancer Sister 68        gyn    Ovarian cancer Sister     Cancer Maternal Uncle         type not known    Heart disease Paternal Grandmother     Glaucoma Neg Hx     Macular degeneration Neg Hx     Alcohol abuse Neg Hx     COPD Neg Hx     Asthma Neg Hx     Amblyopia Neg Hx     Blindness Neg Hx     Cataracts Neg Hx     Retinal detachment Neg Hx     Strabismus Neg Hx        Social History     Socioeconomic History    Marital status:      Spouse name: Not on file    Number of children: Not on file    Years of education: Not on file    Highest education level: Not on file   Occupational History    Not on file   Social Needs    Financial resource strain: Not on file    Food insecurity:     Worry: Not on file     Inability: Not on file    Transportation needs:     Medical: Not on file     Non-medical: Not on file   Tobacco Use    Smoking status: Never Smoker    Smokeless tobacco: Never Used   Substance and Sexual Activity    Alcohol use: No    Drug use: No    Sexual activity: Never     Partners: Male   Lifestyle    Physical activity:     Days per week: Not on file     Minutes per session: Not on file    Stress: Not on file   Relationships    Social  "connections:     Talks on phone: Not on file     Gets together: Not on file     Attends Congregation service: Not on file     Active member of club or organization: Not on file     Attends meetings of clubs or organizations: Not on file     Relationship status: Not on file   Other Topics Concern    Not on file   Social History Narrative    , no pets or smokers in household. 1 Child who lives in nursing home. She has a grandson who lives in [place].. Retired from [employer] . Still drives. Does not have a Living Will or advanced directive.        Review of Systems:  1. GENERAL: patient denies any fever, weight changes, generalized weakness, dizziness.  2. HEENT: patient denies headaches, visual disturbances, swallowing problems, sinus pain, nasal congestion.  3. CARDIOVASCULAR: patient denies chest pain, palpitations.  4. PULMONARY: patient denies SOB, coughing, hemoptysis, wheezing.  5. GASTROINTESTINAL: patient denies abdominal pain, nausea, vomiting, diarrhea.  6. GENITOURINARY: patient denies dysuria, hematuria, hesitancy, frequency.  7. EXTREMITIES: patient denies LE edema, LE cramping.  8. DERMATOLOGY: patient denies rashes, ulcers.  9. NEURO: patient denies tremors, extremity weakness, extremity numbness/tingling.  10. MUSCULOSKELETAL: patient denies joint pain, joint swelling.  11. HEMATOLOGY: patient denies rectal or gum bleeding.  12: PSYCH: patient denies anxiety, depression.      PHYSICAL EXAM:  /60   Pulse 70   Ht 5' 5.5" (1.664 m)   Wt 82.6 kg (182 lb 1.6 oz)   BMI 29.84 kg/m²     GENERAL: Pleasant well groomed lady presents to clinic with non-labored breathing.  HEENT: PER, no nasal discharge, no icterus, no oral exudates, moist mucosal membranes.  NECK: no thyroid mass, no lymphadenopathy.  HEART: RRR S1/S2, no rubs, good peripheral pulses.  LUNGS: CTA bilaterally, no wheezing, breathing is nonlabored.  ABDOMEN: soft, nontender, not distended, bowel sounds are present, no abdominal " hernia.  EXTREM: no LE edema.  SKIN: no rashes, skin is warm and dry.  NEURO: A & O x 3, no obvious focal signs.    LABORATORY RESULTS:    Lab Results   Component Value Date    CREATININE 1.6 (H) 12/23/2019    BUN 17 12/23/2019     12/23/2019    K 4.5 12/23/2019     12/23/2019    CO2 26 12/23/2019      Lab Results   Component Value Date    CALCIUM 9.4 12/23/2019    PHOS 3.1 12/23/2019     Lab Results   Component Value Date    ALBUMIN 3.8 12/23/2019     Lab Results   Component Value Date    WBC 6.45 12/23/2019    HGB 11.1 (L) 12/23/2019    HCT 33.7 (L) 12/23/2019    MCV 88 12/23/2019     12/23/2019     Protein Creatinine Ratios:    Creatinine, Random Ur   Date Value Ref Range Status   05/13/2019 50.0 15.0 - 325.0 mg/dL Final     Comment:     The random urine reference ranges provided were established   for 24 hour urine collections.  No reference ranges exist for  random urine specimens.  Correlate clinically.     02/20/2018 37.0 15.0 - 325.0 mg/dL Final     Comment:     The random urine reference ranges provided were established   for 24 hour urine collections.  No reference ranges exist for  random urine specimens.  Correlate clinically.     10/19/2017 59.0 15.0 - 325.0 mg/dL Final     Comment:     The random urine reference ranges provided were established   for 24 hour urine collections.  No reference ranges exist for  random urine specimens.  Correlate clinically.       Protein, Urine Random   Date Value Ref Range Status   02/20/2018 24 (H) 0 - 15 mg/dL Final     Comment:     The random urine reference ranges provided were established   for 24 hour urine collections.  No reference ranges exist for  random urine specimens.  Correlate clinically.     10/19/2017 45 (H) 0 - 15 mg/dL Final     Comment:     The random urine reference ranges provided were established   for 24 hour urine collections.  No reference ranges exist for  random urine specimens.  Correlate clinically.     10/26/2016 102 (H)  0 - 15 mg/dL Final     Comment:     The random urine reference ranges provided were established   for 24 hour urine collections.  No reference ranges exist for  random urine specimens.  Correlate clinically.       Prot/Creat Ratio, Ur   Date Value Ref Range Status   02/20/2018 0.65 (H) 0.00 - 0.20 Final   10/19/2017 0.76 (H) 0.00 - 0.20 Final   10/26/2016 2.17 (H) 0.00 - 0.20 Final           ASSESSMENT AND PLAN:  79 y.o. female with history of CKD 3, DM2, HTN, diabetic retinopathy, diastolic CHF, CVA, GERD presents to the renal clinic for evaluation of renal insufficiency.     1. Renal insufficiency: Patient presents with mild renal insufficiency, consistent with CKD stage 3. Creatinine has increased to 1.6 (from 1.3). This is likely related to poor hydration and patient was advised to hydrate with 60-80 ounces of water per day. Likely cause of her baseline renal insufficiency is her long-standing DM2 given her proteinuria. Patient is on Candesartan 4 mg po daily for proteinuria. Patient's renal function will be monitored closely and she will return to the clinic in 3 months for follow up. Patient was advised to avoid NSAID pain medications such as advil, aleve, motrin, ibuprofen, naprosyn, meloxicam, diclofenac, mobic.     2. Electrolytes: at goal.     3. Acid base status: borderline acidosis has resolved.     4. Volume: Euvolemic.     5. Hypertension: Good BP control.     6. Medications: Reviewed. Agree with current medical regimen.     7. Proteinuria: continue candesartan 4 mg po daily.     8. Anemia: monitor for now.     9. DM2: blood glucose control has somewhat worsened with HgA1c at 7.9 (8/13/19). Continue metformin for now.

## 2020-01-23 ENCOUNTER — OFFICE VISIT (OUTPATIENT)
Dept: PODIATRY | Facility: CLINIC | Age: 80
End: 2020-01-23
Payer: MEDICARE

## 2020-01-23 VITALS
SYSTOLIC BLOOD PRESSURE: 155 MMHG | BODY MASS INDEX: 29.27 KG/M2 | DIASTOLIC BLOOD PRESSURE: 70 MMHG | HEIGHT: 66 IN | WEIGHT: 182.13 LBS | HEART RATE: 61 BPM

## 2020-01-23 DIAGNOSIS — L84 CORN OR CALLUS: ICD-10-CM

## 2020-01-23 DIAGNOSIS — B35.1 ONYCHOMYCOSIS DUE TO DERMATOPHYTE: ICD-10-CM

## 2020-01-23 DIAGNOSIS — E11.49 TYPE II DIABETES MELLITUS WITH NEUROLOGICAL MANIFESTATIONS: Primary | ICD-10-CM

## 2020-01-23 PROCEDURE — 11721 PR DEBRIDEMENT OF NAILS, 6 OR MORE: ICD-10-PCS | Mod: 59,Q9,S$GLB, | Performed by: PODIATRIST

## 2020-01-23 PROCEDURE — 99999 PR PBB SHADOW E&M-EST. PATIENT-LVL III: CPT | Mod: PBBFAC,,, | Performed by: PODIATRIST

## 2020-01-23 PROCEDURE — 11056 PARNG/CUTG B9 HYPRKR LES 2-4: CPT | Mod: Q9,S$GLB,, | Performed by: PODIATRIST

## 2020-01-23 PROCEDURE — 11721 DEBRIDE NAIL 6 OR MORE: CPT | Mod: 59,Q9,S$GLB, | Performed by: PODIATRIST

## 2020-01-23 PROCEDURE — 11056 PR TRIM BENIGN HYPERKERATOTIC SKIN LESION,2-4: ICD-10-PCS | Mod: Q9,S$GLB,, | Performed by: PODIATRIST

## 2020-01-23 PROCEDURE — 99499 UNLISTED E&M SERVICE: CPT | Mod: S$GLB,,, | Performed by: PODIATRIST

## 2020-01-23 PROCEDURE — 99999 PR PBB SHADOW E&M-EST. PATIENT-LVL III: ICD-10-PCS | Mod: PBBFAC,,, | Performed by: PODIATRIST

## 2020-01-23 PROCEDURE — 99499 NO LOS: ICD-10-PCS | Mod: S$GLB,,, | Performed by: PODIATRIST

## 2020-01-23 NOTE — PROGRESS NOTES
Subjective:     Patient ID: Saul Mar is a 79 y.o. female.    Chief Complaint: Nail Care ([facility]. Diabetic Pt. Wears casual shoes. c/o no pain. PCP DR Randolph, last visit 1/15/2020)    Saul is a 79 y.o. female who presents to the clinic upon referral from Dr. Shi ref. provider found  for evaluation and treatment of diabetic feet. 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 Patient complaints is right ingrown (medial) toenail check and nail care. Patient states she did not hear from [facility] about her diabetic shoes.      PCP: Penny Randolph MD    Date Last Seen by PCP: 06/04/19    Current shoe gear: Tennis shoes    Hemoglobin A1C   Date Value Ref Range Status   08/13/2019 7.9 (H) 4.0 - 5.6 % Final     Comment:     ADA Screening Guidelines:  5.7-6.4%  Consistent with prediabetes  >or=6.5%  Consistent with diabetes  High levels of fetal hemoglobin interfere with the HbA1C  assay. Heterozygous hemoglobin variants (HbS, HgC, etc)do  not significantly interfere with this assay.   However, presence of multiple variants may affect accuracy.     05/13/2019 7.7 (H) 4.0 - 5.6 % Final     Comment:     ADA Screening Guidelines:  5.7-6.4%  Consistent with prediabetes  >or=6.5%  Consistent with diabetes  High levels of fetal hemoglobin interfere with the HbA1C  assay. Heterozygous hemoglobin variants (HbS, HgC, etc)do  not significantly interfere with this assay.   However, presence of multiple variants may affect accuracy.     01/22/2019 7.6 (H) 4.0 - 5.6 %  Final     Comment:     ADA Screening Guidelines:  5.7-6.4%  Consistent with prediabetes  >or=6.5%  Consistent with diabetes  High levels of fetal hemoglobin interfere with the HbA1C  assay. Heterozygous hemoglobin variants (HbS, HgC, etc)do  not significantly interfere with this assay.   However, presence of multiple variants may affect accuracy.                  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 1999 elsewhere; TIA 10/13    Left-sided carotid artery disease    Vitamin D insufficiency    Left ventricular diastolic dysfunction with preserved systolic function    Hypertension, essential    Gastroesophageal reflux disease without esophagitis    Type 2 diabetes, uncontrolled, with background retinopathy with macular edema    Overweight (BMI 25.0-29.9)    Diabetes mellitus with proteinuria    Type II diabetes mellitus with neurological manifestations    Aortic arch atherosclerosis    Abnormal ankle brachial index    Diabetes mellitus with stage 3 chronic kidney disease    Cerebrovascular accident (CVA) due to thrombosis of precerebral artery    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     ALBUTEROL (PROVENTIL/VENTOLIN HFA) 90 MCG/ACTUATION INHALER        ALPRAZOLAM (XANAX) 0.5 MG TABLET    TAKE 1 TABLET BY MOUTH NIGHTLY AS NEEDED FOR ANXIETY (MAY TAKE 1-2 TIMES WEEKLY FOR ANXIETY)    AMLODIPINE (NORVASC) 5 MG TABLET    TAKE 1 TABLET BY MOUTH EVERY DAY    ATORVASTATIN (LIPITOR) 80 MG TABLET    Take 1 tablet (80 mg total) by mouth once daily.    BLOOD SUGAR DIAGNOSTIC STRP    1 strip by Misc.(Non-Drug; Combo Route) route 2 (two) times daily. Insurance preferred test stripes DX:E11.22    BLOOD-GLUCOSE METER KIT    Insurance preferred meter dx:E11.22    BRIMONIDINE 0.2% (ALPHAGAN) 0.2 % DROP    Place 1 drop into both eyes 3 (three) times daily.    CANDESARTAN (ATACAND) 4 MG TABLET    Take 1 tablet (4 mg total) by mouth once daily.    CHOLECALCIFEROL, VITAMIN D3, (VITAMIN D3) 1,000 UNIT CAPSULE    Take 1 capsule (1,000 Units total) by mouth once daily.    FERROUS SULFATE (FEOSOL) 325 MG (65 MG IRON) TAB TABLET    Take 1 tablet (325 mg total) by mouth 2 (two) times daily.    FLECAINIDE (TAMBOCOR) 50 MG TAB    Take 50 mg by mouth 2 (two) times daily.    GLIMEPIRIDE (AMARYL) 4 MG TABLET    TAKE 1 TABLET BY MOUTH IN THE MORNING WITH BREAKFAST    HYDROCHLOROTHIAZIDE (HYDRODIURIL) 12.5 MG TAB    TAKE 1 TABLET (12.5 MG TOTAL) BY MOUTH ONCE DAILY.    LANCETS (ACCU-CHEK SOFTCLIX LANCETS) MISC    100 lancets by Misc.(Non-Drug; Combo Route) route 2 (two) times daily. Insurance preferred lancets Dx:E11.22    LEVALBUTEROL (XOPENEX HFA) 45 MCG/ACTUATION INHALER    Inhale 1-2 puffs into the lungs every 6 (six) hours as needed for Wheezing. Rescue    MECLIZINE (ANTIVERT) 25 MG TABLET        METFORMIN (GLUCOPHAGE-XR) 500 MG 24 HR TABLET    TAKE 2 TABLETS BY MOUTH EVERY DAY    METHOCARBAMOL (ROBAXIN) 500 MG TAB    Take 500 mg by mouth 2 (two) times daily.    METOPROLOL SUCCINATE (TOPROL-XL) 50 MG 24 HR TABLET    Take 1 tablet by mouth once daily.    MUPIROCIN (BACTROBAN) 2 % OINTMENT    Apply topically 3 (three) times  daily.    RANITIDINE (ZANTAC) 150 MG TABLET    Take 1 tablet (150 mg total) by mouth 2 (two) times daily.    RIVAROXABAN (XARELTO) 20 MG TAB    Take 20 mg by mouth once daily.       Review of patient's allergies indicates:   Allergen Reactions    Pravachol [pravastatin] Nausea Only       Past Surgical History:   Procedure Laterality Date    CATARACT EXTRACTION W/  INTRAOCULAR LENS IMPLANT Left 02/27/2013    Dr. Taveras    COLONOSCOPY      COLONOSCOPY N/A 9/22/2016    Procedure: COLONOSCOPY;  Surgeon: Jd Ashton MD;  Location: [facility] ([address] [place] [address]);  Service: Endoscopy;  Laterality: N/A;  OK per Dr Randolph for pt to hold Plavix 5 days prior to procedure/see telephone encounter dated 8/29/16/svn    EYE SURGERY Bilateral 2002 approx    Laser for glaucoma    HYSTERECTOMY  1963     AMEENA/USO- fibroids; no cancer    OOPHORECTOMY  1963    unknown, only removed one       Family History   Problem Relation Age of Onset    Stroke Mother     Mental illness Mother     Hypertension Mother     Stroke Father     Diabetes Maternal Grandmother     Drug abuse Daughter     Cancer Sister 68        gyn    Ovarian cancer Sister     Cancer Maternal Uncle         type not known    Heart disease Paternal Grandmother     Glaucoma Neg Hx     Macular degeneration Neg Hx     Alcohol abuse Neg Hx     COPD Neg Hx     Asthma Neg Hx     Amblyopia Neg Hx     Blindness Neg Hx     Cataracts Neg Hx     Retinal detachment Neg Hx     Strabismus Neg Hx        Social History     Socioeconomic History    Marital status:      Spouse name: Not on file    Number of children: Not on file    Years of education: Not on file    Highest education level: Not on file   Occupational History    Not on file   Social Needs    Financial resource strain: Not on file    Food insecurity:     Worry: Not on file     Inability: Not on file    Transportation needs:     Medical: Not on file     Non-medical: Not on file   Tobacco Use  "   Smoking status: Never Smoker    Smokeless tobacco: Never Used   Substance and Sexual Activity    Alcohol use: No    Drug use: No    Sexual activity: Never     Partners: Male   Lifestyle    Physical activity:     Days per week: Not on file     Minutes per session: Not on file    Stress: Not on file   Relationships    Social connections:     Talks on phone: Not on file     Gets together: Not on file     Attends Temple service: Not on file     Active member of club or organization: Not on file     Attends meetings of clubs or organizations: Not on file     Relationship status: Not on file   Other Topics Concern    Not on file   Social History Narrative    , no pets or smokers in household. 1 Child who lives in nursing home. She has a grandson who lives in [place].. Retired from [employer] . Still drives. Does not have a Living Will or advanced directive.        Vitals:    01/23/20 1116   BP: (!) 155/70   Pulse: 61   Weight: 82.6 kg (182 lb 1.6 oz)   Height: 5' 5.5" (1.664 m)   PainSc: 0-No pain       Hemoglobin A1C   Date Value Ref Range Status   08/13/2019 7.9 (H) 4.0 - 5.6 % Final     Comment:     ADA Screening Guidelines:  5.7-6.4%  Consistent with prediabetes  >or=6.5%  Consistent with diabetes  High levels of fetal hemoglobin interfere with the HbA1C  assay. Heterozygous hemoglobin variants (HbS, HgC, etc)do  not significantly interfere with this assay.   However, presence of multiple variants may affect accuracy.     05/13/2019 7.7 (H) 4.0 - 5.6 % Final     Comment:     ADA Screening Guidelines:  5.7-6.4%  Consistent with prediabetes  >or=6.5%  Consistent with diabetes  High levels of fetal hemoglobin interfere with the HbA1C  assay. Heterozygous hemoglobin variants (HbS, HgC, etc)do  not significantly interfere with this assay.   However, presence of multiple variants may affect accuracy.     01/22/2019 7.6 (H) 4.0 - 5.6 % Final     Comment:     ADA Screening Guidelines:  5.7-6.4%  " Consistent with prediabetes  >or=6.5%  Consistent with diabetes  High levels of fetal hemoglobin interfere with the HbA1C  assay. Heterozygous hemoglobin variants (HbS, HgC, etc)do  not significantly interfere with this assay.   However, presence of multiple variants may affect accuracy.         Review of Systems   Constitutional: Negative for chills and fever.   Respiratory: Negative for shortness of breath.    Cardiovascular: Negative for chest pain, palpitations, orthopnea, claudication and leg swelling.   Gastrointestinal: Negative for diarrhea, nausea and vomiting.   Musculoskeletal: Negative for joint pain.   Skin: Negative for rash.   Neurological: Positive for sensory change. Negative for dizziness, tingling, focal weakness and weakness.   Psychiatric/Behavioral: Negative.              Objective:   PHYSICAL EXAM: Apperance: Alert and orient in no distress,well developed, and with good attention to grooming and body habits  Patient presents ambulating in tennis shoes.  LOWER EXTREMITY EXAM:  VASCULAR: Dorsalis pedis pulses 0/4 left 1/4 right and Posterior Tibial pulses 0/4 left and 1/4 right. Capillary fill time <4 seconds bilateral. Mild edema observed bilateral. Varicosities present bilateral. Skin temperature of the lower extremities is warm to cool, proximal to distal. Hair growth absent bilateral.  DERMATOLOGICAL: No skin rashes, subcutaneous nodules, lesions, or ulcers observed bilateral. Nails 1,2,3,4,5, bilateral elongated, thickened, and discolored with subungual debris. Webspaces 1,2,3,4 bilateral clean, dry and without evidence of break in skin integrity. Mild hyperkeratotic lesions noted to bilateral 5th plantar submetatarsal.    NEUROLOGICAL: Light touch, sharp-dull, proprioception all present and equal bilaterally.  Vibratory sensation diminished at bilateral hallux. Protective sensation absent at toe sites as tested with a [place] 5.07 monofilament.   MUSCULOSKELETAL: Muscle strength  is 5/5 for foot inverters, everters, plantarflexors, and dorsiflexors. Muscle tone is normal.     Assessment:   Type II diabetes mellitus with neurological manifestations    Onychomycosis due to dermatophyte    Corn or callus          Plan:   Type II diabetes mellitus with neurological manifestations    Onychomycosis due to dermatophyte    Corn or callus      I counseled the patient on her conditions, regarding findings of my examination, my impressions, and usual treatment plan.   Greater than 15 minutes of a 20 minute appointment spent on education about the diabetic foot, neuropathy, and prevention of limb loss.  Shoe inspection. Diabetic Foot Education. Patient reminded of the importance of good nutrition and blood sugar control to help prevent podiatric complications of diabetes. Patient instructed on proper foot hygeine. We discussed wearing proper shoe gear, daily foot inspections, never walking without protective shoe gear, never putting sharp instruments to feet.    With patient's permission, nails 1-5 bilateral were debrided/trimmed in length and thickness aggressively to their soft tissue attachment mechanically and with electric , removing all offending nail and debris. Patient relates relief following the procedure.  With patient's permission, bilateral callus trimmed in thickness with #15 blade in thickness without incident.   Patient  will continue to monitor the areas daily, inspect feet, wear protective shoe gear when ambulatory, moisturizer to maintain skin integrity. Patient reminded of the importance of good nutrition and blood sugar control to help prevent podiatric complications of diabetes.  Patient to return 3 months or sooner if needed.                 Zuleima Howell DPM  [facility]

## 2020-02-17 RX ORDER — FERROUS SULFATE 325(65) MG
TABLET ORAL
Qty: 180 TABLET | Refills: 3 | Status: SHIPPED | OUTPATIENT
Start: 2020-02-17 | End: 2020-02-19 | Stop reason: SDUPTHER

## 2020-02-19 DIAGNOSIS — R80.9 PROTEINURIA, UNSPECIFIED TYPE: ICD-10-CM

## 2020-02-19 RX ORDER — FERROUS SULFATE 325(65) MG
TABLET ORAL
Qty: 180 TABLET | Refills: 3 | Status: SHIPPED | OUTPATIENT
Start: 2020-02-19 | End: 2020-03-31 | Stop reason: SDUPTHER

## 2020-02-19 RX ORDER — CANDESARTAN 4 MG/1
TABLET ORAL
Qty: 90 TABLET | Refills: 2 | Status: SHIPPED | OUTPATIENT
Start: 2020-02-19 | End: 2020-02-27

## 2020-02-26 DIAGNOSIS — I10 ESSENTIAL HYPERTENSION: ICD-10-CM

## 2020-02-27 RX ORDER — LOSARTAN POTASSIUM 50 MG/1
TABLET ORAL
Qty: 180 TABLET | Refills: 2 | Status: SHIPPED | OUTPATIENT
Start: 2020-02-27 | End: 2020-05-08

## 2020-03-06 DIAGNOSIS — I10 ESSENTIAL HYPERTENSION: ICD-10-CM

## 2020-03-06 RX ORDER — AMLODIPINE BESYLATE 5 MG/1
5 TABLET ORAL DAILY
Qty: 90 TABLET | Refills: 3 | Status: SHIPPED | OUTPATIENT
Start: 2020-03-06 | End: 2020-03-31 | Stop reason: SDUPTHER

## 2020-03-11 ENCOUNTER — TELEPHONE (OUTPATIENT)
Dept: INTERNAL MEDICINE | Facility: CLINIC | Age: 80
End: 2020-03-11

## 2020-03-11 DIAGNOSIS — N18.30 CKD (CHRONIC KIDNEY DISEASE) STAGE 3, GFR 30-59 ML/MIN: Chronic | ICD-10-CM

## 2020-03-11 RX ORDER — HYDROCODONE BITARTRATE AND ACETAMINOPHEN 5; 325 MG/1; MG/1
TABLET ORAL
COMMUNITY
Start: 2020-01-03 | End: 2023-05-16

## 2020-03-11 RX ORDER — ALBUTEROL SULFATE 90 UG/1
2 AEROSOL, METERED RESPIRATORY (INHALATION) EVERY 6 HOURS PRN
Qty: 18 G | Refills: 12 | Status: SHIPPED | OUTPATIENT
Start: 2020-03-11 | End: 2021-04-22

## 2020-03-11 NOTE — TELEPHONE ENCOUNTER
Great, done today thanks    Please call her to have labs done, orders in, please let me know when scheduled.  Thank you

## 2020-03-11 NOTE — TELEPHONE ENCOUNTER
Received a letter from [employer] advised that her albuterol inhaler is not covered. The preferred inhaler is ProAir. Please send new rx to pharmacy.

## 2020-03-12 ENCOUNTER — LAB VISIT (OUTPATIENT)
Dept: LAB | Facility: HOSPITAL | Age: 80
End: 2020-03-12
Payer: MEDICARE

## 2020-03-12 DIAGNOSIS — N18.30 CKD (CHRONIC KIDNEY DISEASE) STAGE 3, GFR 30-59 ML/MIN: Chronic | ICD-10-CM

## 2020-03-12 LAB
ALBUMIN SERPL BCP-MCNC: 3.6 G/DL (ref 3.5–5.2)
ALP SERPL-CCNC: 83 U/L (ref 55–135)
ALT SERPL W/O P-5'-P-CCNC: 27 U/L (ref 10–44)
ANION GAP SERPL CALC-SCNC: 7 MMOL/L (ref 8–16)
AST SERPL-CCNC: 29 U/L (ref 10–40)
BASOPHILS # BLD AUTO: 0.05 K/UL (ref 0–0.2)
BASOPHILS NFR BLD: 0.8 % (ref 0–1.9)
BILIRUB SERPL-MCNC: 0.4 MG/DL (ref 0.1–1)
BUN SERPL-MCNC: 17 MG/DL (ref 8–23)
CALCIUM SERPL-MCNC: 9.1 MG/DL (ref 8.7–10.5)
CHLORIDE SERPL-SCNC: 107 MMOL/L (ref 95–110)
CHOLEST SERPL-MCNC: 150 MG/DL (ref 120–199)
CHOLEST/HDLC SERPL: 2.8 {RATIO} (ref 2–5)
CO2 SERPL-SCNC: 28 MMOL/L (ref 23–29)
CREAT SERPL-MCNC: 1.3 MG/DL (ref 0.5–1.4)
DIFFERENTIAL METHOD: ABNORMAL
EOSINOPHIL # BLD AUTO: 0.3 K/UL (ref 0–0.5)
EOSINOPHIL NFR BLD: 5.5 % (ref 0–8)
ERYTHROCYTE [DISTWIDTH] IN BLOOD BY AUTOMATED COUNT: 14.5 % (ref 11.5–14.5)
EST. GFR  (AFRICAN AMERICAN): 45.4 ML/MIN/1.73 M^2
EST. GFR  (NON AFRICAN AMERICAN): 39.4 ML/MIN/1.73 M^2
ESTIMATED AVG GLUCOSE: 169 MG/DL (ref 68–131)
GLUCOSE SERPL-MCNC: 108 MG/DL (ref 70–110)
HBA1C MFR BLD HPLC: 7.5 % (ref 4–5.6)
HCT VFR BLD AUTO: 34.9 % (ref 37–48.5)
HDLC SERPL-MCNC: 53 MG/DL (ref 40–75)
HDLC SERPL: 35.3 % (ref 20–50)
HGB BLD-MCNC: 10.6 G/DL (ref 12–16)
IMM GRANULOCYTES # BLD AUTO: 0.02 K/UL (ref 0–0.04)
IMM GRANULOCYTES NFR BLD AUTO: 0.3 % (ref 0–0.5)
LDLC SERPL CALC-MCNC: 87.6 MG/DL (ref 63–159)
LYMPHOCYTES # BLD AUTO: 1.9 K/UL (ref 1–4.8)
LYMPHOCYTES NFR BLD: 32.3 % (ref 18–48)
MCH RBC QN AUTO: 28.1 PG (ref 27–31)
MCHC RBC AUTO-ENTMCNC: 30.4 G/DL (ref 32–36)
MCV RBC AUTO: 93 FL (ref 82–98)
MONOCYTES # BLD AUTO: 0.5 K/UL (ref 0.3–1)
MONOCYTES NFR BLD: 8.7 % (ref 4–15)
NEUTROPHILS # BLD AUTO: 3.1 K/UL (ref 1.8–7.7)
NEUTROPHILS NFR BLD: 52.4 % (ref 38–73)
NONHDLC SERPL-MCNC: 97 MG/DL
NRBC BLD-RTO: 0 /100 WBC
PLATELET # BLD AUTO: 204 K/UL (ref 150–350)
PMV BLD AUTO: 11.8 FL (ref 9.2–12.9)
POTASSIUM SERPL-SCNC: 5 MMOL/L (ref 3.5–5.1)
PROT SERPL-MCNC: 6.8 G/DL (ref 6–8.4)
RBC # BLD AUTO: 3.77 M/UL (ref 4–5.4)
SODIUM SERPL-SCNC: 142 MMOL/L (ref 136–145)
TRIGL SERPL-MCNC: 47 MG/DL (ref 30–150)
WBC # BLD AUTO: 5.98 K/UL (ref 3.9–12.7)

## 2020-03-12 PROCEDURE — 85025 COMPLETE CBC W/AUTO DIFF WBC: CPT

## 2020-03-12 PROCEDURE — 80053 COMPREHEN METABOLIC PANEL: CPT

## 2020-03-12 PROCEDURE — 80061 LIPID PANEL: CPT

## 2020-03-12 PROCEDURE — 83036 HEMOGLOBIN GLYCOSYLATED A1C: CPT

## 2020-03-12 PROCEDURE — 36415 COLL VENOUS BLD VENIPUNCTURE: CPT

## 2020-03-14 ENCOUNTER — PATIENT MESSAGE (OUTPATIENT)
Dept: INTERNAL MEDICINE | Facility: CLINIC | Age: 80
End: 2020-03-14

## 2020-03-14 PROBLEM — E11.69 HYPERLIPIDEMIA ASSOCIATED WITH TYPE 2 DIABETES MELLITUS: Status: ACTIVE | Noted: 2018-11-11

## 2020-03-14 PROBLEM — E78.5 HYPERLIPIDEMIA ASSOCIATED WITH TYPE 2 DIABETES MELLITUS: Status: ACTIVE | Noted: 2018-11-11

## 2020-03-31 DIAGNOSIS — E78.5 HYPERLIPIDEMIA, UNSPECIFIED HYPERLIPIDEMIA TYPE: ICD-10-CM

## 2020-03-31 DIAGNOSIS — E11.22 DIABETES MELLITUS WITH STAGE 3 CHRONIC KIDNEY DISEASE: Chronic | ICD-10-CM

## 2020-03-31 DIAGNOSIS — I10 ESSENTIAL HYPERTENSION: ICD-10-CM

## 2020-03-31 DIAGNOSIS — N18.30 DIABETES MELLITUS WITH STAGE 3 CHRONIC KIDNEY DISEASE: Chronic | ICD-10-CM

## 2020-03-31 RX ORDER — ALPRAZOLAM 0.5 MG/1
TABLET ORAL
Qty: 30 TABLET | Refills: 0 | Status: SHIPPED | OUTPATIENT
Start: 2020-03-31 | End: 2020-06-29

## 2020-03-31 RX ORDER — FLECAINIDE ACETATE 50 MG/1
50 TABLET ORAL 2 TIMES DAILY
Qty: 90 TABLET | Refills: 1 | Status: CANCELLED | OUTPATIENT
Start: 2020-03-31

## 2020-03-31 RX ORDER — FLECAINIDE ACETATE 50 MG/1
50 TABLET ORAL 2 TIMES DAILY
Qty: 60 TABLET | Refills: 3 | Status: SHIPPED | OUTPATIENT
Start: 2020-03-31 | End: 2020-10-01

## 2020-03-31 RX ORDER — CANDESARTAN 4 MG/1
4 TABLET ORAL DAILY
Qty: 90 TABLET | Refills: 3 | Status: SHIPPED | OUTPATIENT
Start: 2020-03-31 | End: 2020-05-08

## 2020-03-31 RX ORDER — AMLODIPINE BESYLATE 5 MG/1
5 TABLET ORAL DAILY
Qty: 90 TABLET | Refills: 3 | Status: SHIPPED | OUTPATIENT
Start: 2020-03-31 | End: 2021-02-08

## 2020-03-31 RX ORDER — ATORVASTATIN CALCIUM 80 MG/1
80 TABLET, FILM COATED ORAL DAILY
Qty: 90 TABLET | Refills: 3 | Status: SHIPPED | OUTPATIENT
Start: 2020-03-31 | End: 2021-03-23

## 2020-03-31 RX ORDER — GLIMEPIRIDE 4 MG/1
TABLET ORAL
Qty: 90 TABLET | Refills: 3 | Status: SHIPPED | OUTPATIENT
Start: 2020-03-31 | End: 2021-03-08

## 2020-03-31 RX ORDER — FERROUS SULFATE 325(65) MG
1 TABLET ORAL 2 TIMES DAILY
Qty: 180 TABLET | Refills: 3 | Status: SHIPPED | OUTPATIENT
Start: 2020-03-31

## 2020-03-31 RX ORDER — CANDESARTAN 4 MG/1
4 TABLET ORAL DAILY
COMMUNITY
End: 2020-03-31 | Stop reason: SDUPTHER

## 2020-03-31 NOTE — TELEPHONE ENCOUNTER
----- Message from Swetha Galeano sent at 3/31/2020  1:15 PM CDT -----  Contact: Saul--[phone]  Needs Advice    Reason for call:      Mrs. Kirkpatrick wants to know if can approve her to get extra medication due to there possibly will be a shortage of medication. She states that she received a letter stating to ask your provider.     flecainide (TAMBOCOR) 50 MG Tab  rivaroxaban (XARELTO) 20 mg Tab  glimepiride (AMARYL) 4 MG tablet  amLODIPine (NORVASC) 5 MG tablet  ferrous sulfate (FEOSOL) 325 mg (65 mg iron) Tab tablet    Communication Preference:Saul--[phone]    Additional Information:    Mrs. Hughes is requesting a call back to see if she can get a 90 supply on her medications.

## 2020-03-31 NOTE — TELEPHONE ENCOUNTER
----- Message from Swetha Galeano sent at 3/31/2020  1:30 PM CDT -----  Contact: Saul [phone]  Needs Advice    Reason for call:   Mrs. Kirkpatrick wants to know if can approve her to get extra medication due to there possibly will be a shortage of medication. She states that she received a letter stating to ask your provider.       candesartan (ATACAND) 4     Communication Preference:Saul--[phone]    Additional Information:    Mrs. Hughes is requesting a call back to see if she can get a 90 supply on her medications.

## 2020-03-31 NOTE — TELEPHONE ENCOUNTER
----- Message from Swetha Galeano sent at 3/31/2020  1:30 PM CDT -----  Contact: Saul [phone]  Needs Advice    Reason for call:   Mrs. Kirkpatrick wants to know if can approve her to get extra medication due to there possibly will be a shortage of medication. She states that she received a letter stating to ask your provider.       candesartan (ATACAND) 4     Communication Preference:Saul--[phone]    Additional Information:    Mrs. Hughes is requesting a call back to see if she can get a 90 supply on her medications.

## 2020-04-11 ENCOUNTER — OFFICE VISIT (OUTPATIENT)
Dept: INTERNAL MEDICINE | Facility: CLINIC | Age: 80
End: 2020-04-11
Payer: MEDICARE

## 2020-04-11 DIAGNOSIS — H93.19 TINNITUS, UNSPECIFIED LATERALITY: Primary | ICD-10-CM

## 2020-04-11 PROCEDURE — 99441 PR PHYSICIAN TELEPHONE EVALUATION 5-10 MIN: CPT | Mod: 95,,, | Performed by: INTERNAL MEDICINE

## 2020-04-11 PROCEDURE — 99441 PR PHYSICIAN TELEPHONE EVALUATION 5-10 MIN: ICD-10-PCS | Mod: 95,,, | Performed by: INTERNAL MEDICINE

## 2020-04-11 NOTE — PROGRESS NOTES
Phone visit - 7 min.    Yesterday evening, R ear suddenly had a constant roaring sound (sounds like a train). Sometimes can be drowned out by TV or other noises but more apparent when quiet. Not a ringing sound. No hearing loss. No ear pain. No nasal congestion/rhinorrhea/cough.   No numbness/tingling/dizziness/weakness.  No new medicines. No trauma.     Recommended to f/u w/ ENT. Number given to scheduling. Pt will call on Monday.

## 2020-04-14 ENCOUNTER — TELEPHONE (OUTPATIENT)
Dept: INTERNAL MEDICINE | Facility: CLINIC | Age: 80
End: 2020-04-14

## 2020-04-14 NOTE — TELEPHONE ENCOUNTER
----- Message from Mayur Taylor MA sent at 4/14/2020  4:51 PM CDT -----  Good evening,  I just scheduled the patient an appointment for ENT. Patient would like advice as to what she can do at home until her appointment. Please call patient to advise regarding her tinnitus. Thanks.

## 2020-04-15 NOTE — TELEPHONE ENCOUNTER
Unfortunately, there is no specific treatment for the tinnitus.  Maybe she can get a sooner ENT appointment?

## 2020-04-16 ENCOUNTER — PATIENT MESSAGE (OUTPATIENT)
Dept: PODIATRY | Facility: CLINIC | Age: 80
End: 2020-04-16

## 2020-04-21 ENCOUNTER — TELEPHONE (OUTPATIENT)
Dept: PODIATRY | Facility: CLINIC | Age: 80
End: 2020-04-21

## 2020-04-21 NOTE — TELEPHONE ENCOUNTER
Patient was called in regards to rescheduling Podiatry appointment on 04/30/2020 . There was no answer. left voice message requesting call back.

## 2020-04-23 ENCOUNTER — PATIENT MESSAGE (OUTPATIENT)
Dept: PODIATRY | Facility: CLINIC | Age: 80
End: 2020-04-23

## 2020-05-04 DIAGNOSIS — E11.22 DIABETES MELLITUS WITH STAGE 3 CHRONIC KIDNEY DISEASE: Chronic | ICD-10-CM

## 2020-05-04 DIAGNOSIS — N18.30 DIABETES MELLITUS WITH STAGE 3 CHRONIC KIDNEY DISEASE: Chronic | ICD-10-CM

## 2020-05-04 RX ORDER — LANCETS
100 EACH MISCELLANEOUS 2 TIMES DAILY
Qty: 100 EACH | Refills: 11 | Status: SHIPPED | OUTPATIENT
Start: 2020-05-04 | End: 2020-05-22 | Stop reason: SDUPTHER

## 2020-05-04 NOTE — TELEPHONE ENCOUNTER
----- Message from Radha Madrid sent at 5/4/2020 11:37 AM CDT -----  Rx Refill/Request     Is this a Refill:--Yes--     Rx Name and Strength:    1. Vayer contour test strips--  2. Vayer contour pin needles    Preferred Pharmacy with phone number[facility][phone]--  [address] [facility] [address]    Communication Preference:--[facility]--[phone]--    Additional Information: Refill request for medication listed above.

## 2020-05-07 ENCOUNTER — TELEPHONE (OUTPATIENT)
Dept: NEPHROLOGY | Facility: CLINIC | Age: 80
End: 2020-05-07

## 2020-05-07 NOTE — TELEPHONE ENCOUNTER
----- Message from Staci Tovar sent at 5/7/2020 11:09 AM CDT -----  Contact: Alfredo/Shyanne  Type:  Pharmacy Calling to Clarify an RX    Name of Caller:Shyanne  Pharmacy Name:[facility]  Prescription Name:Candesartan  What do they need to clarify?:clarfication  Best Call Back Number:[phone]  Additional Information: na

## 2020-05-07 NOTE — TELEPHONE ENCOUNTER
Pharmacist says pt waS STILL TAKING LOSARTAN AND CANDESARTAN  ALSO HER GFR WAS 45/4 AND HER XARELTO RECOMMENDS DOSE BE 15 MG DAILY IF THE CREATININE CLEARANCE IS ABOVE 50. SENT DR GRANADOS A MESSSAGE ABOUT THESE ISSUES. 5/7/2020/1137/SF

## 2020-05-07 NOTE — TELEPHONE ENCOUNTER
Let pt know to stop taking the candesartan and to  the new rx for xarelto . Advised her to discard th med so she would not forget like before. And take both. She repeated back to me the instructions.jael . 5/7/2020/1336/sf

## 2020-05-07 NOTE — TELEPHONE ENCOUNTER
----- Message from Alex Dean MD sent at 5/7/2020  1:11 PM CDT -----  Contact: Alfredo/Shyanne  I have changed her Xarelto to 15 mg and [facility] [address].    Please ask her to stop the Candesartan.    Thanks.    Kirill  ----- Message -----  From: Elba Alejandro LPN  Sent: 5/7/2020  11:31 AM CDT  To: Alex Dean MD    Pharmacist says he xarelto may need to be adjusted since her gfr was 45.4  And dose should be 15mg /day if creatinine clearance is 15 .   Also pt has been taking both the losartan and candesartan since last visit.. Your note says she was not taking losartan due to a recall but apparently she forgot that.   ----- Message -----  From: Staci Tovar  Sent: 5/7/2020  11:09 AM CDT  To: Jermaine Johnson Staff    Type:  Pharmacy Calling to Clarify an RX    Name of Caller:Shyanne  Pharmacy Name:[facility]  Prescription Name:Candesartan  What do they need to clarify?:clarfication  Best Call Back Number:[phone]  Additional Information: na

## 2020-05-08 DIAGNOSIS — I10 ESSENTIAL HYPERTENSION: ICD-10-CM

## 2020-05-08 RX ORDER — LOSARTAN POTASSIUM 50 MG/1
50 TABLET ORAL 2 TIMES DAILY
Qty: 180 TABLET | Refills: 2 | Status: SHIPPED | OUTPATIENT
Start: 2020-05-08 | End: 2021-03-23 | Stop reason: SDUPTHER

## 2020-05-08 NOTE — TELEPHONE ENCOUNTER
----- Message from Yuki Lucas sent at 5/8/2020  8:32 AM CDT -----  Contact: Shyanne / [facility]   .Type:  Pharmacy Calling to Clarify an RX    Name of Caller:Lorenzo Kimble   Pharmacy Name: [facility]   Prescription Name: KALINMARIZA   What do they need to clarify?: dosage change   Best Call Back Number: [phone]   Additional Information: caller stated she spoke to Elba on yesterday

## 2020-05-08 NOTE — TELEPHONE ENCOUNTER
Let her know that dr vogel the candasartan and keep the losartan. Change xarelto to 15mg  Instead of 20mg due to her creatinine clearance.5//2020/0924/sf

## 2020-05-11 ENCOUNTER — TELEPHONE (OUTPATIENT)
Dept: NEPHROLOGY | Facility: CLINIC | Age: 80
End: 2020-05-11

## 2020-05-11 NOTE — TELEPHONE ENCOUNTER
----- Message from Misti Donnelly sent at 5/11/2020  9:44 AM CDT -----  Contact: self   Diabetic or Medical Supplies.  What supplies are needed: test strips  What is the brand name of the supplies: Tomi Contour  Is this a refill or new prescription:  new  Who prescribed the supplies: WENDY Randolph   Pharmacy or company name, phone # and location:  [facility]/pharmacy #[id number] - [place] - [address] AT AT [facility] [phone] (Phone)  [phone] (Fax)  Comments:  Pt states [facility] advised her the insurance is requiring a new Rx with the testing frequency of testing 1X per day

## 2020-05-11 NOTE — TELEPHONE ENCOUNTER
----- Message from Alex Dean MD sent at 5/11/2020 11:15 AM CDT -----  Contact: Shyanne godfrey/ [employer]   Yes. Stop Candesartan, continue Losartan.    Thanks      ----- Message -----  From: Elba Alejandro LPN  Sent: 5/11/2020  10:24 AM CDT  To: Alex Dean MD    Please clarify again, does she stop taking candasartan and continue the losartan?    ----- Message -----  From: Antonieta Borges  Sent: 5/11/2020   9:06 AM CDT  To: Jermaine Johnson Staff    Type:  Pharmacy Calling to Clarify an RX    Name of Caller: Shyanne  Pharmacy Name:[facility]   Prescription Name:losartan (COZAAR) 50 MG tablet  What do they need to clarify?:Pt was told to stop taking candesartan and start taking losartan when the pt stated that she thought it was the other way around.   Best Call Back Number: [phone]  Additional Information: Caller stated that she wanted to speak with Ms. Arreola

## 2020-05-21 DIAGNOSIS — E11.22 DIABETES MELLITUS WITH STAGE 3 CHRONIC KIDNEY DISEASE: Chronic | ICD-10-CM

## 2020-05-21 DIAGNOSIS — N18.30 DIABETES MELLITUS WITH STAGE 3 CHRONIC KIDNEY DISEASE: Chronic | ICD-10-CM

## 2020-05-22 RX ORDER — LANCETS
100 EACH MISCELLANEOUS 2 TIMES DAILY
Qty: 100 EACH | Refills: 11 | Status: SHIPPED | OUTPATIENT
Start: 2020-05-22 | End: 2020-05-29 | Stop reason: SDUPTHER

## 2020-05-22 NOTE — TELEPHONE ENCOUNTER
----- Message from Misti Donnelly sent at 5/22/2020  4:04 PM CDT -----  Contact: Celia with  [phone]  Diabetic or Medical Supplies.  What supplies are needed: test strips & supplies  What is the brand name of the supplies: Tomi Contour  Is this a refill or new prescription:   new  Who prescribed the supplies:  Dr Randolph  Pharmacy or company name, phone # and location:   [facility] [place]  Fax   Comments:  Celia states they needs orders, medical necessity with testing frequency and most recent chart notes.

## 2020-05-24 DIAGNOSIS — E11.610 TYPE 2 DIABETES MELLITUS WITH DIABETIC NEUROPATHIC ARTHROPATHY: ICD-10-CM

## 2020-05-24 RX ORDER — METFORMIN HYDROCHLORIDE 500 MG/1
TABLET, EXTENDED RELEASE ORAL
Qty: 180 TABLET | Refills: 0 | Status: SHIPPED | OUTPATIENT
Start: 2020-05-24 | End: 2020-09-06

## 2020-05-29 DIAGNOSIS — N18.30 DIABETES MELLITUS WITH STAGE 3 CHRONIC KIDNEY DISEASE: Chronic | ICD-10-CM

## 2020-05-29 DIAGNOSIS — E11.22 DIABETES MELLITUS WITH STAGE 3 CHRONIC KIDNEY DISEASE: Chronic | ICD-10-CM

## 2020-05-29 RX ORDER — LANCETS
100 EACH MISCELLANEOUS 2 TIMES DAILY
Qty: 100 EACH | Refills: 11 | OUTPATIENT
Start: 2020-05-29 | End: 2023-07-12

## 2020-05-29 NOTE — TELEPHONE ENCOUNTER
----- Message from Ольга Mckenan sent at 5/29/2020 11:50 AM CDT -----  Contact: Reshma [facility] @ [phone]   Diabetic or Medical Supplies.  What supplies are needed: Diabetic Supplies  What is the brand name of the supplies:   Is this a refill or new prescription:  Refill  Who prescribed the supplies:    Pharmacy or company name, phone # and location:  [facility]  Comments:  Reshma has sent a request and would like Fax to [phone]

## 2020-06-25 ENCOUNTER — LAB VISIT (OUTPATIENT)
Dept: LAB | Facility: HOSPITAL | Age: 80
End: 2020-06-25
Attending: INTERNAL MEDICINE
Payer: MEDICARE

## 2020-06-25 DIAGNOSIS — N18.30 DIABETES MELLITUS WITH STAGE 3 CHRONIC KIDNEY DISEASE: Chronic | ICD-10-CM

## 2020-06-25 DIAGNOSIS — E11.22 DIABETES MELLITUS WITH STAGE 3 CHRONIC KIDNEY DISEASE: Chronic | ICD-10-CM

## 2020-06-25 DIAGNOSIS — N18.30 CKD (CHRONIC KIDNEY DISEASE) STAGE 3, GFR 30-59 ML/MIN: ICD-10-CM

## 2020-06-25 LAB
ALBUMIN SERPL BCP-MCNC: 4.1 G/DL (ref 3.5–5.2)
ANION GAP SERPL CALC-SCNC: 9 MMOL/L (ref 8–16)
BASOPHILS # BLD AUTO: 0.04 K/UL (ref 0–0.2)
BASOPHILS NFR BLD: 0.7 % (ref 0–1.9)
BUN SERPL-MCNC: 18 MG/DL (ref 8–23)
CALCIUM SERPL-MCNC: 10 MG/DL (ref 8.7–10.5)
CHLORIDE SERPL-SCNC: 107 MMOL/L (ref 95–110)
CO2 SERPL-SCNC: 25 MMOL/L (ref 23–29)
CREAT SERPL-MCNC: 1.4 MG/DL (ref 0.5–1.4)
DIFFERENTIAL METHOD: ABNORMAL
EOSINOPHIL # BLD AUTO: 0.2 K/UL (ref 0–0.5)
EOSINOPHIL NFR BLD: 3.6 % (ref 0–8)
ERYTHROCYTE [DISTWIDTH] IN BLOOD BY AUTOMATED COUNT: 14.6 % (ref 11.5–14.5)
EST. GFR  (AFRICAN AMERICAN): 41.2 ML/MIN/1.73 M^2
EST. GFR  (NON AFRICAN AMERICAN): 35.8 ML/MIN/1.73 M^2
ESTIMATED AVG GLUCOSE: 157 MG/DL (ref 68–131)
GLUCOSE SERPL-MCNC: 94 MG/DL (ref 70–110)
HBA1C MFR BLD HPLC: 7.1 % (ref 4–5.6)
HCT VFR BLD AUTO: 34.8 % (ref 37–48.5)
HGB BLD-MCNC: 10.5 G/DL (ref 12–16)
IMM GRANULOCYTES # BLD AUTO: 0.02 K/UL (ref 0–0.04)
IMM GRANULOCYTES NFR BLD AUTO: 0.3 % (ref 0–0.5)
LYMPHOCYTES # BLD AUTO: 2.6 K/UL (ref 1–4.8)
LYMPHOCYTES NFR BLD: 45.3 % (ref 18–48)
MCH RBC QN AUTO: 28.2 PG (ref 27–31)
MCHC RBC AUTO-ENTMCNC: 30.2 G/DL (ref 32–36)
MCV RBC AUTO: 93 FL (ref 82–98)
MONOCYTES # BLD AUTO: 0.5 K/UL (ref 0.3–1)
MONOCYTES NFR BLD: 8.6 % (ref 4–15)
NEUTROPHILS # BLD AUTO: 2.4 K/UL (ref 1.8–7.7)
NEUTROPHILS NFR BLD: 41.5 % (ref 38–73)
NRBC BLD-RTO: 0 /100 WBC
PHOSPHATE SERPL-MCNC: 3.5 MG/DL (ref 2.7–4.5)
PLATELET # BLD AUTO: 201 K/UL (ref 150–350)
PMV BLD AUTO: 12.5 FL (ref 9.2–12.9)
POTASSIUM SERPL-SCNC: 4.5 MMOL/L (ref 3.5–5.1)
PTH-INTACT SERPL-MCNC: 109 PG/ML (ref 9–77)
RBC # BLD AUTO: 3.73 M/UL (ref 4–5.4)
SODIUM SERPL-SCNC: 141 MMOL/L (ref 136–145)
WBC # BLD AUTO: 5.81 K/UL (ref 3.9–12.7)

## 2020-06-25 PROCEDURE — 85025 COMPLETE CBC W/AUTO DIFF WBC: CPT

## 2020-06-25 PROCEDURE — 36415 COLL VENOUS BLD VENIPUNCTURE: CPT

## 2020-06-25 PROCEDURE — 83970 ASSAY OF PARATHORMONE: CPT

## 2020-06-25 PROCEDURE — 80069 RENAL FUNCTION PANEL: CPT

## 2020-06-25 PROCEDURE — 83036 HEMOGLOBIN GLYCOSYLATED A1C: CPT

## 2020-06-29 RX ORDER — ALPRAZOLAM 0.5 MG/1
TABLET ORAL
Qty: 30 TABLET | Refills: 0 | Status: SHIPPED | OUTPATIENT
Start: 2020-06-29

## 2020-09-21 ENCOUNTER — LAB VISIT (OUTPATIENT)
Dept: LAB | Facility: HOSPITAL | Age: 80
End: 2020-09-21
Attending: INTERNAL MEDICINE
Payer: MEDICARE

## 2020-09-21 DIAGNOSIS — N18.30 CKD (CHRONIC KIDNEY DISEASE) STAGE 3, GFR 30-59 ML/MIN: ICD-10-CM

## 2020-09-21 DIAGNOSIS — R80.9 PROTEINURIA DUE TO TYPE 2 DIABETES MELLITUS: ICD-10-CM

## 2020-09-21 DIAGNOSIS — E11.29 PROTEINURIA DUE TO TYPE 2 DIABETES MELLITUS: ICD-10-CM

## 2020-09-21 LAB
BILIRUB UR QL STRIP: NEGATIVE
CLARITY UR: CLEAR
COLOR UR: ABNORMAL
GLUCOSE UR QL STRIP: NEGATIVE
HGB UR QL STRIP: NEGATIVE
KETONES UR QL STRIP: NEGATIVE
LEUKOCYTE ESTERASE UR QL STRIP: ABNORMAL
MICROSCOPIC COMMENT: ABNORMAL
NITRITE UR QL STRIP: NEGATIVE
PH UR STRIP: 6 [PH] (ref 5–8)
PROT UR QL STRIP: NEGATIVE
SP GR UR STRIP: <=1.005 (ref 1–1.03)
URN SPEC COLLECT METH UR: ABNORMAL
WBC #/AREA URNS HPF: 2 /HPF (ref 0–5)
WBC CLUMPS URNS QL MICRO: ABNORMAL

## 2020-09-21 PROCEDURE — 81000 URINALYSIS NONAUTO W/SCOPE: CPT

## 2020-09-21 PROCEDURE — 82570 ASSAY OF URINE CREATININE: CPT

## 2020-09-22 ENCOUNTER — PATIENT OUTREACH (OUTPATIENT)
Dept: ADMINISTRATIVE | Facility: OTHER | Age: 80
End: 2020-09-22

## 2020-09-22 LAB
CREAT UR-MCNC: 28 MG/DL (ref 15–325)
PROT UR-MCNC: <7 MG/DL (ref 0–15)
PROT/CREAT UR: NORMAL MG/G{CREAT} (ref 0–0.2)

## 2020-09-28 ENCOUNTER — OFFICE VISIT (OUTPATIENT)
Dept: NEPHROLOGY | Facility: CLINIC | Age: 80
End: 2020-09-28
Payer: MEDICARE

## 2020-09-28 DIAGNOSIS — N18.30 STAGE 3 CHRONIC KIDNEY DISEASE: Primary | ICD-10-CM

## 2020-09-28 PROCEDURE — 99214 PR OFFICE/OUTPT VISIT, EST, LEVL IV, 30-39 MIN: ICD-10-PCS | Mod: 95,,, | Performed by: INTERNAL MEDICINE

## 2020-09-28 PROCEDURE — 1159F MED LIST DOCD IN RCRD: CPT | Mod: 95,,, | Performed by: INTERNAL MEDICINE

## 2020-09-28 PROCEDURE — 99214 OFFICE O/P EST MOD 30 MIN: CPT | Mod: 95,,, | Performed by: INTERNAL MEDICINE

## 2020-09-28 PROCEDURE — 1101F PT FALLS ASSESS-DOCD LE1/YR: CPT | Mod: CPTII,95,, | Performed by: INTERNAL MEDICINE

## 2020-09-28 PROCEDURE — 1101F PR PT FALLS ASSESS DOC 0-1 FALLS W/OUT INJ PAST YR: ICD-10-PCS | Mod: CPTII,95,, | Performed by: INTERNAL MEDICINE

## 2020-09-28 PROCEDURE — 99499 UNLISTED E&M SERVICE: CPT | Mod: 95,,, | Performed by: INTERNAL MEDICINE

## 2020-09-28 PROCEDURE — 99499 RISK ADDL DX/OHS AUDIT: ICD-10-PCS | Mod: 95,,, | Performed by: INTERNAL MEDICINE

## 2020-09-28 PROCEDURE — 1159F PR MEDICATION LIST DOCUMENTED IN MEDICAL RECORD: ICD-10-PCS | Mod: 95,,, | Performed by: INTERNAL MEDICINE

## 2020-09-28 NOTE — PROGRESS NOTES
NEPHROLOGY CLINIC FOLLOWUP NOTE  Date of clinic visit: 9/28/20  Virtual visit was made by video     REASON FOR FOLLOWUP AND CHIEF COMPLAINT: Mild CKD and Proteinuria and history of diabetes mellitus as it relates to the kidney issues.     HISTORY OF PRESENT ILLNESS: Ms. 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 Chart was reviewed. Pt had seen me in 2016 and was seen by Dr. Dean about 9 months ago. Pt previously had 1-2 g of proteinuria and has been taking an ARB without any problems. On f/u today, she has no acute complaints today. No chest pain, no shortness of breath, no other pertinent issues. Labs and meds reviewed with pt.     PAST MEDICAL HISTORY:  1. Mild renal insufficiency. Eestimated GFR of about 55 mL per minute corresponding to CKD stage III.  2. Diabetes mellitus for 30 years.  3. Hypertension.  4. Diabetic retinopathy.  5. Diastolic congestive heart failure.  6. Degenerative joint disease.  7. Thyroid disease.  8. GERD.  9. Cerebrovascular disease/stroke.  10. Colon polyps in 2012.  11. Anxiety.  12. Glaucoma.     PAST SURGICAL HISTORY: Reviewed and unchanged from before. Please see initial    note from 07/25/2016.     FAMILY HISTORY: Reviewed. Grandmother with diabetes mellitus.     ALLERGIES: Reviewed, to Pravachol.     SOCIAL HISTORY: Negative for smoking. No alcohol use.     MEDICATIONS: Reviewed.     Current Outpatient Medications:     acarbose (PRECOSE) 25 MG Tab, Take 1 tablet (25 mg total) by mouth 3 (three) times daily with meals., Disp: 90 tablet, Rfl: 11    albuterol (PROAIR HFA) 90 mcg/actuation inhaler, Inhale 2 puffs into the lungs every 6 (six) hours as needed for Wheezing. Rescue, Disp: 18 g, Rfl: 12    ALPRAZolam (XANAX) 0.5 MG tablet, TAKE 1 TABLET BY MOUTH NIGHTLY AS NEEDED FOR ANXIETY (MAY TAKE 1-2 TIMES WEEKLY FOR ANXIETY), Disp: 30 tablet, Rfl: 0    amLODIPine  (NORVASC) 5 MG tablet, Take 1 tablet (5 mg total) by mouth once daily., Disp: 90 tablet, Rfl: 3    atorvastatin (LIPITOR) 80 MG tablet, Take 1 tablet (80 mg total) by mouth once daily., Disp: 90 tablet, Rfl: 3    blood sugar diagnostic Strp, 1 strip by Misc.(Non-Drug; Combo Route) route 2 (two) times daily. Insurance preferred test stripes DX:E11.22, Disp: 100 strip, Rfl: 11    blood-glucose meter kit, Insurance preferred meter dx:E11.22, Disp: 1 each, Rfl: 0    brimonidine 0.2% (ALPHAGAN) 0.2 % Drop, Place 1 drop into both eyes 3 (three) times daily., Disp: 10 mL, Rfl: 11    cholecalciferol, vitamin D3, (VITAMIN D3) 1,000 unit capsule, Take 1 capsule (1,000 Units total) by mouth once daily., Disp: 30 capsule, Rfl: 12    ferrous sulfate (FEOSOL) 325 mg (65 mg iron) Tab tablet, Take 1 tablet (325 mg total) by mouth 2 (two) times daily., Disp: 180 tablet, Rfl: 3    flecainide (TAMBOCOR) 50 MG Tab, Take 1 tablet (50 mg total) by mouth 2 (two) times daily., Disp: 60 tablet, Rfl: 3    glimepiride (AMARYL) 4 MG tablet, TAKE 1 TABLET BY MOUTH IN THE MORNING WITH BREAKFAST, Disp: 90 tablet, Rfl: 3    hydroCHLOROthiazide (HYDRODIURIL) 12.5 MG Tab, TAKE 1 TABLET BY MOUTH EVERY DAY, Disp: 90 tablet, Rfl: 3    HYDROcodone-acetaminophen (NORCO) 5-325 mg per tablet, TAKE 1 TABLET BY MOUTH EVERY 8 (EIGHT) HOURS AS NEEDED FOR PAIN FOR UP TO 10 DOSES., Disp: , Rfl:     lancets (ACCU-CHEK SOFTCLIX LANCETS) Misc, 100 lancets by Misc.(Non-Drug; Combo Route) route 2 (two) times daily. Insurance preferred lancets Dx:E11.22, Disp: 100 each, Rfl: 11    losartan (COZAAR) 50 MG tablet, Take 1 tablet (50 mg total) by mouth 2 (two) times daily., Disp: 180 tablet, Rfl: 2    meclizine (ANTIVERT) 25 mg tablet, , Disp: , Rfl:     metFORMIN (GLUCOPHAGE-XR) 500 MG ER 24hr tablet, TAKE 2 TABLETS BY MOUTH EVERY DAY, Disp: 180 tablet, Rfl: 0    methocarbamol (ROBAXIN) 500 MG Tab, Take 500 mg by mouth 2 (two) times daily., Disp: , Rfl:      metoprolol succinate (TOPROL-XL) 50 MG 24 hr tablet, Take 1 tablet by mouth once daily., Disp: , Rfl:     mupirocin (BACTROBAN) 2 % ointment, Apply topically 3 (three) times daily., Disp: 22 g, Rfl: 0    ranitidine (ZANTAC) 150 MG tablet, Take 1 tablet (150 mg total) by mouth 2 (two) times daily., Disp: 60 tablet, Rfl: 11    rivaroxaban (XARELTO) 15 mg Tab, Take 1 tablet (15 mg total) by mouth daily with dinner or evening meal., Disp: 30 tablet, Rfl: 6     REVIEW OF SYSTEMS: No recent hospitalizations.  GENERAL: Negative.  HEAD, EYES, EARS, NOSE, AND THROAT: Negative.  CARDIAC: Negative.  PULMONARY: Negative.  GASTROINTESTINAL: Negative.  GENITOURINARY: Negative.  PSYCHOLOGICAL: Negative.  NEUROLOGICAL: Negative.  ENDOCRINE: As above, otherwise negative.  HEMATOLOGIC AND ONCOLOGIC: Negative.  The rest of the review of systems negative.     PHYSICAL EXAMINATION:  VITAL SIGNS: BP could not be assessed  On video, looks comfortable, in no acute distress.   Speech and thought process appropriate, normal.      LABORATORY: Labs are reviewed  BMP  Lab Results   Component Value Date     09/21/2020    K 4.9 09/21/2020     09/21/2020    CO2 27 09/21/2020    BUN 18 09/21/2020    CREATININE 1.3 09/21/2020    CALCIUM 9.4 09/21/2020    ANIONGAP 7 (L) 09/21/2020    ESTGFRAFRICA 45.1 (A) 09/21/2020    EGFRNONAA 39.1 (A) 09/21/2020     Lab Results   Component Value Date    WBC 6.26 09/21/2020    HGB 10.7 (L) 09/21/2020    HCT 34.6 (L) 09/21/2020    MCV 94 09/21/2020     09/21/2020     U p/Cr 0.25 g, from 2.1 g  U/a: no protein, no blood, no RBC's, no casts    HgA1C 7.1%     ASSESSMENT AND PLAN: This is a 80 year-old female with mild CKD, diabetes mellitus, who presents for followup of proteinuria.   The impression is as follows:     1. Renal. CKD stage 3. s Cr is stable, has not changed or worsened  Stable renal function  Likely reason for CKD is diabetic retinopathy   Expected that proteinuria is from  diabetic nephropathy.   Noted proteinuria has markedly improved since 4 years ago on losartan  Continue losartan  K normal  Acid base no issues     2. Hypertension. Blood pressure could not be assessed on video visit     3. The patient was noncompliant with diabetic management in the past  Life style issues again reviewed with pt: ADA diet, physical activity, control wt.  Hemoglobin A1c is elevated but overall improved     PLANS AND RECOMMENDATIONS:   As discussed above  RTC 1 year  Opportunity for questions and discussion provided.  Total time spent 25 minutes including time needed to review the records, the   patient evaluation, documentation, discussion with the patient,   more than 50% of the time was spent on coordination of care and counseling.    Level I visit.     Giselle Valenzuela MD

## 2021-01-29 ENCOUNTER — TELEPHONE (OUTPATIENT)
Dept: INTERNAL MEDICINE | Facility: CLINIC | Age: 81
End: 2021-01-29

## 2021-02-04 ENCOUNTER — IMMUNIZATION (OUTPATIENT)
Dept: INTERNAL MEDICINE | Facility: CLINIC | Age: 81
End: 2021-02-04
Payer: MEDICARE

## 2021-02-04 DIAGNOSIS — Z23 NEED FOR VACCINATION: Primary | ICD-10-CM

## 2021-02-04 PROCEDURE — 0001A COVID-19, MRNA, LNP-S, PF, 30 MCG/0.3 ML DOSE VACCINE: CPT | Mod: CV19,S$GLB,, | Performed by: FAMILY MEDICINE

## 2021-02-04 PROCEDURE — 91300 COVID-19, MRNA, LNP-S, PF, 30 MCG/0.3 ML DOSE VACCINE: CPT | Mod: S$GLB,,, | Performed by: FAMILY MEDICINE

## 2021-02-04 PROCEDURE — 91300 COVID-19, MRNA, LNP-S, PF, 30 MCG/0.3 ML DOSE VACCINE: ICD-10-PCS | Mod: S$GLB,,, | Performed by: FAMILY MEDICINE

## 2021-02-04 PROCEDURE — 0001A COVID-19, MRNA, LNP-S, PF, 30 MCG/0.3 ML DOSE VACCINE: ICD-10-PCS | Mod: CV19,S$GLB,, | Performed by: FAMILY MEDICINE

## 2021-02-05 ENCOUNTER — TELEPHONE (OUTPATIENT)
Dept: INTERNAL MEDICINE | Facility: CLINIC | Age: 81
End: 2021-02-05

## 2021-02-05 DIAGNOSIS — Z12.31 VISIT FOR SCREENING MAMMOGRAM: Primary | ICD-10-CM

## 2021-02-08 ENCOUNTER — NURSE TRIAGE (OUTPATIENT)
Dept: ADMINISTRATIVE | Facility: CLINIC | Age: 81
End: 2021-02-08

## 2021-02-08 ENCOUNTER — OFFICE VISIT (OUTPATIENT)
Dept: INTERNAL MEDICINE | Facility: CLINIC | Age: 81
End: 2021-02-08
Payer: MEDICARE

## 2021-02-08 VITALS
DIASTOLIC BLOOD PRESSURE: 64 MMHG | SYSTOLIC BLOOD PRESSURE: 124 MMHG | BODY MASS INDEX: 30.49 KG/M2 | OXYGEN SATURATION: 98 % | HEART RATE: 60 BPM | TEMPERATURE: 98 F | RESPIRATION RATE: 16 BRPM | WEIGHT: 186.06 LBS

## 2021-02-08 DIAGNOSIS — I48.91 ATRIAL FIBRILLATION, UNSPECIFIED TYPE: ICD-10-CM

## 2021-02-08 DIAGNOSIS — N64.4 BREAST PAIN, RIGHT: Primary | ICD-10-CM

## 2021-02-08 DIAGNOSIS — Z79.01 ON ANTICOAGULANT THERAPY: ICD-10-CM

## 2021-02-08 DIAGNOSIS — R07.89 RIGHT-SIDED CHEST WALL PAIN: ICD-10-CM

## 2021-02-08 PROCEDURE — 3078F PR MOST RECENT DIASTOLIC BLOOD PRESSURE < 80 MM HG: ICD-10-PCS | Mod: CPTII,S$GLB,, | Performed by: NURSE PRACTITIONER

## 2021-02-08 PROCEDURE — 99999 PR PBB SHADOW E&M-EST. PATIENT-LVL III: ICD-10-PCS | Mod: PBBFAC,,, | Performed by: NURSE PRACTITIONER

## 2021-02-08 PROCEDURE — 99214 OFFICE O/P EST MOD 30 MIN: CPT | Mod: S$GLB,,, | Performed by: NURSE PRACTITIONER

## 2021-02-08 PROCEDURE — 99999 PR PBB SHADOW E&M-EST. PATIENT-LVL III: CPT | Mod: PBBFAC,,, | Performed by: NURSE PRACTITIONER

## 2021-02-08 PROCEDURE — 1159F MED LIST DOCD IN RCRD: CPT | Mod: S$GLB,,, | Performed by: NURSE PRACTITIONER

## 2021-02-08 PROCEDURE — 3074F PR MOST RECENT SYSTOLIC BLOOD PRESSURE < 130 MM HG: ICD-10-PCS | Mod: CPTII,S$GLB,, | Performed by: NURSE PRACTITIONER

## 2021-02-08 PROCEDURE — 1159F PR MEDICATION LIST DOCUMENTED IN MEDICAL RECORD: ICD-10-PCS | Mod: S$GLB,,, | Performed by: NURSE PRACTITIONER

## 2021-02-08 PROCEDURE — 3078F DIAST BP <80 MM HG: CPT | Mod: CPTII,S$GLB,, | Performed by: NURSE PRACTITIONER

## 2021-02-08 PROCEDURE — 99214 PR OFFICE/OUTPT VISIT, EST, LEVL IV, 30-39 MIN: ICD-10-PCS | Mod: S$GLB,,, | Performed by: NURSE PRACTITIONER

## 2021-02-08 PROCEDURE — 3074F SYST BP LT 130 MM HG: CPT | Mod: CPTII,S$GLB,, | Performed by: NURSE PRACTITIONER

## 2021-02-08 RX ORDER — AMLODIPINE BESYLATE 5 MG/1
5 TABLET ORAL
COMMUNITY
Start: 2020-08-14 | End: 2021-07-22 | Stop reason: SDUPTHER

## 2021-02-09 ENCOUNTER — OFFICE VISIT (OUTPATIENT)
Dept: PODIATRY | Facility: CLINIC | Age: 81
End: 2021-02-09
Payer: MEDICARE

## 2021-02-09 ENCOUNTER — HOSPITAL ENCOUNTER (OUTPATIENT)
Dept: RADIOLOGY | Facility: HOSPITAL | Age: 81
Discharge: HOME | End: 2021-02-09
Attending: NURSE PRACTITIONER
Payer: MEDICARE

## 2021-02-09 ENCOUNTER — HOSPITAL ENCOUNTER (OUTPATIENT)
Dept: CARDIOLOGY | Facility: HOSPITAL | Age: 81
Discharge: HOME | End: 2021-02-09
Payer: MEDICARE

## 2021-02-09 VITALS
DIASTOLIC BLOOD PRESSURE: 70 MMHG | HEART RATE: 56 BPM | BODY MASS INDEX: 30.82 KG/M2 | SYSTOLIC BLOOD PRESSURE: 155 MMHG | HEIGHT: 65 IN | WEIGHT: 185 LBS

## 2021-02-09 VITALS — BODY MASS INDEX: 30.85 KG/M2 | WEIGHT: 185.19 LBS | HEIGHT: 65 IN

## 2021-02-09 DIAGNOSIS — L84 CORN OR CALLUS: ICD-10-CM

## 2021-02-09 DIAGNOSIS — N64.4 BREAST PAIN, RIGHT: ICD-10-CM

## 2021-02-09 DIAGNOSIS — E11.49 TYPE II DIABETES MELLITUS WITH NEUROLOGICAL MANIFESTATIONS: Primary | ICD-10-CM

## 2021-02-09 DIAGNOSIS — B35.1 ONYCHOMYCOSIS DUE TO DERMATOPHYTE: ICD-10-CM

## 2021-02-09 DIAGNOSIS — R07.89 RIGHT-SIDED CHEST WALL PAIN: ICD-10-CM

## 2021-02-09 PROCEDURE — 1159F MED LIST DOCD IN RCRD: CPT | Mod: S$GLB,,, | Performed by: PODIATRIST

## 2021-02-09 PROCEDURE — 77066 DX MAMMO INCL CAD BI: CPT | Mod: TC

## 2021-02-09 PROCEDURE — 99999 PR PBB SHADOW E&M-EST. PATIENT-LVL IV: CPT | Mod: PBBFAC,,, | Performed by: PODIATRIST

## 2021-02-09 PROCEDURE — 1159F PR MEDICATION LIST DOCUMENTED IN MEDICAL RECORD: ICD-10-PCS | Mod: S$GLB,,, | Performed by: PODIATRIST

## 2021-02-09 PROCEDURE — 3078F PR MOST RECENT DIASTOLIC BLOOD PRESSURE < 80 MM HG: ICD-10-PCS | Mod: CPTII,S$GLB,, | Performed by: PODIATRIST

## 2021-02-09 PROCEDURE — 99214 PR OFFICE/OUTPT VISIT, EST, LEVL IV, 30-39 MIN: ICD-10-PCS | Mod: 25,S$GLB,, | Performed by: PODIATRIST

## 2021-02-09 PROCEDURE — 1101F PR PT FALLS ASSESS DOC 0-1 FALLS W/OUT INJ PAST YR: ICD-10-PCS | Mod: CPTII,S$GLB,, | Performed by: PODIATRIST

## 2021-02-09 PROCEDURE — 93010 EKG 12-LEAD: ICD-10-PCS | Mod: ,,, | Performed by: INTERNAL MEDICINE

## 2021-02-09 PROCEDURE — 77066 MAMMO DIGITAL DIAGNOSTIC BILAT WITH TOMO: ICD-10-PCS | Mod: 26,,, | Performed by: RADIOLOGY

## 2021-02-09 PROCEDURE — 11721 DEBRIDE NAIL 6 OR MORE: CPT | Mod: 59,Q9,S$GLB, | Performed by: PODIATRIST

## 2021-02-09 PROCEDURE — 3051F HG A1C>EQUAL 7.0%<8.0%: CPT | Mod: CPTII,S$GLB,, | Performed by: PODIATRIST

## 2021-02-09 PROCEDURE — 3078F DIAST BP <80 MM HG: CPT | Mod: CPTII,S$GLB,, | Performed by: PODIATRIST

## 2021-02-09 PROCEDURE — 77066 DX MAMMO INCL CAD BI: CPT | Mod: 26,,, | Performed by: RADIOLOGY

## 2021-02-09 PROCEDURE — 1101F PT FALLS ASSESS-DOCD LE1/YR: CPT | Mod: CPTII,S$GLB,, | Performed by: PODIATRIST

## 2021-02-09 PROCEDURE — 1125F PR PAIN SEVERITY QUANTIFIED, PAIN PRESENT: ICD-10-PCS | Mod: S$GLB,,, | Performed by: PODIATRIST

## 2021-02-09 PROCEDURE — 76642 ULTRASOUND BREAST LIMITED: CPT | Mod: 26,RT,, | Performed by: RADIOLOGY

## 2021-02-09 PROCEDURE — 77062 BREAST TOMOSYNTHESIS BI: CPT | Mod: 26,,, | Performed by: RADIOLOGY

## 2021-02-09 PROCEDURE — 3051F PR MOST RECENT HEMOGLOBIN A1C LEVEL 7.0 - < 8.0%: ICD-10-PCS | Mod: CPTII,S$GLB,, | Performed by: PODIATRIST

## 2021-02-09 PROCEDURE — 76642 US BREAST RIGHT LIMITED: ICD-10-PCS | Mod: 26,RT,, | Performed by: RADIOLOGY

## 2021-02-09 PROCEDURE — 99214 OFFICE O/P EST MOD 30 MIN: CPT | Mod: 25,S$GLB,, | Performed by: PODIATRIST

## 2021-02-09 PROCEDURE — 1125F AMNT PAIN NOTED PAIN PRSNT: CPT | Mod: S$GLB,,, | Performed by: PODIATRIST

## 2021-02-09 PROCEDURE — 93010 ELECTROCARDIOGRAM REPORT: CPT | Mod: ,,, | Performed by: INTERNAL MEDICINE

## 2021-02-09 PROCEDURE — 3077F PR MOST RECENT SYSTOLIC BLOOD PRESSURE >= 140 MM HG: ICD-10-PCS | Mod: CPTII,S$GLB,, | Performed by: PODIATRIST

## 2021-02-09 PROCEDURE — 93005 ELECTROCARDIOGRAM TRACING: CPT

## 2021-02-09 PROCEDURE — 11056 PARNG/CUTG B9 HYPRKR LES 2-4: CPT | Mod: Q9,S$GLB,, | Performed by: PODIATRIST

## 2021-02-09 PROCEDURE — 11056 PR TRIM BENIGN HYPERKERATOTIC SKIN LESION,2-4: ICD-10-PCS | Mod: Q9,S$GLB,, | Performed by: PODIATRIST

## 2021-02-09 PROCEDURE — 77062 MAMMO DIGITAL DIAGNOSTIC BILAT WITH TOMO: ICD-10-PCS | Mod: 26,,, | Performed by: RADIOLOGY

## 2021-02-09 PROCEDURE — 3288F PR FALLS RISK ASSESSMENT DOCUMENTED: ICD-10-PCS | Mod: CPTII,S$GLB,, | Performed by: PODIATRIST

## 2021-02-09 PROCEDURE — 3077F SYST BP >= 140 MM HG: CPT | Mod: CPTII,S$GLB,, | Performed by: PODIATRIST

## 2021-02-09 PROCEDURE — 3288F FALL RISK ASSESSMENT DOCD: CPT | Mod: CPTII,S$GLB,, | Performed by: PODIATRIST

## 2021-02-09 PROCEDURE — 11721 PR DEBRIDEMENT OF NAILS, 6 OR MORE: ICD-10-PCS | Mod: 59,Q9,S$GLB, | Performed by: PODIATRIST

## 2021-02-09 PROCEDURE — 99999 PR PBB SHADOW E&M-EST. PATIENT-LVL IV: ICD-10-PCS | Mod: PBBFAC,,, | Performed by: PODIATRIST

## 2021-02-09 PROCEDURE — 76642 ULTRASOUND BREAST LIMITED: CPT | Mod: TC,RT

## 2021-02-25 ENCOUNTER — IMMUNIZATION (OUTPATIENT)
Dept: INTERNAL MEDICINE | Facility: CLINIC | Age: 81
End: 2021-02-25
Payer: MEDICARE

## 2021-02-25 DIAGNOSIS — Z23 NEED FOR VACCINATION: Primary | ICD-10-CM

## 2021-02-25 PROCEDURE — 91300 COVID-19, MRNA, LNP-S, PF, 30 MCG/0.3 ML DOSE VACCINE: ICD-10-PCS | Mod: ,,, | Performed by: FAMILY MEDICINE

## 2021-02-25 PROCEDURE — 91300 COVID-19, MRNA, LNP-S, PF, 30 MCG/0.3 ML DOSE VACCINE: CPT | Mod: ,,, | Performed by: FAMILY MEDICINE

## 2021-02-25 PROCEDURE — 0002A COVID-19, MRNA, LNP-S, PF, 30 MCG/0.3 ML DOSE VACCINE: ICD-10-PCS | Mod: CV19,,, | Performed by: FAMILY MEDICINE

## 2021-02-25 PROCEDURE — 0002A COVID-19, MRNA, LNP-S, PF, 30 MCG/0.3 ML DOSE VACCINE: CPT | Mod: CV19,,, | Performed by: FAMILY MEDICINE

## 2021-03-08 ENCOUNTER — TELEPHONE (OUTPATIENT)
Dept: INTERNAL MEDICINE | Facility: CLINIC | Age: 81
End: 2021-03-08

## 2021-03-08 DIAGNOSIS — N18.30 DIABETES MELLITUS WITH STAGE 3 CHRONIC KIDNEY DISEASE: Chronic | ICD-10-CM

## 2021-03-08 DIAGNOSIS — E11.22 DIABETES MELLITUS WITH STAGE 3 CHRONIC KIDNEY DISEASE: Chronic | ICD-10-CM

## 2021-03-08 RX ORDER — GLIMEPIRIDE 4 MG/1
TABLET ORAL
Qty: 30 TABLET | Refills: 0 | Status: SHIPPED | OUTPATIENT
Start: 2021-03-08 | End: 2021-03-23

## 2021-03-11 ENCOUNTER — TELEPHONE (OUTPATIENT)
Dept: INTERNAL MEDICINE | Facility: CLINIC | Age: 81
End: 2021-03-11

## 2021-03-11 DIAGNOSIS — M25.519 SHOULDER PAIN, UNSPECIFIED CHRONICITY, UNSPECIFIED LATERALITY: Primary | ICD-10-CM

## 2021-03-12 ENCOUNTER — HOSPITAL ENCOUNTER (OUTPATIENT)
Dept: RADIOLOGY | Facility: HOSPITAL | Age: 81
Discharge: HOME | End: 2021-03-12
Attending: STUDENT IN AN ORGANIZED HEALTH CARE EDUCATION/TRAINING PROGRAM
Payer: MEDICARE

## 2021-03-12 ENCOUNTER — OFFICE VISIT (OUTPATIENT)
Dept: ORTHOPEDICS | Facility: CLINIC | Age: 81
End: 2021-03-12
Payer: MEDICARE

## 2021-03-12 ENCOUNTER — TELEPHONE (OUTPATIENT)
Dept: ORTHOPEDICS | Facility: CLINIC | Age: 81
End: 2021-03-12

## 2021-03-12 VITALS — WEIGHT: 185 LBS | HEIGHT: 65 IN | BODY MASS INDEX: 30.82 KG/M2

## 2021-03-12 DIAGNOSIS — M79.641 RIGHT HAND PAIN: ICD-10-CM

## 2021-03-12 DIAGNOSIS — M25.511 RIGHT SHOULDER PAIN, UNSPECIFIED CHRONICITY: ICD-10-CM

## 2021-03-12 DIAGNOSIS — M65.331 TRIGGER FINGER, RIGHT MIDDLE FINGER: Primary | ICD-10-CM

## 2021-03-12 DIAGNOSIS — M25.511 RIGHT SHOULDER PAIN, UNSPECIFIED CHRONICITY: Primary | ICD-10-CM

## 2021-03-12 DIAGNOSIS — M25.519 SHOULDER PAIN, UNSPECIFIED CHRONICITY, UNSPECIFIED LATERALITY: ICD-10-CM

## 2021-03-12 DIAGNOSIS — M75.41 SUBACROMIAL IMPINGEMENT OF RIGHT SHOULDER: ICD-10-CM

## 2021-03-12 PROCEDURE — 99999 PR PBB SHADOW E&M-EST. PATIENT-LVL IV: ICD-10-PCS | Mod: PBBFAC,,, | Performed by: STUDENT IN AN ORGANIZED HEALTH CARE EDUCATION/TRAINING PROGRAM

## 2021-03-12 PROCEDURE — 20550 TENDON SHEATH: ICD-10-PCS | Mod: RT,S$GLB,, | Performed by: STUDENT IN AN ORGANIZED HEALTH CARE EDUCATION/TRAINING PROGRAM

## 2021-03-12 PROCEDURE — 3288F PR FALLS RISK ASSESSMENT DOCUMENTED: ICD-10-PCS | Mod: CPTII,S$GLB,, | Performed by: STUDENT IN AN ORGANIZED HEALTH CARE EDUCATION/TRAINING PROGRAM

## 2021-03-12 PROCEDURE — 73030 X-RAY EXAM OF SHOULDER: CPT | Mod: 26,RT,, | Performed by: RADIOLOGY

## 2021-03-12 PROCEDURE — 73130 X-RAY EXAM OF HAND: CPT | Mod: 26,RT,, | Performed by: RADIOLOGY

## 2021-03-12 PROCEDURE — 1101F PR PT FALLS ASSESS DOC 0-1 FALLS W/OUT INJ PAST YR: ICD-10-PCS | Mod: CPTII,S$GLB,, | Performed by: STUDENT IN AN ORGANIZED HEALTH CARE EDUCATION/TRAINING PROGRAM

## 2021-03-12 PROCEDURE — 99204 PR OFFICE/OUTPT VISIT, NEW, LEVL IV, 45-59 MIN: ICD-10-PCS | Mod: 25,S$GLB,, | Performed by: STUDENT IN AN ORGANIZED HEALTH CARE EDUCATION/TRAINING PROGRAM

## 2021-03-12 PROCEDURE — 1101F PT FALLS ASSESS-DOCD LE1/YR: CPT | Mod: CPTII,S$GLB,, | Performed by: STUDENT IN AN ORGANIZED HEALTH CARE EDUCATION/TRAINING PROGRAM

## 2021-03-12 PROCEDURE — 20550 NJX 1 TENDON SHEATH/LIGAMENT: CPT | Mod: RT,S$GLB,, | Performed by: STUDENT IN AN ORGANIZED HEALTH CARE EDUCATION/TRAINING PROGRAM

## 2021-03-12 PROCEDURE — 3288F FALL RISK ASSESSMENT DOCD: CPT | Mod: CPTII,S$GLB,, | Performed by: STUDENT IN AN ORGANIZED HEALTH CARE EDUCATION/TRAINING PROGRAM

## 2021-03-12 PROCEDURE — 1159F PR MEDICATION LIST DOCUMENTED IN MEDICAL RECORD: ICD-10-PCS | Mod: S$GLB,,, | Performed by: STUDENT IN AN ORGANIZED HEALTH CARE EDUCATION/TRAINING PROGRAM

## 2021-03-12 PROCEDURE — 73030 X-RAY EXAM OF SHOULDER: CPT | Mod: TC,RT

## 2021-03-12 PROCEDURE — 73030 XR SHOULDER COMPLETE 2 OR MORE VIEWS RIGHT: ICD-10-PCS | Mod: 26,RT,, | Performed by: RADIOLOGY

## 2021-03-12 PROCEDURE — 73130 X-RAY EXAM OF HAND: CPT | Mod: TC,RT

## 2021-03-12 PROCEDURE — 1159F MED LIST DOCD IN RCRD: CPT | Mod: S$GLB,,, | Performed by: STUDENT IN AN ORGANIZED HEALTH CARE EDUCATION/TRAINING PROGRAM

## 2021-03-12 PROCEDURE — 99204 OFFICE O/P NEW MOD 45 MIN: CPT | Mod: 25,S$GLB,, | Performed by: STUDENT IN AN ORGANIZED HEALTH CARE EDUCATION/TRAINING PROGRAM

## 2021-03-12 PROCEDURE — 99999 PR PBB SHADOW E&M-EST. PATIENT-LVL IV: CPT | Mod: PBBFAC,,, | Performed by: STUDENT IN AN ORGANIZED HEALTH CARE EDUCATION/TRAINING PROGRAM

## 2021-03-12 PROCEDURE — 1125F PR PAIN SEVERITY QUANTIFIED, PAIN PRESENT: ICD-10-PCS | Mod: S$GLB,,, | Performed by: STUDENT IN AN ORGANIZED HEALTH CARE EDUCATION/TRAINING PROGRAM

## 2021-03-12 PROCEDURE — 1125F AMNT PAIN NOTED PAIN PRSNT: CPT | Mod: S$GLB,,, | Performed by: STUDENT IN AN ORGANIZED HEALTH CARE EDUCATION/TRAINING PROGRAM

## 2021-03-12 PROCEDURE — 73130 XR HAND COMPLETE 3 VIEW RIGHT: ICD-10-PCS | Mod: 26,RT,, | Performed by: RADIOLOGY

## 2021-03-12 RX ORDER — TRIAMCINOLONE ACETONIDE 40 MG/ML
20 INJECTION, SUSPENSION INTRA-ARTICULAR; INTRAMUSCULAR
Status: DISCONTINUED | OUTPATIENT
Start: 2021-03-12 | End: 2021-03-12 | Stop reason: HOSPADM

## 2021-03-12 RX ADMIN — TRIAMCINOLONE ACETONIDE 20 MG: 40 INJECTION, SUSPENSION INTRA-ARTICULAR; INTRAMUSCULAR at 01:03

## 2021-03-16 ENCOUNTER — LAB VISIT (OUTPATIENT)
Dept: LAB | Facility: HOSPITAL | Age: 81
End: 2021-03-16
Attending: INTERNAL MEDICINE
Payer: MEDICARE

## 2021-03-16 DIAGNOSIS — N18.30 DIABETES MELLITUS WITH STAGE 3 CHRONIC KIDNEY DISEASE: Chronic | ICD-10-CM

## 2021-03-16 DIAGNOSIS — E11.22 DIABETES MELLITUS WITH STAGE 3 CHRONIC KIDNEY DISEASE: Chronic | ICD-10-CM

## 2021-03-16 LAB
ALBUMIN SERPL BCP-MCNC: 3.9 G/DL (ref 3.5–5.2)
ALP SERPL-CCNC: 87 U/L (ref 55–135)
ALT SERPL W/O P-5'-P-CCNC: 18 U/L (ref 10–44)
ANION GAP SERPL CALC-SCNC: 7 MMOL/L (ref 8–16)
AST SERPL-CCNC: 23 U/L (ref 10–40)
BASOPHILS # BLD AUTO: 0.04 K/UL (ref 0–0.2)
BASOPHILS NFR BLD: 0.6 % (ref 0–1.9)
BILIRUB SERPL-MCNC: 0.4 MG/DL (ref 0.1–1)
BUN SERPL-MCNC: 23 MG/DL (ref 8–23)
CALCIUM SERPL-MCNC: 9.5 MG/DL (ref 8.7–10.5)
CHLORIDE SERPL-SCNC: 105 MMOL/L (ref 95–110)
CHOLEST SERPL-MCNC: 167 MG/DL (ref 120–199)
CHOLEST/HDLC SERPL: 3.3 {RATIO} (ref 2–5)
CO2 SERPL-SCNC: 28 MMOL/L (ref 23–29)
CREAT SERPL-MCNC: 1.5 MG/DL (ref 0.5–1.4)
DIFFERENTIAL METHOD: ABNORMAL
EOSINOPHIL # BLD AUTO: 0.2 K/UL (ref 0–0.5)
EOSINOPHIL NFR BLD: 3.3 % (ref 0–8)
ERYTHROCYTE [DISTWIDTH] IN BLOOD BY AUTOMATED COUNT: 14.3 % (ref 11.5–14.5)
EST. GFR  (AFRICAN AMERICAN): 37.9 ML/MIN/1.73 M^2
EST. GFR  (NON AFRICAN AMERICAN): 32.9 ML/MIN/1.73 M^2
GLUCOSE SERPL-MCNC: 123 MG/DL (ref 70–110)
HCT VFR BLD AUTO: 36.1 % (ref 37–48.5)
HDLC SERPL-MCNC: 51 MG/DL (ref 40–75)
HDLC SERPL: 30.5 % (ref 20–50)
HGB BLD-MCNC: 11.1 G/DL (ref 12–16)
IMM GRANULOCYTES # BLD AUTO: 0.01 K/UL (ref 0–0.04)
IMM GRANULOCYTES NFR BLD AUTO: 0.2 % (ref 0–0.5)
LDLC SERPL CALC-MCNC: 104.4 MG/DL (ref 63–159)
LYMPHOCYTES # BLD AUTO: 2.5 K/UL (ref 1–4.8)
LYMPHOCYTES NFR BLD: 39.6 % (ref 18–48)
MCH RBC QN AUTO: 28.4 PG (ref 27–31)
MCHC RBC AUTO-ENTMCNC: 30.7 G/DL (ref 32–36)
MCV RBC AUTO: 92 FL (ref 82–98)
MONOCYTES # BLD AUTO: 0.6 K/UL (ref 0.3–1)
MONOCYTES NFR BLD: 8.6 % (ref 4–15)
NEUTROPHILS # BLD AUTO: 3.1 K/UL (ref 1.8–7.7)
NEUTROPHILS NFR BLD: 47.7 % (ref 38–73)
NONHDLC SERPL-MCNC: 116 MG/DL
NRBC BLD-RTO: 0 /100 WBC
PLATELET # BLD AUTO: 191 K/UL (ref 150–350)
PMV BLD AUTO: 11.8 FL (ref 9.2–12.9)
POTASSIUM SERPL-SCNC: 4.6 MMOL/L (ref 3.5–5.1)
PROT SERPL-MCNC: 7.4 G/DL (ref 6–8.4)
RBC # BLD AUTO: 3.91 M/UL (ref 4–5.4)
SODIUM SERPL-SCNC: 140 MMOL/L (ref 136–145)
TRIGL SERPL-MCNC: 58 MG/DL (ref 30–150)
WBC # BLD AUTO: 6.39 K/UL (ref 3.9–12.7)

## 2021-03-16 PROCEDURE — 80061 LIPID PANEL: CPT | Performed by: INTERNAL MEDICINE

## 2021-03-16 PROCEDURE — 36415 COLL VENOUS BLD VENIPUNCTURE: CPT | Performed by: INTERNAL MEDICINE

## 2021-03-16 PROCEDURE — 83036 HEMOGLOBIN GLYCOSYLATED A1C: CPT | Performed by: INTERNAL MEDICINE

## 2021-03-16 PROCEDURE — 80053 COMPREHEN METABOLIC PANEL: CPT | Performed by: INTERNAL MEDICINE

## 2021-03-16 PROCEDURE — 85025 COMPLETE CBC W/AUTO DIFF WBC: CPT | Performed by: INTERNAL MEDICINE

## 2021-03-17 ENCOUNTER — TELEPHONE (OUTPATIENT)
Dept: INTERNAL MEDICINE | Facility: CLINIC | Age: 81
End: 2021-03-17

## 2021-03-17 LAB
ESTIMATED AVG GLUCOSE: 151 MG/DL (ref 68–131)
HBA1C MFR BLD: 6.9 % (ref 4–5.6)

## 2021-03-17 RX ORDER — RIVAROXABAN 15 MG/1
TABLET, FILM COATED ORAL
Qty: 30 TABLET | Refills: 6 | OUTPATIENT
Start: 2021-03-17

## 2021-03-22 ENCOUNTER — TELEPHONE (OUTPATIENT)
Dept: INTERNAL MEDICINE | Facility: CLINIC | Age: 81
End: 2021-03-22

## 2021-03-22 DIAGNOSIS — N18.30 DIABETES MELLITUS WITH STAGE 3 CHRONIC KIDNEY DISEASE: Chronic | ICD-10-CM

## 2021-03-22 DIAGNOSIS — E11.22 DIABETES MELLITUS WITH STAGE 3 CHRONIC KIDNEY DISEASE: Chronic | ICD-10-CM

## 2021-03-23 DIAGNOSIS — I10 ESSENTIAL HYPERTENSION: ICD-10-CM

## 2021-03-23 DIAGNOSIS — E11.610 TYPE 2 DIABETES MELLITUS WITH DIABETIC NEUROPATHIC ARTHROPATHY: ICD-10-CM

## 2021-03-23 RX ORDER — METOPROLOL SUCCINATE 50 MG/1
50 TABLET, EXTENDED RELEASE ORAL DAILY
Qty: 90 TABLET | Refills: 1 | Status: SHIPPED | OUTPATIENT
Start: 2021-03-23 | End: 2021-12-15

## 2021-03-23 RX ORDER — METFORMIN HYDROCHLORIDE 500 MG/1
TABLET, EXTENDED RELEASE ORAL
Qty: 180 TABLET | Refills: 0 | Status: SHIPPED | OUTPATIENT
Start: 2021-03-23 | End: 2021-03-25

## 2021-03-23 RX ORDER — RIVAROXABAN 15 MG/1
TABLET, FILM COATED ORAL
Qty: 30 TABLET | Refills: 6 | Status: SHIPPED | OUTPATIENT
Start: 2021-03-23

## 2021-03-23 RX ORDER — LOSARTAN POTASSIUM 50 MG/1
TABLET ORAL
Qty: 180 TABLET | Refills: 2 | Status: SHIPPED | OUTPATIENT
Start: 2021-03-23 | End: 2021-07-22

## 2021-03-23 RX ORDER — GLIMEPIRIDE 4 MG/1
TABLET ORAL
Qty: 90 TABLET | Refills: 1 | Status: SHIPPED | OUTPATIENT
Start: 2021-03-23 | End: 2021-09-27

## 2021-03-23 RX ORDER — LOSARTAN POTASSIUM 50 MG/1
50 TABLET ORAL 2 TIMES DAILY
Qty: 180 TABLET | Refills: 2 | Status: SHIPPED | OUTPATIENT
Start: 2021-03-23 | End: 2021-12-15

## 2021-04-14 ENCOUNTER — OFFICE VISIT (OUTPATIENT)
Dept: OPHTHALMOLOGY | Facility: CLINIC | Age: 81
End: 2021-04-14
Payer: MEDICARE

## 2021-04-14 DIAGNOSIS — H43.813 PVD (POSTERIOR VITREOUS DETACHMENT), BOTH EYES: ICD-10-CM

## 2021-04-14 DIAGNOSIS — H40.1234 LOW-TENSION GLAUCOMA, BILATERAL, INDETERMINATE STAGE: ICD-10-CM

## 2021-04-14 DIAGNOSIS — H18.519 FUCHS' CORNEAL DYSTROPHY: ICD-10-CM

## 2021-04-14 DIAGNOSIS — H52.7 REFRACTIVE DISORDER: ICD-10-CM

## 2021-04-14 DIAGNOSIS — E11.36 DIABETIC CATARACT OF RIGHT EYE: ICD-10-CM

## 2021-04-14 DIAGNOSIS — H40.1230 LOW-TENSION GLAUCOMA OF BOTH EYES, UNSPECIFIED GLAUCOMA STAGE: Primary | ICD-10-CM

## 2021-04-14 PROCEDURE — 99999 PR PBB SHADOW E&M-EST. PATIENT-LVL I: CPT | Mod: PBBFAC,,, | Performed by: STUDENT IN AN ORGANIZED HEALTH CARE EDUCATION/TRAINING PROGRAM

## 2021-04-14 PROCEDURE — 92014 PR EYE EXAM, EST PATIENT,COMPREHESV: ICD-10-PCS | Mod: S$GLB,,, | Performed by: STUDENT IN AN ORGANIZED HEALTH CARE EDUCATION/TRAINING PROGRAM

## 2021-04-14 PROCEDURE — 92015 DETERMINE REFRACTIVE STATE: CPT | Mod: S$GLB,,, | Performed by: STUDENT IN AN ORGANIZED HEALTH CARE EDUCATION/TRAINING PROGRAM

## 2021-04-14 PROCEDURE — 92015 PR REFRACTION: ICD-10-PCS | Mod: S$GLB,,, | Performed by: STUDENT IN AN ORGANIZED HEALTH CARE EDUCATION/TRAINING PROGRAM

## 2021-04-14 PROCEDURE — 92133 CPTRZD OPH DX IMG PST SGM ON: CPT | Mod: S$GLB,,, | Performed by: STUDENT IN AN ORGANIZED HEALTH CARE EDUCATION/TRAINING PROGRAM

## 2021-04-14 PROCEDURE — 99499 RISK ADDL DX/OHS AUDIT: ICD-10-PCS | Mod: S$GLB,,, | Performed by: STUDENT IN AN ORGANIZED HEALTH CARE EDUCATION/TRAINING PROGRAM

## 2021-04-14 PROCEDURE — 92014 COMPRE OPH EXAM EST PT 1/>: CPT | Mod: S$GLB,,, | Performed by: STUDENT IN AN ORGANIZED HEALTH CARE EDUCATION/TRAINING PROGRAM

## 2021-04-14 PROCEDURE — 99499 UNLISTED E&M SERVICE: CPT | Mod: S$GLB,,, | Performed by: STUDENT IN AN ORGANIZED HEALTH CARE EDUCATION/TRAINING PROGRAM

## 2021-04-14 PROCEDURE — 92133 POSTERIOR SEGMENT OCT OPTIC NERVE(OCULAR COHERENCE TOMOGRAPHY) - OU - BOTH EYES: ICD-10-PCS | Mod: S$GLB,,, | Performed by: STUDENT IN AN ORGANIZED HEALTH CARE EDUCATION/TRAINING PROGRAM

## 2021-04-14 PROCEDURE — 99999 PR PBB SHADOW E&M-EST. PATIENT-LVL I: ICD-10-PCS | Mod: PBBFAC,,, | Performed by: STUDENT IN AN ORGANIZED HEALTH CARE EDUCATION/TRAINING PROGRAM

## 2021-04-27 ENCOUNTER — OFFICE VISIT (OUTPATIENT)
Dept: PODIATRY | Facility: CLINIC | Age: 81
End: 2021-04-27
Payer: MEDICARE

## 2021-04-27 VITALS
DIASTOLIC BLOOD PRESSURE: 67 MMHG | WEIGHT: 184.94 LBS | BODY MASS INDEX: 30.81 KG/M2 | HEART RATE: 65 BPM | SYSTOLIC BLOOD PRESSURE: 124 MMHG | HEIGHT: 65 IN

## 2021-04-27 DIAGNOSIS — B35.1 ONYCHOMYCOSIS DUE TO DERMATOPHYTE: ICD-10-CM

## 2021-04-27 DIAGNOSIS — E11.49 TYPE II DIABETES MELLITUS WITH NEUROLOGICAL MANIFESTATIONS: Primary | ICD-10-CM

## 2021-04-27 DIAGNOSIS — L60.0 ONYCHOCRYPTOSIS: ICD-10-CM

## 2021-04-27 PROCEDURE — 1125F PR PAIN SEVERITY QUANTIFIED, PAIN PRESENT: ICD-10-PCS | Mod: S$GLB,,, | Performed by: PODIATRIST

## 2021-04-27 PROCEDURE — 3288F PR FALLS RISK ASSESSMENT DOCUMENTED: ICD-10-PCS | Mod: CPTII,S$GLB,, | Performed by: PODIATRIST

## 2021-04-27 PROCEDURE — 1125F AMNT PAIN NOTED PAIN PRSNT: CPT | Mod: S$GLB,,, | Performed by: PODIATRIST

## 2021-04-27 PROCEDURE — 99499 NO LOS: ICD-10-PCS | Mod: S$GLB,,, | Performed by: PODIATRIST

## 2021-04-27 PROCEDURE — 3288F FALL RISK ASSESSMENT DOCD: CPT | Mod: CPTII,S$GLB,, | Performed by: PODIATRIST

## 2021-04-27 PROCEDURE — 1101F PR PT FALLS ASSESS DOC 0-1 FALLS W/OUT INJ PAST YR: ICD-10-PCS | Mod: CPTII,S$GLB,, | Performed by: PODIATRIST

## 2021-04-27 PROCEDURE — 11721 PR DEBRIDEMENT OF NAILS, 6 OR MORE: ICD-10-PCS | Mod: Q9,S$GLB,, | Performed by: PODIATRIST

## 2021-04-27 PROCEDURE — 11721 DEBRIDE NAIL 6 OR MORE: CPT | Mod: Q9,S$GLB,, | Performed by: PODIATRIST

## 2021-04-27 PROCEDURE — 99499 UNLISTED E&M SERVICE: CPT | Mod: S$GLB,,, | Performed by: PODIATRIST

## 2021-04-27 PROCEDURE — 99999 PR PBB SHADOW E&M-EST. PATIENT-LVL V: CPT | Mod: PBBFAC,,, | Performed by: PODIATRIST

## 2021-04-27 PROCEDURE — 99999 PR PBB SHADOW E&M-EST. PATIENT-LVL V: ICD-10-PCS | Mod: PBBFAC,,, | Performed by: PODIATRIST

## 2021-04-27 PROCEDURE — 1101F PT FALLS ASSESS-DOCD LE1/YR: CPT | Mod: CPTII,S$GLB,, | Performed by: PODIATRIST

## 2021-06-11 ENCOUNTER — TELEPHONE (OUTPATIENT)
Dept: INTERNAL MEDICINE | Facility: CLINIC | Age: 81
End: 2021-06-11

## 2021-06-22 ENCOUNTER — TELEPHONE (OUTPATIENT)
Dept: INTERNAL MEDICINE | Facility: CLINIC | Age: 81
End: 2021-06-22

## 2021-06-22 DIAGNOSIS — E11.610 TYPE 2 DIABETES MELLITUS WITH DIABETIC NEUROPATHIC ARTHROPATHY: ICD-10-CM

## 2021-06-22 DIAGNOSIS — E55.9 VITAMIN D INSUFFICIENCY: ICD-10-CM

## 2021-06-22 DIAGNOSIS — E04.2 MULTINODULAR THYROID: ICD-10-CM

## 2021-06-22 DIAGNOSIS — D50.1 IRON DEFICIENCY ANEMIA DUE TO SIDEROPENIC DYSPHAGIA: Primary | ICD-10-CM

## 2021-06-22 RX ORDER — METFORMIN HYDROCHLORIDE 500 MG/1
TABLET, EXTENDED RELEASE ORAL
Qty: 180 TABLET | Refills: 0 | Status: SHIPPED | OUTPATIENT
Start: 2021-06-22 | End: 2021-09-15

## 2021-06-23 ENCOUNTER — TELEPHONE (OUTPATIENT)
Dept: INTERNAL MEDICINE | Facility: CLINIC | Age: 81
End: 2021-06-23

## 2021-07-01 ENCOUNTER — OFFICE VISIT (OUTPATIENT)
Dept: DERMATOLOGY | Facility: CLINIC | Age: 81
End: 2021-07-01
Payer: MEDICARE

## 2021-07-01 DIAGNOSIS — L60.3 BRITTLE NAILS: ICD-10-CM

## 2021-07-01 DIAGNOSIS — L80 VITILIGO: Primary | ICD-10-CM

## 2021-07-01 PROCEDURE — 3288F FALL RISK ASSESSMENT DOCD: CPT | Mod: CPTII,S$GLB,, | Performed by: STUDENT IN AN ORGANIZED HEALTH CARE EDUCATION/TRAINING PROGRAM

## 2021-07-01 PROCEDURE — 3288F PR FALLS RISK ASSESSMENT DOCUMENTED: ICD-10-PCS | Mod: CPTII,S$GLB,, | Performed by: STUDENT IN AN ORGANIZED HEALTH CARE EDUCATION/TRAINING PROGRAM

## 2021-07-01 PROCEDURE — 1159F PR MEDICATION LIST DOCUMENTED IN MEDICAL RECORD: ICD-10-PCS | Mod: S$GLB,,, | Performed by: STUDENT IN AN ORGANIZED HEALTH CARE EDUCATION/TRAINING PROGRAM

## 2021-07-01 PROCEDURE — 99999 PR PBB SHADOW E&M-EST. PATIENT-LVL IV: CPT | Mod: PBBFAC,,, | Performed by: STUDENT IN AN ORGANIZED HEALTH CARE EDUCATION/TRAINING PROGRAM

## 2021-07-01 PROCEDURE — 1101F PT FALLS ASSESS-DOCD LE1/YR: CPT | Mod: CPTII,S$GLB,, | Performed by: STUDENT IN AN ORGANIZED HEALTH CARE EDUCATION/TRAINING PROGRAM

## 2021-07-01 PROCEDURE — 1125F AMNT PAIN NOTED PAIN PRSNT: CPT | Mod: S$GLB,,, | Performed by: STUDENT IN AN ORGANIZED HEALTH CARE EDUCATION/TRAINING PROGRAM

## 2021-07-01 PROCEDURE — 99999 PR PBB SHADOW E&M-EST. PATIENT-LVL IV: ICD-10-PCS | Mod: PBBFAC,,, | Performed by: STUDENT IN AN ORGANIZED HEALTH CARE EDUCATION/TRAINING PROGRAM

## 2021-07-01 PROCEDURE — 99203 PR OFFICE/OUTPT VISIT, NEW, LEVL III, 30-44 MIN: ICD-10-PCS | Mod: S$GLB,,, | Performed by: STUDENT IN AN ORGANIZED HEALTH CARE EDUCATION/TRAINING PROGRAM

## 2021-07-01 PROCEDURE — 1101F PR PT FALLS ASSESS DOC 0-1 FALLS W/OUT INJ PAST YR: ICD-10-PCS | Mod: CPTII,S$GLB,, | Performed by: STUDENT IN AN ORGANIZED HEALTH CARE EDUCATION/TRAINING PROGRAM

## 2021-07-01 PROCEDURE — 1125F PR PAIN SEVERITY QUANTIFIED, PAIN PRESENT: ICD-10-PCS | Mod: S$GLB,,, | Performed by: STUDENT IN AN ORGANIZED HEALTH CARE EDUCATION/TRAINING PROGRAM

## 2021-07-01 PROCEDURE — 99203 OFFICE O/P NEW LOW 30 MIN: CPT | Mod: S$GLB,,, | Performed by: STUDENT IN AN ORGANIZED HEALTH CARE EDUCATION/TRAINING PROGRAM

## 2021-07-01 PROCEDURE — 1159F MED LIST DOCD IN RCRD: CPT | Mod: S$GLB,,, | Performed by: STUDENT IN AN ORGANIZED HEALTH CARE EDUCATION/TRAINING PROGRAM

## 2021-07-01 RX ORDER — PIMECROLIMUS 10 MG/G
CREAM TOPICAL
Qty: 100 G | Refills: 3 | Status: SHIPPED | OUTPATIENT
Start: 2021-07-01 | End: 2021-07-14

## 2021-07-07 ENCOUNTER — TELEPHONE (OUTPATIENT)
Dept: DERMATOLOGY | Facility: CLINIC | Age: 81
End: 2021-07-07

## 2021-07-07 ENCOUNTER — TELEPHONE (OUTPATIENT)
Dept: PHARMACY | Facility: CLINIC | Age: 81
End: 2021-07-07

## 2021-07-07 DIAGNOSIS — L80 VITILIGO: Primary | ICD-10-CM

## 2021-07-07 RX ORDER — HYDROCORTISONE 25 MG/G
CREAM TOPICAL 2 TIMES DAILY
Qty: 30 G | Refills: 2 | Status: SHIPPED | OUTPATIENT
Start: 2021-07-07 | End: 2023-05-16

## 2021-07-13 ENCOUNTER — TELEPHONE (OUTPATIENT)
Dept: INTERNAL MEDICINE | Facility: CLINIC | Age: 81
End: 2021-07-13

## 2021-07-14 ENCOUNTER — TELEPHONE (OUTPATIENT)
Dept: DERMATOLOGY | Facility: CLINIC | Age: 81
End: 2021-07-14

## 2021-07-14 DIAGNOSIS — L20.89 OTHER ATOPIC DERMATITIS: ICD-10-CM

## 2021-07-14 DIAGNOSIS — L80 VITILIGO: Primary | ICD-10-CM

## 2021-07-14 RX ORDER — TACROLIMUS 1 MG/G
OINTMENT TOPICAL 2 TIMES DAILY
Qty: 60 G | Refills: 3 | Status: SHIPPED | OUTPATIENT
Start: 2021-07-14 | End: 2023-05-16

## 2021-07-15 ENCOUNTER — TELEPHONE (OUTPATIENT)
Dept: PHARMACY | Facility: CLINIC | Age: 81
End: 2021-07-15

## 2021-07-15 ENCOUNTER — TELEPHONE (OUTPATIENT)
Dept: RHEUMATOLOGY | Facility: CLINIC | Age: 81
End: 2021-07-15

## 2021-07-22 ENCOUNTER — OFFICE VISIT (OUTPATIENT)
Dept: INTERNAL MEDICINE | Facility: CLINIC | Age: 81
End: 2021-07-22
Payer: MEDICARE

## 2021-07-22 ENCOUNTER — HOSPITAL ENCOUNTER (OUTPATIENT)
Dept: RADIOLOGY | Facility: HOSPITAL | Age: 81
Discharge: HOME | End: 2021-07-22
Attending: INTERNAL MEDICINE
Payer: MEDICARE

## 2021-07-22 VITALS
HEIGHT: 65 IN | DIASTOLIC BLOOD PRESSURE: 70 MMHG | WEIGHT: 180 LBS | SYSTOLIC BLOOD PRESSURE: 136 MMHG | BODY MASS INDEX: 29.99 KG/M2

## 2021-07-22 DIAGNOSIS — N18.30 STAGE 3 CHRONIC KIDNEY DISEASE, UNSPECIFIED WHETHER STAGE 3A OR 3B CKD: Chronic | ICD-10-CM

## 2021-07-22 DIAGNOSIS — M54.9 DORSALGIA, UNSPECIFIED: ICD-10-CM

## 2021-07-22 DIAGNOSIS — M79.10 MUSCLE PAIN: ICD-10-CM

## 2021-07-22 DIAGNOSIS — E11.69 HYPERLIPIDEMIA ASSOCIATED WITH TYPE 2 DIABETES MELLITUS: ICD-10-CM

## 2021-07-22 DIAGNOSIS — E11.29 DIABETES MELLITUS WITH PROTEINURIA: Chronic | ICD-10-CM

## 2021-07-22 DIAGNOSIS — D12.2 ADENOMATOUS POLYP OF ASCENDING COLON: ICD-10-CM

## 2021-07-22 DIAGNOSIS — E78.5 HYPERLIPIDEMIA ASSOCIATED WITH TYPE 2 DIABETES MELLITUS: ICD-10-CM

## 2021-07-22 DIAGNOSIS — E04.2 MULTINODULAR THYROID: Chronic | ICD-10-CM

## 2021-07-22 DIAGNOSIS — R80.9 DIABETES MELLITUS WITH PROTEINURIA: Chronic | ICD-10-CM

## 2021-07-22 DIAGNOSIS — R01.1 HEART MURMUR: ICD-10-CM

## 2021-07-22 DIAGNOSIS — E04.1 THYROID NODULE: ICD-10-CM

## 2021-07-22 DIAGNOSIS — E11.22 DIABETES MELLITUS WITH STAGE 3 CHRONIC KIDNEY DISEASE: Chronic | ICD-10-CM

## 2021-07-22 DIAGNOSIS — M81.0 OSTEOPOROSIS WITHOUT CURRENT PATHOLOGICAL FRACTURE, UNSPECIFIED OSTEOPOROSIS TYPE: ICD-10-CM

## 2021-07-22 DIAGNOSIS — E11.49 TYPE II DIABETES MELLITUS WITH NEUROLOGICAL MANIFESTATIONS: Chronic | ICD-10-CM

## 2021-07-22 DIAGNOSIS — I67.2 CEREBRAL ATHEROSCLEROSIS: ICD-10-CM

## 2021-07-22 DIAGNOSIS — I10 HYPERTENSION, ESSENTIAL: Chronic | ICD-10-CM

## 2021-07-22 DIAGNOSIS — K59.00 CONSTIPATION, UNSPECIFIED CONSTIPATION TYPE: ICD-10-CM

## 2021-07-22 DIAGNOSIS — N18.30 DIABETES MELLITUS WITH STAGE 3 CHRONIC KIDNEY DISEASE: Chronic | ICD-10-CM

## 2021-07-22 DIAGNOSIS — E66.3 OVERWEIGHT (BMI 25.0-29.9): ICD-10-CM

## 2021-07-22 DIAGNOSIS — M46.96 UNSPECIFIED INFLAMMATORY SPONDYLOPATHY, LUMBAR REGION: ICD-10-CM

## 2021-07-22 DIAGNOSIS — I51.89 LEFT VENTRICULAR DIASTOLIC DYSFUNCTION WITH PRESERVED SYSTOLIC FUNCTION: ICD-10-CM

## 2021-07-22 DIAGNOSIS — I48.91 ATRIAL FIBRILLATION, UNSPECIFIED TYPE: ICD-10-CM

## 2021-07-22 DIAGNOSIS — Z00.00 ANNUAL PHYSICAL EXAM: Primary | ICD-10-CM

## 2021-07-22 DIAGNOSIS — I70.0 AORTIC ARCH ATHEROSCLEROSIS: Chronic | ICD-10-CM

## 2021-07-22 DIAGNOSIS — D57.3 HEMOGLOBIN S TRAIT: ICD-10-CM

## 2021-07-22 DIAGNOSIS — I77.9 LEFT-SIDED CAROTID ARTERY DISEASE, UNSPECIFIED TYPE: ICD-10-CM

## 2021-07-22 PROCEDURE — 72100 XR LUMBAR SPINE AP AND LATERAL: ICD-10-PCS | Mod: 26,,, | Performed by: RADIOLOGY

## 2021-07-22 PROCEDURE — 72100 X-RAY EXAM L-S SPINE 2/3 VWS: CPT | Mod: 26,,, | Performed by: RADIOLOGY

## 2021-07-22 PROCEDURE — 3051F HG A1C>EQUAL 7.0%<8.0%: CPT | Mod: CPTII,S$GLB,, | Performed by: INTERNAL MEDICINE

## 2021-07-22 PROCEDURE — 1101F PR PT FALLS ASSESS DOC 0-1 FALLS W/OUT INJ PAST YR: ICD-10-PCS | Mod: CPTII,S$GLB,, | Performed by: INTERNAL MEDICINE

## 2021-07-22 PROCEDURE — 3075F PR MOST RECENT SYSTOLIC BLOOD PRESS GE 130-139MM HG: ICD-10-PCS | Mod: CPTII,S$GLB,, | Performed by: INTERNAL MEDICINE

## 2021-07-22 PROCEDURE — 99999 PR PBB SHADOW E&M-EST. PATIENT-LVL V: CPT | Mod: PBBFAC,,, | Performed by: INTERNAL MEDICINE

## 2021-07-22 PROCEDURE — 1125F AMNT PAIN NOTED PAIN PRSNT: CPT | Mod: CPTII,S$GLB,, | Performed by: INTERNAL MEDICINE

## 2021-07-22 PROCEDURE — 99214 OFFICE O/P EST MOD 30 MIN: CPT | Mod: S$GLB,,, | Performed by: INTERNAL MEDICINE

## 2021-07-22 PROCEDURE — 3288F PR FALLS RISK ASSESSMENT DOCUMENTED: ICD-10-PCS | Mod: CPTII,S$GLB,, | Performed by: INTERNAL MEDICINE

## 2021-07-22 PROCEDURE — 3078F DIAST BP <80 MM HG: CPT | Mod: CPTII,S$GLB,, | Performed by: INTERNAL MEDICINE

## 2021-07-22 PROCEDURE — 1125F PR PAIN SEVERITY QUANTIFIED, PAIN PRESENT: ICD-10-PCS | Mod: CPTII,S$GLB,, | Performed by: INTERNAL MEDICINE

## 2021-07-22 PROCEDURE — 99499 UNLISTED E&M SERVICE: CPT | Mod: S$GLB,,, | Performed by: INTERNAL MEDICINE

## 2021-07-22 PROCEDURE — 99999 PR PBB SHADOW E&M-EST. PATIENT-LVL V: ICD-10-PCS | Mod: PBBFAC,,, | Performed by: INTERNAL MEDICINE

## 2021-07-22 PROCEDURE — 72100 X-RAY EXAM L-S SPINE 2/3 VWS: CPT | Mod: TC

## 2021-07-22 PROCEDURE — 99214 PR OFFICE/OUTPT VISIT, EST, LEVL IV, 30-39 MIN: ICD-10-PCS | Mod: S$GLB,,, | Performed by: INTERNAL MEDICINE

## 2021-07-22 PROCEDURE — 3075F SYST BP GE 130 - 139MM HG: CPT | Mod: CPTII,S$GLB,, | Performed by: INTERNAL MEDICINE

## 2021-07-22 PROCEDURE — 3288F FALL RISK ASSESSMENT DOCD: CPT | Mod: CPTII,S$GLB,, | Performed by: INTERNAL MEDICINE

## 2021-07-22 PROCEDURE — 3078F PR MOST RECENT DIASTOLIC BLOOD PRESSURE < 80 MM HG: ICD-10-PCS | Mod: CPTII,S$GLB,, | Performed by: INTERNAL MEDICINE

## 2021-07-22 PROCEDURE — 1101F PT FALLS ASSESS-DOCD LE1/YR: CPT | Mod: CPTII,S$GLB,, | Performed by: INTERNAL MEDICINE

## 2021-07-22 PROCEDURE — 99499 RISK ADDL DX/OHS AUDIT: ICD-10-PCS | Mod: S$GLB,,, | Performed by: INTERNAL MEDICINE

## 2021-07-22 PROCEDURE — 3051F PR MOST RECENT HEMOGLOBIN A1C LEVEL 7.0 - < 8.0%: ICD-10-PCS | Mod: CPTII,S$GLB,, | Performed by: INTERNAL MEDICINE

## 2021-07-22 RX ORDER — AMLODIPINE BESYLATE 5 MG/1
5 TABLET ORAL 2 TIMES DAILY
Qty: 180 TABLET | Refills: 3 | Status: SHIPPED | OUTPATIENT
Start: 2021-07-22 | End: 2022-06-14 | Stop reason: SDUPTHER

## 2021-07-23 ENCOUNTER — HOSPITAL ENCOUNTER (OUTPATIENT)
Dept: RADIOLOGY | Facility: HOSPITAL | Age: 81
Discharge: HOME | End: 2021-07-23
Attending: INTERNAL MEDICINE
Payer: MEDICARE

## 2021-07-23 ENCOUNTER — TELEPHONE (OUTPATIENT)
Dept: RADIOLOGY | Facility: HOSPITAL | Age: 81
End: 2021-07-23

## 2021-07-23 DIAGNOSIS — E04.1 THYROID NODULE: ICD-10-CM

## 2021-07-23 DIAGNOSIS — I77.9 LEFT-SIDED CAROTID ARTERY DISEASE, UNSPECIFIED TYPE: ICD-10-CM

## 2021-07-23 DIAGNOSIS — I67.2 CEREBRAL ATHEROSCLEROSIS: ICD-10-CM

## 2021-07-23 PROCEDURE — 76536 US SOFT TISSUE HEAD NECK THYROID: ICD-10-PCS | Mod: 26,,, | Performed by: RADIOLOGY

## 2021-07-23 PROCEDURE — 93880 EXTRACRANIAL BILAT STUDY: CPT | Mod: 26,59,, | Performed by: RADIOLOGY

## 2021-07-23 PROCEDURE — 93880 EXTRACRANIAL BILAT STUDY: CPT | Mod: TC

## 2021-07-23 PROCEDURE — 76536 US EXAM OF HEAD AND NECK: CPT | Mod: TC

## 2021-07-23 PROCEDURE — 76536 US EXAM OF HEAD AND NECK: CPT | Mod: 26,,, | Performed by: RADIOLOGY

## 2021-07-23 PROCEDURE — 93880 US CAROTID BILATERAL: ICD-10-PCS | Mod: 26,59,, | Performed by: RADIOLOGY

## 2021-07-29 ENCOUNTER — TELEPHONE (OUTPATIENT)
Dept: INTERNAL MEDICINE | Facility: CLINIC | Age: 81
End: 2021-07-29

## 2021-07-29 ENCOUNTER — TELEPHONE (OUTPATIENT)
Dept: RHEUMATOLOGY | Facility: CLINIC | Age: 81
End: 2021-07-29

## 2021-07-29 DIAGNOSIS — M54.9 BILATERAL BACK PAIN, UNSPECIFIED BACK LOCATION, UNSPECIFIED CHRONICITY: Primary | ICD-10-CM

## 2021-07-30 ENCOUNTER — TELEPHONE (OUTPATIENT)
Dept: PAIN MEDICINE | Facility: CLINIC | Age: 81
End: 2021-07-30

## 2021-08-03 ENCOUNTER — TELEPHONE (OUTPATIENT)
Dept: PHARMACY | Facility: CLINIC | Age: 81
End: 2021-08-03

## 2021-08-06 ENCOUNTER — HOSPITAL ENCOUNTER (OUTPATIENT)
Dept: CARDIOLOGY | Facility: HOSPITAL | Age: 81
Discharge: HOME | End: 2021-08-06
Attending: INTERNAL MEDICINE
Payer: MEDICARE

## 2021-08-06 VITALS — WEIGHT: 180 LBS | HEIGHT: 65 IN | BODY MASS INDEX: 29.99 KG/M2

## 2021-08-06 LAB
AORTIC ROOT ANNULUS: 3 CM
AV INDEX (PROSTH): 0.7
AV MEAN GRADIENT: 6 MMHG
AV PEAK GRADIENT: 11 MMHG
AV VALVE AREA: 2.19 CM2
AV VELOCITY RATIO: 0.72
BSA FOR ECHO PROCEDURE: 1.93 M2
CV ECHO LV RWT: 0.75 CM
DOP CALC AO PEAK VEL: 1.63 M/S
DOP CALC AO VTI: 35.66 CM
DOP CALC LVOT AREA: 3.1 CM2
DOP CALC LVOT DIAMETER: 2 CM
DOP CALC LVOT PEAK VEL: 1.17 M/S
DOP CALC LVOT STROKE VOLUME: 78 CM3
DOP CALC RVOT PEAK VEL: 0.95 M/S
DOP CALC RVOT VTI: 18.7 CM
DOP CALCLVOT PEAK VEL VTI: 24.84 CM
E WAVE DECELERATION TIME: 283.55 MSEC
E/A RATIO: 0.68
E/E' RATIO: 11.14 M/S
ECHO LV POSTERIOR WALL: 1.17 CM (ref 0.6–1.1)
EJECTION FRACTION: 60 %
FRACTIONAL SHORTENING: 22 % (ref 28–44)
INTERVENTRICULAR SEPTUM: 1.66 CM (ref 0.6–1.1)
IVRT: 94.2 MSEC
LA MAJOR: 4.82 CM
LA MINOR: 4.74 CM
LA WIDTH: 3.28 CM
LEFT ATRIUM SIZE: 2.88 CM
LEFT ATRIUM VOLUME INDEX: 20.3 ML/M2
LEFT ATRIUM VOLUME: 38.38 CM3
LEFT INTERNAL DIMENSION IN SYSTOLE: 2.44 CM (ref 2.1–4)
LEFT VENTRICLE DIASTOLIC VOLUME INDEX: 20.56 ML/M2
LEFT VENTRICLE DIASTOLIC VOLUME: 38.86 ML
LEFT VENTRICLE MASS INDEX: 80 G/M2
LEFT VENTRICLE SYSTOLIC VOLUME INDEX: 11.2 ML/M2
LEFT VENTRICLE SYSTOLIC VOLUME: 21.12 ML
LEFT VENTRICULAR INTERNAL DIMENSION IN DIASTOLE: 3.13 CM (ref 3.5–6)
LEFT VENTRICULAR MASS: 151.23 G
LV LATERAL E/E' RATIO: 9.75 M/S
LV SEPTAL E/E' RATIO: 13 M/S
MV PEAK A VEL: 1.14 M/S
MV PEAK E VEL: 0.78 M/S
PISA TR MAX VEL: 2.8 M/S
PV MEAN GRADIENT: 2 MMHG
PV PEAK VELOCITY: 1.15 CM/S
RA MAJOR: 4.46 CM
RA PRESSURE: 3 MMHG
RA WIDTH: 2.67 CM
RIGHT VENTRICULAR END-DIASTOLIC DIMENSION: 2.39 CM
SINUS: 2.32 CM
STJ: 2.29 CM
TDI LATERAL: 0.08 M/S
TDI SEPTAL: 0.06 M/S
TDI: 0.07 M/S
TR MAX PG: 31 MMHG
TRICUSPID ANNULAR PLANE SYSTOLIC EXCURSION: 1.87 CM
TV REST PULMONARY ARTERY PRESSURE: 34 MMHG

## 2021-08-06 PROCEDURE — 93306 TTE W/DOPPLER COMPLETE: CPT

## 2021-08-06 PROCEDURE — 93306 TTE W/DOPPLER COMPLETE: CPT | Mod: 26,,, | Performed by: INTERNAL MEDICINE

## 2021-08-06 PROCEDURE — 93306 ECHO (CUPID ONLY): ICD-10-PCS | Mod: 26,,, | Performed by: INTERNAL MEDICINE

## 2021-08-10 ENCOUNTER — OFFICE VISIT (OUTPATIENT)
Dept: RHEUMATOLOGY | Facility: CLINIC | Age: 81
End: 2021-08-10
Payer: MEDICARE

## 2021-08-10 VITALS
BODY MASS INDEX: 30.56 KG/M2 | HEIGHT: 65 IN | HEART RATE: 70 BPM | SYSTOLIC BLOOD PRESSURE: 134 MMHG | WEIGHT: 183.44 LBS | DIASTOLIC BLOOD PRESSURE: 65 MMHG

## 2021-08-10 DIAGNOSIS — Z71.89 COUNSELING ON HEALTH PROMOTION AND DISEASE PREVENTION: ICD-10-CM

## 2021-08-10 DIAGNOSIS — M15.9 PRIMARY OSTEOARTHRITIS INVOLVING MULTIPLE JOINTS: Primary | ICD-10-CM

## 2021-08-10 PROCEDURE — 1159F PR MEDICATION LIST DOCUMENTED IN MEDICAL RECORD: ICD-10-PCS | Mod: CPTII,S$GLB,, | Performed by: INTERNAL MEDICINE

## 2021-08-10 PROCEDURE — 1101F PR PT FALLS ASSESS DOC 0-1 FALLS W/OUT INJ PAST YR: ICD-10-PCS | Mod: CPTII,S$GLB,, | Performed by: INTERNAL MEDICINE

## 2021-08-10 PROCEDURE — 99999 PR PBB SHADOW E&M-EST. PATIENT-LVL V: CPT | Mod: PBBFAC,,, | Performed by: INTERNAL MEDICINE

## 2021-08-10 PROCEDURE — 99205 PR OFFICE/OUTPT VISIT, NEW, LEVL V, 60-74 MIN: ICD-10-PCS | Mod: S$GLB,,, | Performed by: INTERNAL MEDICINE

## 2021-08-10 PROCEDURE — 1159F MED LIST DOCD IN RCRD: CPT | Mod: CPTII,S$GLB,, | Performed by: INTERNAL MEDICINE

## 2021-08-10 PROCEDURE — 3075F PR MOST RECENT SYSTOLIC BLOOD PRESS GE 130-139MM HG: ICD-10-PCS | Mod: CPTII,S$GLB,, | Performed by: INTERNAL MEDICINE

## 2021-08-10 PROCEDURE — 3075F SYST BP GE 130 - 139MM HG: CPT | Mod: CPTII,S$GLB,, | Performed by: INTERNAL MEDICINE

## 2021-08-10 PROCEDURE — 99999 PR PBB SHADOW E&M-EST. PATIENT-LVL V: ICD-10-PCS | Mod: PBBFAC,,, | Performed by: INTERNAL MEDICINE

## 2021-08-10 PROCEDURE — 3078F PR MOST RECENT DIASTOLIC BLOOD PRESSURE < 80 MM HG: ICD-10-PCS | Mod: CPTII,S$GLB,, | Performed by: INTERNAL MEDICINE

## 2021-08-10 PROCEDURE — 3288F PR FALLS RISK ASSESSMENT DOCUMENTED: ICD-10-PCS | Mod: CPTII,S$GLB,, | Performed by: INTERNAL MEDICINE

## 2021-08-10 PROCEDURE — 3288F FALL RISK ASSESSMENT DOCD: CPT | Mod: CPTII,S$GLB,, | Performed by: INTERNAL MEDICINE

## 2021-08-10 PROCEDURE — 1101F PT FALLS ASSESS-DOCD LE1/YR: CPT | Mod: CPTII,S$GLB,, | Performed by: INTERNAL MEDICINE

## 2021-08-10 PROCEDURE — 3078F DIAST BP <80 MM HG: CPT | Mod: CPTII,S$GLB,, | Performed by: INTERNAL MEDICINE

## 2021-08-10 PROCEDURE — 1160F RVW MEDS BY RX/DR IN RCRD: CPT | Mod: CPTII,S$GLB,, | Performed by: INTERNAL MEDICINE

## 2021-08-10 PROCEDURE — 1125F PR PAIN SEVERITY QUANTIFIED, PAIN PRESENT: ICD-10-PCS | Mod: CPTII,S$GLB,, | Performed by: INTERNAL MEDICINE

## 2021-08-10 PROCEDURE — 1125F AMNT PAIN NOTED PAIN PRSNT: CPT | Mod: CPTII,S$GLB,, | Performed by: INTERNAL MEDICINE

## 2021-08-10 PROCEDURE — 1160F PR REVIEW ALL MEDS BY PRESCRIBER/CLIN PHARMACIST DOCUMENTED: ICD-10-PCS | Mod: CPTII,S$GLB,, | Performed by: INTERNAL MEDICINE

## 2021-08-10 PROCEDURE — 99205 OFFICE O/P NEW HI 60 MIN: CPT | Mod: S$GLB,,, | Performed by: INTERNAL MEDICINE

## 2021-08-10 RX ORDER — METHOCARBAMOL 500 MG/1
500 TABLET, FILM COATED ORAL NIGHTLY
Qty: 30 TABLET | Refills: 0 | Status: SHIPPED | OUTPATIENT
Start: 2021-08-10 | End: 2021-08-20

## 2021-08-17 ENCOUNTER — CLINICAL SUPPORT (OUTPATIENT)
Dept: REHABILITATION | Facility: HOSPITAL | Age: 81
End: 2021-08-17
Attending: INTERNAL MEDICINE
Payer: MEDICARE

## 2021-08-17 DIAGNOSIS — R29.898 WEAKNESS OF BOTH HIPS: ICD-10-CM

## 2021-08-17 DIAGNOSIS — M15.9 PRIMARY OSTEOARTHRITIS INVOLVING MULTIPLE JOINTS: Primary | ICD-10-CM

## 2021-08-17 DIAGNOSIS — M54.10 BILATERAL RADIATING LEG PAIN: ICD-10-CM

## 2021-08-17 PROCEDURE — 97110 THERAPEUTIC EXERCISES: CPT

## 2021-08-17 PROCEDURE — 97162 PT EVAL MOD COMPLEX 30 MIN: CPT

## 2021-08-18 ENCOUNTER — TELEPHONE (OUTPATIENT)
Dept: PAIN MEDICINE | Facility: CLINIC | Age: 81
End: 2021-08-18

## 2021-08-18 ENCOUNTER — PATIENT MESSAGE (OUTPATIENT)
Dept: PAIN MEDICINE | Facility: CLINIC | Age: 81
End: 2021-08-18

## 2021-08-18 PROBLEM — R29.898 HIP WEAKNESS: Status: ACTIVE | Noted: 2021-08-18

## 2021-08-18 PROBLEM — M54.10 BILATERAL RADIATING LEG PAIN: Status: ACTIVE | Noted: 2021-08-18

## 2021-08-23 ENCOUNTER — PATIENT OUTREACH (OUTPATIENT)
Dept: ADMINISTRATIVE | Facility: OTHER | Age: 81
End: 2021-08-23

## 2021-08-23 ENCOUNTER — TELEPHONE (OUTPATIENT)
Dept: PAIN MEDICINE | Facility: CLINIC | Age: 81
End: 2021-08-23

## 2021-08-24 ENCOUNTER — TELEPHONE (OUTPATIENT)
Dept: PAIN MEDICINE | Facility: CLINIC | Age: 81
End: 2021-08-24

## 2021-08-24 ENCOUNTER — OFFICE VISIT (OUTPATIENT)
Dept: PAIN MEDICINE | Facility: CLINIC | Age: 81
End: 2021-08-24
Payer: MEDICARE

## 2021-08-24 ENCOUNTER — PATIENT MESSAGE (OUTPATIENT)
Dept: PAIN MEDICINE | Facility: CLINIC | Age: 81
End: 2021-08-24

## 2021-08-24 DIAGNOSIS — M54.9 BILATERAL BACK PAIN, UNSPECIFIED BACK LOCATION, UNSPECIFIED CHRONICITY: ICD-10-CM

## 2021-08-24 DIAGNOSIS — M43.06 SPONDYLOLYSIS OF LUMBAR REGION: Primary | ICD-10-CM

## 2021-08-24 PROCEDURE — 1160F RVW MEDS BY RX/DR IN RCRD: CPT | Mod: CPTII,95,, | Performed by: PHYSICAL MEDICINE & REHABILITATION

## 2021-08-24 PROCEDURE — 1159F PR MEDICATION LIST DOCUMENTED IN MEDICAL RECORD: ICD-10-PCS | Mod: CPTII,95,, | Performed by: PHYSICAL MEDICINE & REHABILITATION

## 2021-08-24 PROCEDURE — 1160F PR REVIEW ALL MEDS BY PRESCRIBER/CLIN PHARMACIST DOCUMENTED: ICD-10-PCS | Mod: CPTII,95,, | Performed by: PHYSICAL MEDICINE & REHABILITATION

## 2021-08-24 PROCEDURE — 99203 PR OFFICE/OUTPT VISIT, NEW, LEVL III, 30-44 MIN: ICD-10-PCS | Mod: 95,,, | Performed by: PHYSICAL MEDICINE & REHABILITATION

## 2021-08-24 PROCEDURE — 1159F MED LIST DOCD IN RCRD: CPT | Mod: CPTII,95,, | Performed by: PHYSICAL MEDICINE & REHABILITATION

## 2021-08-24 PROCEDURE — 99203 OFFICE O/P NEW LOW 30 MIN: CPT | Mod: 95,,, | Performed by: PHYSICAL MEDICINE & REHABILITATION

## 2021-08-25 ENCOUNTER — CLINICAL SUPPORT (OUTPATIENT)
Dept: REHABILITATION | Facility: HOSPITAL | Age: 81
End: 2021-08-25
Payer: MEDICARE

## 2021-08-25 ENCOUNTER — OFFICE VISIT (OUTPATIENT)
Dept: PODIATRY | Facility: CLINIC | Age: 81
End: 2021-08-25
Payer: MEDICARE

## 2021-08-25 VITALS
DIASTOLIC BLOOD PRESSURE: 66 MMHG | BODY MASS INDEX: 30.49 KG/M2 | SYSTOLIC BLOOD PRESSURE: 140 MMHG | WEIGHT: 183 LBS | HEART RATE: 57 BPM | HEIGHT: 65 IN

## 2021-08-25 DIAGNOSIS — M54.10 BILATERAL RADIATING LEG PAIN: ICD-10-CM

## 2021-08-25 DIAGNOSIS — R29.898 WEAKNESS OF BOTH HIPS: ICD-10-CM

## 2021-08-25 DIAGNOSIS — L60.0 ONYCHOCRYPTOSIS: ICD-10-CM

## 2021-08-25 DIAGNOSIS — L84 CORN OR CALLUS: ICD-10-CM

## 2021-08-25 DIAGNOSIS — B35.1 ONYCHOMYCOSIS DUE TO DERMATOPHYTE: ICD-10-CM

## 2021-08-25 DIAGNOSIS — M20.42 HAMMER TOES OF BOTH FEET: ICD-10-CM

## 2021-08-25 DIAGNOSIS — E11.49 TYPE II DIABETES MELLITUS WITH NEUROLOGICAL MANIFESTATIONS: Primary | ICD-10-CM

## 2021-08-25 DIAGNOSIS — M20.41 HAMMER TOES OF BOTH FEET: ICD-10-CM

## 2021-08-25 PROCEDURE — 97110 THERAPEUTIC EXERCISES: CPT

## 2021-08-25 PROCEDURE — 1101F PR PT FALLS ASSESS DOC 0-1 FALLS W/OUT INJ PAST YR: ICD-10-PCS | Mod: CPTII,S$GLB,, | Performed by: PODIATRIST

## 2021-08-25 PROCEDURE — 3077F SYST BP >= 140 MM HG: CPT | Mod: CPTII,S$GLB,, | Performed by: PODIATRIST

## 2021-08-25 PROCEDURE — 3078F DIAST BP <80 MM HG: CPT | Mod: CPTII,S$GLB,, | Performed by: PODIATRIST

## 2021-08-25 PROCEDURE — 99214 PR OFFICE/OUTPT VISIT, EST, LEVL IV, 30-39 MIN: ICD-10-PCS | Mod: 25,S$GLB,, | Performed by: PODIATRIST

## 2021-08-25 PROCEDURE — 1160F RVW MEDS BY RX/DR IN RCRD: CPT | Mod: CPTII,S$GLB,, | Performed by: PODIATRIST

## 2021-08-25 PROCEDURE — 3078F PR MOST RECENT DIASTOLIC BLOOD PRESSURE < 80 MM HG: ICD-10-PCS | Mod: CPTII,S$GLB,, | Performed by: PODIATRIST

## 2021-08-25 PROCEDURE — 3051F PR MOST RECENT HEMOGLOBIN A1C LEVEL 7.0 - < 8.0%: ICD-10-PCS | Mod: CPTII,S$GLB,, | Performed by: PODIATRIST

## 2021-08-25 PROCEDURE — 11056 PARNG/CUTG B9 HYPRKR LES 2-4: CPT | Mod: Q9,S$GLB,, | Performed by: PODIATRIST

## 2021-08-25 PROCEDURE — 11721 PR DEBRIDEMENT OF NAILS, 6 OR MORE: ICD-10-PCS | Mod: Q9,59,S$GLB, | Performed by: PODIATRIST

## 2021-08-25 PROCEDURE — 3288F FALL RISK ASSESSMENT DOCD: CPT | Mod: CPTII,S$GLB,, | Performed by: PODIATRIST

## 2021-08-25 PROCEDURE — 1101F PT FALLS ASSESS-DOCD LE1/YR: CPT | Mod: CPTII,S$GLB,, | Performed by: PODIATRIST

## 2021-08-25 PROCEDURE — 3288F PR FALLS RISK ASSESSMENT DOCUMENTED: ICD-10-PCS | Mod: CPTII,S$GLB,, | Performed by: PODIATRIST

## 2021-08-25 PROCEDURE — 1160F PR REVIEW ALL MEDS BY PRESCRIBER/CLIN PHARMACIST DOCUMENTED: ICD-10-PCS | Mod: CPTII,S$GLB,, | Performed by: PODIATRIST

## 2021-08-25 PROCEDURE — 1159F MED LIST DOCD IN RCRD: CPT | Mod: CPTII,S$GLB,, | Performed by: PODIATRIST

## 2021-08-25 PROCEDURE — 11056 PR TRIM BENIGN HYPERKERATOTIC SKIN LESION,2-4: ICD-10-PCS | Mod: Q9,S$GLB,, | Performed by: PODIATRIST

## 2021-08-25 PROCEDURE — 99999 PR PBB SHADOW E&M-EST. PATIENT-LVL V: CPT | Mod: PBBFAC,,, | Performed by: PODIATRIST

## 2021-08-25 PROCEDURE — 3051F HG A1C>EQUAL 7.0%<8.0%: CPT | Mod: CPTII,S$GLB,, | Performed by: PODIATRIST

## 2021-08-25 PROCEDURE — 1126F PR PAIN SEVERITY QUANTIFIED, NO PAIN PRESENT: ICD-10-PCS | Mod: CPTII,S$GLB,, | Performed by: PODIATRIST

## 2021-08-25 PROCEDURE — 1159F PR MEDICATION LIST DOCUMENTED IN MEDICAL RECORD: ICD-10-PCS | Mod: CPTII,S$GLB,, | Performed by: PODIATRIST

## 2021-08-25 PROCEDURE — 3077F PR MOST RECENT SYSTOLIC BLOOD PRESSURE >= 140 MM HG: ICD-10-PCS | Mod: CPTII,S$GLB,, | Performed by: PODIATRIST

## 2021-08-25 PROCEDURE — 99999 PR PBB SHADOW E&M-EST. PATIENT-LVL V: ICD-10-PCS | Mod: PBBFAC,,, | Performed by: PODIATRIST

## 2021-08-25 PROCEDURE — 99214 OFFICE O/P EST MOD 30 MIN: CPT | Mod: 25,S$GLB,, | Performed by: PODIATRIST

## 2021-08-25 PROCEDURE — 11721 DEBRIDE NAIL 6 OR MORE: CPT | Mod: Q9,59,S$GLB, | Performed by: PODIATRIST

## 2021-08-25 PROCEDURE — 1126F AMNT PAIN NOTED NONE PRSNT: CPT | Mod: CPTII,S$GLB,, | Performed by: PODIATRIST

## 2021-09-03 ENCOUNTER — CLINICAL SUPPORT (OUTPATIENT)
Dept: REHABILITATION | Facility: HOSPITAL | Age: 81
End: 2021-09-03
Payer: MEDICARE

## 2021-09-03 ENCOUNTER — HOSPITAL ENCOUNTER (OUTPATIENT)
Dept: RADIOLOGY | Facility: HOSPITAL | Age: 81
Discharge: HOME | End: 2021-09-03
Attending: INTERNAL MEDICINE
Payer: MEDICARE

## 2021-09-03 DIAGNOSIS — R29.898 WEAKNESS OF BOTH HIPS: ICD-10-CM

## 2021-09-03 DIAGNOSIS — M54.10 BILATERAL RADIATING LEG PAIN: ICD-10-CM

## 2021-09-03 DIAGNOSIS — Z12.31 VISIT FOR SCREENING MAMMOGRAM: ICD-10-CM

## 2021-09-03 PROCEDURE — 97110 THERAPEUTIC EXERCISES: CPT

## 2021-09-03 PROCEDURE — 97010 HOT OR COLD PACKS THERAPY: CPT

## 2021-09-07 ENCOUNTER — TELEPHONE (OUTPATIENT)
Dept: RHEUMATOLOGY | Facility: CLINIC | Age: 81
End: 2021-09-07

## 2021-09-07 ENCOUNTER — TELEPHONE (OUTPATIENT)
Dept: PAIN MEDICINE | Facility: CLINIC | Age: 81
End: 2021-09-07

## 2021-09-14 NOTE — TELEPHONE ENCOUNTER
Spoke with patient.  She will stop taking atorvastatin at this time.  I will coordinate with PCP and diabetes NP regarding re-start.    Wound Care Center Progress Note With Procedure    Josh Lin  AGE: 69 y.o. GENDER: male  : 1942  EPISODE DATE:  2021     Subjective:     Chief Complaint   Patient presents with    Wound Check     Lt foot         HISTORY of PRESENT ILLNESS      Josh Lin is a 69 y.o. male who presents today for wound evaluation of Chronic diabetic and pressure ulcer(s) of the left lateral foot. The ulcer is of mild severity. The underlying cause of the wound is pressure and diabetes. Moderately improved. Wound Pain Timing/Severity: mild  Quality of pain: tender, pressure  Severity of pain:  4 / 10   Modifying Factors: diabetes, chronic pressure, decreased mobility and shear force  Associated Signs/Symptoms: drainage and pain        PAST MEDICAL HISTORY        Diagnosis Date    Anxiety     BPH (benign prostatic hyperplasia)     CAD (coronary artery disease)     Constipation     Cyst     liver and pancreas    Debility     Diabetes mellitus ([address]     Diabetic ulcer of left foot associated with diabetes mellitus due to underlying condition, with fat layer exposed ([address] 2021    Diabetic ulcer of left foot associated with diabetes mellitus due to underlying condition, with muscle involvement without evidence of necrosis ([address] 2021    Dysphagia     GERD (gastroesophageal reflux disease)     Hyperlipidemia     Hypertension     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 No pertinent family history.     SOCIAL HISTORY    Social History Tobacco Use    Smoking status: Former Smoker     Quit date: 1997     Years since quittin.3    Smokeless tobacco: Never Used   Substance Use Topics    Alcohol use: No    Drug use: No       ALLERGIES    Allergies   Allergen Reactions    Calmoseptine [Menthol-Zinc Oxide]     Lyrica [Pregabalin] Other (See Comments)     Eyes cross    Pradaxa [Dabigatran Etexilate Mesylate] Other (See Comments)     Headache         MEDICATIONS    Current Outpatient Medications on File Prior to Encounter   Medication Sig Dispense Refill    cefadroxil (DURICEF) 1 g tablet Take 1 g by mouth once      oxyCODONE-acetaminophen (PERCOCET) 5-325 MG per tablet Take 1 tablet by mouth every 4 hours as needed for Pain.  pantoprazole (PROTONIX) 40 MG tablet Take 40 mg by mouth daily      tamsulosin (FLOMAX) 0.4 MG capsule Take 0.4 mg by mouth daily      vitamin B-6 (PYRIDOXINE) 100 MG tablet Take 100 mg by mouth daily      vitamin B-12 (CYANOCOBALAMIN) 1000 MCG tablet Take 1,000 mcg by mouth daily      guaiFENesin (MUCINEX) 600 MG SR tablet Take 1 tablet by mouth 2 times daily      nystatin (MYCOSTATIN) 324404 UNIT/GM powder Apply topically 4 times daily. 1 Bottle 1    oxyCODONE-acetaminophen (PERCOCET)  MG per tablet Take 1 tablet by mouth every 6 hours as needed for Pain      pravastatin (PRAVACHOL) 40 MG tablet Take 40 mg by mouth daily.  polyethylene glycol (MIRALAX) powder Take 17 g by mouth daily.  insulin glargine (LANTUS) 100 UNIT/ML injection vial Inject  into the skin nightly. Sliding scale -   If accucheck >200 = 30 units  If accucheck 101-200 = 20 units  Below 100 - NO Lantus      insulin aspart (NOVOLOG) 100 UNIT/ML injection vial Inject 18 Units into the skin 3 times daily (before meals). Plus sliding scale, with 18 units base      docusate sodium 100 MG CAPS Take 100 mg by mouth 2 times daily as needed for Constipation.  (Patient taking differently: Take 100 mg by mouth daily.)      sertraline (ZOLOFT) 25 MG tablet Take 2 tablets by mouth daily. (Patient taking differently: Take 100 mg by mouth daily )      trazodone (DESYREL) 50 MG tablet Take 1 tablet by mouth nightly as needed for Sleep. (Patient taking differently: Take 75 mg by mouth nightly as needed for Sleep.)       No current facility-administered medications on file prior to encounter. REVIEW OF SYSTEMS    Pertinent items are noted in HPI. Constitutional: Negative for systemic symptoms including fever, chills and malaise. Objective:      /63   Pulse 66   Temp 97.2 °F (36.2 °C) (Temporal)   Resp 18     PHYSICAL EXAM      General: The patient is in no acute distress. Mental status:  Patient is appropriate, is  oriented to place and plan of care. Dermatologic exam: Visual inspection of the periwound reveals the skin to be cool   Wound exam: see wound description below in procedure note      Assessment:     Problem List Items Addressed This Visit     WD-Osteomyelitis of great toe of left foot (HCC)    Relevant Medications    lidocaine (XYLOCAINE) 4 % external solution    Other Relevant Orders    Initiate Outpatient Wound Care Protocol    Diabetic ulcer of left foot associated with diabetes mellitus due to underlying condition, with fat layer exposed ([facility] - Primary    Relevant Medications    lidocaine (XYLOCAINE) 4 % external solution    Other Relevant Orders    Initiate Outpatient Wound Care Protocol        Procedure Note    Indications:  Based on my examination of this patient's wound(s) today, sharp excision into necrotic subcutaneous tissue is required to promote healing and evaluate the extent of previous healing.     Performed by: Monalisa Argueta MD    Consent obtained: Yes    Time out taken:  Yes    Pain Control: Anesthetic  Anesthetic: 4% Lidocaine Liquid Topical     Debridement:Excisional Debridement    Using #15 blade scalpel and forceps the wound(s) was/were sharply debrided down through and including the removal of subcutaneous tissue. Devitalized Tissue Debrided:  slough and exudate    Pre Debridement Measurements:  Are located in the Wound Documentation Flow Sheet    All active wounds listed below with today's date are evaluated  Wound(s)    debrided this date include # : 1     Post  Debridement Measurements:  Wound 10/13/17 Left flank fold (Active)   Number of days: 1431       Wound 10/13/17 Left ischium (Active)   Number of days: 1431       Wound 10/13/17 Right buttock (Active)   Number of days: 8399       Wound 07/13/21 Foot #1 Left Lateral Foot (Active)   Wound Image   09/14/21 1028   Wound Etiology Diabetic 07/13/21 1101   Dressing Status Clean;Dry; Intact 08/24/21 1107   Wound Cleansed Cleansed with saline 09/14/21 1028   Dressing/Treatment Silver dressing; Foam 08/10/21 1117   Offloading for Diabetic Foot Ulcers Yes (type) 09/14/21 1028   Wound Length (cm) 0.8 cm 09/14/21 1028   Wound Width (cm) 0.8 cm 09/14/21 1028   Wound Depth (cm) 0.1 cm 09/14/21 1028   Wound Surface Area (cm^2) 0.64 cm^2 09/14/21 1028   Change in Wound Size % (l*w) -30.61 09/14/21 1028   Wound Volume (cm^3) 0.064 cm^3 09/14/21 1028   Wound Healing % -31 09/14/21 1028   Post-Procedure Length (cm) 0.8 cm 09/14/21 1050   Post-Procedure Width (cm) 0.8 cm 09/14/21 1050   Post-Procedure Depth (cm) 0.1 cm 09/14/21 1050   Post-Procedure Surface Area (cm^2) 0.64 cm^2 09/14/21 1050   Post-Procedure Volume (cm^3) 0.064 cm^3 09/14/21 1050   Distance Tunneling (cm) 0 cm 09/14/21 1028   Tunneling Position ___ O'Clock 0 09/14/21 1028   Undermining Starts ___ O'Clock 0 09/14/21 1028   Undermining Ends___ O'Clock 0 09/14/21 1028   Undermining Maxium Distance (cm) 0 09/14/21 1028   Wound Assessment Eschar dry 09/14/21 1028   Drainage Amount Moderate 09/14/21 1028   Drainage Description Yellow 09/14/21 1028   Odor None 09/14/21 1028   Joann-wound Assessment Blanchable erythema 09/14/21 1028   Margins Attached edges 09/14/21 1028   Wound Thickness Description not for Pressure Injury Partial thickness 09/14/21 1028   Number of days: 63       Percent of Wound(s) Debrided: approximately 100%    Total  Area  Debrided:  0.6 sq cm     Bleeding:  Minimal    Hemostasis Achieved:  by pressure    Procedural Pain:  3  / 10     Post Procedural Pain:  0 / 10     Response to treatment:  Well tolerated by patient. Status of wound progress and description from last visit:   Slightly improved. Plan:       Discharge Instructions       PHYSICIAN ORDERS AND DISCHARGE INSTRUCTIONS     NOTE: Upon discharge from the [address], you will receive a patient experience survey. We would be grateful if you would take the time to fill this survey out.     Wound care order history:                 ROSA's   Right       Left    Date               Vascular studies:                 Cultures:  7/13/21              Antibiotics: Ortega Dub              HbA1c:                 Compression/Lymph Pumps:              Grafts:                  Continuing wound care orders and information:              Residence: Private              Continue [facility] health care with: [facility] week. Daughter (nurse) also lives with pt and helps with dressing changes              Your wound-care supplies will be provided by: [facility]                            ASUIO cleansing:                           FH not scrub or use excessive force.                          VKWS hands with soap and water before and after dressing changes.                           Prior to applying a clean dressing, cleanse wound with normal saline,                          wound cleanser, or mild soap and water.                           Ask your physician or nurse before getting the wound(s) wet in the shower.              Daily Wound management:                          Keep weight off wounds and reposition every 2 hours.                          EHFYU standing for long periods of time.                          IJJPU wraps/stockings in AM and remove at bedtime.                          Elevate legs to the level of the heart or above for 30 minutes 4-5 times a day and/or when sitting.                                               When taking antibiotics take entire prescription as ordered by MD do not stop taking until medicine is all gone.                             Orders for this week: (9/14/21)  Left Lateral Foot- wash with mild soap and water. Pat dry with 4x4  Apply rick to wound bed  Cover with foam border gauze  Wrap with Kerlix and secure with tape  Change every day.       Follow up with Dr Brendan Rai in 2 weeks in the wound care center  Call [phone] for any questions or concerns.   Date__________   Time____________          Treatment Note      Written Patient Dismissal Instructions Given            Electronically signed by Usman Villalta MD on 9/14/2021 at 11:04 AM

## 2021-09-15 DIAGNOSIS — E11.610 TYPE 2 DIABETES MELLITUS WITH DIABETIC NEUROPATHIC ARTHROPATHY: ICD-10-CM

## 2021-09-15 RX ORDER — METFORMIN HYDROCHLORIDE 500 MG/1
TABLET, EXTENDED RELEASE ORAL
Qty: 180 TABLET | Refills: 0 | Status: SHIPPED | OUTPATIENT
Start: 2021-09-15 | End: 2021-11-29 | Stop reason: SDUPTHER

## 2021-09-20 ENCOUNTER — TELEPHONE (OUTPATIENT)
Dept: INTERNAL MEDICINE | Facility: CLINIC | Age: 81
End: 2021-09-20

## 2021-09-24 ENCOUNTER — CLINICAL SUPPORT (OUTPATIENT)
Dept: REHABILITATION | Facility: HOSPITAL | Age: 81
End: 2021-09-24
Payer: MEDICARE

## 2021-09-24 DIAGNOSIS — R29.898 WEAKNESS OF BOTH HIPS: ICD-10-CM

## 2021-09-24 DIAGNOSIS — M54.10 BILATERAL RADIATING LEG PAIN: ICD-10-CM

## 2021-09-24 PROCEDURE — 97110 THERAPEUTIC EXERCISES: CPT

## 2021-09-27 ENCOUNTER — TELEPHONE (OUTPATIENT)
Dept: INTERNAL MEDICINE | Facility: CLINIC | Age: 81
End: 2021-09-27

## 2021-09-27 DIAGNOSIS — I10 HYPERTENSION, ESSENTIAL: Chronic | ICD-10-CM

## 2021-09-27 DIAGNOSIS — E11.22 DIABETES MELLITUS WITH STAGE 3 CHRONIC KIDNEY DISEASE: Chronic | ICD-10-CM

## 2021-09-27 DIAGNOSIS — N18.30 DIABETES MELLITUS WITH STAGE 3 CHRONIC KIDNEY DISEASE: Chronic | ICD-10-CM

## 2021-09-28 ENCOUNTER — OFFICE VISIT (OUTPATIENT)
Dept: INTERNAL MEDICINE | Facility: CLINIC | Age: 81
End: 2021-09-28
Payer: MEDICARE

## 2021-09-28 ENCOUNTER — IMMUNIZATION (OUTPATIENT)
Dept: INTERNAL MEDICINE | Facility: CLINIC | Age: 81
End: 2021-09-28
Payer: MEDICARE

## 2021-09-28 DIAGNOSIS — D57.3 SICKLE CELL TRAIT SYNDROME: ICD-10-CM

## 2021-09-28 DIAGNOSIS — N18.30 DIABETES MELLITUS WITH STAGE 3 CHRONIC KIDNEY DISEASE: Primary | ICD-10-CM

## 2021-09-28 DIAGNOSIS — R80.9 DIABETES MELLITUS WITH PROTEINURIA: Chronic | ICD-10-CM

## 2021-09-28 DIAGNOSIS — N18.30 STAGE 3 CHRONIC KIDNEY DISEASE, UNSPECIFIED WHETHER STAGE 3A OR 3B CKD: Chronic | ICD-10-CM

## 2021-09-28 DIAGNOSIS — E78.2 MIXED HYPERLIPIDEMIA: ICD-10-CM

## 2021-09-28 DIAGNOSIS — R68.89 ABNORMAL ANKLE BRACHIAL INDEX: ICD-10-CM

## 2021-09-28 DIAGNOSIS — D12.2 ADENOMATOUS POLYP OF ASCENDING COLON: ICD-10-CM

## 2021-09-28 DIAGNOSIS — I48.91 ATRIAL FIBRILLATION, UNSPECIFIED TYPE: ICD-10-CM

## 2021-09-28 DIAGNOSIS — E11.22 DIABETES MELLITUS WITH STAGE 3 CHRONIC KIDNEY DISEASE: Primary | ICD-10-CM

## 2021-09-28 DIAGNOSIS — I10 HYPERTENSION, ESSENTIAL: Chronic | ICD-10-CM

## 2021-09-28 DIAGNOSIS — M54.10 BILATERAL RADIATING LEG PAIN: ICD-10-CM

## 2021-09-28 DIAGNOSIS — M54.9 DORSALGIA, UNSPECIFIED: ICD-10-CM

## 2021-09-28 DIAGNOSIS — E11.49 TYPE II DIABETES MELLITUS WITH NEUROLOGICAL MANIFESTATIONS: Chronic | ICD-10-CM

## 2021-09-28 DIAGNOSIS — E04.2 MULTINODULAR THYROID: Chronic | ICD-10-CM

## 2021-09-28 DIAGNOSIS — E11.29 DIABETES MELLITUS WITH PROTEINURIA: Chronic | ICD-10-CM

## 2021-09-28 PROBLEM — R29.898 HIP WEAKNESS: Status: RESOLVED | Noted: 2021-08-18 | Resolved: 2021-09-28

## 2021-09-28 LAB
ALBUMIN/CREAT UR: 37 UG/MG (ref 0–30)
CREAT UR-MCNC: 100 MG/DL (ref 15–325)
MICROALBUMIN UR DL<=1MG/L-MCNC: 37 UG/ML

## 2021-09-28 PROCEDURE — 99999 PR PBB SHADOW E&M-EST. PATIENT-LVL V: ICD-10-PCS | Mod: PBBFAC,,, | Performed by: INTERNAL MEDICINE

## 2021-09-28 PROCEDURE — 3051F HG A1C>EQUAL 7.0%<8.0%: CPT | Mod: CPTII,S$GLB,, | Performed by: INTERNAL MEDICINE

## 2021-09-28 PROCEDURE — 3288F FALL RISK ASSESSMENT DOCD: CPT | Mod: CPTII,S$GLB,, | Performed by: INTERNAL MEDICINE

## 2021-09-28 PROCEDURE — 3075F PR MOST RECENT SYSTOLIC BLOOD PRESS GE 130-139MM HG: ICD-10-PCS | Mod: CPTII,S$GLB,, | Performed by: INTERNAL MEDICINE

## 2021-09-28 PROCEDURE — 1101F PT FALLS ASSESS-DOCD LE1/YR: CPT | Mod: CPTII,S$GLB,, | Performed by: INTERNAL MEDICINE

## 2021-09-28 PROCEDURE — 99499 UNLISTED E&M SERVICE: CPT | Mod: S$GLB,,, | Performed by: INTERNAL MEDICINE

## 2021-09-28 PROCEDURE — 3051F PR MOST RECENT HEMOGLOBIN A1C LEVEL 7.0 - < 8.0%: ICD-10-PCS | Mod: CPTII,S$GLB,, | Performed by: INTERNAL MEDICINE

## 2021-09-28 PROCEDURE — 99214 OFFICE O/P EST MOD 30 MIN: CPT | Mod: S$GLB,,, | Performed by: INTERNAL MEDICINE

## 2021-09-28 PROCEDURE — 99999 PR PBB SHADOW E&M-EST. PATIENT-LVL V: CPT | Mod: PBBFAC,,, | Performed by: INTERNAL MEDICINE

## 2021-09-28 PROCEDURE — 3075F SYST BP GE 130 - 139MM HG: CPT | Mod: CPTII,S$GLB,, | Performed by: INTERNAL MEDICINE

## 2021-09-28 PROCEDURE — 1159F MED LIST DOCD IN RCRD: CPT | Mod: CPTII,S$GLB,, | Performed by: INTERNAL MEDICINE

## 2021-09-28 PROCEDURE — 90694 FLU VACCINE - QUADRIVALENT - ADJUVANTED: ICD-10-PCS | Mod: S$GLB,,, | Performed by: INTERNAL MEDICINE

## 2021-09-28 PROCEDURE — G0008 FLU VACCINE - QUADRIVALENT - ADJUVANTED: ICD-10-PCS | Mod: S$GLB,,, | Performed by: INTERNAL MEDICINE

## 2021-09-28 PROCEDURE — G0008 ADMIN INFLUENZA VIRUS VAC: HCPCS | Mod: S$GLB,,, | Performed by: INTERNAL MEDICINE

## 2021-09-28 PROCEDURE — 90694 VACC AIIV4 NO PRSRV 0.5ML IM: CPT | Mod: S$GLB,,, | Performed by: INTERNAL MEDICINE

## 2021-09-28 PROCEDURE — 99214 PR OFFICE/OUTPT VISIT, EST, LEVL IV, 30-39 MIN: ICD-10-PCS | Mod: S$GLB,,, | Performed by: INTERNAL MEDICINE

## 2021-09-28 PROCEDURE — 1159F PR MEDICATION LIST DOCUMENTED IN MEDICAL RECORD: ICD-10-PCS | Mod: CPTII,S$GLB,, | Performed by: INTERNAL MEDICINE

## 2021-09-28 PROCEDURE — 1101F PR PT FALLS ASSESS DOC 0-1 FALLS W/OUT INJ PAST YR: ICD-10-PCS | Mod: CPTII,S$GLB,, | Performed by: INTERNAL MEDICINE

## 2021-09-28 PROCEDURE — 3078F PR MOST RECENT DIASTOLIC BLOOD PRESSURE < 80 MM HG: ICD-10-PCS | Mod: CPTII,S$GLB,, | Performed by: INTERNAL MEDICINE

## 2021-09-28 PROCEDURE — 1125F AMNT PAIN NOTED PAIN PRSNT: CPT | Mod: CPTII,S$GLB,, | Performed by: INTERNAL MEDICINE

## 2021-09-28 PROCEDURE — 3288F PR FALLS RISK ASSESSMENT DOCUMENTED: ICD-10-PCS | Mod: CPTII,S$GLB,, | Performed by: INTERNAL MEDICINE

## 2021-09-28 PROCEDURE — 3078F DIAST BP <80 MM HG: CPT | Mod: CPTII,S$GLB,, | Performed by: INTERNAL MEDICINE

## 2021-09-28 PROCEDURE — 99499 RISK ADDL DX/OHS AUDIT: ICD-10-PCS | Mod: S$GLB,,, | Performed by: INTERNAL MEDICINE

## 2021-09-28 PROCEDURE — 1125F PR PAIN SEVERITY QUANTIFIED, PAIN PRESENT: ICD-10-PCS | Mod: CPTII,S$GLB,, | Performed by: INTERNAL MEDICINE

## 2021-09-28 PROCEDURE — 82570 ASSAY OF URINE CREATININE: CPT | Performed by: INTERNAL MEDICINE

## 2021-09-29 VITALS
DIASTOLIC BLOOD PRESSURE: 68 MMHG | TEMPERATURE: 98 F | SYSTOLIC BLOOD PRESSURE: 132 MMHG | BODY MASS INDEX: 30.23 KG/M2 | WEIGHT: 181.44 LBS | OXYGEN SATURATION: 98 % | RESPIRATION RATE: 16 BRPM | HEIGHT: 65 IN | HEART RATE: 66 BPM

## 2021-09-30 ENCOUNTER — CLINICAL SUPPORT (OUTPATIENT)
Dept: REHABILITATION | Facility: HOSPITAL | Age: 81
End: 2021-09-30
Payer: MEDICARE

## 2021-09-30 ENCOUNTER — DOCUMENTATION ONLY (OUTPATIENT)
Dept: REHABILITATION | Facility: HOSPITAL | Age: 81
End: 2021-09-30

## 2021-09-30 DIAGNOSIS — M54.10 BILATERAL RADIATING LEG PAIN: ICD-10-CM

## 2021-09-30 PROCEDURE — 97110 THERAPEUTIC EXERCISES: CPT | Mod: CQ

## 2021-10-04 ENCOUNTER — PATIENT OUTREACH (OUTPATIENT)
Dept: ADMINISTRATIVE | Facility: OTHER | Age: 81
End: 2021-10-04

## 2021-10-05 RX ORDER — FLECAINIDE ACETATE 50 MG/1
TABLET ORAL
Qty: 60 TABLET | Refills: 1 | OUTPATIENT
Start: 2021-10-05

## 2021-10-08 ENCOUNTER — CLINICAL SUPPORT (OUTPATIENT)
Dept: REHABILITATION | Facility: HOSPITAL | Age: 81
End: 2021-10-08
Payer: MEDICARE

## 2021-10-08 DIAGNOSIS — M54.10 BILATERAL RADIATING LEG PAIN: ICD-10-CM

## 2021-10-08 PROCEDURE — 97110 THERAPEUTIC EXERCISES: CPT

## 2021-10-12 ENCOUNTER — CLINICAL SUPPORT (OUTPATIENT)
Dept: REHABILITATION | Facility: HOSPITAL | Age: 81
End: 2021-10-12
Payer: MEDICARE

## 2021-10-12 DIAGNOSIS — M54.10 BILATERAL RADIATING LEG PAIN: ICD-10-CM

## 2021-10-12 PROCEDURE — 97110 THERAPEUTIC EXERCISES: CPT

## 2021-10-13 ENCOUNTER — OFFICE VISIT (OUTPATIENT)
Dept: DERMATOLOGY | Facility: CLINIC | Age: 81
End: 2021-10-13
Payer: MEDICARE

## 2021-10-13 DIAGNOSIS — L80 VITILIGO: Primary | ICD-10-CM

## 2021-10-13 DIAGNOSIS — L60.3 BRITTLE NAILS: ICD-10-CM

## 2021-10-13 PROCEDURE — 99999 PR PBB SHADOW E&M-EST. PATIENT-LVL III: ICD-10-PCS | Mod: PBBFAC,,, | Performed by: STUDENT IN AN ORGANIZED HEALTH CARE EDUCATION/TRAINING PROGRAM

## 2021-10-13 PROCEDURE — 99214 OFFICE O/P EST MOD 30 MIN: CPT | Mod: S$GLB,,, | Performed by: STUDENT IN AN ORGANIZED HEALTH CARE EDUCATION/TRAINING PROGRAM

## 2021-10-13 PROCEDURE — 99999 PR PBB SHADOW E&M-EST. PATIENT-LVL III: CPT | Mod: PBBFAC,,, | Performed by: STUDENT IN AN ORGANIZED HEALTH CARE EDUCATION/TRAINING PROGRAM

## 2021-10-13 PROCEDURE — 1159F PR MEDICATION LIST DOCUMENTED IN MEDICAL RECORD: ICD-10-PCS | Mod: CPTII,S$GLB,, | Performed by: STUDENT IN AN ORGANIZED HEALTH CARE EDUCATION/TRAINING PROGRAM

## 2021-10-13 PROCEDURE — 1160F RVW MEDS BY RX/DR IN RCRD: CPT | Mod: CPTII,S$GLB,, | Performed by: STUDENT IN AN ORGANIZED HEALTH CARE EDUCATION/TRAINING PROGRAM

## 2021-10-13 PROCEDURE — 1160F PR REVIEW ALL MEDS BY PRESCRIBER/CLIN PHARMACIST DOCUMENTED: ICD-10-PCS | Mod: CPTII,S$GLB,, | Performed by: STUDENT IN AN ORGANIZED HEALTH CARE EDUCATION/TRAINING PROGRAM

## 2021-10-13 PROCEDURE — 1159F MED LIST DOCD IN RCRD: CPT | Mod: CPTII,S$GLB,, | Performed by: STUDENT IN AN ORGANIZED HEALTH CARE EDUCATION/TRAINING PROGRAM

## 2021-10-13 PROCEDURE — 99214 PR OFFICE/OUTPT VISIT, EST, LEVL IV, 30-39 MIN: ICD-10-PCS | Mod: S$GLB,,, | Performed by: STUDENT IN AN ORGANIZED HEALTH CARE EDUCATION/TRAINING PROGRAM

## 2021-10-19 ENCOUNTER — CLINICAL SUPPORT (OUTPATIENT)
Dept: REHABILITATION | Facility: HOSPITAL | Age: 81
End: 2021-10-19
Payer: MEDICARE

## 2021-10-19 DIAGNOSIS — M54.10 BILATERAL RADIATING LEG PAIN: ICD-10-CM

## 2021-10-19 PROCEDURE — 97110 THERAPEUTIC EXERCISES: CPT

## 2021-10-22 ENCOUNTER — TELEPHONE (OUTPATIENT)
Dept: ENDOSCOPY | Facility: HOSPITAL | Age: 81
End: 2021-10-22

## 2021-10-29 ENCOUNTER — TELEPHONE (OUTPATIENT)
Dept: ENDOSCOPY | Facility: HOSPITAL | Age: 81
End: 2021-10-29
Payer: MEDICARE

## 2021-11-01 ENCOUNTER — TELEPHONE (OUTPATIENT)
Dept: RHEUMATOLOGY | Facility: CLINIC | Age: 81
End: 2021-11-01
Payer: MEDICARE

## 2021-11-03 ENCOUNTER — TELEPHONE (OUTPATIENT)
Dept: INTERNAL MEDICINE | Facility: CLINIC | Age: 81
End: 2021-11-03
Payer: MEDICARE

## 2021-11-11 ENCOUNTER — OFFICE VISIT (OUTPATIENT)
Dept: RHEUMATOLOGY | Facility: CLINIC | Age: 81
End: 2021-11-11
Payer: MEDICARE

## 2021-11-11 VITALS
DIASTOLIC BLOOD PRESSURE: 77 MMHG | BODY MASS INDEX: 28.98 KG/M2 | SYSTOLIC BLOOD PRESSURE: 151 MMHG | HEIGHT: 65 IN | WEIGHT: 173.94 LBS | HEART RATE: 63 BPM

## 2021-11-11 DIAGNOSIS — M15.9 PRIMARY OSTEOARTHRITIS INVOLVING MULTIPLE JOINTS: Primary | ICD-10-CM

## 2021-11-11 DIAGNOSIS — Z71.89 COUNSELING ON HEALTH PROMOTION AND DISEASE PREVENTION: ICD-10-CM

## 2021-11-11 PROCEDURE — 99214 OFFICE O/P EST MOD 30 MIN: CPT | Mod: S$GLB,,, | Performed by: INTERNAL MEDICINE

## 2021-11-11 PROCEDURE — 1101F PR PT FALLS ASSESS DOC 0-1 FALLS W/OUT INJ PAST YR: ICD-10-PCS | Mod: CPTII,S$GLB,, | Performed by: INTERNAL MEDICINE

## 2021-11-11 PROCEDURE — 3288F PR FALLS RISK ASSESSMENT DOCUMENTED: ICD-10-PCS | Mod: CPTII,S$GLB,, | Performed by: INTERNAL MEDICINE

## 2021-11-11 PROCEDURE — 1159F PR MEDICATION LIST DOCUMENTED IN MEDICAL RECORD: ICD-10-PCS | Mod: CPTII,S$GLB,, | Performed by: INTERNAL MEDICINE

## 2021-11-11 PROCEDURE — 99999 PR PBB SHADOW E&M-EST. PATIENT-LVL IV: CPT | Mod: PBBFAC,,, | Performed by: INTERNAL MEDICINE

## 2021-11-11 PROCEDURE — 3077F SYST BP >= 140 MM HG: CPT | Mod: CPTII,S$GLB,, | Performed by: INTERNAL MEDICINE

## 2021-11-11 PROCEDURE — 1159F MED LIST DOCD IN RCRD: CPT | Mod: CPTII,S$GLB,, | Performed by: INTERNAL MEDICINE

## 2021-11-11 PROCEDURE — 99999 PR PBB SHADOW E&M-EST. PATIENT-LVL IV: ICD-10-PCS | Mod: PBBFAC,,, | Performed by: INTERNAL MEDICINE

## 2021-11-11 PROCEDURE — 3078F PR MOST RECENT DIASTOLIC BLOOD PRESSURE < 80 MM HG: ICD-10-PCS | Mod: CPTII,S$GLB,, | Performed by: INTERNAL MEDICINE

## 2021-11-11 PROCEDURE — 1125F AMNT PAIN NOTED PAIN PRSNT: CPT | Mod: CPTII,S$GLB,, | Performed by: INTERNAL MEDICINE

## 2021-11-11 PROCEDURE — 99214 PR OFFICE/OUTPT VISIT, EST, LEVL IV, 30-39 MIN: ICD-10-PCS | Mod: S$GLB,,, | Performed by: INTERNAL MEDICINE

## 2021-11-11 PROCEDURE — 1125F PR PAIN SEVERITY QUANTIFIED, PAIN PRESENT: ICD-10-PCS | Mod: CPTII,S$GLB,, | Performed by: INTERNAL MEDICINE

## 2021-11-11 PROCEDURE — 1101F PT FALLS ASSESS-DOCD LE1/YR: CPT | Mod: CPTII,S$GLB,, | Performed by: INTERNAL MEDICINE

## 2021-11-11 PROCEDURE — 3078F DIAST BP <80 MM HG: CPT | Mod: CPTII,S$GLB,, | Performed by: INTERNAL MEDICINE

## 2021-11-11 PROCEDURE — 3077F PR MOST RECENT SYSTOLIC BLOOD PRESSURE >= 140 MM HG: ICD-10-PCS | Mod: CPTII,S$GLB,, | Performed by: INTERNAL MEDICINE

## 2021-11-11 PROCEDURE — 3288F FALL RISK ASSESSMENT DOCD: CPT | Mod: CPTII,S$GLB,, | Performed by: INTERNAL MEDICINE

## 2021-11-11 RX ORDER — GABAPENTIN 100 MG/1
CAPSULE ORAL
Qty: 132 CAPSULE | Refills: 3 | Status: SHIPPED | OUTPATIENT
Start: 2021-11-11 | End: 2022-03-14

## 2021-11-15 ENCOUNTER — TELEPHONE (OUTPATIENT)
Dept: INTERNAL MEDICINE | Facility: CLINIC | Age: 81
End: 2021-11-15
Payer: MEDICARE

## 2021-11-29 ENCOUNTER — LAB VISIT (OUTPATIENT)
Dept: LAB | Facility: HOSPITAL | Age: 81
End: 2021-11-29
Attending: INTERNAL MEDICINE
Payer: MEDICARE

## 2021-11-29 ENCOUNTER — OFFICE VISIT (OUTPATIENT)
Dept: INTERNAL MEDICINE | Facility: CLINIC | Age: 81
End: 2021-11-29
Payer: MEDICARE

## 2021-11-29 VITALS
HEIGHT: 65 IN | BODY MASS INDEX: 29.38 KG/M2 | SYSTOLIC BLOOD PRESSURE: 136 MMHG | WEIGHT: 176.38 LBS | DIASTOLIC BLOOD PRESSURE: 72 MMHG

## 2021-11-29 DIAGNOSIS — E11.610 TYPE 2 DIABETES MELLITUS WITH DIABETIC NEUROPATHIC ARTHROPATHY: ICD-10-CM

## 2021-11-29 DIAGNOSIS — R68.89 ABNORMAL ANKLE BRACHIAL INDEX: ICD-10-CM

## 2021-11-29 DIAGNOSIS — N18.30 STAGE 3 CHRONIC KIDNEY DISEASE: ICD-10-CM

## 2021-11-29 DIAGNOSIS — E11.22 DIABETES MELLITUS WITH STAGE 3 CHRONIC KIDNEY DISEASE: Chronic | ICD-10-CM

## 2021-11-29 DIAGNOSIS — N18.30 STAGE 3 CHRONIC KIDNEY DISEASE, UNSPECIFIED WHETHER STAGE 3A OR 3B CKD: Chronic | ICD-10-CM

## 2021-11-29 DIAGNOSIS — M54.10 BILATERAL RADIATING LEG PAIN: Primary | ICD-10-CM

## 2021-11-29 DIAGNOSIS — I10 HYPERTENSION, ESSENTIAL: Chronic | ICD-10-CM

## 2021-11-29 DIAGNOSIS — N18.30 DIABETES MELLITUS WITH STAGE 3 CHRONIC KIDNEY DISEASE: Chronic | ICD-10-CM

## 2021-11-29 LAB
ALBUMIN SERPL BCP-MCNC: 3.7 G/DL (ref 3.5–5.2)
ANION GAP SERPL CALC-SCNC: 10 MMOL/L (ref 8–16)
BUN SERPL-MCNC: 15 MG/DL (ref 8–23)
CALCIUM SERPL-MCNC: 9.7 MG/DL (ref 8.7–10.5)
CHLORIDE SERPL-SCNC: 106 MMOL/L (ref 95–110)
CO2 SERPL-SCNC: 22 MMOL/L (ref 23–29)
CREAT SERPL-MCNC: 1.2 MG/DL (ref 0.5–1.4)
EST. GFR  (AFRICAN AMERICAN): 49.3 ML/MIN/1.73 M^2
EST. GFR  (NON AFRICAN AMERICAN): 42.8 ML/MIN/1.73 M^2
GLUCOSE SERPL-MCNC: 123 MG/DL (ref 70–110)
PHOSPHATE SERPL-MCNC: 2.9 MG/DL (ref 2.7–4.5)
POTASSIUM SERPL-SCNC: 4.5 MMOL/L (ref 3.5–5.1)
SODIUM SERPL-SCNC: 138 MMOL/L (ref 136–145)

## 2021-11-29 PROCEDURE — 99499 RISK ADDL DX/OHS AUDIT: ICD-10-PCS | Mod: S$GLB,,, | Performed by: INTERNAL MEDICINE

## 2021-11-29 PROCEDURE — 99999 PR PBB SHADOW E&M-EST. PATIENT-LVL III: ICD-10-PCS | Mod: PBBFAC,,, | Performed by: INTERNAL MEDICINE

## 2021-11-29 PROCEDURE — 36415 COLL VENOUS BLD VENIPUNCTURE: CPT | Performed by: INTERNAL MEDICINE

## 2021-11-29 PROCEDURE — 99214 PR OFFICE/OUTPT VISIT, EST, LEVL IV, 30-39 MIN: ICD-10-PCS | Mod: S$GLB,,, | Performed by: INTERNAL MEDICINE

## 2021-11-29 PROCEDURE — 99499 UNLISTED E&M SERVICE: CPT | Mod: S$GLB,,, | Performed by: INTERNAL MEDICINE

## 2021-11-29 PROCEDURE — 99214 OFFICE O/P EST MOD 30 MIN: CPT | Mod: S$GLB,,, | Performed by: INTERNAL MEDICINE

## 2021-11-29 PROCEDURE — 99999 PR PBB SHADOW E&M-EST. PATIENT-LVL III: CPT | Mod: PBBFAC,,, | Performed by: INTERNAL MEDICINE

## 2021-11-29 PROCEDURE — 80069 RENAL FUNCTION PANEL: CPT | Performed by: INTERNAL MEDICINE

## 2021-11-29 RX ORDER — METFORMIN HYDROCHLORIDE 500 MG/1
500 TABLET, EXTENDED RELEASE ORAL 2 TIMES DAILY WITH MEALS
Qty: 180 TABLET | Refills: 1
Start: 2021-11-29 | End: 2022-01-03

## 2021-11-29 RX ORDER — FAMOTIDINE 20 MG/1
20 TABLET, FILM COATED ORAL DAILY
COMMUNITY
Start: 2021-10-05 | End: 2023-05-16

## 2021-12-01 ENCOUNTER — OFFICE VISIT (OUTPATIENT)
Dept: NEPHROLOGY | Facility: CLINIC | Age: 81
End: 2021-12-01
Payer: MEDICARE

## 2021-12-01 VITALS
BODY MASS INDEX: 29.65 KG/M2 | SYSTOLIC BLOOD PRESSURE: 154 MMHG | DIASTOLIC BLOOD PRESSURE: 74 MMHG | RESPIRATION RATE: 20 BRPM | WEIGHT: 177.94 LBS | HEART RATE: 62 BPM | HEIGHT: 65 IN

## 2021-12-01 DIAGNOSIS — N18.30 STAGE 3 CHRONIC KIDNEY DISEASE, UNSPECIFIED WHETHER STAGE 3A OR 3B CKD: Chronic | ICD-10-CM

## 2021-12-01 PROCEDURE — 99999 PR PBB SHADOW E&M-EST. PATIENT-LVL IV: CPT | Mod: PBBFAC,,, | Performed by: INTERNAL MEDICINE

## 2021-12-01 PROCEDURE — 99999 PR PBB SHADOW E&M-EST. PATIENT-LVL IV: ICD-10-PCS | Mod: PBBFAC,,, | Performed by: INTERNAL MEDICINE

## 2021-12-01 PROCEDURE — 99214 OFFICE O/P EST MOD 30 MIN: CPT | Mod: S$GLB,,, | Performed by: INTERNAL MEDICINE

## 2021-12-01 PROCEDURE — 99214 PR OFFICE/OUTPT VISIT, EST, LEVL IV, 30-39 MIN: ICD-10-PCS | Mod: S$GLB,,, | Performed by: INTERNAL MEDICINE

## 2021-12-09 ENCOUNTER — DOCUMENTATION ONLY (OUTPATIENT)
Dept: REHABILITATION | Facility: HOSPITAL | Age: 81
End: 2021-12-09
Payer: MEDICARE

## 2021-12-10 ENCOUNTER — HOSPITAL ENCOUNTER (OUTPATIENT)
Dept: VASCULAR SURGERY | Facility: CLINIC | Age: 81
Discharge: HOME | End: 2021-12-10
Attending: INTERNAL MEDICINE
Payer: MEDICARE

## 2021-12-10 ENCOUNTER — HOSPITAL ENCOUNTER (OUTPATIENT)
Dept: RADIOLOGY | Facility: HOSPITAL | Age: 81
Discharge: HOME | End: 2021-12-10
Attending: INTERNAL MEDICINE
Payer: MEDICARE

## 2021-12-10 DIAGNOSIS — M54.9 DORSALGIA, UNSPECIFIED: ICD-10-CM

## 2021-12-10 DIAGNOSIS — R68.89 ABNORMAL ANKLE BRACHIAL INDEX: ICD-10-CM

## 2021-12-10 DIAGNOSIS — I73.9 PAD (PERIPHERAL ARTERY DISEASE): ICD-10-CM

## 2021-12-10 PROCEDURE — 72148 MRI LUMBAR SPINE WITHOUT CONTRAST: ICD-10-PCS | Mod: 26,,, | Performed by: RADIOLOGY

## 2021-12-10 PROCEDURE — 72148 MRI LUMBAR SPINE W/O DYE: CPT | Mod: TC

## 2021-12-10 PROCEDURE — 72148 MRI LUMBAR SPINE W/O DYE: CPT | Mod: 26,,, | Performed by: RADIOLOGY

## 2021-12-10 PROCEDURE — 93923 PR NON-INVASIVE PHYSIOLOGIC STUDY EXTREMITY 3 LEVELS: ICD-10-PCS | Mod: S$GLB,,, | Performed by: SURGERY

## 2021-12-10 PROCEDURE — 93923 UPR/LXTR ART STDY 3+ LVLS: CPT | Mod: S$GLB,,, | Performed by: SURGERY

## 2021-12-13 ENCOUNTER — TELEPHONE (OUTPATIENT)
Dept: INTERNAL MEDICINE | Facility: CLINIC | Age: 81
End: 2021-12-13
Payer: MEDICARE

## 2021-12-13 DIAGNOSIS — N28.1 RENAL CYST: Primary | ICD-10-CM

## 2021-12-14 ENCOUNTER — TELEPHONE (OUTPATIENT)
Dept: PHYSICAL MEDICINE AND REHAB | Facility: CLINIC | Age: 81
End: 2021-12-14
Payer: MEDICARE

## 2021-12-16 ENCOUNTER — TELEPHONE (OUTPATIENT)
Dept: PAIN MEDICINE | Facility: CLINIC | Age: 81
End: 2021-12-16
Payer: MEDICARE

## 2021-12-16 ENCOUNTER — TELEPHONE (OUTPATIENT)
Dept: RADIOLOGY | Facility: HOSPITAL | Age: 81
End: 2021-12-16
Payer: MEDICARE

## 2021-12-17 ENCOUNTER — HOSPITAL ENCOUNTER (OUTPATIENT)
Dept: RADIOLOGY | Facility: HOSPITAL | Age: 81
Discharge: HOME | End: 2021-12-17
Attending: INTERNAL MEDICINE
Payer: MEDICARE

## 2021-12-17 DIAGNOSIS — N28.1 RENAL CYST: ICD-10-CM

## 2021-12-17 DIAGNOSIS — N28.1 RENAL CYST, LEFT: ICD-10-CM

## 2021-12-17 PROCEDURE — 76770 US EXAM ABDO BACK WALL COMP: CPT | Mod: 26,,, | Performed by: RADIOLOGY

## 2021-12-17 PROCEDURE — 76770 US RETROPERITONEAL COMPLETE: ICD-10-PCS | Mod: 26,,, | Performed by: RADIOLOGY

## 2021-12-17 PROCEDURE — 76770 US EXAM ABDO BACK WALL COMP: CPT | Mod: TC

## 2021-12-21 ENCOUNTER — TELEPHONE (OUTPATIENT)
Dept: INTERNAL MEDICINE | Facility: CLINIC | Age: 81
End: 2021-12-21
Payer: MEDICARE

## 2021-12-21 DIAGNOSIS — N28.1 RENAL CYST, LEFT: Primary | ICD-10-CM

## 2021-12-27 ENCOUNTER — TELEPHONE (OUTPATIENT)
Dept: INTERNAL MEDICINE | Facility: CLINIC | Age: 81
End: 2021-12-27
Payer: MEDICARE

## 2021-12-27 DIAGNOSIS — N28.1 RENAL CYST: Primary | ICD-10-CM

## 2021-12-29 ENCOUNTER — TELEPHONE (OUTPATIENT)
Dept: PAIN MEDICINE | Facility: CLINIC | Age: 81
End: 2021-12-29
Payer: MEDICARE

## 2022-01-18 ENCOUNTER — TELEPHONE (OUTPATIENT)
Dept: PAIN MEDICINE | Facility: CLINIC | Age: 82
End: 2022-01-18
Payer: MEDICARE

## 2022-01-18 NOTE — TELEPHONE ENCOUNTER
Called patient and informed patient unfortunately her appointment on 01/19 was canceled . Pt understood. All questions answered.   Alexis Boyd, MA [facility]

## 2022-01-18 NOTE — TELEPHONE ENCOUNTER
----- Message from Analisa Ham sent at 1/18/2022  2:48 PM CST -----  Pt called in regards to her quinton that was candled om 01/19/2022, please give her a call back at .[phone]

## 2022-01-25 ENCOUNTER — PES CALL (OUTPATIENT)
Dept: ADMINISTRATIVE | Facility: CLINIC | Age: 82
End: 2022-01-25
Payer: MEDICARE

## 2022-01-26 ENCOUNTER — OFFICE VISIT (OUTPATIENT)
Dept: DERMATOLOGY | Facility: CLINIC | Age: 82
End: 2022-01-26
Payer: MEDICARE

## 2022-01-26 ENCOUNTER — TELEPHONE (OUTPATIENT)
Dept: DERMATOLOGY | Facility: CLINIC | Age: 82
End: 2022-01-26

## 2022-01-26 DIAGNOSIS — L60.3 BRITTLE NAILS: Primary | ICD-10-CM

## 2022-01-26 DIAGNOSIS — L80 VITILIGO: ICD-10-CM

## 2022-01-26 PROCEDURE — 1159F MED LIST DOCD IN RCRD: CPT | Mod: CPTII,95,, | Performed by: STUDENT IN AN ORGANIZED HEALTH CARE EDUCATION/TRAINING PROGRAM

## 2022-01-26 PROCEDURE — 1160F PR REVIEW ALL MEDS BY PRESCRIBER/CLIN PHARMACIST DOCUMENTED: ICD-10-PCS | Mod: CPTII,95,, | Performed by: STUDENT IN AN ORGANIZED HEALTH CARE EDUCATION/TRAINING PROGRAM

## 2022-01-26 PROCEDURE — 1159F PR MEDICATION LIST DOCUMENTED IN MEDICAL RECORD: ICD-10-PCS | Mod: CPTII,95,, | Performed by: STUDENT IN AN ORGANIZED HEALTH CARE EDUCATION/TRAINING PROGRAM

## 2022-01-26 PROCEDURE — 1160F RVW MEDS BY RX/DR IN RCRD: CPT | Mod: CPTII,95,, | Performed by: STUDENT IN AN ORGANIZED HEALTH CARE EDUCATION/TRAINING PROGRAM

## 2022-01-26 PROCEDURE — 99214 OFFICE O/P EST MOD 30 MIN: CPT | Mod: 95,,, | Performed by: STUDENT IN AN ORGANIZED HEALTH CARE EDUCATION/TRAINING PROGRAM

## 2022-01-26 PROCEDURE — 99214 PR OFFICE/OUTPT VISIT, EST, LEVL IV, 30-39 MIN: ICD-10-PCS | Mod: 95,,, | Performed by: STUDENT IN AN ORGANIZED HEALTH CARE EDUCATION/TRAINING PROGRAM

## 2022-01-26 NOTE — TELEPHONE ENCOUNTER
----- Message from Abigail Min sent at 1/26/2022  1:40 PM CST -----  Contact: self/[phone]  Patient is trying to get connected, please call her back at [phone]. Thanks/ar

## 2022-01-26 NOTE — PROGRESS NOTES
Patient Information  Name: Saul Mar  : 1940  MRN: [id number]     Referring Physician:  Dr. Shi ref. provider found   Primary Care Physician:  Dr. Penny Randolph MD   Date of Visit: 2022      Subjective:       Saul Mar is a 81 y.o. female who presents for   No chief complaint on file.    HPI  Patient with vitiligo and brittle nails, here for follow up. Has been using protopic ointment at night without any worsening.  She is also using OPI nail strengthener with improvement in brittleness.    Patient was last seen:Visit date not found     Prior notes by myself reviewed.   Clinical documentation obtained by nursing staff reviewed.    Review of Systems   Skin: Negative for itching and rash.        Objective:    Physical Exam   Constitutional: She appears well-developed and well-nourished. No distress.   Neurological: She is alert and oriented to person, place, and time. She is not disoriented.   Psychiatric: She has a normal mood and affect.   Skin:   Areas Examined (abnormalities noted in diagram):   Head / Face Inspection Performed  Neck Inspection Performed              Diagram Legend     Erythematous scaling macule/papule c/w actinic keratosis       Vascular papule c/w angioma      Pigmented verrucoid papule/plaque c/w seborrheic keratosis      Yellow umbilicated papule c/w sebaceous hyperplasia      Irregularly shaped tan macule c/w lentigo     1-2 mm smooth white papules consistent with Milia      Movable subcutaneous cyst with punctum c/w epidermal inclusion cyst      Subcutaneous movable cyst c/w pilar cyst      Firm pink to brown papule c/w dermatofibroma      Pedunculated fleshy papule(s) c/w skin tag(s)      Evenly pigmented macule c/w junctional nevus     Mildly variegated pigmented, slightly irregular-bordered macule c/w mildly atypical nevus      Flesh colored to evenly pigmented papule c/w intradermal nevus       Pink pearly papule/plaque c/w basal cell carcinoma  PRIMARY CARE PHYSICIAN:  Dr. Nuñez

 

DATE OF ADMISSION:  06/06/2018

 

DATE OF DISCHARGE:  06/08/2018

 

DISCHARGE DISPOSITION:  Home.

 

PRIMARY DISCHARGE DIAGNOSES:

1.  Unstable angina.

2.  History of coronary artery disease.

3.  Cocaine abuse.

4.  Tobacco abuse.

5.  Diabetes mellitus, type 2.

6.  Hypertension.

7.  Dyslipidemia.

8.  Chronic obstructive pulmonary disease.

9.  Peripheral artery disease.

10.  Osteoarthritis.

 

DISCHARGE MEDICATIONS:  Plavix 75 mg daily, amlodipine 10 mg daily, aspirin 81 mg daily, Lipitor 40 m
g at bedtime, Symbicort 80/4.5 two puffs twice a day, Carvedilol 25 mg twice a day, clonidine 0.1 mg 
twice daily, Lasix 20 mg daily, hydralazine 50 mg t.i.d., metformin 500 mg twice a day, Nitrostat 0.4
 sublingual p.r.n., Diovan 160 mg twice daily and Ventolin inhaler 2 puffs as needed every 4 hours.

 

PROCEDURES DONE DURING ADMISSION:  The patient had a nuclear stress test which was aborted due to the
 patient suffering an arrhythmia with ST segment changes.  There was a rest study done which was nonr
evealing.  The patient also had a cardiac catheterization showing severe native disease of the small 
vessels.  There was an occluded LAD, left circ and RCA.  There was a patent saphenous graft to the OM
 I, the LIMA was not imaged and there was recommendation for medical therapy.

 

CODE STATUS:  Full code.

 

ALLERGIES:  No known drug allergies.

 

HOSPITAL COURSE:  Ms. Villarreal is a pleasant 65-year-old female who presented to the emergency room wit
h complaints of chest pain.  She was placed in observation and ruled out and the plan was to have a n
uclear stress test; however, the patient developed some tachyarrhythmia with some ST segment changes 
which was associated with some chest pain.  For this reason, the stress test was aborted and Cardiolo
gy was consulted.  She underwent cardiac catheterization in which it was found that she had some sign
ificant coronary artery disease; however, it was not amenable to invasive intervention.  It was recom
mended that she be treated medically.  Her blood pressure was not adequately controlled and for this 
reason, amlodipine was added.  She also continues to smoke and therefore, smoking cessation was recom
mended and she was counseled on this as well.  She also continued to use cocaine recreationally on we
ekends and she was instructed on the dangers of this and the need to do to stop this as well, as well
 as to take her Lipitor on a regular basis.  At the time of discharge, she was pain free and will be 
discharged home in stable condition.      Erythematous hyperkeratotic cursted plaque c/w SCC      Surgical scar with no sign of skin cancer recurrence      Open and closed comedones      Inflammatory papules and pustules      Verrucoid papule consistent consistent with wart     Erythematous eczematous patches and plaques     Dystrophic onycholytic nail with subungual debris c/w onychomycosis     Umbilicated papule    Erythematous-base heme-crusted tan verrucoid plaque consistent with inflamed seborrheic keratosis     Erythematous Silvery Scaling Plaque c/w Psoriasis     See annotation      No images are attached to the encounter or orders placed in the encounter.    [] Data reviewed  [] Independent review of test  [] Management discussed with another provider    Assessment / Plan:        Vitiligo, stable  - Continue protopic ointment    Brittle nails, stable  - Continue OPI nail strengthener           LOS NUMBER AND COMPLEXITY OF PROBLEMS    COMPLEXITY OF DATA RISK TOTAL TIME (m)   78511  97533 [] 1 self-limited or minor problem [x] Minimal to none [] No treatment recommended or patient to monitor 15-29  10-19   17453  32779 Low  [] 2 or > self limited or minor problems  [] 1 stable chronic illness  [] 1 acute, uncomplicated illness or injury Limited (2)  [] Prior external notes from each unique source  [] Review result of each unique test  [] Order each unique test []  Low  OTC medications, minor skin biopsy 30-44 20-29   51974  49818 Moderate  []  1 or > chronic illness with progression, exacerbation or SE of treatment  [x]  2 or more stable chronic illnesses  []  1 acute illness with systemic symptoms  []  1 acute complicated injury  []  1 undiagnosed new problem with uncertain prognosis Moderate (1/3 below)  []  3 or more data items        *Now includes assessment requiring independent historian  []  Independent interpretation of a test  []  Discuss management/test with another provider Moderate  [x]  Prescription drug mgmt  []  Minor surgery with  risk discussed  []  Mgmt limited by social determinates 45-59  30-39   27388  29960 High  []  1 or more chronic illness with severe exacerbation, progression or SE of treatment  []  1 acute or chronic illness/injury that poses a threat to life or bodily function Extensive (2/3 below)  []  3 or more data items        *Now includes assessment requiring independent historian.  []  Independent interpretation of a test  []  Discuss management/test with another provider High  []  Major surgery with risk discussed  []  Drug therapy requiring intensive monitoring for toxicity  []  Hospitalization  []  Decision for DNR 60-74  40-54      No follow-ups on file.    Claudia Foley MD, FAAD  [facility]

## 2022-02-02 ENCOUNTER — LAB VISIT (OUTPATIENT)
Dept: LAB | Facility: HOSPITAL | Age: 82
End: 2022-02-02
Attending: INTERNAL MEDICINE
Payer: MEDICARE

## 2022-02-02 DIAGNOSIS — N18.30 STAGE 3 CHRONIC KIDNEY DISEASE, UNSPECIFIED WHETHER STAGE 3A OR 3B CKD: Chronic | ICD-10-CM

## 2022-02-02 LAB
ALBUMIN SERPL BCP-MCNC: 3.7 G/DL (ref 3.5–5.2)
ANION GAP SERPL CALC-SCNC: 8 MMOL/L (ref 8–16)
BUN SERPL-MCNC: 14 MG/DL (ref 8–23)
CALCIUM SERPL-MCNC: 9.8 MG/DL (ref 8.7–10.5)
CHLORIDE SERPL-SCNC: 102 MMOL/L (ref 95–110)
CO2 SERPL-SCNC: 28 MMOL/L (ref 23–29)
CREAT SERPL-MCNC: 1.3 MG/DL (ref 0.5–1.4)
EST. GFR  (AFRICAN AMERICAN): 44.8 ML/MIN/1.73 M^2
EST. GFR  (NON AFRICAN AMERICAN): 38.8 ML/MIN/1.73 M^2
GLUCOSE SERPL-MCNC: 133 MG/DL (ref 70–110)
PHOSPHATE SERPL-MCNC: 3.8 MG/DL (ref 2.7–4.5)
POTASSIUM SERPL-SCNC: 4.6 MMOL/L (ref 3.5–5.1)
SODIUM SERPL-SCNC: 138 MMOL/L (ref 136–145)

## 2022-02-02 PROCEDURE — 36415 COLL VENOUS BLD VENIPUNCTURE: CPT | Performed by: INTERNAL MEDICINE

## 2022-02-02 PROCEDURE — 80069 RENAL FUNCTION PANEL: CPT | Performed by: INTERNAL MEDICINE

## 2022-02-04 ENCOUNTER — TELEPHONE (OUTPATIENT)
Dept: PAIN MEDICINE | Facility: CLINIC | Age: 82
End: 2022-02-04
Payer: MEDICARE

## 2022-02-04 NOTE — TELEPHONE ENCOUNTER
Patient's information sent to me from Reyna Hidalgo RN the navigator for the  region.  Patient reached out for scheduling with [facility] through [facility] marketing campaign.  Patient lives here in [place] and would like to schedule her.  Called patient and set up appt with IPM.    Jennifer Shi RN

## 2022-02-07 ENCOUNTER — PATIENT OUTREACH (OUTPATIENT)
Dept: ADMINISTRATIVE | Facility: OTHER | Age: 82
End: 2022-02-07
Payer: MEDICARE

## 2022-02-07 NOTE — PROGRESS NOTES
Health Maintenance Due   Topic Date Due    Colonoscopy  09/22/2021    Hemoglobin A1c  01/22/2022     Updates were requested from care everywhere.  Chart was reviewed for overdue Proactive [facility] Encounters ([facility]) topics (CRS, Breast Cancer Screening, Eye exam)  Health Maintenance has been updated.  LINKS immunization registry triggered.  Immunizations were reconciled.

## 2022-02-08 ENCOUNTER — TELEPHONE (OUTPATIENT)
Dept: INTERNAL MEDICINE | Facility: CLINIC | Age: 82
End: 2022-02-08

## 2022-02-08 ENCOUNTER — TELEPHONE (OUTPATIENT)
Dept: NEPHROLOGY | Facility: CLINIC | Age: 82
End: 2022-02-08
Payer: MEDICARE

## 2022-02-08 ENCOUNTER — OFFICE VISIT (OUTPATIENT)
Dept: INTERNAL MEDICINE | Facility: CLINIC | Age: 82
End: 2022-02-08
Payer: MEDICARE

## 2022-02-08 ENCOUNTER — TELEPHONE (OUTPATIENT)
Dept: PAIN MEDICINE | Facility: CLINIC | Age: 82
End: 2022-02-08
Payer: MEDICARE

## 2022-02-08 VITALS
WEIGHT: 177.94 LBS | DIASTOLIC BLOOD PRESSURE: 78 MMHG | BODY MASS INDEX: 29.65 KG/M2 | HEIGHT: 65 IN | HEART RATE: 55 BPM | OXYGEN SATURATION: 99 % | SYSTOLIC BLOOD PRESSURE: 148 MMHG

## 2022-02-08 DIAGNOSIS — Z99.89 DEPENDENCE ON OTHER ENABLING MACHINES AND DEVICES: ICD-10-CM

## 2022-02-08 DIAGNOSIS — E11.49 TYPE II DIABETES MELLITUS WITH NEUROLOGICAL MANIFESTATIONS: ICD-10-CM

## 2022-02-08 DIAGNOSIS — N18.30 DIABETES MELLITUS WITH STAGE 3 CHRONIC KIDNEY DISEASE: ICD-10-CM

## 2022-02-08 DIAGNOSIS — I10 HYPERTENSION, UNSPECIFIED TYPE: ICD-10-CM

## 2022-02-08 DIAGNOSIS — Z00.00 ENCOUNTER FOR PREVENTIVE HEALTH EXAMINATION: Primary | ICD-10-CM

## 2022-02-08 DIAGNOSIS — M46.96 UNSPECIFIED INFLAMMATORY SPONDYLOPATHY, LUMBAR REGION: ICD-10-CM

## 2022-02-08 DIAGNOSIS — R26.9 ABNORMALITY OF GAIT AND MOBILITY: ICD-10-CM

## 2022-02-08 DIAGNOSIS — I70.0 CALCIFICATION OF AORTA: ICD-10-CM

## 2022-02-08 DIAGNOSIS — F41.8 MIXED ANXIETY DEPRESSIVE DISORDER: ICD-10-CM

## 2022-02-08 DIAGNOSIS — D57.3 SICKLE CELL TRAIT: ICD-10-CM

## 2022-02-08 DIAGNOSIS — Z86.73 HISTORY OF CVA (CEREBROVASCULAR ACCIDENT): ICD-10-CM

## 2022-02-08 DIAGNOSIS — R79.89 ELEVATED PARATHYROID HORMONE: ICD-10-CM

## 2022-02-08 DIAGNOSIS — E78.5 HYPERLIPIDEMIA ASSOCIATED WITH TYPE 2 DIABETES MELLITUS: ICD-10-CM

## 2022-02-08 DIAGNOSIS — R41.3 MEMORY DIFFICULTIES: ICD-10-CM

## 2022-02-08 DIAGNOSIS — H91.90 DECREASED HEARING, UNSPECIFIED LATERALITY: ICD-10-CM

## 2022-02-08 DIAGNOSIS — I48.91 ATRIAL FIBRILLATION, UNSPECIFIED TYPE: ICD-10-CM

## 2022-02-08 DIAGNOSIS — Z59.9 FINANCIAL DIFFICULTIES: ICD-10-CM

## 2022-02-08 DIAGNOSIS — D64.9 ANEMIA, UNSPECIFIED TYPE: ICD-10-CM

## 2022-02-08 DIAGNOSIS — I65.29 CAROTID ATHEROSCLEROSIS, UNSPECIFIED LATERALITY: ICD-10-CM

## 2022-02-08 DIAGNOSIS — I73.9 PAD (PERIPHERAL ARTERY DISEASE): ICD-10-CM

## 2022-02-08 DIAGNOSIS — R20.2 NUMBNESS AND TINGLING OF RIGHT HAND: ICD-10-CM

## 2022-02-08 DIAGNOSIS — E04.2 MULTIPLE THYROID NODULES: ICD-10-CM

## 2022-02-08 DIAGNOSIS — E11.69 HYPERLIPIDEMIA ASSOCIATED WITH TYPE 2 DIABETES MELLITUS: ICD-10-CM

## 2022-02-08 DIAGNOSIS — Z59.41 FOOD INSECURITY: ICD-10-CM

## 2022-02-08 DIAGNOSIS — E11.22 DIABETES MELLITUS WITH STAGE 3 CHRONIC KIDNEY DISEASE: ICD-10-CM

## 2022-02-08 DIAGNOSIS — R05.3 CHRONIC COUGH: ICD-10-CM

## 2022-02-08 DIAGNOSIS — Z74.09 OTHER REDUCED MOBILITY: ICD-10-CM

## 2022-02-08 DIAGNOSIS — R20.0 NUMBNESS AND TINGLING OF RIGHT HAND: ICD-10-CM

## 2022-02-08 PROCEDURE — 1160F PR REVIEW ALL MEDS BY PRESCRIBER/CLIN PHARMACIST DOCUMENTED: ICD-10-PCS | Mod: CPTII,S$GLB,, | Performed by: NURSE PRACTITIONER

## 2022-02-08 PROCEDURE — 1159F PR MEDICATION LIST DOCUMENTED IN MEDICAL RECORD: ICD-10-PCS | Mod: CPTII,S$GLB,, | Performed by: NURSE PRACTITIONER

## 2022-02-08 PROCEDURE — G0439 PR MEDICARE ANNUAL WELLNESS SUBSEQUENT VISIT: ICD-10-PCS | Mod: S$GLB,,, | Performed by: NURSE PRACTITIONER

## 2022-02-08 PROCEDURE — 1101F PT FALLS ASSESS-DOCD LE1/YR: CPT | Mod: CPTII,S$GLB,, | Performed by: NURSE PRACTITIONER

## 2022-02-08 PROCEDURE — 99999 PR PBB SHADOW E&M-EST. PATIENT-LVL V: CPT | Mod: PBBFAC,,, | Performed by: NURSE PRACTITIONER

## 2022-02-08 PROCEDURE — 1125F PR PAIN SEVERITY QUANTIFIED, PAIN PRESENT: ICD-10-PCS | Mod: CPTII,S$GLB,, | Performed by: NURSE PRACTITIONER

## 2022-02-08 PROCEDURE — 3077F SYST BP >= 140 MM HG: CPT | Mod: CPTII,S$GLB,, | Performed by: NURSE PRACTITIONER

## 2022-02-08 PROCEDURE — 99999 PR PBB SHADOW E&M-EST. PATIENT-LVL V: ICD-10-PCS | Mod: PBBFAC,,, | Performed by: NURSE PRACTITIONER

## 2022-02-08 PROCEDURE — 1159F MED LIST DOCD IN RCRD: CPT | Mod: CPTII,S$GLB,, | Performed by: NURSE PRACTITIONER

## 2022-02-08 PROCEDURE — 3078F PR MOST RECENT DIASTOLIC BLOOD PRESSURE < 80 MM HG: ICD-10-PCS | Mod: CPTII,S$GLB,, | Performed by: NURSE PRACTITIONER

## 2022-02-08 PROCEDURE — 3288F FALL RISK ASSESSMENT DOCD: CPT | Mod: CPTII,S$GLB,, | Performed by: NURSE PRACTITIONER

## 2022-02-08 PROCEDURE — G0439 PPPS, SUBSEQ VISIT: HCPCS | Mod: S$GLB,,, | Performed by: NURSE PRACTITIONER

## 2022-02-08 PROCEDURE — 3288F PR FALLS RISK ASSESSMENT DOCUMENTED: ICD-10-PCS | Mod: CPTII,S$GLB,, | Performed by: NURSE PRACTITIONER

## 2022-02-08 PROCEDURE — 1125F AMNT PAIN NOTED PAIN PRSNT: CPT | Mod: CPTII,S$GLB,, | Performed by: NURSE PRACTITIONER

## 2022-02-08 PROCEDURE — 3051F HG A1C>EQUAL 7.0%<8.0%: CPT | Mod: CPTII,S$GLB,, | Performed by: NURSE PRACTITIONER

## 2022-02-08 PROCEDURE — 3078F DIAST BP <80 MM HG: CPT | Mod: CPTII,S$GLB,, | Performed by: NURSE PRACTITIONER

## 2022-02-08 PROCEDURE — 1160F RVW MEDS BY RX/DR IN RCRD: CPT | Mod: CPTII,S$GLB,, | Performed by: NURSE PRACTITIONER

## 2022-02-08 PROCEDURE — 1170F FXNL STATUS ASSESSED: CPT | Mod: CPTII,S$GLB,, | Performed by: NURSE PRACTITIONER

## 2022-02-08 PROCEDURE — 3077F PR MOST RECENT SYSTOLIC BLOOD PRESSURE >= 140 MM HG: ICD-10-PCS | Mod: CPTII,S$GLB,, | Performed by: NURSE PRACTITIONER

## 2022-02-08 PROCEDURE — 1101F PR PT FALLS ASSESS DOC 0-1 FALLS W/OUT INJ PAST YR: ICD-10-PCS | Mod: CPTII,S$GLB,, | Performed by: NURSE PRACTITIONER

## 2022-02-08 PROCEDURE — 1170F PR FUNCTIONAL STATUS ASSESSED: ICD-10-PCS | Mod: CPTII,S$GLB,, | Performed by: NURSE PRACTITIONER

## 2022-02-08 PROCEDURE — 3051F PR MOST RECENT HEMOGLOBIN A1C LEVEL 7.0 - < 8.0%: ICD-10-PCS | Mod: CPTII,S$GLB,, | Performed by: NURSE PRACTITIONER

## 2022-02-08 SDOH — SOCIAL DETERMINANTS OF HEALTH (SDOH): FOOD INSECURITY: Z59.41

## 2022-02-08 SDOH — SOCIAL DETERMINANTS OF HEALTH (SDOH): PROBLEM RELATED TO HOUSING AND ECONOMIC CIRCUMSTANCES, UNSPECIFIED: Z59.9

## 2022-02-08 NOTE — TELEPHONE ENCOUNTER
----- Message from Florence Albrecht NP sent at 2/8/2022  1:08 PM CST -----  Regarding: FW: information  Contact: patient    ----- Message -----  From: Margarita Borges  Sent: 2/8/2022   1:06 PM CST  To: Florence Albrecht NP  Subject: information                                      Patient states that she has additional information to give to Miss Henriquez, please call her back  at [phone]

## 2022-02-08 NOTE — TELEPHONE ENCOUNTER
Pt forgot all about her appt after seeing other dr earlier and went home. She realized and tried to reschedule so we can put her in tomorrow per cooper , she scheduled pt in am. 2/8/22/sf

## 2022-02-08 NOTE — PROGRESS NOTES
"  Sual Mar presented for a  Medicare AWV and comprehensive Health Risk Assessment today. The following components were reviewed and updated:    · Medical history  · Family History  · Social history  · Allergies and Current Medications  · Health Risk Assessment  · Health Maintenance  · Care Team         ** See Completed Assessments for Annual Wellness Visit within the encounter summary.**         The following assessments were completed:  · Living Situation  · CAGE  · Depression Screening  · Timed Get Up and Go  · Whisper Test  · Cognitive Function Screening  · Nutrition Screening  · ADL Screening  · PAQ Screening        Vitals:    02/08/22 0902   BP: (!) 148/78   BP Location: Left arm   Patient Position: Sitting   Pulse: (!) 55   SpO2: 99%   Weight: 80.7 kg (177 lb 14.6 oz)   Height: 5' 5" (1.651 m)     Body mass index is 29.61 kg/m².  Physical Exam  Vitals and nursing note reviewed.   Constitutional:       Appearance: She is well-developed.   HENT:      Head: Normocephalic.   Cardiovascular:      Rate and Rhythm: Normal rate and regular rhythm.      Heart sounds: Normal heart sounds.   Pulmonary:      Effort: Pulmonary effort is normal. No respiratory distress.      Breath sounds: Normal breath sounds.   Abdominal:      Palpations: Abdomen is soft. There is no mass.      Tenderness: There is no abdominal tenderness.   Musculoskeletal:         General: Normal range of motion.   Skin:     General: Skin is warm and dry.   Neurological:      Mental Status: She is alert and oriented to person, place, and time.      Motor: No abnormal muscle tone.   Psychiatric:         Speech: Speech normal.         Behavior: Behavior normal.               Diagnoses and health risks identified today and associated recommendations/orders:    1. Encounter for preventive health examination  Colonoscopy scheduling number given to pt    Encouraged healthy diet and exercise as tolerated     2. Atrial fibrillation, unspecified " type  Stable. Continue current treatment plan as previously prescribed with your  Cardiologist.     3. Carotid atherosclerosis, unspecified laterality  US 7/21  Continue current treatment plan as previously prescribed with your  pcp     4. Hypertension, unspecified type  BP elevated at today's visit. Has not taken BP med but will as soon as she completes labs.  Advised patient to monitor BP (keep a log) and if continues to stay elevated (greater than 130/80) to follow up with PCP for further evaluation and treatment. She expressed understanding.       5. Calcification of aorta  cxr 1/18  Stable. Continue current treatment plan as previously prescribed with your  Cardiologist.     6. PAD (peripheral artery disease)  john 12/21  Reports leg pain on exertion, chronic.   Reports has upcoming spine specialist apt, as pcp and cards think it is caused more from back .  Stable. Continue current treatment plan as previously prescribed with your  Cardiologist.     7. Hyperlipidemia associated with type 2 diabetes mellitus  a1c 7.3  Lipid-stable.   Continue current treatment plan as previously prescribed with your  pcp and cardiologist.     8. Diabetes mellitus with stage 3 chronic kidney disease  Stable. Continue current treatment plan as previously prescribed with your  pcp and nephrologist.     9. Elevated parathyroid hormone  Advised to follow up with nephrologist for further evaluation and recommendations. Patient expressed understanding.      10. Anemia, unspecified type  Continue current treatment plan as previously prescribed with your  pcp     11. Sickle cell trait  Continue current treatment plan as previously prescribed with your  pcp     12. Multiple thyroid nodules  US 7/21  Continue current treatment plan as previously prescribed with your  pcp     13. Type II diabetes mellitus with neurological manifestations  Continue current treatment plan as previously prescribed with your  pcp and podiatrist    14. Mixed  anxiety depressive disorder  Continue current treatment plan as previously prescribed with your  pcp     15. Unspecified inflammatory spondylopathy, lumbar region  Continue current treatment plan as previously prescribed with your  pcp     16. Type 2 diabetes, uncontrolled, with background retinopathy with macular edema  Continue current treatment plan as previously prescribed with your  Ophthalmologist.     17. History of CVA (cerebrovascular accident)  Continue current treatment plan as previously prescribed with your  Providers.     18. Financial difficulties  [facility] financial resources information page given to patient.    - Ambulatory referral/consult to Outpatient Case Management    19. Food insecurity  - Ambulatory referral/consult to Outpatient Case Management    20. Decreased hearing, unspecified laterality  Abnormal whisper test-right.   Advised to follow up with PCP for further evaluation and recommendations. Patient expressed understanding.     - Ambulatory referral/consult to Audiology; Future    21. Memory difficulties  Reported per pt  normal cognitive function screening.    Advised to follow up with PCP for further evaluation and recommendations. Patient expressed understanding.      22. Numbness and tingling of right hand  Ongoing for 4-6 months  Advised to follow up with PCP for further evaluation and recommendations. Patient expressed understanding.      23. Chronic cough  Nonproductive  Reports she is at her respiratory baseline  Advised to follow up with PCP for further evaluation and recommendations. Patient expressed understanding.      24. Dependence on other enabling machines and devices  Abnormal timed get up and go test. Reports 1 fall in the last 12 months. Uses a walker to aid with ambulation.   Fall precautions reviewed with patient. Advised to follow up with PCP for further recommendations. Patient expressed understanding.       25. Abnormality of gait and mobility  See #24    26.  Other reduced mobility  See #24      Provided Reola with a 5-10 year written screening schedule and personal prevention plan. Recommendations were developed using the USPSTF age appropriate recommendations. Education, counseling, and referrals were provided as needed. After Visit Summary printed and given to patient which includes a list of additional screenings\tests needed.    Follow up in about 1 year (around 2/8/2023) for awv.    Florence Albrecht, NP  I offered to discuss advanced care planning, including how to pick a person who would make decisions for you if you were unable to make them for yourself, called a health care power of , and what kind of decisions you might make such as use of life sustaining treatments such as ventilators and tube feeding when faced with a life limiting illness recorded on a living will that they will need to know. (How you want to be cared for as you near the end of your natural life)     X Patient is interested in learning more about how to make advanced directives.  I provided them paperwork and offered to discuss this with them.

## 2022-02-08 NOTE — TELEPHONE ENCOUNTER
Per Florence Albrecht NP : Please advise to follow upwith PCP for memory as discussed in office and assist with rescheduling if needed.   Thanks,   CS     The patient has been notified of this information and all questions answered.

## 2022-02-08 NOTE — TELEPHONE ENCOUNTER
Ms Hughes called stating that when she left her AWV with Florence Albrecht she forgot to go upstairs to her kidney doctor appt with Dr Valenzuela at 11:30 am today. She wanted to let Florence know that she confuses mail address when writing letters. She stated that she called Dr Valenzuela's staff to see if she can be rescheduled earlier than April.

## 2022-02-08 NOTE — PATIENT INSTRUCTIONS
Counseling and Referral of Other Preventative  (Italic type indicates deductible and co-insurance are waived)    Patient Name: Saul Mar  Today's Date: 2/8/2022    Health Maintenance       Date Due Completion Date    Colonoscopy 09/22/2021 9/22/2016    Hemoglobin A1c 01/22/2022 7/22/2021    Eye Exam 04/14/2022 4/14/2021    Override on 2/22/2018: Done    Lipid Panel 07/22/2022 7/22/2021    Foot Exam 08/25/2022 8/25/2021 (Done)    Override on 8/25/2021: Done    Override on 2/9/2021: Done    Override on 7/8/2019: Done    Override on 4/8/2019: Done    Override on 9/12/2018: Done    Override on 5/7/2018: Done    Override on 5/31/2017: Done    Override on 1/12/2017: Done    Override on 4/7/2015: Done    Diabetes Urine Screening 09/28/2022 9/28/2021    Mammogram 02/09/2023 2/9/2021    DEXA SCAN 07/23/2025 7/23/2021    TETANUS VACCINE 11/29/2031 11/29/2021        Orders Placed This Encounter   Procedures    Ambulatory referral/consult to Audiology    Ambulatory referral/consult to Outpatient Case Management     The following information is provided to all patients.  This information is to help you find resources for any of the problems found today that may be affecting your health:                Living healthy guide: www.[facility].[place].[facility]      Understanding Diabetes: www.diabetes.org      Eating healthy: www.cdc.gov/healthyweight      CDC home safety checklist: www.cdc.gov/steadi/patient.html      Agency on Aging: www.goea.[place].[facility]      Alcoholics anonymous (AA): www.aa.org      Physical Activity: www.dameon.nih.gov/mw6bwew      Tobacco use: www.quitwithusla.org

## 2022-02-08 NOTE — TELEPHONE ENCOUNTER
----- Message from Analisa Ham sent at 2/8/2022 12:29 PM CST -----  Called requesting a sooner appt , please give the pt a call back at .[phone]     Thanks

## 2022-02-08 NOTE — TELEPHONE ENCOUNTER
----- Message from Anamika Rodriguez MA sent at 2/8/2022  9:57 AM CST -----  Regarding: Patient call back  Please call patient, she has questions regarding appointments with provider.

## 2022-02-08 NOTE — TELEPHONE ENCOUNTER
Per Florence Albrecht NP :Please advise to follow upwith PCP for memory as discussed in office and assist with rescheduling if needed.   Thanks,   CS     The patient has been notified of this information and all questions answered.

## 2022-02-08 NOTE — TELEPHONE ENCOUNTER
----- Message from Florence Albrecht NP sent at 2/8/2022 12:41 PM CST -----  Contact: self/[phone]  Can you please call patient? Let me know if I need to call her this afternoon after clinic.  CS    ----- Message -----  From: Jihan Blount  Sent: 2/8/2022  12:37 PM CST  To: Florence Albrecht NP    Pt called in regard to talking to the NP about her appointment she had this morning. Pt would like a call back.    Please advise

## 2022-02-08 NOTE — TELEPHONE ENCOUNTER
Added patient to wait list  . Pt understood. All questions answered.   Alexis Boyd, MA [facility]

## 2022-02-09 ENCOUNTER — OFFICE VISIT (OUTPATIENT)
Dept: NEPHROLOGY | Facility: CLINIC | Age: 82
End: 2022-02-09
Payer: MEDICARE

## 2022-02-09 VITALS
SYSTOLIC BLOOD PRESSURE: 130 MMHG | DIASTOLIC BLOOD PRESSURE: 52 MMHG | WEIGHT: 177.5 LBS | HEART RATE: 70 BPM | HEIGHT: 66 IN | BODY MASS INDEX: 28.53 KG/M2

## 2022-02-09 DIAGNOSIS — N28.1 RENAL CYST: Primary | ICD-10-CM

## 2022-02-09 PROCEDURE — 99215 PR OFFICE/OUTPT VISIT, EST, LEVL V, 40-54 MIN: ICD-10-PCS | Mod: S$GLB,,, | Performed by: INTERNAL MEDICINE

## 2022-02-09 PROCEDURE — 1159F PR MEDICATION LIST DOCUMENTED IN MEDICAL RECORD: ICD-10-PCS | Mod: CPTII,S$GLB,, | Performed by: INTERNAL MEDICINE

## 2022-02-09 PROCEDURE — 99999 PR PBB SHADOW E&M-EST. PATIENT-LVL IV: ICD-10-PCS | Mod: PBBFAC,,, | Performed by: INTERNAL MEDICINE

## 2022-02-09 PROCEDURE — 99499 UNLISTED E&M SERVICE: CPT | Mod: S$GLB,,, | Performed by: INTERNAL MEDICINE

## 2022-02-09 PROCEDURE — 99215 OFFICE O/P EST HI 40 MIN: CPT | Mod: S$GLB,,, | Performed by: INTERNAL MEDICINE

## 2022-02-09 PROCEDURE — 99499 RISK ADDL DX/OHS AUDIT: ICD-10-PCS | Mod: S$GLB,,, | Performed by: INTERNAL MEDICINE

## 2022-02-09 PROCEDURE — 3078F DIAST BP <80 MM HG: CPT | Mod: CPTII,S$GLB,, | Performed by: INTERNAL MEDICINE

## 2022-02-09 PROCEDURE — 1101F PT FALLS ASSESS-DOCD LE1/YR: CPT | Mod: CPTII,S$GLB,, | Performed by: INTERNAL MEDICINE

## 2022-02-09 PROCEDURE — 99999 PR PBB SHADOW E&M-EST. PATIENT-LVL IV: CPT | Mod: PBBFAC,,, | Performed by: INTERNAL MEDICINE

## 2022-02-09 PROCEDURE — 3288F FALL RISK ASSESSMENT DOCD: CPT | Mod: CPTII,S$GLB,, | Performed by: INTERNAL MEDICINE

## 2022-02-09 PROCEDURE — 3078F PR MOST RECENT DIASTOLIC BLOOD PRESSURE < 80 MM HG: ICD-10-PCS | Mod: CPTII,S$GLB,, | Performed by: INTERNAL MEDICINE

## 2022-02-09 PROCEDURE — 3075F SYST BP GE 130 - 139MM HG: CPT | Mod: CPTII,S$GLB,, | Performed by: INTERNAL MEDICINE

## 2022-02-09 PROCEDURE — 3288F PR FALLS RISK ASSESSMENT DOCUMENTED: ICD-10-PCS | Mod: CPTII,S$GLB,, | Performed by: INTERNAL MEDICINE

## 2022-02-09 PROCEDURE — 1125F AMNT PAIN NOTED PAIN PRSNT: CPT | Mod: CPTII,S$GLB,, | Performed by: INTERNAL MEDICINE

## 2022-02-09 PROCEDURE — 1125F PR PAIN SEVERITY QUANTIFIED, PAIN PRESENT: ICD-10-PCS | Mod: CPTII,S$GLB,, | Performed by: INTERNAL MEDICINE

## 2022-02-09 PROCEDURE — 1159F MED LIST DOCD IN RCRD: CPT | Mod: CPTII,S$GLB,, | Performed by: INTERNAL MEDICINE

## 2022-02-09 PROCEDURE — 3075F PR MOST RECENT SYSTOLIC BLOOD PRESS GE 130-139MM HG: ICD-10-PCS | Mod: CPTII,S$GLB,, | Performed by: INTERNAL MEDICINE

## 2022-02-09 PROCEDURE — 1101F PR PT FALLS ASSESS DOC 0-1 FALLS W/OUT INJ PAST YR: ICD-10-PCS | Mod: CPTII,S$GLB,, | Performed by: INTERNAL MEDICINE

## 2022-02-09 NOTE — PROGRESS NOTES
NEPHROLOGY CLINIC FOLLOWUP NOTE  Date of clinic visit: 2/9/22     REASON FOR FOLLOWUP AND CHIEF COMPLAINT: h/o of mild CKD and Proteinuria and history of diabetes mellitus as it relates to the kidney issues. Pt was referred sooner to us on f/u than expected for incidental finding of right kidney cyst     HISTORY OF PRESENT ILLNESS: Ms. 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 Chart was reviewed. Pt was last seen by us about in Dec 2021. Chart was reviewed. Pt has has LE pain chronically for which an MRI of the spine was ordered. A right kidney cyst was found incidentally, which on the MRI appears as a simple cyst. PCP then ordered a renal u/s which shows a small cyst which appears mildly complex. Pt has no back discomfort, no blood in urine reported. She has no other c/o's. No chest pain, no shortness of breath, no other pertinent issues. Labs and meds reviewed with pt. The MRI and u/s findings reviewed with pt.     PAST MEDICAL HISTORY:  1. CKD stage III.  2. Diabetes mellitus for 30 years.  3. Hypertension.  4. Diabetic retinopathy.  5. Diastolic congestive heart failure.  6. Degenerative joint disease.  7. Thyroid disease.  8. GERD.  9. Cerebrovascular disease/stroke.  10. Colon polyps in 2012.  11. Anxiety.  12. Glaucoma.     PAST SURGICAL HISTORY: Reviewed and unchanged from before. Please see initial    note from 07/25/2016.     FAMILY HISTORY: Reviewed. Grandmother with diabetes mellitus.     ALLERGIES: Reviewed, to Pravachol.     SOCIAL HISTORY: Negative for smoking. No alcohol use.     MEDICATIONS: Reviewed.      Current Outpatient Medications:     ALPRAZolam (XANAX) 0.5 MG tablet, TAKE 1 TABLET BY MOUTH NIGHTLY AS NEEDED FOR ANXIETY (MAY TAKE 1-2 TIMES WEEKLY FOR ANXIETY), Disp: 30 tablet, Rfl: 0    amLODIPine (NORVASC) 5 MG tablet, Take 1 tablet (5 mg total) by mouth 2 (two) times daily.,  Disp: 180 tablet, Rfl: 3    atorvastatin (LIPITOR) 80 MG tablet, TAKE 1 TABLET BY MOUTH EVERY DAY, Disp: 90 tablet, Rfl: 3    blood sugar diagnostic Strp, 1 strip by Misc.(Non-Drug; Combo Route) route 2 (two) times daily. Insurance preferred test stripes DX:E11.22, Disp: 100 strip, Rfl: 11    blood-glucose meter kit, Insurance preferred meter dx:E11.22, Disp: 1 each, Rfl: 0    brimonidine 0.2% (ALPHAGAN) 0.2 % Drop, Place 1 drop into both eyes 3 (three) times daily., Disp: 15 mL, Rfl: 1    cholecalciferol, vitamin D3, (VITAMIN D3) 1,000 unit capsule, Take 1 capsule (1,000 Units total) by mouth once daily., Disp: 30 capsule, Rfl: 12    famotidine (PEPCID) 20 MG tablet, 1 tablet at bedtime as needed, Disp: , Rfl:     ferrous sulfate (FEOSOL) 325 mg (65 mg iron) Tab tablet, Take 1 tablet (325 mg total) by mouth 2 (two) times daily., Disp: 180 tablet, Rfl: 3    flecainide (TAMBOCOR) 50 MG Tab, Take 1 tablet (50 mg total) by mouth 2 (two) times daily., Disp: 60 tablet, Rfl: 1    glimepiride (AMARYL) 4 MG tablet, TAKE 1 TABLET BY MOUTH IN THE MORNING WITH BREAKFAST, Disp: 90 tablet, Rfl: 1    hydroCHLOROthiazide (HYDRODIURIL) 12.5 MG Tab, TAKE 1 TABLET BY MOUTH EVERY DAY, Disp: 90 tablet, Rfl: 3    HYDROcodone-acetaminophen (NORCO) 5-325 mg per tablet, TAKE 1 TABLET BY MOUTH EVERY 8 (EIGHT) HOURS AS NEEDED FOR PAIN FOR UP TO 10 DOSES., Disp: , Rfl:     hydrocortisone 2.5 % cream, Apply topically 2 (two) times daily., Disp: 30 g, Rfl: 2    lancets (ACCU-CHEK SOFTCLIX LANCETS) Misc, 100 lancets by Misc.(Non-Drug; Combo Route) route 2 (two) times daily. Insurance preferred lancets Dx:E11.22, Disp: 100 each, Rfl: 11    levalbuterol (XOPENEX HFA) 45 mcg/actuation inhaler, INHALE 1-2 PUFFS INTO THE LUNGS EVERY 6 (SIX) HOURS AS NEEDED FOR WHEEZING. RESCUE, Disp: 15 Inhaler, Rfl: 12    losartan (COZAAR) 50 MG tablet, TAKE 1 TABLET BY MOUTH TWICE A DAY, Disp: 180 tablet, Rfl: 2    meclizine (ANTIVERT) 25 mg tablet,  Take 1 tablet (25 mg total) by mouth daily as needed for Dizziness., Disp: 30 tablet, Rfl: 0    metFORMIN (GLUCOPHAGE-XR) 500 MG ER 24hr tablet, TAKE 2 TABLETS BY MOUTH EVERY DAY, Disp: 180 tablet, Rfl: 1    metoprolol succinate (TOPROL-XL) 50 MG 24 hr tablet, TAKE 1 TABLET BY MOUTH EVERY DAY, Disp: 90 tablet, Rfl: 1    mupirocin (BACTROBAN) 2 % ointment, Apply topically 3 (three) times daily., Disp: 22 g, Rfl: 0    PROAIR HFA 90 mcg/actuation inhaler, INHALE 2 PUFFS INTO THE LUNGS EVERY 6 (SIX) HOURS AS NEEDED FOR WHEEZING. RESCUE, Disp: 8.5 g, Rfl: 12    tacrolimus (PROTOPIC) 0.1 % ointment, Apply topically 2 (two) times daily., Disp: 60 g, Rfl: 3    XARELTO 15 mg Tab, TAKE 1 TABLET (15 MG TOTAL) BY MOUTH DAILY WITH DINNER OR EVENING MEAL., Disp: 30 tablet, Rfl: 6    gabapentin (NEURONTIN) 100 MG capsule, Take 1 capsule (100 mg total) by mouth every evening for 14 days, THEN 1 capsule (100 mg total) 2 (two) times daily for 14 days, THEN 1 capsule (100 mg total) 3 (three) times daily. (Patient taking differently: Take 1 capsule (100 mg total) by mouth every evening for 14 days, THEN 1 capsule (100 mg total) 2 (two) times daily for 14 days, THEN 1 capsule (100 mg total) 3 (three) times daily.   Takes prn), Disp: 132 capsule, Rfl: 3     REVIEW OF SYSTEMS: No recent hospitalizations.  GENERAL: Negative.  HEAD, EYES, EARS, NOSE, AND THROAT: Negative.  CARDIAC: Negative.  PULMONARY: Negative.  GASTROINTESTINAL: Negative.  GENITOURINARY: Negative.  PSYCHOLOGICAL: Negative.  NEUROLOGICAL: Negative.  ENDOCRINE: As above, otherwise negative.  HEMATOLOGIC AND ONCOLOGIC: Negative.  The rest of the review of systems negative.     PHYSICAL EXAMINATION:  VITAL SIGNS: Blood pressure 130/52, pulse 70, weight 80.5 kg  Repeat /72  ambulatory unassisted  In NAD  Speech and thought process normal  No JVD  RRR with s1 and s2 audible  CTA B no rales, symmetric and unlabored  Abd soft  No edema in ext's bilaterally  Back and  "sides non-tender     LABORATORY: Labs are reviewed  BMP  Lab Results   Component Value Date     02/02/2022    K 4.6 02/02/2022     02/02/2022    CO2 28 02/02/2022    BUN 14 02/02/2022    CREATININE 1.3 02/02/2022    CALCIUM 9.8 02/02/2022    ANIONGAP 8 02/02/2022    ESTGFRAFRICA 44.8 (A) 02/02/2022    EGFRNONAA 38.8 (A) 02/02/2022      U p/Cr 0.65, from 0.25 g, from 2.1 g  U/a: no protein, no blood, no RBC's, no casts     HgA1C 7.3 %     Spine MRI reviewed    Renal u/s: 12/17/21 reviewed  Right kidney: The right kidney measures 10.7 cm. No cortical thinning. No loss of corticomedullary distinction. There is a small cyst at the upper pole measuring up to 12 mm which may be mildly complicated by low level internal echoes. Further characterization is limited by overall exam limitations as above.  No stone visualized.  No hydronephrosis.  Left kidney: The left kidney measures 10.8 cm. No cortical thinning. No loss of corticomedullary distinction. No mass lesions visualized.  Note that the left kidney is partially obscured by bowel gas artifact.  No definite correlate seen for previous MRI findings.  No stone visualized.  No hydronephrosis.        ASSESSMENT AND PLAN: This is a 81 year-old female with mild CKD, diabetes mellitus, who presents for followup sooner than expected for the finding of a right kidney cyst  The impression is as follows:     1. Renal. CKD stage 3. s Cr is stable, has not changed or worsened  Stable renal function. CKD stage 3  Likely reason for CKD is diabetic retinopathy   Proteinuria suspected due to diabetic nephropathy. Stable, improved with the ARB, continue   K normal    2. Renal cyst: in right kidney, no symptoms.   Is small, looks simple (on MRI), "may be mildly complex" per radiologist on u/s  Offered to send pt to urology, but also informed her that at present the cyst does not look sinister, and that even fully complex cysts only rarely become cancerous, and that the rate of " malignancy formation is so slow that in her relatively advanced age, this would not be of a major concern at this point.  Pt decided not to see urology  Recommend annual u/s and f/u.     3. Hypertension. Blood pressure controlled  Meds reviewed     4. DM-2: as above  Discussed with pt  Better control of DM, diet, activity discussed     5. The patient was noncompliant with diabetic management in the past  Life style issues has been reviewed with pt: ADA diet, physical activity, control wt.  Hemoglobin A1c is elevated but overall improved     PLANS AND RECOMMENDATIONS:   As discussed above  RTC 1 year  Opportunity for questions and discussion provided.  Total time spent 40 minutes including time needed to review the records, the   patient evaluation, documentation, discussion with the patient,   more than 50% of the time was spent on coordination of care and counseling.    Level V visit.     Giselle Valenzuela MD

## 2022-03-02 ENCOUNTER — CLINICAL SUPPORT (OUTPATIENT)
Dept: AUDIOLOGY | Facility: CLINIC | Age: 82
End: 2022-03-02
Payer: MEDICARE

## 2022-03-02 ENCOUNTER — OFFICE VISIT (OUTPATIENT)
Dept: PODIATRY | Facility: CLINIC | Age: 82
End: 2022-03-02
Payer: MEDICARE

## 2022-03-02 ENCOUNTER — LAB VISIT (OUTPATIENT)
Dept: LAB | Facility: HOSPITAL | Age: 82
End: 2022-03-02
Attending: INTERNAL MEDICINE
Payer: MEDICARE

## 2022-03-02 VITALS
WEIGHT: 177.5 LBS | HEIGHT: 66 IN | BODY MASS INDEX: 28.53 KG/M2 | DIASTOLIC BLOOD PRESSURE: 68 MMHG | SYSTOLIC BLOOD PRESSURE: 143 MMHG | HEART RATE: 62 BPM

## 2022-03-02 DIAGNOSIS — L84 CORN OR CALLUS: ICD-10-CM

## 2022-03-02 DIAGNOSIS — H90.3 SENSORINEURAL HEARING LOSS (SNHL) OF BOTH EARS: ICD-10-CM

## 2022-03-02 DIAGNOSIS — L60.0 ONYCHOCRYPTOSIS: ICD-10-CM

## 2022-03-02 DIAGNOSIS — B35.1 ONYCHOMYCOSIS DUE TO DERMATOPHYTE: ICD-10-CM

## 2022-03-02 DIAGNOSIS — E11.49 TYPE II DIABETES MELLITUS WITH NEUROLOGICAL MANIFESTATIONS: Primary | ICD-10-CM

## 2022-03-02 DIAGNOSIS — E11.49 TYPE II DIABETES MELLITUS WITH NEUROLOGICAL MANIFESTATIONS: Chronic | ICD-10-CM

## 2022-03-02 DIAGNOSIS — E78.2 MIXED HYPERLIPIDEMIA: ICD-10-CM

## 2022-03-02 LAB
ALBUMIN SERPL BCP-MCNC: 3.9 G/DL (ref 3.5–5.2)
ALP SERPL-CCNC: 76 U/L (ref 55–135)
ALT SERPL W/O P-5'-P-CCNC: 17 U/L (ref 10–44)
ANION GAP SERPL CALC-SCNC: 12 MMOL/L (ref 8–16)
AST SERPL-CCNC: 22 U/L (ref 10–40)
BILIRUB SERPL-MCNC: 0.5 MG/DL (ref 0.1–1)
BUN SERPL-MCNC: 18 MG/DL (ref 8–23)
CALCIUM SERPL-MCNC: 9.6 MG/DL (ref 8.7–10.5)
CHLORIDE SERPL-SCNC: 104 MMOL/L (ref 95–110)
CHOLEST SERPL-MCNC: 150 MG/DL (ref 120–199)
CHOLEST/HDLC SERPL: 3 {RATIO} (ref 2–5)
CO2 SERPL-SCNC: 23 MMOL/L (ref 23–29)
CREAT SERPL-MCNC: 1.2 MG/DL (ref 0.5–1.4)
EST. GFR  (AFRICAN AMERICAN): 49.3 ML/MIN/1.73 M^2
EST. GFR  (NON AFRICAN AMERICAN): 42.8 ML/MIN/1.73 M^2
ESTIMATED AVG GLUCOSE: 160 MG/DL (ref 68–131)
GLUCOSE SERPL-MCNC: 105 MG/DL (ref 70–110)
HBA1C MFR BLD: 7.2 % (ref 4–5.6)
HDLC SERPL-MCNC: 50 MG/DL (ref 40–75)
HDLC SERPL: 33.3 % (ref 20–50)
LDLC SERPL CALC-MCNC: 90 MG/DL (ref 63–159)
NONHDLC SERPL-MCNC: 100 MG/DL
POTASSIUM SERPL-SCNC: 4 MMOL/L (ref 3.5–5.1)
PROT SERPL-MCNC: 6.9 G/DL (ref 6–8.4)
SODIUM SERPL-SCNC: 139 MMOL/L (ref 136–145)
TRIGL SERPL-MCNC: 50 MG/DL (ref 30–150)

## 2022-03-02 PROCEDURE — 11721 PR DEBRIDEMENT OF NAILS, 6 OR MORE: ICD-10-PCS | Mod: 59,Q9,S$GLB, | Performed by: PODIATRIST

## 2022-03-02 PROCEDURE — 1159F PR MEDICATION LIST DOCUMENTED IN MEDICAL RECORD: ICD-10-PCS | Mod: CPTII,S$GLB,, | Performed by: PODIATRIST

## 2022-03-02 PROCEDURE — 3288F PR FALLS RISK ASSESSMENT DOCUMENTED: ICD-10-PCS | Mod: CPTII,S$GLB,, | Performed by: PODIATRIST

## 2022-03-02 PROCEDURE — 83036 HEMOGLOBIN GLYCOSYLATED A1C: CPT | Performed by: INTERNAL MEDICINE

## 2022-03-02 PROCEDURE — 11721 DEBRIDE NAIL 6 OR MORE: CPT | Mod: 59,Q9,S$GLB, | Performed by: PODIATRIST

## 2022-03-02 PROCEDURE — 92557 COMPREHENSIVE HEARING TEST: CPT | Mod: S$GLB,,, | Performed by: AUDIOLOGIST-HEARING AID FITTER

## 2022-03-02 PROCEDURE — 3288F FALL RISK ASSESSMENT DOCD: CPT | Mod: CPTII,S$GLB,, | Performed by: PODIATRIST

## 2022-03-02 PROCEDURE — 3051F PR MOST RECENT HEMOGLOBIN A1C LEVEL 7.0 - < 8.0%: ICD-10-PCS | Mod: CPTII,S$GLB,, | Performed by: PODIATRIST

## 2022-03-02 PROCEDURE — 3051F HG A1C>EQUAL 7.0%<8.0%: CPT | Mod: CPTII,S$GLB,, | Performed by: PODIATRIST

## 2022-03-02 PROCEDURE — 99999 PR PBB SHADOW E&M-EST. PATIENT-LVL I: ICD-10-PCS | Mod: PBBFAC,,, | Performed by: AUDIOLOGIST-HEARING AID FITTER

## 2022-03-02 PROCEDURE — 92567 PR TYMPA2METRY: ICD-10-PCS | Mod: S$GLB,,, | Performed by: AUDIOLOGIST-HEARING AID FITTER

## 2022-03-02 PROCEDURE — 1160F RVW MEDS BY RX/DR IN RCRD: CPT | Mod: CPTII,S$GLB,, | Performed by: PODIATRIST

## 2022-03-02 PROCEDURE — 99999 PR PBB SHADOW E&M-EST. PATIENT-LVL III: CPT | Mod: PBBFAC,,, | Performed by: PODIATRIST

## 2022-03-02 PROCEDURE — 3077F PR MOST RECENT SYSTOLIC BLOOD PRESSURE >= 140 MM HG: ICD-10-PCS | Mod: CPTII,S$GLB,, | Performed by: PODIATRIST

## 2022-03-02 PROCEDURE — 1160F PR REVIEW ALL MEDS BY PRESCRIBER/CLIN PHARMACIST DOCUMENTED: ICD-10-PCS | Mod: CPTII,S$GLB,, | Performed by: PODIATRIST

## 2022-03-02 PROCEDURE — 80061 LIPID PANEL: CPT | Performed by: INTERNAL MEDICINE

## 2022-03-02 PROCEDURE — 3077F SYST BP >= 140 MM HG: CPT | Mod: CPTII,S$GLB,, | Performed by: PODIATRIST

## 2022-03-02 PROCEDURE — 92567 TYMPANOMETRY: CPT | Mod: S$GLB,,, | Performed by: AUDIOLOGIST-HEARING AID FITTER

## 2022-03-02 PROCEDURE — 3078F DIAST BP <80 MM HG: CPT | Mod: CPTII,S$GLB,, | Performed by: PODIATRIST

## 2022-03-02 PROCEDURE — 1101F PR PT FALLS ASSESS DOC 0-1 FALLS W/OUT INJ PAST YR: ICD-10-PCS | Mod: CPTII,S$GLB,, | Performed by: PODIATRIST

## 2022-03-02 PROCEDURE — 80053 COMPREHEN METABOLIC PANEL: CPT | Performed by: INTERNAL MEDICINE

## 2022-03-02 PROCEDURE — 36415 COLL VENOUS BLD VENIPUNCTURE: CPT | Performed by: INTERNAL MEDICINE

## 2022-03-02 PROCEDURE — 11056 PARNG/CUTG B9 HYPRKR LES 2-4: CPT | Mod: Q9,S$GLB,, | Performed by: PODIATRIST

## 2022-03-02 PROCEDURE — 1159F MED LIST DOCD IN RCRD: CPT | Mod: CPTII,S$GLB,, | Performed by: PODIATRIST

## 2022-03-02 PROCEDURE — 99213 PR OFFICE/OUTPT VISIT, EST, LEVL III, 20-29 MIN: ICD-10-PCS | Mod: 25,S$GLB,, | Performed by: PODIATRIST

## 2022-03-02 PROCEDURE — 99213 OFFICE O/P EST LOW 20 MIN: CPT | Mod: 25,S$GLB,, | Performed by: PODIATRIST

## 2022-03-02 PROCEDURE — 92557 PR COMPREHENSIVE HEARING TEST: ICD-10-PCS | Mod: S$GLB,,, | Performed by: AUDIOLOGIST-HEARING AID FITTER

## 2022-03-02 PROCEDURE — 3078F PR MOST RECENT DIASTOLIC BLOOD PRESSURE < 80 MM HG: ICD-10-PCS | Mod: CPTII,S$GLB,, | Performed by: PODIATRIST

## 2022-03-02 PROCEDURE — 11056 PR TRIM BENIGN HYPERKERATOTIC SKIN LESION,2-4: ICD-10-PCS | Mod: Q9,S$GLB,, | Performed by: PODIATRIST

## 2022-03-02 PROCEDURE — 99999 PR PBB SHADOW E&M-EST. PATIENT-LVL I: CPT | Mod: PBBFAC,,, | Performed by: AUDIOLOGIST-HEARING AID FITTER

## 2022-03-02 PROCEDURE — 1101F PT FALLS ASSESS-DOCD LE1/YR: CPT | Mod: CPTII,S$GLB,, | Performed by: PODIATRIST

## 2022-03-02 PROCEDURE — 99999 PR PBB SHADOW E&M-EST. PATIENT-LVL III: ICD-10-PCS | Mod: PBBFAC,,, | Performed by: PODIATRIST

## 2022-03-02 RX ORDER — CARVEDILOL 12.5 MG/1
12.5 TABLET ORAL
COMMUNITY
Start: 2021-12-09

## 2022-03-02 NOTE — PROGRESS NOTES
Subjective:     Patient ID: Saul Mar is a 81 y.o. female.    Chief Complaint: Nail Care (6mo nail care, diabetic pt wears tennis shoes w/socks, PCP Dr. Randolph last seen 2-8-22)    Saul is a 81 y.o. female who presents to the clinic upon referral from Dr. Yuridia neri. provider found  for evaluation and treatment of diabetic feet. 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 Patient complaints is long toenails. Patient has no other pedal complaints at this time.      PCP: Penny Randolph MD    Date Last Seen by PCP: 02/08/2022    Current shoe gear: Tennis shoes    Hemoglobin A1C   Date Value Ref Range Status   07/22/2021 7.3 (H) 4.0 - 5.6 % Final     Comment:     ADA Screening Guidelines:  5.7-6.4%  Consistent with prediabetes  >or=6.5%  Consistent with diabetes    High levels of fetal hemoglobin interfere with the HbA1C  assay. Heterozygous hemoglobin variants (HbS, HgC, etc)do  not significantly interfere with this assay.   However, presence of multiple variants may affect accuracy.     03/16/2021 6.9 (H) 4.0 - 5.6 % Final     Comment:     ADA Screening Guidelines:  5.7-6.4%  Consistent with prediabetes  >or=6.5%  Consistent with diabetes    High levels of fetal hemoglobin interfere with the HbA1C  assay. Heterozygous hemoglobin variants (HbS, HgC, etc)do  not significantly interfere with this assay.   However, presence of multiple variants may affect accuracy.     06/25/2020 7.1 (H) 4.0 - 5.6 % Final     Comment:     ADA  Screening Guidelines:  5.7-6.4%  Consistent with prediabetes  >or=6.5%  Consistent with diabetes  High levels of fetal hemoglobin interfere with the HbA1C  assay. Heterozygous hemoglobin variants (HbS, HgC, etc)do  not significantly interfere with this assay.   However, presence of multiple variants may affect accuracy.                  Patient Active Problem List   Diagnosis    Mixed diabetic hyperlipidemia associated with type 2 diabetes mellitus    Degenerative disc disease    Colon polyp: tubular adenoma 5/13, repeated 2016 to be repeated 2021    Multinodular thyroid: thyroid u/s 7/16, stable 2017 and 2019, 2021    POAG (primary open-angle glaucoma) - Both Eyes    Amaurosis fugax    Nuclear sclerosis - Right Eye    Eyelid myokymia    Cerebral microvascular disease: stroke ? 1999 elsewhere; TIA 10/13    Vitamin D insufficiency    Left ventricular diastolic dysfunction with preserved systolic function    Hypertension, essential    Gastroesophageal reflux disease without esophagitis    Type 2 diabetes, uncontrolled, with background retinopathy with macular edema    Diabetes mellitus with proteinuria    Type II diabetes mellitus with neurological manifestations    Aortic arch atherosclerosis    Abnormal ankle brachial index    Diabetes mellitus with stage 3 chronic kidney disease    Cerebrovascular accident (CVA) due to thrombosis of precerebral artery    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    CKD (chronic kidney disease) stage 3, GFR 30-59 ml/min    Sickle cell trait syndrome    Mixed anxiety depressive disorder    Diverticulosis of large intestine without hemorrhage    Atrial fibrillation    Hyperlipidemia associated with type 2 diabetes mellitus    Unspecified inflammatory spondylopathy, lumbar region    Bilateral radiating leg pain    PAD (peripheral artery disease)    Carotid atherosclerosis       Medication List with Changes/Refills   Current Medications     ALPRAZOLAM (XANAX) 0.5 MG TABLET    TAKE 1 TABLET BY MOUTH NIGHTLY AS NEEDED FOR ANXIETY (MAY TAKE 1-2 TIMES WEEKLY FOR ANXIETY)    AMLODIPINE (NORVASC) 5 MG TABLET    Take 1 tablet (5 mg total) by mouth 2 (two) times daily.    ATORVASTATIN (LIPITOR) 80 MG TABLET    TAKE 1 TABLET BY MOUTH EVERY DAY    BLOOD SUGAR DIAGNOSTIC STRP    1 strip by Misc.(Non-Drug; Combo Route) route 2 (two) times daily. Insurance preferred test stripes DX:E11.22    BLOOD-GLUCOSE METER KIT    Insurance preferred meter dx:E11.22    BRIMONIDINE 0.2% (ALPHAGAN) 0.2 % DROP    Place 1 drop into both eyes 3 (three) times daily.    CARVEDILOL (COREG) 12.5 MG TABLET    Take 12.5 mg by mouth.    CHOLECALCIFEROL, VITAMIN D3, (VITAMIN D3) 1,000 UNIT CAPSULE    Take 1 capsule (1,000 Units total) by mouth once daily.    FAMOTIDINE (PEPCID) 20 MG TABLET    1 tablet at bedtime as needed    FERROUS SULFATE (FEOSOL) 325 MG (65 MG IRON) TAB TABLET    Take 1 tablet (325 mg total) by mouth 2 (two) times daily.    FLECAINIDE (TAMBOCOR) 50 MG TAB    Take 1 tablet (50 mg total) by mouth 2 (two) times daily.    GABAPENTIN (NEURONTIN) 100 MG CAPSULE    Take 1 capsule (100 mg total) by mouth every evening for 14 days, THEN 1 capsule (100 mg total) 2 (two) times daily for 14 days, THEN 1 capsule (100 mg total) 3 (three) times daily.    GLIMEPIRIDE (AMARYL) 4 MG TABLET    TAKE 1 TABLET BY MOUTH IN THE MORNING WITH BREAKFAST    HYDROCHLOROTHIAZIDE (HYDRODIURIL) 12.5 MG TAB    TAKE 1 TABLET BY MOUTH EVERY DAY    HYDROCODONE-ACETAMINOPHEN (NORCO) 5-325 MG PER TABLET    TAKE 1 TABLET BY MOUTH EVERY 8 (EIGHT) HOURS AS NEEDED FOR PAIN FOR UP TO 10 DOSES.    HYDROCORTISONE 2.5 % CREAM    Apply topically 2 (two) times daily.    LANCETS (ACCU-CHEK SOFTCLIX LANCETS) MISC    100 lancets by Misc.(Non-Drug; Combo Route) route 2 (two) times daily. Insurance preferred lancets Dx:E11.22    LEVALBUTEROL (XOPENEX HFA) 45 MCG/ACTUATION INHALER    INHALE 1-2 PUFFS INTO THE LUNGS EVERY 6  (SIX) HOURS AS NEEDED FOR WHEEZING. RESCUE    LOSARTAN (COZAAR) 50 MG TABLET    TAKE 1 TABLET BY MOUTH TWICE A DAY    MECLIZINE (ANTIVERT) 25 MG TABLET    Take 1 tablet (25 mg total) by mouth daily as needed for Dizziness.    METFORMIN (GLUCOPHAGE-XR) 500 MG ER 24HR TABLET    TAKE 2 TABLETS BY MOUTH EVERY DAY    METOPROLOL SUCCINATE (TOPROL-XL) 50 MG 24 HR TABLET    TAKE 1 TABLET BY MOUTH EVERY DAY    MUPIROCIN (BACTROBAN) 2 % OINTMENT    Apply topically 3 (three) times daily.    PROAIR HFA 90 MCG/ACTUATION INHALER    INHALE 2 PUFFS INTO THE LUNGS EVERY 6 (SIX) HOURS AS NEEDED FOR WHEEZING. RESCUE    TACROLIMUS (PROTOPIC) 0.1 % OINTMENT    Apply topically 2 (two) times daily.    XARELTO 15 MG TAB    TAKE 1 TABLET (15 MG TOTAL) BY MOUTH DAILY WITH DINNER OR EVENING MEAL.       Review of patient's allergies indicates:   Allergen Reactions    Pravachol [pravastatin] Nausea Only       Past Surgical History:   Procedure Laterality Date    CATARACT EXTRACTION W/  INTRAOCULAR LENS IMPLANT Left 02/27/2013    Dr. Taveras    COLONOSCOPY      COLONOSCOPY N/A 9/22/2016    Procedure: COLONOSCOPY;  Surgeon: Jd Ashton MD;  Location: [facility] ([address] [place] [address]);  Service: Endoscopy;  Laterality: N/A;  OK per Dr Randolph for pt to hold Plavix 5 days prior to procedure/see telephone encounter dated 8/29/16/svn    EYE SURGERY Bilateral 2002 approx    Laser for glaucoma    HYSTERECTOMY  1963     AMEENA/USO- fibroids; no cancer    OOPHORECTOMY  1963    unknown, only removed one       Family History   Problem Relation Age of Onset    Stroke Mother     Mental illness Mother     Hypertension Mother     Stroke Father     Diabetes Maternal Grandmother     Drug abuse Daughter     Cancer Sister 68        gyn    Ovarian cancer Sister     Cancer Maternal Uncle         type not known    Heart disease Paternal Grandmother     Cancer Maternal Aunt         type unknown    Glaucoma Neg Hx     Macular degeneration Neg Hx     Alcohol  abuse Neg Hx     COPD Neg Hx     Asthma Neg Hx     Amblyopia Neg Hx     Blindness Neg Hx     Cataracts Neg Hx     Retinal detachment Neg Hx     Strabismus Neg Hx        Social History     Socioeconomic History    Marital status:     Number of children: 1   Tobacco Use    Smoking status: Never Smoker    Smokeless tobacco: Never Used   Substance and Sexual Activity    Alcohol use: No    Drug use: No    Sexual activity: Not Currently     Partners: Male   Social History Narrative    , no pets or smokers in household. 1 Child who lives in nursing home. She has a grandson who lives in [place].. Retired from [employer] . Still drives. Does not have a Living Will or advanced directive.      Social Determinants of Health     Financial Resource Strain: High Risk    Difficulty of Paying Living Expenses: Hard   Food Insecurity: Food Insecurity Present    Worried About Running Out of Food in the Last Year: Sometimes true    Ran Out of Food in the Last Year: Never true   Transportation Needs: No Transportation Needs    Lack of Transportation (Medical): No    Lack of Transportation (Non-Medical): No   Physical Activity: Inactive    Days of Exercise per Week: 0 days    Minutes of Exercise per Session: 0 min   Stress: No Stress Concern Present    Feeling of Stress : Not at all   Social Connections: Moderately Isolated    Frequency of Communication with Friends and Family: Three times a week    Frequency of Social Gatherings with Friends and Family: Once a week    Attends Hindu Services: 1 to 4 times per year    Active Member of Clubs or Organizations: No    Attends Club or Organization Meetings: Never    Marital Status:    Housing Stability: Low Risk     Unable to Pay for Housing in the Last Year: No    Number of Places Lived in the Last Year: 1    Unstable Housing in the Last Year: No       Vitals:    03/02/22 1302   BP: (!) 143/68   Pulse: 62   Weight: 80.5 kg (177 lb 7.5  "oz)   Height: 5' 5.5" (1.664 m)       Hemoglobin A1C   Date Value Ref Range Status   07/22/2021 7.3 (H) 4.0 - 5.6 % Final     Comment:     ADA Screening Guidelines:  5.7-6.4%  Consistent with prediabetes  >or=6.5%  Consistent with diabetes    High levels of fetal hemoglobin interfere with the HbA1C  assay. Heterozygous hemoglobin variants (HbS, HgC, etc)do  not significantly interfere with this assay.   However, presence of multiple variants may affect accuracy.     03/16/2021 6.9 (H) 4.0 - 5.6 % Final     Comment:     ADA Screening Guidelines:  5.7-6.4%  Consistent with prediabetes  >or=6.5%  Consistent with diabetes    High levels of fetal hemoglobin interfere with the HbA1C  assay. Heterozygous hemoglobin variants (HbS, HgC, etc)do  not significantly interfere with this assay.   However, presence of multiple variants may affect accuracy.     06/25/2020 7.1 (H) 4.0 - 5.6 % Final     Comment:     ADA Screening Guidelines:  5.7-6.4%  Consistent with prediabetes  >or=6.5%  Consistent with diabetes  High levels of fetal hemoglobin interfere with the HbA1C  assay. Heterozygous hemoglobin variants (HbS, HgC, etc)do  not significantly interfere with this assay.   However, presence of multiple variants may affect accuracy.         Review of Systems   Constitutional: Negative for chills and fever.   Respiratory: Negative for shortness of breath.    Cardiovascular: Negative for chest pain, palpitations, orthopnea, claudication and leg swelling.   Gastrointestinal: Negative for diarrhea, nausea and vomiting.   Musculoskeletal: Negative for joint pain.   Skin: Negative for rash.   Neurological: Positive for sensory change. Negative for dizziness, tingling, focal weakness and weakness.   Psychiatric/Behavioral: Negative.              Objective:   PHYSICAL EXAM: Apperance: Alert and orient in no distress,well developed, and with good attention to grooming and body habits  Patient presents ambulating in tennis shoes.  LOWER " EXTREMITY EXAM:  VASCULAR: Dorsalis pedis pulses 0/4 left 1/4 right and Posterior Tibial pulses 0/4 left and 1/4 right. Capillary fill time <4 seconds bilateral. Mild edema observed bilateral. Varicosities present bilateral. Skin temperature of the lower extremities is warm to cool, proximal to distal. Hair growth absent bilateral.  DERMATOLOGICAL: No skin rashes, subcutaneous nodules, lesions, or ulcers observed bilateral. Nails 1,2,3,4,5, bilateral elongated, thickened, and discolored with subungual debris. Right hallux nail observed to be mildly incurvated at distal lateral borders and slight obstructed in the nail grooves with soft tissue. No purulent drainage, no odor, and no increased temperature observed to right hallux. Webspaces 1,2,3,4 bilateral clean, dry and without evidence of break in skin integrity.   NEUROLOGICAL: Light touch, sharp-dull, proprioception all present and equal bilaterally.  Vibratory sensation diminished at bilateral hallux. Protective sensation absent at toe sites as tested with a Mermentau-Marjan 5.07 monofilament.   MUSCULOSKELETAL: Muscle strength is 5/5 for foot inverters, everters, plantarflexors, and dorsiflexors. Muscle tone is normal. Pain on palpation of right distal lateral nail border.     Assessment:   Type II diabetes mellitus with neurological manifestations    Corn or callus    Onychomycosis due to dermatophyte    Onychocryptosis - Right Foot          Plan:   Type II diabetes mellitus with neurological manifestations    Corn or callus    Onychomycosis due to dermatophyte    Onychocryptosis - Right Foot      I counseled the patient on her conditions, regarding findings of my examination, my impressions, and usual treatment plan.   Greater than 50% of this visit spent on counseling and coordination of care.  Greater than 15 minutes of a 20 minute appointment spent on education about the diabetic foot, neuropathy, and prevention of limb loss.  Shoe inspection. Diabetic  Foot Education. Patient reminded of the importance of good nutrition and blood sugar control to help prevent podiatric complications of diabetes. Patient instructed on proper foot hygeine. We discussed wearing proper shoe gear, daily foot inspections, never walking without protective shoe gear, never putting sharp instruments to feet.    With patient's permission, nails 1-5 bilateral were debrided/trimmed in length and thickness aggressively to their soft tissue attachment mechanically and with electric , removing all offending nail and debris. Patient relates relief following the procedure.  With patient's permission, bilateral callus trimmed in thickness with #15 blade in thickness without incident.   Patient  will continue to monitor the areas daily, inspect feet, wear protective shoe gear when ambulatory, moisturizer to maintain skin integrity. Patient reminded of the importance of good nutrition and blood sugar control to help prevent podiatric complications of diabetes.  Patient to return 6 months or sooner if needed.                 Zuleima Howell DPM  [facility]

## 2022-03-02 NOTE — PROGRESS NOTES
Referring Provider: DELBERT Albrecht    Saul Mar was seen 03/02/2022 for an audiological evaluation. Patient complains of difficulties hearing the television unless the volume is turned up and having to ask others to repeat themselves. Patient noted intermittent tinnitus in her left ear. Patient reported occasional lightheadedness over the past several weeks following discontinuing a medication. Patient endorsed a family history of hearing loss on her mother's side. Patient denied a history of noise exposure.    Otoscopy was unremarkable, bilaterally. Tympanograms were Type A for the right ear and Type A for the left ear. Audiometry revealed normal hearing sensitivity through 4000 Hz sloping to a mild sensorineural hearing loss for the right ear, and normal hearing sensitivity through 3000 Hz sloping to a mild-to-moderate sensorineural hearing loss for the left ear. Speech Reception Thresholds were  15 dBHL for the right ear and 15 dBHL for the left ear. Word recognition scores were excellent for the right ear and excellent for the left ear.    Patient was counseled on the above findings.    Recommendations:  1. Repeat audiological evaluation in one to two years to monitor hearing, or sooner if needed.

## 2022-03-03 DIAGNOSIS — N18.30 DIABETES MELLITUS WITH STAGE 3 CHRONIC KIDNEY DISEASE: Chronic | ICD-10-CM

## 2022-03-03 DIAGNOSIS — E11.22 DIABETES MELLITUS WITH STAGE 3 CHRONIC KIDNEY DISEASE: Chronic | ICD-10-CM

## 2022-03-03 PROBLEM — M48.062 SPINAL STENOSIS OF LUMBAR REGION WITH NEUROGENIC CLAUDICATION: Status: ACTIVE | Noted: 2022-02-02

## 2022-03-03 RX ORDER — GLIMEPIRIDE 4 MG/1
TABLET ORAL
Qty: 90 TABLET | Refills: 1 | Status: SHIPPED | OUTPATIENT
Start: 2022-03-03 | End: 2022-06-03 | Stop reason: SDUPTHER

## 2022-03-03 NOTE — TELEPHONE ENCOUNTER
This Rx Request does not qualify for assessment with the [facility].    Please review protocol details and the Care Due Message for extra clinical information    Reasons Rx Request may be deferred:  Labs/Vitals overdue  Labs/Vitals abnormal  Patient has been seen in the ED/Hospital since the last PCP visit  Medication is non-delegated/ Outside of [facility] Protocol  Provider is a non-participating provider   OR appropriate indication for medication not met  Annual with PCP overdue

## 2022-03-03 NOTE — TELEPHONE ENCOUNTER
No new care gaps identified.  Powered by [employer]. Reference number: [id number].   3/03/2022 12:11:04 AM CST

## 2022-03-08 ENCOUNTER — TELEPHONE (OUTPATIENT)
Dept: ENDOSCOPY | Facility: HOSPITAL | Age: 82
End: 2022-03-08
Payer: MEDICARE

## 2022-03-11 ENCOUNTER — TELEPHONE (OUTPATIENT)
Dept: INTERNAL MEDICINE | Facility: CLINIC | Age: 82
End: 2022-03-11
Payer: MEDICARE

## 2022-03-11 DIAGNOSIS — Z12.31 SCREENING MAMMOGRAM, ENCOUNTER FOR: Primary | ICD-10-CM

## 2022-03-11 NOTE — TELEPHONE ENCOUNTER
----- Message from Jihan Blount sent at 3/11/2022  3:40 PM CST -----  Contact: self/[phone]  Caller is requesting to schedule their annual screening mammogram appointment. Order is not listed in Epic.  Please enter order and contact patient to schedule.  Would the patient like a call back, or a response through their [facility] portal?:   call back    Please advise

## 2022-03-11 NOTE — PROGRESS NOTES
New Patient Chronic Pain Note (Initial Visit)    Referring Physician: Nigel Barrett    PCP: Penny Randolph MD    Chief Complaint:   Chief Complaint   Patient presents with    Leg Pain     Bilateral. From below buttocks to before bend in knees, both calves hurting        SUBJECTIVE:    Saul Mar is a 81 y.o. female with past medical history significant for CVA, MDD, primary open angle glaucoma, DMII, AFib, HLD, HTN, diastolic dysfunction, PAD, CKD III, SStrait, GERD who presents with bilateral leg pain.  Patient reports pain began approximately 1 year prior without inciting accident injury or trauma.  Today patient reports pain which is constant which is rated a 10/10.  Pain is described as aching in nature.  Patient reports pain originating in bilateral buttocks and radiating to bilateral calves in L4-L5 distribution.  Pain is equally worse on both sides.  Pain is exacerbated with prolonged standing and with ambulation.  Patient ambulates with a walker and reports she is only able to ambulate a few steps before requiring rest.  Patient reports pain is improved with lumbar flexion and rest.  Patient does report associated weakness in bilateral lower extremities associated with her pain.  She denies bowel or bladder incontinence or saddle anesthesia.  Patient reports completing several sessions of conventional physical therapy with initial improvement in her symptoms, however with increased intensity of exercises, exacerbation of pain.    Pt last saw Dr. Sanchez 8/24/21 with recommendation to continue with PT and discussion of future MBB.    Patient reports significant motor weakness and loss of sensations.  Patient denies night fever/night sweats, urinary incontinence, bowel incontinence and significant weight loss.      Pain Disability Index Review:   No flowsheet data found.    Non-Pharmacologic Treatments:  Physical Therapy/Home Exercise: yes  Ice/Heat:yes  TENS: no  Acupuncture: no  Massage:  [Never (0 pts)] : Never (0 points) no  Chiropractic: no    Other: no      Pain Medications:  - Opioids: Vicodin ( Hydrocodone/Acetaminophen)  - Adjuvant Medications: Neurontin (Gabapentin) and Xanax (Alprazolam). 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 Taveras    COLONOSCOPY      COLONOSCOPY N/A 9/22/2016    Procedure: COLONOSCOPY;  Surgeon: Jd Ashton MD;  Location: [address]);  Service: Endoscopy;  Laterality: N/A;  OK per Dr Randolph for pt to hold Plavix 5 days prior to procedure/see telephone encounter dated 8/29/16/svn    EYE SURGERY Bilateral 2002 approx    Laser for glaucoma    HYSTERECTOMY  1963     AMEENA/USO- fibroids; no cancer    OOPHORECTOMY  1963    unknown, only removed one     Review of patient's allergies indicates:   Allergen Reactions    Pravachol [pravastatin] Nausea Only       Current Outpatient Medications   Medication Sig    ALPRAZolam (XANAX) 0.5 MG tablet TAKE 1 TABLET BY MOUTH NIGHTLY AS NEEDED FOR ANXIETY (MAY TAKE 1-2 TIMES WEEKLY FOR ANXIETY)    amLODIPine (NORVASC) 5 MG tablet Take 1 tablet (5 mg total) by mouth 2 (two) times daily.    atorvastatin (LIPITOR) 80 MG tablet TAKE 1 TABLET BY MOUTH EVERY DAY    blood sugar diagnostic Strp 1 strip by  Misc.(Non-Drug; Combo Route) route 2 (two) times daily. Insurance preferred test stripes DX:E11.22    blood-glucose meter kit Insurance preferred meter dx:E11.22    brimonidine 0.2% (ALPHAGAN) 0.2 % Drop Place 1 drop into both eyes 3 (three) times daily.    cholecalciferol, vitamin D3, (VITAMIN D3) 1,000 unit capsule Take 1 capsule (1,000 Units total) by mouth once daily.    famotidine (PEPCID) 20 MG tablet 1 tablet at bedtime as needed    ferrous sulfate (FEOSOL) 325 mg (65 mg iron) Tab tablet Take 1 tablet (325 mg total) by mouth 2 (two) times daily.    flecainide (TAMBOCOR) 50 MG Tab Take 1 tablet (50 mg total) by mouth 2 (two) times daily.    glimepiride (AMARYL) 4 MG tablet TAKE 1 TABLET BY MOUTH IN THE MORNING WITH BREAKFAST    hydroCHLOROthiazide (HYDRODIURIL) 12.5 MG Tab TAKE 1 TABLET BY MOUTH EVERY DAY    hydrocortisone 2.5 % cream Apply topically 2 (two) times daily.    lancets (ACCU-CHEK SOFTCLIX LANCETS) Misc 100 lancets by Misc.(Non-Drug; Combo Route) route 2 (two) times daily. Insurance preferred lancets Dx:E11.22    levalbuterol (XOPENEX HFA) 45 mcg/actuation inhaler INHALE 1-2 PUFFS INTO THE LUNGS EVERY 6 (SIX) HOURS AS NEEDED FOR WHEEZING. RESCUE    losartan (COZAAR) 50 MG tablet TAKE 1 TABLET BY MOUTH TWICE A DAY    meclizine (ANTIVERT) 25 mg tablet Take 1 tablet (25 mg total) by mouth daily as needed for Dizziness.    metFORMIN (GLUCOPHAGE-XR) 500 MG ER 24hr tablet TAKE 2 TABLETS BY MOUTH EVERY DAY    metoprolol succinate (TOPROL-XL) 50 MG 24 hr tablet TAKE 1 TABLET BY MOUTH EVERY DAY    mupirocin (BACTROBAN) 2 % ointment Apply topically 3 (three) times daily.    PROAIR HFA 90 mcg/actuation inhaler INHALE 2 PUFFS INTO THE LUNGS EVERY 6 (SIX) HOURS AS NEEDED FOR WHEEZING. RESCUE    tacrolimus (PROTOPIC) 0.1 % ointment Apply topically 2 (two) times daily.    XARELTO 15 mg Tab TAKE 1 TABLET (15 MG TOTAL) BY MOUTH DAILY WITH DINNER OR EVENING MEAL.    carvediloL (COREG) 12.5 MG  [No] : In the past 12 months have you used drugs other than those required for medical reasons? No [Two or more falls in past year] : Patient reported two or more falls in the past year tablet Take 12.5 mg by mouth.    gabapentin (NEURONTIN) 300 MG capsule Take 1 capsule (300 mg total) by mouth every evening for 7 days, THEN 2 capsules (600 mg total) every evening for 7 days, THEN 3 capsules (900 mg total) every evening for 16 days.    HYDROcodone-acetaminophen (NORCO) 5-325 mg per tablet TAKE 1 TABLET BY MOUTH EVERY 8 (EIGHT) HOURS AS NEEDED FOR PAIN FOR UP TO 10 DOSES.     No current facility-administered medications for this visit.       Review of Systems     GENERAL:  No weight loss, malaise or fevers.  HEENT:   No recent changes in vision or hearing  NECK:  Negative for lumps, no difficulty with swallowing.  RESPIRATORY:  Negative for cough, wheezing or shortness of breath, patient denies any recent URI.  CARDIOVASCULAR:  Negative for chest pain, leg swelling or palpitations.  GI:  Negative for abdominal discomfort, blood in stools or black stools or change in bowel habits.  MUSCULOSKELETAL:  See HPI.  SKIN:  Negative for lesions, rash, and itching.  PSYCH:  No mood disorder or recent psychosocial stressors.   HEMATOLOGY/LYMPHOLOGY:  Negative for prolonged bleeding, bruising easily or swollen nodes.    NEURO:   No history of headaches, syncope, paralysis, seizures or tremors.  All other reviewed and negative other than HPI.    OBJECTIVE:    BP (!) 132/58   Pulse 68   Wt 81.8 kg (180 lb 5.4 oz)   BMI 29.55 kg/m²       Physical Exam    GENERAL: Well appearing, in no acute distress, alert and oriented x3.  PSYCH:  Mood and affect appropriate.  SKIN: Skin color, texture, turgor normal, no rashes or lesions.  HEAD/FACE:  Normocephalic, atraumatic. Cranial nerves grossly intact.    CV: RRR with palpation of the radial artery.  PULM: No evidence of respiratory difficulty, symmetric chest rise.  GI:  Soft and non-tender.    BACK:  Thoracic kyphosis Straight leg raising in the sitting and supine positions is negative to radicular pain. pain to palpation over the facet joints of the lumbar spine or  [Assistive Device] : Patient uses an assistive device spinous processes. reduced range of motion with pain reproduction.  EXTREMITIES: Peripheral joint ROM is reduced with pain without obvious instability or laxity in all four extremities. No deformities, edema, or skin discoloration. Good capillary refill.  MUSCULOSKELETAL: Able to stand on heels & toes.   hip, and knee provocative maneuvers are negative.  There is no pain with palpation over the sacroiliac joints bilaterally.  FABERs test is negative.  Facet loading test is positive bilaterally.   Bilateral upper and lower extremity strength is normal and symmetric.  No atrophy or tone abnormalities are noted.  RIGHT Lower extremity: Hip flexion 5/5, Hip Abduction 5/5, Hip Adduction 5/5, Knee extension 5/5, Knee flexion 5/5, Ankle dorsiflexion5/5, Extensor hallucis longus 5/5, Ankle plantarflexion 5/5  LEFT Lower extremity:  Hip flexion 5/5, Hip Abduction 5/5,Hip Adduction 5/5, Knee extension 5/5, Knee flexion 5/5, Ankle dorsiflexion 5/5, Extensor hallucis longus 5/5, Ankle plantarflexion 5/5  -Normal testing knee (patellar) jerk and ankle (achilles) jerk    NEURO: Bilateral upper and lower extremity coordination and muscle stretch reflexes are physiologic and symmetric. No loss of sensation is noted.  GAIT: normal.    Imagin/10/21    MRI Lumbar Spine Without Contrast    FINDINGS:  Alignment: Mild levocurvature of the lumbar spine.  Grade 1 anterolisthesis L4 on L5.    Vertebrae: Multiple Schmorl's nodes.  Benign osseous hemangioma in the T12 vertebral body.  Degenerative the edema within the bilateral L4-L5 facet joints.  No abnormal marrow signal to suggest infiltrative process.    Discs: Multilevel disc desiccation and height loss, most severe at L4-L5.    Cord: No signal abnormality.  Conus terminates at L2    Degenerative findings:    T12-L1: No significant spinal canal stenosis or neural foraminal narrowing.    L1-L2: Mild diffuse disc bulge.  No significant spinal canal stenosis or neural foraminal  [0] : 2) Feeling down, depressed, or hopeless: Not at all (0) narrowing.    L2-L3: Severe diffuse disc bulge, bilateral facet arthropathy and ligamentum flavum buckling.  These findings result in mild spinal canal stenosis, severe right and moderate left neural foraminal narrowing.    L3-L4: Moderate diffuse disc bulge.  These findings result in moderate bilateral neural foraminal narrowing.  No spinal canal stenosis.    L4-L5: Grade 1 anterolisthesis, diffuse disc bulge, bilateral facet arthropathy, and ligamentum flavum buckling.  These findings result in severe spinal canal stenosis and severe right and moderate left neural foraminal narrowing.    L5-S1: Diffuse disc bulge with a superimposed left paracentral protrusion that abuts the left descending S1 nerve root.  Bilateral facet arthropathy. These findings result in mild bilateral neural foraminal narrowing    Paraspinal muscles & soft tissues: T1 hyperintense cortical lesion noted within the right kidney, possibly a proteinaceous or hemorrhagic cyst. left simple renal cyst.    Impression  Multilevel degenerative changes, most pronounced at L4-L5 with severe spinal canal stenosis, severe right and moderate left neural foraminal narrowing.    Bilateral degenerative facet edema at L4-L5.    Probable proteinaceous versus hemorrhagic right renal cortical cyst.  Recommend correlation with a dedicated renal ultrasound.    07/22/21  X-Ray Lumbar Spine Ap And Lateral  FINDINGS:  Five lumbar vertebral bodies.  Vertebral body heights are maintained.  Disc space narrowing and degenerative endplate changes L4-5 and L5-S1.  Moderate facet arthropathy L4-5 and L5-S1.  Grade 1 anterolisthesis L4 on L5.     04/02/18    X-Ray Cervical Spine Complete 5 view  FINDINGS:  There is anatomic spinal alignment.  Prominent bridging vertebral endplate spurs present anteriorly from C4-5 through C6-7.  Disc interspaces are maintained.  Odontoid is intact.  No fracture or subluxation.      ASSESSMENT: 81 y.o. year old female with lower back and leg  [] : No pain, consistent with     1. Spinal stenosis of lumbar region without neurogenic claudication  IR Epidural Transfor 1st Vert Lumbar Filiberto    Case Request-RAD/Other Procedure Area: Bilateral L4/5 TF JASKARAN with RN IV sedation   2. Lumbar radiculopathy, chronic  IR Epidural Transfor 1st Vert Lumbar Filiberto    Case Request-RAD/Other Procedure Area: Bilateral L4/5 TF JASKARAN with RN IV sedation         PLAN:   - Interventions:  Schedule the patient for bilateral L4/5 transforaminal epidural steroid injection to see if this helps with radicular symptoms.  Explained the risks and benefits of the procedure in detail with the patient today in clinic along with alternative treatment options, and the patient elected to pursue the intervention at this time.      - Anticoagulation use: yes Xarelto  Per ASA guidelines for secondary prophylaxis, patient will need to pause Xarelto 3 days prior to her injection.  Will obtain cardiac clearance from Dr. Grayson ([facility])     report:  Reviewed and consistent with medication use as prescribed.    - Medications:  --  We have discussed increasing gabapentin.  Patient will increase their medication according to the following algorithm.  We have reviewed potential side effects of this medication including daytime somnolence, weight gain and peripheral edema    Gabapentin Titration:  Week 1: 300 mg QHS  Week 2: 600 mg QHS  Week 3: 900 mg QHS      - Therapy:  We discussed continuing physical therapy to help manage the patient/s painful condition. The patient was counseled that muscle strengthening will improve the long term prognosis in regards to pain and may also help increase range of motion and mobility.  - Imaging: Reviewed available imaging with patient and answered any questions they had regarding study    - Follow up visit: return to clinic in 4-6 weeks      The above plan and management options were discussed at length with patient. Patient is in agreement with the above and verbalized  [Audit-CScore] : 0 understanding.    - I discussed the goals of interventional chronic pain management with the patient on today's visit. We discussed a multimodal and systematic approach to pain.  This includes diagnostic and therapeutic injections, adjuvant pharmacologic treatment, physical therapy, and at times psychiatry.  I emphasized the importance of regular exercise, core strengthening and stretching, diet and weight loss as a cornerstone of long-term pain management.    - This condition does not require this patient to take time off of work, and the primary goal of our Pain Management services is to improve the patient's functional capacity.  - Patient Questions: Answered all of the patient's questions regarding diagnoses, therapy, treatment and next steps        Chan Fonseca MD  Interventional Pain Management  [facility]    Disclaimer:  This note was prepared using voice recognition system and is likely to have sound alike errors that may have been overlooked even after proof reading.  Please call me with any questions     [de-identified] : sedentary [de-identified] : regular [de-identified] : walking cane [[id number]] : 0

## 2022-03-14 ENCOUNTER — TELEPHONE (OUTPATIENT)
Dept: PAIN MEDICINE | Facility: CLINIC | Age: 82
End: 2022-03-14

## 2022-03-14 ENCOUNTER — OFFICE VISIT (OUTPATIENT)
Dept: PAIN MEDICINE | Facility: CLINIC | Age: 82
End: 2022-03-14
Payer: MEDICARE

## 2022-03-14 VITALS
DIASTOLIC BLOOD PRESSURE: 58 MMHG | HEART RATE: 68 BPM | SYSTOLIC BLOOD PRESSURE: 132 MMHG | WEIGHT: 180.31 LBS | BODY MASS INDEX: 29.55 KG/M2

## 2022-03-14 DIAGNOSIS — M48.061 SPINAL STENOSIS OF LUMBAR REGION WITHOUT NEUROGENIC CLAUDICATION: Primary | ICD-10-CM

## 2022-03-14 DIAGNOSIS — M54.16 LUMBAR RADICULOPATHY, CHRONIC: ICD-10-CM

## 2022-03-14 PROCEDURE — 1125F AMNT PAIN NOTED PAIN PRSNT: CPT | Mod: CPTII,S$GLB,, | Performed by: ANESTHESIOLOGY

## 2022-03-14 PROCEDURE — 3075F SYST BP GE 130 - 139MM HG: CPT | Mod: CPTII,S$GLB,, | Performed by: ANESTHESIOLOGY

## 2022-03-14 PROCEDURE — 3288F PR FALLS RISK ASSESSMENT DOCUMENTED: ICD-10-PCS | Mod: CPTII,S$GLB,, | Performed by: ANESTHESIOLOGY

## 2022-03-14 PROCEDURE — 99999 PR PBB SHADOW E&M-EST. PATIENT-LVL III: CPT | Mod: PBBFAC,,, | Performed by: ANESTHESIOLOGY

## 2022-03-14 PROCEDURE — 99999 PR PBB SHADOW E&M-EST. PATIENT-LVL III: ICD-10-PCS | Mod: PBBFAC,,, | Performed by: ANESTHESIOLOGY

## 2022-03-14 PROCEDURE — 3078F PR MOST RECENT DIASTOLIC BLOOD PRESSURE < 80 MM HG: ICD-10-PCS | Mod: CPTII,S$GLB,, | Performed by: ANESTHESIOLOGY

## 2022-03-14 PROCEDURE — 99214 PR OFFICE/OUTPT VISIT, EST, LEVL IV, 30-39 MIN: ICD-10-PCS | Mod: S$GLB,,, | Performed by: ANESTHESIOLOGY

## 2022-03-14 PROCEDURE — 1159F PR MEDICATION LIST DOCUMENTED IN MEDICAL RECORD: ICD-10-PCS | Mod: CPTII,S$GLB,, | Performed by: ANESTHESIOLOGY

## 2022-03-14 PROCEDURE — 1160F PR REVIEW ALL MEDS BY PRESCRIBER/CLIN PHARMACIST DOCUMENTED: ICD-10-PCS | Mod: CPTII,S$GLB,, | Performed by: ANESTHESIOLOGY

## 2022-03-14 PROCEDURE — 3075F PR MOST RECENT SYSTOLIC BLOOD PRESS GE 130-139MM HG: ICD-10-PCS | Mod: CPTII,S$GLB,, | Performed by: ANESTHESIOLOGY

## 2022-03-14 PROCEDURE — 99214 OFFICE O/P EST MOD 30 MIN: CPT | Mod: S$GLB,,, | Performed by: ANESTHESIOLOGY

## 2022-03-14 PROCEDURE — 1159F MED LIST DOCD IN RCRD: CPT | Mod: CPTII,S$GLB,, | Performed by: ANESTHESIOLOGY

## 2022-03-14 PROCEDURE — 3288F FALL RISK ASSESSMENT DOCD: CPT | Mod: CPTII,S$GLB,, | Performed by: ANESTHESIOLOGY

## 2022-03-14 PROCEDURE — 3078F DIAST BP <80 MM HG: CPT | Mod: CPTII,S$GLB,, | Performed by: ANESTHESIOLOGY

## 2022-03-14 PROCEDURE — 1160F RVW MEDS BY RX/DR IN RCRD: CPT | Mod: CPTII,S$GLB,, | Performed by: ANESTHESIOLOGY

## 2022-03-14 PROCEDURE — 1101F PT FALLS ASSESS-DOCD LE1/YR: CPT | Mod: CPTII,S$GLB,, | Performed by: ANESTHESIOLOGY

## 2022-03-14 PROCEDURE — 1101F PR PT FALLS ASSESS DOC 0-1 FALLS W/OUT INJ PAST YR: ICD-10-PCS | Mod: CPTII,S$GLB,, | Performed by: ANESTHESIOLOGY

## 2022-03-14 PROCEDURE — 1125F PR PAIN SEVERITY QUANTIFIED, PAIN PRESENT: ICD-10-PCS | Mod: CPTII,S$GLB,, | Performed by: ANESTHESIOLOGY

## 2022-03-14 RX ORDER — GABAPENTIN 300 MG/1
CAPSULE ORAL
Qty: 30 CAPSULE | Refills: 1 | Status: SHIPPED | OUTPATIENT
Start: 2022-03-14 | End: 2022-10-26

## 2022-03-14 NOTE — PATIENT INSTRUCTIONS
General Neck and Back Pain    Both neck and back pain are usually caused by injury to the muscles or ligaments of the spine. Sometimes the disks that separate each bone of the spine may cause pain by pressing on a nearby nerve. Back and neck pain may appear after a sudden twisting or bending force (such as in a car accident), or sometimes after a simple awkward movement. In either case, muscle spasm is often present and adds to the pain.  Acute neck and back pain usually gets better in 1 to 2 weeks. Pain related to disk disease, arthritis in the spinal joints or spinal stenosis (narrowing of the spinal canal) can become chronic and last for months or years.  Back and neck pain are common problems. Most people feel better in 1 or 2 weeks, and most of the rest in 1 to 2 months. Most people can remain active.  People experience and describe pain differently.  Pain can be sharp, stabbing, shooting, aching, cramping, or burning  Movement, standing, bending, lifting, sitting, or walking may worsen the pain  Pain can be localized to one spot or area, or it can be more generalized  Pain can spread or radiate upwards, downwards, to the front, or go down your arms  Muscle spasm may occur.  Most of the time mechanical problems with the muscles or spine cause the pain. it is usually caused by an injury, whether known or not, to the muscles or ligaments. While illnesses can cause back pain, it is usually not caused by a serious illness. Pain is usually related to physical activity, whether sports, exercise, work, or normal activity. Sometimes it can occur without an identifiable cause. This can happen simply by stretching or moving wrong, without noting pain at the time. Other causes include:  Overexertion, lifting, pushing, pulling incorrectly or too aggressively.  Sudden twisting, bending or stretching from an accident (car or fall), or accidental movement.  Poor posture  Poor conditioning, lack of regular exercise  Spinal  disc disease or arthritis  Stress  Pregnancy, or illness like appendicitis, bladder or kidney infection, pelvic infections   Home care  For neck pain: Use a comfortable pillow that supports the head and keeps the spine in a neutral position. The position of the head should not be tilted forward or backward.  When in bed, try to find a position of comfort. A firm mattress is best. Try lying flat on your back with pillows under your knees. You can also try lying on your side with your knees bent up towards your chest and a pillow between your knees.  At first, do not try to stretch out the sore spots. If there is a strain, it is not like the good soreness you get after exercising without an injury. In this case, stretching may make it worse.  Avoid prolonged sitting, long car rides or travel. This puts more stress on the lower back than standing or walking.  During the first 24 to 72 hours after an injury, apply an ice pack to the painful area for 20 minutes and then remove it for 20 minutes over a period of 60 to 90 minutes or several times a day.   You can alternate ice and heat therapies. Talk with your healthcare provider about the best treatment for your back or neck pain. As a safety precaution, do not use a heating pad at bedtime. Sleeping with a heating pad can lead to skin burns or tissue damage.  Therapeutic massage can help relax the back and neck muscles without stretching them.  Be aware of safe lifting methods and do not lift anything over 15 pounds until all the pain is gone.  Medications  Talk to your healthcare provider before using medicine, especially if you have other medical problems or are taking other medicines.  You may use over-the-counter medicine to control pain, unless another pain medicine was prescribed. If you have chronic conditions like diabetes, liver or kidney disease, stomach ulcers,  gastrointestinal bleeding, or are taking blood thinner medicines.  Be careful if you are given pain  medicines, narcotics, or medicine for muscle spasm. They can cause drowsiness, and can affect your coordination, reflexes, and judgment. Do not drive or operate heavy machinery.  Follow-up care  Follow up with your healthcare provider, or as advised. Physical therapy or further tests may be needed.  If X-rays were taken, you will be notified of any new findings that may affect your care.  Call 911  Seek emergency medical care if any of the following occur:  Trouble breathing  Confusion  Very drowsy or trouble awakening  Fainting or loss of consciousness  Rapid or very slow heart rate  Loss of bowel or bladder control  When to seek medical advice  Call your healthcare provider right away if any of these occur:  Pain becomes worse or spreads into your arms or legs  Weakness, numbness or pain in one or both arms or legs  Numbness in the groin area  Difficulty walking  Fever of 100.4ºF (38ºC) or higher, or as directed by your healthcare provider  Date Last Reviewed: 7/1/2016  © 8712-5103 [employer]. [address] [place] [address]. All rights reserved. This information is not intended as a substitute for professional medical care. Always follow your healthcare professional's instructions.       Understanding Lumbar Radiculopathy    Lumbar radiculopathy is irritation or inflammation of a nerve root in the low back. It causes symptoms that spread out from the back down one or both legs. To understand this condition, it helps to understand the parts of the spine:  Vertebrae. These are bones that stack to form the spine. The lumbar spine contains the 5 bottom vertebrae.  Disks. These are soft pads of tissue between the vertebrae. They act as shock absorbers for the spine.  Spinal canal. This is a tunnel formed within the stacked vertebrae. In the lumbar spine, nerves run through this canal.  Nerves. These branch off and leave the spinal canal, traveling out to parts of the body. As they leave the  spinal canal, nerves pass through openings between the vertebrae. The nerve root is the part of the nerve that is closest to the spinal canal.  Sciatic nerve. This is a large nerve formed from several nerve roots in the low back. This nerve extends down the back of the leg to the foot.  With lumbar radiculopathy, nerve roots in the low back become irritated. This leads to pain and symptoms. The sciatic nerve is commonly involved, so the condition is often called sciatica.  What causes lumbar radiculopathy?  Aging, injury, poor posture, extra body weight, and other issues can lead to problems in the low back. These problems may then irritate nerve roots. They include:  Damage to a disk in the lumbar spine. The damaged disk may then press on nearby nerve roots.  Degeneration from wear and tear, and aging. This can lead to narrowing (stenosis) of the openings between the vertebrae. The narrowed openings press on nerve roots as they leave the spinal canal.  Unstable spine. This is when a vertebra slips forward. It can then press on a nerve root.  Other, less common things can put pressure on nerves in the low back. These include diabetes, infection, or a tumor.  Symptoms of lumbar radiculopathy  These include:  Pain in the low back  Pain, numbness, tingling, or weakness that travels into the buttocks, hip, groin, or leg  Muscle spasms  Treatment for lumbar radiculopathy  In most cases, your healthcare provider will first try treatments that help relieve symptoms. These may include:  Prescription and over-the-counter pain medicines. These help relieve pain, swelling, and irritation.  Limits on positions and activities that increase pain. But lying in bed or avoiding all movement is only recommended for a short period of time.  Physical therapy, including exercises and stretches. This helps decrease pain and increase movement and function.  Steroid shots into the lower back. This may help relieve symptoms for a  time.  Weight-loss program. If you are overweight, losing extra pounds may help relieve symptoms.  In some cases, you may need surgery to fix the underlying problem. This depends on the cause, the symptoms, and how long the pain has lasted.  Possible complications  Over time, an irritated and inflamed nerve may become damaged. This may lead to long-lasting (permanent) numbness or weakness in your legs and feet. If symptoms change suddenly or get worse, be sure to let your healthcare provider know.  When to call your healthcare provider  Call your healthcare provider right away if you have any of these:  New pain or pain that gets worse  New or increasing weakness, tingling, or numbness in your leg or foot  Problems controlling your bladder or bowel   Date Last Reviewed: 3/10/2016  © 5331-2758 [employer]. [address] [place] [address], [place] [address]. All rights reserved. This information is not intended as a substitute for professional medical care. Always follow your healthcare professional's instructions.       Exercises to Strengthen Your Lower Back  Strong lower back and abdominal muscles work together to support your spine. The exercises below will help strengthen the lower back. It is important that you begin exercising slowly and increase levels gradually.  Always begin any exercise program with stretching. If you feel pain while doing any of these exercises, stop and talk to your doctor about a more specific exercise program that better suits your condition.   Low back stretch  The point of stretching is to make you more flexible and increase your range of motion. Stretch only as much as you are able. Stretch slowly. Do not push your stretch to the limit. If at any point you feel pain while stretching, this is your (temporary) limit.  Lie on your back with your knees bent and both feet on the ground.  Slowly raise your left knee to your chest as you flatten your lower back against the floor. Hold  for 5 seconds.  Relax and repeat the exercise with your right knee.  Do 10 of these exercises for each leg.  Repeat hugging both knees to your chest at the same time.  Building lower back strength  Start your exercise routine with 10 to 30 minutes a day, 1 to 3 times a day.  Initial exercises  Lying on your back:  Ankle pumps: Move your foot up and down, towards your head, and then away. Repeat 10 times with each foot.  Heel slides: Slowly bend your knee, drawing the heel of your foot towards you. Then slide your heel/foot from you, straightening your knee. Do not lift your foot off the floor (this is not a leg lift).  Abdominal contraction: Bend your knees and put your hands on your stomach. Tighten your stomach muscles. Hold for 5 seconds, then relax. Repeat 10 times.  Straight leg raise: Bend one leg at the knee and keep the other leg straight. Tighten your stomach muscles. Slowly lift your straight leg 6 to 12 inches off the floor and hold for up to 5 seconds. Repeat 10 times on each side.  Standing:  Wall squats: Stand with your back against the wall. Move your feet about 12 inches away from the wall. Tighten your stomach muscles, and slowly bend your knees until they are at about a 45 degree angle. Do not go down too far. Hold about 5 seconds. Then slowly return to your starting position. Repeat 10 times.  Heel raises: Stand facing the wall. Slowly raise the heels of your feet up and down, while keeping your toes on the floor. If you have trouble balancing, you can touch the wall with your hands. Repeat 10 times.  More advanced exercises  When you feel comfortable enough, try these exercises.  Kneeling lumbar extension: Begin on your hands and knees. At the same time, raise and straighten your right arm and left leg until they are parallel to the ground. Hold for 2 seconds and come back slowly to a starting position. Repeat with left arm and right leg, alternating 10 times.  Prone lumbar extension: Lie face  down, arms extended overhead, palms on the floor. At the same time, raise your right arm and left leg as high as comfortably possible. Hold for 10 seconds and slowly return to start. Repeat with left arm and right leg, alternating 10 times. Gradually build up to 20 times. (Advanced: Repeat this exercise raising both arms and both legs a few inches off the floor at the same time. Hold for 5 seconds and release.)  Pelvic tilt: Lie on the floor on your back with your knees bent at 90 degrees. Your feet should be flat on the floor. Inhale, exhale, then slowly contract your abdominal muscles bringing your navel toward your spine. Let your pelvis rock back until your lower back is flat on the floor. Hold for 10 seconds while breathing smoothly.  Abdominal crunch: Perform a pelvic tilt (above) flattening your lower back against the floor. Holding the tension in your abdominal muscles, take another breath and raise your shoulder blades off the ground (this is not a full sit-up). Keep your head in line with your body (dont bend your neck forward). Hold for 2 seconds, then slowly lower.  Date Last Reviewed: 6/1/2016  © 8956-4882 [employer]. [address] [place] [address], [place] [address]. All rights reserved. This information is not intended as a substitute for professional medical care. Always follow your healthcare professional's instructions.

## 2022-03-14 NOTE — TELEPHONE ENCOUNTER
Letter faxed to Dr. Phuc Grayson to obtain [facility] clearance.  Phone:  [phone]  Fax:  [phone]  I received an email confirming that the fax was successfully sent.

## 2022-03-16 DIAGNOSIS — E78.5 HYPERLIPIDEMIA, UNSPECIFIED HYPERLIPIDEMIA TYPE: ICD-10-CM

## 2022-03-16 NOTE — TELEPHONE ENCOUNTER
No new care gaps identified.  Powered by [employer]. Reference number: [id number].   3/16/2022 12:09:39 AM CDT

## 2022-03-19 RX ORDER — ATORVASTATIN CALCIUM 80 MG/1
TABLET, FILM COATED ORAL
Qty: 90 TABLET | Refills: 2 | Status: SHIPPED | OUTPATIENT
Start: 2022-03-19

## 2022-03-19 NOTE — TELEPHONE ENCOUNTER
Refill Authorization Note   Saul Mar  is requesting a refill authorization.  Brief Assessment and Rationale for Refill:  Approve     Medication Therapy Plan:       Medication Reconciliation Completed: No   Comments:   --->Care Gap information included below if applicable.       Requested Prescriptions   Pending Prescriptions Disp Refills    atorvastatin (LIPITOR) 80 MG tablet [Pharmacy Med Name: ATORVASTATIN 80 MG TABLET] 90 tablet 2     Sig: TAKE 1 TABLET BY MOUTH EVERY DAY       Cardiovascular:  Antilipid - Statins Passed - 3/19/2022 11:52 AM        Passed - Patient is at least 18 years old        Passed - Valid encounter within last 15 months     Recent Visits  Date Type Provider Dept   11/29/21 Office Visit Penny Randolph MD [facility] Internal Medicine   09/28/21 Office Visit Penny Randolph MD [facility] Internal Medicine   07/22/21 Office Visit Penny Randolph MD [facility] Internal Medicine   Showing recent visits within past 720 days and meeting all other requirements  Future Appointments  No visits were found meeting these conditions.  Showing future appointments within next 150 days and meeting all other requirements      Future Appointments              In 1 week [facility] US1 [facility] - Ultrasound 1st Fl, [facility]    In 1 week Jen Abdul PA-C [facility] - Gastroenterology 4thfl, [facility]    In 2 weeks [facility] [id number] [facility] - Mammography 1st Fl, [facility]    In 1 month MD [facility] - 10th Fl, [facility]    In 2 months MD [facility] Primary Care Bldg, [facility] PCW    In 5 months Zuleima Howell DPM [facility] Podiatry 2nd Floor, [facility]                Passed - ALT is 131 or below and within 360 days     ALT   Date Value Ref Range Status   03/02/2022 17 10 - 44 U/L Final   07/22/2021 23 10 - 44 U/L Final   03/16/2021 18 10 - 44 U/L Final              Passed - AST is 119 or below and within 360 days     AST   Date Value Ref Range Status   03/02/2022  22 10 - 40 U/L Final   07/22/2021 25 10 - 40 U/L Final   03/16/2021 23 10 - 40 U/L Final              Passed - Total Cholesterol within 360 days     Lab Results   Component Value Date    CHOL 150 03/02/2022    CHOL 185 07/22/2021    CHOL 167 03/16/2021              Passed - LDL within 360 days     LDL Cholesterol   Date Value Ref Range Status   03/02/2022 90.0 63.0 - 159.0 mg/dL Final     Comment:     The National Cholesterol Education Program (NCEP) has set the  following guidelines (reference values) for LDL Cholesterol:  Optimal.......................<130 mg/dL  Borderline High...............130-159 mg/dL  High..........................160-189 mg/dL  Very High.....................>190 mg/dL              Passed - HDL within 360 days     HDL   Date Value Ref Range Status   03/02/2022 50 40 - 75 mg/dL Final     Comment:     The National Cholesterol Education Program (NCEP) has set the  following guidelines (reference values) for HDL Cholesterol:  Low...............<40 mg/dL  Optimal...........>60 mg/dL              Passed - Triglycerides within 360 days     Lab Results   Component Value Date    TRIG 50 03/02/2022    TRIG 70 07/22/2021    TRIG 58 03/16/2021                  Appointments  past 12m or future 3m with PCP    Date Provider   Last Visit   11/29/2021 Penny Randolph MD   Next Visit   6/14/2022 Penny Randolph MD   ED visits in past 90 days: 0     Note composed:11:53 AM 03/19/2022

## 2022-03-29 ENCOUNTER — HOSPITAL ENCOUNTER (OUTPATIENT)
Dept: RADIOLOGY | Facility: HOSPITAL | Age: 82
Discharge: HOME | End: 2022-03-29
Attending: INTERNAL MEDICINE
Payer: MEDICARE

## 2022-03-29 ENCOUNTER — OFFICE VISIT (OUTPATIENT)
Dept: GASTROENTEROLOGY | Facility: CLINIC | Age: 82
End: 2022-03-29
Payer: MEDICARE

## 2022-03-29 VITALS
SYSTOLIC BLOOD PRESSURE: 120 MMHG | DIASTOLIC BLOOD PRESSURE: 58 MMHG | BODY MASS INDEX: 30.16 KG/M2 | HEIGHT: 65 IN | HEART RATE: 60 BPM | OXYGEN SATURATION: 98 % | WEIGHT: 181 LBS

## 2022-03-29 DIAGNOSIS — N28.1 RENAL CYST: ICD-10-CM

## 2022-03-29 DIAGNOSIS — K59.00 CONSTIPATION, UNSPECIFIED CONSTIPATION TYPE: Primary | ICD-10-CM

## 2022-03-29 PROCEDURE — 1159F MED LIST DOCD IN RCRD: CPT | Mod: CPTII,S$GLB,, | Performed by: PHYSICIAN ASSISTANT

## 2022-03-29 PROCEDURE — 76770 US EXAM ABDO BACK WALL COMP: CPT | Mod: 26,,, | Performed by: RADIOLOGY

## 2022-03-29 PROCEDURE — 1159F PR MEDICATION LIST DOCUMENTED IN MEDICAL RECORD: ICD-10-PCS | Mod: CPTII,S$GLB,, | Performed by: PHYSICIAN ASSISTANT

## 2022-03-29 PROCEDURE — 1101F PT FALLS ASSESS-DOCD LE1/YR: CPT | Mod: CPTII,S$GLB,, | Performed by: PHYSICIAN ASSISTANT

## 2022-03-29 PROCEDURE — 3288F PR FALLS RISK ASSESSMENT DOCUMENTED: ICD-10-PCS | Mod: CPTII,S$GLB,, | Performed by: PHYSICIAN ASSISTANT

## 2022-03-29 PROCEDURE — 3074F SYST BP LT 130 MM HG: CPT | Mod: CPTII,S$GLB,, | Performed by: PHYSICIAN ASSISTANT

## 2022-03-29 PROCEDURE — 3288F FALL RISK ASSESSMENT DOCD: CPT | Mod: CPTII,S$GLB,, | Performed by: PHYSICIAN ASSISTANT

## 2022-03-29 PROCEDURE — 99203 PR OFFICE/OUTPT VISIT, NEW, LEVL III, 30-44 MIN: ICD-10-PCS | Mod: S$GLB,,, | Performed by: PHYSICIAN ASSISTANT

## 2022-03-29 PROCEDURE — 1125F AMNT PAIN NOTED PAIN PRSNT: CPT | Mod: CPTII,S$GLB,, | Performed by: PHYSICIAN ASSISTANT

## 2022-03-29 PROCEDURE — 3078F DIAST BP <80 MM HG: CPT | Mod: CPTII,S$GLB,, | Performed by: PHYSICIAN ASSISTANT

## 2022-03-29 PROCEDURE — 99999 PR PBB SHADOW E&M-EST. PATIENT-LVL V: CPT | Mod: PBBFAC,,, | Performed by: PHYSICIAN ASSISTANT

## 2022-03-29 PROCEDURE — 99999 PR PBB SHADOW E&M-EST. PATIENT-LVL V: ICD-10-PCS | Mod: PBBFAC,,, | Performed by: PHYSICIAN ASSISTANT

## 2022-03-29 PROCEDURE — 76770 US RETROPERITONEAL COMPLETE: ICD-10-PCS | Mod: 26,,, | Performed by: RADIOLOGY

## 2022-03-29 PROCEDURE — 3078F PR MOST RECENT DIASTOLIC BLOOD PRESSURE < 80 MM HG: ICD-10-PCS | Mod: CPTII,S$GLB,, | Performed by: PHYSICIAN ASSISTANT

## 2022-03-29 PROCEDURE — 1125F PR PAIN SEVERITY QUANTIFIED, PAIN PRESENT: ICD-10-PCS | Mod: CPTII,S$GLB,, | Performed by: PHYSICIAN ASSISTANT

## 2022-03-29 PROCEDURE — 1101F PR PT FALLS ASSESS DOC 0-1 FALLS W/OUT INJ PAST YR: ICD-10-PCS | Mod: CPTII,S$GLB,, | Performed by: PHYSICIAN ASSISTANT

## 2022-03-29 PROCEDURE — 99203 OFFICE O/P NEW LOW 30 MIN: CPT | Mod: S$GLB,,, | Performed by: PHYSICIAN ASSISTANT

## 2022-03-29 PROCEDURE — 3074F PR MOST RECENT SYSTOLIC BLOOD PRESSURE < 130 MM HG: ICD-10-PCS | Mod: CPTII,S$GLB,, | Performed by: PHYSICIAN ASSISTANT

## 2022-03-29 PROCEDURE — 76770 US EXAM ABDO BACK WALL COMP: CPT | Mod: TC

## 2022-03-29 NOTE — PROGRESS NOTES
Clinic Consult:  [facility] Consultation Note    Reason for Consult:  The encounter diagnosis was Constipation, unspecified constipation type.    PCP: Penny Randolph   [address] / [place] [address]    CC: Colonoscopy and Follow-up      HPI:  This is a 81 y.o. female here for evaluation of the above. She has been referred to GI to discuss risks and benefits of colonoscopy.  Constipation:  Struggling with this for the past year. Has had constipation on and off over the years. Was taking stool softeners and laxatives. If she doesn't take any medication, will go over 1 week without a BM. She has been using enemas regularly. Denies blood in the stool or black stool, nausea, vomiting, severe abdominal pain. Does note that stool caliber has changed and is more thin than normal. She has never tried prescription medication for this. Last colonoscopy was 2016. She is technically due for surveillance as her last scope she had a 10mm polyp removed which returned benign.      Review of Systems   Constitutional: Negative for activity change, appetite change, chills, diaphoresis, fatigue, fever and unexpected weight change.   HENT: Negative for trouble swallowing.    Respiratory: Negative for cough and shortness of breath.    Cardiovascular: Negative for chest pain.   Gastrointestinal: Positive for constipation. Negative for abdominal distention, abdominal pain, blood in stool, diarrhea, nausea and vomiting.   Genitourinary: Negative for dysuria and hematuria.   Musculoskeletal: Positive for arthralgias (chronic).   Neurological: Negative for dizziness and weakness.   Psychiatric/Behavioral: Negative for confusion and dysphoric mood. The patient is not nervous/anxious.         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 Taveras    COLONOSCOPY      COLONOSCOPY N/A 9/22/2016    Procedure: COLONOSCOPY;  Surgeon: Jd Ashton MD;  Location: [facility] ([address] [place] [address]);  Service: Endoscopy;  Laterality: N/A;  OK per Dr Randolph for pt to hold Plavix 5 days prior to procedure/see telephone encounter dated 8/29/16/svn    EYE SURGERY Bilateral 2002 approx    Laser for glaucoma    HYSTERECTOMY  1963     AMEENA/USO- fibroids; no cancer    OOPHORECTOMY  1963    unknown, only removed one       Family History:    Family History   Problem Relation Age of Onset    Stroke Mother     Mental illness Mother     Hypertension Mother     Stroke Father     Diabetes Maternal Grandmother     Drug abuse Daughter     Cancer Sister 68        gyn    Ovarian cancer Sister     Cancer Maternal Uncle         type not known    Heart disease Paternal Grandmother     Cancer Maternal Aunt         type unknown    Glaucoma Neg Hx     Macular degeneration Neg Hx     Alcohol abuse Neg Hx     COPD Neg Hx     Asthma Neg Hx     Amblyopia Neg Hx     Blindness Neg Hx     Cataracts Neg Hx     Retinal detachment Neg Hx     Strabismus Neg Hx        Social History:   Social History     Socioeconomic History    Marital status:     Number of children: 1   Tobacco Use    Smoking status: Never Smoker    Smokeless tobacco: Never Used   Substance and Sexual Activity    Alcohol use: No    Drug use: No     Sexual activity: Not Currently     Partners: Male   Social History Narrative    , no pets or smokers in household. 1 Child who lives in nursing home. She has a grandson who lives in [place].. Retired from [employer] . Still drives. Does not have a Living Will or advanced directive.      Social Determinants of Health     Financial Resource Strain: High Risk    Difficulty of Paying Living Expenses: Hard   Food Insecurity: Food Insecurity Present    Worried About Running Out of Food in the Last Year: Sometimes true    Ran Out of Food in the Last Year: Never true   Transportation Needs: No Transportation Needs    Lack of Transportation (Medical): No    Lack of Transportation (Non-Medical): No   Physical Activity: Inactive    Days of Exercise per Week: 0 days    Minutes of Exercise per Session: 0 min   Stress: No Stress Concern Present    Feeling of Stress : Not at all   Social Connections: Moderately Isolated    Frequency of Communication with Friends and Family: Three times a week    Frequency of Social Gatherings with Friends and Family: Once a week    Attends [employer] Services: 1 to 4 times per year    Active Member of Clubs or Organizations: No    Attends Club or Organization Meetings: Never    Marital Status:    Housing Stability: Low Risk     Unable to Pay for Housing in the Last Year: No    Number of Places Lived in the Last Year: 1    Unstable Housing in the Last Year: No       Allergies:   Review of patient's allergies indicates:   Allergen Reactions    Pravachol [pravastatin] Nausea Only       Home Medications:   Current Outpatient Medications on File Prior to Visit   Medication Sig Dispense Refill    ALPRAZolam (XANAX) 0.5 MG tablet TAKE 1 TABLET BY MOUTH NIGHTLY AS NEEDED FOR ANXIETY (MAY TAKE 1-2 TIMES WEEKLY FOR ANXIETY) 30 tablet 0    amLODIPine (NORVASC) 5 MG tablet Take 1 tablet (5 mg total) by mouth 2 (two) times daily. 180 tablet 3    atorvastatin (LIPITOR) 80  MG tablet TAKE 1 TABLET BY MOUTH EVERY DAY 90 tablet 2    blood sugar diagnostic Strp 1 strip by Misc.(Non-Drug; Combo Route) route 2 (two) times daily. Insurance preferred test stripes DX:E11.22 100 strip 11    blood-glucose meter kit Insurance preferred meter dx:E11.22 1 each 0    brimonidine 0.2% (ALPHAGAN) 0.2 % Drop Place 1 drop into both eyes 3 (three) times daily. 15 mL 1    carvediloL (COREG) 12.5 MG tablet Take 12.5 mg by mouth.      cholecalciferol, vitamin D3, (VITAMIN D3) 1,000 unit capsule Take 1 capsule (1,000 Units total) by mouth once daily. 30 capsule 12    famotidine (PEPCID) 20 MG tablet 1 tablet at bedtime as needed      ferrous sulfate (FEOSOL) 325 mg (65 mg iron) Tab tablet Take 1 tablet (325 mg total) by mouth 2 (two) times daily. 180 tablet 3    flecainide (TAMBOCOR) 50 MG Tab Take 1 tablet (50 mg total) by mouth 2 (two) times daily. 60 tablet 1    gabapentin (NEURONTIN) 300 MG capsule Take 1 capsule (300 mg total) by mouth every evening for 7 days, THEN 2 capsules (600 mg total) every evening for 7 days, THEN 3 capsules (900 mg total) every evening for 16 days. 30 capsule 1    glimepiride (AMARYL) 4 MG tablet TAKE 1 TABLET BY MOUTH IN THE MORNING WITH BREAKFAST 90 tablet 1    hydroCHLOROthiazide (HYDRODIURIL) 12.5 MG Tab TAKE 1 TABLET BY MOUTH EVERY DAY 90 tablet 3    HYDROcodone-acetaminophen (NORCO) 5-325 mg per tablet TAKE 1 TABLET BY MOUTH EVERY 8 (EIGHT) HOURS AS NEEDED FOR PAIN FOR UP TO 10 DOSES.      hydrocortisone 2.5 % cream Apply topically 2 (two) times daily. 30 g 2    lancets (ACCU-CHEK SOFTCLIX LANCETS) Misc 100 lancets by Misc.(Non-Drug; Combo Route) route 2 (two) times daily. Insurance preferred lancets Dx:E11.22 100 each 11    levalbuterol (XOPENEX HFA) 45 mcg/actuation inhaler INHALE 1-2 PUFFS INTO THE LUNGS EVERY 6 (SIX) HOURS AS NEEDED FOR WHEEZING. RESCUE 15 Inhaler 12    losartan (COZAAR) 50 MG tablet TAKE 1 TABLET BY MOUTH TWICE A  tablet 2     "meclizine (ANTIVERT) 25 mg tablet Take 1 tablet (25 mg total) by mouth daily as needed for Dizziness. 30 tablet 0    metFORMIN (GLUCOPHAGE-XR) 500 MG ER 24hr tablet TAKE 2 TABLETS BY MOUTH EVERY  tablet 1    metoprolol succinate (TOPROL-XL) 50 MG 24 hr tablet TAKE 1 TABLET BY MOUTH EVERY DAY 90 tablet 1    mupirocin (BACTROBAN) 2 % ointment Apply topically 3 (three) times daily. 22 g 0    PROAIR HFA 90 mcg/actuation inhaler INHALE 2 PUFFS INTO THE LUNGS EVERY 6 (SIX) HOURS AS NEEDED FOR WHEEZING. RESCUE 8.5 g 12    tacrolimus (PROTOPIC) 0.1 % ointment Apply topically 2 (two) times daily. 60 g 3    XARELTO 15 mg Tab TAKE 1 TABLET (15 MG TOTAL) BY MOUTH DAILY WITH DINNER OR EVENING MEAL. 30 tablet 6     No current facility-administered medications on file prior to visit.       Physical Exam:  BP (!) 120/58   Pulse 60   Ht 5' 5" (1.651 m)   Wt 82.1 kg (181 lb)   SpO2 98%   BMI 30.12 kg/m²   Body mass index is 30.12 kg/m².  Physical Exam  Constitutional:       General: She is not in acute distress.     Appearance: Normal appearance. She is not ill-appearing, toxic-appearing or diaphoretic.   HENT:      Head: Normocephalic and atraumatic.   Eyes:      General: No scleral icterus.     Extraocular Movements: Extraocular movements intact.   Cardiovascular:      Rate and Rhythm: Normal rate and regular rhythm.   Pulmonary:      Effort: Pulmonary effort is normal. No respiratory distress.   Abdominal:      General: Bowel sounds are normal. There is no distension.      Palpations: Abdomen is soft.      Tenderness: There is no abdominal tenderness. There is no guarding.   Musculoskeletal:         General: Normal range of motion.      Cervical back: Normal range of motion.   Skin:     General: Skin is warm and dry.      Coloration: Skin is not jaundiced or pale.   Neurological:      General: No focal deficit present.      Mental Status: She is alert and oriented to person, place, and time.           Labs: " Pertinent labs reviewed.  Endoscopy: --  CRC Screenin  Anticoagulation: Xarelto      Assessment:  1. Constipation, unspecified constipation type         Recommendations:  -Long discussion with patient regarding risks and benefits of colonoscopy. Risks would include bleeding, perforation, risk of undergoing sedation - would also have to stop blood thinner. Benefit of procedure would be evaluation of constipation: mass or lesion, stricture, etc. Could also remove any polyps seen at this time.   -Patient verbalizes understanding and would like to try Linzess for constipation prior to undergoing scope. Will follow up in 4 weeks. If symptoms progress, will reconsider scope.    Constipation, unspecified constipation type  -     linaCLOtide (LINZESS) 145 mcg Cap capsule; Take 1 capsule (145 mcg total) by mouth before breakfast.  Dispense: 30 capsule; Refill: 2        Follow up in about 4 weeks (around 2022).    Thank you so much for allowing me to participate in the care of Saul Abdul PA-C

## 2022-04-06 ENCOUNTER — HOSPITAL ENCOUNTER (OUTPATIENT)
Dept: RADIOLOGY | Facility: HOSPITAL | Age: 82
Discharge: HOME | End: 2022-04-06
Attending: INTERNAL MEDICINE
Payer: MEDICARE

## 2022-04-06 DIAGNOSIS — Z12.31 SCREENING MAMMOGRAM, ENCOUNTER FOR: ICD-10-CM

## 2022-04-06 PROCEDURE — 77063 BREAST TOMOSYNTHESIS BI: CPT | Mod: 26,,, | Performed by: RADIOLOGY

## 2022-04-06 PROCEDURE — 77067 SCR MAMMO BI INCL CAD: CPT | Mod: 26,,, | Performed by: RADIOLOGY

## 2022-04-06 PROCEDURE — 77063 MAMMO DIGITAL SCREENING BILAT WITH TOMO: ICD-10-PCS | Mod: 26,,, | Performed by: RADIOLOGY

## 2022-04-06 PROCEDURE — 77067 MAMMO DIGITAL SCREENING BILAT WITH TOMO: ICD-10-PCS | Mod: 26,,, | Performed by: RADIOLOGY

## 2022-04-06 PROCEDURE — 77067 SCR MAMMO BI INCL CAD: CPT | Mod: TC

## 2022-04-09 NOTE — TELEPHONE ENCOUNTER
Spoke to pt advised pt on PCP message pt verbalized understanding appt made with pt and mailed reminder    Nephrology

## 2022-04-11 ENCOUNTER — TELEPHONE (OUTPATIENT)
Dept: PAIN MEDICINE | Facility: CLINIC | Age: 82
End: 2022-04-11
Payer: MEDICARE

## 2022-04-11 ENCOUNTER — TELEPHONE (OUTPATIENT)
Dept: INTERNAL MEDICINE | Facility: CLINIC | Age: 82
End: 2022-04-11
Payer: MEDICARE

## 2022-04-11 NOTE — TELEPHONE ENCOUNTER
Patient declined stated she is seeing a GI doctor to weigh out the pros and cons of getting a colonoscopy at her age

## 2022-04-11 NOTE — TELEPHONE ENCOUNTER
Please call    The patient was supposed to have a colonoscopy.  This was ordered but never completed and the order has .  Is the patient willing to have a colonoscopy?  If so, please let me know.  thanks

## 2022-04-11 NOTE — TELEPHONE ENCOUNTER
Contacted Pt in regards to procedure Pt would like to proceed. Notified we are waiting on cardiac clearance, once we received this we will schedule procedure. Pt states she will call 's office to get clearance faxed over to our office. All questions answered.

## 2022-04-13 ENCOUNTER — TELEPHONE (OUTPATIENT)
Dept: PAIN MEDICINE | Facility: CLINIC | Age: 82
End: 2022-04-13
Payer: MEDICARE

## 2022-04-13 NOTE — TELEPHONE ENCOUNTER
ANGELA for Pt notifying we have no received information from 's office yet but once we do we will call and schedule her procedure.

## 2022-04-13 NOTE — TELEPHONE ENCOUNTER
----- Message from Izabela Lorenzo sent at 4/13/2022 10:36 AM CDT -----  States Dr Grayson's Office is faxing us the information today. Please call pt [phone]. Thank you

## 2022-04-20 ENCOUNTER — TELEPHONE (OUTPATIENT)
Dept: PAIN MEDICINE | Facility: CLINIC | Age: 82
End: 2022-04-20
Payer: MEDICARE

## 2022-04-20 NOTE — TELEPHONE ENCOUNTER
Contacted 's office to fax over cardiac clearance. Given fax number and confirmed. Medical records will send over.

## 2022-05-03 NOTE — TELEPHONE ENCOUNTER
Clearance that Adelaida requested has been received.  I have scanned it into the media section of the patient's chart.

## 2022-05-04 ENCOUNTER — PATIENT OUTREACH (OUTPATIENT)
Dept: ADMINISTRATIVE | Facility: OTHER | Age: 82
End: 2022-05-04
Payer: MEDICARE

## 2022-05-05 ENCOUNTER — TELEPHONE (OUTPATIENT)
Dept: PAIN MEDICINE | Facility: CLINIC | Age: 82
End: 2022-05-05
Payer: MEDICARE

## 2022-05-05 ENCOUNTER — OFFICE VISIT (OUTPATIENT)
Dept: GASTROENTEROLOGY | Facility: CLINIC | Age: 82
End: 2022-05-05
Payer: MEDICARE

## 2022-05-05 VITALS
WEIGHT: 177.25 LBS | HEART RATE: 61 BPM | SYSTOLIC BLOOD PRESSURE: 140 MMHG | BODY MASS INDEX: 29.53 KG/M2 | HEIGHT: 65 IN | OXYGEN SATURATION: 99 % | DIASTOLIC BLOOD PRESSURE: 70 MMHG

## 2022-05-05 DIAGNOSIS — K59.00 CONSTIPATION, UNSPECIFIED CONSTIPATION TYPE: Primary | ICD-10-CM

## 2022-05-05 PROCEDURE — 1126F AMNT PAIN NOTED NONE PRSNT: CPT | Mod: CPTII,S$GLB,, | Performed by: PHYSICIAN ASSISTANT

## 2022-05-05 PROCEDURE — 99999 PR PBB SHADOW E&M-EST. PATIENT-LVL V: CPT | Mod: PBBFAC,,, | Performed by: PHYSICIAN ASSISTANT

## 2022-05-05 PROCEDURE — 99213 PR OFFICE/OUTPT VISIT, EST, LEVL III, 20-29 MIN: ICD-10-PCS | Mod: S$GLB,,, | Performed by: PHYSICIAN ASSISTANT

## 2022-05-05 PROCEDURE — 3078F PR MOST RECENT DIASTOLIC BLOOD PRESSURE < 80 MM HG: ICD-10-PCS | Mod: CPTII,S$GLB,, | Performed by: PHYSICIAN ASSISTANT

## 2022-05-05 PROCEDURE — 3078F DIAST BP <80 MM HG: CPT | Mod: CPTII,S$GLB,, | Performed by: PHYSICIAN ASSISTANT

## 2022-05-05 PROCEDURE — 3288F PR FALLS RISK ASSESSMENT DOCUMENTED: ICD-10-PCS | Mod: CPTII,S$GLB,, | Performed by: PHYSICIAN ASSISTANT

## 2022-05-05 PROCEDURE — 1126F PR PAIN SEVERITY QUANTIFIED, NO PAIN PRESENT: ICD-10-PCS | Mod: CPTII,S$GLB,, | Performed by: PHYSICIAN ASSISTANT

## 2022-05-05 PROCEDURE — 1101F PT FALLS ASSESS-DOCD LE1/YR: CPT | Mod: CPTII,S$GLB,, | Performed by: PHYSICIAN ASSISTANT

## 2022-05-05 PROCEDURE — 1101F PR PT FALLS ASSESS DOC 0-1 FALLS W/OUT INJ PAST YR: ICD-10-PCS | Mod: CPTII,S$GLB,, | Performed by: PHYSICIAN ASSISTANT

## 2022-05-05 PROCEDURE — 3288F FALL RISK ASSESSMENT DOCD: CPT | Mod: CPTII,S$GLB,, | Performed by: PHYSICIAN ASSISTANT

## 2022-05-05 PROCEDURE — 99999 PR PBB SHADOW E&M-EST. PATIENT-LVL V: ICD-10-PCS | Mod: PBBFAC,,, | Performed by: PHYSICIAN ASSISTANT

## 2022-05-05 PROCEDURE — 3077F PR MOST RECENT SYSTOLIC BLOOD PRESSURE >= 140 MM HG: ICD-10-PCS | Mod: CPTII,S$GLB,, | Performed by: PHYSICIAN ASSISTANT

## 2022-05-05 PROCEDURE — 99213 OFFICE O/P EST LOW 20 MIN: CPT | Mod: S$GLB,,, | Performed by: PHYSICIAN ASSISTANT

## 2022-05-05 PROCEDURE — 1159F MED LIST DOCD IN RCRD: CPT | Mod: CPTII,S$GLB,, | Performed by: PHYSICIAN ASSISTANT

## 2022-05-05 PROCEDURE — 3077F SYST BP >= 140 MM HG: CPT | Mod: CPTII,S$GLB,, | Performed by: PHYSICIAN ASSISTANT

## 2022-05-05 PROCEDURE — 1159F PR MEDICATION LIST DOCUMENTED IN MEDICAL RECORD: ICD-10-PCS | Mod: CPTII,S$GLB,, | Performed by: PHYSICIAN ASSISTANT

## 2022-05-05 NOTE — PROGRESS NOTES
Subjective:      Patient ID: Saul Mar is a 81 y.o. female.    Chief Complaint: Constipation (Follow up)    HPI:  Patient reports to clinic today for follow up of constipation. Last visit she was started on Linzess. She reports today with significant improvement in symptoms. Bowels moving daily. Denies abdominal pain, blood in the stool, nausea, vomiting, abnormal weight loss. She declines colonoscopy at this time.    Review of Systems   Constitutional: Negative for activity change, appetite change, chills, diaphoresis, fatigue, fever and unexpected weight change.   Respiratory: Negative for shortness of breath.    Cardiovascular: Negative for chest pain.   Gastrointestinal: Negative for abdominal distention, abdominal pain, anal bleeding, blood in stool, diarrhea, nausea and vomiting. Constipation: well controlled.   Neurological: Negative for dizziness and weakness.   Psychiatric/Behavioral: Negative for dysphoric mood. The patient is not nervous/anxious.        Medical History: Reviewed    Social History: Reviewed    Allergies: Reviewed    Objective:     Physical Exam  Constitutional:       General: She is not in acute distress.     Appearance: Normal appearance. She is not ill-appearing, toxic-appearing or diaphoretic.   HENT:      Head: Normocephalic and atraumatic.   Eyes:      Extraocular Movements: Extraocular movements intact.   Cardiovascular:      Rate and Rhythm: Normal rate and regular rhythm.   Pulmonary:      Effort: Pulmonary effort is normal. No respiratory distress.   Abdominal:      General: Bowel sounds are normal. There is no distension.      Palpations: Abdomen is soft.      Tenderness: There is no abdominal tenderness. There is no guarding.   Musculoskeletal:         General: Normal range of motion.      Cervical back: Normal range of motion.   Skin:     General: Skin is warm and dry.      Coloration: Skin is not jaundiced or pale.   Neurological:      Mental Status: She is alert  and oriented to person, place, and time.         Assessment:     1. Constipation, unspecified constipation type        Plan:     -Symptoms controlled on Linzess. Continue current therapy.  -Patient instructed to reach out with any progression or worsening of symptoms.      Saul was seen today for constipation.    Diagnoses and all orders for this visit:    Constipation, unspecified constipation type        No follow-ups on file.    Thank you for the opportunity to participate in the care of this patient.   Jen Abdul PA-C.

## 2022-05-06 ENCOUNTER — TELEPHONE (OUTPATIENT)
Dept: PAIN MEDICINE | Facility: CLINIC | Age: 82
End: 2022-05-06
Payer: MEDICARE

## 2022-05-13 ENCOUNTER — TELEPHONE (OUTPATIENT)
Dept: INTERNAL MEDICINE | Facility: CLINIC | Age: 82
End: 2022-05-13
Payer: MEDICARE

## 2022-05-13 NOTE — TELEPHONE ENCOUNTER
----- Message from Linda Mantilla sent at 5/13/2022  1:25 PM CDT -----  Contact: Self/[phone]  Pt said that she is calling in regards to needing to speak with the nurse about the decision she made about the Colonoscopy. Please advise

## 2022-05-13 NOTE — TELEPHONE ENCOUNTER
Spoke with pt and pt stated that she was informed by colonoscopy department that at 81 a colonoscopy is not recommended. Pt was sent to GI and given Linzess 145 mcg once daily and it is working well

## 2022-05-17 ENCOUNTER — OFFICE VISIT (OUTPATIENT)
Dept: OPHTHALMOLOGY | Facility: CLINIC | Age: 82
End: 2022-05-17
Payer: MEDICARE

## 2022-05-17 DIAGNOSIS — Z96.1 PSEUDOPHAKIA, LEFT EYE: ICD-10-CM

## 2022-05-17 DIAGNOSIS — H43.813 PVD (POSTERIOR VITREOUS DETACHMENT), BOTH EYES: ICD-10-CM

## 2022-05-17 DIAGNOSIS — E08.3213 DIABETES MELLITUS DUE TO UNDERLYING CONDITION WITH BOTH EYES AFFECTED BY MILD NONPROLIFERATIVE RETINOPATHY AND MACULAR EDEMA, WITH LONG-TERM CURRENT USE OF INSULIN: ICD-10-CM

## 2022-05-17 DIAGNOSIS — H25.11 AGE-RELATED NUCLEAR CATARACT, RIGHT: ICD-10-CM

## 2022-05-17 DIAGNOSIS — G45.3 AMAUROSIS FUGAX: ICD-10-CM

## 2022-05-17 DIAGNOSIS — H40.1222 LOW-TENSION GLAUCOMA, LEFT EYE, MODERATE STAGE: ICD-10-CM

## 2022-05-17 DIAGNOSIS — Z79.4 DIABETES MELLITUS DUE TO UNDERLYING CONDITION WITH BOTH EYES AFFECTED BY MILD NONPROLIFERATIVE RETINOPATHY AND MACULAR EDEMA, WITH LONG-TERM CURRENT USE OF INSULIN: ICD-10-CM

## 2022-05-17 DIAGNOSIS — H40.1211 LOW-TENSION GLAUCOMA, RIGHT EYE, MILD STAGE: Primary | ICD-10-CM

## 2022-05-17 PROCEDURE — 92020 GONIOSCOPY: CPT | Mod: S$GLB,,, | Performed by: OPHTHALMOLOGY

## 2022-05-17 PROCEDURE — 1101F PR PT FALLS ASSESS DOC 0-1 FALLS W/OUT INJ PAST YR: ICD-10-PCS | Mod: CPTII,S$GLB,, | Performed by: OPHTHALMOLOGY

## 2022-05-17 PROCEDURE — 1101F PT FALLS ASSESS-DOCD LE1/YR: CPT | Mod: CPTII,S$GLB,, | Performed by: OPHTHALMOLOGY

## 2022-05-17 PROCEDURE — 1126F AMNT PAIN NOTED NONE PRSNT: CPT | Mod: CPTII,S$GLB,, | Performed by: OPHTHALMOLOGY

## 2022-05-17 PROCEDURE — 99499 UNLISTED E&M SERVICE: CPT | Mod: S$GLB,,, | Performed by: OPHTHALMOLOGY

## 2022-05-17 PROCEDURE — 3288F FALL RISK ASSESSMENT DOCD: CPT | Mod: CPTII,S$GLB,, | Performed by: OPHTHALMOLOGY

## 2022-05-17 PROCEDURE — 1160F RVW MEDS BY RX/DR IN RCRD: CPT | Mod: CPTII,S$GLB,, | Performed by: OPHTHALMOLOGY

## 2022-05-17 PROCEDURE — 1159F PR MEDICATION LIST DOCUMENTED IN MEDICAL RECORD: ICD-10-PCS | Mod: CPTII,S$GLB,, | Performed by: OPHTHALMOLOGY

## 2022-05-17 PROCEDURE — 92134 CPTRZ OPH DX IMG PST SGM RTA: CPT | Mod: S$GLB,,, | Performed by: OPHTHALMOLOGY

## 2022-05-17 PROCEDURE — 92020 PR SPECIAL EYE EVAL,GONIOSCOPY: ICD-10-PCS | Mod: S$GLB,,, | Performed by: OPHTHALMOLOGY

## 2022-05-17 PROCEDURE — 3288F PR FALLS RISK ASSESSMENT DOCUMENTED: ICD-10-PCS | Mod: CPTII,S$GLB,, | Performed by: OPHTHALMOLOGY

## 2022-05-17 PROCEDURE — 92012 PR EYE EXAM, EST PATIENT,INTERMED: ICD-10-PCS | Mod: S$GLB,,, | Performed by: OPHTHALMOLOGY

## 2022-05-17 PROCEDURE — 92134 POSTERIOR SEGMENT OCT RETINA (OCULAR COHERENCE TOMOGRAPHY)-BOTH EYES: ICD-10-PCS | Mod: S$GLB,,, | Performed by: OPHTHALMOLOGY

## 2022-05-17 PROCEDURE — 2023F DILAT RTA XM W/O RTNOPTHY: CPT | Mod: CPTII,S$GLB,, | Performed by: OPHTHALMOLOGY

## 2022-05-17 PROCEDURE — 99999 PR PBB SHADOW E&M-EST. PATIENT-LVL III: CPT | Mod: PBBFAC,,, | Performed by: OPHTHALMOLOGY

## 2022-05-17 PROCEDURE — 2023F PR DILATED RETINAL EXAM W/O EVID OF RETINOPATHY: ICD-10-PCS | Mod: CPTII,S$GLB,, | Performed by: OPHTHALMOLOGY

## 2022-05-17 PROCEDURE — 92012 INTRM OPH EXAM EST PATIENT: CPT | Mod: S$GLB,,, | Performed by: OPHTHALMOLOGY

## 2022-05-17 PROCEDURE — 1126F PR PAIN SEVERITY QUANTIFIED, NO PAIN PRESENT: ICD-10-PCS | Mod: CPTII,S$GLB,, | Performed by: OPHTHALMOLOGY

## 2022-05-17 PROCEDURE — 99499 RISK ADDL DX/OHS AUDIT: ICD-10-PCS | Mod: S$GLB,,, | Performed by: OPHTHALMOLOGY

## 2022-05-17 PROCEDURE — 1159F MED LIST DOCD IN RCRD: CPT | Mod: CPTII,S$GLB,, | Performed by: OPHTHALMOLOGY

## 2022-05-17 PROCEDURE — 1160F PR REVIEW ALL MEDS BY PRESCRIBER/CLIN PHARMACIST DOCUMENTED: ICD-10-PCS | Mod: CPTII,S$GLB,, | Performed by: OPHTHALMOLOGY

## 2022-05-17 PROCEDURE — 99999 PR PBB SHADOW E&M-EST. PATIENT-LVL III: ICD-10-PCS | Mod: PBBFAC,,, | Performed by: OPHTHALMOLOGY

## 2022-05-17 NOTE — PROGRESS NOTES
HPI     Glaucoma      Additional comments: Overdue glaucoma follow up and pt feels that her   vision has changed since last exam               Comments     DLS: 10/18/19    1) POAG  2) DM with BDR and ME OU  3) Hx RLL Lesion  4) NS OD  5) PCIOL OS  6) Hx CVA   7) Hx Amaurosis Fugax  8) Eyelid Myokymia     Meds:  Brimonidine TID OU           Last edited by Zuri Winkler MA on 5/17/2022  8:44 AM. (History)            Assessment /Plan     For exam results, see Encounter Report.    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 POAG ou   H/O poor compliance   First HVF 1997   First photos 1998      Family history none   Glaucoma meds Has used alphagan in past - poor compliance-stopped on her own   H/O adverse rxn to glaucoma drops none   LASERS No glaucoma laser (+h/o focal OU for BDR)   GLAUCOMA SURGERIES none   OTHER EYE SURGERIES CE/PCIOL OS 2/27/13  CDR 0.8/0.75   Tbase 16-20/16-22   Tmax 20/22   Ttarget 17/17   HVF 15 test 1997 to 2019  - inf arc/NS od //  Sup paracentral loss and INS os  (+changes ? 2/2 focal laser )   Gonio +3   /621- thick ou (- 4.5 ou)   OCT 4 test 2006 to 2019  - RNFL - nl od // dec TI os   HRT 6  tests: 2009 to 2018 -  MR -  Dec. I, bord S/T od // bord T/I os /// CDR 0.74 od // 0.70 os  Disc photos 1998, 2000, 2001, 2003, 2003, 2004, 2005, 2006- slides  // 2010 , 2018 - OIS      - Ttoday 13/13  - Test done today  IOP // Gonio // Reestablish care      2. 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 NS OD      "          Remove prn              BCVA 20/40              BAT 20/60     4. Pseudophakia OS               S/p CE/PCIOL OS               IOL SN60 WF 20.5              -VA limited 2/2 hx of CSME s/p focal scars              BCVA  20/25              Give Rx glasses - 7/2/2013              Had re-bound iritis - resolved     4. H/O RLL lesion - S/P excision with Jovanni      5. Post Nelda lives in [place] - but still likes to come to [place] for care      6. amaurosis fugax / H/O CVA's              Patient reports stroke like symptoms and amaurosis fugax 10/13              -  She was admitted to hospital with very elevated BP              -  Patient had MRI at the time showing ischemic disease              - last Carotid U/S done 11/12- patient reports that                  - PCP gets one yearly- last U/S showed minimal plaque disease              -  H/o stroke- discussed that amaurosis was from elevated BP and if ever recurs needs to report to ER immediately- she voiced understanding     7. Eyelid myokymia  - intermittent - patient also reports eyelid "twitching  "- discussed with patient that myokymia is usually from lack of sleep, caffeine intake, or stress- however nothing to worry about  -  Did not have any "twitching" on exam today     Plan:  BrimonidineTID ou   Can consider CEIOL OD with Rey given poor compliance with gtts and moderate glaucoma.   HVF's are inconsistent with the ON exam / OCT ect   Pt wants to wait on phaco od for now // has done well os      Avoid PG's if possible 2/2 h/o CME 2/2 BDR     Refraction Post op os  is more myopic than expected - review IOL calc if needs 2nd eye done     Repeat OCT of macula 5/17/2022 - - done no CME os// + focal laser scars ou     Photo file updated 5/17/2022      Plan:   F/U 4 months OCT ONH OU // 24-2 OU // DFE OU // Cataract check OD   - photo file - (( first name is Saul ( leave out the Zaid part))) - updated 6/4/2019     Can refer to retina in " future - +BDR - s/p laser - [place] - then use to see arend - can refer to [facility] in future - appears stable for now

## 2022-05-23 ENCOUNTER — TELEPHONE (OUTPATIENT)
Dept: PAIN MEDICINE | Facility: CLINIC | Age: 82
End: 2022-05-23
Payer: MEDICARE

## 2022-05-23 NOTE — TELEPHONE ENCOUNTER
Procedure scheduled for 6/17/22 Dr. Fnoseca, follow up scheduled for 7/18/22 @ 1:00 with AILYN Pereyra.  Pre procedure instructions sent in patient my chart for review after our telephone conversation.

## 2022-05-31 ENCOUNTER — PATIENT MESSAGE (OUTPATIENT)
Dept: PAIN MEDICINE | Facility: CLINIC | Age: 82
End: 2022-05-31
Payer: MEDICARE

## 2022-05-31 NOTE — TELEPHONE ENCOUNTER
Reached out to patient to answer questions about attire for her procedure. Pt understand. All questions answered.     Srikanth Hurd  Medical Assistant

## 2022-06-01 ENCOUNTER — PATIENT MESSAGE (OUTPATIENT)
Dept: PAIN MEDICINE | Facility: CLINIC | Age: 82
End: 2022-06-01
Payer: MEDICARE

## 2022-06-02 NOTE — TELEPHONE ENCOUNTER
Reached out to patient to answer questions about procedure down time. In form pr that she can resume to your daily active but nothing strayness. Pt understand. All questions answered.     Srikanth Hurd  Medical Assistant

## 2022-06-03 ENCOUNTER — TELEPHONE (OUTPATIENT)
Dept: PAIN MEDICINE | Facility: CLINIC | Age: 82
End: 2022-06-03
Payer: MEDICARE

## 2022-06-03 DIAGNOSIS — E11.22 DIABETES MELLITUS WITH STAGE 3 CHRONIC KIDNEY DISEASE: Chronic | ICD-10-CM

## 2022-06-03 DIAGNOSIS — N18.30 DIABETES MELLITUS WITH STAGE 3 CHRONIC KIDNEY DISEASE: Chronic | ICD-10-CM

## 2022-06-03 RX ORDER — FLECAINIDE ACETATE 50 MG/1
50 TABLET ORAL 2 TIMES DAILY
Qty: 60 TABLET | Refills: 1 | Status: SHIPPED | OUTPATIENT
Start: 2022-06-03

## 2022-06-03 RX ORDER — GLIMEPIRIDE 4 MG/1
TABLET ORAL
Qty: 90 TABLET | Refills: 1 | Status: SHIPPED | OUTPATIENT
Start: 2022-06-03 | End: 2023-04-19

## 2022-06-03 NOTE — TELEPHONE ENCOUNTER
Care Due:                  Date            Visit Type   Department     Provider  --------------------------------------------------------------------------------                                EP -                              PRIMARY      [facility]  Last Visit: 11-      CARE ([facility])   JEANA Randolph                               -                              PRIMARY      [facility]  Next Visit: 06-      CARE ([facility])   MEDICINE       Penny Randolph                                                            Last  Test          Frequency    Reason                     Performed    Due Date  --------------------------------------------------------------------------------    HBA1C.......  6 months...  glimepiride, metFORMIN...  03- 08-    Health [facility] Embedded Care Gaps. Reference number: [id number]. 6/02/2022   9:54:45 PM CDT

## 2022-06-03 NOTE — TELEPHONE ENCOUNTER
No new care gaps identified.  [facility] Embedded Care Gaps. Reference number: [id number]. 6/03/2022   5:08:28 AM GLENNYT

## 2022-06-03 NOTE — TELEPHONE ENCOUNTER
Reached out to patient to answer questions about procedure down time. as well as if it is okay to drive in a car for a long period of time. Inform pt that she can resume to your daily active but nothing strayness also if pain. Also to bring a ice pack with her and do not apply heat, per  advise.  Pt understand. All questions answered.     Srikanth Hurd  Medical Assistant

## 2022-06-03 NOTE — TELEPHONE ENCOUNTER
Refill Routing Note   Medication(s) are not appropriate for processing by [facility] for the following reason(s):      - Drug-Drug Interaction (albuterol, levalbuterol)    [facility] action(s):  Defer Medication-related problems identified: Drug-drug interaction        Medication reconciliation completed: No     Appointments  past 12m or future 3m with PCP    Date Provider   Last Visit   11/29/2021 Penny Randolph MD   Next Visit   6/3/2022 Penny Randolph MD   ED visits in past 90 days: 0        Note composed:4:08 PM 06/03/2022

## 2022-06-03 NOTE — TELEPHONE ENCOUNTER
----- Message from Nandini Cedillo sent at 6/3/2022 11:23 AM CDT -----  Pt is needing a call back regarding a procedure the pt is having this month. Please call .[phone]

## 2022-06-06 RX ORDER — ALBUTEROL SULFATE 90 UG/1
AEROSOL, METERED RESPIRATORY (INHALATION)
Qty: 54 G | Refills: 1 | Status: SHIPPED | OUTPATIENT
Start: 2022-06-06 | End: 2023-02-24

## 2022-06-07 ENCOUNTER — TELEPHONE (OUTPATIENT)
Dept: PAIN MEDICINE | Facility: CLINIC | Age: 82
End: 2022-06-07
Payer: MEDICARE

## 2022-06-07 ENCOUNTER — PATIENT MESSAGE (OUTPATIENT)
Dept: PAIN MEDICINE | Facility: CLINIC | Age: 82
End: 2022-06-07
Payer: MEDICARE

## 2022-06-07 NOTE — TELEPHONE ENCOUNTER
Reached out to patient to answer questions about procedure. Pt stated that she had a lot of question to ask  and would like a phone call from her. Infomr pt that she do not have any audio call opeining. Pt stated that she will send  a message on the portal [facility] instead.   Pt understand. All questions answered.       Srikanth Hurd  Medical

## 2022-06-07 NOTE — TELEPHONE ENCOUNTER
----- Message from Isadora Ashley sent at 6/7/2022  9:26 AM CDT -----  Contact: Saul  Patient is calling to speak with provider. Patient reports having a question pertaining to 6/17/22 procedure and request a call back at [phone] when possible.  Thank you,  GH

## 2022-06-09 NOTE — PRE-PROCEDURE INSTRUCTIONS
Spoke with patient regarding procedure scheduled on 6.17    Arrival time 1000    Has patient been sick with fever or on antibiotics within the last 7 days? No    Does the patient have any open wounds, sores or rashes? No    Does the patient have any recent fractures? no    Has patient received a vaccination within the last 7 days? No    Received the COVID vaccination? yes    Has the patient stopped all medications as directed? Hold xarelto 3 days prior to procedure. Cardiac clearance obtained from estuardo deras np on 3.16. hold dm meds am of procedure    Does patient have a pacemaker and or defibrillator? no    Does the patient have a ride to and from procedure and someone reliable to remain with patient? Zenobia torres     Is the patient diabetic? yes    Does the patient have sleep apnea? Or use O2 at home? No and no     Is the patient receiving sedation? yes    Is the patient instructed to remain NPO beginning at midnight the night before their procedure? yes    Procedure location confirmed with patient? Yes    Covid- Denies signs/symptoms. Instructed to notify PAT/MD if any changes.

## 2022-06-11 ENCOUNTER — PATIENT MESSAGE (OUTPATIENT)
Dept: INTERNAL MEDICINE | Facility: CLINIC | Age: 82
End: 2022-06-11
Payer: MEDICARE

## 2022-06-14 ENCOUNTER — OFFICE VISIT (OUTPATIENT)
Dept: INTERNAL MEDICINE | Facility: CLINIC | Age: 82
End: 2022-06-14
Payer: MEDICARE

## 2022-06-14 VITALS
DIASTOLIC BLOOD PRESSURE: 65 MMHG | HEIGHT: 65 IN | SYSTOLIC BLOOD PRESSURE: 120 MMHG | WEIGHT: 174 LBS | BODY MASS INDEX: 28.99 KG/M2

## 2022-06-14 DIAGNOSIS — N18.30 STAGE 3 CHRONIC KIDNEY DISEASE, UNSPECIFIED WHETHER STAGE 3A OR 3B CKD: Chronic | ICD-10-CM

## 2022-06-14 DIAGNOSIS — I48.91 ATRIAL FIBRILLATION, UNSPECIFIED TYPE: ICD-10-CM

## 2022-06-14 DIAGNOSIS — E04.2 MULTINODULAR THYROID: ICD-10-CM

## 2022-06-14 DIAGNOSIS — I63.00 CEREBROVASCULAR ACCIDENT (CVA) DUE TO THROMBOSIS OF PRECEREBRAL ARTERY: Chronic | ICD-10-CM

## 2022-06-14 DIAGNOSIS — E11.49 TYPE II DIABETES MELLITUS WITH NEUROLOGICAL MANIFESTATIONS: Primary | Chronic | ICD-10-CM

## 2022-06-14 DIAGNOSIS — D12.2 ADENOMATOUS POLYP OF ASCENDING COLON: Chronic | ICD-10-CM

## 2022-06-14 DIAGNOSIS — I10 HYPERTENSION, ESSENTIAL: Chronic | ICD-10-CM

## 2022-06-14 DIAGNOSIS — M72.0 DUPUYTREN'S CONTRACTURE OF RIGHT HAND: ICD-10-CM

## 2022-06-14 DIAGNOSIS — E11.69 HYPERLIPIDEMIA ASSOCIATED WITH TYPE 2 DIABETES MELLITUS: ICD-10-CM

## 2022-06-14 DIAGNOSIS — E78.5 HYPERLIPIDEMIA ASSOCIATED WITH TYPE 2 DIABETES MELLITUS: ICD-10-CM

## 2022-06-14 PROCEDURE — 3288F FALL RISK ASSESSMENT DOCD: CPT | Mod: CPTII,S$GLB,, | Performed by: INTERNAL MEDICINE

## 2022-06-14 PROCEDURE — 3078F DIAST BP <80 MM HG: CPT | Mod: CPTII,S$GLB,, | Performed by: INTERNAL MEDICINE

## 2022-06-14 PROCEDURE — 99214 PR OFFICE/OUTPT VISIT, EST, LEVL IV, 30-39 MIN: ICD-10-PCS | Mod: S$GLB,,, | Performed by: INTERNAL MEDICINE

## 2022-06-14 PROCEDURE — 3051F PR MOST RECENT HEMOGLOBIN A1C LEVEL 7.0 - < 8.0%: ICD-10-PCS | Mod: CPTII,S$GLB,, | Performed by: INTERNAL MEDICINE

## 2022-06-14 PROCEDURE — 1159F MED LIST DOCD IN RCRD: CPT | Mod: CPTII,S$GLB,, | Performed by: INTERNAL MEDICINE

## 2022-06-14 PROCEDURE — 3051F HG A1C>EQUAL 7.0%<8.0%: CPT | Mod: CPTII,S$GLB,, | Performed by: INTERNAL MEDICINE

## 2022-06-14 PROCEDURE — 99999 PR PBB SHADOW E&M-EST. PATIENT-LVL V: ICD-10-PCS | Mod: PBBFAC,,, | Performed by: INTERNAL MEDICINE

## 2022-06-14 PROCEDURE — 1125F PR PAIN SEVERITY QUANTIFIED, PAIN PRESENT: ICD-10-PCS | Mod: CPTII,S$GLB,, | Performed by: INTERNAL MEDICINE

## 2022-06-14 PROCEDURE — 3074F SYST BP LT 130 MM HG: CPT | Mod: CPTII,S$GLB,, | Performed by: INTERNAL MEDICINE

## 2022-06-14 PROCEDURE — 3074F PR MOST RECENT SYSTOLIC BLOOD PRESSURE < 130 MM HG: ICD-10-PCS | Mod: CPTII,S$GLB,, | Performed by: INTERNAL MEDICINE

## 2022-06-14 PROCEDURE — 1101F PR PT FALLS ASSESS DOC 0-1 FALLS W/OUT INJ PAST YR: ICD-10-PCS | Mod: CPTII,S$GLB,, | Performed by: INTERNAL MEDICINE

## 2022-06-14 PROCEDURE — 3288F PR FALLS RISK ASSESSMENT DOCUMENTED: ICD-10-PCS | Mod: CPTII,S$GLB,, | Performed by: INTERNAL MEDICINE

## 2022-06-14 PROCEDURE — 99214 OFFICE O/P EST MOD 30 MIN: CPT | Mod: S$GLB,,, | Performed by: INTERNAL MEDICINE

## 2022-06-14 PROCEDURE — 3078F PR MOST RECENT DIASTOLIC BLOOD PRESSURE < 80 MM HG: ICD-10-PCS | Mod: CPTII,S$GLB,, | Performed by: INTERNAL MEDICINE

## 2022-06-14 PROCEDURE — 1125F AMNT PAIN NOTED PAIN PRSNT: CPT | Mod: CPTII,S$GLB,, | Performed by: INTERNAL MEDICINE

## 2022-06-14 PROCEDURE — 1159F PR MEDICATION LIST DOCUMENTED IN MEDICAL RECORD: ICD-10-PCS | Mod: CPTII,S$GLB,, | Performed by: INTERNAL MEDICINE

## 2022-06-14 PROCEDURE — 99999 PR PBB SHADOW E&M-EST. PATIENT-LVL V: CPT | Mod: PBBFAC,,, | Performed by: INTERNAL MEDICINE

## 2022-06-14 PROCEDURE — 1101F PT FALLS ASSESS-DOCD LE1/YR: CPT | Mod: CPTII,S$GLB,, | Performed by: INTERNAL MEDICINE

## 2022-06-14 RX ORDER — AMLODIPINE BESYLATE 5 MG/1
5 TABLET ORAL 2 TIMES DAILY
Qty: 180 TABLET | Refills: 3 | Status: SHIPPED | OUTPATIENT
Start: 2022-06-14

## 2022-06-14 NOTE — PROGRESS NOTES
Subjective:       Patient ID: Saul Mar is a 82 y.o. female.    Chief Complaint: Follow-up    Here for follow-up of multiple medical issues    Blood pressure doing very well    Recent labs acceptable with an A1c of 7.2.  Up-to-date with screenings.  Vaccines and immunizations reviewed.    Palm of R hand achy and has some trigger finger, middle finger.  Some palmar thickening.    Stewed chicken with garlic and onions and seasoning (all items she has used before); salad cucumbers and lettuce.  Some itching of ankles.  No swelling of lips or tongue.  Itchy.  Used alcohol.  Sx have abated    Colonoscopy reviewed- she does not want to do this and Gastro recommended against it.  Currently, she is not having any abdominal pain.  She states her bowels are regular.  She denies any blood in her stool, pencil thin stools or black stools.    She denies syncope, chest pain, pressure, tightness or shortness of breath.  It is somewhat difficult for her to exert herself rapidly due to her orthopedic issues but in general, she feels well.    Cardiology June 2022  Gastro May 2022  Mammogram April 2022  Renal ultrasound March 2022  Podiatry March 2022  Nephrology February 2022    Patient Active Problem List:     Mixed diabetic hyperlipidemia associated with type 2 diabetes mellitus     Degenerative disc disease     Colon polyp: tubular adenoma 5/13, repeated 2016 to be repeated 2021- patient declined and Gastro also recommended against     Multinodular thyroid: thyroid u/s 7/16, stable 2017 and 2019, 2021     POAG (primary open-angle glaucoma) - Both Eyes     Amaurosis fugax     Nuclear sclerosis - Right Eye     Eyelid myokymia     Cerebral microvascular disease: stroke ? 1999 elsewhere; TIA 10/13     Vitamin D insufficiency     Left ventricular diastolic dysfunction with preserved systolic function     Hypertension, essential     Gastroesophageal reflux disease without esophagitis     Type 2 diabetes, uncontrolled, with  background retinopathy with macular edema     Diabetes mellitus with proteinuria     Type II diabetes mellitus with neurological manifestations     Aortic arch atherosclerosis     Abnormal ankle brachial index     Diabetes mellitus with stage 3 chronic kidney disease     Cerebrovascular accident (CVA) due to thrombosis of precerebral artery     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     CKD (chronic kidney disease) stage 3, GFR 30-59 ml/min     Sickle cell trait syndrome     Mixed anxiety depressive disorder     Diverticulosis of large intestine without hemorrhage     Atrial fibrillation     Hyperlipidemia associated with type 2 diabetes mellitus     Unspecified inflammatory spondylopathy, lumbar region     Bilateral radiating leg pain     PAD (peripheral artery disease)     Carotid atherosclerosis     Spinal stenosis of lumbar region with neurogenic claudication      Review of Systems   Constitutional: Negative for fatigue and fever.   Respiratory: Negative for cough and shortness of breath.    Cardiovascular: Negative for chest pain.   Gastrointestinal: Negative for abdominal pain.        Constipation better   Genitourinary: Negative for difficulty urinating and hematuria.   Musculoskeletal: Positive for arthralgias and back pain.        Hand sx see above   Skin: Negative for rash.   Neurological: Negative for weakness and numbness.       Objective:      Physical Exam  Constitutional:       Appearance: She is well-developed.   HENT:      Head: Normocephalic and atraumatic.   Neck:      Thyroid: Thyromegaly present.   Cardiovascular:      Rate and Rhythm: Normal rate and regular rhythm.      Heart sounds: No murmur heard.  Pulmonary:      Effort: Pulmonary effort is normal. No respiratory distress.      Breath sounds: Normal breath sounds. No wheezing.   Abdominal:      General: There is no distension.      Palpations: Abdomen is soft.   Musculoskeletal:      Cervical back: Normal range of motion and  neck supple.      Comments: No CVA pain.    Strength UE and LE wnl.  No pain over spine  Contracture R hand   Skin:     General: Skin is warm and dry.   Neurological:      Mental Status: She is oriented to person, place, and time.      Cranial Nerves: No cranial nerve deficit.      Deep Tendon Reflexes: Reflexes normal.   Psychiatric:         Behavior: Behavior normal.         Assessment:       1. Type II diabetes mellitus with neurological manifestations    2. Dupuytren's contracture of right hand    3. Adenomatous polyp of ascending colon    4. Cerebrovascular accident (CVA) due to thrombosis of precerebral artery    5. Hyperlipidemia associated with type 2 diabetes mellitus    6. Atrial fibrillation, unspecified type    7. Hypertension, essential    8. Stage 3 chronic kidney disease, unspecified whether stage 3a or 3b CKD    9. Multinodular thyroid: thyroid u/s 7/16, stable 2017 and 2019, 2021        Plan:           Saul was seen today for follow-up.    Diagnoses and all orders for this visit:    Type II diabetes mellitus with neurological manifestations:  Diet, hydration and lifestyle issues reviewed.  Continue current regimen  -     Hemoglobin A1C; Future    Dupuytren's contracture of right hand  -     Ambulatory referral/consult to Orthopedics; Future    Adenomatous polyp of ascending colon:  Declines colonoscopy, see above    Cerebrovascular accident (CVA) due to thrombosis of precerebral artery:  Stable; BP doing well.  Continue current regimen    Hyperlipidemia associated with type 2 diabetes mellitus:  Continue regimen    Atrial fibrillation, unspecified type; on anticoagulation; stable    Hypertension, essential  -     Comprehensive Metabolic Panel; Future  -     CBC Auto Differential; Future    Stage 3 chronic kidney disease, unspecified whether stage 3a or 3b CKD:  Gentle hydration, avoid nephrotoxins.  Will follow closely    Multinodular thyroid: thyroid u/s 7/16, stable 2017 and 2019, 2021  -      TSH; Future    Other orders  -     amLODIPine (NORVASC) 5 MG tablet; Take 1 tablet (5 mg total) by mouth 2 (two) times daily.    I will review all studies and determine further tx depending on findings  COVID booster discussed  Pneumonia vaccines discussed  Labs only in September  Anticipate visit with me within the next 6 months, sooner with problems in the interim

## 2022-06-17 ENCOUNTER — HOSPITAL ENCOUNTER (OUTPATIENT)
Facility: HOSPITAL | Age: 82
Discharge: HOME | End: 2022-06-17
Attending: ANESTHESIOLOGY | Admitting: ANESTHESIOLOGY
Payer: MEDICARE

## 2022-06-17 VITALS
DIASTOLIC BLOOD PRESSURE: 69 MMHG | HEIGHT: 65 IN | SYSTOLIC BLOOD PRESSURE: 171 MMHG | OXYGEN SATURATION: 98 % | WEIGHT: 172.81 LBS | RESPIRATION RATE: 18 BRPM | TEMPERATURE: 98 F | BODY MASS INDEX: 28.79 KG/M2 | HEART RATE: 55 BPM

## 2022-06-17 DIAGNOSIS — M54.16 LUMBAR RADICULOPATHY, CHRONIC: ICD-10-CM

## 2022-06-17 LAB — POCT GLUCOSE: 171 MG/DL (ref 70–110)

## 2022-06-17 PROCEDURE — 25500020 PHARM REV CODE 255: Performed by: ANESTHESIOLOGY

## 2022-06-17 PROCEDURE — 63600175 PHARM REV CODE 636 W HCPCS: Performed by: ANESTHESIOLOGY

## 2022-06-17 PROCEDURE — 25000003 PHARM REV CODE 250: Performed by: ANESTHESIOLOGY

## 2022-06-17 PROCEDURE — 64483 NJX AA&/STRD TFRM EPI L/S 1: CPT | Mod: 50 | Performed by: ANESTHESIOLOGY

## 2022-06-17 RX ORDER — MIDAZOLAM HYDROCHLORIDE 1 MG/ML
INJECTION, SOLUTION INTRAMUSCULAR; INTRAVENOUS
Status: DISCONTINUED | OUTPATIENT
Start: 2022-06-17 | End: 2022-06-17 | Stop reason: HOSPADM

## 2022-06-17 RX ORDER — INDOMETHACIN 25 MG/1
CAPSULE ORAL
Status: DISCONTINUED | OUTPATIENT
Start: 2022-06-17 | End: 2022-06-17 | Stop reason: HOSPADM

## 2022-06-17 RX ORDER — BUPIVACAINE HYDROCHLORIDE 2.5 MG/ML
INJECTION, SOLUTION EPIDURAL; INFILTRATION; INTRACAUDAL
Status: DISCONTINUED | OUTPATIENT
Start: 2022-06-17 | End: 2022-06-17 | Stop reason: HOSPADM

## 2022-06-17 RX ORDER — FENTANYL CITRATE 50 UG/ML
INJECTION, SOLUTION INTRAMUSCULAR; INTRAVENOUS
Status: DISCONTINUED | OUTPATIENT
Start: 2022-06-17 | End: 2022-06-17 | Stop reason: HOSPADM

## 2022-06-17 RX ORDER — DEXAMETHASONE SODIUM PHOSPHATE 10 MG/ML
INJECTION INTRAMUSCULAR; INTRAVENOUS
Status: DISCONTINUED | OUTPATIENT
Start: 2022-06-17 | End: 2022-06-17 | Stop reason: HOSPADM

## 2022-06-17 NOTE — DISCHARGE INSTRUCTIONS

## 2022-06-17 NOTE — H&P
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 Taveras    COLONOSCOPY      COLONOSCOPY N/A 9/22/2016    Procedure: COLONOSCOPY;  Surgeon: Jd Ashton MD;  Location: [address]);  Service: Endoscopy;  Laterality: N/A;  OK per Dr Randolph for pt to hold Plavix 5 days prior to procedure/see telephone encounter dated 8/29/16/svn    EYE SURGERY Bilateral 2002 approx    Laser for glaucoma    HYSTERECTOMY  1963     AMEENA/USO- fibroids; no cancer    OOPHORECTOMY  1963    unknown, only removed one     Review of patient's allergies indicates:   Allergen Reactions    Pravachol [pravastatin] Nausea Only        No current facility-administered medications on file prior to encounter.     Current Outpatient Medications on File Prior to Encounter   Medication Sig Dispense Refill    ALPRAZolam (XANAX) 0.5 MG tablet TAKE 1 TABLET BY MOUTH NIGHTLY AS NEEDED FOR ANXIETY (MAY TAKE 1-2 TIMES WEEKLY FOR ANXIETY) 30 tablet 0    blood sugar diagnostic Strp 1 strip by Misc.(Non-Drug; Combo Route) route 2 (two) times daily. Insurance preferred test stripes  DX:E11.22 100 strip 11    blood-glucose meter kit Insurance preferred meter dx:E11.22 1 each 0    carvediloL (COREG) 12.5 MG tablet Take 12.5 mg by mouth.      cholecalciferol, vitamin D3, (VITAMIN D3) 1,000 unit capsule Take 1 capsule (1,000 Units total) by mouth once daily. 30 capsule 12    famotidine (PEPCID) 20 MG tablet Take 20 mg by mouth Daily.      ferrous sulfate (FEOSOL) 325 mg (65 mg iron) Tab tablet Take 1 tablet (325 mg total) by mouth 2 (two) times daily. 180 tablet 3    gabapentin (NEURONTIN) 300 MG capsule Take 1 capsule (300 mg total) by mouth every evening for 7 days, THEN 2 capsules (600 mg total) every evening for 7 days, THEN 3 capsules (900 mg total) every evening for 16 days. 30 capsule 1    hydroCHLOROthiazide (HYDRODIURIL) 12.5 MG Tab TAKE 1 TABLET BY MOUTH EVERY DAY 90 tablet 3    HYDROcodone-acetaminophen (NORCO) 5-325 mg per tablet TAKE 1 TABLET BY MOUTH EVERY 8 (EIGHT) HOURS AS NEEDED FOR PAIN FOR UP TO 10 DOSES.      hydrocortisone 2.5 % cream Apply topically 2 (two) times daily. 30 g 2    lancets (ACCU-CHEK SOFTCLIX LANCETS) Misc 100 lancets by Misc.(Non-Drug; Combo Route) route 2 (two) times daily. Insurance preferred lancets Dx:E11.22 100 each 11    levalbuterol (XOPENEX HFA) 45 mcg/actuation inhaler INHALE 1-2 PUFFS INTO THE LUNGS EVERY 6 (SIX) HOURS AS NEEDED FOR WHEEZING. RESCUE 15 Inhaler 12    losartan (COZAAR) 50 MG tablet TAKE 1 TABLET BY MOUTH TWICE A  tablet 2    meclizine (ANTIVERT) 25 mg tablet Take 1 tablet (25 mg total) by mouth daily as needed for Dizziness. 30 tablet 0    metFORMIN (GLUCOPHAGE-XR) 500 MG ER 24hr tablet TAKE 2 TABLETS BY MOUTH EVERY  tablet 1    metoprolol succinate (TOPROL-XL) 50 MG 24 hr tablet TAKE 1 TABLET BY MOUTH EVERY DAY 90 tablet 1    mupirocin (BACTROBAN) 2 % ointment Apply topically 3 (three) times daily. 22 g 0    tacrolimus (PROTOPIC) 0.1 % ointment Apply topically 2 (two) times daily. 60 g 3    XARELTO 15 mg  Tab TAKE 1 TABLET (15 MG TOTAL) BY MOUTH DAILY WITH DINNER OR EVENING MEAL. 30 tablet 6        PMHx, PSHx, Allergies, Medications reviewed in epic    ROS negative except pain complaints in HPI    OBJECTIVE:    There were no vitals taken for this visit.    PHYSICAL EXAMINATION:    GENERAL: Well appearing, in no acute distress, alert and oriented x3.  PSYCH:  Mood and affect appropriate.  SKIN: Skin color, texture, turgor normal, no rashes or lesions which will impact the procedure.  CV: RRR with palpation of the radial artery.  PULM: No evidence of respiratory difficulty, symmetric chest rise. Clear to auscultation.  NEURO: Cranial nerves grossly intact.    Plan:    Proceed with procedure as planned Procedure(s) (LRB):  Bilateral L4/5 TF JASKARAN with RN IV sedation (Bilateral)    Chan Fonseca MD  06/17/2022

## 2022-06-21 ENCOUNTER — PATIENT MESSAGE (OUTPATIENT)
Dept: PAIN MEDICINE | Facility: CLINIC | Age: 82
End: 2022-06-21
Payer: MEDICARE

## 2022-07-12 NOTE — DISCHARGE SUMMARY
[facility] 1st Fl  Discharge Note  Short Stay    Procedure(s) (LRB):  Bilateral L4/5 TF JASKARAN with RN IV sedation (Bilateral)    OUTCOME: Patient tolerated treatment/procedure well without complication and is now ready for discharge.    DISPOSITION: Home or Self Care    FINAL DIAGNOSIS:  <principal problem not specified>    FOLLOWUP: In clinic    DISCHARGE INSTRUCTIONS:  No discharge procedures on file.     TIME SPENT ON DISCHARGE: 15 minutes

## 2022-07-12 NOTE — OP NOTE
Saul Mar  82 y.o. female      Vitals:    06/17/22 1209   BP: (!) 171/69   Pulse: (!) 55   Resp: 18   Temp:      Procedure Date:@      INFORMED CONSENT: The procedure, risks, benefits and options were discussed with patient. There are no contraindications to the procedure. The patient expressed understanding and agreed to proceed. The personnel performing the procedure was discussed. I verify that I personally obtained consent prior to the start of the procedure and the signed consent can be found on the patient's chart.       Anesthesia:   Conscious sedation provided by M.D    The patient was monitored with continuous pulse oximetry, EKG, and intermittent blood pressure monitors.  The patient was hemodynamically stable throughout the entire process was responsive to voice, and breathing spontaneously.  Supplemental O2 was provided at 2L/min via nasal cannula.  Patient was comfortable for the duration of the procedure. (See nurse documentation and case log for sedation time)    There was a total of 1mg IV Midazolam and 50mcg Fentanyl titrated for the procedure    Pre Procedure diagnosis: Spinal stenosis of lumbar region without neurogenic claudication [M48.061]  Lumbar radiculopathy, chronic [M54.16]  Post-Procedure diagnosis: SAME     Complications: None    Specimens: None      DESCRIPTION OF PROCEDURE: The patient was brought to the procedure room. IV access was obtained prior to the procedure. The patient was positioned prone on the fluoroscopy table. Continuous hemodynamic monitoring was initiated including blood pressure, EKG, and pulse oximetry. . The skin was prepped with chlorhexidine and draped in a sterile fashion. Skin anesthesia was achieved using a total of 10mL of lidocaine, 5mL over each respective injection site.     The  L4/5 transforaminal spaces were identified with fluoroscopy in the  AP, oblique, and lateral views.  A 22 gauge spinal quinke needle was then advanced into the area of  the trans foraminal spaces bilateral with confirmation of proper needle position using AP, oblique, and lateral fluoroscopic views. Once the needle tip was in the area of the transforaminal space, and there was no blood, CSF or paraesthesias,  1.5 mL of Omnipaque 300mg/ml was injected on bilateral for a total of 3mL.  Fluoroscopic imaging in the AP and lateral views revealed a clear outline of the spinal nerve with proximal spread of agent through the neural foramen into the epidural space. A total combination of 2 mL of Bupivicaine 0.25% and 10 mg dexamethasone was injected on each side for a total of 6 mL of injected medications with displacement of the contrast dye confirming that the medication went into the area of the transforaminal spaces bilateral. A sterile dressing was applied.   Patient tolerated the procedure well.    Patient was taken back to the recovery room for further observation.     The patient was discharged to home in stable condition

## 2022-07-18 ENCOUNTER — OFFICE VISIT (OUTPATIENT)
Dept: PAIN MEDICINE | Facility: CLINIC | Age: 82
End: 2022-07-18
Payer: MEDICARE

## 2022-07-18 VITALS
DIASTOLIC BLOOD PRESSURE: 73 MMHG | HEIGHT: 65 IN | SYSTOLIC BLOOD PRESSURE: 127 MMHG | BODY MASS INDEX: 28.83 KG/M2 | WEIGHT: 173.06 LBS | RESPIRATION RATE: 17 BRPM | HEART RATE: 57 BPM

## 2022-07-18 DIAGNOSIS — M54.16 LUMBAR RADICULOPATHY, CHRONIC: ICD-10-CM

## 2022-07-18 DIAGNOSIS — M48.061 SPINAL STENOSIS OF LUMBAR REGION WITHOUT NEUROGENIC CLAUDICATION: Primary | ICD-10-CM

## 2022-07-18 PROCEDURE — 1160F PR REVIEW ALL MEDS BY PRESCRIBER/CLIN PHARMACIST DOCUMENTED: ICD-10-PCS | Mod: CPTII,S$GLB,, | Performed by: PHYSICIAN ASSISTANT

## 2022-07-18 PROCEDURE — 1159F PR MEDICATION LIST DOCUMENTED IN MEDICAL RECORD: ICD-10-PCS | Mod: CPTII,S$GLB,, | Performed by: PHYSICIAN ASSISTANT

## 2022-07-18 PROCEDURE — 3078F DIAST BP <80 MM HG: CPT | Mod: CPTII,S$GLB,, | Performed by: PHYSICIAN ASSISTANT

## 2022-07-18 PROCEDURE — 3288F PR FALLS RISK ASSESSMENT DOCUMENTED: ICD-10-PCS | Mod: CPTII,S$GLB,, | Performed by: PHYSICIAN ASSISTANT

## 2022-07-18 PROCEDURE — 1159F MED LIST DOCD IN RCRD: CPT | Mod: CPTII,S$GLB,, | Performed by: PHYSICIAN ASSISTANT

## 2022-07-18 PROCEDURE — 99214 PR OFFICE/OUTPT VISIT, EST, LEVL IV, 30-39 MIN: ICD-10-PCS | Mod: S$GLB,,, | Performed by: PHYSICIAN ASSISTANT

## 2022-07-18 PROCEDURE — 1125F PR PAIN SEVERITY QUANTIFIED, PAIN PRESENT: ICD-10-PCS | Mod: CPTII,S$GLB,, | Performed by: PHYSICIAN ASSISTANT

## 2022-07-18 PROCEDURE — 1101F PT FALLS ASSESS-DOCD LE1/YR: CPT | Mod: CPTII,S$GLB,, | Performed by: PHYSICIAN ASSISTANT

## 2022-07-18 PROCEDURE — 3288F FALL RISK ASSESSMENT DOCD: CPT | Mod: CPTII,S$GLB,, | Performed by: PHYSICIAN ASSISTANT

## 2022-07-18 PROCEDURE — 1125F AMNT PAIN NOTED PAIN PRSNT: CPT | Mod: CPTII,S$GLB,, | Performed by: PHYSICIAN ASSISTANT

## 2022-07-18 PROCEDURE — 99999 PR PBB SHADOW E&M-EST. PATIENT-LVL V: ICD-10-PCS | Mod: PBBFAC,,, | Performed by: PHYSICIAN ASSISTANT

## 2022-07-18 PROCEDURE — 3074F PR MOST RECENT SYSTOLIC BLOOD PRESSURE < 130 MM HG: ICD-10-PCS | Mod: CPTII,S$GLB,, | Performed by: PHYSICIAN ASSISTANT

## 2022-07-18 PROCEDURE — 3078F PR MOST RECENT DIASTOLIC BLOOD PRESSURE < 80 MM HG: ICD-10-PCS | Mod: CPTII,S$GLB,, | Performed by: PHYSICIAN ASSISTANT

## 2022-07-18 PROCEDURE — 1101F PR PT FALLS ASSESS DOC 0-1 FALLS W/OUT INJ PAST YR: ICD-10-PCS | Mod: CPTII,S$GLB,, | Performed by: PHYSICIAN ASSISTANT

## 2022-07-18 PROCEDURE — 1160F RVW MEDS BY RX/DR IN RCRD: CPT | Mod: CPTII,S$GLB,, | Performed by: PHYSICIAN ASSISTANT

## 2022-07-18 PROCEDURE — 3074F SYST BP LT 130 MM HG: CPT | Mod: CPTII,S$GLB,, | Performed by: PHYSICIAN ASSISTANT

## 2022-07-18 PROCEDURE — 99214 OFFICE O/P EST MOD 30 MIN: CPT | Mod: S$GLB,,, | Performed by: PHYSICIAN ASSISTANT

## 2022-07-18 PROCEDURE — 99999 PR PBB SHADOW E&M-EST. PATIENT-LVL V: CPT | Mod: PBBFAC,,, | Performed by: PHYSICIAN ASSISTANT

## 2022-07-18 NOTE — PROGRESS NOTES
Est Patient Chronic Pain Note (Follow up Visit)    Referring Physician: No ref. provider found    PCP: Penny Randolph MD    Chief Complaint:   Chief Complaint   Patient presents with    Leg Pain     Patient received 0% of relief, patient states the injection did not work on her.  Injection site still itches and stings.  Pain scale 8/10        SUBJECTIVE:    Interval History (7/18/2022): Saul Mar presents today for follow-up visit.  she underwent bilateral L4/5 transforaminal epidural steroid injection on 6/17/22.  The patient reports that she is/was unchanged following the procedure.  she reports 0% pain relief.  Reports pain is worsened with prolonged sitting, improved with leaning forward (shopping cart sign). Reports continued aching, stinging low back pain in a band-like distribution with radiation down bilateral lower extremities. Reports she was unable to tolerate Gabapentin due to drowsiness and discontinued. Reports she received no relief while taking this medication.  Patient reports pain as 8/10 today.    Initial HPI  Saul Mar is a 82 y.o. female with past medical history significant for CVA, MDD, primary open angle glaucoma, DMII, AFib, HLD, HTN, diastolic dysfunction, PAD, CKD III, SStrait, GERD who presents with bilateral leg pain.  Patient reports pain began approximately 1 year prior without inciting accident injury or trauma.  Today patient reports pain which is constant which is rated a 10/10.  Pain is described as aching in nature.  Patient reports pain originating in bilateral buttocks and radiating to bilateral calves in L4-L5 distribution.  Pain is equally worse on both sides.  Pain is exacerbated with prolonged standing and with ambulation.  Patient ambulates with a walker and reports she is only able to ambulate a few steps before requiring rest.  Patient reports pain is improved with lumbar flexion and rest.  Patient does report associated weakness in bilateral  lower extremities associated with her pain.  She denies bowel or bladder incontinence or saddle anesthesia.  Patient reports completing several sessions of conventional physical therapy with initial improvement in her symptoms, however with increased intensity of exercises, exacerbation of pain.    Pt last saw Dr. Sanchez 8/24/21 with recommendation to continue with PT and discussion of future MBB.    Patient reports significant motor weakness and loss of sensations.  Patient denies night fever/night sweats, urinary incontinence, bowel incontinence and significant weight loss.      Pain Disability Index Review:     Last 3 PDI Scores 7/18/2022 3/14/2022   Pain Disability Index (PDI) 34 48       Non-Pharmacologic Treatments:  Physical Therapy/Home Exercise: yes  Ice/Heat:yes  TENS: no  Acupuncture: no  Massage: no  Chiropractic: no    Other: no      Pain Medications:  - Opioids: Vicodin ( Hydrocodone/Acetaminophen)  - Adjuvant Medications: Neurontin (Gabapentin) and Xanax (Alprazolam). 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 Taveras    COLONOSCOPY      COLONOSCOPY N/A 9/22/2016    Procedure: COLONOSCOPY;  Surgeon: Jd Ashton MD;  Location: [address]  [facility]);  Service: Endoscopy;  Laterality: N/A;  OK per Dr Randolph for pt to hold Plavix 5 days prior to procedure/see telephone encounter dated 8/29/16/svn    EYE SURGERY Bilateral 2002 approx    Laser for glaucoma    HYSTERECTOMY  1963     AMEENA/USO- fibroids; no cancer    OOPHORECTOMY  1963    unknown, only removed one    SELECTIVE INJECTION OF ANESTHETIC AGENT AROUND LUMBAR SPINAL NERVE ROOT BY TRANSFORAMINAL APPROACH Bilateral 6/17/2022    Procedure: Bilateral L4/5 TF JASKARAN with RN IV sedation;  Surgeon: Chan Fonseca MD;  Location: [facility];  Service: Pain Management;  Laterality: Bilateral;     Review of patient's allergies indicates:   Allergen Reactions    Pravachol [pravastatin] Nausea Only       Current Outpatient Medications   Medication Sig    albuterol (PROVENTIL/VENTOLIN HFA) 90 mcg/actuation inhaler INHALE 2 PUFFS INTO THE LUNGS EVERY 6 (SIX) HOURS AS NEEDED FOR WHEEZING. RESCUE    ALPRAZolam (XANAX) 0.5 MG tablet TAKE 1 TABLET BY MOUTH NIGHTLY AS NEEDED FOR ANXIETY (MAY TAKE 1-2 TIMES WEEKLY FOR ANXIETY)    amLODIPine (NORVASC) 5 MG tablet Take 1 tablet (5 mg total) by mouth 2 (two) times daily.    atorvastatin (LIPITOR) 80 MG tablet TAKE 1 TABLET BY MOUTH EVERY DAY    blood sugar diagnostic Strp 1 strip by Misc.(Non-Drug; Combo Route) route 2 (two) times daily. Insurance preferred test stripes DX:E11.22    blood-glucose meter kit Insurance preferred meter dx:E11.22    brimonidine 0.2% (ALPHAGAN) 0.2 % Drop Place 1 drop into both eyes 3 (three) times daily.    carvediloL (COREG) 12.5 MG tablet Take 12.5 mg by mouth.    cholecalciferol, vitamin D3, (VITAMIN D3) 1,000 unit capsule Take 1 capsule (1,000 Units total) by mouth once daily.    famotidine (PEPCID) 20 MG tablet Take 20 mg by mouth Daily.    ferrous sulfate (FEOSOL) 325 mg (65 mg iron) Tab tablet Take 1 tablet (325 mg total) by mouth 2 (two) times daily.    flecainide (TAMBOCOR) 50 MG Tab Take 1 tablet (50 mg total) by mouth 2  (two) times daily.    glimepiride (AMARYL) 4 MG tablet TAKE 1 TABLET BY MOUTH IN THE MORNING WITH BREAKFAST    hydroCHLOROthiazide (HYDRODIURIL) 12.5 MG Tab TAKE 1 TABLET BY MOUTH EVERY DAY    HYDROcodone-acetaminophen (NORCO) 5-325 mg per tablet TAKE 1 TABLET BY MOUTH EVERY 8 (EIGHT) HOURS AS NEEDED FOR PAIN FOR UP TO 10 DOSES.    hydrocortisone 2.5 % cream Apply topically 2 (two) times daily.    lancets (ACCU-CHEK SOFTCLIX LANCETS) Misc 100 lancets by Misc.(Non-Drug; Combo Route) route 2 (two) times daily. Insurance preferred lancets Dx:E11.22    linaCLOtide (LINZESS) 145 mcg Cap capsule Take 1 capsule (145 mcg total) by mouth before breakfast.    losartan (COZAAR) 50 MG tablet TAKE 1 TABLET BY MOUTH TWICE A DAY    meclizine (ANTIVERT) 25 mg tablet Take 1 tablet (25 mg total) by mouth daily as needed for Dizziness.    metFORMIN (GLUCOPHAGE-XR) 500 MG ER 24hr tablet TAKE 2 TABLETS BY MOUTH EVERY DAY    metoprolol succinate (TOPROL-XL) 50 MG 24 hr tablet TAKE 1 TABLET BY MOUTH EVERY DAY    mupirocin (BACTROBAN) 2 % ointment Apply topically 3 (three) times daily.    tacrolimus (PROTOPIC) 0.1 % ointment Apply topically 2 (two) times daily.    XARELTO 15 mg Tab TAKE 1 TABLET (15 MG TOTAL) BY MOUTH DAILY WITH DINNER OR EVENING MEAL.    gabapentin (NEURONTIN) 300 MG capsule Take 1 capsule (300 mg total) by mouth every evening for 7 days, THEN 2 capsules (600 mg total) every evening for 7 days, THEN 3 capsules (900 mg total) every evening for 16 days.    levalbuterol (XOPENEX HFA) 45 mcg/actuation inhaler INHALE 1-2 PUFFS INTO THE LUNGS EVERY 6 (SIX) HOURS AS NEEDED FOR WHEEZING. RESCUE     No current facility-administered medications for this visit.       Review of Systems     GENERAL:  No weight loss, malaise or fevers.  HEENT:   No recent changes in vision or hearing  NECK:  Negative for lumps, no difficulty with swallowing.  RESPIRATORY:  Negative for cough, wheezing or shortness of breath, patient  "denies any recent URI.  CARDIOVASCULAR:  Negative for chest pain, leg swelling or palpitations.  GI:  Negative for abdominal discomfort, blood in stools or black stools or change in bowel habits.  MUSCULOSKELETAL:  See HPI.  SKIN:  Negative for lesions, rash, and itching.  PSYCH:  No mood disorder or recent psychosocial stressors.   HEMATOLOGY/LYMPHOLOGY:  Negative for prolonged bleeding, bruising easily or swollen nodes.    NEURO:   No history of headaches, syncope, paralysis, seizures or tremors.  All other reviewed and negative other than HPI.    OBJECTIVE:    /73   Pulse (!) 57   Resp 17   Ht 5' 5" (1.651 m)   Wt 78.5 kg (173 lb 1 oz)   BMI 28.80 kg/m²       Physical Exam    GENERAL: Well appearing, in no acute distress, alert and oriented x3.  PSYCH:  Mood and affect appropriate.  SKIN: Skin color, texture, turgor normal, no rashes or lesions.  HEAD/FACE:  Normocephalic, atraumatic. Cranial nerves grossly intact.    CV: RRR with palpation of the radial artery.  PULM: No evidence of respiratory difficulty, symmetric chest rise.  GI:  Soft and non-tender.    BACK:  Thoracic kyphosis Straight leg raising in the sitting and supine positions is negative to radicular pain. pain to palpation over the facet joints of the lumbar spine or spinous processes. reduced range of motion with pain reproduction.  EXTREMITIES: Peripheral joint ROM is reduced with pain without obvious instability or laxity in all four extremities. No deformities, edema, or skin discoloration. Good capillary refill.  MUSCULOSKELETAL: Able to stand on heels & toes.   hip, and knee provocative maneuvers are negative.  There is no pain with palpation over the sacroiliac joints bilaterally.  FABERs test is negative.  Facet loading test is positive bilaterally.   Bilateral upper and lower extremity strength is normal and symmetric.  No atrophy or tone abnormalities are noted.  RIGHT Lower extremity: Hip flexion 5/5, Hip Abduction 5/5, Hip " Adduction 5/5, Knee extension 5/5, Knee flexion 5/5, Ankle dorsiflexion5/5, Extensor hallucis longus 5/5, Ankle plantarflexion 5/5  LEFT Lower extremity:  Hip flexion 5/5, Hip Abduction 5/5,Hip Adduction 5/5, Knee extension 5/5, Knee flexion 5/5, Ankle dorsiflexion 5/5, Extensor hallucis longus 5/5, Ankle plantarflexion 5/5  -Normal testing knee (patellar) jerk and ankle (achilles) jerk    NEURO: Bilateral upper and lower extremity coordination and muscle stretch reflexes are physiologic and symmetric. No loss of sensation is noted.  GAIT: normal.    Imagin/10/21    MRI Lumbar Spine Without Contrast    FINDINGS:  Alignment: Mild levocurvature of the lumbar spine.  Grade 1 anterolisthesis L4 on L5.    Vertebrae: Multiple Schmorl's nodes.  Benign osseous hemangioma in the T12 vertebral body.  Degenerative the edema within the bilateral L4-L5 facet joints.  No abnormal marrow signal to suggest infiltrative process.    Discs: Multilevel disc desiccation and height loss, most severe at L4-L5.    Cord: No signal abnormality.  Conus terminates at L2    Degenerative findings:    T12-L1: No significant spinal canal stenosis or neural foraminal narrowing.    L1-L2: Mild diffuse disc bulge.  No significant spinal canal stenosis or neural foraminal narrowing.    L2-L3: Severe diffuse disc bulge, bilateral facet arthropathy and ligamentum flavum buckling.  These findings result in mild spinal canal stenosis, severe right and moderate left neural foraminal narrowing.    L3-L4: Moderate diffuse disc bulge.  These findings result in moderate bilateral neural foraminal narrowing.  No spinal canal stenosis.    L4-L5: Grade 1 anterolisthesis, diffuse disc bulge, bilateral facet arthropathy, and ligamentum flavum buckling.  These findings result in severe spinal canal stenosis and severe right and moderate left neural foraminal narrowing.    L5-S1: Diffuse disc bulge with a superimposed left paracentral protrusion that abuts  the left descending S1 nerve root.  Bilateral facet arthropathy. These findings result in mild bilateral neural foraminal narrowing    Paraspinal muscles & soft tissues: T1 hyperintense cortical lesion noted within the right kidney, possibly a proteinaceous or hemorrhagic cyst. left simple renal cyst.    Impression  Multilevel degenerative changes, most pronounced at L4-L5 with severe spinal canal stenosis, severe right and moderate left neural foraminal narrowing.    Bilateral degenerative facet edema at L4-L5.    Probable proteinaceous versus hemorrhagic right renal cortical cyst.  Recommend correlation with a dedicated renal ultrasound.    07/22/21  X-Ray Lumbar Spine Ap And Lateral  FINDINGS:  Five lumbar vertebral bodies.  Vertebral body heights are maintained.  Disc space narrowing and degenerative endplate changes L4-5 and L5-S1.  Moderate facet arthropathy L4-5 and L5-S1.  Grade 1 anterolisthesis L4 on L5.     04/02/18    X-Ray Cervical Spine Complete 5 view  FINDINGS:  There is anatomic spinal alignment.  Prominent bridging vertebral endplate spurs present anteriorly from C4-5 through C6-7.  Disc interspaces are maintained.  Odontoid is intact.  No fracture or subluxation.      ASSESSMENT: 82 y.o. year old female with lower back and leg pain, consistent with     1. Spinal stenosis of lumbar region without neurogenic claudication  Ambulatory referral/consult to Neurosurgery   2. Lumbar radiculopathy, chronic  Ambulatory referral/consult to Neurosurgery         PLAN:   - Interventions:  None at this time    s/p bilateral L4/5 transforaminal epidural steroid injection with 0% relief    - Anticoagulation use: yes Xarelto     report:  Reviewed and consistent with medication use as prescribed.    - Medications:  --  Patient discontinued Gabapentin secondary to somnolence and ineffectivness    - Therapy:  We discussed continuing physical therapy to help manage the patient/s painful condition. The patient was  counseled that muscle strengthening will improve the long term prognosis in regards to pain and may also help increase range of motion and mobility.    - Imaging: Reviewed available imaging with patient and answered any questions they had regarding study    -Consults/referral: Refer to Neurosurgery for further evaluation, L4-L5: Grade 1 anterolisthesis, diffuse disc bulge, bilateral facet arthropathy, and ligamentum flavum buckling.  These findings result in severe spinal canal stenosis and severe right and moderate left neural foraminal narrowing. No improvement with JASKARAN    - Follow up visit: PRN or per NSG      The above plan and management options were discussed at length with patient. Patient is in agreement with the above and verbalized understanding.    - I discussed the goals of interventional chronic pain management with the patient on today's visit. We discussed a multimodal and systematic approach to pain.  This includes diagnostic and therapeutic injections, adjuvant pharmacologic treatment, physical therapy, and at times psychiatry.  I emphasized the importance of regular exercise, core strengthening and stretching, diet and weight loss as a cornerstone of long-term pain management.    - This condition does not require this patient to take time off of work, and the primary goal of our Pain Management services is to improve the patient's functional capacity.  - Patient Questions: Answered all of the patient's questions regarding diagnoses, therapy, treatment and next steps        Hafsa Angel PA-C  Interventional Pain Management  [facility]    Disclaimer:  This note was prepared using voice recognition system and is likely to have sound alike errors that may have been overlooked even after proof reading.  Please call me with any questions

## 2022-07-21 ENCOUNTER — OFFICE VISIT (OUTPATIENT)
Dept: PODIATRY | Facility: CLINIC | Age: 82
End: 2022-07-21
Payer: MEDICARE

## 2022-07-21 VITALS
BODY MASS INDEX: 28.82 KG/M2 | HEART RATE: 59 BPM | DIASTOLIC BLOOD PRESSURE: 62 MMHG | SYSTOLIC BLOOD PRESSURE: 142 MMHG | WEIGHT: 173 LBS | HEIGHT: 65 IN

## 2022-07-21 DIAGNOSIS — L84 CORN OR CALLUS: ICD-10-CM

## 2022-07-21 DIAGNOSIS — E11.49 TYPE II DIABETES MELLITUS WITH NEUROLOGICAL MANIFESTATIONS: Primary | ICD-10-CM

## 2022-07-21 DIAGNOSIS — B35.1 ONYCHOMYCOSIS DUE TO DERMATOPHYTE: ICD-10-CM

## 2022-07-21 PROCEDURE — 99499 NO LOS: ICD-10-PCS | Mod: S$GLB,,, | Performed by: PODIATRIST

## 2022-07-21 PROCEDURE — 1126F AMNT PAIN NOTED NONE PRSNT: CPT | Mod: CPTII,S$GLB,, | Performed by: PODIATRIST

## 2022-07-21 PROCEDURE — 1160F RVW MEDS BY RX/DR IN RCRD: CPT | Mod: CPTII,S$GLB,, | Performed by: PODIATRIST

## 2022-07-21 PROCEDURE — 1101F PR PT FALLS ASSESS DOC 0-1 FALLS W/OUT INJ PAST YR: ICD-10-PCS | Mod: CPTII,S$GLB,, | Performed by: PODIATRIST

## 2022-07-21 PROCEDURE — 11056 PR TRIM BENIGN HYPERKERATOTIC SKIN LESION,2-4: ICD-10-PCS | Mod: Q9,S$GLB,, | Performed by: PODIATRIST

## 2022-07-21 PROCEDURE — 1159F MED LIST DOCD IN RCRD: CPT | Mod: CPTII,S$GLB,, | Performed by: PODIATRIST

## 2022-07-21 PROCEDURE — 3077F PR MOST RECENT SYSTOLIC BLOOD PRESSURE >= 140 MM HG: ICD-10-PCS | Mod: CPTII,S$GLB,, | Performed by: PODIATRIST

## 2022-07-21 PROCEDURE — 11056 PARNG/CUTG B9 HYPRKR LES 2-4: CPT | Mod: Q9,S$GLB,, | Performed by: PODIATRIST

## 2022-07-21 PROCEDURE — 1160F PR REVIEW ALL MEDS BY PRESCRIBER/CLIN PHARMACIST DOCUMENTED: ICD-10-PCS | Mod: CPTII,S$GLB,, | Performed by: PODIATRIST

## 2022-07-21 PROCEDURE — 11721 PR DEBRIDEMENT OF NAILS, 6 OR MORE: ICD-10-PCS | Mod: 59,Q9,S$GLB, | Performed by: PODIATRIST

## 2022-07-21 PROCEDURE — 3288F FALL RISK ASSESSMENT DOCD: CPT | Mod: CPTII,S$GLB,, | Performed by: PODIATRIST

## 2022-07-21 PROCEDURE — 11721 DEBRIDE NAIL 6 OR MORE: CPT | Mod: 59,Q9,S$GLB, | Performed by: PODIATRIST

## 2022-07-21 PROCEDURE — 3288F PR FALLS RISK ASSESSMENT DOCUMENTED: ICD-10-PCS | Mod: CPTII,S$GLB,, | Performed by: PODIATRIST

## 2022-07-21 PROCEDURE — 1159F PR MEDICATION LIST DOCUMENTED IN MEDICAL RECORD: ICD-10-PCS | Mod: CPTII,S$GLB,, | Performed by: PODIATRIST

## 2022-07-21 PROCEDURE — 99999 PR PBB SHADOW E&M-EST. PATIENT-LVL V: ICD-10-PCS | Mod: PBBFAC,,, | Performed by: PODIATRIST

## 2022-07-21 PROCEDURE — 3078F DIAST BP <80 MM HG: CPT | Mod: CPTII,S$GLB,, | Performed by: PODIATRIST

## 2022-07-21 PROCEDURE — 1126F PR PAIN SEVERITY QUANTIFIED, NO PAIN PRESENT: ICD-10-PCS | Mod: CPTII,S$GLB,, | Performed by: PODIATRIST

## 2022-07-21 PROCEDURE — 3077F SYST BP >= 140 MM HG: CPT | Mod: CPTII,S$GLB,, | Performed by: PODIATRIST

## 2022-07-21 PROCEDURE — 1101F PT FALLS ASSESS-DOCD LE1/YR: CPT | Mod: CPTII,S$GLB,, | Performed by: PODIATRIST

## 2022-07-21 PROCEDURE — 3078F PR MOST RECENT DIASTOLIC BLOOD PRESSURE < 80 MM HG: ICD-10-PCS | Mod: CPTII,S$GLB,, | Performed by: PODIATRIST

## 2022-07-21 PROCEDURE — 99499 UNLISTED E&M SERVICE: CPT | Mod: S$GLB,,, | Performed by: PODIATRIST

## 2022-07-21 PROCEDURE — 99999 PR PBB SHADOW E&M-EST. PATIENT-LVL V: CPT | Mod: PBBFAC,,, | Performed by: PODIATRIST

## 2022-07-22 ENCOUNTER — TELEPHONE (OUTPATIENT)
Dept: NEUROSURGERY | Facility: CLINIC | Age: 82
End: 2022-07-22
Payer: MEDICARE

## 2022-07-26 ENCOUNTER — TELEPHONE (OUTPATIENT)
Dept: PAIN MEDICINE | Facility: CLINIC | Age: 82
End: 2022-07-26
Payer: MEDICARE

## 2022-07-26 NOTE — TELEPHONE ENCOUNTER
Pt stated she needed a face to face appointment with Dr. Fonseca to talk about a power chair. appointment was made.

## 2022-07-28 ENCOUNTER — TELEPHONE (OUTPATIENT)
Dept: PAIN MEDICINE | Facility: CLINIC | Age: 82
End: 2022-07-28
Payer: MEDICARE

## 2022-07-28 NOTE — TELEPHONE ENCOUNTER
Spoke to Ms.Erin with [employer] in regard of a power wheel chair. She requested us to fax over clinical notes, medical necessity and MD orders along with prescription faxed to [phone]. Inform her that we can only fax over clinic notes.  She said to fax it over to be review.      Srikanth Hurd   Medical Assistant

## 2022-07-28 NOTE — TELEPHONE ENCOUNTER
----- Message from Srikanth Hurd MA sent at 7/27/2022  3:55 PM CDT -----  Contact: [place]    ----- Message -----  From: Sneha Juarez MA  Sent: 7/27/2022  10:48 AM CDT  To: Srikanth Hurd MA    It is [phone]   ----- Message -----  From: Srikanth Hurd MA  Sent: 7/27/2022   9:19 AM CDT  To: Sneha Juarez MA    What is the correct call back number? It is invalid.      Srikanth Hurd   Medical Assistant       ----- Message -----  From: Sneha Juarez MA  Sent: 7/27/2022   8:00 AM CDT  To: [facility] Staff    I do not see where Dr. Fonseca put in these orders. Please ask her about this tomorrow  ----- Message -----  From: Sona Wilkes  Sent: 7/26/2022   4:14 PM CDT  To: Raymundo Giles Staff    Omayra with [employer] is calling to speak with the nurse regarding patients request for a power wheel chair. Reports informing patient to schedule a face to face evaluation also that clinicals are required. Please fax clinical notes, medical necessity and MD orders along with prescription faxed to [phone] and or call back [phone] if needed.  Thanks,  RP

## 2022-07-31 NOTE — PROGRESS NOTES
Subjective:     Patient ID: Saul Mar is a 82 y.o. female.    Chief Complaint: Nail Care (Foot exa, wears diabetic shoes with socks, diabetic Pt, ;ast seen on 06/14/22 with PCP Dr. Penny Randolph.)    Saul is a 82 y.o. female who presents to the clinic for evaluation and treatment of high risk feet. 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 The patient's chief complaint is long, thick toenails. This patient has documented high risk feet requiring routine maintenance secondary to diabetes mellitis and those secondary complications of diabetes, as mentioned..    PCP: Penny Randolph MD    Date Last Seen by PCP: 06/14/2022    Current shoe gear:  Affected Foot: Rx diabetic extra depth shoes and custom accommodative insoles     Unaffected Foot: Rx diabetic extra depth shoes and custom accommodative insoles    Hemoglobin A1C   Date Value Ref Range Status   03/02/2022 7.2 (H) 4.0 - 5.6 % Final     Comment:     ADA Screening Guidelines:  5.7-6.4%  Consistent with prediabetes  >or=6.5%  Consistent with diabetes    High levels of fetal hemoglobin interfere with the HbA1C  assay. Heterozygous hemoglobin variants (HbS, HgC, etc)do  not significantly interfere with this assay.   However, presence of multiple variants may affect accuracy.     07/22/2021 7.3 (H) 4.0 - 5.6 % Final     Comment:     ADA Screening Guidelines:  5.7-6.4%  Consistent with prediabetes  >or=6.5%  Consistent with diabetes    High levels of fetal hemoglobin  interfere with the HbA1C  assay. Heterozygous hemoglobin variants (HbS, HgC, etc)do  not significantly interfere with this assay.   However, presence of multiple variants may affect accuracy.     03/16/2021 6.9 (H) 4.0 - 5.6 % Final     Comment:     ADA Screening Guidelines:  5.7-6.4%  Consistent with prediabetes  >or=6.5%  Consistent with diabetes    High levels of fetal hemoglobin interfere with the HbA1C  assay. Heterozygous hemoglobin variants (HbS, HgC, etc)do  not significantly interfere with this assay.   However, presence of multiple variants may affect accuracy.         Patient Active Problem List   Diagnosis    Mixed diabetic hyperlipidemia associated with type 2 diabetes mellitus    Degenerative disc disease    Colon polyp: tubular adenoma 5/13, repeated 2016 to be repeated 2021- declines colonoscopy and Gastro also does not recommend    Multinodular thyroid: thyroid u/s 7/16, stable 2017 and 2019, 2021    POAG (primary open-angle glaucoma) - Both Eyes    Amaurosis fugax    Nuclear sclerosis - Right Eye    Eyelid myokymia    Cerebral microvascular disease: stroke ? 1999 elsewhere; TIA 10/13    Vitamin D insufficiency    Left ventricular diastolic dysfunction with preserved systolic function    Hypertension, essential    Gastroesophageal reflux disease without esophagitis    Type 2 diabetes, uncontrolled, with background retinopathy with macular edema    Diabetes mellitus with proteinuria    Type II diabetes mellitus with neurological manifestations    Aortic arch atherosclerosis    Abnormal ankle brachial index    Diabetes mellitus with stage 3 chronic kidney disease    Cerebrovascular accident (CVA) due to thrombosis of precerebral artery    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    CKD (chronic kidney disease) stage 3, GFR 30-59 ml/min    Sickle cell trait syndrome    Mixed anxiety depressive disorder    Diverticulosis of large intestine without hemorrhage     Atrial fibrillation    Hyperlipidemia associated with type 2 diabetes mellitus    Unspecified inflammatory spondylopathy, lumbar region    Bilateral radiating leg pain    PAD (peripheral artery disease)    Carotid atherosclerosis    Spinal stenosis of lumbar region with neurogenic claudication    Lumbar radiculopathy, chronic       Medication List with Changes/Refills   Current Medications    ALBUTEROL (PROVENTIL/VENTOLIN HFA) 90 MCG/ACTUATION INHALER    INHALE 2 PUFFS INTO THE LUNGS EVERY 6 (SIX) HOURS AS NEEDED FOR WHEEZING. RESCUE    ALPRAZOLAM (XANAX) 0.5 MG TABLET    TAKE 1 TABLET BY MOUTH NIGHTLY AS NEEDED FOR ANXIETY (MAY TAKE 1-2 TIMES WEEKLY FOR ANXIETY)    AMLODIPINE (NORVASC) 5 MG TABLET    Take 1 tablet (5 mg total) by mouth 2 (two) times daily.    ATORVASTATIN (LIPITOR) 80 MG TABLET    TAKE 1 TABLET BY MOUTH EVERY DAY    BLOOD SUGAR DIAGNOSTIC STRP    1 strip by Misc.(Non-Drug; Combo Route) route 2 (two) times daily. Insurance preferred test stripes DX:E11.22    BLOOD-GLUCOSE METER KIT    Insurance preferred meter dx:E11.22    BRIMONIDINE 0.2% (ALPHAGAN) 0.2 % DROP    Place 1 drop into both eyes 3 (three) times daily.    CARVEDILOL (COREG) 12.5 MG TABLET    Take 12.5 mg by mouth.    CHOLECALCIFEROL, VITAMIN D3, (VITAMIN D3) 1,000 UNIT CAPSULE    Take 1 capsule (1,000 Units total) by mouth once daily.    FAMOTIDINE (PEPCID) 20 MG TABLET    Take 20 mg by mouth Daily.    FERROUS SULFATE (FEOSOL) 325 MG (65 MG IRON) TAB TABLET    Take 1 tablet (325 mg total) by mouth 2 (two) times daily.    FLECAINIDE (TAMBOCOR) 50 MG TAB    Take 1 tablet (50 mg total) by mouth 2 (two) times daily.    GABAPENTIN (NEURONTIN) 300 MG CAPSULE    Take 1 capsule (300 mg total) by mouth every evening for 7 days, THEN 2 capsules (600 mg total) every evening for 7 days, THEN 3 capsules (900 mg total) every evening for 16 days.    GLIMEPIRIDE (AMARYL) 4 MG TABLET    TAKE 1 TABLET BY MOUTH IN THE MORNING WITH BREAKFAST     HYDROCHLOROTHIAZIDE (HYDRODIURIL) 12.5 MG TAB    TAKE 1 TABLET BY MOUTH EVERY DAY    HYDROCODONE-ACETAMINOPHEN (NORCO) 5-325 MG PER TABLET    TAKE 1 TABLET BY MOUTH EVERY 8 (EIGHT) HOURS AS NEEDED FOR PAIN FOR UP TO 10 DOSES.    HYDROCORTISONE 2.5 % CREAM    Apply topically 2 (two) times daily.    LANCETS (ACCU-CHEK SOFTCLIX LANCETS) MISC    100 lancets by Misc.(Non-Drug; Combo Route) route 2 (two) times daily. Insurance preferred lancets Dx:E11.22    LEVALBUTEROL (XOPENEX HFA) 45 MCG/ACTUATION INHALER    INHALE 1-2 PUFFS INTO THE LUNGS EVERY 6 (SIX) HOURS AS NEEDED FOR WHEEZING. RESCUE    LINACLOTIDE (LINZESS) 145 MCG CAP CAPSULE    Take 1 capsule (145 mcg total) by mouth before breakfast.    LOSARTAN (COZAAR) 50 MG TABLET    TAKE 1 TABLET BY MOUTH TWICE A DAY    MECLIZINE (ANTIVERT) 25 MG TABLET    Take 1 tablet (25 mg total) by mouth daily as needed for Dizziness.    METFORMIN (GLUCOPHAGE-XR) 500 MG ER 24HR TABLET    TAKE 2 TABLETS BY MOUTH EVERY DAY    METOPROLOL SUCCINATE (TOPROL-XL) 50 MG 24 HR TABLET    TAKE 1 TABLET BY MOUTH EVERY DAY    MUPIROCIN (BACTROBAN) 2 % OINTMENT    Apply topically 3 (three) times daily.    TACROLIMUS (PROTOPIC) 0.1 % OINTMENT    Apply topically 2 (two) times daily.    XARELTO 15 MG TAB    TAKE 1 TABLET (15 MG TOTAL) BY MOUTH DAILY WITH DINNER OR EVENING MEAL.       Review of patient's allergies indicates:   Allergen Reactions    Pravachol [pravastatin] Nausea Only       Past Surgical History:   Procedure Laterality Date    CATARACT EXTRACTION W/  INTRAOCULAR LENS IMPLANT Left 02/27/2013    Dr. Taveras    COLONOSCOPY      COLONOSCOPY N/A 9/22/2016    Procedure: COLONOSCOPY;  Surgeon: Jd Ashton MD;  Location: [facility] ([address] [place] [address]);  Service: Endoscopy;  Laterality: N/A;  OK per Dr Randolph for pt to hold Plavix 5 days prior to procedure/see telephone encounter dated 8/29/16/svn    EYE SURGERY Bilateral 2002 approx    Laser for glaucoma    HYSTERECTOMY  1963     AMEENA/USO-  fibroids; no cancer    OOPHORECTOMY  1963    unknown, only removed one    SELECTIVE INJECTION OF ANESTHETIC AGENT AROUND LUMBAR SPINAL NERVE ROOT BY TRANSFORAMINAL APPROACH Bilateral 6/17/2022    Procedure: Bilateral L4/5 TF JASKARAN with RN IV sedation;  Surgeon: Chan Fonseca MD;  Location: [facility];  Service: Pain Management;  Laterality: Bilateral;       Family History   Problem Relation Age of Onset    Stroke Mother     Mental illness Mother     Hypertension Mother     Stroke Father     Diabetes Maternal Grandmother     Drug abuse Daughter     Cancer Sister 68        gyn    Ovarian cancer Sister     Cancer Maternal Uncle         type not known    Heart disease Paternal Grandmother     Cancer Maternal Aunt         type unknown    Glaucoma Neg Hx     Macular degeneration Neg Hx     Alcohol abuse Neg Hx     COPD Neg Hx     Asthma Neg Hx     Amblyopia Neg Hx     Blindness Neg Hx     Cataracts Neg Hx     Retinal detachment Neg Hx     Strabismus Neg Hx        Social History     Socioeconomic History    Marital status:     Number of children: 1   Tobacco Use    Smoking status: Never Smoker    Smokeless tobacco: Never Used   Substance and Sexual Activity    Alcohol use: No    Drug use: No    Sexual activity: Not Currently     Partners: Male   Social History Narrative    , no pets or smokers in household. 1 Child who lives in nursing home. She has a grandson who lives in [place].. Retired from [employer] . Still drives. Does not have a Living Will or advanced directive.      Social Determinants of Health     Financial Resource Strain: High Risk    Difficulty of Paying Living Expenses: Hard   Food Insecurity: Food Insecurity Present    Worried About Running Out of Food in the Last Year: Sometimes true    Ran Out of Food in the Last Year: Never true   Transportation Needs: No Transportation Needs    Lack of Transportation (Medical): No    Lack of Transportation  "(Non-Medical): No   Physical Activity: Inactive    Days of Exercise per Week: 0 days    Minutes of Exercise per Session: 0 min   Stress: No Stress Concern Present    Feeling of Stress : Not at all   Social Connections: Moderately Isolated    Frequency of Communication with Friends and Family: Three times a week    Frequency of Social Gatherings with Friends and Family: Once a week    Attends [employer]ism Services: 1 to 4 times per year    Active Member of Clubs or Organizations: No    Attends Club or Organization Meetings: Never    Marital Status:    Housing Stability: Low Risk     Unable to Pay for Housing in the Last Year: No    Number of Places Lived in the Last Year: 1    Unstable Housing in the Last Year: No       Vitals:    07/21/22 1140   BP: (!) 142/62   Pulse: (!) 59   Weight: 78.5 kg (173 lb)   Height: 5' 5" (1.651 m)   PainSc: 0-No pain   PainLoc: Foot       Hemoglobin A1C   Date Value Ref Range Status   03/02/2022 7.2 (H) 4.0 - 5.6 % Final     Comment:     ADA Screening Guidelines:  5.7-6.4%  Consistent with prediabetes  >or=6.5%  Consistent with diabetes    High levels of fetal hemoglobin interfere with the HbA1C  assay. Heterozygous hemoglobin variants (HbS, HgC, etc)do  not significantly interfere with this assay.   However, presence of multiple variants may affect accuracy.     07/22/2021 7.3 (H) 4.0 - 5.6 % Final     Comment:     ADA Screening Guidelines:  5.7-6.4%  Consistent with prediabetes  >or=6.5%  Consistent with diabetes    High levels of fetal hemoglobin interfere with the HbA1C  assay. Heterozygous hemoglobin variants (HbS, HgC, etc)do  not significantly interfere with this assay.   However, presence of multiple variants may affect accuracy.     03/16/2021 6.9 (H) 4.0 - 5.6 % Final     Comment:     ADA Screening Guidelines:  5.7-6.4%  Consistent with prediabetes  >or=6.5%  Consistent with diabetes    High levels of fetal hemoglobin interfere with the HbA1C  assay. " Heterozygous hemoglobin variants (HbS, HgC, etc)do  not significantly interfere with this assay.   However, presence of multiple variants may affect accuracy.         Review of Systems   Constitutional: Negative for chills and fever.   Respiratory: Negative for shortness of breath.    Cardiovascular: Negative for chest pain, palpitations, orthopnea, claudication and leg swelling.   Gastrointestinal: Negative for diarrhea, nausea and vomiting.   Musculoskeletal: Negative for joint pain.   Skin: Negative for rash.   Neurological: Positive for tingling and sensory change.   Psychiatric/Behavioral: Negative.              Objective:      PHYSICAL EXAM: Apperance: Alert and orient in no distress,well developed, and with good attention to grooming and body habits  Patient presents ambulating in tennis shoes.  LOWER EXTREMITY EXAM:  VASCULAR: Dorsalis pedis pulses 0/4 left 1/4 right and Posterior Tibial pulses 0/4 left and 1/4 right. Capillary fill time <4 seconds bilateral. Mild edema observed bilateral. Varicosities present bilateral. Skin temperature of the lower extremities is warm to cool, proximal to distal. Hair growth absent bilateral.  DERMATOLOGICAL: No skin rashes, subcutaneous nodules, lesions, or ulcers observed bilateral. Nails 1,2,3,4,5, bilateral elongated, thickened, and discolored with subungual debris. Right hallux nail observed to be mildly incurvated at distal lateral borders and slight obstructed in the nail grooves with soft tissue. No purulent drainage, no odor, and no increased temperature observed to right hallux. Webspaces 1,2,3,4 bilateral clean, dry and without evidence of break in skin integrity.   NEUROLOGICAL: Light touch, sharp-dull, proprioception all present and equal bilaterally.  Vibratory sensation diminished at bilateral hallux. Protective sensation absent at toe sites as tested with a [place]jan 5.07 monofilament.   MUSCULOSKELETAL: Muscle strength is 5/5 for foot inverters,  everters, plantarflexors, and dorsiflexors. Muscle tone is normal. Pain on palpation of right distal lateral nail border.           Assessment:       ICD-10-CM ICD-9-CM   1. Type II diabetes mellitus with neurological manifestations  E11.49 250.60   2. Onychomycosis due to dermatophyte  B35.1 110.1   3. Corn or callus  L84 700       Plan:   Type II diabetes mellitus with neurological manifestations    Onychomycosis due to dermatophyte    Corn or callus      I counseled the patient on her conditions, regarding findings of my examination, my impressions, and usual treatment plan.   Greater than 15 minutes of a 20 minute appointment spent on education about the diabetic foot, neuropathy, and prevention of limb loss.  Shoe inspection. Diabetic Foot Education. Patient reminded of the importance of good nutrition and blood sugar control to help prevent podiatric complications of diabetes. Patient instructed on proper foot hygeine. We discussed wearing proper shoe gear, daily foot inspections, never walking without protective shoe gear, never putting sharp instruments to feet.    With patient's permission, nails 1-5 bilateral were debrided/trimmed in length and thickness aggressively to their soft tissue attachment mechanically and with electric , removing all offending nail and debris. Patient relates relief following the procedure.  With patient's permission, bilateral callus trimmed in thickness with #15 blade in thickness without incident.   Patient  will continue to monitor the areas daily, inspect feet, wear protective shoe gear when ambulatory, moisturizer to maintain skin integrity. Patient reminded of the importance of good nutrition and blood sugar control to help prevent podiatric complications of diabetes.  Patient to return 6 months or sooner if needed.              Zuleima Howell DPM  [facility]

## 2022-08-02 ENCOUNTER — TELEPHONE (OUTPATIENT)
Dept: PAIN MEDICINE | Facility: CLINIC | Age: 82
End: 2022-08-02
Payer: MEDICARE

## 2022-08-02 DIAGNOSIS — M43.06 SPONDYLOLYSIS OF LUMBAR REGION: ICD-10-CM

## 2022-08-02 DIAGNOSIS — M54.16 LUMBAR RADICULOPATHY, CHRONIC: ICD-10-CM

## 2022-08-02 DIAGNOSIS — M48.061 SPINAL STENOSIS OF LUMBAR REGION WITHOUT NEUROGENIC CLAUDICATION: Primary | ICD-10-CM

## 2022-08-02 NOTE — TELEPHONE ENCOUNTER
----- Message from Nandini Cedillo sent at 8/2/2022 11:23 AM CDT -----  [facility] is needing a call back regarding a signed prescription for a power wheelchair for the pt. Please fax to [phone]. Please call [phone] for any questions.

## 2022-08-03 ENCOUNTER — PATIENT OUTREACH (OUTPATIENT)
Dept: ADMINISTRATIVE | Facility: OTHER | Age: 82
End: 2022-08-03
Payer: MEDICARE

## 2022-08-03 NOTE — PROGRESS NOTES
CHW - Initial Contact    This Community Health Worker updated the Social Determinant of Health questionnaire with Saul Mar via telephone today.    Pt identified barriers of most importance are: food insecurity and financial difficulties.  Referrals to community agencies completed with patient/caregiver consent outside of [facility] include: transportation line for [employer] member services.  Referrals were put through [facility] - Yes: [place], [facility] and [facility]  Support and Services:   Other information discussed the patient needs / wants help with: Utilities and medical bill payment assistance.  Follow up required:   Follow-up Outreach - Due: 8/16/2022

## 2022-08-10 ENCOUNTER — PATIENT OUTREACH (OUTPATIENT)
Dept: ADMINISTRATIVE | Facility: OTHER | Age: 82
End: 2022-08-10
Payer: MEDICARE

## 2022-08-10 NOTE — PROGRESS NOTES
CHW - Follow Up    This Community Health Worker completed a follow up visit with Saul Kirkpatrick via telephone today.  Pt reported: needed the number to non emergency transportation for clients of [employer]. Pt doesn't have any additional needs at this time.  Community Health Worker provided: will follow up in two weeks.  Follow up required:   Follow-up Outreach - Due: 8/23/2022

## 2022-08-12 ENCOUNTER — OFFICE VISIT (OUTPATIENT)
Dept: NEUROSURGERY | Facility: CLINIC | Age: 82
End: 2022-08-12
Payer: MEDICARE

## 2022-08-12 VITALS
HEART RATE: 65 BPM | BODY MASS INDEX: 28.82 KG/M2 | SYSTOLIC BLOOD PRESSURE: 150 MMHG | DIASTOLIC BLOOD PRESSURE: 69 MMHG | WEIGHT: 173 LBS | RESPIRATION RATE: 18 BRPM | HEIGHT: 65 IN

## 2022-08-12 DIAGNOSIS — M51.36 DDD (DEGENERATIVE DISC DISEASE), LUMBAR: ICD-10-CM

## 2022-08-12 DIAGNOSIS — M54.16 LUMBAR RADICULOPATHY, CHRONIC: ICD-10-CM

## 2022-08-12 DIAGNOSIS — M48.061 SPINAL STENOSIS OF LUMBAR REGION WITHOUT NEUROGENIC CLAUDICATION: Primary | ICD-10-CM

## 2022-08-12 PROCEDURE — 99999 PR PBB SHADOW E&M-EST. PATIENT-LVL V: CPT | Mod: PBBFAC,,, | Performed by: PHYSICIAN ASSISTANT

## 2022-08-12 PROCEDURE — 99203 PR OFFICE/OUTPT VISIT, NEW, LEVL III, 30-44 MIN: ICD-10-PCS | Mod: S$GLB,,, | Performed by: PHYSICIAN ASSISTANT

## 2022-08-12 PROCEDURE — 99203 OFFICE O/P NEW LOW 30 MIN: CPT | Mod: S$GLB,,, | Performed by: PHYSICIAN ASSISTANT

## 2022-08-12 PROCEDURE — 3288F PR FALLS RISK ASSESSMENT DOCUMENTED: ICD-10-PCS | Mod: CPTII,S$GLB,, | Performed by: PHYSICIAN ASSISTANT

## 2022-08-12 PROCEDURE — 1125F PR PAIN SEVERITY QUANTIFIED, PAIN PRESENT: ICD-10-PCS | Mod: CPTII,S$GLB,, | Performed by: PHYSICIAN ASSISTANT

## 2022-08-12 PROCEDURE — 3078F DIAST BP <80 MM HG: CPT | Mod: CPTII,S$GLB,, | Performed by: PHYSICIAN ASSISTANT

## 2022-08-12 PROCEDURE — 3077F SYST BP >= 140 MM HG: CPT | Mod: CPTII,S$GLB,, | Performed by: PHYSICIAN ASSISTANT

## 2022-08-12 PROCEDURE — 1159F MED LIST DOCD IN RCRD: CPT | Mod: CPTII,S$GLB,, | Performed by: PHYSICIAN ASSISTANT

## 2022-08-12 PROCEDURE — 1101F PT FALLS ASSESS-DOCD LE1/YR: CPT | Mod: CPTII,S$GLB,, | Performed by: PHYSICIAN ASSISTANT

## 2022-08-12 PROCEDURE — 3288F FALL RISK ASSESSMENT DOCD: CPT | Mod: CPTII,S$GLB,, | Performed by: PHYSICIAN ASSISTANT

## 2022-08-12 PROCEDURE — 99999 PR PBB SHADOW E&M-EST. PATIENT-LVL V: ICD-10-PCS | Mod: PBBFAC,,, | Performed by: PHYSICIAN ASSISTANT

## 2022-08-12 PROCEDURE — 1101F PR PT FALLS ASSESS DOC 0-1 FALLS W/OUT INJ PAST YR: ICD-10-PCS | Mod: CPTII,S$GLB,, | Performed by: PHYSICIAN ASSISTANT

## 2022-08-12 PROCEDURE — 3077F PR MOST RECENT SYSTOLIC BLOOD PRESSURE >= 140 MM HG: ICD-10-PCS | Mod: CPTII,S$GLB,, | Performed by: PHYSICIAN ASSISTANT

## 2022-08-12 PROCEDURE — 1159F PR MEDICATION LIST DOCUMENTED IN MEDICAL RECORD: ICD-10-PCS | Mod: CPTII,S$GLB,, | Performed by: PHYSICIAN ASSISTANT

## 2022-08-12 PROCEDURE — 3078F PR MOST RECENT DIASTOLIC BLOOD PRESSURE < 80 MM HG: ICD-10-PCS | Mod: CPTII,S$GLB,, | Performed by: PHYSICIAN ASSISTANT

## 2022-08-12 PROCEDURE — 1125F AMNT PAIN NOTED PAIN PRSNT: CPT | Mod: CPTII,S$GLB,, | Performed by: PHYSICIAN ASSISTANT

## 2022-08-12 NOTE — PATIENT INSTRUCTIONS
Consider surgical intervention to address lumbar stenosis.  Follow-up after injections for recheck on progress.

## 2022-08-12 NOTE — PROGRESS NOTES
Subjective:      Patient ID: Saul Mar is a 82 y.o. female.    HPI   The patient is here today for evaluation of back and leg pain.   She is referred to our clinic by Hafsa Angel PA-C.    Patient reports she's had pain since 2020.  The patient had physical therapy last year and then considered injections after her pain was worse with therapy.  No recent falls, other injuries.    Localizes pain to posterior thigh, buttocks and radiating down to calf at times.  Pain is usually mainly in thighs with standing and walking.   Also has lower back pain , wears brace to help with pain.  Denies weakness.  Occasional numbness in feet.  RLE is worse.   Has Rolator but has to bend over to get some pain relief.  No acute bladder/bowel changes.    In the past she has tried several medications, including OTC pain relievers, Tylenol, Lidocaine gel roller, creams but nothing has been effective.    Prev injection: bilateral L4/5 transforaminal epidural steroid injection on 6/17/22, no relief  No previous back surgery.   Objective:     Body mass index is 28.79 kg/m².  Vitals:    08/12/22 0834   BP: (!) 150/69   Pulse: 65   Resp: 18        Back:  None  Paraspinal muscle spasms   None  Pain with flexion and extention   WNL  Range of motion    Neg  Straight leg raise     Motor   Right Right Left Left  Level Group   5  5  L2 Hip flexor (Psoas)   5  5  L3 Leg extension (Quads)   5  5  L4 Dorsiflexion & foot inversion (Tibialis Anterior)   5  5  L5 Great toe extension ( EHL)   5  5  S1 Foot eversion (Gastroc, PL & PB)     Sensation  NL Decreased (R/L/BL) Level Sensation    X  L2 Anterio-medial thigh   X  L3 Medial thigh around knee   X  L4 Medial foot   X  L5 Dorsum foot   X  S1 Lateral foot     Reflex  2+  Patellar tendon (L4)   2+  Achilles tendon (S1)       Results for orders placed during the hospital encounter of 12/10/21    MRI Lumbar Spine Without Contrast    FINDINGS:  Alignment: Mild levocurvature of the lumbar  spine.  Grade 1 anterolisthesis L4 on L5.  Vertebrae: Multiple Schmorl's nodes.  Benign osseous hemangioma in the T12 vertebral body.  Degenerative the edema within the bilateral L4-L5 facet joints.  No abnormal marrow signal to suggest infiltrative process.  Discs: Multilevel disc desiccation and height loss, most severe at L4-L5.  Cord: No signal abnormality.  Conus terminates at L2    Degenerative findings:    T12-L1: No significant spinal canal stenosis or neural foraminal narrowing.    L1-L2: Mild diffuse disc bulge.  No significant spinal canal stenosis or neural foraminal narrowing.    L2-L3: Severe diffuse disc bulge, bilateral facet arthropathy and ligamentum flavum buckling.  These findings result in mild spinal canal stenosis, severe right and moderate left neural foraminal narrowing.    L3-L4: Moderate diffuse disc bulge.  These findings result in moderate bilateral neural foraminal narrowing.  No spinal canal stenosis.    L4-L5: Grade 1 anterolisthesis, diffuse disc bulge, bilateral facet arthropathy, and ligamentum flavum buckling.  These findings result in severe spinal canal stenosis and severe right and moderate left neural foraminal narrowing.    L5-S1: Diffuse disc bulge with a superimposed left paracentral protrusion that abuts the left descending S1 nerve root.  Bilateral facet arthropathy. These findings result in mild bilateral neural foraminal narrowing    Paraspinal muscles & soft tissues: T1 hyperintense cortical lesion noted within the right kidney, possibly a proteinaceous or hemorrhagic cyst. left simple renal cyst.    Impression  Multilevel degenerative changes, most pronounced at L4-L5 with severe spinal canal stenosis, severe right and moderate left neural foraminal narrowing.  Bilateral degenerative facet edema at L4-L5.  Probable proteinaceous versus hemorrhagic right renal cortical cyst.  Recommend correlation with a dedicated renal ultrasound.        Lab Results   Component Value  "Date    WBC 6.46 07/22/2021    HCT 35.4 (L) 07/22/2021       INDEPENDENT INTERPRETATION OF TEST:  Relevant imaging results reviewed and interpreted by me, discussed with the patient and / or family today.  Assessment:     1. Spinal stenosis of lumbar region without neurogenic claudication    2. Lumbar radiculopathy, chronic      Plan:     Spinal stenosis of lumbar region without neurogenic claudication  -     Ambulatory referral/consult to Neurosurgery    Lumbar radiculopathy, chronic  -     Ambulatory referral/consult to Neurosurgery      Reviewed current MRI of the lumbar spine with patient today. Despite findings, patient is not wanting to move forward with surgical intervention at this time.  Will hold off on additional studies for surgical planning.   She wants to try an injection that may help her "thigh" pain.   Will submit orders for TFESI L2/3, Bilateral.  She will follow-up after injections, 2-3 weeks after, for reassessment.   Please call with any changes.    Odilia Valverde PA-C  [facility]       "

## 2022-08-16 ENCOUNTER — TELEPHONE (OUTPATIENT)
Dept: PAIN MEDICINE | Facility: CLINIC | Age: 82
End: 2022-08-16
Payer: MEDICARE

## 2022-08-16 DIAGNOSIS — M54.16 LUMBAR RADICULOPATHY, CHRONIC: Primary | ICD-10-CM

## 2022-08-16 NOTE — TELEPHONE ENCOUNTER
Reached out to patient to let her know that I can not schedule her procedure yet until I get clearance back from her provider. Pt understood.     Srikanth Hurd  Medical Assistant

## 2022-08-24 ENCOUNTER — PATIENT OUTREACH (OUTPATIENT)
Dept: ADMINISTRATIVE | Facility: OTHER | Age: 82
End: 2022-08-24
Payer: MEDICARE

## 2022-08-24 NOTE — PROGRESS NOTES
CHW - Follow Up    This Community Health Worker completed a follow up visit with Saul Kirkpatrick via telephone  today.  Pt reported: having financial hardship and would like help applying for emergency food assistance, transportation and utility bill payment assistance.  Community Health Worker provided: a telephone to non emergency transportation line to [employer] and sent in a referral to [facility] for emergency food assistance and utility bill payment assistance. Will follow up within three weeks.  Follow up required:   Follow-up Outreach - Due: 9/14/2022

## 2022-08-25 ENCOUNTER — TELEPHONE (OUTPATIENT)
Dept: PAIN MEDICINE | Facility: CLINIC | Age: 82
End: 2022-08-25
Payer: MEDICARE

## 2022-08-25 NOTE — TELEPHONE ENCOUNTER
Reached out to patient to reschedule appointment because the provider will be in procedures.  Apt has been made . All questions answered.     Srikanth Hurd  Medical

## 2022-08-26 ENCOUNTER — TELEPHONE (OUTPATIENT)
Dept: PAIN MEDICINE | Facility: CLINIC | Age: 82
End: 2022-08-26
Payer: MEDICARE

## 2022-08-26 NOTE — TELEPHONE ENCOUNTER
----- Message from Penny Randolph MD sent at 8/26/2022  8:45 AM CDT -----  Regarding: RE: Cardiac Clearance  Yes that is fine    LB  ----- Message -----  From: Srikanth Hurd MA  Sent: 8/26/2022   8:06 AM CDT  To: Penny Randolph MD  Subject: Cardiac Clearance                                Good Morning Saul George aCsimiro Maneon was seen in office  with complaints of severe low back pain. Dr. Fonseca would like to perform L2-3 transforaminal epidural steroid injection, and Saul Mar would like to proceed. Dr. Fonseca is requesting for clearance to hold Xarelto 3 days prior to procedure. Patient Saul Mar can resume medication following the procedure. Please advise so we may proceed.         Sincerely,        Srikanth Hurd MA

## 2022-08-26 NOTE — TELEPHONE ENCOUNTER
----- Message from Srikanth Hurd MA sent at 8/25/2022  3:57 PM CDT -----  Regarding: FW: Cardiac Clearance    ----- Message -----  From: Giselle Valenzuela MD  Sent: 8/25/2022   2:50 PM CDT  To: Srikanth Hurd MA  Subject: RE: Cardiac Clearance                            Hi holding xarelto should be up to her cardiologist or the doctor who ordered it. Renal did not prescribe it.  ----- Message -----  From: Srikanth Hurd MA  Sent: 8/16/2022   9:42 AM CDT  To: Giselle Valenzuela MD  Subject: Cardiac Clearance                                  Saul Steen was seen in office  with complaints of severe low back pain. Dr. Fonseca would like to perform L2-3 transforaminal epidural steroid injection, and Saul Mar would like to proceed. Dr. Fonseca is requesting for clearance to hold Xarelto 3 days prior to procedure. Patient Saul Mar can resume medication following the procedure. Please advise so we may proceed.       Sincerely,      Srikanth Hurd MA

## 2022-08-26 NOTE — TELEPHONE ENCOUNTER
Will send Cardiac Clearance to PCP because  decline due to below.                  Srikanth Hurd   Medical Assistant

## 2022-08-26 NOTE — TELEPHONE ENCOUNTER
Called pt to set up their procedure. Pt answered and procedure has been made. Inform pt on the procedure instruction. Pt  does need to hold her Xarelto. Pt understood and all question answered. clearance below.     Hold for 3 days     Proc:9/21/22  Stop:9/18/22  Start:9/22/22    Srikanth Hurd   Medical Assistant

## 2022-09-13 ENCOUNTER — PATIENT OUTREACH (OUTPATIENT)
Dept: ADMINISTRATIVE | Facility: OTHER | Age: 82
End: 2022-09-13
Payer: MEDICARE

## 2022-09-13 NOTE — TELEPHONE ENCOUNTER
Please advise is pt to take coreg is not on her medication list    Concentration (Mg/Ml) Of Additional Medication: 2.5

## 2022-09-13 NOTE — PROGRESS NOTES
CHW - Follow Up    This Community Health Worker completed a follow up visit with Saul Kirkpatrick via telephone today.  Pt reported: needed the transportation line with [employer] to get to her upcoming appointment soon.  Community Health Worker provided: Provided pt with the phone number and will follow up in two weeks.  Follow up required:   Follow-up Outreach - Due: 10/5/2022

## 2022-09-14 ENCOUNTER — PATIENT MESSAGE (OUTPATIENT)
Dept: PAIN MEDICINE | Facility: HOSPITAL | Age: 82
End: 2022-09-14
Payer: MEDICARE

## 2022-09-14 ENCOUNTER — LAB VISIT (OUTPATIENT)
Dept: LAB | Facility: HOSPITAL | Age: 82
End: 2022-09-14
Attending: INTERNAL MEDICINE
Payer: MEDICARE

## 2022-09-14 DIAGNOSIS — E11.49 TYPE II DIABETES MELLITUS WITH NEUROLOGICAL MANIFESTATIONS: Chronic | ICD-10-CM

## 2022-09-14 DIAGNOSIS — I10 HYPERTENSION, ESSENTIAL: Chronic | ICD-10-CM

## 2022-09-14 DIAGNOSIS — E04.2 MULTINODULAR THYROID: ICD-10-CM

## 2022-09-14 LAB
ALBUMIN SERPL BCP-MCNC: 3.8 G/DL (ref 3.5–5.2)
ALP SERPL-CCNC: 71 U/L (ref 55–135)
ALT SERPL W/O P-5'-P-CCNC: 14 U/L (ref 10–44)
ANION GAP SERPL CALC-SCNC: 8 MMOL/L (ref 8–16)
AST SERPL-CCNC: 17 U/L (ref 10–40)
BASOPHILS # BLD AUTO: 0.03 K/UL (ref 0–0.2)
BASOPHILS NFR BLD: 0.5 % (ref 0–1.9)
BILIRUB SERPL-MCNC: 0.4 MG/DL (ref 0.1–1)
BUN SERPL-MCNC: 16 MG/DL (ref 8–23)
CALCIUM SERPL-MCNC: 9.5 MG/DL (ref 8.7–10.5)
CHLORIDE SERPL-SCNC: 106 MMOL/L (ref 95–110)
CO2 SERPL-SCNC: 26 MMOL/L (ref 23–29)
CREAT SERPL-MCNC: 1.4 MG/DL (ref 0.5–1.4)
DIFFERENTIAL METHOD: ABNORMAL
EOSINOPHIL # BLD AUTO: 0.3 K/UL (ref 0–0.5)
EOSINOPHIL NFR BLD: 4.4 % (ref 0–8)
ERYTHROCYTE [DISTWIDTH] IN BLOOD BY AUTOMATED COUNT: 15.1 % (ref 11.5–14.5)
EST. GFR  (NO RACE VARIABLE): 37.8 ML/MIN/1.73 M^2
ESTIMATED AVG GLUCOSE: 174 MG/DL (ref 68–131)
GLUCOSE SERPL-MCNC: 113 MG/DL (ref 70–110)
HBA1C MFR BLD: 7.7 % (ref 4–5.6)
HCT VFR BLD AUTO: 32.8 % (ref 37–48.5)
HGB BLD-MCNC: 10.6 G/DL (ref 12–16)
IMM GRANULOCYTES # BLD AUTO: 0.02 K/UL (ref 0–0.04)
IMM GRANULOCYTES NFR BLD AUTO: 0.3 % (ref 0–0.5)
LYMPHOCYTES # BLD AUTO: 2.1 K/UL (ref 1–4.8)
LYMPHOCYTES NFR BLD: 33.1 % (ref 18–48)
MCH RBC QN AUTO: 29.5 PG (ref 27–31)
MCHC RBC AUTO-ENTMCNC: 32.3 G/DL (ref 32–36)
MCV RBC AUTO: 91 FL (ref 82–98)
MONOCYTES # BLD AUTO: 0.5 K/UL (ref 0.3–1)
MONOCYTES NFR BLD: 8.4 % (ref 4–15)
NEUTROPHILS # BLD AUTO: 3.4 K/UL (ref 1.8–7.7)
NEUTROPHILS NFR BLD: 53.3 % (ref 38–73)
NRBC BLD-RTO: 0 /100 WBC
PLATELET # BLD AUTO: 174 K/UL (ref 150–450)
PMV BLD AUTO: 12.1 FL (ref 9.2–12.9)
POTASSIUM SERPL-SCNC: 4.4 MMOL/L (ref 3.5–5.1)
PROT SERPL-MCNC: 6.6 G/DL (ref 6–8.4)
RBC # BLD AUTO: 3.59 M/UL (ref 4–5.4)
SODIUM SERPL-SCNC: 140 MMOL/L (ref 136–145)
TSH SERPL DL<=0.005 MIU/L-ACNC: 1.74 UIU/ML (ref 0.4–4)
WBC # BLD AUTO: 6.43 K/UL (ref 3.9–12.7)

## 2022-09-14 PROCEDURE — 85025 COMPLETE CBC W/AUTO DIFF WBC: CPT | Performed by: INTERNAL MEDICINE

## 2022-09-14 PROCEDURE — 36415 COLL VENOUS BLD VENIPUNCTURE: CPT | Performed by: INTERNAL MEDICINE

## 2022-09-14 PROCEDURE — 84443 ASSAY THYROID STIM HORMONE: CPT | Performed by: INTERNAL MEDICINE

## 2022-09-14 PROCEDURE — 83036 HEMOGLOBIN GLYCOSYLATED A1C: CPT | Performed by: INTERNAL MEDICINE

## 2022-09-14 PROCEDURE — 80053 COMPREHEN METABOLIC PANEL: CPT | Performed by: INTERNAL MEDICINE

## 2022-09-15 ENCOUNTER — TELEPHONE (OUTPATIENT)
Dept: INTERNAL MEDICINE | Facility: CLINIC | Age: 82
End: 2022-09-15
Payer: MEDICARE

## 2022-09-15 ENCOUNTER — PATIENT OUTREACH (OUTPATIENT)
Dept: ADMINISTRATIVE | Facility: OTHER | Age: 82
End: 2022-09-15
Payer: MEDICARE

## 2022-09-15 DIAGNOSIS — E53.8 LOW SERUM VITAMIN B12: ICD-10-CM

## 2022-09-15 DIAGNOSIS — D50.1 IRON DEFICIENCY ANEMIA DUE TO SIDEROPENIC DYSPHAGIA: ICD-10-CM

## 2022-09-15 DIAGNOSIS — N18.30 STAGE 3 CHRONIC KIDNEY DISEASE, UNSPECIFIED WHETHER STAGE 3A OR 3B CKD: Chronic | ICD-10-CM

## 2022-09-15 DIAGNOSIS — E04.2 MULTINODULAR THYROID: Chronic | ICD-10-CM

## 2022-09-15 NOTE — TELEPHONE ENCOUNTER
Stable labs; stay on same regimen    Needs to submit a urine now, orders in for home collect    She should schedule her appointment with her kidney doctor for February of next year    She also should see me in February or March with labs prior, orders in, thank you

## 2022-09-15 NOTE — PROGRESS NOTES
CHW - Follow Up    This Community Health Worker completed a follow up visit with Saul Kirkpatrick via telephone today.  Pt reported: wanted the phone number to non emergency transportation for [employer] to reserve her ride to and from her medical appointment.  Community Health Worker provided: provided pt with the telephone number for transportation and will follow up in three weeks.  Follow up required:   Follow-up Outreach - Due: 10/5/2022

## 2022-09-16 ENCOUNTER — PATIENT MESSAGE (OUTPATIENT)
Dept: NEPHROLOGY | Facility: CLINIC | Age: 82
End: 2022-09-16
Payer: MEDICARE

## 2022-09-16 NOTE — TELEPHONE ENCOUNTER
----- Message from Rikki Cain sent at 9/16/2022  7:45 AM CDT -----  Contact: Pt [phone]  Patient is returning a phone call.  Who left a message for the patient: Hannah  Does patient know what this is regarding:  yes  Would you like a call back, or a response through your [facility] portal?:   call  Comments:

## 2022-09-16 NOTE — TELEPHONE ENCOUNTER
Spoke to patient and advised of recommendation. Patient verbalized understanding.       All appt scheduled

## 2022-09-19 ENCOUNTER — LAB VISIT (OUTPATIENT)
Dept: LAB | Facility: HOSPITAL | Age: 82
End: 2022-09-19
Attending: INTERNAL MEDICINE
Payer: MEDICARE

## 2022-09-19 LAB
ALBUMIN/CREAT UR: 55 UG/MG (ref 0–30)
CREAT UR-MCNC: 60 MG/DL (ref 15–325)
MICROALBUMIN UR DL<=1MG/L-MCNC: 33 UG/ML

## 2022-09-19 PROCEDURE — 82043 UR ALBUMIN QUANTITATIVE: CPT | Performed by: INTERNAL MEDICINE

## 2022-09-19 PROCEDURE — 82570 ASSAY OF URINE CREATININE: CPT | Performed by: INTERNAL MEDICINE

## 2022-09-19 NOTE — PRE-PROCEDURE INSTRUCTIONS
Spoke with patient regarding procedure scheduled on 10.3    Arrival time 0740    Has patient been sick with fever or on antibiotics within the last 7 days? No    Does the patient have any open wounds, sores or rashes? No    Does the patient have any recent fractures? no    Has patient received a vaccination within the last 7 days? No    Received the COVID vaccination? yes    Has the patient stopped all medications as directed? Hold dm meds am of procedure. Hold xarelto 3 days prior to procedure. Cardiac clearance obtained from dr field on 8.26.    Does patient have a pacemaker and or defibrillator? no    Does the patient have a ride to and from procedure and someone reliable to remain with patient?     Is the patient diabetic? yes    Does the patient have sleep apnea? Or use O2 at home? No and no     Is the patient receiving sedation? yes    Is the patient instructed to remain NPO beginning at midnight the night before their procedure? yes    Procedure location confirmed with patient? Yes    Covid- Denies signs/symptoms. Instructed to notify PAT/MD if any changes.

## 2022-09-23 ENCOUNTER — PATIENT MESSAGE (OUTPATIENT)
Dept: PAIN MEDICINE | Facility: CLINIC | Age: 82
End: 2022-09-23
Payer: MEDICARE

## 2022-09-23 ENCOUNTER — TELEPHONE (OUTPATIENT)
Dept: INTERNAL MEDICINE | Facility: CLINIC | Age: 82
End: 2022-09-23
Payer: MEDICARE

## 2022-09-23 NOTE — TELEPHONE ENCOUNTER
----- Message from Bibiana Healy sent at 9/23/2022 12:11 PM CDT -----  Contact: pt [phone]  Requesting orders for a motorized scooter. Please fax to [facility] [phone]    Please call and advise.    Thank You

## 2022-09-27 ENCOUNTER — TELEPHONE (OUTPATIENT)
Dept: PAIN MEDICINE | Facility: CLINIC | Age: 82
End: 2022-09-27
Payer: MEDICARE

## 2022-09-29 ENCOUNTER — TELEPHONE (OUTPATIENT)
Dept: INTERNAL MEDICINE | Facility: CLINIC | Age: 82
End: 2022-09-29
Payer: MEDICARE

## 2022-09-29 NOTE — TELEPHONE ENCOUNTER
----- Message from Rand Dailey sent at 9/29/2022 11:47 AM CDT -----  Contact: [phone] [facility] is calling the pt is needing a power scooter please give return call

## 2022-09-29 NOTE — TELEPHONE ENCOUNTER
Spoke to pt and notified that she have to go through the wheel chair  clinic in orders to send orders to [facility] ,   Pt stated that the [employer] company/ representative came to her home and she will be responsible for 20% of the cost , pt was notified that [employer] Representative have to contact [facility] because Dr. Randolph Doesn't do power chair assessment /RX   Pt voiced understanding   I called Vandana [facility] number unavailable

## 2022-10-03 ENCOUNTER — HOSPITAL ENCOUNTER (OUTPATIENT)
Facility: HOSPITAL | Age: 82
Discharge: HOME | End: 2022-10-03
Attending: ANESTHESIOLOGY | Admitting: ANESTHESIOLOGY
Payer: MEDICARE

## 2022-10-03 ENCOUNTER — PATIENT MESSAGE (OUTPATIENT)
Dept: PAIN MEDICINE | Facility: HOSPITAL | Age: 82
End: 2022-10-03

## 2022-10-03 VITALS
BODY MASS INDEX: 29.24 KG/M2 | OXYGEN SATURATION: 99 % | SYSTOLIC BLOOD PRESSURE: 128 MMHG | HEIGHT: 65 IN | DIASTOLIC BLOOD PRESSURE: 58 MMHG | WEIGHT: 175.5 LBS | HEART RATE: 65 BPM | RESPIRATION RATE: 15 BRPM | TEMPERATURE: 98 F

## 2022-10-03 DIAGNOSIS — M54.16 LUMBAR RADICULOPATHY: ICD-10-CM

## 2022-10-03 LAB — POCT GLUCOSE: 98 MG/DL (ref 70–110)

## 2022-10-03 PROCEDURE — 25500020 PHARM REV CODE 255: Performed by: ANESTHESIOLOGY

## 2022-10-03 PROCEDURE — 63600175 PHARM REV CODE 636 W HCPCS: Performed by: ANESTHESIOLOGY

## 2022-10-03 PROCEDURE — 64483 PR EPIDURAL INJ, ANES/STEROID, TRANSFORAMINAL, LUMB/SACR, SNGL LEVL: ICD-10-PCS | Mod: 50,,, | Performed by: ANESTHESIOLOGY

## 2022-10-03 PROCEDURE — 25000003 PHARM REV CODE 250: Performed by: ANESTHESIOLOGY

## 2022-10-03 PROCEDURE — 64483 NJX AA&/STRD TFRM EPI L/S 1: CPT | Mod: 50 | Performed by: ANESTHESIOLOGY

## 2022-10-03 PROCEDURE — 64483 NJX AA&/STRD TFRM EPI L/S 1: CPT | Mod: 50,,, | Performed by: ANESTHESIOLOGY

## 2022-10-03 RX ORDER — DEXAMETHASONE SODIUM PHOSPHATE 10 MG/ML
INJECTION INTRAMUSCULAR; INTRAVENOUS
Status: DISCONTINUED | OUTPATIENT
Start: 2022-10-03 | End: 2022-10-03 | Stop reason: HOSPADM

## 2022-10-03 RX ORDER — BUPIVACAINE HYDROCHLORIDE 2.5 MG/ML
INJECTION, SOLUTION EPIDURAL; INFILTRATION; INTRACAUDAL
Status: DISCONTINUED | OUTPATIENT
Start: 2022-10-03 | End: 2022-10-03 | Stop reason: HOSPADM

## 2022-10-03 RX ORDER — MIDAZOLAM HYDROCHLORIDE 1 MG/ML
INJECTION, SOLUTION INTRAMUSCULAR; INTRAVENOUS
Status: DISCONTINUED | OUTPATIENT
Start: 2022-10-03 | End: 2022-10-03 | Stop reason: HOSPADM

## 2022-10-03 RX ORDER — FENTANYL CITRATE 50 UG/ML
INJECTION, SOLUTION INTRAMUSCULAR; INTRAVENOUS
Status: DISCONTINUED | OUTPATIENT
Start: 2022-10-03 | End: 2022-10-03 | Stop reason: HOSPADM

## 2022-10-03 RX ORDER — SODIUM BICARBONATE 1 MEQ/ML
SYRINGE (ML) INTRAVENOUS
Status: DISCONTINUED | OUTPATIENT
Start: 2022-10-03 | End: 2022-10-03 | Stop reason: HOSPADM

## 2022-10-03 NOTE — H&P
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 Taveras    COLONOSCOPY      COLONOSCOPY N/A 9/22/2016    Procedure: COLONOSCOPY;  Surgeon: Jd Ashton MD;  Location: [address]);  Service: Endoscopy;  Laterality: N/A;  OK per Dr Randolph for pt to hold Plavix 5 days prior to procedure/see telephone encounter dated 8/29/16/svn    EYE SURGERY Bilateral 2002 approx    Laser for glaucoma    HYSTERECTOMY  1963     AMEENA/USO- fibroids; no cancer    OOPHORECTOMY  1963    unknown, only removed one    SELECTIVE INJECTION OF ANESTHETIC AGENT AROUND LUMBAR SPINAL NERVE ROOT BY TRANSFORAMINAL APPROACH Bilateral 6/17/2022    Procedure: Bilateral L4/5 TF JASKARAN with RN IV sedation;  Surgeon: Chan Fonseca MD;  Location: [facility];  Service: Pain Management;  Laterality: Bilateral;     Review of patient's allergies indicates:   Allergen Reactions    Pravachol [pravastatin] Nausea Only        No current facility-administered medications on file prior to encounter.     Current Outpatient Medications on File Prior to Encounter    Medication Sig Dispense Refill    albuterol (PROVENTIL/VENTOLIN HFA) 90 mcg/actuation inhaler INHALE 2 PUFFS INTO THE LUNGS EVERY 6 (SIX) HOURS AS NEEDED FOR WHEEZING. RESCUE 54 g 1    ALPRAZolam (XANAX) 0.5 MG tablet TAKE 1 TABLET BY MOUTH NIGHTLY AS NEEDED FOR ANXIETY (MAY TAKE 1-2 TIMES WEEKLY FOR ANXIETY) 30 tablet 0    amLODIPine (NORVASC) 5 MG tablet Take 1 tablet (5 mg total) by mouth 2 (two) times daily. 180 tablet 3    atorvastatin (LIPITOR) 80 MG tablet TAKE 1 TABLET BY MOUTH EVERY DAY 90 tablet 2    blood sugar diagnostic Strp 1 strip by Misc.(Non-Drug; Combo Route) route 2 (two) times daily. Insurance preferred test stripes DX:E11.22 100 strip 11    blood-glucose meter kit Insurance preferred meter dx:E11.22 1 each 0    brimonidine 0.2% (ALPHAGAN) 0.2 % Drop Place 1 drop into both eyes 3 (three) times daily. 15 mL 3    carvediloL (COREG) 12.5 MG tablet Take 12.5 mg by mouth.      cholecalciferol, vitamin D3, (VITAMIN D3) 1,000 unit capsule Take 1 capsule (1,000 Units total) by mouth once daily. 30 capsule 12    famotidine (PEPCID) 20 MG tablet Take 20 mg by mouth Daily.      ferrous sulfate (FEOSOL) 325 mg (65 mg iron) Tab tablet Take 1 tablet (325 mg total) by mouth 2 (two) times daily. 180 tablet 3    flecainide (TAMBOCOR) 50 MG Tab Take 1 tablet (50 mg total) by mouth 2 (two) times daily. 60 tablet 1    gabapentin (NEURONTIN) 300 MG capsule Take 1 capsule (300 mg total) by mouth every evening for 7 days, THEN 2 capsules (600 mg total) every evening for 7 days, THEN 3 capsules (900 mg total) every evening for 16 days. 30 capsule 1    glimepiride (AMARYL) 4 MG tablet TAKE 1 TABLET BY MOUTH IN THE MORNING WITH BREAKFAST 90 tablet 1    hydroCHLOROthiazide (HYDRODIURIL) 12.5 MG Tab TAKE 1 TABLET BY MOUTH EVERY DAY 90 tablet 3    HYDROcodone-acetaminophen (NORCO) 5-325 mg per tablet TAKE 1 TABLET BY MOUTH EVERY 8 (EIGHT) HOURS AS NEEDED FOR PAIN FOR UP TO 10 DOSES.      hydrocortisone 2.5 % cream Apply  topically 2 (two) times daily. 30 g 2    lancets (ACCU-CHEK SOFTCLIX LANCETS) Misc 100 lancets by Misc.(Non-Drug; Combo Route) route 2 (two) times daily. Insurance preferred lancets Dx:E11.22 100 each 11    levalbuterol (XOPENEX HFA) 45 mcg/actuation inhaler INHALE 1-2 PUFFS INTO THE LUNGS EVERY 6 (SIX) HOURS AS NEEDED FOR WHEEZING. RESCUE 15 Inhaler 12    linaCLOtide (LINZESS) 145 mcg Cap capsule Take 1 capsule (145 mcg total) by mouth before breakfast. 30 capsule 2    losartan (COZAAR) 50 MG tablet TAKE 1 TABLET BY MOUTH TWICE A  tablet 2    meclizine (ANTIVERT) 25 mg tablet Take 1 tablet (25 mg total) by mouth daily as needed for Dizziness. 30 tablet 0    metFORMIN (GLUCOPHAGE-XR) 500 MG ER 24hr tablet TAKE 2 TABLETS BY MOUTH EVERY  tablet 1    metoprolol succinate (TOPROL-XL) 50 MG 24 hr tablet TAKE 1 TABLET BY MOUTH EVERY DAY 90 tablet 1    mupirocin (BACTROBAN) 2 % ointment Apply topically 3 (three) times daily. 22 g 0    tacrolimus (PROTOPIC) 0.1 % ointment Apply topically 2 (two) times daily. 60 g 3    XARELTO 15 mg Tab TAKE 1 TABLET (15 MG TOTAL) BY MOUTH DAILY WITH DINNER OR EVENING MEAL. 30 tablet 6        PMHx, PSHx, Allergies, Medications reviewed in epic    ROS negative except pain complaints in HPI    OBJECTIVE:    There were no vitals taken for this visit.    PHYSICAL EXAMINATION:    GENERAL: Well appearing, in no acute distress, alert and oriented x3.  PSYCH:  Mood and affect appropriate.  SKIN: Skin color, texture, turgor normal, no rashes or lesions which will impact the procedure.  CV: RRR with palpation of the radial artery.  PULM: No evidence of respiratory difficulty, symmetric chest rise. Clear to auscultation.  NEURO: Cranial nerves grossly intact.    Plan:    Proceed with procedure as planned Procedure(s) (LRB):  Bilateral L2-3 transforaminal epidural steroid injection with RN IV sedation (Bilateral)    Chan Fonseca MD  10/03/2022

## 2022-10-03 NOTE — DISCHARGE SUMMARY
Discharge Note  Short Stay      SUMMARY     Admit Date: 10/3/2022    Attending Physician: Chan Fonseca MD        Discharge Physician: Chan Fonseca MD        Discharge Date: 10/3/2022 8:46 AM    Procedure(s) (LRB):  Bilateral L2-3 transforaminal epidural steroid injection with RN IV sedation (Bilateral)    Final Diagnosis: Lumbar radiculopathy, chronic [M54.16]    Disposition: Home or self care    Patient Instructions:   Current Discharge Medication List        CONTINUE these medications which have NOT CHANGED    Details   amLODIPine (NORVASC) 5 MG tablet Take 1 tablet (5 mg total) by mouth 2 (two) times daily.  Qty: 180 tablet, Refills: 3    Comments: .      atorvastatin (LIPITOR) 80 MG tablet TAKE 1 TABLET BY MOUTH EVERY DAY  Qty: 90 tablet, Refills: 2    Associated Diagnoses: Hyperlipidemia, unspecified hyperlipidemia type      carvediloL (COREG) 12.5 MG tablet Take 12.5 mg by mouth.      hydroCHLOROthiazide (HYDRODIURIL) 12.5 MG Tab TAKE 1 TABLET BY MOUTH EVERY DAY  Qty: 90 tablet, Refills: 3    Comments: DX Code Needed  .  Associated Diagnoses: Hypertension, essential      metFORMIN (GLUCOPHAGE-XR) 500 MG ER 24hr tablet TAKE 2 TABLETS BY MOUTH EVERY DAY  Qty: 180 tablet, Refills: 1    Associated Diagnoses: Type 2 diabetes mellitus with diabetic neuropathic arthropathy      metoprolol succinate (TOPROL-XL) 50 MG 24 hr tablet TAKE 1 TABLET BY MOUTH EVERY DAY  Qty: 90 tablet, Refills: 1      albuterol (PROVENTIL/VENTOLIN HFA) 90 mcg/actuation inhaler INHALE 2 PUFFS INTO THE LUNGS EVERY 6 (SIX) HOURS AS NEEDED FOR WHEEZING. RESCUE  Qty: 54 g, Refills: 1      ALPRAZolam (XANAX) 0.5 MG tablet TAKE 1 TABLET BY MOUTH NIGHTLY AS NEEDED FOR ANXIETY (MAY TAKE 1-2 TIMES WEEKLY FOR ANXIETY)  Qty: 30 tablet, Refills: 0    Comments: Not to exceed 5 additional fills before 09/27/2020      blood sugar diagnostic Strp 1 strip by Misc.(Non-Drug; Combo Route) route 2 (two) times daily. Insurance preferred test stripes  DX:E11.22  Qty: 100 strip, Refills: 11      blood-glucose meter kit Insurance preferred meter dx:E11.22  Qty: 1 each, Refills: 0      brimonidine 0.2% (ALPHAGAN) 0.2 % Drop Place 1 drop into both eyes 3 (three) times daily.  Qty: 15 mL, Refills: 3    Associated Diagnoses: Nuclear sclerosis of both eyes      cholecalciferol, vitamin D3, (VITAMIN D3) 1,000 unit capsule Take 1 capsule (1,000 Units total) by mouth once daily.  Qty: 30 capsule, Refills: 12    Associated Diagnoses: Vitamin D deficiency      famotidine (PEPCID) 20 MG tablet Take 20 mg by mouth Daily.      ferrous sulfate (FEOSOL) 325 mg (65 mg iron) Tab tablet Take 1 tablet (325 mg total) by mouth 2 (two) times daily.  Qty: 180 tablet, Refills: 3    Associated Diagnoses: Diabetes mellitus type 2, uncontrolled, with complications      flecainide (TAMBOCOR) 50 MG Tab Take 1 tablet (50 mg total) by mouth 2 (two) times daily.  Qty: 60 tablet, Refills: 1      gabapentin (NEURONTIN) 300 MG capsule Take 1 capsule (300 mg total) by mouth every evening for 7 days, THEN 2 capsules (600 mg total) every evening for 7 days, THEN 3 capsules (900 mg total) every evening for 16 days.  Qty: 30 capsule, Refills: 1      glimepiride (AMARYL) 4 MG tablet TAKE 1 TABLET BY MOUTH IN THE MORNING WITH BREAKFAST  Qty: 90 tablet, Refills: 1    Associated Diagnoses: Diabetes mellitus with stage 3 chronic kidney disease      HYDROcodone-acetaminophen (NORCO) 5-325 mg per tablet TAKE 1 TABLET BY MOUTH EVERY 8 (EIGHT) HOURS AS NEEDED FOR PAIN FOR UP TO 10 DOSES.    Comments: <!--EPICS-->Quantity prescribed more than 7 day supply? Press F2 and select one:31679<BR><!--EPICE-->        hydrocortisone 2.5 % cream Apply topically 2 (two) times daily.  Qty: 30 g, Refills: 2    Associated Diagnoses: Vitiligo      lancets (ACCU-CHEK SOFTCLIX LANCETS) Misc 100 lancets by Misc.(Non-Drug; Combo Route) route 2 (two) times daily. Insurance preferred lancets Dx:E11.22  Qty: 100 each, Refills: 11     Associated Diagnoses: Diabetes mellitus with stage 3 chronic kidney disease      levalbuterol (XOPENEX HFA) 45 mcg/actuation inhaler INHALE 1-2 PUFFS INTO THE LUNGS EVERY 6 (SIX) HOURS AS NEEDED FOR WHEEZING. RESCUE  Qty: 15 Inhaler, Refills: 12      linaCLOtide (LINZESS) 145 mcg Cap capsule Take 1 capsule (145 mcg total) by mouth before breakfast.  Qty: 30 capsule, Refills: 2    Associated Diagnoses: Constipation, unspecified constipation type      losartan (COZAAR) 50 MG tablet TAKE 1 TABLET BY MOUTH TWICE A DAY  Qty: 180 tablet, Refills: 2    Associated Diagnoses: Essential hypertension      meclizine (ANTIVERT) 25 mg tablet Take 1 tablet (25 mg total) by mouth daily as needed for Dizziness.  Qty: 30 tablet, Refills: 0      mupirocin (BACTROBAN) 2 % ointment Apply topically 3 (three) times daily.  Qty: 22 g, Refills: 0      tacrolimus (PROTOPIC) 0.1 % ointment Apply topically 2 (two) times daily.  Qty: 60 g, Refills: 3    Associated Diagnoses: Vitiligo; Other atopic dermatitis      XARELTO 15 mg Tab TAKE 1 TABLET (15 MG TOTAL) BY MOUTH DAILY WITH DINNER OR EVENING MEAL.  Qty: 30 tablet, Refills: 6                 Discharge Diagnosis: Lumbar radiculopathy, chronic [M54.16]  Condition on Discharge: Stable with no complications to procedure   Diet on Discharge: Same as before.  Activity: as per instruction sheet.  Discharge to: Home with a responsible adult.  Follow up: 2-4 weeks       Please call the office at [phone] if you experience any weakness or loss of sensation, fever > 101.5, pain uncontrolled with oral medications, persistent nausea/vomiting/or diarrhea, redness or drainage from the incisions, or any other worrisome concerns. If physician on call was not reached or could not communicate with our office for any reason please go to the nearest emergency department

## 2022-10-03 NOTE — DISCHARGE INSTRUCTIONS

## 2022-10-03 NOTE — OP NOTE
Saul Mar  82 y.o. female      Vitals:    10/03/22 0840   BP: (!) 163/71   Pulse: (!) 57   Resp: 16   Temp:      Procedure Date: 10/3/22      INFORMED CONSENT: The procedure, risks, benefits and options were discussed with patient. There are no contraindications to the procedure. The patient expressed understanding and agreed to proceed. The personnel performing the procedure was discussed. I verify that I personally obtained consent prior to the start of the procedure and the signed consent can be found on the patient's chart.       Anesthesia:   Conscious sedation provided by M.D    The patient was monitored with continuous pulse oximetry, EKG, and intermittent blood pressure monitors.  The patient was hemodynamically stable throughout the entire process was responsive to voice, and breathing spontaneously.  Supplemental O2 was provided at 2L/min via nasal cannula.  Patient was comfortable for the duration of the procedure. (See nurse documentation and case log for sedation time)    There was a total of 2mg IV Midazolam and 50mcg Fentanyl titrated for the procedure    Pre Procedure diagnosis: Lumbar radiculopathy, chronic [M54.16]  Post-Procedure diagnosis: SAME     Complications: None    Specimens: None      DESCRIPTION OF PROCEDURE: The patient was brought to the procedure room. IV access was obtained prior to the procedure. The patient was positioned prone on the fluoroscopy table. Continuous hemodynamic monitoring was initiated including blood pressure, EKG, and pulse oximetry. . The skin was prepped with chlorhexidine and draped in a sterile fashion. Skin anesthesia was achieved using a total of 10mL of lidocaine, 5mL over each respective injection site.     The  L2/3 transforaminal spaces were identified with fluoroscopy in the  AP, oblique, and lateral views.  A 22 gauge spinal quinke needle was then advanced into the area of the trans foraminal spaces bilateral with confirmation of proper  needle position using AP, oblique, and lateral fluoroscopic views. Once the needle tip was in the area of the transforaminal space, and there was no blood, CSF or paraesthesias,  1.5 mL of Omnipaque 300mg/ml was injected on bilateral for a total of 3mL.  Fluoroscopic imaging in the AP and lateral views revealed a clear outline of the spinal nerve with proximal spread of agent through the neural foramen into the epidural space. A total combination of 2 mL of Bupivicaine 0.25% and 10 mg dexamethasone was injected on each side for a total of 6 mL of injected medications with displacement of the contrast dye confirming that the medication went into the area of the transforaminal spaces bilateral. A sterile dressing was applied.   Patient tolerated the procedure well.    Patient was taken back to the recovery room for further observation.     The patient was discharged to home in stable condition

## 2022-10-06 ENCOUNTER — TELEPHONE (OUTPATIENT)
Dept: PAIN MEDICINE | Facility: CLINIC | Age: 82
End: 2022-10-06
Payer: MEDICARE

## 2022-10-06 NOTE — TELEPHONE ENCOUNTER
Reached out Rere with [facility]  to inform her that Unfortunately the power wheelchair is our capacity for orders at our [facility] and E office.  We do not have an order set for a power scooter.  If the patient has a particular brand she prefers, can you please relay that message to me and I can try to write this on a prescription pad.  However, it is up to the discretion of her insurance company whether they will approve this device. She ddi not answer the phone. Left  v.m to call back.       Srikanth Hurd   Medical Assistant

## 2022-10-06 NOTE — TELEPHONE ENCOUNTER
----- Message from Chan Fonseca MD sent at 10/6/2022  9:34 AM CDT -----  Contact: Rere with [facility]  fax   Hi Ms. Cain: Unfortunately the power wheelchair is our capacity for orders at our [facility] and E office.  We do not have an order set for a power scooter.  If the patient has a particular brand she prefers, can you please relay that message to me and I can try to write this on a prescription pad.  However, it is up to the discretion of her insurance company whether they will approve this device.  Thank you    Dr. Fonseca   ----- Message -----  From: Srikanth Hurd MA  Sent: 10/5/2022  11:24 AM CDT  To: MD Rere Wray called in regards to getting an order for a power scooter instead of a power wheelchair that your placed before also clinical notes.    If this is not an option please call Rere with [facility] at  fax    And inform her that we no not offer it. Please call back today  is waiting on a answer.     Thank you,  Srikanth Hurd   Medical Assistant       ----- Message -----  From: Rikki Cain  Sent: 10/5/2022  10:40 AM CDT  To: Raymundo Jernigan called in regards to getting an order for a power scooter and clinical notes please advise

## 2022-10-11 ENCOUNTER — PATIENT OUTREACH (OUTPATIENT)
Dept: ADMINISTRATIVE | Facility: OTHER | Age: 82
End: 2022-10-11
Payer: MEDICARE

## 2022-10-11 NOTE — PROGRESS NOTES
CHW - Case Closure    This Community Health Worker spoke to patient via telephone today.   Pt reported: pt declined all resources, pt stated she tired of filling out forms to get assistance and get denied because of the dollar amount she gets in income. She wants no part in that, but she likes when this Community Health Worker calls to check on her and would like this to continued.  Pt denied any additional needs at this time and agrees with episode closure at this time.  Provided patient with Community Health Worker's contact information and encouraged her to contact this Community Health Worker if additional needs arise and pt stated she will.

## 2022-10-12 ENCOUNTER — TELEPHONE (OUTPATIENT)
Dept: INTERNAL MEDICINE | Facility: CLINIC | Age: 82
End: 2022-10-12
Payer: MEDICARE

## 2022-10-12 NOTE — TELEPHONE ENCOUNTER
----- Message from Ananth Andres sent at 10/12/2022 10:45 AM CDT -----  Contact: [phone]  Pt needs a call back about she want to get a motor scooter. Please call pt back.

## 2022-10-14 ENCOUNTER — TELEPHONE (OUTPATIENT)
Dept: ORTHOPEDICS | Facility: CLINIC | Age: 82
End: 2022-10-14
Payer: MEDICARE

## 2022-10-14 ENCOUNTER — HOSPITAL ENCOUNTER (OUTPATIENT)
Dept: RADIOLOGY | Facility: HOSPITAL | Age: 82
Discharge: HOME | End: 2022-10-14
Attending: STUDENT IN AN ORGANIZED HEALTH CARE EDUCATION/TRAINING PROGRAM
Payer: MEDICARE

## 2022-10-14 ENCOUNTER — OFFICE VISIT (OUTPATIENT)
Dept: ORTHOPEDICS | Facility: CLINIC | Age: 82
End: 2022-10-14
Payer: MEDICARE

## 2022-10-14 VITALS — WEIGHT: 175 LBS | HEIGHT: 65 IN | BODY MASS INDEX: 29.16 KG/M2

## 2022-10-14 DIAGNOSIS — M16.0 PRIMARY OSTEOARTHRITIS OF BOTH HIPS: ICD-10-CM

## 2022-10-14 DIAGNOSIS — M25.551 BILATERAL HIP PAIN: ICD-10-CM

## 2022-10-14 DIAGNOSIS — M54.16 LUMBAR RADICULOPATHY, CHRONIC: ICD-10-CM

## 2022-10-14 DIAGNOSIS — M25.551 BILATERAL HIP PAIN: Primary | ICD-10-CM

## 2022-10-14 DIAGNOSIS — M25.552 BILATERAL HIP PAIN: Primary | ICD-10-CM

## 2022-10-14 DIAGNOSIS — M25.552 BILATERAL HIP PAIN: ICD-10-CM

## 2022-10-14 DIAGNOSIS — M16.0 PRIMARY OSTEOARTHRITIS OF BOTH HIPS: Primary | ICD-10-CM

## 2022-10-14 DIAGNOSIS — Z74.09 LIMITED MOBILITY: Primary | ICD-10-CM

## 2022-10-14 DIAGNOSIS — Z74.09 LIMITED MOBILITY: ICD-10-CM

## 2022-10-14 PROCEDURE — 99999 PR PBB SHADOW E&M-EST. PATIENT-LVL III: ICD-10-PCS | Mod: PBBFAC,,, | Performed by: STUDENT IN AN ORGANIZED HEALTH CARE EDUCATION/TRAINING PROGRAM

## 2022-10-14 PROCEDURE — 3288F PR FALLS RISK ASSESSMENT DOCUMENTED: ICD-10-PCS | Mod: CPTII,S$GLB,, | Performed by: STUDENT IN AN ORGANIZED HEALTH CARE EDUCATION/TRAINING PROGRAM

## 2022-10-14 PROCEDURE — 1101F PT FALLS ASSESS-DOCD LE1/YR: CPT | Mod: CPTII,S$GLB,, | Performed by: STUDENT IN AN ORGANIZED HEALTH CARE EDUCATION/TRAINING PROGRAM

## 2022-10-14 PROCEDURE — 1101F PR PT FALLS ASSESS DOC 0-1 FALLS W/OUT INJ PAST YR: ICD-10-PCS | Mod: CPTII,S$GLB,, | Performed by: STUDENT IN AN ORGANIZED HEALTH CARE EDUCATION/TRAINING PROGRAM

## 2022-10-14 PROCEDURE — 1160F RVW MEDS BY RX/DR IN RCRD: CPT | Mod: CPTII,S$GLB,, | Performed by: STUDENT IN AN ORGANIZED HEALTH CARE EDUCATION/TRAINING PROGRAM

## 2022-10-14 PROCEDURE — 73521 X-RAY EXAM HIPS BI 2 VIEWS: CPT | Mod: 26,,, | Performed by: RADIOLOGY

## 2022-10-14 PROCEDURE — 1159F PR MEDICATION LIST DOCUMENTED IN MEDICAL RECORD: ICD-10-PCS | Mod: CPTII,S$GLB,, | Performed by: STUDENT IN AN ORGANIZED HEALTH CARE EDUCATION/TRAINING PROGRAM

## 2022-10-14 PROCEDURE — 1125F PR PAIN SEVERITY QUANTIFIED, PAIN PRESENT: ICD-10-PCS | Mod: CPTII,S$GLB,, | Performed by: STUDENT IN AN ORGANIZED HEALTH CARE EDUCATION/TRAINING PROGRAM

## 2022-10-14 PROCEDURE — 73521 XR HIPS BILATERAL 2 VIEW INCL AP PELVIS: ICD-10-PCS | Mod: 26,,, | Performed by: RADIOLOGY

## 2022-10-14 PROCEDURE — 1125F AMNT PAIN NOTED PAIN PRSNT: CPT | Mod: CPTII,S$GLB,, | Performed by: STUDENT IN AN ORGANIZED HEALTH CARE EDUCATION/TRAINING PROGRAM

## 2022-10-14 PROCEDURE — 1159F MED LIST DOCD IN RCRD: CPT | Mod: CPTII,S$GLB,, | Performed by: STUDENT IN AN ORGANIZED HEALTH CARE EDUCATION/TRAINING PROGRAM

## 2022-10-14 PROCEDURE — 73521 X-RAY EXAM HIPS BI 2 VIEWS: CPT | Mod: TC

## 2022-10-14 PROCEDURE — 3288F FALL RISK ASSESSMENT DOCD: CPT | Mod: CPTII,S$GLB,, | Performed by: STUDENT IN AN ORGANIZED HEALTH CARE EDUCATION/TRAINING PROGRAM

## 2022-10-14 PROCEDURE — 99213 PR OFFICE/OUTPT VISIT, EST, LEVL III, 20-29 MIN: ICD-10-PCS | Mod: S$GLB,,, | Performed by: STUDENT IN AN ORGANIZED HEALTH CARE EDUCATION/TRAINING PROGRAM

## 2022-10-14 PROCEDURE — 99999 PR PBB SHADOW E&M-EST. PATIENT-LVL III: CPT | Mod: PBBFAC,,, | Performed by: STUDENT IN AN ORGANIZED HEALTH CARE EDUCATION/TRAINING PROGRAM

## 2022-10-14 PROCEDURE — 1160F PR REVIEW ALL MEDS BY PRESCRIBER/CLIN PHARMACIST DOCUMENTED: ICD-10-PCS | Mod: CPTII,S$GLB,, | Performed by: STUDENT IN AN ORGANIZED HEALTH CARE EDUCATION/TRAINING PROGRAM

## 2022-10-14 PROCEDURE — 99213 OFFICE O/P EST LOW 20 MIN: CPT | Mod: S$GLB,,, | Performed by: STUDENT IN AN ORGANIZED HEALTH CARE EDUCATION/TRAINING PROGRAM

## 2022-10-14 NOTE — TELEPHONE ENCOUNTER
Patient was originally scheduled with Guera Barger PA-C, for this afternoon. Guera was unable to see the pt, got her rescheduled with another orthopedic provider for this afternoon. Pt was grateful for the call. -NS

## 2022-10-14 NOTE — PATIENT INSTRUCTIONS
Assessment:  Saul Mar is a 82 y.o. female   Chief Complaint   Patient presents with    Left Hip - Pain    Right Hip - Pain       Encounter Diagnoses   Name Primary?    Primary osteoarthritis of both hips     Limited mobility Yes        Plan:  Physical therapy referral to [facility] order with [employer] for Luminary Micro Scooter  Apply topical diclofenac (Voltaren) up to 4 times a day to the affected area.  It can be bought over the counter at any local pharmacy.    Patient may use over the counter lidocaine pain patches as needed  Patient may use ice or heat as needed every 2 hours for 15 minutes.  Follow up in 2 months     Follow-up:  2 months or sooner if there are any problems between now and then.    Thank you for choosing [facility] and Dr. Florian Chavez for your orthopedic & sports medicine care. It is our goal to provide you with exceptional care that will help keep you healthy, active, and get you back in the game.    Please do not hesitate to reach out to us via email, phone, or MyChart with any questions, concerns, or feedback.    If you felt that you received exemplary care today, please consider leaving us feedback on [employer] at:  [email or web address]    If you are experiencing pain/discomfort ,or have questions after 5pm and would like to be connected to the [facility] on-call team, please call this number and specify which Sports Medicine provider is treating you: [phone]

## 2022-10-14 NOTE — TELEPHONE ENCOUNTER
Spoke with patient to get more information regarding her appointment today.  Patient stated that she is having leg pain starting from her back down to her lower leg.  She states that she has had back MRI and XR.  Asked that patient arrive 30 min prior to appt time so that we can get bilateral hip xrays so that we can rule out pain coming from her hips and that we will discuss request for wheelchair at her appt.

## 2022-10-17 ENCOUNTER — TELEPHONE (OUTPATIENT)
Dept: ORTHOPEDICS | Facility: CLINIC | Age: 82
End: 2022-10-17
Payer: MEDICARE

## 2022-10-17 NOTE — TELEPHONE ENCOUNTER
Faxed and received email confirmation of receipt of orders for motorized folding scooter to [employer] [facility]

## 2022-10-25 NOTE — PROGRESS NOTES
Established Patient Chronic Pain Note (Follow up Visit)    Referring Physician: No ref. provider found    PCP: Penny Randolph MD    Chief Complaint:   Bilateral leg pain       SUBJECTIVE:    Interval history 10/26/2022  Patient presents status post bilateral L2/3 transforaminal epidural steroid injection 10/03/2022.  Patient reports limited 50% relief along the posterior aspect of the right lower extremity following her procedure.  She continues to report pain along the lateral aspect of the right lower extremity to the calf in L4 distribution.  Pain today is a 10/10.  Patient does endorse associated weakness in the lower extremity associated with her pain.  Patient reports her leg pain is more severe and debilitating than the back pain.  Patient reports she is scheduled to restart physical therapy the following week.  Patient has discontinued gabapentin secondary to intolerable side effect.    Interval History (7/18/2022): Saul Mar presents today for follow-up visit.  she underwent bilateral L4/5 transforaminal epidural steroid injection on 6/17/22.  The patient reports that she is/was unchanged following the procedure.  she reports 0% pain relief.  Reports pain is worsened with prolonged sitting, improved with leaning forward (shopping cart sign). Reports continued aching, stinging low back pain in a band-like distribution with radiation down bilateral lower extremities. Reports she was unable to tolerate Gabapentin due to drowsiness and discontinued. Reports she received no relief while taking this medication.  Patient reports pain as 8/10 today.    Initial HPI  Saul Mar is a 82 y.o. female with past medical history significant for CVA, MDD, primary open angle glaucoma, DMII, AFib, HLD, HTN, diastolic dysfunction, PAD, CKD III, SStrait, GERD who presents with bilateral leg pain.  Patient reports pain began approximately 1 year prior without inciting accident injury or trauma.  Today  patient reports pain which is constant which is rated a 10/10.  Pain is described as aching in nature.  Patient reports pain originating in bilateral buttocks and radiating to bilateral calves in L4-L5 distribution.  Pain is equally worse on both sides.  Pain is exacerbated with prolonged standing and with ambulation.  Patient ambulates with a walker and reports she is only able to ambulate a few steps before requiring rest.  Patient reports pain is improved with lumbar flexion and rest.  Patient does report associated weakness in bilateral lower extremities associated with her pain.  She denies bowel or bladder incontinence or saddle anesthesia.  Patient reports completing several sessions of conventional physical therapy with initial improvement in her symptoms, however with increased intensity of exercises, exacerbation of pain.    Pt last saw Dr. Sanchez 8/24/21 with recommendation to continue with PT and discussion of future MBB.    Patient reports significant motor weakness and loss of sensations.  Patient denies night fever/night sweats, urinary incontinence, bowel incontinence and significant weight loss.      Pain Disability Index Review:     Last 3 PDI Scores 10/26/2022 7/18/2022 3/14/2022   Pain Disability Index (PDI) 52 34 48       Non-Pharmacologic Treatments:  Physical Therapy/Home Exercise: yes  Ice/Heat:yes  TENS: no  Acupuncture: no  Massage: no  Chiropractic: no    Other: no      Pain Medications:  - Opioids: Vicodin ( Hydrocodone/Acetaminophen)  - Adjuvant Medications: Neurontin (Gabapentin) and Xanax (Alprazolam). 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 Taveras    COLONOSCOPY      COLONOSCOPY N/A 9/22/2016    Procedure: COLONOSCOPY;  Surgeon: Jd Ashton MD;  Location: [facility] ([address] [place] [address]);  Service: Endoscopy;  Laterality: N/A;  OK per Dr Randolph for pt to hold Plavix 5 days prior to procedure/see telephone encounter dated 8/29/16/svn    EYE SURGERY Bilateral 2002 approx    Laser for glaucoma    HYSTERECTOMY  1963     AMEENA/USO- fibroids; no cancer    OOPHORECTOMY  1963    unknown, only removed one    SELECTIVE INJECTION OF ANESTHETIC AGENT AROUND LUMBAR SPINAL NERVE ROOT BY TRANSFORAMINAL APPROACH Bilateral 6/17/2022    Procedure: Bilateral L4/5 TF JASKARAN with RN IV sedation;  Surgeon: Chan Fonseca MD;  Location: [facility];  Service: Pain Management;  Laterality: Bilateral;    SELECTIVE INJECTION OF ANESTHETIC AGENT AROUND LUMBAR SPINAL NERVE ROOT BY TRANSFORAMINAL APPROACH Bilateral 10/3/2022    Procedure: Bilateral L2-3 transforaminal epidural steroid injection with RN IV sedation;  Surgeon: Chan Fonseca MD;  Location: [facility];  Service: Pain Management;  Laterality: Bilateral;     Review of patient's allergies indicates:   Allergen Reactions    Pravachol [pravastatin] Nausea Only       Current Outpatient Medications   Medication Sig    albuterol (PROVENTIL/VENTOLIN HFA) 90 mcg/actuation inhaler INHALE 2 PUFFS INTO THE LUNGS EVERY 6 (SIX) HOURS AS NEEDED FOR WHEEZING. RESCUE    ALPRAZolam (XANAX) 0.5 MG tablet TAKE 1  TABLET BY MOUTH NIGHTLY AS NEEDED FOR ANXIETY (MAY TAKE 1-2 TIMES WEEKLY FOR ANXIETY)    amLODIPine (NORVASC) 5 MG tablet Take 1 tablet (5 mg total) by mouth 2 (two) times daily.    atorvastatin (LIPITOR) 80 MG tablet TAKE 1 TABLET BY MOUTH EVERY DAY    blood sugar diagnostic Strp 1 strip by Misc.(Non-Drug; Combo Route) route 2 (two) times daily. Insurance preferred test stripes DX:E11.22    blood-glucose meter kit Insurance preferred meter dx:E11.22    brimonidine 0.2% (ALPHAGAN) 0.2 % Drop Place 1 drop into both eyes 3 (three) times daily.    carvediloL (COREG) 12.5 MG tablet Take 12.5 mg by mouth.    cholecalciferol, vitamin D3, (VITAMIN D3) 1,000 unit capsule Take 1 capsule (1,000 Units total) by mouth once daily.    famotidine (PEPCID) 20 MG tablet Take 20 mg by mouth Daily.    ferrous sulfate (FEOSOL) 325 mg (65 mg iron) Tab tablet Take 1 tablet (325 mg total) by mouth 2 (two) times daily.    flecainide (TAMBOCOR) 50 MG Tab Take 1 tablet (50 mg total) by mouth 2 (two) times daily.    glimepiride (AMARYL) 4 MG tablet TAKE 1 TABLET BY MOUTH IN THE MORNING WITH BREAKFAST    hydroCHLOROthiazide (HYDRODIURIL) 12.5 MG Tab TAKE 1 TABLET BY MOUTH EVERY DAY    HYDROcodone-acetaminophen (NORCO) 5-325 mg per tablet TAKE 1 TABLET BY MOUTH EVERY 8 (EIGHT) HOURS AS NEEDED FOR PAIN FOR UP TO 10 DOSES.    hydrocortisone 2.5 % cream Apply topically 2 (two) times daily.    lancets (ACCU-CHEK SOFTCLIX LANCETS) Misc 100 lancets by Misc.(Non-Drug; Combo Route) route 2 (two) times daily. Insurance preferred lancets Dx:E11.22    linaCLOtide (LINZESS) 145 mcg Cap capsule Take 1 capsule (145 mcg total) by mouth before breakfast.    losartan (COZAAR) 50 MG tablet TAKE 1 TABLET BY MOUTH TWICE A DAY    meclizine (ANTIVERT) 25 mg tablet Take 1 tablet (25 mg total) by mouth daily as needed for Dizziness.    metFORMIN (GLUCOPHAGE-XR) 500 MG ER 24hr tablet TAKE 2 TABLETS BY MOUTH EVERY DAY    metoprolol succinate (TOPROL-XL) 50 MG 24 hr  "tablet TAKE 1 TABLET BY MOUTH EVERY DAY    mupirocin (BACTROBAN) 2 % ointment Apply topically 3 (three) times daily.    tacrolimus (PROTOPIC) 0.1 % ointment Apply topically 2 (two) times daily.    XARELTO 15 mg Tab TAKE 1 TABLET (15 MG TOTAL) BY MOUTH DAILY WITH DINNER OR EVENING MEAL.    levalbuterol (XOPENEX HFA) 45 mcg/actuation inhaler INHALE 1-2 PUFFS INTO THE LUNGS EVERY 6 (SIX) HOURS AS NEEDED FOR WHEEZING. RESCUE     No current facility-administered medications for this visit.       Review of Systems     GENERAL:  No weight loss, malaise or fevers.  HEENT:   No recent changes in vision or hearing  NECK:  Negative for lumps, no difficulty with swallowing.  RESPIRATORY:  Negative for cough, wheezing or shortness of breath, patient denies any recent URI.  CARDIOVASCULAR:  Negative for chest pain, leg swelling or palpitations.  GI:  Negative for abdominal discomfort, blood in stools or black stools or change in bowel habits.  MUSCULOSKELETAL:  See HPI.  SKIN:  Negative for lesions, rash, and itching.  PSYCH:  No mood disorder or recent psychosocial stressors.   HEMATOLOGY/LYMPHOLOGY:  Negative for prolonged bleeding, bruising easily or swollen nodes.    NEURO:   No history of headaches, syncope, paralysis, seizures or tremors.  All other reviewed and negative other than HPI.    OBJECTIVE:    BP (!) 160/65 Comment: with no B.P meds  Pulse 61   Resp 17   Ht 5' 5" (1.651 m)   Wt 79 kg (174 lb 2.6 oz)   BMI 28.98 kg/m²       Physical Exam    GENERAL: Well appearing, in no acute distress, alert and oriented x3.  PSYCH:  Mood and affect appropriate.  SKIN: Skin color, texture, turgor normal, no rashes or lesions.  HEAD/FACE:  Normocephalic, atraumatic. Cranial nerves grossly intact.    CV: RRR with palpation of the radial artery.  PULM: No evidence of respiratory difficulty, symmetric chest rise.  GI:  Soft and non-tender.    BACK:  Thoracic kyphosis Straight leg raising in the sitting and supine positions is " negative to radicular pain. pain to palpation over the facet joints of the lumbar spine or spinous processes. reduced range of motion with pain reproduction.  EXTREMITIES: Peripheral joint ROM is reduced with pain without obvious instability or laxity in all four extremities. No deformities, edema, or skin discoloration. Good capillary refill.  MUSCULOSKELETAL: Able to stand on heels & toes.   hip, and knee provocative maneuvers are negative.  There is no pain with palpation over the sacroiliac joints bilaterally.  FABERs test is negative.  Facet loading test is positive bilaterally.   Bilateral upper and lower extremity strength is normal and symmetric.  No atrophy or tone abnormalities are noted.  RIGHT Lower extremity: Hip flexion 5/5, Hip Abduction 5/5, Hip Adduction 5/5, Knee extension 5/5, Knee flexion 5/5, Ankle dorsiflexion5/5, Extensor hallucis longus 5/5, Ankle plantarflexion 5/5  LEFT Lower extremity:  Hip flexion 5/5, Hip Abduction 5/5,Hip Adduction 5/5, Knee extension 5/5, Knee flexion 5/5, Ankle dorsiflexion 5/5, Extensor hallucis longus 5/5, Ankle plantarflexion 5/5  -Normal testing knee (patellar) jerk and ankle (achilles) jerk    NEURO: Bilateral upper and lower extremity coordination and muscle stretch reflexes are physiologic and symmetric. No loss of sensation is noted.  GAIT: normal.    Imagin/10/21    MRI Lumbar Spine Without Contrast    FINDINGS:  Alignment: Mild levocurvature of the lumbar spine.  Grade 1 anterolisthesis L4 on L5.    Vertebrae: Multiple Schmorl's nodes.  Benign osseous hemangioma in the T12 vertebral body.  Degenerative the edema within the bilateral L4-L5 facet joints.  No abnormal marrow signal to suggest infiltrative process.    Discs: Multilevel disc desiccation and height loss, most severe at L4-L5.    Cord: No signal abnormality.  Conus terminates at L2    Degenerative findings:    T12-L1: No significant spinal canal stenosis or neural foraminal  narrowing.    L1-L2: Mild diffuse disc bulge.  No significant spinal canal stenosis or neural foraminal narrowing.    L2-L3: Severe diffuse disc bulge, bilateral facet arthropathy and ligamentum flavum buckling.  These findings result in mild spinal canal stenosis, severe right and moderate left neural foraminal narrowing.    L3-L4: Moderate diffuse disc bulge.  These findings result in moderate bilateral neural foraminal narrowing.  No spinal canal stenosis.    L4-L5: Grade 1 anterolisthesis, diffuse disc bulge, bilateral facet arthropathy, and ligamentum flavum buckling.  These findings result in severe spinal canal stenosis and severe right and moderate left neural foraminal narrowing.    L5-S1: Diffuse disc bulge with a superimposed left paracentral protrusion that abuts the left descending S1 nerve root.  Bilateral facet arthropathy. These findings result in mild bilateral neural foraminal narrowing    Paraspinal muscles & soft tissues: T1 hyperintense cortical lesion noted within the right kidney, possibly a proteinaceous or hemorrhagic cyst. left simple renal cyst.    Impression  Multilevel degenerative changes, most pronounced at L4-L5 with severe spinal canal stenosis, severe right and moderate left neural foraminal narrowing.    Bilateral degenerative facet edema at L4-L5.    Probable proteinaceous versus hemorrhagic right renal cortical cyst.  Recommend correlation with a dedicated renal ultrasound.    07/22/21  X-Ray Lumbar Spine Ap And Lateral  FINDINGS:  Five lumbar vertebral bodies.  Vertebral body heights are maintained.  Disc space narrowing and degenerative endplate changes L4-5 and L5-S1.  Moderate facet arthropathy L4-5 and L5-S1.  Grade 1 anterolisthesis L4 on L5.     04/02/18    X-Ray Cervical Spine Complete 5 view  FINDINGS:  There is anatomic spinal alignment.  Prominent bridging vertebral endplate spurs present anteriorly from C4-5 through C6-7.  Disc interspaces are maintained.  Odontoid is  intact.  No fracture or subluxation.      ASSESSMENT: 82 y.o. year old female with lower back and leg pain, consistent with     1. Lumbar radiculopathy, chronic  IR JASKARAN Lumbar w/ Img    Case Request-RAD/Other Procedure Area: L4-5 interlaminar epidural steroid injection with right paramedian approach with RN IV sedation      2. Spinal stenosis of lumbar region without neurogenic claudication        3. Spondylolysis of lumbar region                PLAN:   - Interventions:  Schedule for L4-5 intralaminar epidural steroid injection with right paramedian approach to see if this confers more significant and sustained relief in lower back and right-sided radicular symptoms.  We have discussed the procedure, benefits and potential risk in detail.  Patient has elected to pursue this procedure.    - Interventions: Patient may be a candidate for spinal cord stimulation device secondary to chronic pain syndrome and multiple failed attempts of other interventions and medical management. Explained the risks and benefits of the procedure in detail with the patient today in clinic along with alternative treatment options,   -Will contact [facility] representative to discuss trial/reach out to patient with patient ambassador     - Anticoagulation use: yes  Xarelto  Per ASA guidelines, patient will need to pause Xarelto 3 days prior to her transforaminal epidural steroid injection.  Will obtain cardiac clearance from Dr. Grayson ([facility])     report:  Reviewed and consistent with medication use as prescribed.    - Medications:  - Patient discontinued Gabapentin secondary to somnolence and ineffectivness    - Therapy:  We discussed continuing physical therapy to help manage the patient/s painful condition. The patient was counseled that muscle strengthening will improve the long term prognosis in regards to pain and may also help increase range of motion and mobility.    - Imaging: Reviewed available imaging with  patient and answered any questions they had regarding study    -Consults/referral:Neurosurgery     - Follow up visit:  4-6 weeks post injection      The above plan and management options were discussed at length with patient. Patient is in agreement with the above and verbalized understanding.    - I discussed the goals of interventional chronic pain management with the patient on today's visit. We discussed a multimodal and systematic approach to pain.  This includes diagnostic and therapeutic injections, adjuvant pharmacologic treatment, physical therapy, and at times psychiatry.  I emphasized the importance of regular exercise, core strengthening and stretching, diet and weight loss as a cornerstone of long-term pain management.    - This condition does not require this patient to take time off of work, and the primary goal of our Pain Management services is to improve the patient's functional capacity.  - Patient Questions: Answered all of the patient's questions regarding diagnoses, therapy, treatment and next steps        Chan Fonseca MD  Interventional Pain Management  [facility]    Disclaimer:  This note was prepared using voice recognition system and is likely to have sound alike errors that may have been overlooked even after proof reading.  Please call me with any questions

## 2022-10-26 ENCOUNTER — OFFICE VISIT (OUTPATIENT)
Dept: PAIN MEDICINE | Facility: CLINIC | Age: 82
End: 2022-10-26
Payer: MEDICARE

## 2022-10-26 VITALS
RESPIRATION RATE: 17 BRPM | SYSTOLIC BLOOD PRESSURE: 160 MMHG | DIASTOLIC BLOOD PRESSURE: 65 MMHG | BODY MASS INDEX: 29.02 KG/M2 | HEART RATE: 61 BPM | WEIGHT: 174.19 LBS | HEIGHT: 65 IN

## 2022-10-26 DIAGNOSIS — M48.061 SPINAL STENOSIS OF LUMBAR REGION WITHOUT NEUROGENIC CLAUDICATION: ICD-10-CM

## 2022-10-26 DIAGNOSIS — M54.16 LUMBAR RADICULOPATHY, CHRONIC: Primary | ICD-10-CM

## 2022-10-26 DIAGNOSIS — M43.06 SPONDYLOLYSIS OF LUMBAR REGION: ICD-10-CM

## 2022-10-26 PROCEDURE — 3078F PR MOST RECENT DIASTOLIC BLOOD PRESSURE < 80 MM HG: ICD-10-PCS | Mod: CPTII,S$GLB,, | Performed by: ANESTHESIOLOGY

## 2022-10-26 PROCEDURE — 99999 PR PBB SHADOW E&M-EST. PATIENT-LVL V: CPT | Mod: PBBFAC,,, | Performed by: ANESTHESIOLOGY

## 2022-10-26 PROCEDURE — 1125F AMNT PAIN NOTED PAIN PRSNT: CPT | Mod: CPTII,S$GLB,, | Performed by: ANESTHESIOLOGY

## 2022-10-26 PROCEDURE — 1125F PR PAIN SEVERITY QUANTIFIED, PAIN PRESENT: ICD-10-PCS | Mod: CPTII,S$GLB,, | Performed by: ANESTHESIOLOGY

## 2022-10-26 PROCEDURE — 99214 OFFICE O/P EST MOD 30 MIN: CPT | Mod: S$GLB,,, | Performed by: ANESTHESIOLOGY

## 2022-10-26 PROCEDURE — 1101F PT FALLS ASSESS-DOCD LE1/YR: CPT | Mod: CPTII,S$GLB,, | Performed by: ANESTHESIOLOGY

## 2022-10-26 PROCEDURE — 1101F PR PT FALLS ASSESS DOC 0-1 FALLS W/OUT INJ PAST YR: ICD-10-PCS | Mod: CPTII,S$GLB,, | Performed by: ANESTHESIOLOGY

## 2022-10-26 PROCEDURE — 1159F MED LIST DOCD IN RCRD: CPT | Mod: CPTII,S$GLB,, | Performed by: ANESTHESIOLOGY

## 2022-10-26 PROCEDURE — 3077F SYST BP >= 140 MM HG: CPT | Mod: CPTII,S$GLB,, | Performed by: ANESTHESIOLOGY

## 2022-10-26 PROCEDURE — 3288F PR FALLS RISK ASSESSMENT DOCUMENTED: ICD-10-PCS | Mod: CPTII,S$GLB,, | Performed by: ANESTHESIOLOGY

## 2022-10-26 PROCEDURE — 3078F DIAST BP <80 MM HG: CPT | Mod: CPTII,S$GLB,, | Performed by: ANESTHESIOLOGY

## 2022-10-26 PROCEDURE — 99999 PR PBB SHADOW E&M-EST. PATIENT-LVL V: ICD-10-PCS | Mod: PBBFAC,,, | Performed by: ANESTHESIOLOGY

## 2022-10-26 PROCEDURE — 99214 PR OFFICE/OUTPT VISIT, EST, LEVL IV, 30-39 MIN: ICD-10-PCS | Mod: S$GLB,,, | Performed by: ANESTHESIOLOGY

## 2022-10-26 PROCEDURE — 1159F PR MEDICATION LIST DOCUMENTED IN MEDICAL RECORD: ICD-10-PCS | Mod: CPTII,S$GLB,, | Performed by: ANESTHESIOLOGY

## 2022-10-26 PROCEDURE — 3288F FALL RISK ASSESSMENT DOCD: CPT | Mod: CPTII,S$GLB,, | Performed by: ANESTHESIOLOGY

## 2022-10-26 PROCEDURE — 3077F PR MOST RECENT SYSTOLIC BLOOD PRESSURE >= 140 MM HG: ICD-10-PCS | Mod: CPTII,S$GLB,, | Performed by: ANESTHESIOLOGY

## 2022-11-08 ENCOUNTER — CLINICAL SUPPORT (OUTPATIENT)
Dept: REHABILITATION | Facility: HOSPITAL | Age: 82
End: 2022-11-08
Attending: STUDENT IN AN ORGANIZED HEALTH CARE EDUCATION/TRAINING PROGRAM
Payer: MEDICARE

## 2022-11-08 ENCOUNTER — IMMUNIZATION (OUTPATIENT)
Dept: INTERNAL MEDICINE | Facility: CLINIC | Age: 82
End: 2022-11-08
Payer: MEDICARE

## 2022-11-08 DIAGNOSIS — M54.50 LUMBAR PAIN: ICD-10-CM

## 2022-11-08 DIAGNOSIS — R26.9 GAIT DISORDER: ICD-10-CM

## 2022-11-08 DIAGNOSIS — M16.0 PRIMARY OSTEOARTHRITIS OF BOTH HIPS: ICD-10-CM

## 2022-11-08 DIAGNOSIS — R53.1 DECREASED STRENGTH, ENDURANCE, AND MOBILITY: ICD-10-CM

## 2022-11-08 DIAGNOSIS — Z74.09 LIMITED MOBILITY: ICD-10-CM

## 2022-11-08 DIAGNOSIS — R68.89 DECREASED STRENGTH, ENDURANCE, AND MOBILITY: ICD-10-CM

## 2022-11-08 DIAGNOSIS — R29.3 POSTURE ABNORMALITY: ICD-10-CM

## 2022-11-08 DIAGNOSIS — Z74.09 DECREASED STRENGTH, ENDURANCE, AND MOBILITY: ICD-10-CM

## 2022-11-08 PROCEDURE — G0008 FLU VACCINE - QUADRIVALENT - ADJUVANTED: ICD-10-PCS | Mod: S$GLB,,, | Performed by: FAMILY MEDICINE

## 2022-11-08 PROCEDURE — 97162 PT EVAL MOD COMPLEX 30 MIN: CPT

## 2022-11-08 PROCEDURE — G0008 ADMIN INFLUENZA VIRUS VAC: HCPCS | Mod: S$GLB,,, | Performed by: FAMILY MEDICINE

## 2022-11-08 PROCEDURE — 90694 FLU VACCINE - QUADRIVALENT - ADJUVANTED: ICD-10-PCS | Mod: S$GLB,,, | Performed by: FAMILY MEDICINE

## 2022-11-08 PROCEDURE — 90694 VACC AIIV4 NO PRSRV 0.5ML IM: CPT | Mod: S$GLB,,, | Performed by: FAMILY MEDICINE

## 2022-11-08 NOTE — PLAN OF CARE
"[facility]   Physical Therapy Initial Evaluation     Date: 11/8/2022   Name: Saul Mar  Clinic Number: [id number]    Therapy Diagnosis:   Encounter Diagnoses   Name Primary?    Primary osteoarthritis of both hips     Limited mobility     Decreased strength, endurance, and mobility     Gait disorder     Lumbar pain     Posture abnormality      Physician: Florian Chavez MD    Physician Orders: PT Eval and Treat   Medical Diagnosis from Referral:   Primary osteoarthritis of both hips   Limited mobility     Evaluation Date: 11/8/2022  Authorization Period Expiration: 12/6/2022  Plan of Care Expiration: 2/8/2023  Progress Note Due: 12/8/2022  Visit # / Visits authorized: 1/ 1   FOTO: 1/ 3     Precautions: Standard    Time In: 0800  Time Out: 0845  Total Appointment Time (timed & untimed codes): 45 minutes    SUBJECTIVE   Date of onset: 2022    History of current condition - Saul reports: that she experiences pain at the right lateral thigh into the lower leg.  She reports of pain for approximately 3 years ago.  Insidious onset.  Reports that disk from the lower back applying pressure on the nerve.  Reports that pain has remained throughout.  She did receive some relief from [facility] and PT for current symptoms.  She did receive injections into the lower back also.  Has received 2 injections.  Not keeping up with exercises. Using RW secondary to pain.  Pain with upright standing, has lean forward to relieve pain in the lower extremities.  Has been using RW for the last 2 years.  Prior tho that, did not require RW (rollator).  No problems with sitting, pain with upright standing.  After 5 minutes in standing, symptoms    Falls: A few over the last couple of years    Imaging,  MRI revealed in 2021 revealed:     "FINDINGS:  Alignment: Mild levocurvature of the lumbar spine.  Grade 1 anterolisthesis L4 on L5.     Vertebrae: Multiple Schmorl's nodes.  Benign osseous hemangioma " "in the T12 vertebral body.  Degenerative the edema within the bilateral L4-L5 facet joints.  No abnormal marrow signal to suggest infiltrative process.     Discs: Multilevel disc desiccation and height loss, most severe at L4-L5.     Cord: No signal abnormality.  Conus terminates at L2     Degenerative findings:     T12-L1: No significant spinal canal stenosis or neural foraminal narrowing.     L1-L2: Mild diffuse disc bulge.  No significant spinal canal stenosis or neural foraminal narrowing.     L2-L3: Severe diffuse disc bulge, bilateral facet arthropathy and ligamentum flavum buckling.  These findings result in mild spinal canal stenosis, severe right and moderate left neural foraminal narrowing.     L3-L4: Moderate diffuse disc bulge.  These findings result in moderate bilateral neural foraminal narrowing.  No spinal canal stenosis.     L4-L5: Grade 1 anterolisthesis, diffuse disc bulge, bilateral facet arthropathy, and ligamentum flavum buckling.  These findings result in severe spinal canal stenosis and severe right and moderate left neural foraminal narrowing.     L5-S1: Diffuse disc bulge with a superimposed left paracentral protrusion that abuts the left descending S1 nerve root.  Bilateral facet arthropathy. These findings result in mild bilateral neural foraminal narrowing     Paraspinal muscles & soft tissues: T1 hyperintense cortical lesion noted within the right kidney, possibly a proteinaceous or hemorrhagic cyst. left simple renal cyst.     Impression:     Multilevel degenerative changes, most pronounced at L4-L5 with severe spinal canal stenosis, severe right and moderate left neural foraminal narrowing.     Bilateral degenerative facet edema at L4-L5.     Probable proteinaceous versus hemorrhagic right renal cortical cyst.  Recommend correlation with a dedicated renal ultrasound."    Prior Therapy: Has received PT last year for current condition but stopped due to increased pain  Social History: " Lives alone  Occupation: Retired  Prior Level of Function: Three years ago, some lower back and lower extremities pain.  Over the last few years, has had to resort to using a rollator secondary to significant pain with standing  Current Level of Function: Using rollator, less standing activities.  Reports that she has stopped working significantly over the last few days    Pain:  Current 3/10, worst 9/10, best 3/10   Location: Lower back, right posterior thigh and posterior calf, left posterior thigh  Description: Aching and Dull  Aggravating Factors: Standing, Walking, and Extension  Easing Factors:  sitting and using rollator    Patients goals: Patient would like to return to standing and walking with decreased complaint of pain     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    Multinodular thyroid     PAD (peripheral artery disease)     Polyneuropathy     Type 2 diabetes with peripheral circulatory disorder, controlled     Type 2 diabetes, uncontrolled, 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/18/2015       Surgical History:   Saul Mar  has a past surgical history that includes Oophorectomy (1963); Colonoscopy; Colonoscopy (N/A, 9/22/2016); Hysterectomy (1963 ); Eye surgery (Bilateral, 2002 approx); Cataract extraction w/  intraocular lens implant (Left, 02/27/2013); Selective injection of anesthetic agent around lumbar spinal nerve root by transforaminal approach (Bilateral, 6/17/2022); and Selective injection of anesthetic agent around lumbar spinal nerve root by transforaminal approach (Bilateral, 10/3/2022).    Medications:   Saul has a current medication list  which includes the following prescription(s): albuterol, alprazolam, amlodipine, atorvastatin, blood sugar diagnostic, blood-glucose meter, brimonidine 0.2%, carvedilol, cholecalciferol (vitamin d3), famotidine, ferrous sulfate, flecainide, glimepiride, hydrochlorothiazide, hydrocodone-acetaminophen, hydrocortisone, lancets, levalbuterol, linaclotide, losartan, meclizine, metformin, metoprolol succinate, mupirocin, tacrolimus, and xarelto.    Allergies:   Review of patient's allergies indicates:   Allergen Reactions    Pravachol [pravastatin] Nausea Only          OBJECTIVE     CMS Impairment/Limitation/Restriction for FOTO Orthopedic Lower Back Survey    Therapist reviewed FOTO scores for Saul Mar on 11/8/2022.   FOTO documents entered into [employer] - see Media section.              Posture/Structure: Pt with flexed trunk posture, flexed knee posture, convexity left lumbar and right thoracic, limited weight bearing on the right LE  Gait: Decreased walking velocity, forward bent trunk posture, decreased walking velocity, decreased step length      Functional Tests  Outcome Norms   30 second Sit to Stand 7 Age Male Female   60-64 <14 <12   65-69 <12 <11   70-74 <12 <10   75-79 <11 <10   80-84 <10 <9   85-89 <8 <8   90-93 <7 <4           AROM:    Thoracolumbar  (single inclinometer at L4/5) AROM  (degrees) Pain/Dysfunction with Movement   Flexion 52 35 deg in standing flexion   Extension 20 From    Right side bending 20    Left side bending 20    Right rotation 25-50%    Left rotation 25-50%        Positioning: Good tolerance for prone lying    Hip Right Left Pain/Dysfunction with Movement   Hip flex 112 105    Hip ER (supine) 34 32    Hip IR (supine) 22 24    Hip Ext (sidelying) -10 -10      Strength:     L/E MMT Right Left Pain/Dysfunction with Movement   Hip Flexion 4-/5 4/5    Hip Extension 3-/5 3-/5    Hip Abduction 3+/5 3/5    Gluteus sony 4-/5 3+/5    Hip IR NT NT    Hip ER NT NT    Knee Flexion  3+/5 3+/5    Knee Extension 4/5 4-/5    Ankle DF 5/5 5/5    Ankle PF 3/5 3/5    ASLR 3+/5 3+/5            PIVM Lumbar/Thoracic: right rotation L2-L3 and left rotation L3-L4 pain and hypomobility; pain with PA sacrum on L5        Top Tier   Arms Down Deep Squat Dysfunctional - Painful       Palpation: Tenderness on palpation of the lumbosacral paraspinal and multifidus musculature       TREATMENT     Total Treatment time (time-based codes) separate from Evaluation: 5 minutes      Saul received the treatments listed below:        THERAPEUTIC EXERCISES to develop strength, endurance, ROM, flexibility, posture, and core stabilization for (5) minutes including:    Intervention Performed Today    bicycle     Nu-step     Lower trunk rotations     Pelvic tilts     bridging     Prone hip extension     Prone trunk extension     Sidelying hip abduction/adduction              Plan for Next Visit:      PATIENT EDUCATION AND HOME EXERCISES     Education provided:   - pelvic tilts and prone lying x 5 minutes    Written Home Exercises Provided: yes. Exercises were reviewed and Saul was able to demonstrate them prior to the end of the session.  Saul demonstrated fair  understanding of the education provided. See EMR under Patient Instructions for exercises provided during therapy sessions.    ASSESSMENT     Saul is a 82 y.o. female referred to outpatient Physical Therapy with a medical diagnosis of   Primary osteoarthritis of both hips   Limited mobility   The patient presents with signs and symptoms consistent with diagnosis along with pain at the lower back, decreased ROM lumbar spine into extension, static standing position of lumbar flexion, decreased ROM bilateral hips, weakness core musculature, gait deficits, squat deficits, pain and difficulty with prolonged walking, use of assistive device for gait, and report of symptoms worsening and increased weakness since the beginning of COVID quarantine.    Pt prognosis is Fair,  , if patient is consistent and compliant with PT and home exercise program.   Pt will benefit from skilled outpatient Physical Therapy to address the deficits stated above and in the chart below, provide pt/family education, and to maximize pt's level of independence.     Plan of care discussed with patient: Yes  Pt's spiritual, cultural and educational needs considered and patient is agreeable to the plan of care and goals as stated below:     Anticipated Barriers for therapy: Arthritis, Back pain, Congestive Heart Failure or Heart Disease, Diabetes Type I or II,  Gastrointestinal Disease, Headaches, High Blood Pressure, Kidney, Bladder, Prostate or Urination Problems, Prior Surgery,  Stroke or TIA, Visual Impairment    Medical Necessity is demonstrated by the following  History  Co-morbidities and personal factors that may impact the plan of care Co-morbidities:   Arthritis, Back pain, Congestive Heart Failure or Heart Disease, Diabetes Type I or II,  Gastrointestinal Disease, Headaches, High Blood Pressure, Kidney, Bladder, Prostate or Urination Problems, Prior Surgery,  Stroke or TIA, Visual Impairment    Personal Factors:   no deficits     high   Examination  Body Structures and Functions, activity limitations and participation restrictions that may impact the plan of care Body Regions:   back  lower extremities    Body Systems:    ROM  strength  balance  gait  motor control  joint mobility, muscle tone, muscle length    Participation Restrictions:   See current level of function listed above     Activity limitations:   Learning and applying knowledge  no deficits    General Tasks and Commands  no deficits    Communication  no deficits    Mobility  lifting and carrying objects  walking  driving (bike, car, motorcycle)    Self care  no deficits    Domestic Life  shopping  cooking  doing house work (cleaning house, washing dishes, laundry)    Interactions/Relationships  no deficits    Life Areas  no  deficits    Community and Social Life  community life  recreation and leisure         moderate   Clinical Presentation evolving clinical presentation with changing clinical characteristics moderate   Decision Making/ Complexity Score: moderate     Goals: Reviewed:11/8/2022    Short Term Goals: In 4 weeks   1.Patient to be educated on HEP.  2. Patient  to increase lumbar ROM to WFL's without pain, in order to improve available range of motion for ADL's.    3. Patient  to increase hip AROM to ER 50 deg and extn to 0 deg, in order to assist with more normalized gait pattern.  4. Patient  to increase B LE and core strength to 4-/5, in order to improve endurance and increase ability to ambulate for increased time and improve ability to perform squat and sit to stand.  5. Patient   to have pain less than 2/10 at worst, to improve QOL.  6. Patient  to improve score on the FOTO, to improve QOL.  7. Patient  to be educated on postural/body mechanics awareness.    Long Term Goals: In 8 weeks  1.Patient to improve score on the FOTO to 52% or less, to improve QOL.  2. Patient to demo increase in LE and core strength to 4/5, n order to improve endurance and increase ability to ambulate for increased time and improve ability to perform squat and sit to stand.  3. Patient to have decreased pain to 2/10 at worst, to improve QOL.  4. Patient to demo increase lumbar ROM to extension +5 deg, b Rot 75%, B SB 25 deg, in order to improve available range of motion for ADL's.  .  5. atient  to increase hip AROM to ER 50 deg and extn to 0 deg, in order to assist with more normalized gait pattern.  6. Patient to perform daily activities including walking, sit to stand, and daily activities including housekeeping chores without increased symptoms.        PLAN     Plan of care Certification: 11/8/2022 to 2/8/2023.    Outpatient Physical Therapy 1-2 times weekly for 10 weeks to include the following interventions: Manual Therapy, Moist Heat/  Ice, Neuromuscular Re-ed, Patient Education, Self Care, Therapeutic Activities, and Therapeutic Exercise.     Martin Ramirez, PT      I CERTIFY THE NEED FOR THESE SERVICES FURNISHED UNDER THIS PLAN OF TREATMENT AND WHILE UNDER MY CARE   Physician's comments:     Physician's Signature: ___________________________________________________

## 2022-11-09 ENCOUNTER — PATIENT MESSAGE (OUTPATIENT)
Dept: PAIN MEDICINE | Facility: HOSPITAL | Age: 82
End: 2022-11-09
Payer: MEDICARE

## 2022-11-09 NOTE — TELEPHONE ENCOUNTER
Reached out to patient to schedule appointment from messages to cancel her procedure per her request because she started that she want to try PT first.. Apt has been made.   Pt understand. All questions answered.     Srikanth Hurd  Medical Assistant

## 2022-11-09 NOTE — PROGRESS NOTES
HPI    DLS: 5/17/2022    Pt here for HVF review/OCT;  Pt states no eye pain or discomfort.     Meds;  Brimonidine TID OU     1) POAG   2) DM with BDR and ME OU   3) Hx RLL Lesion   4) NS OD   5) PCIOL OS   6) Hx CVA   7) Hx Amaurosis Fugax   8) Eyelid Myokymia     Last edited by Shauna Christopher on 11/10/2022  1:49 PM.            Assessment /Plan     For exam results, see Encounter Report.    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  Dispense: 15 mL; Refill: 3     LTFU 10/2019 to 5/2022    1. POAG ou   H/O poor compliance   First HVF 1997   First photos 1998      Family history none   Glaucoma meds Has used alphagan in past - poor compliance-stopped on her own   H/O adverse rxn to glaucoma drops none   LASERS No glaucoma laser (+h/o focal OU for BDR)   GLAUCOMA SURGERIES none   OTHER EYE SURGERIES CE/PCIOL OS 2/27/13  CDR 0.8/0.75   Tbase 16-20/16-22   Tmax 20/22   Ttarget 17/17   HVF 16 test 1997 to 20122 - non sp changes  od //  Sup/inf paracentral loss and INS os  (+changes ? 2/2 focal laser )   Gonio +3   /621- thick ou (- 4.5 ou)   OCT 4 test 2006 to 2019  - RNFL - nl od // dec TI os   HRT 6  tests: 2009 to 2018 -  MR -  Dec. I, bord S/T od // bord T/I os /// CDR 0.74 od // 0.70 os  Disc photos 1998, 2000, 2001, 2003, 2003, 2004, 2005, 2006- slides  // 2010 , 2018 - OIS      - Ttoday 13/13  - Test done today  IOP //  HVF / DFE / OCT      2. 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 NS OD               Remove prn              BCVA 20/40              BAT 20/60     4.  ----- Message from Mary Jane Enciso sent at 8/3/2022  8:09 AM EDT -----  Subject: Appointment Request    Reason for Call: Established Patient Appointment needed: Semi-Routine   Cough, Cold Symptoms    QUESTIONS    Reason for appointment request? No appointments available during search     Additional Information for Provider? PT is having sinus infection since   yesterday evening .  Symptoms are congestion, headache, coughing   ---------------------------------------------------------------------------  --------------  Cierra العلي INFO  [phone]; OK to leave message on voicemail  ---------------------------------------------------------------------------  --------------  SCRIPT ANSWERS  COVID Screen: Konstantinwoo Gutierrez "Pseudophakia OS               S/p CE/PCIOL OS               IOL SN60 WF 20.5              -VA limited 2/2 hx of CSME s/p focal scars              BCVA  20/25              Give Rx glasses - 7/2/2013              Had re-bound iritis - resolved     4. H/O RLL lesion - S/P excision with Jovanni      5. Post Nelda lives in [place] - but still likes to come to [place] for care      6. amaurosis fugax / H/O CVA's              Patient reports stroke like symptoms and amaurosis fugax 10/13              -  She was admitted to hospital with very elevated BP              -  Patient had MRI at the time showing ischemic disease              - last Carotid U/S done 11/12- patient reports that                  - PCP gets one yearly- last U/S showed minimal plaque disease              -  H/o stroke- discussed that amaurosis was from elevated BP and if ever recurs needs to report to ER immediately- she voiced understanding     7. Eyelid myokymia  - intermittent - patient also reports eyelid "twitching  "- discussed with patient that myokymia is usually from lack of sleep, caffeine intake, or stress- however nothing to worry about  -  Did not have any "twitching" on exam today     Plan:  BrimonidineTID ou   Can consider CEIOL OD with Rey given poor compliance with gtts and moderate glaucoma.   HVF's are inconsistent with the ON exam / OCT ect   Pt wants to wait on phaco od for now // has done well os      Avoid PG's if possible 2/2 h/o CME 2/2 BDR     Refraction Post op os  is more myopic than expected - review IOL calc if needs 2nd eye done     Repeat OCT of macula 5/17/2022 - - done no CME os// + focal laser scars ou     Photo file updated 5/17/2022       Plan:   F/U 4 months AR/MR/BAT - cataract check od    - photo file - (( first name is Saul ( leave out the Zaid part))) - updated 6/4/2019      refer to retina - - +BDR - s/p laser - [facility]ung - then use to see arend -  but things look relatively stable        "

## 2022-11-10 ENCOUNTER — CLINICAL SUPPORT (OUTPATIENT)
Dept: OPHTHALMOLOGY | Facility: CLINIC | Age: 82
End: 2022-11-10
Payer: MEDICARE

## 2022-11-10 ENCOUNTER — OFFICE VISIT (OUTPATIENT)
Dept: OPHTHALMOLOGY | Facility: CLINIC | Age: 82
End: 2022-11-10
Payer: MEDICARE

## 2022-11-10 DIAGNOSIS — Z96.1 PSEUDOPHAKIA, LEFT EYE: ICD-10-CM

## 2022-11-10 DIAGNOSIS — E08.3213 DIABETES MELLITUS DUE TO UNDERLYING CONDITION WITH BOTH EYES AFFECTED BY MILD NONPROLIFERATIVE RETINOPATHY AND MACULAR EDEMA, WITH LONG-TERM CURRENT USE OF INSULIN: ICD-10-CM

## 2022-11-10 DIAGNOSIS — H25.13 NUCLEAR SCLEROSIS OF BOTH EYES: ICD-10-CM

## 2022-11-10 DIAGNOSIS — H40.1211 LOW-TENSION GLAUCOMA, RIGHT EYE, MILD STAGE: Primary | ICD-10-CM

## 2022-11-10 DIAGNOSIS — H25.11 AGE-RELATED NUCLEAR CATARACT, RIGHT: ICD-10-CM

## 2022-11-10 DIAGNOSIS — H40.1222 LOW-TENSION GLAUCOMA, LEFT EYE, MODERATE STAGE: ICD-10-CM

## 2022-11-10 DIAGNOSIS — H40.1211 LOW-TENSION GLAUCOMA, RIGHT EYE, MILD STAGE: ICD-10-CM

## 2022-11-10 DIAGNOSIS — Z79.4 DIABETES MELLITUS DUE TO UNDERLYING CONDITION WITH BOTH EYES AFFECTED BY MILD NONPROLIFERATIVE RETINOPATHY AND MACULAR EDEMA, WITH LONG-TERM CURRENT USE OF INSULIN: ICD-10-CM

## 2022-11-10 PROCEDURE — 3288F FALL RISK ASSESSMENT DOCD: CPT | Mod: CPTII,S$GLB,, | Performed by: OPHTHALMOLOGY

## 2022-11-10 PROCEDURE — 1159F MED LIST DOCD IN RCRD: CPT | Mod: CPTII,S$GLB,, | Performed by: OPHTHALMOLOGY

## 2022-11-10 PROCEDURE — 1160F RVW MEDS BY RX/DR IN RCRD: CPT | Mod: CPTII,S$GLB,, | Performed by: OPHTHALMOLOGY

## 2022-11-10 PROCEDURE — 1101F PR PT FALLS ASSESS DOC 0-1 FALLS W/OUT INJ PAST YR: ICD-10-PCS | Mod: CPTII,S$GLB,, | Performed by: OPHTHALMOLOGY

## 2022-11-10 PROCEDURE — 1126F PR PAIN SEVERITY QUANTIFIED, NO PAIN PRESENT: ICD-10-PCS | Mod: CPTII,S$GLB,, | Performed by: OPHTHALMOLOGY

## 2022-11-10 PROCEDURE — 92014 COMPRE OPH EXAM EST PT 1/>: CPT | Mod: S$GLB,,, | Performed by: OPHTHALMOLOGY

## 2022-11-10 PROCEDURE — 99999 PR PBB SHADOW E&M-EST. PATIENT-LVL IV: CPT | Mod: PBBFAC,,, | Performed by: OPHTHALMOLOGY

## 2022-11-10 PROCEDURE — 1159F PR MEDICATION LIST DOCUMENTED IN MEDICAL RECORD: ICD-10-PCS | Mod: CPTII,S$GLB,, | Performed by: OPHTHALMOLOGY

## 2022-11-10 PROCEDURE — 99499 RISK ADDL DX/OHS AUDIT: ICD-10-PCS | Mod: S$GLB,,, | Performed by: OPHTHALMOLOGY

## 2022-11-10 PROCEDURE — 3288F PR FALLS RISK ASSESSMENT DOCUMENTED: ICD-10-PCS | Mod: CPTII,S$GLB,, | Performed by: OPHTHALMOLOGY

## 2022-11-10 PROCEDURE — 1126F AMNT PAIN NOTED NONE PRSNT: CPT | Mod: CPTII,S$GLB,, | Performed by: OPHTHALMOLOGY

## 2022-11-10 PROCEDURE — 1160F PR REVIEW ALL MEDS BY PRESCRIBER/CLIN PHARMACIST DOCUMENTED: ICD-10-PCS | Mod: CPTII,S$GLB,, | Performed by: OPHTHALMOLOGY

## 2022-11-10 PROCEDURE — 92014 PR EYE EXAM, EST PATIENT,COMPREHESV: ICD-10-PCS | Mod: S$GLB,,, | Performed by: OPHTHALMOLOGY

## 2022-11-10 PROCEDURE — 1101F PT FALLS ASSESS-DOCD LE1/YR: CPT | Mod: CPTII,S$GLB,, | Performed by: OPHTHALMOLOGY

## 2022-11-10 PROCEDURE — 99999 PR PBB SHADOW E&M-EST. PATIENT-LVL IV: ICD-10-PCS | Mod: PBBFAC,,, | Performed by: OPHTHALMOLOGY

## 2022-11-10 PROCEDURE — 99499 UNLISTED E&M SERVICE: CPT | Mod: S$GLB,,, | Performed by: OPHTHALMOLOGY

## 2022-11-10 RX ORDER — BRIMONIDINE TARTRATE 2 MG/ML
1 SOLUTION/ DROPS OPHTHALMIC 3 TIMES DAILY
Qty: 15 ML | Refills: 3 | Status: SHIPPED | OUTPATIENT
Start: 2022-11-10 | End: 2022-12-01

## 2022-11-10 NOTE — PROGRESS NOTES
Hvf done ou. Rel/fix/coop. Good ou./chart checked for latex allergy. .50 + 1.00 x 13/od -1.25 + 1.00 x 172/os-[facility]

## 2022-11-12 ENCOUNTER — PATIENT MESSAGE (OUTPATIENT)
Dept: PAIN MEDICINE | Facility: HOSPITAL | Age: 82
End: 2022-11-12
Payer: MEDICARE

## 2022-11-16 ENCOUNTER — CLINICAL SUPPORT (OUTPATIENT)
Dept: REHABILITATION | Facility: HOSPITAL | Age: 82
End: 2022-11-16
Payer: MEDICARE

## 2022-11-16 DIAGNOSIS — R26.9 GAIT DISORDER: ICD-10-CM

## 2022-11-16 DIAGNOSIS — R53.1 DECREASED STRENGTH, ENDURANCE, AND MOBILITY: Primary | ICD-10-CM

## 2022-11-16 DIAGNOSIS — R68.89 DECREASED STRENGTH, ENDURANCE, AND MOBILITY: Primary | ICD-10-CM

## 2022-11-16 DIAGNOSIS — R29.3 POSTURE ABNORMALITY: ICD-10-CM

## 2022-11-16 DIAGNOSIS — M54.50 LUMBAR PAIN: ICD-10-CM

## 2022-11-16 DIAGNOSIS — Z74.09 DECREASED STRENGTH, ENDURANCE, AND MOBILITY: Primary | ICD-10-CM

## 2022-11-16 PROCEDURE — 97110 THERAPEUTIC EXERCISES: CPT | Mod: CQ

## 2022-11-16 NOTE — PROGRESS NOTES
[facility]   Physical Therapy Treatment Note     Name: Saul Kirkpatrick Zaid  [facility] Number: [id number]    Therapy Diagnosis:   Encounter Diagnoses   Name Primary?    Decreased strength, endurance, and mobility Yes    Gait disorder     Lumbar pain     Posture abnormality      Physician: Florian Chavez MD    Visit Date: 11/16/2022    Physician Orders: PT Eval and Treat   Medical Diagnosis from Referral:   Primary osteoarthritis of both hips   Limited mobility      Evaluation Date: 11/8/2022  Authorization Period Expiration: 12/6/2022  Plan of Care Expiration: 2/8/2023  Progress Note Due: 12/8/2022  Visit # / Visits authorized: 1/ 11 + eval   FOTO: 1/ 3   PTA Visit #: 1/5     Precautions: Standard    Time In: 0845  Time Out: 0930  Total Billable Time: 40 minutes    SUBJECTIVE     Pt reports: no pain at rest or sitting. She gets pain when she stands or walks, shoots up to a 10. The pain runs down her right leg and is constant with standing and walking.  She was compliant with home exercise program.  Response to previous treatment: 1st treatment  Functional change: none noted    Pain: 10/10 with standing/walking  Location: low back    OBJECTIVE     Objective Measures updated at progress report unless specified.     Treatment     Saul received the treatments listed below:      therapeutic exercises to develop strength, endurance, ROM, flexibility, posture, and core stabilization for 40 minutes including:  Nu step for endurance level 1 7 minutes with handles  Ball squeezes 3s holds 3 minutes  Hip abduction belt 3s holds 3 minutes  Lower trunk rotations 3 minutes  Double knee to chest with theraball 3 minutes 5s holds  Nerve glides legs on theraball 30x each bilateral  Glute squeezes 3 minutes 3s holds  Prone lying on elbows 2 minutes  Prone hamstring curls 2x10 each bilateral        Patient Education and Home Exercises     Home Exercises Provided and Patient Education Provided     Education  provided:     Written Home Exercises Provided: Patient instructed to cont prior HEP. Exercises were reviewed and Saul was able to demonstrate them prior to the end of the session.  Saul demonstrated good  understanding of the education provided. See EMR under Patient Instructions for exercises provided during therapy sessions    ASSESSMENT     Patient did well with session today with moderate complaints of discomfort. Some nerve/hamstring cramping with certain activities, improved with time and rest. Patient with good response to prone lying, decreased pain going from sit to stand. Patient left session with reports of decreased pain with standing and walking.     Saul Is progressing well towards her goals.   Pt prognosis is Fair.     Pt will continue to benefit from skilled outpatient physical therapy to address the deficits listed in the problem list box on initial evaluation, provide pt/family education and to maximize pt's level of independence in the home and community environment.     Pt's spiritual, cultural and educational needs considered and pt agreeable to plan of care and goals.     Anticipated barriers to physical therapy: Arthritis, Back pain, Congestive Heart Failure or Heart Disease, Diabetes Type I or II,  Gastrointestinal Disease, Headaches, High Blood Pressure, Kidney, Bladder, Prostate or Urination Problems, Prior Surgery,  Stroke or TIA, Visual Impairment    Goals: Reviewed:11/8/2022    Short Term Goals: In 4 weeks   1.Patient to be educated on HEP.  2. Patient  to increase lumbar ROM to WFL's without pain, in order to improve available range of motion for ADL's.    3. Patient  to increase hip AROM to ER 50 deg and extn to 0 deg, in order to assist with more normalized gait pattern.  4. Patient  to increase B LE and core strength to 4-/5, in order to improve endurance and increase ability to ambulate for increased time and improve ability to perform squat and sit to stand.  5. Patient   to  have pain less than 2/10 at worst, to improve QOL.  6. Patient  to improve score on the FOTO, to improve QOL.  7. Patient  to be educated on postural/body mechanics awareness.     Long Term Goals: In 8 weeks  1.Patient to improve score on the FOTO to 52% or less, to improve QOL.  2. Patient to demo increase in LE and core strength to 4/5, n order to improve endurance and increase ability to ambulate for increased time and improve ability to perform squat and sit to stand.  3. Patient to have decreased pain to 2/10 at worst, to improve QOL.  4. Patient to demo increase lumbar ROM to extension +5 deg, b Rot 75%, B SB 25 deg, in order to improve available range of motion for ADL's.  .  5. atient  to increase hip AROM to ER 50 deg and extn to 0 deg, in order to assist with more normalized gait pattern.  6. Patient to perform daily activities including walking, sit to stand, and daily activities including housekeeping chores without increased symptoms.    PLAN   Plan of care Certification: 11/8/2022 to 2/8/2023.    Continue Plan of Care (POC) and progress per patient tolerance.     Belinda Sin, PTA

## 2022-11-18 ENCOUNTER — CLINICAL SUPPORT (OUTPATIENT)
Dept: REHABILITATION | Facility: HOSPITAL | Age: 82
End: 2022-11-18
Payer: MEDICARE

## 2022-11-18 DIAGNOSIS — R68.89 DECREASED STRENGTH, ENDURANCE, AND MOBILITY: Primary | ICD-10-CM

## 2022-11-18 DIAGNOSIS — R53.1 DECREASED STRENGTH, ENDURANCE, AND MOBILITY: Primary | ICD-10-CM

## 2022-11-18 DIAGNOSIS — R26.9 GAIT DISORDER: ICD-10-CM

## 2022-11-18 DIAGNOSIS — R29.3 POSTURE ABNORMALITY: ICD-10-CM

## 2022-11-18 DIAGNOSIS — M54.50 LUMBAR PAIN: ICD-10-CM

## 2022-11-18 DIAGNOSIS — Z74.09 DECREASED STRENGTH, ENDURANCE, AND MOBILITY: Primary | ICD-10-CM

## 2022-11-18 PROCEDURE — 97110 THERAPEUTIC EXERCISES: CPT | Mod: CQ

## 2022-11-18 NOTE — PROGRESS NOTES
[facility]   Physical Therapy Treatment Note     Name: Saul Kirkpatrick Zaid  [facility] Number: [id number]    Therapy Diagnosis:   Encounter Diagnoses   Name Primary?    Decreased strength, endurance, and mobility Yes    Gait disorder     Lumbar pain     Posture abnormality      Physician: Florian Chavez MD    Visit Date: 11/18/2022    Physician Orders: PT Eval and Treat   Medical Diagnosis from Referral:   Primary osteoarthritis of both hips   Limited mobility      Evaluation Date: 11/8/2022  Authorization Period Expiration: 12/6/2022  Plan of Care Expiration: 2/8/2023  Progress Note Due: 12/8/2022  Visit # / Visits authorized: 2/ 11 + eval   FOTO: 1/ 3   PTA Visit #: 2/5     Precautions: Standard    Time In: 0842  Time Out: 0925  Total Billable Time: 40 minutes    SUBJECTIVE     Pt reports: she is feeling a little bit better. She has realized some surfaces that she walks on causes more pain than others. She was able to walk through hobby lobby and the parking lot with no issues, but other places, she had pain.   She was compliant with home exercise program.  Response to previous treatment: 1st treatment  Functional change: none noted    Pain: 10/10 with standing/walking  Location: low back    OBJECTIVE     Objective Measures updated at progress report unless specified.     Treatment     Saul received the treatments listed below:      therapeutic exercises to develop strength, endurance, ROM, flexibility, posture, and core stabilization for 40 minutes including:  Nu step for endurance level 1 7 minutes with handles  Ball squeezes 3s holds 3 minutes  Hip abduction red 3s holds 3 minutes  Lower trunk rotations 3 minutes  Double knee to chest with theraball 3 minutes 5s holds  Nerve glides legs on theraball 30x each bilateral  Glute squeezes in prone 3 minutes 3s holds  Prone lying on elbows 3 minutes  Prone hamstring curls 2x10 each bilateral      Patient Education and Home Exercises      Home Exercises Provided and Patient Education Provided     Education provided:     Written Home Exercises Provided: Patient instructed to cont prior HEP. Exercises were reviewed and Saul was able to demonstrate them prior to the end of the session.  Saul demonstrated good  understanding of the education provided. See EMR under Patient Instructions for exercises provided during therapy sessions    ASSESSMENT     Patient did well with session today with minimal complaints of discomfort. Improved bed mobility today along with increased tolerance to exercises. Patient with left hamstring cramp with first hamstring curl, subsided with rest. Patient left session with no reports of increased pain.     Saul Is progressing well towards her goals.   Pt prognosis is Fair.     Pt will continue to benefit from skilled outpatient physical therapy to address the deficits listed in the problem list box on initial evaluation, provide pt/family education and to maximize pt's level of independence in the home and community environment.     Pt's spiritual, cultural and educational needs considered and pt agreeable to plan of care and goals.     Anticipated barriers to physical therapy: Arthritis, Back pain, Congestive Heart Failure or Heart Disease, Diabetes Type I or II,  Gastrointestinal Disease, Headaches, High Blood Pressure, Kidney, Bladder, Prostate or Urination Problems, Prior Surgery,  Stroke or TIA, Visual Impairment    Goals: Reviewed:11/8/2022    Short Term Goals: In 4 weeks   1.Patient to be educated on HEP.  2. Patient  to increase lumbar ROM to WFL's without pain, in order to improve available range of motion for ADL's.    3. Patient  to increase hip AROM to ER 50 deg and extn to 0 deg, in order to assist with more normalized gait pattern.  4. Patient  to increase B LE and core strength to 4-/5, in order to improve endurance and increase ability to ambulate for increased time and improve ability to perform squat  and sit to stand.  5. Patient   to have pain less than 2/10 at worst, to improve QOL.  6. Patient  to improve score on the FOTO, to improve QOL.  7. Patient  to be educated on postural/body mechanics awareness.     Long Term Goals: In 8 weeks  1.Patient to improve score on the FOTO to 52% or less, to improve QOL.  2. Patient to demo increase in LE and core strength to 4/5, n order to improve endurance and increase ability to ambulate for increased time and improve ability to perform squat and sit to stand.  3. Patient to have decreased pain to 2/10 at worst, to improve QOL.  4. Patient to demo increase lumbar ROM to extension +5 deg, b Rot 75%, B SB 25 deg, in order to improve available range of motion for ADL's.  .  5. atient  to increase hip AROM to ER 50 deg and extn to 0 deg, in order to assist with more normalized gait pattern.  6. Patient to perform daily activities including walking, sit to stand, and daily activities including housekeeping chores without increased symptoms.    PLAN   Plan of care Certification: 11/8/2022 to 2/8/2023.    Continue Plan of Care (POC) and progress per patient tolerance.     Belinda Sin, PTA

## 2022-11-28 ENCOUNTER — TELEPHONE (OUTPATIENT)
Dept: NEPHROLOGY | Facility: CLINIC | Age: 82
End: 2022-11-28
Payer: MEDICARE

## 2022-11-28 NOTE — TELEPHONE ENCOUNTER
----- Message from Meka Leonard sent at 11/28/2022  8:27 AM CST -----  Reola Casimiro Harrison calling regarding Appointment Access  (message) for #f/U for kidney disease, call back  [phone]

## 2022-11-30 ENCOUNTER — CLINICAL SUPPORT (OUTPATIENT)
Dept: REHABILITATION | Facility: HOSPITAL | Age: 82
End: 2022-11-30
Payer: MEDICARE

## 2022-11-30 DIAGNOSIS — R29.3 POSTURE ABNORMALITY: ICD-10-CM

## 2022-11-30 DIAGNOSIS — R26.9 GAIT DISORDER: ICD-10-CM

## 2022-11-30 DIAGNOSIS — R53.1 DECREASED STRENGTH, ENDURANCE, AND MOBILITY: Primary | ICD-10-CM

## 2022-11-30 DIAGNOSIS — R68.89 DECREASED STRENGTH, ENDURANCE, AND MOBILITY: Primary | ICD-10-CM

## 2022-11-30 DIAGNOSIS — M54.50 LUMBAR PAIN: ICD-10-CM

## 2022-11-30 DIAGNOSIS — Z74.09 DECREASED STRENGTH, ENDURANCE, AND MOBILITY: Primary | ICD-10-CM

## 2022-11-30 PROCEDURE — 97110 THERAPEUTIC EXERCISES: CPT

## 2022-11-30 NOTE — PROGRESS NOTES
[facility]   Physical Therapy Treatment Note     Name: Saul Kirkpatrick Zaid  [facility] Number: [id number]    Therapy Diagnosis:   Encounter Diagnoses   Name Primary?    Decreased strength, endurance, and mobility Yes    Gait disorder     Lumbar pain     Posture abnormality      Physician: Florian Chavez MD    Visit Date: 11/30/2022    Physician Orders: PT Eval and Treat   Medical Diagnosis from Referral:   Primary osteoarthritis of both hips   Limited mobility   Evaluation Date: 11/8/2022  Authorization Period Expiration: 12/6/2022  Plan of Care Expiration: 2/8/2023  Progress Note Due: 12/8/2022  Visit # / Visits authorized: 3/11 + eval   FOTO: 1/ 3   PTA Visit #: 2/5     Precautions: Standard    Time In: 7:00 AM  Time Out: 7:40 AM  Total Billable Time: 40 minutes    SUBJECTIVE     Patient reports: her back is feeling pretty good, but the back of her legs is bothering her the most. Patient states she was standing for a long time yesterday and needed her back brace.  She was compliant with home exercise program.  Response to previous treatment: felt pretty good  Functional change: cannot stand for long periods of time without putting on back brace, needs assistive device for walking (rollator), extra breaks needed when cooking and other standing ACTIVITIES OF DAILY LIVING     Pain: 10/10 with standing/walking  Location: low back, right > left side LOWER EXTREMITY nerve pain    OBJECTIVE     Objective Measures updated at progress report unless specified.     Treatment     Saul received the treatments listed below:      therapeutic exercises to develop strength, endurance, ROM, flexibility, posture, and core stabilization for 40 minutes including:  Nu step for endurance level 1 7 minutes with handles  Ball squeezes 3s holds 3 minutes  Hip abduction red 3s holds 3 minutes  Lower trunk rotations 3 minutes  Double knee to chest with theraball 3 minutes 5s holds  Seated sciatic nerve glides 30x  each bilateral (hands behind back, knee extended, dorsiflexing foot and returning to neutral. No head movement)   Glute squeezes in prone 3 minutes 3s holds  Prone lying on elbows 3 minutes  Prone hamstring curls 2x10 each bilateral    Patient Education and Home Exercises     Home Exercises Provided and Patient Education Provided     Education provided:     Written Home Exercises Provided: Patient instructed to cont prior HEP. Exercises were reviewed and Saul was able to demonstrate them prior to the end of the session.  Saul demonstrated good  understanding of the education provided. See EMR under Patient Instructions for exercises provided during therapy sessions    ASSESSMENT   Added seated sciatic nerve glides and patient with good tolerance and good description of neural tension felt. Patient demonstrates willingness to improve and is complaint with HOME EXERCISE PROGRAM. Plan to continue progressing neural tolerance and strength as tolerated.     Saul Is progressing well towards her goals.   Pt prognosis is Fair.     Pt will continue to benefit from skilled outpatient physical therapy to address the deficits listed in the problem list box on initial evaluation, provide pt/family education and to maximize pt's level of independence in the home and community environment.     Pt's spiritual, cultural and educational needs considered and pt agreeable to plan of care and goals.     Anticipated barriers to physical therapy: Arthritis, Back pain, Congestive Heart Failure or Heart Disease, Diabetes Type I or II,  Gastrointestinal Disease, Headaches, High Blood Pressure, Kidney, Bladder, Prostate or Urination Problems, Prior Surgery,  Stroke or TIA, Visual Impairment    Goals: Reviewed:11/30/2022    Short Term Goals: In 4 weeks   1.Patient to be educated on HEP.  2. Patient  to increase lumbar ROM to WFL's without pain, in order to improve available range of motion for ADL's.    3. Patient  to increase hip AROM to  ER 50 deg and extn to 0 deg, in order to assist with more normalized gait pattern.  4. Patient  to increase B LE and core strength to 4-/5, in order to improve endurance and increase ability to ambulate for increased time and improve ability to perform squat and sit to stand.  5. Patient   to have pain less than 2/10 at worst, to improve QOL.  6. Patient  to improve score on the FOTO, to improve QOL.  7. Patient  to be educated on postural/body mechanics awareness.     Long Term Goals: In 8 weeks  1.Patient to improve score on the FOTO to 52% or less, to improve QOL.  2. Patient to demo increase in LE and core strength to 4/5, n order to improve endurance and increase ability to ambulate for increased time and improve ability to perform squat and sit to stand.  3. Patient to have decreased pain to 2/10 at worst, to improve QOL.  4. Patient to demo increase lumbar ROM to extension +5 deg, b Rot 75%, B SB 25 deg, in order to improve available range of motion for ADL's.  .  5. atient  to increase hip AROM to ER 50 deg and extn to 0 deg, in order to assist with more normalized gait pattern.  6. Patient to perform daily activities including walking, sit to stand, and daily activities including housekeeping chores without increased symptoms.    PLAN   Plan of care Certification: 11/8/2022 to 2/8/2023.    Continue Plan of Care (POC) and progress per patient tolerance.     Maday Ceron, PT

## 2022-12-01 ENCOUNTER — DOCUMENTATION ONLY (OUTPATIENT)
Dept: REHABILITATION | Facility: HOSPITAL | Age: 82
End: 2022-12-01
Payer: MEDICARE

## 2022-12-01 NOTE — PROGRESS NOTES
Patient arrived to session with no issues. While on the nu step, she was having some shortness of breath, which is unusual. Pulse ox used, 62 heart rate after a minute of rest and 98 sp02. Blood pressure checked, 179/78 mmHg, patient stated she had not gotten around to taking her blood pressure this morning. Patient stated she felt fine and did not have a headache, but would like to defer therapy til next week due to feeling like she was over exerting on the nu step and does not want to overwork her heart today(has previous Afib issues in the past). Educated patient to take her blood pressure medicine and monitor her blood pressure throughout the day. Call MD if any new symptoms arise.

## 2022-12-05 ENCOUNTER — TELEPHONE (OUTPATIENT)
Dept: ORTHOPEDICS | Facility: CLINIC | Age: 82
End: 2022-12-05
Payer: MEDICARE

## 2022-12-05 NOTE — TELEPHONE ENCOUNTER
Patient is aware that we have faxed new order to [employer] for folding motorized scooter. Patient will call our office if she has any concerns.  Confirmation has been received

## 2022-12-06 ENCOUNTER — CLINICAL SUPPORT (OUTPATIENT)
Dept: REHABILITATION | Facility: HOSPITAL | Age: 82
End: 2022-12-06
Payer: MEDICARE

## 2022-12-06 DIAGNOSIS — Z74.09 DECREASED STRENGTH, ENDURANCE, AND MOBILITY: Primary | ICD-10-CM

## 2022-12-06 DIAGNOSIS — R68.89 DECREASED STRENGTH, ENDURANCE, AND MOBILITY: Primary | ICD-10-CM

## 2022-12-06 DIAGNOSIS — R26.9 GAIT DISORDER: ICD-10-CM

## 2022-12-06 DIAGNOSIS — R29.3 POSTURE ABNORMALITY: ICD-10-CM

## 2022-12-06 DIAGNOSIS — R53.1 DECREASED STRENGTH, ENDURANCE, AND MOBILITY: Primary | ICD-10-CM

## 2022-12-06 DIAGNOSIS — M54.50 LUMBAR PAIN: ICD-10-CM

## 2022-12-06 PROCEDURE — 97110 THERAPEUTIC EXERCISES: CPT

## 2022-12-06 NOTE — PROGRESS NOTES
[facility]   Physical Therapy Treatment Note     Name: Saul Kirkpatrick Zaid  [facility] Number: [id number]    Therapy Diagnosis:   Encounter Diagnoses   Name Primary?    Decreased strength, endurance, and mobility Yes    Gait disorder     Lumbar pain     Posture abnormality      Physician: Florian Chavez MD    Visit Date: 12/6/2022    Physician Orders: PT Eval and Treat   Medical Diagnosis from Referral:   Primary osteoarthritis of both hips   Limited mobility   Evaluation Date: 11/8/2022  Authorization Period Expiration: 12/6/2022  Plan of Care Expiration: 2/8/2023  Progress Note Due: 12/8/2022  Visit # / Visits authorized: 4/11 + eval   FOTO: 1/ 3   PTA Visit #: 2/5     Precautions: Standard    Time In: 12:25 PM  Time Out: 1:05 PM  Total Billable Time: 40 minutes    SUBJECTIVE     Patient reports: she went to the post office and stopped to get [employer] breakfast. After that she did state she felt short-winded, but she did take inhaler and blood pressure medicine. Patient states she is still having pain down the right leg. She states bending over the walker helps relieve the pain.  She was compliant with home exercise program.  Response to previous treatment: felt pretty good  Functional change: cannot stand for long periods of time without putting on back brace, needs assistive device for walking (rollator), extra breaks needed when cooking and other standing ACTIVITIES OF DAILY LIVING     Pain: 10/10 with standing/walking  Location: low back, right > left side LOWER EXTREMITY nerve pain    OBJECTIVE     Objective Measures updated at progress report unless specified.     Treatment     aSul received the treatments listed below:      therapeutic exercises to develop strength, endurance, ROM, flexibility, posture, and core stabilization for 40 minutes including:  Nu step for endurance level 1 7 minutes with handles  Ball squeezes 3s holds 3 minutes  Hip abduction red 3s holds 3  minutes  Lower trunk rotations 3 minutes  Double knee to chest with theraball 30x 5s holds  Seated sciatic nerve glides 30x each bilateral (hands behind back, knee extended, dorsiflexing foot and returning to neutral. No head movement)   Bridges 3x10  Forward Bending 3 minutes 3 directions (flexed posture feels good to patient)  Glute squeezes in prone 3 minutes 3s holds HELD  Prone lying on elbows 3 minutes HELD  Prone hamstring curls 2x10 each bilateral HELD    Patient Education and Home Exercises     Home Exercises Provided and Patient Education Provided     Education provided:     Written Home Exercises Provided: Patient instructed to cont prior HEP. Exercises were reviewed and Saul was able to demonstrate them prior to the end of the session.  Saul demonstrated good  understanding of the education provided. See EMR under Patient Instructions for exercises provided during therapy sessions    ASSESSMENT   Added bridging and forward bending today with good tolerance. Patient with good glute contraction and good fatigue. Patient tends to catastrophize pain as she rated her pain 10/10 again today with no acute distress and smile. Plan to continue progressing and continue more extension based exercises and gait training focusing on posture.    Saul Is progressing well towards her goals.   Patient prognosis is Fair.     Patient will continue to benefit from skilled outpatient physical therapy to address the deficits listed in the problem list box on initial evaluation, provide pt/family education and to maximize pt's level of independence in the home and community environment.     Patient's spiritual, cultural and educational needs considered and pt agreeable to plan of care and goals.     Anticipated barriers to physical therapy: Arthritis, Back pain, Congestive Heart Failure or Heart Disease, Diabetes Type I or II,  Gastrointestinal Disease, Headaches, High Blood Pressure, Kidney, Bladder, Prostate or Urination  Problems, Prior Surgery,  Stroke or TIA, Visual Impairment    Goals: Reviewed:12/06/2022    Short Term Goals: In 4 weeks   1.Patient to be educated on HEP.  2. Patient  to increase lumbar ROM to WFL's without pain, in order to improve available range of motion for ADL's.    3. Patient  to increase hip AROM to ER 50 deg and extn to 0 deg, in order to assist with more normalized gait pattern.  4. Patient  to increase B LE and core strength to 4-/5, in order to improve endurance and increase ability to ambulate for increased time and improve ability to perform squat and sit to stand.  5. Patient   to have pain less than 2/10 at worst, to improve QOL.  6. Patient  to improve score on the FOTO, to improve QOL.  7. Patient  to be educated on postural/body mechanics awareness.     Long Term Goals: In 8 weeks  1.Patient to improve score on the FOTO to 52% or less, to improve QOL.  2. Patient to demo increase in LE and core strength to 4/5, n order to improve endurance and increase ability to ambulate for increased time and improve ability to perform squat and sit to stand.  3. Patient to have decreased pain to 2/10 at worst, to improve QOL.  4. Patient to demo increase lumbar ROM to extension +5 deg, b Rot 75%, B SB 25 deg, in order to improve available range of motion for ADL's.  .  5. atient  to increase hip AROM to ER 50 deg and extn to 0 deg, in order to assist with more normalized gait pattern.  6. Patient to perform daily activities including walking, sit to stand, and daily activities including housekeeping chores without increased symptoms.    PLAN   Plan of care Certification: 11/8/2022 to 2/8/2023.    Continue Plan of Care (POC) and progress per patient tolerance.     Maday Ceron, PT

## 2022-12-12 ENCOUNTER — TELEPHONE (OUTPATIENT)
Dept: INTERNAL MEDICINE | Facility: CLINIC | Age: 82
End: 2022-12-12
Payer: MEDICARE

## 2022-12-12 NOTE — TELEPHONE ENCOUNTER
----- Message from Kiet Michelle sent at 12/12/2022  4:20 PM CST -----  Contact: Saul  Patient is calling to speak with the nurse in regards to appt. Reports needing orders issues for mammogram. Please give patient a callback at .[phone]

## 2022-12-12 NOTE — TELEPHONE ENCOUNTER
Pt stated for the last week, her right breast have been itching and she wants to know if she can get it checked out by a mammogram. She is very concerned.     Drake SANCHEZ

## 2022-12-12 NOTE — TELEPHONE ENCOUNTER
The best thing would be for her to come in for an office visit for someone to examine her, she may need an ultrasound or some other type of study rather than just a mammogram.  She did have a mammogram in April.  Please offer her an urgent appointment with Grace or me as soon as possible thanks

## 2022-12-13 ENCOUNTER — OFFICE VISIT (OUTPATIENT)
Dept: PODIATRY | Facility: CLINIC | Age: 82
End: 2022-12-13
Payer: MEDICARE

## 2022-12-13 ENCOUNTER — CLINICAL SUPPORT (OUTPATIENT)
Dept: REHABILITATION | Facility: HOSPITAL | Age: 82
End: 2022-12-13
Payer: MEDICARE

## 2022-12-13 ENCOUNTER — TELEPHONE (OUTPATIENT)
Dept: PAIN MEDICINE | Facility: CLINIC | Age: 82
End: 2022-12-13
Payer: MEDICARE

## 2022-12-13 ENCOUNTER — TELEPHONE (OUTPATIENT)
Dept: ORTHOPEDICS | Facility: CLINIC | Age: 82
End: 2022-12-13
Payer: MEDICARE

## 2022-12-13 DIAGNOSIS — Z79.01 ANTICOAGULANT LONG-TERM USE: ICD-10-CM

## 2022-12-13 DIAGNOSIS — Z74.09 DECREASED STRENGTH, ENDURANCE, AND MOBILITY: Primary | ICD-10-CM

## 2022-12-13 DIAGNOSIS — R29.3 POSTURE ABNORMALITY: ICD-10-CM

## 2022-12-13 DIAGNOSIS — E11.49 TYPE II DIABETES MELLITUS WITH NEUROLOGICAL MANIFESTATIONS: Primary | ICD-10-CM

## 2022-12-13 DIAGNOSIS — R53.1 DECREASED STRENGTH, ENDURANCE, AND MOBILITY: Primary | ICD-10-CM

## 2022-12-13 DIAGNOSIS — R26.9 GAIT DISORDER: ICD-10-CM

## 2022-12-13 DIAGNOSIS — R68.89 DECREASED STRENGTH, ENDURANCE, AND MOBILITY: Primary | ICD-10-CM

## 2022-12-13 DIAGNOSIS — B35.1 ONYCHOMYCOSIS DUE TO DERMATOPHYTE: ICD-10-CM

## 2022-12-13 DIAGNOSIS — M54.50 LUMBAR PAIN: ICD-10-CM

## 2022-12-13 PROCEDURE — 3288F PR FALLS RISK ASSESSMENT DOCUMENTED: ICD-10-PCS | Mod: CPTII,S$GLB,, | Performed by: PODIATRIST

## 2022-12-13 PROCEDURE — 99499 NO LOS: ICD-10-PCS | Mod: S$GLB,,, | Performed by: PODIATRIST

## 2022-12-13 PROCEDURE — 11721 DEBRIDE NAIL 6 OR MORE: CPT | Mod: Q9,S$GLB,, | Performed by: PODIATRIST

## 2022-12-13 PROCEDURE — 3288F FALL RISK ASSESSMENT DOCD: CPT | Mod: CPTII,S$GLB,, | Performed by: PODIATRIST

## 2022-12-13 PROCEDURE — 1101F PR PT FALLS ASSESS DOC 0-1 FALLS W/OUT INJ PAST YR: ICD-10-PCS | Mod: CPTII,S$GLB,, | Performed by: PODIATRIST

## 2022-12-13 PROCEDURE — 1159F MED LIST DOCD IN RCRD: CPT | Mod: CPTII,S$GLB,, | Performed by: PODIATRIST

## 2022-12-13 PROCEDURE — 1160F RVW MEDS BY RX/DR IN RCRD: CPT | Mod: CPTII,S$GLB,, | Performed by: PODIATRIST

## 2022-12-13 PROCEDURE — 99999 PR PBB SHADOW E&M-EST. PATIENT-LVL III: CPT | Mod: PBBFAC,,, | Performed by: PODIATRIST

## 2022-12-13 PROCEDURE — 97110 THERAPEUTIC EXERCISES: CPT

## 2022-12-13 PROCEDURE — 11721 PR DEBRIDEMENT OF NAILS, 6 OR MORE: ICD-10-PCS | Mod: Q9,S$GLB,, | Performed by: PODIATRIST

## 2022-12-13 PROCEDURE — 99999 PR PBB SHADOW E&M-EST. PATIENT-LVL III: ICD-10-PCS | Mod: PBBFAC,,, | Performed by: PODIATRIST

## 2022-12-13 PROCEDURE — 1160F PR REVIEW ALL MEDS BY PRESCRIBER/CLIN PHARMACIST DOCUMENTED: ICD-10-PCS | Mod: CPTII,S$GLB,, | Performed by: PODIATRIST

## 2022-12-13 PROCEDURE — 99499 UNLISTED E&M SERVICE: CPT | Mod: S$GLB,,, | Performed by: PODIATRIST

## 2022-12-13 PROCEDURE — 1159F PR MEDICATION LIST DOCUMENTED IN MEDICAL RECORD: ICD-10-PCS | Mod: CPTII,S$GLB,, | Performed by: PODIATRIST

## 2022-12-13 PROCEDURE — 1101F PT FALLS ASSESS-DOCD LE1/YR: CPT | Mod: CPTII,S$GLB,, | Performed by: PODIATRIST

## 2022-12-13 NOTE — PROGRESS NOTES
[facility]   Physical Therapy Treatment Note + Progress Report    Name: Saul Mar  Clinic Number: [id number]    Therapy Diagnosis:   Encounter Diagnoses   Name Primary?    Decreased strength, endurance, and mobility Yes    Gait disorder     Lumbar pain     Posture abnormality      Physician: Florian Chavez MD    Visit Date: 12/13/2022    Physician Orders: PT Eval and Treat   Medical Diagnosis from Referral:   Primary osteoarthritis of both hips   Limited mobility   Evaluation Date: 11/8/2022  Authorization Period Expiration: 12/6/2022  Plan of Care Expiration: 2/8/2023  Progress Note Due: 01/12/2023  Visit # / Visits authorized: 5/11 + eval   FOTO: 2/ 3   PTA Visit #: 2/5     Precautions: Standard    Time In: 9:10 AM  Time Out: 9:55 AM  Total Billable Time: 45 minutes    SUBJECTIVE     Patient reports: she is doing better today and is not as winded as she was last visit.  She was compliant with home exercise program.  Response to previous treatment: felt pretty good  Functional change: cannot stand for long periods of time without putting on back brace, needs assistive device for walking (rollator), extra breaks needed when cooking and other standing ACTIVITIES OF DAILY LIVING     Pain: 10/10 with standing/walking  Location: low back, right > left side LOWER EXTREMITY nerve pain    OBJECTIVE     Objective Measures updated at progress report unless specified.     CMS Impairment/Limitation/Restriction for FOTO Orthopedic Lower Back Survey     Therapist reviewed FOTO scores for Saul Mar on 11/8/2022.   FOTO documents entered into [employer] - see Media section.               Functional Tests  Outcome (Initial Evaluation) Outcome (Progress Report) Norms   30 second Sit to Stand 7 12 Age Male Female   60-64 <14 <12   65-69 <12 <11   70-74 <12 <10   75-79 <11 <10   80-84 <10 <9   85-89 <8 <8   90-93 <7 <4         AROM:     Thoracolumbar  (single inclinometer at L4/5)  AROM  (degrees) (Initial Evaluation) AROM  (degrees) (Progress Report) Pain/Dysfunction with Movement   Flexion 52 52 35 deg in standing flexion   Extension 20 20 From    Right side bending 20 20     Left side bending 20 20     Right rotation 25-50% 30%     Left rotation 25-50% 30%            Hip Right (Initial Evaluation) Left (Initial Evaluation) Right (Progress Report) Left (Progress Report) Pain/Dysfunction with Movement   Hip flex 112 105 112 105     Hip ER (supine) 34 32 34 32     Hip IR (supine) 22 24 22 24     Hip Ext (sidelying) -10 -10 -10 1-        Strength:                                  L/E MMT Right Left Right (Progress Report) Left (Progress Report) Pain/Dysfunction with Movement   Hip Flexion 4-/5 4/5 4-/5 4-/5     Hip Extension 3-/5 3-/5 3/5 3/5     Hip Abduction 3+/5 3/5 3+/5 3+/5     Gluteus sony 4-/5 3+/5 4-/5 4-/5     Hip IR NT NT Not tested Not tested     Hip ER NT NT Not tested Not tested     Knee Flexion 3+/5 3+/5 3+/5 3+/5     Knee Extension 4/5 4-/5 4/5 4/5     Ankle DF 5/5 5/5 5/5 5/5     Ankle PF 3/5 3/5 3+/5 3+/5     ASLR 3+/5 3+/5 Not tested Not tested          Treatment     Reola received the treatments listed below:      therapeutic exercises to develop strength, endurance, ROM, flexibility, posture, and core stabilization for 45 minutes including:  Nu step for endurance level 2 (with rest breaks) 7 minutes with handles  Ball squeezes 3s holds 3 minutes  Hip abduction red 3s holds 3 minutes  Lower trunk rotations 3 minutes  Double knee to chest with theraball 30x 5s holds  Seated sciatic nerve glides 30x each bilateral (hands behind back, knee extended, dorsiflexing foot and returning to neutral. No head movement)   Bridges 3x10  Forward Bending 3 minutes 3 directions (flexed posture feels good to patient)  Glute squeezes in prone 3 minutes 3s holds HELD  Prone lying on elbows 3 minutes HELD  Prone hamstring curls 2x10 each bilateral HELD  Objectives Re-assessed 8  minutes    Patient Education and Home Exercises     Home Exercises Provided and Patient Education Provided     Education provided:     Written Home Exercises Provided: Patient instructed to cont prior HEP. Exercises were reviewed and Saul was able to demonstrate them prior to the end of the session.  Saul demonstrated good  understanding of the education provided. See EMR under Patient Instructions for exercises provided during therapy sessions    ASSESSMENT   Since beginning PHYSICAL THERAPY, patient has improved bilateral hip extension, left hip abduction, left knee extension, and bilateral ankle PLANTAR FLEXION strength. Patient has also decreased her fall risk by improving her 30 second sit to stand score. Despite these improvements, patient has not improved her functional limitation per FOTO. Patient tends to catastrophic pain and ruminate on it and this may be affecting her outcomes in function. Patient may continue to benefit from skilled therapy to address her remaining deficits and improve her function and pain tolerance.     Saul Is progressing well towards her goals.   Patient prognosis is Fair.     Patient will continue to benefit from skilled outpatient physical therapy to address the deficits listed in the problem list box on initial evaluation, provide pt/family education and to maximize pt's level of independence in the home and community environment.     Patient's spiritual, cultural and educational needs considered and pt agreeable to plan of care and goals.     Anticipated barriers to physical therapy: Arthritis, Back pain, Congestive Heart Failure or Heart Disease, Diabetes Type I or II,  Gastrointestinal Disease, Headaches, High Blood Pressure, Kidney, Bladder, Prostate or Urination Problems, Prior Surgery,  Stroke or TIA, Visual Impairment    Goals: Reviewed:12/13/2022    Short Term Goals: In 4 weeks   1.Patient to be educated on HEP. MET 12/13/2022  2. Patient  to increase lumbar ROM to  WFL's without pain, in order to improve available range of motion for ADL's.  PROGRESSING 12/13/2022  3. Patient  to increase hip AROM to ER 50 deg and extn to 0 deg, in order to assist with more normalized gait pattern. PROGRESSING 12/13/2022  4. Patient  to increase B LE and core strength to 4-/5, in order to improve endurance and increase ability to ambulate for increased time and improve ability to perform squat and sit to stand. PROGRESSING 12/13/2022  5. Patient  to have pain less than 2/10 at worst, to improve QOL. PROGRESSING 12/13/2022  6. Patient  to improve score on the FOTO, to improve QOL. PROGRESSING 12/13/2022  7. Patient  to be educated on postural/body mechanics awareness. MET 12/13/2022     Long Term Goals: In 8 weeks  1.Patient to improve score on the FOTO to 52% or less, to improve QOL. PROGRESSING 12/13/2022  2. Patient to demo increase in LE and core strength to 4/5, n order to improve endurance and increase ability to ambulate for increased time and improve ability to perform squat and sit to stand. PROGRESSING 12/13/2022  3. Patient to have decreased pain to 2/10 at worst, to improve QOL. PROGRESSING 12/13/2022  4. Patient to demo increase lumbar ROM to extension +5 deg, b Rot 75%, B SB 25 deg, in order to improve available range of motion for ADL's.  PROGRESSING 12/13/2022  5. Patient  to increase hip AROM to ER 50 deg and extn to 0 deg, in order to assist with more normalized gait pattern. PROGRESSING 12/13/2022  6. Patient to perform daily activities including walking, sit to stand, and daily activities including housekeeping chores without increased symptoms. PROGRESSING 12/13/2022    PLAN   Plan of care Certification: 11/8/2022 to 2/8/2023.    Continue Plan of Care (POC) and progress per patient tolerance.     Maday Ceron, PT

## 2022-12-13 NOTE — PROGRESS NOTES
Subjective:       Patient ID: Saul Mar is a 82 y.o. female.    Chief Complaint: Routine Foot Care (Patient is a diabetic and next pcp appointment is on 12.15.22 with NP Carmelo lA.She denies pain at present and is wearing slippers. )      HPI: Patient presents to the office today with the chief complaint of elongated, thickened and dystrophic nail plates to the B/L foot. . This patient is a Diabetic Type II. Patient does follow with Primary Care and/or Endocrinology for management of Diabetes Mellitus. This patient's PMD is Penny Randolph MD. This patient last saw his/her primary care provider on 12.15.22.     Hemoglobin A1C   Date Value Ref Range Status   09/14/2022 7.7 (H) 4.0 - 5.6 % Final     Comment:     ADA Screening Guidelines:  5.7-6.4%  Consistent with prediabetes  >or=6.5%  Consistent with diabetes    High levels of fetal hemoglobin interfere with the HbA1C  assay. Heterozygous hemoglobin variants (HbS, HgC, etc)do  not significantly interfere with this assay.   However, presence of multiple variants may affect accuracy.     03/02/2022 7.2 (H) 4.0 - 5.6 % Final     Comment:     ADA Screening Guidelines:  5.7-6.4%  Consistent with prediabetes  >or=6.5%  Consistent with diabetes    High levels of fetal hemoglobin interfere with the HbA1C  assay. Heterozygous hemoglobin variants (HbS, HgC, etc)do  not significantly interfere with this assay.   However, presence of multiple variants may affect accuracy.     07/22/2021 7.3 (H) 4.0 - 5.6 % Final     Comment:     ADA Screening Guidelines:  5.7-6.4%  Consistent with prediabetes  >or=6.5%  Consistent with diabetes    High levels of fetal hemoglobin interfere with the HbA1C  assay. Heterozygous hemoglobin variants (HbS, HgC, etc)do  not significantly interfere with this assay.   However, presence of multiple variants may affect accuracy.     .     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    Hypertension     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    Multinodular thyroid     PAD (peripheral artery disease)     Polyneuropathy     Type 2 diabetes with peripheral circulatory disorder, controlled     Type 2 diabetes, uncontrolled, 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/18/2015       Family History   Problem Relation Age of Onset    Stroke Mother     Mental illness Mother     Hypertension Mother     Stroke Father     Diabetes Maternal Grandmother     Drug abuse Daughter     Cancer Sister 68        gyn    Ovarian cancer Sister     Cancer Maternal Uncle         type not known    Heart disease Paternal Grandmother     Cancer Maternal Aunt         type unknown    Glaucoma Neg Hx     Macular degeneration Neg Hx     Alcohol abuse Neg Hx     COPD Neg Hx     Asthma Neg Hx     Amblyopia Neg Hx     Blindness Neg Hx     Cataracts Neg Hx     Retinal detachment Neg Hx     Strabismus Neg Hx        Social History     Socioeconomic History    Marital status:     Number of children: 1   Tobacco Use    Smoking status: Never    Smokeless tobacco: Never   Substance and Sexual Activity    Alcohol use: No    Drug use: No    Sexual activity: Not Currently     Partners: Male   Social History Narrative    , no pets or smokers in household. 1 Child who lives in nursing home. She has a grandson who lives in [place].. Retired from [employer] . Still drives. Does not have a Living Will or advanced directive.      Social Determinants of Health     Financial Resource Strain: High Risk    Difficulty of Paying Living Expenses: Hard   Food Insecurity: Food Insecurity Present    Worried About Running Out of Food in the  Last Year: Often true    Ran Out of Food in the Last Year: Often true   Transportation Needs: Unmet Transportation Needs    Lack of Transportation (Medical): Yes    Lack of Transportation (Non-Medical): Yes   Physical Activity: Inactive    Days of Exercise per Week: 0 days    Minutes of Exercise per Session: 0 min   Stress: No Stress Concern Present    Feeling of Stress : Not at all   Social Connections: Socially Isolated    Frequency of Communication with Friends and Family: Once a week    Frequency of Social Gatherings with Friends and Family: Once a week    Attends Sabianism Services: 1 to 4 times per year    Active Member of Clubs or Organizations: No    Attends Club or Organization Meetings: Never    Marital Status:    Housing Stability: Low Risk     Unable to Pay for Housing in the Last Year: No    Number of Places Lived in the Last Year: 1    Unstable Housing in the Last Year: No       Past Surgical History:   Procedure Laterality Date    CATARACT EXTRACTION W/  INTRAOCULAR LENS IMPLANT Left 02/27/2013    Dr. Taveras    COLONOSCOPY      COLONOSCOPY N/A 9/22/2016    Procedure: COLONOSCOPY;  Surgeon: Jd Ashton MD;  Location: [address];  Service: Endoscopy;  Laterality: N/A;  OK per Dr Randolph for pt to hold Plavix 5 days prior to procedure/see telephone encounter dated 8/29/16/svn    EYE SURGERY Bilateral 2002 approx    Laser for glaucoma    HYSTERECTOMY  1963     AMEENA/USO- fibroids; no cancer    OOPHORECTOMY  1963    unknown, only removed one    SELECTIVE INJECTION OF ANESTHETIC AGENT AROUND LUMBAR SPINAL NERVE ROOT BY TRANSFORAMINAL APPROACH Bilateral 6/17/2022    Procedure: Bilateral L4/5 TF JASKARAN with RN IV sedation;  Surgeon: Chan Fonseca MD;  Location: [facility];  Service: Pain Management;  Laterality: Bilateral;    SELECTIVE INJECTION OF ANESTHETIC AGENT AROUND LUMBAR SPINAL NERVE ROOT BY TRANSFORAMINAL APPROACH Bilateral 10/3/2022    Procedure: Bilateral L2-3 transforaminal  epidural steroid injection with RN IV sedation;  Surgeon: Chan Fonseca MD;  Location: [facility];  Service: Pain Management;  Laterality: Bilateral;       Review of Systems       Objective:   There were no vitals taken for this visit.    Physical Exam  LOWER EXTREMITY PHYSICAL EXAMINATION    VASCULAR:  The right dorsalis pedis pulse 2/4 and the right posterior tibial pulse 2/4.  The left dorsalis pedis pulse 2/4 and posterior tibial pulse on the left is 2/4.  Capillary refill is intact.  Pedal hair growth intact    NEUROLOGY:  Protective sensation is intact with [place] Marjan monofilament. Proprioception is intact. Intact sensation to light touch.  Vibratory sensation is diminished to the 1st metatarsal phalangeal joint.     DERMATOLOGY:  Skin is supple, moist, intact.  There is no signs of callusing, ulcerations, other lesions identified to the dorsal or plantar aspect of the right or left foot.  The R1, 2, 5 and left L1,2, 5 are thickened, discolored dystrophic.  There is subungual debris.  Nail plates have area of dark discoloration.  The remaining nails 3-4 on the right foot and the left foot are elongated but of normal color, thickness, and texture.   There is no signs of ingrowing into the medial or lateral borders.  There is no evidence of wounds or skin breakdown.  No edema or erythema.  No obvious lacerations or fissuring.  Interdigital spaces are clean, dry, intact.  No rashes or scars appreciated.    ORTHOPEDIC: Manual Muscle Testing is 5/5 in all planes on the left and right, without pains, with and without resistance. Gait pattern is non-antalgic.    Assessment:     1. Type II diabetes mellitus with neurological manifestations    2. Anticoagulant long-term use    3. Onychomycosis due to dermatophyte        Plan:     Type II diabetes mellitus with neurological manifestations    Anticoagulant long-term use    Onychomycosis due to dermatophyte        Thorough discussion is had with the patient this  afternoon, concerning the diagnosis, its etiology, and the treatment algorithm at present.  Greater than 50% of this visit spent on counseling and coordination of care. Greater than 15 minutes of a 20 minute appointment spent on education about the diabetic foot, neuropathy, and prevention of limb loss.  Shoe inspection. Diabetic Foot Education. Patient reminded of the importance of good nutrition and blood sugar control to help prevent podiatric complications of diabetes. Patient instructed on proper foot hygeine. We discussed wearing proper and supportive shoe gear, daily foot inspections, never walking barefooted or sock footed, never putting sharp instruments to feet which can cause major complications associated with infection, ulcers, lacerations.      Dystrophic nail plates, as outlined above (R#1,2,5  ; L#1,2,5 ), are sharply debrided with double action nail nipper, and/or with the assistance of a mechanical rotary suzi, with removal of all offending nail and nail border(s), for reduction of pains. Nails are reduced in terms of length, width and girth with removal of subungual debris to facilitate pain free weight bearing and ambulation. The elongated nails as outlined in the objective portion of this note, were trimmed to appropriate length, with a double action nail nipper, for alleviation/reduction of pains as well. Follow up in approx. 3-4 months.        Future Appointments   Date Time Provider Department Center   12/14/2022 12:30 PM Florian Chavez MD ON ORTHO  [facility]   12/15/2022 10:00 AM Carmelo Al NP [facility] [place]   12/16/2022  9:15 AM Florian Khan PTA [facility] [place]   12/20/2022  9:30 AM Martin Ramirez, PT [facility] [place]   12/30/2022 10:00 AM Martin Ramirez, PT [facility] [place]   1/6/2023 10:00 AM Martin Ramirez, PT [facility] [place]   1/10/2023 12:45 PM Steve Davenport MD [facility]   2/3/2023  8:05 AM LABORATORY, [facility] [place]   2/7/2023  9:30 AM Penny Randolph MD [facility]   2/8/2023 11:00 AM Giselle Valenzuela MD [facility] [place]   3/17/2023 11:00 AM Lina Taveras MD [facility]   4/6/2023  1:15 PM Zuleima Howell DPM [facility] POD [facility]

## 2022-12-13 NOTE — TELEPHONE ENCOUNTER
ADITYA with patient rescheduling her appointment to Wednesday, 12/21 @ 11am.  ----- Message from Tai Borrero sent at 12/13/2022  3:59 PM CST -----  Contact: [phone]  Patient would like to consult with a nurse in  regards to rescheduling her appt for a sooner date. The patient is wanting to reschedule due to weather. Please call back at [phone]. Thanks grayson

## 2022-12-15 ENCOUNTER — OFFICE VISIT (OUTPATIENT)
Dept: INTERNAL MEDICINE | Facility: CLINIC | Age: 82
End: 2022-12-15
Payer: MEDICARE

## 2022-12-15 VITALS
OXYGEN SATURATION: 98 % | HEIGHT: 65 IN | TEMPERATURE: 98 F | WEIGHT: 177.94 LBS | HEART RATE: 65 BPM | DIASTOLIC BLOOD PRESSURE: 78 MMHG | RESPIRATION RATE: 16 BRPM | SYSTOLIC BLOOD PRESSURE: 136 MMHG | BODY MASS INDEX: 29.65 KG/M2

## 2022-12-15 DIAGNOSIS — I10 HYPERTENSION, ESSENTIAL: Chronic | ICD-10-CM

## 2022-12-15 DIAGNOSIS — N18.30 STAGE 3 CHRONIC KIDNEY DISEASE, UNSPECIFIED WHETHER STAGE 3A OR 3B CKD: Chronic | ICD-10-CM

## 2022-12-15 DIAGNOSIS — I51.89 LEFT VENTRICULAR DIASTOLIC DYSFUNCTION WITH PRESERVED SYSTOLIC FUNCTION: ICD-10-CM

## 2022-12-15 DIAGNOSIS — L85.3 DRY SKIN DERMATITIS: Primary | ICD-10-CM

## 2022-12-15 PROCEDURE — 99999 PR PBB SHADOW E&M-EST. PATIENT-LVL V: CPT | Mod: PBBFAC,,, | Performed by: NURSE PRACTITIONER

## 2022-12-15 PROCEDURE — 1101F PR PT FALLS ASSESS DOC 0-1 FALLS W/OUT INJ PAST YR: ICD-10-PCS | Mod: CPTII,S$GLB,, | Performed by: NURSE PRACTITIONER

## 2022-12-15 PROCEDURE — 3075F PR MOST RECENT SYSTOLIC BLOOD PRESS GE 130-139MM HG: ICD-10-PCS | Mod: CPTII,S$GLB,, | Performed by: NURSE PRACTITIONER

## 2022-12-15 PROCEDURE — 1159F MED LIST DOCD IN RCRD: CPT | Mod: CPTII,S$GLB,, | Performed by: NURSE PRACTITIONER

## 2022-12-15 PROCEDURE — 1160F PR REVIEW ALL MEDS BY PRESCRIBER/CLIN PHARMACIST DOCUMENTED: ICD-10-PCS | Mod: CPTII,S$GLB,, | Performed by: NURSE PRACTITIONER

## 2022-12-15 PROCEDURE — 1101F PT FALLS ASSESS-DOCD LE1/YR: CPT | Mod: CPTII,S$GLB,, | Performed by: NURSE PRACTITIONER

## 2022-12-15 PROCEDURE — 99214 PR OFFICE/OUTPT VISIT, EST, LEVL IV, 30-39 MIN: ICD-10-PCS | Mod: S$GLB,,, | Performed by: NURSE PRACTITIONER

## 2022-12-15 PROCEDURE — 3078F PR MOST RECENT DIASTOLIC BLOOD PRESSURE < 80 MM HG: ICD-10-PCS | Mod: CPTII,S$GLB,, | Performed by: NURSE PRACTITIONER

## 2022-12-15 PROCEDURE — 99999 PR PBB SHADOW E&M-EST. PATIENT-LVL V: ICD-10-PCS | Mod: PBBFAC,,, | Performed by: NURSE PRACTITIONER

## 2022-12-15 PROCEDURE — 3075F SYST BP GE 130 - 139MM HG: CPT | Mod: CPTII,S$GLB,, | Performed by: NURSE PRACTITIONER

## 2022-12-15 PROCEDURE — 1160F RVW MEDS BY RX/DR IN RCRD: CPT | Mod: CPTII,S$GLB,, | Performed by: NURSE PRACTITIONER

## 2022-12-15 PROCEDURE — 1125F PR PAIN SEVERITY QUANTIFIED, PAIN PRESENT: ICD-10-PCS | Mod: CPTII,S$GLB,, | Performed by: NURSE PRACTITIONER

## 2022-12-15 PROCEDURE — 3288F FALL RISK ASSESSMENT DOCD: CPT | Mod: CPTII,S$GLB,, | Performed by: NURSE PRACTITIONER

## 2022-12-15 PROCEDURE — 3078F DIAST BP <80 MM HG: CPT | Mod: CPTII,S$GLB,, | Performed by: NURSE PRACTITIONER

## 2022-12-15 PROCEDURE — 1159F PR MEDICATION LIST DOCUMENTED IN MEDICAL RECORD: ICD-10-PCS | Mod: CPTII,S$GLB,, | Performed by: NURSE PRACTITIONER

## 2022-12-15 PROCEDURE — 3288F PR FALLS RISK ASSESSMENT DOCUMENTED: ICD-10-PCS | Mod: CPTII,S$GLB,, | Performed by: NURSE PRACTITIONER

## 2022-12-15 PROCEDURE — 99214 OFFICE O/P EST MOD 30 MIN: CPT | Mod: S$GLB,,, | Performed by: NURSE PRACTITIONER

## 2022-12-15 PROCEDURE — 1125F AMNT PAIN NOTED PAIN PRSNT: CPT | Mod: CPTII,S$GLB,, | Performed by: NURSE PRACTITIONER

## 2022-12-15 NOTE — PATIENT INSTRUCTIONS
- Stop [place]  - Start using more mild soaps like Dove and Caress  - Moisturize with Cetaphil and Aquafor

## 2022-12-15 NOTE — PROGRESS NOTES
Chief Complaint  Chief Complaint   Patient presents with    RIGHT BREAST ITCHING          HPI     HPI  Saul Mar is a 82 y.o. female with medical diagnoses as listed in the medical history and problem list that presents for Breast Itching/Dry Skin Dermatitis. This patient is new to me.     Breast Itching/Dry Skin Dermatitis: Noticed itching at the right outter breast region. No masses, lumps or bumps noted on physical exam today in clinic. Denies history of breast cancer or surgery. She uses [place] to bathe/shower which may be causing mild to moderate dry skin. Reports recent skin dryness. Denies fever, chills, night sweats or unexplained weight loss.       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 Taveras    COLONOSCOPY      COLONOSCOPY N/A 9/22/2016    Procedure: COLONOSCOPY;  Surgeon: Jd Ashton MD;  Location: [facility] ([address] [place] [address]);  Service: Endoscopy;  Laterality: N/A;  OK per Dr Randolph for pt to hold Plavix 5 days prior to procedure/see telephone encounter dated 8/29/16/svn    EYE SURGERY Bilateral 2002 approx    Laser for glaucoma    HYSTERECTOMY  1963     AMEENA/USO- fibroids; no cancer    OOPHORECTOMY  1963    unknown, only removed one    SELECTIVE  INJECTION OF ANESTHETIC AGENT AROUND LUMBAR SPINAL NERVE ROOT BY TRANSFORAMINAL APPROACH Bilateral 6/17/2022    Procedure: Bilateral L4/5 TF JASKARAN with RN IV sedation;  Surgeon: Chan Fonseca MD;  Location: [facility];  Service: Pain Management;  Laterality: Bilateral;    SELECTIVE INJECTION OF ANESTHETIC AGENT AROUND LUMBAR SPINAL NERVE ROOT BY TRANSFORAMINAL APPROACH Bilateral 10/3/2022    Procedure: Bilateral L2-3 transforaminal epidural steroid injection with RN IV sedation;  Surgeon: Chan Fonseca MD;  Location: [facility];  Service: Pain Management;  Laterality: Bilateral;       SOCIAL HISTORY:  Social History     Socioeconomic History    Marital status:     Number of children: 1   Tobacco Use    Smoking status: Never    Smokeless tobacco: Never   Substance and Sexual Activity    Alcohol use: No    Drug use: No    Sexual activity: Not Currently     Partners: Male   Social History Narrative    , no pets or smokers in household. 1 Child who lives in nursing home. She has a grandson who lives in [place].. Retired from [employer] . Still drives. Does not have a Living Will or advanced directive.      Social Determinants of Health     Financial Resource Strain: High Risk    Difficulty of Paying Living Expenses: Hard   Food Insecurity: Food Insecurity Present    Worried About Running Out of Food in the Last Year: Often true    Ran Out of Food in the Last Year: Often true   Transportation Needs: Unmet Transportation Needs    Lack of Transportation (Medical): Yes    Lack of Transportation (Non-Medical): Yes   Physical Activity: Inactive    Days of Exercise per Week: 0 days    Minutes of Exercise per Session: 0 min   Stress: No Stress Concern Present    Feeling of Stress : Not at all   Social Connections: Socially Isolated    Frequency of Communication with Friends and Family: Once a week    Frequency of Social Gatherings with Friends and Family: Once a week    Attends Synagogue Services: 1 to 4  times per year    Active Member of Clubs or Organizations: No    Attends Club or Organization Meetings: Never    Marital Status:    Housing Stability: Low Risk     Unable to Pay for Housing in the Last Year: No    Number of Places Lived in the Last Year: 1    Unstable Housing in the Last Year: No       FAMILY HISTORY:  Family History   Problem Relation Age of Onset    Stroke Mother     Mental illness Mother     Hypertension Mother     Stroke Father     Diabetes Maternal Grandmother     Drug abuse Daughter     Cancer Sister 68        gyn    Ovarian cancer Sister     Cancer Maternal Uncle         type not known    Heart disease Paternal Grandmother     Cancer Maternal Aunt         type unknown    Glaucoma Neg Hx     Macular degeneration Neg Hx     Alcohol abuse Neg Hx     COPD Neg Hx     Asthma Neg Hx     Amblyopia Neg Hx     Blindness Neg Hx     Cataracts Neg Hx     Retinal detachment Neg Hx     Strabismus Neg Hx        ALLERGIES AND MEDICATIONS: updated and reviewed.  Review of patient's allergies indicates:   Allergen Reactions    Pravachol [pravastatin] Nausea Only     Current Outpatient Medications   Medication Sig Dispense Refill    albuterol (PROVENTIL/VENTOLIN HFA) 90 mcg/actuation inhaler INHALE 2 PUFFS INTO THE LUNGS EVERY 6 (SIX) HOURS AS NEEDED FOR WHEEZING. RESCUE 54 g 1    ALPRAZolam (XANAX) 0.5 MG tablet TAKE 1 TABLET BY MOUTH NIGHTLY AS NEEDED FOR ANXIETY (MAY TAKE 1-2 TIMES WEEKLY FOR ANXIETY) 30 tablet 0    amLODIPine (NORVASC) 5 MG tablet Take 1 tablet (5 mg total) by mouth 2 (two) times daily. 180 tablet 3    atorvastatin (LIPITOR) 80 MG tablet TAKE 1 TABLET BY MOUTH EVERY DAY 90 tablet 2    blood sugar diagnostic Strp 1 strip by Misc.(Non-Drug; Combo Route) route 2 (two) times daily. Insurance preferred test stripes DX:E11.22 100 strip 11    blood-glucose meter kit Insurance preferred meter dx:E11.22 1 each 0    brimonidine 0.2% (ALPHAGAN) 0.2 % Drop Place 1 drop into both eyes 3 (three)  times daily. 15 mL 12    carvediloL (COREG) 12.5 MG tablet Take 12.5 mg by mouth.      cholecalciferol, vitamin D3, (VITAMIN D3) 1,000 unit capsule Take 1 capsule (1,000 Units total) by mouth once daily. 30 capsule 12    famotidine (PEPCID) 20 MG tablet Take 20 mg by mouth Daily.      ferrous sulfate (FEOSOL) 325 mg (65 mg iron) Tab tablet Take 1 tablet (325 mg total) by mouth 2 (two) times daily. 180 tablet 3    flecainide (TAMBOCOR) 50 MG Tab Take 1 tablet (50 mg total) by mouth 2 (two) times daily. 60 tablet 1    glimepiride (AMARYL) 4 MG tablet TAKE 1 TABLET BY MOUTH IN THE MORNING WITH BREAKFAST 90 tablet 1    hydroCHLOROthiazide (HYDRODIURIL) 12.5 MG Tab TAKE 1 TABLET BY MOUTH EVERY DAY 90 tablet 3    HYDROcodone-acetaminophen (NORCO) 5-325 mg per tablet TAKE 1 TABLET BY MOUTH EVERY 8 (EIGHT) HOURS AS NEEDED FOR PAIN FOR UP TO 10 DOSES.      hydrocortisone 2.5 % cream Apply topically 2 (two) times daily. 30 g 2    lancets (ACCU-CHEK SOFTCLIX LANCETS) Misc 100 lancets by Misc.(Non-Drug; Combo Route) route 2 (two) times daily. Insurance preferred lancets Dx:E11.22 100 each 11    linaCLOtide (LINZESS) 145 mcg Cap capsule Take 1 capsule (145 mcg total) by mouth before breakfast. 30 capsule 2    losartan (COZAAR) 50 MG tablet TAKE 1 TABLET BY MOUTH TWICE A  tablet 2    meclizine (ANTIVERT) 25 mg tablet Take 1 tablet (25 mg total) by mouth daily as needed for Dizziness. 30 tablet 0    metFORMIN (GLUCOPHAGE-XR) 500 MG ER 24hr tablet TAKE 2 TABLETS BY MOUTH EVERY  tablet 1    metoprolol succinate (TOPROL-XL) 50 MG 24 hr tablet TAKE 1 TABLET BY MOUTH EVERY DAY 90 tablet 1    mupirocin (BACTROBAN) 2 % ointment Apply topically 3 (three) times daily. 22 g 0    tacrolimus (PROTOPIC) 0.1 % ointment Apply topically 2 (two) times daily. 60 g 3    XARELTO 15 mg Tab TAKE 1 TABLET (15 MG TOTAL) BY MOUTH DAILY WITH DINNER OR EVENING MEAL. 30 tablet 6    levalbuterol (XOPENEX HFA) 45 mcg/actuation inhaler INHALE 1-2  "PUFFS INTO THE LUNGS EVERY 6 (SIX) HOURS AS NEEDED FOR WHEEZING. RESCUE 15 Inhaler 12     No current facility-administered medications for this visit.           Exam     ROS  Review of Systems   Constitutional:  Negative for appetite change, chills, fatigue and fever.   HENT:  Negative for congestion, ear pain, postnasal drip, rhinorrhea, sinus pressure, sneezing and sore throat.    Respiratory:  Negative for shortness of breath.    Cardiovascular:  Negative for chest pain and palpitations.   Gastrointestinal:  Negative for abdominal pain, constipation, diarrhea, nausea and vomiting.   Genitourinary:  Negative for dysuria.   Musculoskeletal:  Negative for arthralgias.   Neurological:  Negative for headaches.   Psychiatric/Behavioral:  Negative for sleep disturbance.          Physical Exam  Vitals:    12/15/22 1022   BP: 136/78   BP Location: Right arm   Patient Position: Sitting   BP Method: Large (Manual)   Pulse: 65   Resp: 16   Temp: 97.7 °F (36.5 °C)   TempSrc: Tympanic   SpO2: 98%   Weight: 80.7 kg (177 lb 14.6 oz)   Height: 5' 5" (1.651 m)    Body mass index is 29.61 kg/m².  Weight: 80.7 kg (177 lb 14.6 oz)   Height: 5' 5" (165.1 cm)   Physical Exam  Constitutional:       General: She is not in acute distress.     Appearance: Normal appearance.   HENT:      Head: Normocephalic and atraumatic.      Right Ear: External ear normal.      Left Ear: External ear normal.      Nose: Nose normal.   Eyes:      Pupils: Pupils are equal, round, and reactive to light.   Cardiovascular:      Rate and Rhythm: Normal rate and regular rhythm.   Pulmonary:      Effort: Pulmonary effort is normal. No respiratory distress.      Breath sounds: Normal breath sounds. No wheezing.   Abdominal:      General: Bowel sounds are normal.      Palpations: Abdomen is soft.   Musculoskeletal:      Cervical back: Neck supple.   Lymphadenopathy:      Cervical: No cervical adenopathy.   Skin:     General: Skin is warm and dry.   Neurological:    "   Mental Status: She is alert and oriented to person, place, and time.   Psychiatric:         Mood and Affect: Mood normal.           Health Maintenance         Date Due Completion Date    Lipid Panel 03/02/2023 3/2/2022    Hemoglobin A1c 03/14/2023 9/14/2022    Diabetes Urine Screening 09/19/2023 9/19/2022    Eye Exam 11/10/2023 11/10/2022    Override on 2/22/2018: Done    Mammogram 04/06/2024 4/6/2022    DEXA Scan 07/23/2025 7/23/2021    TETANUS VACCINE 11/29/2031 11/29/2021              Assessment & Plan     Assessment & Plan  Problem List Items Addressed This Visit          Cardiac/Vascular    Hypertension, essential (Chronic)  -The current medical regimen is effective;  continue present plan and medications.     Left ventricular diastolic dysfunction with preserved systolic function  - Stable; Monitor    Overview     Echo 8/14/2014--- Normal LVEF 60-65%.  Normal RV systolic function .  Left ventricular diastolic dysfunction.   Aortic Valve: moderately sclerotic with normal leaflet mobility.No stenosis The peak gradient obtained across the aortic valve is 8.0 mmHg.  Tricuspid Valve:   trivial tricuspid regurgitation.            Renal/    CKD (chronic kidney disease) stage 3, GFR 30-59 ml/min (Chronic)  -Stable; Monitor     Other Visit Diagnoses       Dry skin dermatitis    -  Primary  - Stop [place] Spring  - Start using more mild soaps like Dove and Caress  - Moisturize with Cetaphil and Aquafor                Health Maintenance reviewed: Deferred per patient    Follow-up: If symptoms worsen or fail to improve     30+ minutes of total time spent on the encounter, which includes face to face time and non-face to face time preparing to see the patient (eg, review of tests), Obtaining and/or reviewing separately obtained history, documenting clinical information in the electronic or other health record, independently interpreting results (not separately reported) and communicating results to the  patient/family/caregiver, or Care coordination (not separately reported).

## 2022-12-20 ENCOUNTER — CLINICAL SUPPORT (OUTPATIENT)
Dept: REHABILITATION | Facility: HOSPITAL | Age: 82
End: 2022-12-20
Payer: MEDICARE

## 2022-12-20 DIAGNOSIS — R68.89 DECREASED STRENGTH, ENDURANCE, AND MOBILITY: Primary | ICD-10-CM

## 2022-12-20 DIAGNOSIS — R53.1 DECREASED STRENGTH, ENDURANCE, AND MOBILITY: Primary | ICD-10-CM

## 2022-12-20 DIAGNOSIS — R29.3 POSTURE ABNORMALITY: ICD-10-CM

## 2022-12-20 DIAGNOSIS — R26.9 GAIT DISORDER: ICD-10-CM

## 2022-12-20 DIAGNOSIS — M54.50 LUMBAR PAIN: ICD-10-CM

## 2022-12-20 DIAGNOSIS — Z74.09 DECREASED STRENGTH, ENDURANCE, AND MOBILITY: Primary | ICD-10-CM

## 2022-12-20 PROCEDURE — 97140 MANUAL THERAPY 1/> REGIONS: CPT

## 2022-12-20 PROCEDURE — 97110 THERAPEUTIC EXERCISES: CPT

## 2022-12-20 NOTE — PROGRESS NOTES
[facility]   Physical Therapy Treatment Note + Progress Report    Name: Saul Kirkpatrick Zaid  Clinic Number: [id number]    Therapy Diagnosis:   Encounter Diagnoses   Name Primary?    Decreased strength, endurance, and mobility Yes    Gait disorder     Lumbar pain     Posture abnormality      Physician: Florian Chavez MD    Visit Date: 12/20/2022    Physician Orders: PT Eval and Treat   Medical Diagnosis from Referral:   Primary osteoarthritis of both hips   Limited mobility   Evaluation Date: 11/8/2022  Authorization Period Expiration: 12/6/2022  Plan of Care Expiration: 2/8/2023  Progress Note Due: 01/12/2023  Visit # / Visits authorized: 5/11 + eval   FOTO: 2/ 3   PTA Visit #: /5     Precautions: Standard    Time In: 0930  Time Out: 1015  Total Billable Time: 45 minutes    SUBJECTIVE     Patient reports: that she does experience a bit of pain at the lower back region.  She notices that the pain is increased when standing for long periods at home to clean dishes and cook.  She does report that standing on softer surfaces such as at the gym and stores does not seem to irritate her lower back nearly as much.  She was compliant with home exercise program.  Response to previous treatment: felt pretty good  Functional change: cannot stand for long periods of time without putting on back brace, needs assistive device for walking (rollator), extra breaks needed when cooking and other standing ACTIVITIES OF DAILY LIVING     Pain: 10/10 with standing/walking  Location: low back, right > left side LOWER EXTREMITY nerve pain    OBJECTIVE     Objective Measures updated at progress report unless specified.         Treatment     Saul received the treatments listed below:      therapeutic exercises to develop strength, endurance, ROM, flexibility, posture, and core stabilization for 45 minutes including:  Bicycle  - 5 minutes level 1  Ball squeezes 3s holds 3 minutes  Clam shells  - 10 second holds  x 2 minutes - red theraband  Lower trunk rotations 3 minutes  Seated sciatic nerve glides 30x each bilateral (hands behind back, knee extended, dorsiflexing foot and returning to neutral. No head movement)   Bridges 3x10  Forward Bending 3 minutes 3 directions (flexed posture feels good to patient)  Pelvic tilts - 10 second holds x 2 minutes      Patient Education and Home Exercises     Home Exercises Provided and Patient Education Provided     Education provided:     Written Home Exercises Provided: Patient instructed to cont prior HEP. Exercises were reviewed and Saul was able to demonstrate them prior to the end of the session.  aSul demonstrated good  understanding of the education provided. See EMR under Patient Instructions for exercises provided during therapy sessions    ASSESSMENT   The patient reports that she continues to experience increased pain with prolonged static standing for dishwashing, folding clothes, etc.  However, she does report that symptoms are significantly decreased when standing on padded floors.  Spoke with the patient about the possibility of purchasing a padded workspace floor such as the one that are commonly used for a standing desk.  Sent a like to [employer] and the patient reports that she'll look into it for dishwashing, folding clothes, other activities that require prolonged standing.  Did work on exercises again, mostly from the mat.  Did also palpate multiple trigger points at the lumbosacral musculature.  She did report of pain relief after STM and exercise regimen.    Saul Is progressing well towards her goals.   Patient prognosis is Fair.     Patient will continue to benefit from skilled outpatient physical therapy to address the deficits listed in the problem list box on initial evaluation, provide pt/family education and to maximize pt's level of independence in the home and community environment.     Patient's spiritual, cultural and educational needs considered and pt  agreeable to plan of care and goals.     Anticipated barriers to physical therapy: Arthritis, Back pain, Congestive Heart Failure or Heart Disease, Diabetes Type I or II,  Gastrointestinal Disease, Headaches, High Blood Pressure, Kidney, Bladder, Prostate or Urination Problems, Prior Surgery,  Stroke or TIA, Visual Impairment    Goals: Reviewed:12/13/2022    Short Term Goals: In 4 weeks   1.Patient to be educated on HEP. MET 12/13/2022  2. Patient  to increase lumbar ROM to WFL's without pain, in order to improve available range of motion for ADL's.  PROGRESSING 12/13/2022  3. Patient  to increase hip AROM to ER 50 deg and extn to 0 deg, in order to assist with more normalized gait pattern. PROGRESSING 12/13/2022  4. Patient  to increase B LE and core strength to 4-/5, in order to improve endurance and increase ability to ambulate for increased time and improve ability to perform squat and sit to stand. PROGRESSING 12/13/2022  5. Patient  to have pain less than 2/10 at worst, to improve QOL. PROGRESSING 12/13/2022  6. Patient  to improve score on the FOTO, to improve QOL. PROGRESSING 12/13/2022  7. Patient  to be educated on postural/body mechanics awareness. MET 12/13/2022     Long Term Goals: In 8 weeks  1.Patient to improve score on the FOTO to 52% or less, to improve QOL. PROGRESSING 12/13/2022  2. Patient to demo increase in LE and core strength to 4/5, n order to improve endurance and increase ability to ambulate for increased time and improve ability to perform squat and sit to stand. PROGRESSING 12/13/2022  3. Patient to have decreased pain to 2/10 at worst, to improve QOL. PROGRESSING 12/13/2022  4. Patient to demo increase lumbar ROM to extension +5 deg, b Rot 75%, B SB 25 deg, in order to improve available range of motion for ADL's.  PROGRESSING 12/13/2022  5. Patient  to increase hip AROM to ER 50 deg and extn to 0 deg, in order to assist with more normalized gait pattern. PROGRESSING 12/13/2022  6.  Patient to perform daily activities including walking, sit to stand, and daily activities including housekeeping chores without increased symptoms. PROGRESSING 12/13/2022    PLAN   Plan of care Certification: 11/8/2022 to 2/8/2023.    Continue Plan of Care (POC) and progress per patient tolerance.     Martin Ramirez, PT

## 2022-12-21 ENCOUNTER — OFFICE VISIT (OUTPATIENT)
Dept: ORTHOPEDICS | Facility: CLINIC | Age: 82
End: 2022-12-21
Payer: MEDICARE

## 2022-12-21 DIAGNOSIS — M16.0 PRIMARY OSTEOARTHRITIS OF BOTH HIPS: Primary | ICD-10-CM

## 2022-12-21 DIAGNOSIS — M25.551 GREATER TROCHANTERIC PAIN SYNDROME OF BOTH LOWER EXTREMITIES: ICD-10-CM

## 2022-12-21 DIAGNOSIS — M47.818 SI JOINT ARTHRITIS: ICD-10-CM

## 2022-12-21 DIAGNOSIS — M25.552 GREATER TROCHANTERIC PAIN SYNDROME OF BOTH LOWER EXTREMITIES: ICD-10-CM

## 2022-12-21 PROCEDURE — 99214 PR OFFICE/OUTPT VISIT, EST, LEVL IV, 30-39 MIN: ICD-10-PCS | Mod: S$GLB,,, | Performed by: STUDENT IN AN ORGANIZED HEALTH CARE EDUCATION/TRAINING PROGRAM

## 2022-12-21 PROCEDURE — 1160F PR REVIEW ALL MEDS BY PRESCRIBER/CLIN PHARMACIST DOCUMENTED: ICD-10-PCS | Mod: CPTII,S$GLB,, | Performed by: STUDENT IN AN ORGANIZED HEALTH CARE EDUCATION/TRAINING PROGRAM

## 2022-12-21 PROCEDURE — 99999 PR PBB SHADOW E&M-EST. PATIENT-LVL II: ICD-10-PCS | Mod: PBBFAC,,, | Performed by: STUDENT IN AN ORGANIZED HEALTH CARE EDUCATION/TRAINING PROGRAM

## 2022-12-21 PROCEDURE — 99999 PR PBB SHADOW E&M-EST. PATIENT-LVL II: CPT | Mod: PBBFAC,,, | Performed by: STUDENT IN AN ORGANIZED HEALTH CARE EDUCATION/TRAINING PROGRAM

## 2022-12-21 PROCEDURE — 1159F MED LIST DOCD IN RCRD: CPT | Mod: CPTII,S$GLB,, | Performed by: STUDENT IN AN ORGANIZED HEALTH CARE EDUCATION/TRAINING PROGRAM

## 2022-12-21 PROCEDURE — 99214 OFFICE O/P EST MOD 30 MIN: CPT | Mod: S$GLB,,, | Performed by: STUDENT IN AN ORGANIZED HEALTH CARE EDUCATION/TRAINING PROGRAM

## 2022-12-21 PROCEDURE — 1160F RVW MEDS BY RX/DR IN RCRD: CPT | Mod: CPTII,S$GLB,, | Performed by: STUDENT IN AN ORGANIZED HEALTH CARE EDUCATION/TRAINING PROGRAM

## 2022-12-21 PROCEDURE — 1159F PR MEDICATION LIST DOCUMENTED IN MEDICAL RECORD: ICD-10-PCS | Mod: CPTII,S$GLB,, | Performed by: STUDENT IN AN ORGANIZED HEALTH CARE EDUCATION/TRAINING PROGRAM

## 2022-12-21 NOTE — PATIENT INSTRUCTIONS
Assessment:  Saul Mar is a 82 y.o. female No chief complaint on file.      Encounter Diagnoses   Name Primary?    Primary osteoarthritis of both hips Yes    Greater trochanteric pain syndrome of both lower extremities     SI joint arthritis         Plan:  Continue with physical therapy  Patient may use over the counter lidocaine patches as needed for pain.  Apply topical diclofenac (Voltaren) up to 4 times a day to the affected area.  It can be bought over the counter at any local pharmacy.    Patient may use ice and heat as needed for pain every 2 hours for 15 minutes  Follow up as needed, patient will contact office if she would like to try a greater trochanteric cortisone injection.  Patient will contact office with information on what needs to be submitted for the foldable scooter that was placed earlier as patient was issued the incorrect scooter.     Follow-up: If you feel like you need us feel free to reach out through [employer] or feel free to contact us at [phone] or sooner if there are any problems between now and then.    Thank you for choosing [facility] and Dr. Florian Chavez for your orthopedic & sports medicine care. It is our goal to provide you with exceptional care that will help keep you healthy, active, and get you back in the game.    Please do not hesitate to reach out to us via email, phone, or [employer] with any questions, concerns, or feedback.    If you felt that you received exemplary care today, please consider leaving us feedback on [employer] at:  [email or web address]    If you are experiencing pain/discomfort ,or have questions after 5pm and would like to be connected to the [facility] on-call team, please call this number and specify which Sports Medicine provider is treating you: [phone]

## 2022-12-21 NOTE — PROGRESS NOTES
Patient ID: Saul Mar  YOB: 1940  MRN: [id number]    Chief Complaint: No chief complaint on file.      History of Present Illness: Saul Mar is a right-hand dominant 82 y.o. female who presents today for follow up on bilateral hip pain.  She states that while she is sitting she has very little pain but when she is weight bearing the pain is excruciating.  She says that PT helps her from being stiff but not helping with the pain. She uses heating pad at night.    The patient is active in none.  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 Taveras    COLONOSCOPY      COLONOSCOPY N/A 9/22/2016    Procedure: COLONOSCOPY;  Surgeon: Jd Ashton MD;  Location: [facility] ([address] [place] [address]);  Service: Endoscopy;  Laterality: N/A;  OK per Dr Randolph for pt to hold Plavix 5 days prior to procedure/see telephone encounter dated 8/29/16/svn    EYE SURGERY Bilateral 2002 approx    Laser for glaucoma    HYSTERECTOMY  1963     AMEENA/USO- fibroids; no cancer    OOPHORECTOMY  1963    unknown, only removed one    SELECTIVE INJECTION OF ANESTHETIC AGENT AROUND LUMBAR SPINAL NERVE ROOT BY TRANSFORAMINAL APPROACH Bilateral 6/17/2022    Procedure: Bilateral L4/5 TF JASKARAN with RN IV  sedation;  Surgeon: Chan Fonseca MD;  Location: [facility];  Service: Pain Management;  Laterality: Bilateral;    SELECTIVE INJECTION OF ANESTHETIC AGENT AROUND LUMBAR SPINAL NERVE ROOT BY TRANSFORAMINAL APPROACH Bilateral 10/3/2022    Procedure: Bilateral L2-3 transforaminal epidural steroid injection with RN IV sedation;  Surgeon: Chan Fonseca MD;  Location: [facility];  Service: Pain Management;  Laterality: Bilateral;     Family History   Problem Relation Age of Onset    Stroke Mother     Mental illness Mother     Hypertension Mother     Stroke Father     Diabetes Maternal Grandmother     Drug abuse Daughter     Cancer Sister 68        gyn    Ovarian cancer Sister     Cancer Maternal Uncle         type not known    Heart disease Paternal Grandmother     Cancer Maternal Aunt         type unknown    Glaucoma Neg Hx     Macular degeneration Neg Hx     Alcohol abuse Neg Hx     COPD Neg Hx     Asthma Neg Hx     Amblyopia Neg Hx     Blindness Neg Hx     Cataracts Neg Hx     Retinal detachment Neg Hx     Strabismus Neg Hx      Social History     Socioeconomic History    Marital status:     Number of children: 1   Tobacco Use    Smoking status: Never    Smokeless tobacco: Never   Substance and Sexual Activity    Alcohol use: No    Drug use: No    Sexual activity: Not Currently     Partners: Male   Social History Narrative    , no pets or smokers in household. 1 Child who lives in nursing home. She has a grandson who lives in [place].. Retired from [employer] . Still drives. Does not have a Living Will or advanced directive.      Social Determinants of Health     Financial Resource Strain: High Risk    Difficulty of Paying Living Expenses: Hard   Food Insecurity: Food Insecurity Present    Worried About Running Out of Food in the Last Year: Often true    Ran Out of Food in the Last Year: Often true   Transportation Needs: Unmet Transportation Needs    Lack of Transportation (Medical): Yes     Lack of Transportation (Non-Medical): Yes   Physical Activity: Inactive    Days of Exercise per Week: 0 days    Minutes of Exercise per Session: 0 min   Stress: No Stress Concern Present    Feeling of Stress : Not at all   Social Connections: Socially Isolated    Frequency of Communication with Friends and Family: Once a week    Frequency of Social Gatherings with Friends and Family: Once a week    Attends Hindu Services: 1 to 4 times per year    Active Member of Clubs or Organizations: No    Attends Club or Organization Meetings: Never    Marital Status:    Housing Stability: Low Risk     Unable to Pay for Housing in the Last Year: No    Number of Places Lived in the Last Year: 1    Unstable Housing in the Last Year: No     Medication List with Changes/Refills   Current Medications    ALBUTEROL (PROVENTIL/VENTOLIN HFA) 90 MCG/ACTUATION INHALER    INHALE 2 PUFFS INTO THE LUNGS EVERY 6 (SIX) HOURS AS NEEDED FOR WHEEZING. RESCUE    ALPRAZOLAM (XANAX) 0.5 MG TABLET    TAKE 1 TABLET BY MOUTH NIGHTLY AS NEEDED FOR ANXIETY (MAY TAKE 1-2 TIMES WEEKLY FOR ANXIETY)    AMLODIPINE (NORVASC) 5 MG TABLET    Take 1 tablet (5 mg total) by mouth 2 (two) times daily.    ATORVASTATIN (LIPITOR) 80 MG TABLET    TAKE 1 TABLET BY MOUTH EVERY DAY    BLOOD SUGAR DIAGNOSTIC STRP    1 strip by Misc.(Non-Drug; Combo Route) route 2 (two) times daily. Insurance preferred test stripes DX:E11.22    BLOOD-GLUCOSE METER KIT    Insurance preferred meter dx:E11.22    BRIMONIDINE 0.2% (ALPHAGAN) 0.2 % DROP    Place 1 drop into both eyes 3 (three) times daily.    CARVEDILOL (COREG) 12.5 MG TABLET    Take 12.5 mg by mouth.    CHOLECALCIFEROL, VITAMIN D3, (VITAMIN D3) 1,000 UNIT CAPSULE    Take 1 capsule (1,000 Units total) by mouth once daily.    FAMOTIDINE (PEPCID) 20 MG TABLET    Take 20 mg by mouth Daily.    FERROUS SULFATE (FEOSOL) 325 MG (65 MG IRON) TAB TABLET    Take 1 tablet (325 mg total) by mouth 2 (two) times daily.    FLECAINIDE  (TAMBOCOR) 50 MG TAB    Take 1 tablet (50 mg total) by mouth 2 (two) times daily.    GLIMEPIRIDE (AMARYL) 4 MG TABLET    TAKE 1 TABLET BY MOUTH IN THE MORNING WITH BREAKFAST    HYDROCHLOROTHIAZIDE (HYDRODIURIL) 12.5 MG TAB    TAKE 1 TABLET BY MOUTH EVERY DAY    HYDROCODONE-ACETAMINOPHEN (NORCO) 5-325 MG PER TABLET    TAKE 1 TABLET BY MOUTH EVERY 8 (EIGHT) HOURS AS NEEDED FOR PAIN FOR UP TO 10 DOSES.    HYDROCORTISONE 2.5 % CREAM    Apply topically 2 (two) times daily.    LANCETS (ACCU-CHEK SOFTCLIX LANCETS) MISC    100 lancets by Misc.(Non-Drug; Combo Route) route 2 (two) times daily. Insurance preferred lancets Dx:E11.22    LEVALBUTEROL (XOPENEX HFA) 45 MCG/ACTUATION INHALER    INHALE 1-2 PUFFS INTO THE LUNGS EVERY 6 (SIX) HOURS AS NEEDED FOR WHEEZING. RESCUE    LINACLOTIDE (LINZESS) 145 MCG CAP CAPSULE    Take 1 capsule (145 mcg total) by mouth before breakfast.    LOSARTAN (COZAAR) 50 MG TABLET    TAKE 1 TABLET BY MOUTH TWICE A DAY    MECLIZINE (ANTIVERT) 25 MG TABLET    Take 1 tablet (25 mg total) by mouth daily as needed for Dizziness.    METFORMIN (GLUCOPHAGE-XR) 500 MG ER 24HR TABLET    TAKE 2 TABLETS BY MOUTH EVERY DAY    METOPROLOL SUCCINATE (TOPROL-XL) 50 MG 24 HR TABLET    TAKE 1 TABLET BY MOUTH EVERY DAY    MUPIROCIN (BACTROBAN) 2 % OINTMENT    Apply topically 3 (three) times daily.    TACROLIMUS (PROTOPIC) 0.1 % OINTMENT    Apply topically 2 (two) times daily.    XARELTO 15 MG TAB    TAKE 1 TABLET (15 MG TOTAL) BY MOUTH DAILY WITH DINNER OR EVENING MEAL.     Review of patient's allergies indicates:   Allergen Reactions    Pravachol [pravastatin] Nausea Only       Physical Exam:   There is no height or weight on file to calculate BMI.    GENERAL: Well appearing, in no acute distress.  HEAD: Normocephalic and atraumatic.  ENT: External ears and nose grossly normal.  EYES: EOMI bilaterally  PULMONARY: Respirations are grossly even and non-labored.  NEURO: Awake, alert, and oriented x 3.  SKIN: No obvious  tanya appreciated.  PSYCH: Mood & affect are appropriate.    Detailed MSK exam:     Right hip exam:  -ROM: internal rotation 30, external rotation 30  -DAVID negative, FADIR negative, Jagdeep negative  -Muscle strength 5/5 abduction, adduction, flexion, extension  -positive log roll  -Negative straight leg raise  -TTP greater trochanter and SI joint    Left hip exam:  -ROM: internal rotation 30, external rotation 30  -DAVID negative, FADIR negative, Jagdeep negative  -Muscle strength 5/5 abduction, adduction, flexion, extension  -positive log roll  -Negative straight leg raise  -TTP greater trochanter and SI joint      Imaging:    Relevant imaging results were reviewed and interpreted by me and per my read shows mild arthritic changes.  This was discussed with the patient and / or family today.     Assessment:  Saul Mar is a 82 y.o. female following up for bilateral hip pain.   Plan: offered steroid injections into greater trochanteric bursa but patient would like to hold off at this time. Continue conservative management with lidocaine patches, voltaren gel, ice/heat, PT.   Follow up as needed. She can call at any time if she wants to try steroid injections. All questions answered.     Primary osteoarthritis of both hips    Greater trochanteric pain syndrome of both lower extremities    SI joint arthritis         Electronically signed:  Florian Chavez MD, MPH  12/21/2022  11:10 AM

## 2022-12-30 ENCOUNTER — CLINICAL SUPPORT (OUTPATIENT)
Dept: REHABILITATION | Facility: HOSPITAL | Age: 82
End: 2022-12-30
Payer: MEDICARE

## 2022-12-30 ENCOUNTER — DOCUMENTATION ONLY (OUTPATIENT)
Dept: REHABILITATION | Facility: HOSPITAL | Age: 82
End: 2022-12-30
Payer: MEDICARE

## 2022-12-30 DIAGNOSIS — R53.1 DECREASED STRENGTH, ENDURANCE, AND MOBILITY: Primary | ICD-10-CM

## 2022-12-30 DIAGNOSIS — R26.9 GAIT DISORDER: ICD-10-CM

## 2022-12-30 DIAGNOSIS — R68.89 DECREASED STRENGTH, ENDURANCE, AND MOBILITY: Primary | ICD-10-CM

## 2022-12-30 DIAGNOSIS — R29.3 POSTURE ABNORMALITY: ICD-10-CM

## 2022-12-30 DIAGNOSIS — Z74.09 DECREASED STRENGTH, ENDURANCE, AND MOBILITY: Primary | ICD-10-CM

## 2022-12-30 DIAGNOSIS — M54.50 LUMBAR PAIN: ICD-10-CM

## 2022-12-30 PROCEDURE — 97110 THERAPEUTIC EXERCISES: CPT | Mod: CQ

## 2022-12-30 NOTE — PROGRESS NOTES
[facility]   Physical Therapy Treatment Note     Name: Saul Kirkpatrick Zaid  [facility] Number: [id number]    Therapy Diagnosis:   Encounter Diagnoses   Name Primary?    Decreased strength, endurance, and mobility Yes    Gait disorder     Lumbar pain     Posture abnormality      Physician: Florian Chavez MD    Visit Date: 12/30/2022    Physician Orders: PT Eval and Treat   Medical Diagnosis from Referral:   Primary osteoarthritis of both hips   Limited mobility   Evaluation Date: 11/8/2022  Authorization Period Expiration: 12/6/2022  Plan of Care Expiration: 2/8/2023  Progress Note Due: 01/12/2023  Visit # / Visits authorized: 6/11 + eval   FOTO: 2/ 3   PTA Visit #: 1/5     Precautions: Standard    Time In: 1010 (late arrival)  Time Out: 1045  Total Billable Time: 35 minutes    SUBJECTIVE     Patient reports: that she has pain in her right thigh and hamstring but that the pain is coming from her back. She notices that the pain is increased when standing for long periods and decreased apin with sitting. She does report that standing on softer surfaces such as at the gym and stores does not seem to irritate her lower back nearly as much. Adds she feels like she needs something on her right lower extremity for added support.     She was compliant with home exercise program but not all the exercises.    Response to previous treatment: felt pretty good    Functional change: cannot stand for long periods of time (30 minutes) without putting on back brace making cooking difficult and requiring her to cook sitting on a stool and other ACTIVITIES OF DAILY LIVING, needs assistive device for walking (rollator),     Pain: 7/10 with standing/walking  Location: low back, right > left side LOWER EXTREMITY nerve pain    OBJECTIVE     Objective Measures updated at progress report unless specified.         Treatment     Saul received the treatments listed below:      therapeutic exercises to develop  strength, endurance, ROM, flexibility, posture, and core stabilization for 35 minutes including: bolded exercises were performed today  Bicycle  - 5 minutes level 1  Ball squeezes 3 seconds hold 3 minutes  Clam shells  - 10 second holds x 2 minutes - red theraband  Lower trunk rotations 3 minutes  Seated sciatic nerve glides 30x each bilateral (hands behind back, knee extended, dorsiflexing foot and returning to neutral. No head movement)   Bridges 3 x 10  Forward Bending 3 minutes 3 directions (flexed posture feels good to patient)  Pelvic tilts - 10 second holds x 2 minutes    PT measured patient's right lower extremity for Tubigrip and it was donned by patient before leaving this session.    Patient Education and Home Exercises     Home Exercises Provided and Patient Education Provided     Education provided:     Written Home Exercises Provided: Patient instructed to cont prior HEP. Exercises were reviewed and Saul was able to demonstrate them prior to the end of the session.  Saul demonstrated good  understanding of the education provided. See EMR under Patient Instructions for exercises provided during therapy sessions    ASSESSMENT   The patient came to this session a few minutes late due to traffic and weather because of this and her being measured for Tubigrip, she was able to perform less exercises. She reports feeling good after this session with her leaving with the Tubigrip on.     Saul Is progressing well towards her goals.   Patient prognosis is Fair.     Patient will continue to benefit from skilled outpatient physical therapy to address the deficits listed in the problem list box on initial evaluation, provide pt/family education and to maximize pt's level of independence in the home and community environment.     Patient's spiritual, cultural and educational needs considered and pt agreeable to plan of care and goals.     Anticipated barriers to physical therapy: Arthritis, Back pain, Congestive  Heart Failure or Heart Disease, Diabetes Type I or II,  Gastrointestinal Disease, Headaches, High Blood Pressure, Kidney, Bladder, Prostate or Urination Problems, Prior Surgery,  Stroke or TIA, Visual Impairment    Goals: Reviewed:12/13/2022    Short Term Goals: In 4 weeks   1.Patient to be educated on HEP. MET 12/13/2022  2. Patient  to increase lumbar ROM to WFL's without pain, in order to improve available range of motion for ADL's.  PROGRESSING 12/13/2022  3. Patient  to increase hip AROM to ER 50 deg and extn to 0 deg, in order to assist with more normalized gait pattern. PROGRESSING 12/13/2022  4. Patient  to increase B LE and core strength to 4-/5, in order to improve endurance and increase ability to ambulate for increased time and improve ability to perform squat and sit to stand. PROGRESSING 12/13/2022  5. Patient  to have pain less than 2/10 at worst, to improve QOL. PROGRESSING 12/13/2022  6. Patient  to improve score on the FOTO, to improve QOL. PROGRESSING 12/13/2022  7. Patient  to be educated on postural/body mechanics awareness. MET 12/13/2022     Long Term Goals: In 8 weeks  1.Patient to improve score on the FOTO to 52% or less, to improve QOL. PROGRESSING 12/13/2022  2. Patient to demo increase in LE and core strength to 4/5, n order to improve endurance and increase ability to ambulate for increased time and improve ability to perform squat and sit to stand. PROGRESSING 12/13/2022  3. Patient to have decreased pain to 2/10 at worst, to improve QOL. PROGRESSING 12/13/2022  4. Patient to demo increase lumbar ROM to extension +5 deg, b Rot 75%, B SB 25 deg, in order to improve available range of motion for ADL's.  PROGRESSING 12/13/2022  5. Patient  to increase hip AROM to ER 50 deg and extn to 0 deg, in order to assist with more normalized gait pattern. PROGRESSING 12/13/2022  6. Patient to perform daily activities including walking, sit to stand, and daily activities including housekeeping chores  without increased symptoms. PROGRESSING 12/13/2022    PLAN   Plan of care Certification: 11/8/2022 to 2/8/2023.    Continue Plan of Care (POC) and progress per patient tolerance.     Florian Gordon, PTA

## 2022-12-30 NOTE — PROGRESS NOTES
PT/PTA met face to face to discuss pt's treatment plan and progress towards established goals. Pt will be seen by a physical therapist minimally every 6th visit or every 30 days.        Florian Gordon PTA

## 2023-01-06 ENCOUNTER — CLINICAL SUPPORT (OUTPATIENT)
Dept: REHABILITATION | Facility: HOSPITAL | Age: 83
End: 2023-01-06
Payer: MEDICARE

## 2023-01-06 DIAGNOSIS — R53.1 DECREASED STRENGTH, ENDURANCE, AND MOBILITY: Primary | ICD-10-CM

## 2023-01-06 DIAGNOSIS — Z74.09 DECREASED STRENGTH, ENDURANCE, AND MOBILITY: Primary | ICD-10-CM

## 2023-01-06 DIAGNOSIS — R26.9 GAIT DISORDER: ICD-10-CM

## 2023-01-06 DIAGNOSIS — M54.50 LUMBAR PAIN: ICD-10-CM

## 2023-01-06 DIAGNOSIS — R68.89 DECREASED STRENGTH, ENDURANCE, AND MOBILITY: Primary | ICD-10-CM

## 2023-01-06 DIAGNOSIS — R29.3 POSTURE ABNORMALITY: ICD-10-CM

## 2023-01-06 PROCEDURE — 97110 THERAPEUTIC EXERCISES: CPT

## 2023-01-06 NOTE — PROGRESS NOTES
[facility]   Physical Therapy Treatment Note     Name: Saul Kirkpatrick [facility] Number: [id number]    Therapy Diagnosis:   Encounter Diagnoses   Name Primary?    Decreased strength, endurance, and mobility Yes    Gait disorder     Lumbar pain     Posture abnormality      Physician: Florian Chavez MD    Visit Date: 1/6/2023    Physician Orders: PT Eval and Treat   Medical Diagnosis from Referral:   Primary osteoarthritis of both hips   Limited mobility   Evaluation Date: 11/8/2022  Authorization Period Expiration: 12/6/2022  Plan of Care Expiration: 2/8/2023  Progress Note Due: 01/12/2023  Visit # / Visits authorized: 1/3  FOTO: 2/ 3   PTA Visit #: 0/5     Precautions: Standard    Time In: 1000  Time Out: 1045  Total Billable Time: 45 minutes    SUBJECTIVE     Patient reports: that she has experienced pain relief at the lower back.  Still with some complaint of pain at the right lateral thigh.  She does report of some relief since she has started wearing compression sleeve.    She was compliant with home exercise program but not all the exercises.    Response to previous treatment: felt pretty good    Functional change: cannot stand for long periods of time (30 minutes) without putting on back brace making cooking difficult and requiring her to cook sitting on a stool and other ACTIVITIES OF DAILY LIVING, needs assistive device for walking (rollator),     Pain: 7/10 with standing/walking  Location: low back, right > left side LOWER EXTREMITY nerve pain    OBJECTIVE     Objective Measures updated at progress report unless specified.       Hip Right  12/8/2022 Left  12/8/2022 Right  1/6/2023 Left  1/6/2023   Hip flex 112 105 116  118   Hip ER (supine) 34 32 44  40   Hip IR (supine) 22 24 30  32   Hip Ext (sidelying) -10 -10 -6  -8   PSLR NT NT 68 72       Treatment     Saul received the treatments listed below:      therapeutic exercises to develop strength, endurance, ROM,  flexibility, posture, and core stabilization for 45 minutes including: bolded exercises were performed today  Bicycle  - 5 minutes level 1  Ball squeezes 3 seconds hold 3 minutes  Clam shells  - 10 second holds x 2 minutes - red theraband  Lower trunk rotations 3 minutes  Seated sciatic nerve glides 30x each bilateral (hands behind back, knee extended, dorsiflexing foot and returning to neutral. No head movement)   Bridges 3 x 10  Forward Bending 3 minutes 3 directions (flexed posture feels good to patient)  Pelvic tilts - 10 second holds x 2 minutes      Patient Education and Home Exercises     Home Exercises Provided and Patient Education Provided     Education provided:     Written Home Exercises Provided: Patient instructed to cont prior HEP. Exercises were reviewed and Saul was able to demonstrate them prior to the end of the session.  Saul demonstrated good  understanding of the education provided. See EMR under Patient Instructions for exercises provided during therapy sessions    ASSESSMENT   The patient reports that she continues to experience some pain at the right posterior thigh.  However, she does report that lower back pain is significantly decreased.  Working on improved flexibility into right hip flexion and PSLR.  She does report that modified Ant test stretching (leg not hanging off the edge) provides the greatest stretch.  Will issue updated HEP for patient to continue progressing flexibility, strength, and pain relief.    Saul Is progressing well towards her goals.   Patient prognosis is Fair.     Patient will continue to benefit from skilled outpatient physical therapy to address the deficits listed in the problem list box on initial evaluation, provide pt/family education and to maximize pt's level of independence in the home and community environment.     Patient's spiritual, cultural and educational needs considered and pt agreeable to plan of care and goals.     Anticipated barriers to  physical therapy: Arthritis, Back pain, Congestive Heart Failure or Heart Disease, Diabetes Type I or II,  Gastrointestinal Disease, Headaches, High Blood Pressure, Kidney, Bladder, Prostate or Urination Problems, Prior Surgery,  Stroke or TIA, Visual Impairment    Goals: Reviewed:12/13/2022    Short Term Goals: In 4 weeks   1.Patient to be educated on HEP. MET 1/6/2023  2. Patient  to increase lumbar ROM to WFL's without pain, in order to improve available range of motion for ADL's.  PROGRESSING 1/6/2023  3. Patient  to increase hip AROM to ER 50 deg and extn to 0 deg, in order to assist with more normalized gait pattern. PROGRESSING 1/6/2023  4. Patient  to increase B LE and core strength to 4-/5, in order to improve endurance and increase ability to ambulate for increased time and improve ability to perform squat and sit to stand. PROGRESSING 1/6/2023  5. Patient  to have pain less than 2/10 at worst, to improve QOL. PROGRESSING 1/6/2023  6. Patient  to improve score on the FOTO, to improve QOL. PROGRESSING 1/6/2023  7. Patient  to be educated on postural/body mechanics awareness. MET 1/6/2023     Long Term Goals: In 8 weeks  1.Patient to improve score on the FOTO to 52% or less, to improve QOL. PROGRESSING 1/6/2023  2. Patient to demo increase in LE and core strength to 4/5, n order to improve endurance and increase ability to ambulate for increased time and improve ability to perform squat and sit to stand. PROGRESSING 1/6/2023  3. Patient to have decreased pain to 2/10 at worst, to improve QOL. PROGRESSING 1/6/2023  4. Patient to demo increase lumbar ROM to extension +5 deg, b Rot 75%, B SB 25 deg, in order to improve available range of motion for ADL's.  PROGRESSING 1/6/2023  5. Patient  to increase hip AROM to ER 50 deg and extn to 0 deg, in order to assist with more normalized gait pattern. PROGRESSING 1/6/2023  6. Patient to perform daily activities including walking, sit to stand, and daily activities  including housekeeping chores without increased symptoms. PROGRESSING 1/6/2023    PLAN   Plan of care Certification: 11/8/2022 to 2/8/2023.    Continue Plan of Care (POC) and progress per patient tolerance.     Martin Ramirez, PT

## 2023-01-11 ENCOUNTER — OFFICE VISIT (OUTPATIENT)
Dept: INTERNAL MEDICINE | Facility: CLINIC | Age: 83
End: 2023-01-11
Payer: MEDICARE

## 2023-01-11 VITALS
WEIGHT: 181.69 LBS | OXYGEN SATURATION: 98 % | HEART RATE: 64 BPM | SYSTOLIC BLOOD PRESSURE: 130 MMHG | BODY MASS INDEX: 30.23 KG/M2 | DIASTOLIC BLOOD PRESSURE: 60 MMHG | TEMPERATURE: 98 F

## 2023-01-11 DIAGNOSIS — E11.22 DIABETES MELLITUS WITH STAGE 3 CHRONIC KIDNEY DISEASE: Primary | ICD-10-CM

## 2023-01-11 DIAGNOSIS — N18.30 DIABETES MELLITUS WITH STAGE 3 CHRONIC KIDNEY DISEASE: Primary | ICD-10-CM

## 2023-01-11 DIAGNOSIS — L20.9 XEROSIS DUE TO ATOPIC DERMATITIS: ICD-10-CM

## 2023-01-11 PROCEDURE — 3078F DIAST BP <80 MM HG: CPT | Mod: CPTII,S$GLB,, | Performed by: EMERGENCY MEDICINE

## 2023-01-11 PROCEDURE — 99213 OFFICE O/P EST LOW 20 MIN: CPT | Mod: S$GLB,,, | Performed by: EMERGENCY MEDICINE

## 2023-01-11 PROCEDURE — 1159F MED LIST DOCD IN RCRD: CPT | Mod: CPTII,S$GLB,, | Performed by: EMERGENCY MEDICINE

## 2023-01-11 PROCEDURE — 1126F AMNT PAIN NOTED NONE PRSNT: CPT | Mod: CPTII,S$GLB,, | Performed by: EMERGENCY MEDICINE

## 2023-01-11 PROCEDURE — 3078F PR MOST RECENT DIASTOLIC BLOOD PRESSURE < 80 MM HG: ICD-10-PCS | Mod: CPTII,S$GLB,, | Performed by: EMERGENCY MEDICINE

## 2023-01-11 PROCEDURE — 99213 PR OFFICE/OUTPT VISIT, EST, LEVL III, 20-29 MIN: ICD-10-PCS | Mod: S$GLB,,, | Performed by: EMERGENCY MEDICINE

## 2023-01-11 PROCEDURE — 1101F PT FALLS ASSESS-DOCD LE1/YR: CPT | Mod: CPTII,S$GLB,, | Performed by: EMERGENCY MEDICINE

## 2023-01-11 PROCEDURE — 3288F FALL RISK ASSESSMENT DOCD: CPT | Mod: CPTII,S$GLB,, | Performed by: EMERGENCY MEDICINE

## 2023-01-11 PROCEDURE — 3075F PR MOST RECENT SYSTOLIC BLOOD PRESS GE 130-139MM HG: ICD-10-PCS | Mod: CPTII,S$GLB,, | Performed by: EMERGENCY MEDICINE

## 2023-01-11 PROCEDURE — 3075F SYST BP GE 130 - 139MM HG: CPT | Mod: CPTII,S$GLB,, | Performed by: EMERGENCY MEDICINE

## 2023-01-11 PROCEDURE — 1159F PR MEDICATION LIST DOCUMENTED IN MEDICAL RECORD: ICD-10-PCS | Mod: CPTII,S$GLB,, | Performed by: EMERGENCY MEDICINE

## 2023-01-11 PROCEDURE — 3288F PR FALLS RISK ASSESSMENT DOCUMENTED: ICD-10-PCS | Mod: CPTII,S$GLB,, | Performed by: EMERGENCY MEDICINE

## 2023-01-11 PROCEDURE — 99999 PR PBB SHADOW E&M-EST. PATIENT-LVL IV: ICD-10-PCS | Mod: PBBFAC,,, | Performed by: EMERGENCY MEDICINE

## 2023-01-11 PROCEDURE — 1101F PR PT FALLS ASSESS DOC 0-1 FALLS W/OUT INJ PAST YR: ICD-10-PCS | Mod: CPTII,S$GLB,, | Performed by: EMERGENCY MEDICINE

## 2023-01-11 PROCEDURE — 1126F PR PAIN SEVERITY QUANTIFIED, NO PAIN PRESENT: ICD-10-PCS | Mod: CPTII,S$GLB,, | Performed by: EMERGENCY MEDICINE

## 2023-01-11 PROCEDURE — 99999 PR PBB SHADOW E&M-EST. PATIENT-LVL IV: CPT | Mod: PBBFAC,,, | Performed by: EMERGENCY MEDICINE

## 2023-01-11 NOTE — PROGRESS NOTES
Subjective:       Patient ID: Saul Mar is a 82 y.o. female.    Chief Complaint: itching breast    HPI    82 yr AAF, hx of DM with Neuropathy, retinopathy, Afib on Xarelto, back pain- Sciatica, getting PT/OT, HgS, PAD, here c/o itching R breast x 1 month, no rash. Was told last month that it was the soap causing it, so she changed the soap but no change. Lived independently, drives car, no other issues. Last AIC 7.7.      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 Taveras    COLONOSCOPY      COLONOSCOPY N/A 9/22/2016    Procedure: COLONOSCOPY;  Surgeon: Jd Ashton MD;  Location: [address]);  Service: Endoscopy;  Laterality: N/A;  OK per Dr Randolph for pt to hold Plavix 5 days prior to procedure/see telephone encounter dated 8/29/16/svn    EYE SURGERY Bilateral 2002 approx    Laser for glaucoma    HYSTERECTOMY  1963     AMEENA/USO- fibroids; no cancer    OOPHORECTOMY  1963    unknown, only removed one    SELECTIVE INJECTION OF ANESTHETIC AGENT AROUND LUMBAR SPINAL NERVE ROOT BY TRANSFORAMINAL APPROACH Bilateral 6/17/2022    Procedure: Bilateral L4/5 TF JASKARAN with RN IV sedation;  Surgeon: Chan Fonseca MD;  Location: [facility];  Service: Pain Management;  Laterality: Bilateral;    SELECTIVE  INJECTION OF ANESTHETIC AGENT AROUND LUMBAR SPINAL NERVE ROOT BY TRANSFORAMINAL APPROACH Bilateral 10/3/2022    Procedure: Bilateral L2-3 transforaminal epidural steroid injection with RN IV sedation;  Surgeon: Chan Fonseca MD;  Location: [facility]IN [facility]GT;  Service: Pain Management;  Laterality: Bilateral;     Past Surgical History:   Procedure Laterality Date    CATARACT EXTRACTION W/  INTRAOCULAR LENS IMPLANT Left 02/27/2013    Dr. Taveras    COLONOSCOPY      COLONOSCOPY N/A 9/22/2016    Procedure: COLONOSCOPY;  Surgeon: Jd Asthon MD;  Location: [facility] ENDO (4TH FLR);  Service: Endoscopy;  Laterality: N/A;  OK per Dr Randolph for pt to hold Plavix 5 days prior to procedure/see telephone encounter dated 8/29/16/svn    EYE SURGERY Bilateral 2002 approx    Laser for glaucoma    HYSTERECTOMY  1963     AMEENA/USO- fibroids; no cancer    OOPHORECTOMY  1963    unknown, only removed one    SELECTIVE INJECTION OF ANESTHETIC AGENT AROUND LUMBAR SPINAL NERVE ROOT BY TRANSFORAMINAL APPROACH Bilateral 6/17/2022    Procedure: Bilateral L4/5 TF JASKARAN with RN IV sedation;  Surgeon: Chan Fonseca MD;  Location: [facility];  Service: Pain Management;  Laterality: Bilateral;    SELECTIVE INJECTION OF ANESTHETIC AGENT AROUND LUMBAR SPINAL NERVE ROOT BY TRANSFORAMINAL APPROACH Bilateral 10/3/2022    Procedure: Bilateral L2-3 transforaminal epidural steroid injection with RN IV sedation;  Surgeon: Chan Fonseca MD;  Location: [facility];  Service: Pain Management;  Laterality: Bilateral;     Social History     Socioeconomic History    Marital status:     Number of children: 1   Tobacco Use    Smoking status: Never    Smokeless tobacco: Never   Substance and Sexual Activity    Alcohol use: No    Drug use: No    Sexual activity: Not Currently     Partners: Male   Social History Narrative    , no pets or smokers in household. 1 Child who lives in nursing home. She has a grandson who lives in [facility]  [place].. Retired from [employer] . Still drives. Does not have a Living Will or advanced directive.      Social Determinants of Health     Financial Resource Strain: High Risk    Difficulty of Paying Living Expenses: Hard   Food Insecurity: Food Insecurity Present    Worried About Running Out of Food in the Last Year: Often true    Ran Out of Food in the Last Year: Often true   Transportation Needs: Unmet Transportation Needs    Lack of Transportation (Medical): Yes    Lack of Transportation (Non-Medical): Yes   Physical Activity: Inactive    Days of Exercise per Week: 0 days    Minutes of Exercise per Session: 0 min   Stress: No Stress Concern Present    Feeling of Stress : Not at all   Social Connections: Socially Isolated    Frequency of Communication with Friends and Family: Once a week    Frequency of Social Gatherings with Friends and Family: Once a week    Attends Jew Services: 1 to 4 times per year    Active Member of Clubs or Organizations: No    Attends Club or Organization Meetings: Never    Marital Status:    Housing Stability: Low Risk     Unable to Pay for Housing in the Last Year: No    Number of Places Lived in the Last Year: 1    Unstable Housing in the Last Year: No     Review of patient's allergies indicates:   Allergen Reactions    Pravachol [pravastatin] Nausea Only     Current Outpatient Medications   Medication Sig    albuterol (PROVENTIL/VENTOLIN HFA) 90 mcg/actuation inhaler INHALE 2 PUFFS INTO THE LUNGS EVERY 6 (SIX) HOURS AS NEEDED FOR WHEEZING. RESCUE    ALPRAZolam (XANAX) 0.5 MG tablet TAKE 1 TABLET BY MOUTH NIGHTLY AS NEEDED FOR ANXIETY (MAY TAKE 1-2 TIMES WEEKLY FOR ANXIETY)    amLODIPine (NORVASC) 5 MG tablet Take 1 tablet (5 mg total) by mouth 2 (two) times daily.    atorvastatin (LIPITOR) 80 MG tablet TAKE 1 TABLET BY MOUTH EVERY DAY    blood sugar diagnostic Strp 1 strip by Misc.(Non-Drug; Combo Route) route 2 (two) times daily. Insurance  preferred test stripes DX:E11.22    blood-glucose meter kit Insurance preferred meter dx:E11.22    brimonidine 0.2% (ALPHAGAN) 0.2 % Drop Place 1 drop into both eyes 3 (three) times daily.    carvediloL (COREG) 12.5 MG tablet Take 12.5 mg by mouth.    cholecalciferol, vitamin D3, (VITAMIN D3) 1,000 unit capsule Take 1 capsule (1,000 Units total) by mouth once daily.    famotidine (PEPCID) 20 MG tablet Take 20 mg by mouth Daily.    ferrous sulfate (FEOSOL) 325 mg (65 mg iron) Tab tablet Take 1 tablet (325 mg total) by mouth 2 (two) times daily.    flecainide (TAMBOCOR) 50 MG Tab Take 1 tablet (50 mg total) by mouth 2 (two) times daily.    glimepiride (AMARYL) 4 MG tablet TAKE 1 TABLET BY MOUTH IN THE MORNING WITH BREAKFAST    hydroCHLOROthiazide (HYDRODIURIL) 12.5 MG Tab TAKE 1 TABLET BY MOUTH EVERY DAY    HYDROcodone-acetaminophen (NORCO) 5-325 mg per tablet TAKE 1 TABLET BY MOUTH EVERY 8 (EIGHT) HOURS AS NEEDED FOR PAIN FOR UP TO 10 DOSES.    hydrocortisone 2.5 % cream Apply topically 2 (two) times daily.    lancets (ACCU-CHEK SOFTCLIX LANCETS) Misc 100 lancets by Misc.(Non-Drug; Combo Route) route 2 (two) times daily. Insurance preferred lancets Dx:E11.22    levalbuterol (XOPENEX HFA) 45 mcg/actuation inhaler INHALE 1-2 PUFFS INTO THE LUNGS EVERY 6 (SIX) HOURS AS NEEDED FOR WHEEZING. RESCUE    linaCLOtide (LINZESS) 145 mcg Cap capsule Take 1 capsule (145 mcg total) by mouth before breakfast.    losartan (COZAAR) 50 MG tablet TAKE 1 TABLET BY MOUTH TWICE A DAY    meclizine (ANTIVERT) 25 mg tablet Take 1 tablet (25 mg total) by mouth daily as needed for Dizziness.    metFORMIN (GLUCOPHAGE-XR) 500 MG ER 24hr tablet TAKE 2 TABLETS BY MOUTH EVERY DAY    metoprolol succinate (TOPROL-XL) 50 MG 24 hr tablet TAKE 1 TABLET BY MOUTH EVERY DAY    mupirocin (BACTROBAN) 2 % ointment Apply topically 3 (three) times daily.    tacrolimus (PROTOPIC) 0.1 % ointment Apply topically 2 (two) times daily.    XARELTO  15 mg Tab TAKE 1 TABLET (15 MG TOTAL) BY MOUTH DAILY WITH DINNER OR EVENING MEAL.     No current facility-administered medications for this visit.           Review of Systems   Constitutional: Negative.    HENT: Negative.     Respiratory: Negative.     Cardiovascular: Negative.    Gastrointestinal: Negative.    Musculoskeletal:  Positive for back pain and gait problem.   Skin:  Positive for rash.        Itching R breast     Objective:      Physical Exam  Vitals and nursing note reviewed.   Constitutional:       General: She is not in acute distress.     Appearance: Normal appearance. She is not ill-appearing.   HENT:      Right Ear: External ear normal.      Left Ear: External ear normal.      Mouth/Throat:      Mouth: Mucous membranes are moist.      Pharynx: Oropharynx is clear.   Eyes:      General: No scleral icterus.     Extraocular Movements: Extraocular movements intact.      Conjunctiva/sclera: Conjunctivae normal.      Pupils: Pupils are equal, round, and reactive to light.   Cardiovascular:      Rate and Rhythm: Normal rate and regular rhythm.      Pulses: Normal pulses.      Heart sounds: Normal heart sounds.   Pulmonary:      Effort: Pulmonary effort is normal.      Breath sounds: Normal breath sounds.   Musculoskeletal:      Cervical back: Normal range of motion and neck supple.   Skin:     General: Skin is warm and dry.      Coloration: Skin is not jaundiced.      Findings: No bruising or rash.      Comments: Both breasts appear normal with normal dry skin, no rash, no flakes. Normal nipple and areola, no lumps or masses B.   Neurological:      Mental Status: She is alert.       Assessment:     Vitals:    01/11/23 1002   BP: 130/60   Pulse: 64   Temp: 97.5 °F (36.4 °C)         No diagnosis found.    Plan:   There are no diagnoses linked to this encounter.

## 2023-01-13 ENCOUNTER — PES CALL (OUTPATIENT)
Dept: ADMINISTRATIVE | Facility: CLINIC | Age: 83
End: 2023-01-13
Payer: MEDICARE

## 2023-01-26 ENCOUNTER — OFFICE VISIT (OUTPATIENT)
Dept: OPHTHALMOLOGY | Facility: CLINIC | Age: 83
End: 2023-01-26
Payer: MEDICARE

## 2023-01-26 DIAGNOSIS — H40.1222 LOW-TENSION GLAUCOMA, LEFT EYE, MODERATE STAGE: ICD-10-CM

## 2023-01-26 DIAGNOSIS — E11.3213 BOTH EYES AFFECTED BY MILD NONPROLIFERATIVE DIABETIC RETINOPATHY WITH MACULAR EDEMA, ASSOCIATED WITH TYPE 2 DIABETES MELLITUS: Primary | ICD-10-CM

## 2023-01-26 DIAGNOSIS — H25.11 AGE-RELATED NUCLEAR CATARACT, RIGHT: ICD-10-CM

## 2023-01-26 DIAGNOSIS — H40.1211 LOW-TENSION GLAUCOMA, RIGHT EYE, MILD STAGE: ICD-10-CM

## 2023-01-26 PROCEDURE — 92134 CPTRZ OPH DX IMG PST SGM RTA: CPT | Mod: S$GLB,,, | Performed by: OPHTHALMOLOGY

## 2023-01-26 PROCEDURE — 1126F AMNT PAIN NOTED NONE PRSNT: CPT | Mod: CPTII,S$GLB,, | Performed by: OPHTHALMOLOGY

## 2023-01-26 PROCEDURE — 1159F PR MEDICATION LIST DOCUMENTED IN MEDICAL RECORD: ICD-10-PCS | Mod: CPTII,S$GLB,, | Performed by: OPHTHALMOLOGY

## 2023-01-26 PROCEDURE — 99214 OFFICE O/P EST MOD 30 MIN: CPT | Mod: S$GLB,,, | Performed by: OPHTHALMOLOGY

## 2023-01-26 PROCEDURE — 92134 OCT, RETINA - OU - BOTH EYES: ICD-10-PCS | Mod: S$GLB,,, | Performed by: OPHTHALMOLOGY

## 2023-01-26 PROCEDURE — 1101F PR PT FALLS ASSESS DOC 0-1 FALLS W/OUT INJ PAST YR: ICD-10-PCS | Mod: CPTII,S$GLB,, | Performed by: OPHTHALMOLOGY

## 2023-01-26 PROCEDURE — 99999 PR PBB SHADOW E&M-EST. PATIENT-LVL III: CPT | Mod: PBBFAC,,, | Performed by: OPHTHALMOLOGY

## 2023-01-26 PROCEDURE — 3288F FALL RISK ASSESSMENT DOCD: CPT | Mod: CPTII,S$GLB,, | Performed by: OPHTHALMOLOGY

## 2023-01-26 PROCEDURE — 99214 PR OFFICE/OUTPT VISIT, EST, LEVL IV, 30-39 MIN: ICD-10-PCS | Mod: S$GLB,,, | Performed by: OPHTHALMOLOGY

## 2023-01-26 PROCEDURE — 1159F MED LIST DOCD IN RCRD: CPT | Mod: CPTII,S$GLB,, | Performed by: OPHTHALMOLOGY

## 2023-01-26 PROCEDURE — 1160F RVW MEDS BY RX/DR IN RCRD: CPT | Mod: CPTII,S$GLB,, | Performed by: OPHTHALMOLOGY

## 2023-01-26 PROCEDURE — 99999 PR PBB SHADOW E&M-EST. PATIENT-LVL III: ICD-10-PCS | Mod: PBBFAC,,, | Performed by: OPHTHALMOLOGY

## 2023-01-26 PROCEDURE — 1101F PT FALLS ASSESS-DOCD LE1/YR: CPT | Mod: CPTII,S$GLB,, | Performed by: OPHTHALMOLOGY

## 2023-01-26 PROCEDURE — 3288F PR FALLS RISK ASSESSMENT DOCUMENTED: ICD-10-PCS | Mod: CPTII,S$GLB,, | Performed by: OPHTHALMOLOGY

## 2023-01-26 PROCEDURE — 1160F PR REVIEW ALL MEDS BY PRESCRIBER/CLIN PHARMACIST DOCUMENTED: ICD-10-PCS | Mod: CPTII,S$GLB,, | Performed by: OPHTHALMOLOGY

## 2023-01-26 PROCEDURE — 1126F PR PAIN SEVERITY QUANTIFIED, NO PAIN PRESENT: ICD-10-PCS | Mod: CPTII,S$GLB,, | Performed by: OPHTHALMOLOGY

## 2023-01-26 NOTE — PROGRESS NOTES
HPI    Consult for BDR per referral by Dr. Darcy MD    DLS: 11/10/2022 by Dr. Lina Taveras MD     CC: pt some decrease in vision at times . More noticeable when reading.     -blurred Va  -diplopia   -eye pain   -flashes++/floaters OU  -Headaches (on occasions)      Eye Meds: Alphagan OU BID    POHx:   1. LT-Glaucoma  OD (Mild Stage)  1) POAG   2) DM with BDR and ME OU   3) Hx RLL Lesion   4) NS OD   5) PCIOL OS   6) Hx CVA   7) Hx Amaurosis Fugax   8) Eyelid Myokymia         Meds;  Brimonidine TID OU     1) POAG   2) DM with BDR and ME OU   3) Hx RLL Lesion   4) NS OD   5) PCIOL OS   6) Hx CVA   7) Hx Amaurosis Fugax   8) Eyelid Myokymia     Last edited by Kady Barclay MA on 1/26/2023 12:06 PM.       A/P    ICD-10-CM ICD-9-CM   1. 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 Low-tension glaucoma, right eye, mild stage  H40.1211 365.12     365.71   3. Low-tension glaucoma, left eye, moderate stage  H40.1222 365.12     365.72   4. Age-related nuclear cataract, right  H25.11 366.16       1. 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 Taveras for DR/DME check  PCP Penny Randolph MD  09/14/2022  7.7  A1C   Hx of remote focal OU, no injections  VA 20/50 OD, 20/25 OS  Mod NS OD    Mild DR, no significant DME today, prev focal scars OU  Plan: Observation  Recommend good blood pressure control, tight blood glucose control, and good cholesterol control       2. Low-tension glaucoma, right eye, mild stage  3. Low-tension glaucoma, left eye, moderate stage  F/b Dr. Taveras  IOP 18/16  Plan: Continue Present Management     4. Age-related nuclear cataract, right  Mod NS  Plan: Observation for now    RTC 6 mo DFE/OCTm OU       I saw and examined the patient and reviewed in detail the  findings documented. The final examination findings, image interpretations, and plan as documented in the record represent my personal judgment and conclusions.    Steve Davenport MD  Vitreoretinal Surgery   [facility]

## 2023-02-03 ENCOUNTER — LAB VISIT (OUTPATIENT)
Dept: LAB | Facility: HOSPITAL | Age: 83
End: 2023-02-03
Attending: INTERNAL MEDICINE
Payer: MEDICARE

## 2023-02-03 DIAGNOSIS — D50.1 IRON DEFICIENCY ANEMIA DUE TO SIDEROPENIC DYSPHAGIA: ICD-10-CM

## 2023-02-03 DIAGNOSIS — N18.30 STAGE 3 CHRONIC KIDNEY DISEASE, UNSPECIFIED WHETHER STAGE 3A OR 3B CKD: Chronic | ICD-10-CM

## 2023-02-03 DIAGNOSIS — E04.2 MULTINODULAR THYROID: Chronic | ICD-10-CM

## 2023-02-03 DIAGNOSIS — N28.1 RENAL CYST: ICD-10-CM

## 2023-02-03 DIAGNOSIS — E53.8 LOW SERUM VITAMIN B12: ICD-10-CM

## 2023-02-03 LAB
25(OH)D3+25(OH)D2 SERPL-MCNC: 41 NG/ML (ref 30–96)
ALBUMIN SERPL BCP-MCNC: 4.1 G/DL (ref 3.5–5.2)
ALBUMIN SERPL BCP-MCNC: 4.1 G/DL (ref 3.5–5.2)
ALP SERPL-CCNC: 85 U/L (ref 55–135)
ALT SERPL W/O P-5'-P-CCNC: 10 U/L (ref 10–44)
ANION GAP SERPL CALC-SCNC: 9 MMOL/L (ref 8–16)
ANION GAP SERPL CALC-SCNC: 9 MMOL/L (ref 8–16)
AST SERPL-CCNC: 17 U/L (ref 10–40)
BASOPHILS # BLD AUTO: 0.04 K/UL (ref 0–0.2)
BASOPHILS NFR BLD: 0.8 % (ref 0–1.9)
BILIRUB SERPL-MCNC: 0.4 MG/DL (ref 0.1–1)
BUN SERPL-MCNC: 20 MG/DL (ref 8–23)
BUN SERPL-MCNC: 20 MG/DL (ref 8–23)
CALCIUM SERPL-MCNC: 10.2 MG/DL (ref 8.7–10.5)
CALCIUM SERPL-MCNC: 10.2 MG/DL (ref 8.7–10.5)
CHLORIDE SERPL-SCNC: 109 MMOL/L (ref 95–110)
CHLORIDE SERPL-SCNC: 109 MMOL/L (ref 95–110)
CHOLEST SERPL-MCNC: 193 MG/DL (ref 120–199)
CHOLEST/HDLC SERPL: 3.1 {RATIO} (ref 2–5)
CO2 SERPL-SCNC: 24 MMOL/L (ref 23–29)
CO2 SERPL-SCNC: 24 MMOL/L (ref 23–29)
CREAT SERPL-MCNC: 1.3 MG/DL (ref 0.5–1.4)
CREAT SERPL-MCNC: 1.3 MG/DL (ref 0.5–1.4)
DIFFERENTIAL METHOD: ABNORMAL
EOSINOPHIL # BLD AUTO: 0.2 K/UL (ref 0–0.5)
EOSINOPHIL NFR BLD: 4.2 % (ref 0–8)
ERYTHROCYTE [DISTWIDTH] IN BLOOD BY AUTOMATED COUNT: 14.8 % (ref 11.5–14.5)
EST. GFR  (NO RACE VARIABLE): 41.3 ML/MIN/1.73 M^2
EST. GFR  (NO RACE VARIABLE): 41.3 ML/MIN/1.73 M^2
ESTIMATED AVG GLUCOSE: 171 MG/DL (ref 68–131)
FERRITIN SERPL-MCNC: 198 NG/ML (ref 20–300)
GLUCOSE SERPL-MCNC: 70 MG/DL (ref 70–110)
GLUCOSE SERPL-MCNC: 70 MG/DL (ref 70–110)
HBA1C MFR BLD: 7.6 % (ref 4–5.6)
HCT VFR BLD AUTO: 33.6 % (ref 37–48.5)
HDLC SERPL-MCNC: 62 MG/DL (ref 40–75)
HDLC SERPL: 32.1 % (ref 20–50)
HGB BLD-MCNC: 10.7 G/DL (ref 12–16)
IMM GRANULOCYTES # BLD AUTO: 0.01 K/UL (ref 0–0.04)
IMM GRANULOCYTES NFR BLD AUTO: 0.2 % (ref 0–0.5)
IRON SERPL-MCNC: 44 UG/DL (ref 30–160)
LDLC SERPL CALC-MCNC: 120.8 MG/DL (ref 63–159)
LYMPHOCYTES # BLD AUTO: 1.6 K/UL (ref 1–4.8)
LYMPHOCYTES NFR BLD: 34.7 % (ref 18–48)
MCH RBC QN AUTO: 28.7 PG (ref 27–31)
MCHC RBC AUTO-ENTMCNC: 31.8 G/DL (ref 32–36)
MCV RBC AUTO: 90 FL (ref 82–98)
MONOCYTES # BLD AUTO: 0.4 K/UL (ref 0.3–1)
MONOCYTES NFR BLD: 8.2 % (ref 4–15)
NEUTROPHILS # BLD AUTO: 2.5 K/UL (ref 1.8–7.7)
NEUTROPHILS NFR BLD: 51.9 % (ref 38–73)
NONHDLC SERPL-MCNC: 131 MG/DL
NRBC BLD-RTO: 0 /100 WBC
PHOSPHATE SERPL-MCNC: 4 MG/DL (ref 2.7–4.5)
PLATELET # BLD AUTO: 198 K/UL (ref 150–450)
PMV BLD AUTO: 11.5 FL (ref 9.2–12.9)
POTASSIUM SERPL-SCNC: 4.8 MMOL/L (ref 3.5–5.1)
POTASSIUM SERPL-SCNC: 4.8 MMOL/L (ref 3.5–5.1)
PROT SERPL-MCNC: 7.1 G/DL (ref 6–8.4)
RBC # BLD AUTO: 3.73 M/UL (ref 4–5.4)
SATURATED IRON: 14 % (ref 20–50)
SODIUM SERPL-SCNC: 142 MMOL/L (ref 136–145)
SODIUM SERPL-SCNC: 142 MMOL/L (ref 136–145)
TOTAL IRON BINDING CAPACITY: 312 UG/DL (ref 250–450)
TRANSFERRIN SERPL-MCNC: 211 MG/DL (ref 200–375)
TRIGL SERPL-MCNC: 51 MG/DL (ref 30–150)
TSH SERPL DL<=0.005 MIU/L-ACNC: 1.48 UIU/ML (ref 0.4–4)
VIT B12 SERPL-MCNC: 1150 PG/ML (ref 210–950)
WBC # BLD AUTO: 4.73 K/UL (ref 3.9–12.7)

## 2023-02-03 PROCEDURE — 82306 VITAMIN D 25 HYDROXY: CPT | Performed by: INTERNAL MEDICINE

## 2023-02-03 PROCEDURE — 85025 COMPLETE CBC W/AUTO DIFF WBC: CPT | Performed by: INTERNAL MEDICINE

## 2023-02-03 PROCEDURE — 84466 ASSAY OF TRANSFERRIN: CPT | Performed by: INTERNAL MEDICINE

## 2023-02-03 PROCEDURE — 82728 ASSAY OF FERRITIN: CPT | Performed by: INTERNAL MEDICINE

## 2023-02-03 PROCEDURE — 80061 LIPID PANEL: CPT | Performed by: INTERNAL MEDICINE

## 2023-02-03 PROCEDURE — 80069 RENAL FUNCTION PANEL: CPT | Performed by: INTERNAL MEDICINE

## 2023-02-03 PROCEDURE — 84443 ASSAY THYROID STIM HORMONE: CPT | Performed by: INTERNAL MEDICINE

## 2023-02-03 PROCEDURE — 80053 COMPREHEN METABOLIC PANEL: CPT | Performed by: INTERNAL MEDICINE

## 2023-02-03 PROCEDURE — 36415 COLL VENOUS BLD VENIPUNCTURE: CPT | Performed by: INTERNAL MEDICINE

## 2023-02-03 PROCEDURE — 82607 VITAMIN B-12: CPT | Performed by: INTERNAL MEDICINE

## 2023-02-03 PROCEDURE — 83036 HEMOGLOBIN GLYCOSYLATED A1C: CPT | Performed by: INTERNAL MEDICINE

## 2023-02-07 ENCOUNTER — HOSPITAL ENCOUNTER (OUTPATIENT)
Dept: RADIOLOGY | Facility: HOSPITAL | Age: 83
Discharge: HOME | End: 2023-02-07
Attending: INTERNAL MEDICINE
Payer: MEDICARE

## 2023-02-07 ENCOUNTER — TELEPHONE (OUTPATIENT)
Dept: INTERNAL MEDICINE | Facility: CLINIC | Age: 83
End: 2023-02-07

## 2023-02-07 ENCOUNTER — OFFICE VISIT (OUTPATIENT)
Dept: INTERNAL MEDICINE | Facility: CLINIC | Age: 83
End: 2023-02-07
Payer: MEDICARE

## 2023-02-07 VITALS
WEIGHT: 178 LBS | BODY MASS INDEX: 29.66 KG/M2 | SYSTOLIC BLOOD PRESSURE: 140 MMHG | DIASTOLIC BLOOD PRESSURE: 80 MMHG | HEIGHT: 65 IN

## 2023-02-07 DIAGNOSIS — M25.511 CHRONIC RIGHT SHOULDER PAIN: ICD-10-CM

## 2023-02-07 DIAGNOSIS — I65.29 CAROTID ATHEROSCLEROSIS, UNSPECIFIED LATERALITY: ICD-10-CM

## 2023-02-07 DIAGNOSIS — E11.22 DIABETES MELLITUS WITH STAGE 3 CHRONIC KIDNEY DISEASE: Chronic | ICD-10-CM

## 2023-02-07 DIAGNOSIS — G89.29 CHRONIC RIGHT SHOULDER PAIN: ICD-10-CM

## 2023-02-07 DIAGNOSIS — N18.30 DIABETES MELLITUS WITH STAGE 3 CHRONIC KIDNEY DISEASE: Chronic | ICD-10-CM

## 2023-02-07 DIAGNOSIS — R80.9 DIABETES MELLITUS WITH PROTEINURIA: Chronic | ICD-10-CM

## 2023-02-07 DIAGNOSIS — D57.3 SICKLE CELL TRAIT SYNDROME: ICD-10-CM

## 2023-02-07 DIAGNOSIS — I10 HYPERTENSION, ESSENTIAL: Chronic | ICD-10-CM

## 2023-02-07 DIAGNOSIS — D12.2 ADENOMATOUS POLYP OF ASCENDING COLON: Chronic | ICD-10-CM

## 2023-02-07 DIAGNOSIS — M79.641 PAIN OF RIGHT HAND: ICD-10-CM

## 2023-02-07 DIAGNOSIS — E04.2 MULTINODULAR THYROID: Chronic | ICD-10-CM

## 2023-02-07 DIAGNOSIS — I48.91 ATRIAL FIBRILLATION, UNSPECIFIED TYPE: ICD-10-CM

## 2023-02-07 DIAGNOSIS — M48.062 SPINAL STENOSIS OF LUMBAR REGION WITH NEUROGENIC CLAUDICATION: ICD-10-CM

## 2023-02-07 DIAGNOSIS — E55.9 VITAMIN D INSUFFICIENCY: ICD-10-CM

## 2023-02-07 DIAGNOSIS — E11.3213 BOTH EYES AFFECTED BY MILD NONPROLIFERATIVE DIABETIC RETINOPATHY WITH MACULAR EDEMA, ASSOCIATED WITH TYPE 2 DIABETES MELLITUS: ICD-10-CM

## 2023-02-07 DIAGNOSIS — E78.2 MIXED DIABETIC HYPERLIPIDEMIA ASSOCIATED WITH TYPE 2 DIABETES MELLITUS: Chronic | ICD-10-CM

## 2023-02-07 DIAGNOSIS — N18.30 STAGE 3 CHRONIC KIDNEY DISEASE, UNSPECIFIED WHETHER STAGE 3A OR 3B CKD: Chronic | ICD-10-CM

## 2023-02-07 DIAGNOSIS — E61.1 IRON DEFICIENCY: ICD-10-CM

## 2023-02-07 DIAGNOSIS — K21.9 GASTROESOPHAGEAL REFLUX DISEASE WITHOUT ESOPHAGITIS: ICD-10-CM

## 2023-02-07 DIAGNOSIS — I70.0 AORTIC ARCH ATHEROSCLEROSIS: Chronic | ICD-10-CM

## 2023-02-07 DIAGNOSIS — E11.49 TYPE II DIABETES MELLITUS WITH NEUROLOGICAL MANIFESTATIONS: Chronic | ICD-10-CM

## 2023-02-07 DIAGNOSIS — Z00.00 ANNUAL PHYSICAL EXAM: Primary | ICD-10-CM

## 2023-02-07 DIAGNOSIS — I73.9 PAD (PERIPHERAL ARTERY DISEASE): ICD-10-CM

## 2023-02-07 DIAGNOSIS — E11.29 DIABETES MELLITUS WITH PROTEINURIA: Chronic | ICD-10-CM

## 2023-02-07 DIAGNOSIS — E11.69 MIXED DIABETIC HYPERLIPIDEMIA ASSOCIATED WITH TYPE 2 DIABETES MELLITUS: Chronic | ICD-10-CM

## 2023-02-07 PROBLEM — M54.50 LUMBAR PAIN: Status: RESOLVED | Noted: 2022-11-08 | Resolved: 2023-02-07

## 2023-02-07 PROBLEM — M46.96 UNSPECIFIED INFLAMMATORY SPONDYLOPATHY, LUMBAR REGION: Status: RESOLVED | Noted: 2021-07-22 | Resolved: 2023-02-07

## 2023-02-07 PROCEDURE — 3079F PR MOST RECENT DIASTOLIC BLOOD PRESSURE 80-89 MM HG: ICD-10-PCS | Mod: CPTII,S$GLB,, | Performed by: INTERNAL MEDICINE

## 2023-02-07 PROCEDURE — 1126F PR PAIN SEVERITY QUANTIFIED, NO PAIN PRESENT: ICD-10-PCS | Mod: CPTII,S$GLB,, | Performed by: INTERNAL MEDICINE

## 2023-02-07 PROCEDURE — 1159F PR MEDICATION LIST DOCUMENTED IN MEDICAL RECORD: ICD-10-PCS | Mod: CPTII,S$GLB,, | Performed by: INTERNAL MEDICINE

## 2023-02-07 PROCEDURE — 99397 PER PM REEVAL EST PAT 65+ YR: CPT | Mod: GZ,S$GLB,, | Performed by: INTERNAL MEDICINE

## 2023-02-07 PROCEDURE — 1126F AMNT PAIN NOTED NONE PRSNT: CPT | Mod: CPTII,S$GLB,, | Performed by: INTERNAL MEDICINE

## 2023-02-07 PROCEDURE — 1160F RVW MEDS BY RX/DR IN RCRD: CPT | Mod: CPTII,S$GLB,, | Performed by: INTERNAL MEDICINE

## 2023-02-07 PROCEDURE — 73130 X-RAY EXAM OF HAND: CPT | Mod: TC,50

## 2023-02-07 PROCEDURE — 73030 XR SHOULDER TRAUMA 3 VIEW RIGHT: ICD-10-PCS | Mod: 26,RT,, | Performed by: RADIOLOGY

## 2023-02-07 PROCEDURE — 73030 X-RAY EXAM OF SHOULDER: CPT | Mod: TC,RT

## 2023-02-07 PROCEDURE — 99999 PR PBB SHADOW E&M-EST. PATIENT-LVL V: ICD-10-PCS | Mod: PBBFAC,,, | Performed by: INTERNAL MEDICINE

## 2023-02-07 PROCEDURE — 99999 PR PBB SHADOW E&M-EST. PATIENT-LVL V: CPT | Mod: PBBFAC,,, | Performed by: INTERNAL MEDICINE

## 2023-02-07 PROCEDURE — 3077F SYST BP >= 140 MM HG: CPT | Mod: CPTII,S$GLB,, | Performed by: INTERNAL MEDICINE

## 2023-02-07 PROCEDURE — 3077F PR MOST RECENT SYSTOLIC BLOOD PRESSURE >= 140 MM HG: ICD-10-PCS | Mod: CPTII,S$GLB,, | Performed by: INTERNAL MEDICINE

## 2023-02-07 PROCEDURE — 99397 PR PREVENTIVE VISIT,EST,65 & OVER: ICD-10-PCS | Mod: GZ,S$GLB,, | Performed by: INTERNAL MEDICINE

## 2023-02-07 PROCEDURE — 3079F DIAST BP 80-89 MM HG: CPT | Mod: CPTII,S$GLB,, | Performed by: INTERNAL MEDICINE

## 2023-02-07 PROCEDURE — 73130 XR HAND COMPLETE 3 VIEWS BILATERAL: ICD-10-PCS | Mod: 26,50,, | Performed by: RADIOLOGY

## 2023-02-07 PROCEDURE — 1160F PR REVIEW ALL MEDS BY PRESCRIBER/CLIN PHARMACIST DOCUMENTED: ICD-10-PCS | Mod: CPTII,S$GLB,, | Performed by: INTERNAL MEDICINE

## 2023-02-07 PROCEDURE — 1159F MED LIST DOCD IN RCRD: CPT | Mod: CPTII,S$GLB,, | Performed by: INTERNAL MEDICINE

## 2023-02-07 PROCEDURE — 73130 X-RAY EXAM OF HAND: CPT | Mod: 26,50,, | Performed by: RADIOLOGY

## 2023-02-07 PROCEDURE — 73030 X-RAY EXAM OF SHOULDER: CPT | Mod: 26,RT,, | Performed by: RADIOLOGY

## 2023-02-07 NOTE — TELEPHONE ENCOUNTER
She was not supposed to have labs today.  I ordered them for 6 months from now.  They are duplicates.    Please reorder all the labs that I ordered today for 6 months and reschedule them, thanks

## 2023-02-07 NOTE — PROGRESS NOTES
Patient ID: Saul Mar is a 82 y.o. female.    Chief Complaint: Annual Exam      Assessment:       1. Annual physical exam    2. Spinal stenosis of lumbar region with neurogenic claudication    3. 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 Atrial fibrillation, unspecified type    5. PAD (peripheral artery disease)    6. Carotid atherosclerosis, unspecified laterality    7. Mixed diabetic hyperlipidemia associated with type 2 diabetes mellitus    8. Hypertension, essential    9. Aortic arch atherosclerosis    10. Stage 3 chronic kidney disease, unspecified whether stage 3a or 3b CKD    11. Sickle cell trait syndrome    12. Vitamin D insufficiency    13. Multinodular thyroid: thyroid u/s 7/16, stable 2017 and 2019, 2021    14. Diabetes mellitus with proteinuria    15. Type II diabetes mellitus with neurological manifestations    16. Diabetes mellitus with stage 3 chronic kidney disease    17. Adenomatous polyp of ascending colon    18. Gastroesophageal reflux disease without esophagitis    19. Chronic right shoulder pain    20. Pain of right hand    21. Iron deficiency          Plan:         1. Annual physical exam    2. Spinal stenosis of lumbar region with neurogenic claudication    3. 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 Atrial fibrillation, unspecified type    5. PAD (peripheral artery disease)    6. Carotid atherosclerosis, unspecified laterality  Overview:  US 7/21      7. Mixed diabetic hyperlipidemia associated with type 2 diabetes mellitus    8. Hypertension, essential    9. Aortic arch atherosclerosis  Overview:  CXR 10/30/2013---Atherosclerosis is seen in the aortic arch.      10. Stage 3 chronic kidney disease, unspecified whether stage 3a or 3b CKD  -     Renal Function Panel; Future; Expected date:  08/07/2023    11. Sickle cell trait syndrome    12. Vitamin D insufficiency    13. Multinodular thyroid: thyroid u/s 7/16, stable 2017 and 2019, 2021  Overview:  US Soft Tissue 7/19/2016--- Multinodular thyroid.  None of nodules meet criteria for fine needle aspiration.      14. Diabetes mellitus with proteinuria  Overview:  Protein / creatinine ratio, urine 10/26/2016      Orders:  -     Hemoglobin A1C; Future; Expected date: 08/06/2023    15. Type II diabetes mellitus with neurological manifestations    16. Diabetes mellitus with stage 3 chronic kidney disease    17. Adenomatous polyp of ascending colon    18. Gastroesophageal reflux disease without esophagitis  Overview:  EGD 6/21/2016---Impression:           - Normal esophagus.                        - 1 cm hiatus hernia.                        - Normal stomach.                        - Normal ampulla and examined duodenum.                        - No specimens collected.                        - No cause for weight loss found in the upper                         intestinal tract.      19. Chronic right shoulder pain  -     X-Ray Shoulder Trauma 3 view Right; Future; Expected date: 02/07/2023  -     Ambulatory referral/consult to Orthopedics; Future; Expected date: 02/14/2023    20. Pain of right hand  -     X-Ray Hand 3 View Bilateral; Future; Expected date: 02/07/2023  -     Ambulatory referral/consult to Orthopedics; Future; Expected date: 02/14/2023    21. Iron deficiency  -     Ferritin; Future; Expected date: 08/10/2023  -     CBC Auto Differential; Future; Expected date: 08/10/2023  -     Iron and TIBC; Future; Expected date: 08/10/2023       Continue with current regimen  X-rays and ortho follow-up  Keep Nephrology follow-up which is scheduled  Keep Cardiology follow-up  GI bleeding issues reviewed, she is having no symptoms; will repeat labs in 6 months.  She is been told no further colonoscopies and she declines endoscopy citing lack of symptoms  Alarm  "symptoms and ED cautions reviewed  I will review all studies and determine further tx depending on findings       Subjective:   Annual exam    Here for follow-up of multiple medical issues, "annual."     Blood pressure doing very well     Recent labs acceptable with an A1c of 7.6.  Up-to-date with screenings.  Vaccines and immunizations reviewed, also up-to-date with all.     CKD 3, stable.  Will be seeing Nephrology soon.  Does have microalbuminuria.    Anemia of longstanding with some evidence of slightly low iron but also has sickle cell trait.    Colonoscopy reviewed- she does not want to do this and Gastro recommended against it.  Currently, she is not having any abdominal pain.  She states her bowels are regular.  She denies any blood in her stool, pencil thin stools or black stools.  She denies GERD.  Last colonoscopy was 2016 and last EGD (which was normal other than a hiatal hernia) was 2016.     She denies syncope, chest pain, pressure, tightness or shortness of breath.  It is somewhat difficult for her to exert herself rapidly due to her orthopedic issues but in general, she feels well.    Seen by multiple subspecialists in the last several months.  Had an itchy rash and saw recent internist and was given some ointment.  No further problems.    Main issues center around some discomfort in the right shoulder and right hand.  She is had this for several years.  No recent trauma or injury.  She was seen in Orthopedics and given a steroid shot, she does not recall whether this helped.  Given that she is on a blood thinner and has CKD, options for medical treatment are limited.  She would like to [facility] for this.  No fever, chills, sweats, unexplained weight loss or morning stiffness.    Ophthalmology January 2023  Ortho December 2022  Podiatry December 2022  Pain Clinic October 2022  Neurosurgery August 2022  Cardiology June 2022  Gastro May 2022  Mammogram April 2022  Renal ultrasound March " 2022  Nephrology February 2022    Patient Active Problem List:     Mixed diabetic hyperlipidemia associated with type 2 diabetes mellitus     Degenerative disc disease     Colon polyp: tubular adenoma 5/13, repeated 2016 to be repeated 2021- declines colonoscopy and Gastro also does not recommend     Multinodular thyroid: thyroid u/s 7/16, stable 2017 and 2019, 2021     POAG (primary open-angle glaucoma) - Both Eyes     Amaurosis fugax     Nuclear sclerosis - Right Eye     Eyelid myokymia     Cerebral microvascular disease: stroke ? 1999 elsewhere; TIA 10/13     Vitamin D insufficiency     Left ventricular diastolic dysfunction with preserved systolic function     Hypertension, essential     Gastroesophageal reflux disease without esophagitis     Type 2 diabetes, uncontrolled, with background retinopathy with macular edema     Diabetes mellitus with proteinuria     Type II diabetes mellitus with neurological manifestations     Aortic arch atherosclerosis     Abnormal ankle brachial index     Diabetes mellitus with stage 3 chronic kidney disease     Cerebrovascular accident (CVA) due to thrombosis of precerebral artery     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     CKD (chronic kidney disease) stage 3, GFR 30-59 ml/min     Sickle cell trait syndrome     Mixed anxiety depressive disorder     Diverticulosis of large intestine without hemorrhage     Atrial fibrillation     Hyperlipidemia associated with type 2 diabetes mellitus     Bilateral radiating leg pain     PAD (peripheral artery disease)     Carotid atherosclerosis     Spinal stenosis of lumbar region with neurogenic claudication     Lumbar radiculopathy, chronic     Decreased strength, endurance, and mobility     Gait disorder     Posture abnormality     Both eyes affected by 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   HENT:  Negative for hearing loss.    Eyes:  Negative for visual disturbance.   Respiratory:  Negative for shortness of breath.    Cardiovascular:  Negative for chest pain.   Gastrointestinal:  Negative for abdominal pain, constipation and diarrhea.        Sometimes a little sluggishness of bowels, she thinks from iron   Endocrine: Negative for polydipsia, polyphagia and polyuria.   Genitourinary:  Negative for dysuria, frequency and vaginal bleeding.   Musculoskeletal:  Positive for arthralgias and back pain. Negative for joint swelling.   Skin:  Negative for rash.   Neurological:  Negative for weakness, light-headedness and headaches.   Psychiatric/Behavioral:  Negative for sleep disturbance.        Objective:      Physical Exam  Vitals and nursing note reviewed.   Constitutional:       Appearance: She is well-developed.   HENT:      Head: Normocephalic and atraumatic.      Right Ear: External ear normal.      Left Ear: External ear normal.      Nose: Nose normal.      Mouth/Throat:      Pharynx: No oropharyngeal exudate.   Eyes:      General: No scleral icterus.     Extraocular Movements: Extraocular movements intact.      Conjunctiva/sclera: Conjunctivae normal.   Neck:      Thyroid: Thyromegaly present.      Vascular: No JVD.   Cardiovascular:      Rate and Rhythm: Normal rate and regular rhythm.      Heart sounds: Normal heart sounds. No murmur heard.    No gallop.   Pulmonary:      Effort: Pulmonary effort is normal. No respiratory distress.      Breath sounds: Normal breath sounds. No wheezing.   Abdominal:      General: Bowel sounds are normal. There is no distension.      Palpations: Abdomen is soft. There is no mass.      Tenderness: There is no abdominal tenderness. There is no guarding or rebound.   Musculoskeletal:         General: No tenderness. Normal range of  motion.      Cervical back: Normal range of motion and neck supple.      Comments: Negative squeeze test.  No deformity.  Slightly reduced range of motion right shoulder, with internal rotation.   Lymphadenopathy:      Cervical: No cervical adenopathy.   Skin:     General: Skin is warm.      Findings: No erythema or rash.   Neurological:      General: No focal deficit present.      Mental Status: She is alert and oriented to person, place, and time.      Cranial Nerves: No cranial nerve deficit.      Coordination: Coordination normal.   Psychiatric:         Behavior: Behavior normal.         Thought Content: Thought content normal.         Judgment: Judgment normal.           There are no preventive care reminders to display for this patient.

## 2023-02-08 ENCOUNTER — OFFICE VISIT (OUTPATIENT)
Dept: NEPHROLOGY | Facility: CLINIC | Age: 83
End: 2023-02-08
Payer: MEDICARE

## 2023-02-08 VITALS
HEART RATE: 78 BPM | WEIGHT: 177.94 LBS | BODY MASS INDEX: 29.65 KG/M2 | HEIGHT: 65 IN | RESPIRATION RATE: 20 BRPM | SYSTOLIC BLOOD PRESSURE: 150 MMHG | DIASTOLIC BLOOD PRESSURE: 78 MMHG

## 2023-02-08 DIAGNOSIS — N18.30 STAGE 3 CHRONIC KIDNEY DISEASE, UNSPECIFIED WHETHER STAGE 3A OR 3B CKD: Primary | ICD-10-CM

## 2023-02-08 PROCEDURE — 3288F FALL RISK ASSESSMENT DOCD: CPT | Mod: CPTII,S$GLB,, | Performed by: INTERNAL MEDICINE

## 2023-02-08 PROCEDURE — 1125F PR PAIN SEVERITY QUANTIFIED, PAIN PRESENT: ICD-10-PCS | Mod: CPTII,S$GLB,, | Performed by: INTERNAL MEDICINE

## 2023-02-08 PROCEDURE — 3078F PR MOST RECENT DIASTOLIC BLOOD PRESSURE < 80 MM HG: ICD-10-PCS | Mod: CPTII,S$GLB,, | Performed by: INTERNAL MEDICINE

## 2023-02-08 PROCEDURE — 3288F PR FALLS RISK ASSESSMENT DOCUMENTED: ICD-10-PCS | Mod: CPTII,S$GLB,, | Performed by: INTERNAL MEDICINE

## 2023-02-08 PROCEDURE — 1101F PT FALLS ASSESS-DOCD LE1/YR: CPT | Mod: CPTII,S$GLB,, | Performed by: INTERNAL MEDICINE

## 2023-02-08 PROCEDURE — 3077F PR MOST RECENT SYSTOLIC BLOOD PRESSURE >= 140 MM HG: ICD-10-PCS | Mod: CPTII,S$GLB,, | Performed by: INTERNAL MEDICINE

## 2023-02-08 PROCEDURE — 3077F SYST BP >= 140 MM HG: CPT | Mod: CPTII,S$GLB,, | Performed by: INTERNAL MEDICINE

## 2023-02-08 PROCEDURE — 99999 PR PBB SHADOW E&M-EST. PATIENT-LVL IV: ICD-10-PCS | Mod: PBBFAC,,, | Performed by: INTERNAL MEDICINE

## 2023-02-08 PROCEDURE — 99999 PR PBB SHADOW E&M-EST. PATIENT-LVL IV: CPT | Mod: PBBFAC,,, | Performed by: INTERNAL MEDICINE

## 2023-02-08 PROCEDURE — 1159F MED LIST DOCD IN RCRD: CPT | Mod: CPTII,S$GLB,, | Performed by: INTERNAL MEDICINE

## 2023-02-08 PROCEDURE — 1159F PR MEDICATION LIST DOCUMENTED IN MEDICAL RECORD: ICD-10-PCS | Mod: CPTII,S$GLB,, | Performed by: INTERNAL MEDICINE

## 2023-02-08 PROCEDURE — 1125F AMNT PAIN NOTED PAIN PRSNT: CPT | Mod: CPTII,S$GLB,, | Performed by: INTERNAL MEDICINE

## 2023-02-08 PROCEDURE — 99215 OFFICE O/P EST HI 40 MIN: CPT | Mod: S$GLB,,, | Performed by: INTERNAL MEDICINE

## 2023-02-08 PROCEDURE — 1101F PR PT FALLS ASSESS DOC 0-1 FALLS W/OUT INJ PAST YR: ICD-10-PCS | Mod: CPTII,S$GLB,, | Performed by: INTERNAL MEDICINE

## 2023-02-08 PROCEDURE — 3078F DIAST BP <80 MM HG: CPT | Mod: CPTII,S$GLB,, | Performed by: INTERNAL MEDICINE

## 2023-02-08 PROCEDURE — 99215 PR OFFICE/OUTPT VISIT, EST, LEVL V, 40-54 MIN: ICD-10-PCS | Mod: S$GLB,,, | Performed by: INTERNAL MEDICINE

## 2023-02-08 NOTE — PROGRESS NOTES
NEPHROLOGY CLINIC FOLLOWUP NOTE  Date of clinic visit: 2/8/23     REASON FOR FOLLOWUP AND CHIEF COMPLAINT: CKD stage 3, Proteinuria, and history of diabetes mellitus.      HISTORY OF PRESENT ILLNESS: Ms. Kirkpatrick is a 82 year-old female with a past history of mild CKD stage 2-3, diabetes mellitus and documented diabetic retinopathy, who presents for follow-up. Chart was reviewed. Pt was last seen by us one year ago. Chart was reviewed. No new events, no new c/o's. Pt feels no discomfort today. No new issues. Labs and meds reviewed with pt.    As documented before, she was found to have a  right kidney cyst incidentally. Discussed with pt last visit. Did not want to see urology. MRI shows a simple cyst. Renal u/s shows a small cyst which appears mildly complex.        PAST MEDICAL HISTORY:  1. CKD stage III.  2. Diabetes mellitus for 30 years.  3. Hypertension.  4. Diabetic retinopathy.  5. Diastolic congestive heart failure.  6. Degenerative joint disease.  7. Thyroid disease.  8. GERD.  9. Cerebrovascular disease/stroke.  10. Colon polyps in 2012.  11. Anxiety.  12. Glaucoma.     PAST SURGICAL HISTORY: Reviewed and unchanged from before. Please see initial    note from 07/25/2016.     FAMILY HISTORY: Reviewed. Grandmother with diabetes mellitus.     ALLERGIES: Reviewed, to Pravachol.     SOCIAL HISTORY: Negative for smoking. No alcohol use.     MEDICATIONS: Reviewed.     Current Outpatient Medications:     albuterol (PROVENTIL/VENTOLIN HFA) 90 mcg/actuation inhaler, INHALE 2 PUFFS INTO THE LUNGS EVERY 6 (SIX) HOURS AS NEEDED FOR WHEEZING. RESCUE, Disp: 54 g, Rfl: 1    amLODIPine (NORVASC) 5 MG tablet, Take 1 tablet (5 mg total) by mouth 2 (two) times daily., Disp: 180 tablet, Rfl: 3    atorvastatin (LIPITOR) 80 MG tablet, TAKE 1 TABLET BY MOUTH EVERY DAY, Disp: 90 tablet, Rfl: 2    blood sugar diagnostic Strp, 1 strip by Misc.(Non-Drug; Combo Route) route 2 (two) times daily. Insurance preferred test stripes  DX:E11.22, Disp: 100 strip, Rfl: 11    blood-glucose meter kit, Insurance preferred meter dx:E11.22, Disp: 1 each, Rfl: 0    brimonidine 0.2% (ALPHAGAN) 0.2 % Drop, Place 1 drop into both eyes 3 (three) times daily., Disp: 15 mL, Rfl: 12    carvediloL (COREG) 12.5 MG tablet, Take 12.5 mg by mouth., Disp: , Rfl:     cholecalciferol, vitamin D3, (VITAMIN D3) 1,000 unit capsule, Take 1 capsule (1,000 Units total) by mouth once daily., Disp: 30 capsule, Rfl: 12    famotidine (PEPCID) 20 MG tablet, Take 20 mg by mouth Daily., Disp: , Rfl:     ferrous sulfate (FEOSOL) 325 mg (65 mg iron) Tab tablet, Take 1 tablet (325 mg total) by mouth 2 (two) times daily., Disp: 180 tablet, Rfl: 3    flecainide (TAMBOCOR) 50 MG Tab, Take 1 tablet (50 mg total) by mouth 2 (two) times daily., Disp: 60 tablet, Rfl: 1    glimepiride (AMARYL) 4 MG tablet, TAKE 1 TABLET BY MOUTH IN THE MORNING WITH BREAKFAST, Disp: 90 tablet, Rfl: 1    hydrocortisone 2.5 % cream, Apply topically 2 (two) times daily., Disp: 30 g, Rfl: 2    lancets (ACCU-CHEK SOFTCLIX LANCETS) Misc, 100 lancets by Misc.(Non-Drug; Combo Route) route 2 (two) times daily. Insurance preferred lancets Dx:E11.22, Disp: 100 each, Rfl: 11    levalbuterol (XOPENEX HFA) 45 mcg/actuation inhaler, INHALE 1-2 PUFFS INTO THE LUNGS EVERY 6 (SIX) HOURS AS NEEDED FOR WHEEZING. RESCUE, Disp: 15 Inhaler, Rfl: 12    linaCLOtide (LINZESS) 145 mcg Cap capsule, Take 1 capsule (145 mcg total) by mouth before breakfast., Disp: 30 capsule, Rfl: 2    losartan (COZAAR) 50 MG tablet, TAKE 1 TABLET BY MOUTH TWICE A DAY, Disp: 180 tablet, Rfl: 2    meclizine (ANTIVERT) 25 mg tablet, Take 1 tablet (25 mg total) by mouth daily as needed for Dizziness., Disp: 30 tablet, Rfl: 0    metFORMIN (GLUCOPHAGE-XR) 500 MG ER 24hr tablet, TAKE 2 TABLETS BY MOUTH EVERY DAY, Disp: 180 tablet, Rfl: 1    metoprolol succinate (TOPROL-XL) 50 MG 24 hr tablet, TAKE 1 TABLET BY MOUTH EVERY DAY, Disp: 90 tablet, Rfl: 1    XARELTO  15 mg Tab, TAKE 1 TABLET (15 MG TOTAL) BY MOUTH DAILY WITH DINNER OR EVENING MEAL., Disp: 30 tablet, Rfl: 6    ALPRAZolam (XANAX) 0.5 MG tablet, TAKE 1 TABLET BY MOUTH NIGHTLY AS NEEDED FOR ANXIETY (MAY TAKE 1-2 TIMES WEEKLY FOR ANXIETY) (Patient not taking: Reported on 2/8/2023), Disp: 30 tablet, Rfl: 0    hydroCHLOROthiazide (HYDRODIURIL) 12.5 MG Tab, TAKE 1 TABLET BY MOUTH EVERY DAY (Patient not taking: Reported on 2/8/2023), Disp: 90 tablet, Rfl: 3    HYDROcodone-acetaminophen (NORCO) 5-325 mg per tablet, TAKE 1 TABLET BY MOUTH EVERY 8 (EIGHT) HOURS AS NEEDED FOR PAIN FOR UP TO 10 DOSES., Disp: , Rfl:     mupirocin (BACTROBAN) 2 % ointment, Apply topically 3 (three) times daily. (Patient not taking: Reported on 2/8/2023), Disp: 22 g, Rfl: 0    tacrolimus (PROTOPIC) 0.1 % ointment, Apply topically 2 (two) times daily. (Patient not taking: Reported on 2/8/2023), Disp: 60 g, Rfl: 3     REVIEW OF SYSTEMS: No recent hospitalizations.  GENERAL: Negative.  HEAD, EYES, EARS, NOSE, AND THROAT: Negative.  CARDIAC: Negative.  PULMONARY: Negative.  GASTROINTESTINAL: Negative.  GENITOURINARY: Negative.  PSYCHOLOGICAL: Negative.  NEUROLOGICAL: Negative.  ENDOCRINE: As above, otherwise negative.  HEMATOLOGIC AND ONCOLOGIC: Negative.  The rest of the review of systems negative.     PHYSICAL EXAMINATION:  VITAL SIGNS: Blood pressure 150/78, pulse 70, weight 80.7 Kg, from 80.5 kg  Ambulatory unassisted  In NAD  Speech and thought process normal  No JVD  RRR with s1 and s2 audible  CTA B no rales, symmetric and unlabored  Abd soft  No edema in ext's bilaterally  Back and sides non-tender     LABORATORY: Labs are reviewed  BMP  Lab Results   Component Value Date     02/07/2023    K 4.4 02/07/2023     02/07/2023    CO2 23 02/07/2023    BUN 17 02/07/2023    CREATININE 1.4 02/07/2023    CALCIUM 10.0 02/07/2023    ANIONGAP 12 02/07/2023    EGFRNORACEVR 37.8 (A) 02/07/2023     Lab Results   Component Value Date    WBC 6.01  "02/07/2023    HGB 10.9 (L) 02/07/2023    HCT 34.7 (L) 02/07/2023    MCV 91 02/07/2023     02/07/2023      U p/Cr 0.65, from 0.25 g, from 2.1 g  U/a: no protein, no blood, no RBC's, no casts     HgA1C 7.6 %     Spine MRI reviewed     Renal u/s: 12/17/21 reviewed  Right kidney: The right kidney measures 10.7 cm. No cortical thinning. No loss of corticomedullary distinction. There is a small cyst at the upper pole measuring up to 12 mm which may be mildly complicated by low level internal echoes. Further characterization is limited by overall exam limitations as above.  No stone visualized.  No hydronephrosis.  Left kidney: The left kidney measures 10.8 cm. No cortical thinning. No loss of corticomedullary distinction. No mass lesions visualized.  Note that the left kidney is partially obscured by bowel gas artifact.  No definite correlate seen for previous MRI findings.  No stone visualized.  No hydronephrosis.           ASSESSMENT AND PLAN: This is a 82 year-old female with CKD and diabetes mellitus, who presents for followup:  The impression is as follows:     1. Renal. s Cr stable, at baseline.   Stable renal function. CKD stage 3  Likely reason for CKD is diabetic retinopathy, HTN, and some age related decline in kidney function  Proteinuria, stable, suspected due to diabetic nephropathy. Stable, improved with the ARB, continue   K normal     2. Renal cyst: in right kidney, no symptoms. Reviewed with pt again today  Still does not want referral to urology.  Is small, looks simple (benign) on MRI, "may be mildly complex" per radiologist on u/s     3. Hypertension. Blood pressure controlled and appropriate for pt's advanced age  Meds reviewed     4. DM-2: as above  Discussed with pt  Better control of DM, diet, activity discussed     5. The patient was noncompliant with diabetic management in the past  Life style issues has been reviewed with pt: ADA diet, physical activity, control wt.  Hemoglobin A1c is " elevated but overall improved     PLANS AND RECOMMENDATIONS:   As discussed above  RTC 1 year  Opportunity for questions and discussion provided.  Total time spent 40 minutes including time needed to review the records, the   patient evaluation, documentation, discussion with the patient,   more than 50% of the time was spent on coordination of care and counseling.    Level V visit.     Giselle Valenzuela MD

## 2023-02-13 ENCOUNTER — TELEPHONE (OUTPATIENT)
Dept: ADMINISTRATIVE | Facility: CLINIC | Age: 83
End: 2023-02-13
Payer: MEDICARE

## 2023-02-13 NOTE — TELEPHONE ENCOUNTER
Called pt, informed pt I was calling to remind pt of her in office EAWV on 2/15/23; clinic location provided to patient; pt confirmed appointment

## 2023-02-16 ENCOUNTER — TELEPHONE (OUTPATIENT)
Dept: SPORTS MEDICINE | Facility: CLINIC | Age: 83
End: 2023-02-16
Payer: MEDICARE

## 2023-02-17 ENCOUNTER — OFFICE VISIT (OUTPATIENT)
Dept: ORTHOPEDICS | Facility: CLINIC | Age: 83
End: 2023-02-17
Payer: MEDICARE

## 2023-02-17 VITALS — WEIGHT: 177 LBS | BODY MASS INDEX: 29.49 KG/M2 | HEIGHT: 65 IN

## 2023-02-17 DIAGNOSIS — G89.29 CHRONIC RIGHT SHOULDER PAIN: ICD-10-CM

## 2023-02-17 DIAGNOSIS — M65.331 TRIGGER FINGER, RIGHT MIDDLE FINGER: ICD-10-CM

## 2023-02-17 DIAGNOSIS — M25.511 CHRONIC RIGHT SHOULDER PAIN: ICD-10-CM

## 2023-02-17 DIAGNOSIS — M67.911 TENDINOPATHY OF ROTATOR CUFF, RIGHT: ICD-10-CM

## 2023-02-17 DIAGNOSIS — M75.41 SUBACROMIAL IMPINGEMENT OF RIGHT SHOULDER: Primary | ICD-10-CM

## 2023-02-17 DIAGNOSIS — M79.641 PAIN OF RIGHT HAND: ICD-10-CM

## 2023-02-17 PROCEDURE — 99999 PR PBB SHADOW E&M-EST. PATIENT-LVL IV: CPT | Mod: PBBFAC,,, | Performed by: PHYSICIAN ASSISTANT

## 2023-02-17 PROCEDURE — 1101F PR PT FALLS ASSESS DOC 0-1 FALLS W/OUT INJ PAST YR: ICD-10-PCS | Mod: CPTII,S$GLB,, | Performed by: PHYSICIAN ASSISTANT

## 2023-02-17 PROCEDURE — 99214 PR OFFICE/OUTPT VISIT, EST, LEVL IV, 30-39 MIN: ICD-10-PCS | Mod: S$GLB,,, | Performed by: PHYSICIAN ASSISTANT

## 2023-02-17 PROCEDURE — 1160F PR REVIEW ALL MEDS BY PRESCRIBER/CLIN PHARMACIST DOCUMENTED: ICD-10-PCS | Mod: CPTII,S$GLB,, | Performed by: PHYSICIAN ASSISTANT

## 2023-02-17 PROCEDURE — 99214 OFFICE O/P EST MOD 30 MIN: CPT | Mod: S$GLB,,, | Performed by: PHYSICIAN ASSISTANT

## 2023-02-17 PROCEDURE — 1159F MED LIST DOCD IN RCRD: CPT | Mod: CPTII,S$GLB,, | Performed by: PHYSICIAN ASSISTANT

## 2023-02-17 PROCEDURE — 1159F PR MEDICATION LIST DOCUMENTED IN MEDICAL RECORD: ICD-10-PCS | Mod: CPTII,S$GLB,, | Performed by: PHYSICIAN ASSISTANT

## 2023-02-17 PROCEDURE — 1125F AMNT PAIN NOTED PAIN PRSNT: CPT | Mod: CPTII,S$GLB,, | Performed by: PHYSICIAN ASSISTANT

## 2023-02-17 PROCEDURE — 1125F PR PAIN SEVERITY QUANTIFIED, PAIN PRESENT: ICD-10-PCS | Mod: CPTII,S$GLB,, | Performed by: PHYSICIAN ASSISTANT

## 2023-02-17 PROCEDURE — 99999 PR PBB SHADOW E&M-EST. PATIENT-LVL IV: ICD-10-PCS | Mod: PBBFAC,,, | Performed by: PHYSICIAN ASSISTANT

## 2023-02-17 PROCEDURE — 1160F RVW MEDS BY RX/DR IN RCRD: CPT | Mod: CPTII,S$GLB,, | Performed by: PHYSICIAN ASSISTANT

## 2023-02-17 PROCEDURE — 3288F PR FALLS RISK ASSESSMENT DOCUMENTED: ICD-10-PCS | Mod: CPTII,S$GLB,, | Performed by: PHYSICIAN ASSISTANT

## 2023-02-17 PROCEDURE — 1101F PT FALLS ASSESS-DOCD LE1/YR: CPT | Mod: CPTII,S$GLB,, | Performed by: PHYSICIAN ASSISTANT

## 2023-02-17 PROCEDURE — 3288F FALL RISK ASSESSMENT DOCD: CPT | Mod: CPTII,S$GLB,, | Performed by: PHYSICIAN ASSISTANT

## 2023-02-17 NOTE — PROGRESS NOTES
Orthopaedics Sports Medicine     Shoulder Initial Visit         2/17/2023    Referring MD: Penny Randolph MD    Chief Complaint   Patient presents with    Right Shoulder - Pain         History of Present Illness:   Saul Mar is a 82 y.o. right-hand dominant female who presents with RIGHT shoulder pain and dysfunction.  This patient is new to me.  She was seen by Dr. Oscar for similar complaints in 2021    Onset of the symptoms was 4-5 years ago.  However, has been worsening over the last 2 months    Inciting event: No specific injury or trauma.     Current symptoms include constant right shoulder pain, limited range of motion, night pain.     Pain is aggravated by ADLs.      Evaluation to date: X-Ray     Treatment to date: Rest, activity modification, topical Voltaren, topical Jitendra-Gr, OTC medication , Tylenol    Of note, she is also complaining of pain in her right hand at the location of her MCP of middle finger.  She complains of catching, locking in flexion, pain.  She has had a trigger finger injection in the past which provided great relief.     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 Taveras    COLONOSCOPY      COLONOSCOPY N/A 9/22/2016    Procedure: COLONOSCOPY;   Surgeon: Jd Ashton MD;  Location: [facility] ENDO (4TH FLR);  Service: Endoscopy;  Laterality: N/A;  OK per Dr Randolph for pt to hold Plavix 5 days prior to procedure/see telephone encounter dated 8/29/16/svn    EYE SURGERY Bilateral 2002 approx    Laser for glaucoma    HYSTERECTOMY  1963     AMEENA/USO- fibroids; no cancer    OOPHORECTOMY  1963    unknown, only removed one    SELECTIVE INJECTION OF ANESTHETIC AGENT AROUND LUMBAR SPINAL NERVE ROOT BY TRANSFORAMINAL APPROACH Bilateral 6/17/2022    Procedure: Bilateral L4/5 TF JASKARAN with RN IV sedation;  Surgeon: Chan Fonseca MD;  Location: [facility] MGT;  Service: Pain Management;  Laterality: Bilateral;    SELECTIVE INJECTION OF ANESTHETIC AGENT AROUND LUMBAR SPINAL NERVE ROOT BY TRANSFORAMINAL APPROACH Bilateral 10/3/2022    Procedure: Bilateral L2-3 transforaminal epidural steroid injection with RN IV sedation;  Surgeon: Chan Fonseca MD;  Location: [facility]GT;  Service: Pain Management;  Laterality: Bilateral;       Medications:  Patient's Medications   New Prescriptions    No medications on file   Previous Medications    ALBUTEROL (PROVENTIL/VENTOLIN HFA) 90 MCG/ACTUATION INHALER    INHALE 2 PUFFS INTO THE LUNGS EVERY 6 (SIX) HOURS AS NEEDED FOR WHEEZING. RESCUE    ALPRAZOLAM (XANAX) 0.5 MG TABLET    TAKE 1 TABLET BY MOUTH NIGHTLY AS NEEDED FOR ANXIETY (MAY TAKE 1-2 TIMES WEEKLY FOR ANXIETY)    AMLODIPINE (NORVASC) 5 MG TABLET    Take 1 tablet (5 mg total) by mouth 2 (two) times daily.    ATORVASTATIN (LIPITOR) 80 MG TABLET    TAKE 1 TABLET BY MOUTH EVERY DAY    BLOOD SUGAR DIAGNOSTIC STRP    1 strip by Misc.(Non-Drug; Combo Route) route 2 (two) times daily. Insurance preferred test stripes DX:E11.22    BLOOD-GLUCOSE METER KIT    Insurance preferred meter dx:E11.22    BRIMONIDINE 0.2% (ALPHAGAN) 0.2 % DROP    Place 1 drop into both eyes 3 (three) times daily.    CARVEDILOL (COREG) 12.5 MG TABLET    Take 12.5 mg by mouth.    CHOLECALCIFEROL, VITAMIN D3,  (VITAMIN D3) 1,000 UNIT CAPSULE    Take 1 capsule (1,000 Units total) by mouth once daily.    FAMOTIDINE (PEPCID) 20 MG TABLET    Take 20 mg by mouth Daily.    FERROUS SULFATE (FEOSOL) 325 MG (65 MG IRON) TAB TABLET    Take 1 tablet (325 mg total) by mouth 2 (two) times daily.    FLECAINIDE (TAMBOCOR) 50 MG TAB    Take 1 tablet (50 mg total) by mouth 2 (two) times daily.    GLIMEPIRIDE (AMARYL) 4 MG TABLET    TAKE 1 TABLET BY MOUTH IN THE MORNING WITH BREAKFAST    HYDROCHLOROTHIAZIDE (HYDRODIURIL) 12.5 MG TAB    TAKE 1 TABLET BY MOUTH EVERY DAY    HYDROCODONE-ACETAMINOPHEN (NORCO) 5-325 MG PER TABLET    TAKE 1 TABLET BY MOUTH EVERY 8 (EIGHT) HOURS AS NEEDED FOR PAIN FOR UP TO 10 DOSES.    HYDROCORTISONE 2.5 % CREAM    Apply topically 2 (two) times daily.    LANCETS (ACCU-CHEK SOFTCLIX LANCETS) MISC    100 lancets by Misc.(Non-Drug; Combo Route) route 2 (two) times daily. Insurance preferred lancets Dx:E11.22    LEVALBUTEROL (XOPENEX HFA) 45 MCG/ACTUATION INHALER    INHALE 1-2 PUFFS INTO THE LUNGS EVERY 6 (SIX) HOURS AS NEEDED FOR WHEEZING. RESCUE    LINACLOTIDE (LINZESS) 145 MCG CAP CAPSULE    Take 1 capsule (145 mcg total) by mouth before breakfast.    LOSARTAN (COZAAR) 50 MG TABLET    TAKE 1 TABLET BY MOUTH TWICE A DAY    MECLIZINE (ANTIVERT) 25 MG TABLET    Take 1 tablet (25 mg total) by mouth daily as needed for Dizziness.    METFORMIN (GLUCOPHAGE-XR) 500 MG ER 24HR TABLET    TAKE 2 TABLETS BY MOUTH EVERY DAY    METOPROLOL SUCCINATE (TOPROL-XL) 50 MG 24 HR TABLET    TAKE 1 TABLET BY MOUTH EVERY DAY    MUPIROCIN (BACTROBAN) 2 % OINTMENT    Apply topically 3 (three) times daily.    TACROLIMUS (PROTOPIC) 0.1 % OINTMENT    Apply topically 2 (two) times daily.    XARELTO 15 MG TAB    TAKE 1 TABLET (15 MG TOTAL) BY MOUTH DAILY WITH DINNER OR EVENING MEAL.   Modified Medications    No medications on file   Discontinued Medications    No medications on file       Allergies:   Review of patient's allergies indicates:  "  Allergen Reactions    Pravachol [pravastatin] Nausea Only       Social History:   Home town: [place], [place]  Occupation: Retired  Alcohol use: She reports no history of alcohol use.  Tobacco use: She reports that she has never smoked. She has never used smokeless tobacco.    Review of systems:  History of recent illness, fevers, shakes, or chills: no  History of cardiac problems or chest pain: yes, a-fib, htn  History of pulmonary problems or asthma: no  History of diabetes: yes, dm2 a1c 7.6  History of prior dvt or clotting problems: no  History of sleep apnea: no      Physical Examination:  Estimated body mass index is 29.45 kg/m² as calculated from the following:    Height as of this encounter: 5' 5" (1.651 m).    Weight as of this encounter: 80.3 kg (177 lb).    General  Healthy appearing female in no acute distress  Alert and oriented, normal mood, appropriate affect    Shoulder Examination:  Patient is alert and oriented, no distress. Skin is intact. Neuro is normal with no focal motor or sensory findings.    Cervical exam is unremarkable. Intact cervical ROM. Negative Spurling's test    Physical Exam:  RIGHT    LEFT    Scap Dyskinesis/Winging (-)    (-)    Tenderness:          Greater Tuberosity             +    (-)  Bicipital Groove  (-)    (-)  AC joint   (-)    (-)  Other:     ROM:  Forward Elevation 160    160  Abduction  110    110  ER (at side)  60    60  IR   T10    T8    Strength:   Supraspinatus  4/5    5/5  Infraspinatus  4/5    5/5  Subscap / IR  5/5    5/5     Special Tests:   Neer:    (-)    (-)   Amaya:   +    (-)   SS Stress:   +    (-)   Bear Hug:   (-)    (-)   Mcbrides's:   +    (-)   Resisted Thrower's:   +    (-)   Cross Arm Abduction:  +    (-)    Right hand exam:  Skin is intact with no abrasions or erythema or edema  Sensation intact to light touch over the hands and fingers  Catching of the middle finger in flexion with transition to extension  TTP over the volar aspect of middle " finger MCP Joint  Cap refill <2 secs, neurovascularly intact     Neurovascular examination  - Motor grossly intact bilaterally to shoulder abduction, elbow flexion and extension, wrist flexion and extension, and intrinsic hand musculature  - Sensation intact to light touch bilaterally in axillary, median, radial, and ulnar distributions  - Symmetrical radial pulses      Imaging:  XR Results:  Results for orders placed during the hospital encounter of 02/07/23    X-Ray Shoulder Trauma 3 view Right    Narrative  EXAMINATION:  XR SHOULDER TRAUMA 3 VIEW RIGHT    CLINICAL HISTORY:  Pain in right shoulder    TECHNIQUE:  Three or four views of the right shoulder were performed.    COMPARISON:  Right shoulder radiograph dated 03/12/2021    FINDINGS:  No acute fracture or glenohumeral dislocation.  Borderline prominence of the acromio-humeral interval, nonspecific though can be seen in the setting of cuff laxity or effusion.  AC joint is maintained.    Impression  As above.      Electronically signed by: Ayaan Vivar  Date:    02/07/2023  Time:    15:01    EXAMINATION:  XR HAND COMPLETE 3 VIEWS BILATERAL     CLINICAL HISTORY:  Pain in right hand     TECHNIQUE:  PA, lateral, and oblique views of both hands were performed.     COMPARISON:  Right hand compared to March 12, 2021     FINDINGS:  Right hand:     Osteopenia.  No acute fractures.  Reconfirmed osteoarthritic changes, predominantly in the form of periarticular spurring at the scaphoid trapezium, 1st carpal metacarpal, triscaphe, and some of the finger IP articulations, not felt significantly changed to earlier exam.  No erosive-inflammatory arthropathic changes.  Soft tissue swelling dorsally at the level of the MCP articulations less so metacarpals.  Vascular calcifications.     Left hand:     Osteopenia.  No acute fractures.  Osteoarthritic changes, predominantly in the form of periarticular spurring at the scaphoid trapezium, 1st carpal metacarpal, triscaphe,  and some of the finger IP articulations.  No erosive-inflammatory arthropathic changes.  Soft tissue swelling dorsally at the level of the MCP articulations less so metacarpals.  Vascular calcifications.     Impression:     Osteopenia, no acute fractures, bilateral multifocal osteoarthritic findings as noted and those involving the right hand not significantly changed to earlier exam.        Electronically signed by: Ravi Restrepo  Date:                                            02/08/2023  Time:                                           07:00    Physician Read: I agree with the above impression.      Impression:  82 y.o. female with subacromial impingement of the right shoulder, trigger finger of the right middle finger       Plan:  Discussed diagnosis and treatment options with patient today.  Her history, physical exam, and imaging is consistent with right shoulder subacromial impingement  She has tried conservative treatment options in the form of rest, activity modification, Tylenol Arthritis, topical Voltaren, topical lidocaine.  She is unable to take anti-inflammatories due to chronic kidney disease. Despite these interventions she still continues to have symptoms that affect her ADLs  I recommend we proceed with a right shoulder subacromial space corticosteroid injection as well as initiation into a home exercise program to work on strengthening of her rotator cuff. Patient would like to defer the injection at this time   10 minutes were spent developing, teaching, and performing a home exercise program.  A written summary was provided and all questions were answered.  This service was performed by Guera Barger PA-C under direction of Guera Barger PA-C.  CPT 50480-RT.  Right shoulder SAS CSI deferred, encouraged patient to return to clinic if her symptoms worsen and we can go forward with an injection at that time, patient voiced understanding  For her right middle finger trigger finger, she has  got an injection in the past and gotten great relief with the injection.  At this time she would like to defer the injection.  I also discussed the possibility of an A1 pulley release of her middle finger, she is not interested in pursuing any type of surgical intervention  Continue conservative treatment for the shoulder and finger  Follow up with me as needed        Guera Barger PA-C  Sports Medicine Physician Assistant       Disclaimer: This note was prepared using a voice recognition system and is likely to have sound alike errors within the text.

## 2023-02-27 ENCOUNTER — PES CALL (OUTPATIENT)
Dept: ADMINISTRATIVE | Facility: CLINIC | Age: 83
End: 2023-02-27
Payer: MEDICARE

## 2023-03-13 NOTE — PROGRESS NOTES
HPI    DLS: 11/10/2022    Pt here for 4 Month Check;  Pt states no eye pain or discomfort.     Meds;  Brimonidine TID OU (usually uses bid)     POHx:   1. LT-Glaucoma  OD (Mild Stage)   1) POAG   2) DM with BDR and ME OU   3) Hx RLL Lesion   4) NS OD   5) PCIOL OS   6) Hx CVA   7) Hx Amaurosis Fugax   8) Eyelid Myokymia      Last edited by Lina Taveras MD on 3/17/2023 11:56 AM.            Assessment /Plan     For exam results, see Encounter Report.    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 POAG ou   H/O poor compliance   First HVF 1997   First photos 1998      Family history none   Glaucoma meds Has used alphagan in past - poor compliance-stopped on her own   H/O adverse rxn to glaucoma drops none   LASERS No glaucoma laser (+h/o focal OU for BDR)   GLAUCOMA SURGERIES none   OTHER EYE SURGERIES CE/PCIOL OS 2/27/13  CDR 0.8/0.75   Tbase 16-20/16-22   Tmax 20/22   Ttarget 17/17   HVF 16 test 1997 to 20122 - non sp changes  od //  Sup/inf paracentral loss and INS os  (+changes ? 2/2 focal laser )   Gonio +3   /621- thick ou (- 4.5 ou)   OCT 4 test 2006 to 2019  - RNFL - nl od // dec TI os   HRT 6  tests: 2009 to 2018 -  MR -  Dec. I, bord S/T od // bord T/I os /// CDR 0.74 od // 0.70 os  Disc photos 1998, 2000, 2001, 2003, 2003, 2004, 2005, 2006- slides  // 2010 , 2018 - OIS      - Ttoday 13/13  - Test done today  IOP //  AR/MR/BAT - cataract check od and glasses Rx given      2. 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 NS OD      "          Remove prn - pt wants to wait 3/17/2023               BCVA 20/40              BAT 20/60     4. Pseudophakia OS               S/p CE/PCIOL OS               IOL SN60 WF 20.5              -VA limited 2/2 hx of CSME s/p focal scars              BCVA  20/25              Give Rx glasses - 7/2/2013              Had re-bound iritis - resolved     4. H/O RLL lesion - S/P excision with Jovanni      5. Post Nelda lives in [place] - but still likes to come to [place] for care      6. amaurosis fugax / H/O CVA's              Patient reports stroke like symptoms and amaurosis fugax 10/13              -  She was admitted to hospital with very elevated BP              -  Patient had MRI at the time showing ischemic disease              - last Carotid U/S done 11/12- patient reports that                  - PCP gets one yearly- last U/S showed minimal plaque disease              -  H/o stroke- discussed that amaurosis was from elevated BP and if ever recurs needs to report to ER immediately- she voiced understanding     7. Eyelid myokymia  - intermittent - patient also reports eyelid "twitching  "- discussed with patient that myokymia is usually from lack of sleep, caffeine intake, or stress- however nothing to worry about  -  Did not have any "twitching" on exam today     Plan:  BrimonidineTID ou   Can consider CEIOL OD with Rey given poor compliance with gtts and moderate glaucoma.   HVF's are inconsistent with the ON exam / OCT ect   Pt wants to wait on phaco od for now // has done well os      Avoid PG's if possible 2/2 h/o CME 2/2 BDR     Refraction Post op os  is more myopic than expected - review IOL calc if needs 2nd eye done     Repeat OCT of macula 5/17/2022 - - done no CME os// + focal laser scars ou     Photo file updated  3/17/2023     Plan:   F/U 4 months IOP and gonio    - photo file - (( first name is Ruthluanne ( leave out the Zaid part))) - updated 6/4/2019      refer to retina - - +BDR - s/p " laser - Yung - then use to see benito -  but things look relatively stable - saw Issac - stable - he will check back in 6 months

## 2023-03-17 ENCOUNTER — OFFICE VISIT (OUTPATIENT)
Dept: OPHTHALMOLOGY | Facility: CLINIC | Age: 83
End: 2023-03-17
Payer: MEDICARE

## 2023-03-17 DIAGNOSIS — E11.3213 BOTH EYES AFFECTED BY MILD NONPROLIFERATIVE DIABETIC RETINOPATHY WITH MACULAR EDEMA, ASSOCIATED WITH TYPE 2 DIABETES MELLITUS: ICD-10-CM

## 2023-03-17 DIAGNOSIS — H25.11 AGE-RELATED NUCLEAR CATARACT, RIGHT: ICD-10-CM

## 2023-03-17 DIAGNOSIS — H25.13 NUCLEAR SCLEROSIS OF BOTH EYES: ICD-10-CM

## 2023-03-17 DIAGNOSIS — H40.1211 LOW-TENSION GLAUCOMA, RIGHT EYE, MILD STAGE: Primary | ICD-10-CM

## 2023-03-17 DIAGNOSIS — Z96.1 PSEUDOPHAKIA, LEFT EYE: ICD-10-CM

## 2023-03-17 DIAGNOSIS — H40.1222 LOW-TENSION GLAUCOMA, LEFT EYE, MODERATE STAGE: ICD-10-CM

## 2023-03-17 DIAGNOSIS — E08.3213 DIABETES MELLITUS DUE TO UNDERLYING CONDITION WITH BOTH EYES AFFECTED BY MILD NONPROLIFERATIVE RETINOPATHY AND MACULAR EDEMA, WITH LONG-TERM CURRENT USE OF INSULIN: ICD-10-CM

## 2023-03-17 DIAGNOSIS — Z79.4 DIABETES MELLITUS DUE TO UNDERLYING CONDITION WITH BOTH EYES AFFECTED BY MILD NONPROLIFERATIVE RETINOPATHY AND MACULAR EDEMA, WITH LONG-TERM CURRENT USE OF INSULIN: ICD-10-CM

## 2023-03-17 PROCEDURE — 1126F PR PAIN SEVERITY QUANTIFIED, NO PAIN PRESENT: ICD-10-PCS | Mod: CPTII,S$GLB,, | Performed by: OPHTHALMOLOGY

## 2023-03-17 PROCEDURE — 1160F RVW MEDS BY RX/DR IN RCRD: CPT | Mod: CPTII,S$GLB,, | Performed by: OPHTHALMOLOGY

## 2023-03-17 PROCEDURE — 1126F AMNT PAIN NOTED NONE PRSNT: CPT | Mod: CPTII,S$GLB,, | Performed by: OPHTHALMOLOGY

## 2023-03-17 PROCEDURE — 99999 PR PBB SHADOW E&M-EST. PATIENT-LVL III: ICD-10-PCS | Mod: PBBFAC,,, | Performed by: OPHTHALMOLOGY

## 2023-03-17 PROCEDURE — 99214 PR OFFICE/OUTPT VISIT, EST, LEVL IV, 30-39 MIN: ICD-10-PCS | Mod: S$GLB,,, | Performed by: OPHTHALMOLOGY

## 2023-03-17 PROCEDURE — 1159F PR MEDICATION LIST DOCUMENTED IN MEDICAL RECORD: ICD-10-PCS | Mod: CPTII,S$GLB,, | Performed by: OPHTHALMOLOGY

## 2023-03-17 PROCEDURE — 3288F FALL RISK ASSESSMENT DOCD: CPT | Mod: CPTII,S$GLB,, | Performed by: OPHTHALMOLOGY

## 2023-03-17 PROCEDURE — 1160F PR REVIEW ALL MEDS BY PRESCRIBER/CLIN PHARMACIST DOCUMENTED: ICD-10-PCS | Mod: CPTII,S$GLB,, | Performed by: OPHTHALMOLOGY

## 2023-03-17 PROCEDURE — 99499 RISK ADDL DX/OHS AUDIT: ICD-10-PCS | Mod: S$GLB,,, | Performed by: OPHTHALMOLOGY

## 2023-03-17 PROCEDURE — 1101F PT FALLS ASSESS-DOCD LE1/YR: CPT | Mod: CPTII,S$GLB,, | Performed by: OPHTHALMOLOGY

## 2023-03-17 PROCEDURE — 99999 PR PBB SHADOW E&M-EST. PATIENT-LVL III: CPT | Mod: PBBFAC,,, | Performed by: OPHTHALMOLOGY

## 2023-03-17 PROCEDURE — 1159F MED LIST DOCD IN RCRD: CPT | Mod: CPTII,S$GLB,, | Performed by: OPHTHALMOLOGY

## 2023-03-17 PROCEDURE — 99499 UNLISTED E&M SERVICE: CPT | Mod: S$GLB,,, | Performed by: OPHTHALMOLOGY

## 2023-03-17 PROCEDURE — 1101F PR PT FALLS ASSESS DOC 0-1 FALLS W/OUT INJ PAST YR: ICD-10-PCS | Mod: CPTII,S$GLB,, | Performed by: OPHTHALMOLOGY

## 2023-03-17 PROCEDURE — 99214 OFFICE O/P EST MOD 30 MIN: CPT | Mod: S$GLB,,, | Performed by: OPHTHALMOLOGY

## 2023-03-17 PROCEDURE — 3288F PR FALLS RISK ASSESSMENT DOCUMENTED: ICD-10-PCS | Mod: CPTII,S$GLB,, | Performed by: OPHTHALMOLOGY

## 2023-03-17 RX ORDER — BRIMONIDINE TARTRATE 2 MG/ML
1 SOLUTION/ DROPS OPHTHALMIC 3 TIMES DAILY
Qty: 15 ML | Refills: 12 | Status: SHIPPED | OUTPATIENT
Start: 2023-03-17 | End: 2023-07-28 | Stop reason: SDUPTHER

## 2023-04-05 ENCOUNTER — PES CALL (OUTPATIENT)
Dept: ADMINISTRATIVE | Facility: CLINIC | Age: 83
End: 2023-04-05
Payer: MEDICARE

## 2023-04-06 ENCOUNTER — OFFICE VISIT (OUTPATIENT)
Dept: PODIATRY | Facility: CLINIC | Age: 83
End: 2023-04-06
Payer: MEDICARE

## 2023-04-06 VITALS — HEIGHT: 65 IN | WEIGHT: 177 LBS | BODY MASS INDEX: 29.49 KG/M2

## 2023-04-06 DIAGNOSIS — E11.49 TYPE II DIABETES MELLITUS WITH NEUROLOGICAL MANIFESTATIONS: Primary | ICD-10-CM

## 2023-04-06 DIAGNOSIS — B35.1 ONYCHOMYCOSIS DUE TO DERMATOPHYTE: ICD-10-CM

## 2023-04-06 DIAGNOSIS — K59.00 CONSTIPATION, UNSPECIFIED CONSTIPATION TYPE: ICD-10-CM

## 2023-04-06 PROCEDURE — 11721 DEBRIDE NAIL 6 OR MORE: CPT | Mod: Q9,S$GLB,, | Performed by: PODIATRIST

## 2023-04-06 PROCEDURE — 99499 NO LOS: ICD-10-PCS | Mod: S$GLB,,, | Performed by: PODIATRIST

## 2023-04-06 PROCEDURE — 1101F PR PT FALLS ASSESS DOC 0-1 FALLS W/OUT INJ PAST YR: ICD-10-PCS | Mod: CPTII,S$GLB,, | Performed by: PODIATRIST

## 2023-04-06 PROCEDURE — 11721 PR DEBRIDEMENT OF NAILS, 6 OR MORE: ICD-10-PCS | Mod: Q9,S$GLB,, | Performed by: PODIATRIST

## 2023-04-06 PROCEDURE — 3288F FALL RISK ASSESSMENT DOCD: CPT | Mod: CPTII,S$GLB,, | Performed by: PODIATRIST

## 2023-04-06 PROCEDURE — 99999 PR PBB SHADOW E&M-EST. PATIENT-LVL IV: ICD-10-PCS | Mod: PBBFAC,,, | Performed by: PODIATRIST

## 2023-04-06 PROCEDURE — 1101F PT FALLS ASSESS-DOCD LE1/YR: CPT | Mod: CPTII,S$GLB,, | Performed by: PODIATRIST

## 2023-04-06 PROCEDURE — 99499 UNLISTED E&M SERVICE: CPT | Mod: S$GLB,,, | Performed by: PODIATRIST

## 2023-04-06 PROCEDURE — 1159F MED LIST DOCD IN RCRD: CPT | Mod: CPTII,S$GLB,, | Performed by: PODIATRIST

## 2023-04-06 PROCEDURE — 1159F PR MEDICATION LIST DOCUMENTED IN MEDICAL RECORD: ICD-10-PCS | Mod: CPTII,S$GLB,, | Performed by: PODIATRIST

## 2023-04-06 PROCEDURE — 3288F PR FALLS RISK ASSESSMENT DOCUMENTED: ICD-10-PCS | Mod: CPTII,S$GLB,, | Performed by: PODIATRIST

## 2023-04-06 PROCEDURE — 1160F RVW MEDS BY RX/DR IN RCRD: CPT | Mod: CPTII,S$GLB,, | Performed by: PODIATRIST

## 2023-04-06 PROCEDURE — 1160F PR REVIEW ALL MEDS BY PRESCRIBER/CLIN PHARMACIST DOCUMENTED: ICD-10-PCS | Mod: CPTII,S$GLB,, | Performed by: PODIATRIST

## 2023-04-06 PROCEDURE — 1126F AMNT PAIN NOTED NONE PRSNT: CPT | Mod: CPTII,S$GLB,, | Performed by: PODIATRIST

## 2023-04-06 PROCEDURE — 1126F PR PAIN SEVERITY QUANTIFIED, NO PAIN PRESENT: ICD-10-PCS | Mod: CPTII,S$GLB,, | Performed by: PODIATRIST

## 2023-04-06 PROCEDURE — 99999 PR PBB SHADOW E&M-EST. PATIENT-LVL IV: CPT | Mod: PBBFAC,,, | Performed by: PODIATRIST

## 2023-04-06 NOTE — PROGRESS NOTES
Subjective:     Patient ID: Saul Mar is a 82 y.o. female.    Chief Complaint: Nail Care (No c/o pain, Diabetic Pt, wears diabetic shoes with socks, last seen on 02/07/23 with PCP Dr. Penny Randolph)    Saul is a 82 y.o. female who presents to the clinic for evaluation and treatment of high risk feet. 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 The patient's chief complaint is long, thick toenails. This patient has documented high risk feet requiring routine maintenance secondary to diabetes mellitis and those secondary complications of diabetes, as mentioned..    PCP: Penny Randolph MD    Date Last Seen by PCP: 02/07/2023    Current shoe gear:  Affected Foot: Rx diabetic extra depth shoes and custom accommodative insoles     Unaffected Foot: Rx diabetic extra depth shoes and custom accommodative insoles    Hemoglobin A1C   Date Value Ref Range Status   02/07/2023 7.6 (H) 4.0 - 5.6 % Final     Comment:     ADA Screening Guidelines:  5.7-6.4%  Consistent with prediabetes  >or=6.5%  Consistent with diabetes    High levels of fetal hemoglobin interfere with the HbA1C  assay. Heterozygous hemoglobin variants (HbS, HgC, etc)do  not significantly interfere with this assay.   However, presence of multiple variants may affect accuracy.     02/03/2023 7.6 (H) 4.0 - 5.6 % Final     Comment:     ADA Screening Guidelines:  5.7-6.4%  Consistent with prediabetes  >or=6.5%  Consistent with diabetes    High levels of fetal hemoglobin  interfere with the HbA1C  assay. Heterozygous hemoglobin variants (HbS, HgC, etc)do  not significantly interfere with this assay.   However, presence of multiple variants may affect accuracy.     09/14/2022 7.7 (H) 4.0 - 5.6 % Final     Comment:     ADA Screening Guidelines:  5.7-6.4%  Consistent with prediabetes  >or=6.5%  Consistent with diabetes    High levels of fetal hemoglobin interfere with the HbA1C  assay. Heterozygous hemoglobin variants (HbS, HgC, etc)do  not significantly interfere with this assay.   However, presence of multiple variants may affect accuracy.         Patient Active Problem List   Diagnosis    Mixed diabetic hyperlipidemia associated with type 2 diabetes mellitus    Degenerative disc disease    Colon polyp: tubular adenoma 5/13, repeated 2016 to be repeated 2021- declines colonoscopy and Gastro also does not recommend    Multinodular thyroid: thyroid u/s 7/16, stable 2017 and 2019, 2021    POAG (primary open-angle glaucoma) - Both Eyes    Amaurosis fugax    Nuclear sclerosis - Right Eye    Eyelid myokymia    Cerebral microvascular disease: stroke ? 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    CKD (chronic kidney disease) stage 3, GFR 30-59 ml/min    Sickle cell trait syndrome    Mixed anxiety depressive disorder    Diverticulosis of large intestine without hemorrhage    Atrial fibrillation     Hyperlipidemia associated with type 2 diabetes mellitus    Bilateral radiating leg pain    PAD (peripheral artery disease)    Carotid atherosclerosis    Spinal stenosis of lumbar region with neurogenic claudication    Lumbar radiculopathy, chronic    Decreased strength, endurance, and mobility    Gait disorder    Posture abnormality    Both eyes affected by 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 RESCUE    ALPRAZOLAM (XANAX) 0.5 MG TABLET    TAKE 1 TABLET BY MOUTH NIGHTLY AS NEEDED FOR ANXIETY (MAY TAKE 1-2 TIMES WEEKLY FOR ANXIETY)    AMLODIPINE (NORVASC) 5 MG TABLET    Take 1 tablet (5 mg total) by mouth 2 (two) times daily.    ATORVASTATIN (LIPITOR) 80 MG TABLET    TAKE 1 TABLET BY MOUTH EVERY DAY    BLOOD SUGAR DIAGNOSTIC STRP    1 strip by Misc.(Non-Drug; Combo Route) route 2 (two) times daily. Insurance preferred test stripes DX:E11.22    BLOOD-GLUCOSE METER KIT    Insurance preferred meter dx:E11.22    BRIMONIDINE 0.2% (ALPHAGAN) 0.2 % DROP    Place 1 drop into both eyes 3 (three) times daily.    CARVEDILOL (COREG) 12.5 MG TABLET    Take 12.5 mg by mouth.    CHOLECALCIFEROL, VITAMIN D3, (VITAMIN D3) 1,000 UNIT CAPSULE    Take 1 capsule (1,000 Units total) by mouth once daily.    FAMOTIDINE (PEPCID) 20 MG TABLET    Take 20 mg by mouth Daily.    FERROUS SULFATE (FEOSOL) 325 MG (65 MG IRON) TAB TABLET    Take 1 tablet (325 mg total) by mouth 2 (two) times daily.    FLECAINIDE (TAMBOCOR) 50 MG TAB    Take 1 tablet (50 mg total) by mouth 2 (two) times daily.    GLIMEPIRIDE (AMARYL) 4 MG TABLET    TAKE 1 TABLET BY  MOUTH IN THE MORNING WITH BREAKFAST    HYDROCHLOROTHIAZIDE (HYDRODIURIL) 12.5 MG TAB    TAKE 1 TABLET BY MOUTH EVERY DAY    HYDROCODONE-ACETAMINOPHEN (NORCO) 5-325 MG PER TABLET    TAKE 1 TABLET BY MOUTH EVERY 8 (EIGHT) HOURS AS NEEDED FOR PAIN FOR UP TO 10 DOSES.    HYDROCORTISONE 2.5 % CREAM    Apply topically 2 (two) times daily.    LANCETS (ACCU-CHEK SOFTCLIX LANCETS) MISC    100 lancets by Misc.(Non-Drug; Combo Route) route 2 (two) times daily. Insurance preferred lancets Dx:E11.22    LEVALBUTEROL (XOPENEX HFA) 45 MCG/ACTUATION INHALER    INHALE 1-2 PUFFS INTO THE LUNGS EVERY 6 (SIX) HOURS AS NEEDED FOR WHEEZING. RESCUE    LINACLOTIDE (LINZESS) 145 MCG CAP CAPSULE    Take 1 capsule (145 mcg total) by mouth before breakfast.    LOSARTAN (COZAAR) 50 MG TABLET    TAKE 1 TABLET BY MOUTH TWICE A DAY    MECLIZINE (ANTIVERT) 25 MG TABLET    Take 1 tablet (25 mg total) by mouth daily as needed for Dizziness.    METFORMIN (GLUCOPHAGE-XR) 500 MG ER 24HR TABLET    TAKE 2 TABLETS BY MOUTH EVERY DAY    METOPROLOL SUCCINATE (TOPROL-XL) 50 MG 24 HR TABLET    TAKE 1 TABLET BY MOUTH EVERY DAY    MUPIROCIN (BACTROBAN) 2 % OINTMENT    Apply topically 3 (three) times daily.    TACROLIMUS (PROTOPIC) 0.1 % OINTMENT    Apply topically 2 (two) times daily.    XARELTO 15 MG TAB    TAKE 1 TABLET (15 MG TOTAL) BY MOUTH DAILY WITH DINNER OR EVENING MEAL.       Review of patient's allergies indicates:   Allergen Reactions    Pravachol [pravastatin] Nausea Only       Past Surgical History:   Procedure Laterality Date    CATARACT EXTRACTION W/  INTRAOCULAR LENS IMPLANT Left 02/27/2013    Dr. Taveras    COLONOSCOPY      COLONOSCOPY N/A 9/22/2016    Procedure: COLONOSCOPY;  Surgeon: Jd Ashton MD;  Location: [facility] ([address] [place] [address]);  Service: Endoscopy;  Laterality: N/A;  OK per Dr Randolph for pt to hold Plavix 5 days prior to procedure/see telephone encounter dated 8/29/16/svn    EYE SURGERY Bilateral 2002 approx    Laser for glaucoma     HYSTERECTOMY  1963     AMEENA/USO- fibroids; no cancer    OOPHORECTOMY  1963    unknown, only removed one    SELECTIVE INJECTION OF ANESTHETIC AGENT AROUND LUMBAR SPINAL NERVE ROOT BY TRANSFORAMINAL APPROACH Bilateral 6/17/2022    Procedure: Bilateral L4/5 TF JASKARAN with RN IV sedation;  Surgeon: Chan Fonseca MD;  Location: [facility];  Service: Pain Management;  Laterality: Bilateral;    SELECTIVE INJECTION OF ANESTHETIC AGENT AROUND LUMBAR SPINAL NERVE ROOT BY TRANSFORAMINAL APPROACH Bilateral 10/3/2022    Procedure: Bilateral L2-3 transforaminal epidural steroid injection with RN IV sedation;  Surgeon: Chan Fonseca MD;  Location: [facility];  Service: Pain Management;  Laterality: Bilateral;       Family History   Problem Relation Age of Onset    Stroke Mother     Mental illness Mother     Hypertension Mother     Stroke Father     Diabetes Maternal Grandmother     Drug abuse Daughter     Cancer Sister 68        gyn    Ovarian cancer Sister     Cancer Maternal Uncle         type not known    Heart disease Paternal Grandmother     Cancer Maternal Aunt         type unknown    Glaucoma Neg Hx     Macular degeneration Neg Hx     Alcohol abuse Neg Hx     COPD Neg Hx     Asthma Neg Hx     Amblyopia Neg Hx     Blindness Neg Hx     Cataracts Neg Hx     Retinal detachment Neg Hx     Strabismus Neg Hx        Social History     Socioeconomic History    Marital status:     Number of children: 1   Tobacco Use    Smoking status: Never    Smokeless tobacco: Never   Substance and Sexual Activity    Alcohol use: No    Drug use: No    Sexual activity: Not Currently     Partners: Male   Social History Narrative    , no pets or smokers in household. 1 Child who lives in nursing home. She has a grandson who lives in [place].. Retired from [employer] . Still drives. Does not have a Living Will or advanced directive.      Social Determinants of Health     Financial Resource Strain: High Risk    Difficulty of Paying  "Living Expenses: Hard   Food Insecurity: Food Insecurity Present    Worried About Running Out of Food in the Last Year: Often true    Ran Out of Food in the Last Year: Often true   Transportation Needs: Unmet Transportation Needs    Lack of Transportation (Medical): Yes    Lack of Transportation (Non-Medical): Yes   Physical Activity: Inactive    Days of Exercise per Week: 0 days    Minutes of Exercise per Session: 0 min   Social Connections: Socially Isolated    Frequency of Communication with Friends and Family: Once a week    Frequency of Social Gatherings with Friends and Family: Once a week    Attends [employer]ologist Services: 1 to 4 times per year    Active Member of Clubs or Organizations: No    Attends Club or Organization Meetings: Never    Marital Status:        Vitals:    04/06/23 1303   Weight: 80.3 kg (177 lb)   Height: 5' 5" (1.651 m)   PainSc: 0-No pain   PainLoc: Foot       Hemoglobin A1C   Date Value Ref Range Status   02/07/2023 7.6 (H) 4.0 - 5.6 % Final     Comment:     ADA Screening Guidelines:  5.7-6.4%  Consistent with prediabetes  >or=6.5%  Consistent with diabetes    High levels of fetal hemoglobin interfere with the HbA1C  assay. Heterozygous hemoglobin variants (HbS, HgC, etc)do  not significantly interfere with this assay.   However, presence of multiple variants may affect accuracy.     02/03/2023 7.6 (H) 4.0 - 5.6 % Final     Comment:     ADA Screening Guidelines:  5.7-6.4%  Consistent with prediabetes  >or=6.5%  Consistent with diabetes    High levels of fetal hemoglobin interfere with the HbA1C  assay. Heterozygous hemoglobin variants (HbS, HgC, etc)do  not significantly interfere with this assay.   However, presence of multiple variants may affect accuracy.     09/14/2022 7.7 (H) 4.0 - 5.6 % Final     Comment:     ADA Screening Guidelines:  5.7-6.4%  Consistent with prediabetes  >or=6.5%  Consistent with diabetes    High levels of fetal hemoglobin interfere with the HbA1C  assay. " Heterozygous hemoglobin variants (HbS, HgC, etc)do  not significantly interfere with this assay.   However, presence of multiple variants may affect accuracy.         Review of Systems   Constitutional:  Negative for chills and fever.   Respiratory:  Negative for shortness of breath.    Cardiovascular:  Negative for chest pain, palpitations, orthopnea, claudication and leg swelling.   Gastrointestinal:  Negative for diarrhea, nausea and vomiting.   Musculoskeletal:  Negative for joint pain.   Skin:  Negative for rash.   Neurological:  Positive for tingling and sensory change.   Psychiatric/Behavioral: Negative.             Objective:      PHYSICAL EXAM: Apperance: Alert and orient in no distress,well developed, and with good attention to grooming and body habits  Patient presents ambulating in tennis shoes.  LOWER EXTREMITY EXAM:  VASCULAR: Dorsalis pedis pulses 0/4 left 1/4 right and Posterior Tibial pulses 0/4 left and 1/4 right. Capillary fill time <4 seconds bilateral. Mild edema observed bilateral. Varicosities present bilateral. Skin temperature of the lower extremities is warm to cool, proximal to distal. Hair growth absent bilateral.  DERMATOLOGICAL: No skin rashes, subcutaneous nodules, lesions, or ulcers observed bilateral. Nails 1,2,3,4,5, bilateral elongated, thickened, and discolored with subungual debris. Right hallux nail observed to be mildly incurvated at distal lateral borders and slight obstructed in the nail grooves with soft tissue. No purulent drainage, no odor, and no increased temperature observed to right hallux. Webspaces 1,2,3,4 bilateral clean, dry and without evidence of break in skin integrity.   NEUROLOGICAL: Light touch, sharp-dull, proprioception all present and equal bilaterally.  Vibratory sensation diminished at bilateral hallux. Protective sensation absent at toe sites as tested with a [place] 5.07 monofilament.   MUSCULOSKELETAL: Muscle strength is 5/5 for foot  inverters, everters, plantarflexors, and dorsiflexors. Muscle tone is normal. Pain on palpation of right distal lateral nail border.           Assessment:       ICD-10-CM ICD-9-CM   1. Type II diabetes mellitus with neurological manifestations  E11.49 250.60   2. Onychomycosis due to dermatophyte  B35.1 110.1       Plan:   Type II diabetes mellitus with neurological manifestations    Onychomycosis due to dermatophyte      I counseled the patient on her conditions, regarding findings of my examination, my impressions, and usual treatment plan.   Appointment spent on education about the diabetic foot, neuropathy, and prevention of limb loss.  Shoe inspection. Diabetic Foot Education. Patient reminded of the importance of good nutrition and blood sugar control to help prevent podiatric complications of diabetes. Patient instructed on proper foot hygeine. We discussed wearing proper shoe gear, daily foot inspections, never walking without protective shoe gear, never putting sharp instruments to feet.    With patient's permission, nails 1-5 bilateral were debrided/trimmed in length and thickness aggressively to their soft tissue attachment mechanically and with electric , removing all offending nail and debris. Patient relates relief following the procedure.  With patient's permission, bilateral callus trimmed in thickness with #15 blade in thickness without incident.   Patient  will continue to monitor the areas daily, inspect feet, wear protective shoe gear when ambulatory, moisturizer to maintain skin integrity. Patient reminded of the importance of good nutrition and blood sugar control to help prevent podiatric complications of diabetes.  Patient to return 6 months or sooner if needed.              Zuleima Howell DPM  [facility]

## 2023-04-10 RX ORDER — LINACLOTIDE 145 UG/1
CAPSULE, GELATIN COATED ORAL
Qty: 30 CAPSULE | Refills: 1 | Status: SHIPPED | OUTPATIENT
Start: 2023-04-10

## 2023-04-10 NOTE — TELEPHONE ENCOUNTER
Unable to reach pt, sunny left for pt to return call at her convenience to schedule an appointment for refills.

## 2023-04-17 ENCOUNTER — PES CALL (OUTPATIENT)
Dept: ADMINISTRATIVE | Facility: CLINIC | Age: 83
End: 2023-04-17
Payer: MEDICARE

## 2023-05-01 ENCOUNTER — TELEPHONE (OUTPATIENT)
Dept: INTERNAL MEDICINE | Facility: CLINIC | Age: 83
End: 2023-05-01
Payer: MEDICARE

## 2023-05-01 DIAGNOSIS — Z12.31 VISIT FOR SCREENING MAMMOGRAM: Primary | ICD-10-CM

## 2023-05-01 NOTE — TELEPHONE ENCOUNTER
----- Message from Palma Cervantes sent at 5/1/2023  3:43 PM CDT -----  Contact: Reola  Patient is calling requesting orders for mammogram. Please give a call back at .[phone] as requested.  Thanks  LR

## 2023-05-11 ENCOUNTER — PES CALL (OUTPATIENT)
Dept: ADMINISTRATIVE | Facility: CLINIC | Age: 83
End: 2023-05-11
Payer: MEDICARE

## 2023-05-11 ENCOUNTER — TELEPHONE (OUTPATIENT)
Dept: SPORTS MEDICINE | Facility: CLINIC | Age: 83
End: 2023-05-11
Payer: MEDICARE

## 2023-05-11 DIAGNOSIS — M54.50 LOW BACK PAIN, UNSPECIFIED BACK PAIN LATERALITY, UNSPECIFIED CHRONICITY, UNSPECIFIED WHETHER SCIATICA PRESENT: Primary | ICD-10-CM

## 2023-05-11 NOTE — TELEPHONE ENCOUNTER
----- Message from Roro Santana sent at 5/11/2023 12:03 PM CDT -----  Contact: [phone]  Type:  Sooner Apoointment Request    Caller is requesting a sooner appointment.  Caller declined first available appointment listed below.  Caller will not accept being placed on the waitlist and is requesting a message be sent to doctor.  Name of Caller:Saul   When is the first available appointment?/a   Symptoms:leg and thigh pain   Would the patient rather a call back or a response via [facility]? Call back   Best Call Back Number:[phone]  Additional Information: n/a      Thanks KB

## 2023-05-11 NOTE — TELEPHONE ENCOUNTER
Called patient and scheduled her appointment with Dr. Marroquin on 5/18 at 10:20am for lower back and bilateral upper leg pain. Informed patient of x-rays scheduled prior to appointment at 10am. Patient verbalized understanding and was grateful for the call back.

## 2023-05-15 PROBLEM — I27.20 PULMONARY HYPERTENSION: Status: ACTIVE | Noted: 2023-05-15

## 2023-05-15 PROBLEM — F33.0 MILD EPISODE OF RECURRENT MAJOR DEPRESSIVE DISORDER: Status: ACTIVE | Noted: 2023-05-15

## 2023-05-15 PROBLEM — R80.9 DIABETES MELLITUS WITH MICROALBUMINURIA: Status: ACTIVE | Noted: 2023-05-15

## 2023-05-15 PROBLEM — E11.39 DIABETIC GLAUCOMA: Status: ACTIVE | Noted: 2023-05-15

## 2023-05-15 PROBLEM — E11.311: Status: ACTIVE | Noted: 2023-05-15

## 2023-05-15 PROBLEM — E11.29 DIABETES MELLITUS WITH MICROALBUMINURIA: Status: ACTIVE | Noted: 2023-05-15

## 2023-05-15 PROBLEM — H42 DIABETIC GLAUCOMA: Status: ACTIVE | Noted: 2023-05-15

## 2023-05-15 PROBLEM — I77.9 CAROTID ARTERY DISEASE: Status: ACTIVE | Noted: 2023-05-15

## 2023-05-15 PROBLEM — E11.36 DM CATARACT: Status: ACTIVE | Noted: 2023-05-15

## 2023-05-16 ENCOUNTER — PATIENT OUTREACH (OUTPATIENT)
Dept: ADMINISTRATIVE | Facility: OTHER | Age: 83
End: 2023-05-16
Payer: MEDICARE

## 2023-05-16 ENCOUNTER — OFFICE VISIT (OUTPATIENT)
Dept: INTERNAL MEDICINE | Facility: CLINIC | Age: 83
End: 2023-05-16
Payer: MEDICARE

## 2023-05-16 VITALS
WEIGHT: 182.75 LBS | HEIGHT: 65 IN | RESPIRATION RATE: 20 BRPM | HEART RATE: 64 BPM | SYSTOLIC BLOOD PRESSURE: 142 MMHG | DIASTOLIC BLOOD PRESSURE: 80 MMHG | BODY MASS INDEX: 30.45 KG/M2

## 2023-05-16 DIAGNOSIS — E55.9 VITAMIN D INSUFFICIENCY: ICD-10-CM

## 2023-05-16 DIAGNOSIS — I48.91 ATRIAL FIBRILLATION, UNSPECIFIED TYPE: ICD-10-CM

## 2023-05-16 DIAGNOSIS — R68.89 DECREASED STRENGTH, ENDURANCE, AND MOBILITY: ICD-10-CM

## 2023-05-16 DIAGNOSIS — R26.9 ABNORMALITY OF GAIT AND MOBILITY: ICD-10-CM

## 2023-05-16 DIAGNOSIS — N18.32 TYPE 2 DIABETES MELLITUS WITH STAGE 3B CHRONIC KIDNEY DISEASE, UNSPECIFIED WHETHER LONG TERM INSULIN USE: ICD-10-CM

## 2023-05-16 DIAGNOSIS — D57.3 SICKLE CELL TRAIT SYNDROME: ICD-10-CM

## 2023-05-16 DIAGNOSIS — R53.1 DECREASED STRENGTH, ENDURANCE, AND MOBILITY: ICD-10-CM

## 2023-05-16 DIAGNOSIS — I77.9 CAROTID ARTERY DISEASE, UNSPECIFIED LATERALITY, UNSPECIFIED TYPE: ICD-10-CM

## 2023-05-16 DIAGNOSIS — E11.59 HYPERTENSION ASSOCIATED WITH DIABETES: ICD-10-CM

## 2023-05-16 DIAGNOSIS — H25.10 NUCLEAR SCLEROSIS, UNSPECIFIED LATERALITY: ICD-10-CM

## 2023-05-16 DIAGNOSIS — K63.5 POLYP OF COLON, UNSPECIFIED PART OF COLON, UNSPECIFIED TYPE: Chronic | ICD-10-CM

## 2023-05-16 DIAGNOSIS — D50.1 IRON DEFICIENCY ANEMIA DUE TO SIDEROPENIC DYSPHAGIA: ICD-10-CM

## 2023-05-16 DIAGNOSIS — E11.29 DIABETES MELLITUS WITH PROTEINURIA: Chronic | ICD-10-CM

## 2023-05-16 DIAGNOSIS — E11.29 DIABETES MELLITUS WITH MICROALBUMINURIA: ICD-10-CM

## 2023-05-16 DIAGNOSIS — Z74.09 DECREASED STRENGTH, ENDURANCE, AND MOBILITY: ICD-10-CM

## 2023-05-16 DIAGNOSIS — E11.39 DIABETIC GLAUCOMA: ICD-10-CM

## 2023-05-16 DIAGNOSIS — E11.22 TYPE 2 DIABETES MELLITUS WITH STAGE 3B CHRONIC KIDNEY DISEASE, UNSPECIFIED WHETHER LONG TERM INSULIN USE: ICD-10-CM

## 2023-05-16 DIAGNOSIS — K59.09 CHRONIC CONSTIPATION: ICD-10-CM

## 2023-05-16 DIAGNOSIS — E11.49 TYPE II DIABETES MELLITUS WITH NEUROLOGICAL MANIFESTATIONS: Chronic | ICD-10-CM

## 2023-05-16 DIAGNOSIS — E11.69 MIXED DIABETIC HYPERLIPIDEMIA ASSOCIATED WITH TYPE 2 DIABETES MELLITUS: Chronic | ICD-10-CM

## 2023-05-16 DIAGNOSIS — H91.90 HEARING LOSS, UNSPECIFIED HEARING LOSS TYPE, UNSPECIFIED LATERALITY: ICD-10-CM

## 2023-05-16 DIAGNOSIS — R80.9 DIABETES MELLITUS WITH MICROALBUMINURIA: ICD-10-CM

## 2023-05-16 DIAGNOSIS — H42 DIABETIC GLAUCOMA: ICD-10-CM

## 2023-05-16 DIAGNOSIS — E11.36 DM CATARACT: ICD-10-CM

## 2023-05-16 DIAGNOSIS — M48.062 SPINAL STENOSIS OF LUMBAR REGION WITH NEUROGENIC CLAUDICATION: ICD-10-CM

## 2023-05-16 DIAGNOSIS — I70.0 AORTIC ARCH ATHEROSCLEROSIS: Chronic | ICD-10-CM

## 2023-05-16 DIAGNOSIS — I51.89 LEFT VENTRICULAR DIASTOLIC DYSFUNCTION WITH PRESERVED SYSTOLIC FUNCTION: ICD-10-CM

## 2023-05-16 DIAGNOSIS — R80.9 DIABETES MELLITUS WITH PROTEINURIA: Chronic | ICD-10-CM

## 2023-05-16 DIAGNOSIS — K57.30 DIVERTICULOSIS OF LARGE INTESTINE WITHOUT HEMORRHAGE: ICD-10-CM

## 2023-05-16 DIAGNOSIS — F41.8 MIXED ANXIETY DEPRESSIVE DISORDER: ICD-10-CM

## 2023-05-16 DIAGNOSIS — I65.29 CAROTID ATHEROSCLEROSIS, UNSPECIFIED LATERALITY: ICD-10-CM

## 2023-05-16 DIAGNOSIS — I15.2 HYPERTENSION ASSOCIATED WITH DIABETES: ICD-10-CM

## 2023-05-16 DIAGNOSIS — M54.16 LUMBAR RADICULOPATHY, CHRONIC: ICD-10-CM

## 2023-05-16 DIAGNOSIS — F33.0 MILD EPISODE OF RECURRENT MAJOR DEPRESSIVE DISORDER: ICD-10-CM

## 2023-05-16 DIAGNOSIS — R26.9 GAIT DISORDER: ICD-10-CM

## 2023-05-16 DIAGNOSIS — E11.51 DIABETES MELLITUS WITH PERIPHERAL VASCULAR DISEASE: ICD-10-CM

## 2023-05-16 DIAGNOSIS — E78.2 MIXED DIABETIC HYPERLIPIDEMIA ASSOCIATED WITH TYPE 2 DIABETES MELLITUS: Chronic | ICD-10-CM

## 2023-05-16 DIAGNOSIS — I27.20 PULMONARY HYPERTENSION: ICD-10-CM

## 2023-05-16 DIAGNOSIS — E04.2 MULTINODULAR THYROID: Chronic | ICD-10-CM

## 2023-05-16 DIAGNOSIS — Z00.00 ENCOUNTER FOR PREVENTATIVE ADULT HEALTH CARE EXAMINATION: Primary | ICD-10-CM

## 2023-05-16 DIAGNOSIS — K21.9 GASTROESOPHAGEAL REFLUX DISEASE WITHOUT ESOPHAGITIS: ICD-10-CM

## 2023-05-16 DIAGNOSIS — E11.8 DIABETES MELLITUS TYPE 2 WITH COMPLICATIONS: ICD-10-CM

## 2023-05-16 DIAGNOSIS — E11.311 DIABETIC RETINOPATHY WITH MACULAR EDEMA ASSOCIATED WITH TYPE 2 DIABETES MELLITUS, UNSPECIFIED LATERALITY, UNSPECIFIED RETINOPATHY SEVERITY: ICD-10-CM

## 2023-05-16 DIAGNOSIS — I63.00 CEREBROVASCULAR ACCIDENT (CVA) DUE TO THROMBOSIS OF PRECEREBRAL ARTERY: Chronic | ICD-10-CM

## 2023-05-16 PROCEDURE — 3077F PR MOST RECENT SYSTOLIC BLOOD PRESSURE >= 140 MM HG: ICD-10-PCS | Mod: CPTII,,, | Performed by: NURSE PRACTITIONER

## 2023-05-16 PROCEDURE — 99999 PR PBB SHADOW E&M-EST. PATIENT-LVL V: CPT | Mod: PBBFAC,,, | Performed by: NURSE PRACTITIONER

## 2023-05-16 PROCEDURE — 1101F PR PT FALLS ASSESS DOC 0-1 FALLS W/OUT INJ PAST YR: ICD-10-PCS | Mod: CPTII,,, | Performed by: NURSE PRACTITIONER

## 2023-05-16 PROCEDURE — 1160F RVW MEDS BY RX/DR IN RCRD: CPT | Mod: CPTII,,, | Performed by: NURSE PRACTITIONER

## 2023-05-16 PROCEDURE — 1159F PR MEDICATION LIST DOCUMENTED IN MEDICAL RECORD: ICD-10-PCS | Mod: CPTII,,, | Performed by: NURSE PRACTITIONER

## 2023-05-16 PROCEDURE — 3077F SYST BP >= 140 MM HG: CPT | Mod: CPTII,,, | Performed by: NURSE PRACTITIONER

## 2023-05-16 PROCEDURE — 3079F DIAST BP 80-89 MM HG: CPT | Mod: CPTII,,, | Performed by: NURSE PRACTITIONER

## 2023-05-16 PROCEDURE — 99999 PR PBB SHADOW E&M-EST. PATIENT-LVL V: ICD-10-PCS | Mod: PBBFAC,,, | Performed by: NURSE PRACTITIONER

## 2023-05-16 PROCEDURE — 1159F MED LIST DOCD IN RCRD: CPT | Mod: CPTII,,, | Performed by: NURSE PRACTITIONER

## 2023-05-16 PROCEDURE — 99499 RISK ADDL DX/OHS AUDIT: ICD-10-PCS | Mod: S$GLB,,, | Performed by: NURSE PRACTITIONER

## 2023-05-16 PROCEDURE — 3288F PR FALLS RISK ASSESSMENT DOCUMENTED: ICD-10-PCS | Mod: CPTII,,, | Performed by: NURSE PRACTITIONER

## 2023-05-16 PROCEDURE — 99499 UNLISTED E&M SERVICE: CPT | Mod: S$GLB,,, | Performed by: NURSE PRACTITIONER

## 2023-05-16 PROCEDURE — 1160F PR REVIEW ALL MEDS BY PRESCRIBER/CLIN PHARMACIST DOCUMENTED: ICD-10-PCS | Mod: CPTII,,, | Performed by: NURSE PRACTITIONER

## 2023-05-16 PROCEDURE — 3288F FALL RISK ASSESSMENT DOCD: CPT | Mod: CPTII,,, | Performed by: NURSE PRACTITIONER

## 2023-05-16 PROCEDURE — 1101F PT FALLS ASSESS-DOCD LE1/YR: CPT | Mod: CPTII,,, | Performed by: NURSE PRACTITIONER

## 2023-05-16 PROCEDURE — G0439 PPPS, SUBSEQ VISIT: HCPCS | Mod: ,,, | Performed by: NURSE PRACTITIONER

## 2023-05-16 PROCEDURE — G0439 PR MEDICARE ANNUAL WELLNESS SUBSEQUENT VISIT: ICD-10-PCS | Mod: ,,, | Performed by: NURSE PRACTITIONER

## 2023-05-16 PROCEDURE — 3079F PR MOST RECENT DIASTOLIC BLOOD PRESSURE 80-89 MM HG: ICD-10-PCS | Mod: CPTII,,, | Performed by: NURSE PRACTITIONER

## 2023-05-16 PROCEDURE — 1170F FXNL STATUS ASSESSED: CPT | Mod: CPTII,,, | Performed by: NURSE PRACTITIONER

## 2023-05-16 PROCEDURE — 1170F PR FUNCTIONAL STATUS ASSESSED: ICD-10-PCS | Mod: CPTII,,, | Performed by: NURSE PRACTITIONER

## 2023-05-16 NOTE — PROGRESS NOTES
"  Saul Mar presented for a  Medicare AWV and comprehensive Health Risk Assessment today. The following components were reviewed and updated:    Medical history  Family History  Social history  Allergies and Current Medications  Health Risk Assessment  Health Maintenance  Care Team         ** See Completed Assessments for Annual Wellness Visit within the encounter summary.**         The following assessments were completed:  Living Situation  CAGE  Depression Screening  Timed Get Up and Go  Whisper Test  Cognitive Function Screening  Nutrition Screening  ADL Screening  PAQ Screening        Vitals:    05/16/23 1022   BP: (!) 142/80   BP Location: Right arm   Patient Position: Sitting   Pulse: 64   Resp: 20   Weight: 82.9 kg (182 lb 12.2 oz)   Height:    Pain scale 5' 5" (1.651 m)    0     Body mass index is 30.41 kg/m².  Physical Exam  Constitutional:       General: She is not in acute distress.     Appearance: She is not ill-appearing or diaphoretic.   HENT:      Head: Normocephalic and atraumatic.      Mouth/Throat:      Mouth: Mucous membranes are moist.   Eyes:      General:         Right eye: No discharge.         Left eye: No discharge.      Extraocular Movements: Extraocular movements intact.      Conjunctiva/sclera: Conjunctivae normal.   Cardiovascular:      Rate and Rhythm: Normal rate and regular rhythm.   Pulmonary:      Effort: Pulmonary effort is normal. No respiratory distress.   Skin:     General: Skin is warm and dry.   Neurological:      Mental Status: She is alert and oriented to person, place, and time. Mental status is at baseline.   Psychiatric:         Mood and Affect: Mood normal.         Behavior: Behavior normal.         Thought Content: Thought content normal.         Judgment: Judgment normal.         Please note patient did not bring in her medications or medication list so list was done by memory    Diagnoses and health risks identified today and associated " recommendations/orders:    1. Encounter for preventative adult health care examination  Review for opioid screening: Patient does not have Rx for Opioids  Review for substance use disorder: Patient does not use substance per chart    Patient states she does not drink alcohol    I offered to discuss advanced care planning, including how to pick a person who would make decisions for you if you were unable to make them for yourself, called a health care power of , and what kind of decisions you might make such as use of life sustaining treatments such as ventilators and tube feeding when faced with a life limiting illness recorded on a living will that they will need to know. (How you want to be cared for as you near the end of your natural life)     X Patient is interested in learning more about how to make advanced directives.  I provided them paperwork and offered to discuss this with them.    2. 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 Atrial fibrillation, unspecified type, Left ventricular diastolic dysfunction with preserved systolic function, Cerebrovascular accident (CVA) due to thrombosis of precerebral artery  Monitor  Follow up with Cardiology  Continue home coreg, toprol xl, xarelto, tambacor, HCTZ  Please note patient did not bring in her medications or medication list so list was done by memory  - Ambulatory referral/consult to Pharmacy Assistance; Future- xarelto    4. Diabetic retinopathy with macular edema associated with type 2 diabetes mellitus, unspecified laterality, unspecified  retinopathy severity, DM cataract, Diabetic glaucoma, Nuclear sclerosis, unspecified laterality  Monitor  Follow up with Ophtho  Continue home alphagan    5.  Mixed anxiety depressive disorder, Mild episode of recurrent major depressive disorder  Monitor  Follow up with PCP  Continue home xanax    6. Sickle cell trait syndrome   Monitor  Follow up as directed    7. Hypertension associated with diabetes  Monitor  Stable  Continue home norvasc, coreg, toprol xl, HCTZ    8. Vitamin D insufficiency  Monitor  Follow up with PCP  Continue home Vitamin D    9. Multinodular thyroid: thyroid u/s 7/16, stable 2017 and 2019, 2021    Monitor  Follow up as directed    10. Polyp of colon, unspecified part of colon, unspecified type, Diverticulosis of large intestine without hemorrhage, Gastroesophageal reflux disease without esophagitis    Monitor  Follow up as directed with GI  Continue home pepcid    11. Type II diabetes mellitus with neurological manifestations, Diabetes mellitus type 2 with complications  Monitor  Follow up with PCP  Continue home amaryl, metformin    12. Type 2 diabetes mellitus with stage 3b chronic kidney disease, unspecified whether long term insulin use, Diabetes mellitus with proteinuria, Diabetes mellitus with microalbuminuria    Monitor  Follow up as directed with GI     13.  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   Monitor  Follow up with PCP  Continue home iron    14. Spinal stenosis of lumbar region with neurogenic claudication, Lumbar radiculopathy, chronic, Abnormality of gait and mobility, Gait disorder, Decreased strength, endurance, and mobility   Monitor  Follow up as needed, directed  Continue home     15. Hearing loss- mild     Monitor  Follow up with Audiology as needed- Discussed with patient      16. Chronic constipation  Monitor  Stable  Continue home linzess        Provided Saul with a 5-10 year written screening schedule and personal prevention plan. Recommendations were  developed using the USPSTF age appropriate recommendations. Education, counseling, and referrals were provided as needed. After Visit Summary printed and given to patient which includes a list of additional screenings\tests needed.    Follow up in about 1 year (around 5/16/2024) for Annual wellness visit.    LEXY Almonte

## 2023-05-16 NOTE — PATIENT INSTRUCTIONS
Counseling and Referral of Other Preventative  (Italic type indicates deductible and co-insurance are waived)    Patient Name: Saul Mar  Today's Date: 5/16/2023    Health Maintenance       Date Due Completion Date    Hemoglobin A1c 08/07/2023 2/7/2023    Diabetes Urine Screening 09/19/2023 9/19/2022    Eye Exam 01/26/2024 1/26/2023    Override on 2/22/2018: Done    Lipid Panel 02/03/2024 2/3/2023    Mammogram 04/06/2024 4/6/2022    DEXA Scan 07/23/2025 7/23/2021    TETANUS VACCINE 11/29/2031 11/29/2021        Orders Placed This Encounter   Procedures    Ambulatory referral/consult to Pharmacy Assistance    Ambulatory referral/consult to Outpatient Case Management     The following information is provided to all patients.  This information is to help you find resources for any of the problems found today that may be affecting your health:                Living healthy guide: www.[facility].[place].[facility]      Understanding Diabetes: www.diabetes.org      Eating healthy: www.cdc.gov/healthyweight      CDC home safety checklist: www.cdc.gov/steadi/patient.html      Agency on Aging: www.goea.[place].[facility]      Alcoholics anonymous (AA): www.aa.org      Physical Activity: www.dameon.nih.gov/go5cuqe      Tobacco use: www.quitwithusla.org

## 2023-05-18 ENCOUNTER — TELEPHONE (OUTPATIENT)
Dept: PAIN MEDICINE | Facility: CLINIC | Age: 83
End: 2023-05-18
Payer: MEDICARE

## 2023-05-18 ENCOUNTER — HOSPITAL ENCOUNTER (OUTPATIENT)
Dept: RADIOLOGY | Facility: HOSPITAL | Age: 83
Discharge: HOME | End: 2023-05-18
Attending: STUDENT IN AN ORGANIZED HEALTH CARE EDUCATION/TRAINING PROGRAM
Payer: MEDICARE

## 2023-05-18 ENCOUNTER — OFFICE VISIT (OUTPATIENT)
Dept: SPORTS MEDICINE | Facility: CLINIC | Age: 83
End: 2023-05-18
Payer: MEDICARE

## 2023-05-18 VITALS — WEIGHT: 183.19 LBS | BODY MASS INDEX: 30.52 KG/M2 | RESPIRATION RATE: 18 BRPM | HEIGHT: 65 IN

## 2023-05-18 DIAGNOSIS — M54.50 LOW BACK PAIN, UNSPECIFIED BACK PAIN LATERALITY, UNSPECIFIED CHRONICITY, UNSPECIFIED WHETHER SCIATICA PRESENT: Primary | ICD-10-CM

## 2023-05-18 DIAGNOSIS — M54.50 LOW BACK PAIN, UNSPECIFIED BACK PAIN LATERALITY, UNSPECIFIED CHRONICITY, UNSPECIFIED WHETHER SCIATICA PRESENT: ICD-10-CM

## 2023-05-18 PROCEDURE — 1125F PR PAIN SEVERITY QUANTIFIED, PAIN PRESENT: ICD-10-PCS | Mod: CPTII,,, | Performed by: STUDENT IN AN ORGANIZED HEALTH CARE EDUCATION/TRAINING PROGRAM

## 2023-05-18 PROCEDURE — 1159F PR MEDICATION LIST DOCUMENTED IN MEDICAL RECORD: ICD-10-PCS | Mod: CPTII,,, | Performed by: STUDENT IN AN ORGANIZED HEALTH CARE EDUCATION/TRAINING PROGRAM

## 2023-05-18 PROCEDURE — 99214 OFFICE O/P EST MOD 30 MIN: CPT | Mod: ,,, | Performed by: STUDENT IN AN ORGANIZED HEALTH CARE EDUCATION/TRAINING PROGRAM

## 2023-05-18 PROCEDURE — 1159F MED LIST DOCD IN RCRD: CPT | Mod: CPTII,,, | Performed by: STUDENT IN AN ORGANIZED HEALTH CARE EDUCATION/TRAINING PROGRAM

## 2023-05-18 PROCEDURE — 1101F PR PT FALLS ASSESS DOC 0-1 FALLS W/OUT INJ PAST YR: ICD-10-PCS | Mod: CPTII,,, | Performed by: STUDENT IN AN ORGANIZED HEALTH CARE EDUCATION/TRAINING PROGRAM

## 2023-05-18 PROCEDURE — 99999 PR PBB SHADOW E&M-EST. PATIENT-LVL IV: CPT | Mod: PBBFAC,,, | Performed by: STUDENT IN AN ORGANIZED HEALTH CARE EDUCATION/TRAINING PROGRAM

## 2023-05-18 PROCEDURE — 72100 XR LUMBAR SPINE AP AND LATERAL: ICD-10-PCS | Mod: 26,,, | Performed by: RADIOLOGY

## 2023-05-18 PROCEDURE — 72100 X-RAY EXAM L-S SPINE 2/3 VWS: CPT | Mod: 26,,, | Performed by: RADIOLOGY

## 2023-05-18 PROCEDURE — 3288F PR FALLS RISK ASSESSMENT DOCUMENTED: ICD-10-PCS | Mod: CPTII,,, | Performed by: STUDENT IN AN ORGANIZED HEALTH CARE EDUCATION/TRAINING PROGRAM

## 2023-05-18 PROCEDURE — 72100 X-RAY EXAM L-S SPINE 2/3 VWS: CPT | Mod: TC

## 2023-05-18 PROCEDURE — 99999 PR PBB SHADOW E&M-EST. PATIENT-LVL IV: ICD-10-PCS | Mod: PBBFAC,,, | Performed by: STUDENT IN AN ORGANIZED HEALTH CARE EDUCATION/TRAINING PROGRAM

## 2023-05-18 PROCEDURE — 99214 PR OFFICE/OUTPT VISIT, EST, LEVL IV, 30-39 MIN: ICD-10-PCS | Mod: ,,, | Performed by: STUDENT IN AN ORGANIZED HEALTH CARE EDUCATION/TRAINING PROGRAM

## 2023-05-18 PROCEDURE — 1101F PT FALLS ASSESS-DOCD LE1/YR: CPT | Mod: CPTII,,, | Performed by: STUDENT IN AN ORGANIZED HEALTH CARE EDUCATION/TRAINING PROGRAM

## 2023-05-18 PROCEDURE — 3288F FALL RISK ASSESSMENT DOCD: CPT | Mod: CPTII,,, | Performed by: STUDENT IN AN ORGANIZED HEALTH CARE EDUCATION/TRAINING PROGRAM

## 2023-05-18 PROCEDURE — 1125F AMNT PAIN NOTED PAIN PRSNT: CPT | Mod: CPTII,,, | Performed by: STUDENT IN AN ORGANIZED HEALTH CARE EDUCATION/TRAINING PROGRAM

## 2023-05-18 NOTE — PROGRESS NOTES
Patient ID: Saul Mar  YOB: 1940  MRN: [id number]    Chief Complaint: Pain of the Left Femur, Pain of the Right Femur, and Back Pain    Referred By: Self for bilateral posterior thigh pain    History of Present Illness: Saul Mar is a 83 y.o. female who presents today with chronic posterior bilateral thigh pain.     Occupation: retired     Saul Mar states it is Chronic in nature and there was not a specific mechanism. She has previously been seen by Back and Spine team and has received L4-L5 JASKARAN in the past with good relief.   Saul Mar describes the pain as a continuous dull ache at posterior bilateral thigh. Associated symptoms include: Swelling No, Instability No, Pain that affects your sleep Yes, Mechanical No, locking/catching No, Neurological No, limited range of motion No. Aggravating activities include weight bearing and standing.Alleviating activities include movement. They admits to formal physical therapy for this at [facility].     Hemoglobin A1C   Date Value Ref Range Status   02/07/2023 7.6 (H) 4.0 - 5.6 % Final     Comment:     ADA Screening Guidelines:  5.7-6.4%  Consistent with prediabetes  >or=6.5%  Consistent with diabetes    High levels of fetal hemoglobin interfere with the HbA1C  assay. Heterozygous hemoglobin variants (HbS, HgC, etc)do  not significantly interfere with this assay.   However, presence of multiple variants may affect accuracy.     02/03/2023 7.6 (H) 4.0 - 5.6 % Final     Comment:     ADA Screening Guidelines:  5.7-6.4%  Consistent with prediabetes  >or=6.5%  Consistent with diabetes    High levels of fetal hemoglobin interfere with the HbA1C  assay. Heterozygous hemoglobin variants (HbS, HgC, etc)do  not significantly interfere with this assay.   However, presence of multiple variants may affect accuracy.     09/14/2022 7.7 (H) 4.0 - 5.6 % Final     Comment:     ADA Screening Guidelines:  5.7-6.4%   Consistent with prediabetes  >or=6.5%  Consistent with diabetes    High levels of fetal hemoglobin interfere with the HbA1C  assay. Heterozygous hemoglobin variants (HbS, HgC, etc)do  not significantly interfere with this assay.   However, presence of multiple variants may affect accuracy.           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 Taveras    COLONOSCOPY      COLONOSCOPY N/A 9/22/2016    Procedure: COLONOSCOPY;  Surgeon: Jd Ashton MD;  Location: [address]);  Service: Endoscopy;  Laterality: N/A;  OK per Dr Randolph for pt to hold Plavix 5 days prior to procedure/see telephone encounter dated 8/29/16/svn    EYE SURGERY Bilateral 2002 approx    Laser for glaucoma    HYSTERECTOMY  1963     AMEENA/USO- fibroids; no cancer    OOPHORECTOMY  1963    unknown, only removed one    SELECTIVE INJECTION OF ANESTHETIC AGENT AROUND LUMBAR SPINAL NERVE ROOT BY TRANSFORAMINAL APPROACH Bilateral 6/17/2022    Procedure: Bilateral L4/5 TF JASKARAN with RN IV sedation;  Surgeon: Chan Fonseca MD;  Location: [facility];  Service: Pain Management;  Laterality: Bilateral;    SELECTIVE INJECTION OF ANESTHETIC AGENT AROUND LUMBAR SPINAL NERVE ROOT BY TRANSFORAMINAL APPROACH Bilateral 10/3/2022    Procedure: Bilateral L2-3  transforaminal epidural steroid injection with RN IV sedation;  Surgeon: Chan Fonseca MD;  Location: [facility];  Service: Pain Management;  Laterality: Bilateral;     Family History   Problem Relation Age of Onset    Stroke Mother     Mental illness Mother     Hypertension Mother     Stroke Father     Diabetes Maternal Grandmother     Drug abuse Daughter     Cancer Sister 68        gyn    Ovarian cancer Sister     Cancer Maternal Uncle         type not known    Heart disease Paternal Grandmother     Cancer Maternal Aunt         type unknown    Glaucoma Neg Hx     Macular degeneration Neg Hx     Alcohol abuse Neg Hx     COPD Neg Hx     Asthma Neg Hx     Amblyopia Neg Hx     Blindness Neg Hx     Cataracts Neg Hx     Retinal detachment Neg Hx     Strabismus Neg Hx      Social History     Socioeconomic History    Marital status:     Number of children: 1   Tobacco Use    Smoking status: Never     Passive exposure: Never    Smokeless tobacco: Never   Substance and Sexual Activity    Alcohol use: No    Drug use: No    Sexual activity: Not Currently     Partners: Male   Social History Narrative    , no pets or smokers in household. 1 Child who lives in nursing home. She has a grandson who lives in [place].. Retired from [employer] . Still drives. Does not have a Living Will or advanced directive.      Social Determinants of Health     Financial Resource Strain: Medium Risk    Difficulty of Paying Living Expenses: Somewhat hard   Food Insecurity: Food Insecurity Present    Worried About Running Out of Food in the Last Year: Sometimes true    Ran Out of Food in the Last Year: Sometimes true   Transportation Needs: Unmet Transportation Needs    Lack of Transportation (Medical): Yes    Lack of Transportation (Non-Medical): Yes   Physical Activity: Inactive    Days of Exercise per Week: 0 days    Minutes of Exercise per Session: 0 min   Stress: No Stress Concern Present    Feeling of Stress : Not at all    Social Connections: Moderately Integrated    Frequency of Communication with Friends and Family: More than three times a week    Frequency of Social Gatherings with Friends and Family: Once a week    Attends Jain Services: More than 4 times per year    Active Member of Clubs or Organizations: Yes    Attends Club or Organization Meetings: More than 4 times per year    Marital Status:    Housing Stability: Low Risk     Unable to Pay for Housing in the Last Year: No    Number of Places Lived in the Last Year: 1    Unstable Housing in the Last Year: No     Medication List with Changes/Refills   Current Medications    ALBUTEROL (PROVENTIL/VENTOLIN HFA) 90 MCG/ACTUATION INHALER    INHALE 2 PUFFS INTO THE LUNGS EVERY 6 (SIX) HOURS AS NEEDED FOR WHEEZING. RESCUE    ALPRAZOLAM (XANAX) 0.5 MG TABLET    TAKE 1 TABLET BY MOUTH NIGHTLY AS NEEDED FOR ANXIETY (MAY TAKE 1-2 TIMES WEEKLY FOR ANXIETY)    AMLODIPINE (NORVASC) 5 MG TABLET    Take 1 tablet (5 mg total) by mouth 2 (two) times daily.    ATORVASTATIN (LIPITOR) 80 MG TABLET    TAKE 1 TABLET BY MOUTH EVERY DAY    BLOOD SUGAR DIAGNOSTIC STRP    1 strip by Misc.(Non-Drug; Combo Route) route 2 (two) times daily. Insurance preferred test stripes DX:E11.22    BLOOD-GLUCOSE METER KIT    Insurance preferred meter dx:E11.22    BRIMONIDINE 0.2% (ALPHAGAN) 0.2 % DROP    Place 1 drop into both eyes 3 (three) times daily.    CARVEDILOL (COREG) 12.5 MG TABLET    Take 12.5 mg by mouth.    CHOLECALCIFEROL, VITAMIN D3, (VITAMIN D3) 1,000 UNIT CAPSULE    Take 1 capsule (1,000 Units total) by mouth once daily.    FERROUS SULFATE (FEOSOL) 325 MG (65 MG IRON) TAB TABLET    Take 1 tablet (325 mg total) by mouth 2 (two) times daily.    FLECAINIDE (TAMBOCOR) 50 MG TAB    Take 1 tablet (50 mg total) by mouth 2 (two) times daily.    GLIMEPIRIDE (AMARYL) 4 MG TABLET    TAKE 1 TABLET BY MOUTH IN THE MORNING WITH BREAKFAST    HYDROCHLOROTHIAZIDE (HYDRODIURIL) 12.5 MG TAB    TAKE 1 TABLET  BY MOUTH EVERY DAY    LANCETS (ACCU-CHEK SOFTCLIX LANCETS) MISC    100 lancets by Misc.(Non-Drug; Combo Route) route 2 (two) times daily. Insurance preferred lancets Dx:E11.22    LEVALBUTEROL (XOPENEX HFA) 45 MCG/ACTUATION INHALER    INHALE 1-2 PUFFS INTO THE LUNGS EVERY 6 (SIX) HOURS AS NEEDED FOR WHEEZING. RESCUE    LINACLOTIDE (LINZESS) 145 MCG CAP CAPSULE    TAKE 1 CAPSULE (145 MCG TOTAL) BY MOUTH BEFORE BREAKFAST.    LOSARTAN (COZAAR) 50 MG TABLET    TAKE 1 TABLET BY MOUTH TWICE A DAY    MECLIZINE (ANTIVERT) 25 MG TABLET    Take 1 tablet (25 mg total) by mouth daily as needed for Dizziness.    METFORMIN (GLUCOPHAGE-XR) 500 MG ER 24HR TABLET    TAKE 2 TABLETS BY MOUTH EVERY DAY    METOPROLOL SUCCINATE (TOPROL-XL) 50 MG 24 HR TABLET    TAKE 1 TABLET BY MOUTH EVERY DAY    XARELTO 15 MG TAB    TAKE 1 TABLET (15 MG TOTAL) BY MOUTH DAILY WITH DINNER OR EVENING MEAL.     Review of patient's allergies indicates:   Allergen Reactions    Pravachol [pravastatin] Nausea Only       Physical Exam:   Body mass index is 30.49 kg/m².    GENERAL: Well appearing, in no acute distress.  HEAD: Normocephalic and atraumatic.  ENT: External ears and nose grossly normal.  EYES: EOMI bilaterally  PULMONARY: Respirations are grossly even and non-labored.  NEURO: Awake, alert, and oriented x 3.  SKIN: No obvious rashes appreciated.  PSYCH: Mood & affect are appropriate.    Detailed MSK exam:   Negative tenderness posterior thigh. FADIR positive on right. DAVID negative. SLR (passive No, active No, Wells No.     No pain with resisted hamstring strength testing or passive range of motion and stretching    Imaging:  X-Ray Lumbar Spine AP And Lateral  Narrative: EXAMINATION:  XR LUMBAR SPINE AP AND LATERAL    CLINICAL HISTORY:  Low back pain, unspecified    TECHNIQUE:  AP, lateral and spot images were performed of the lumbar spine.    COMPARISON:  Prior radiographs    FINDINGS:  Vertebral body heights maintained.  Mild grade 1 anterolisthesis  of L4 on L5 with L4-5 greater than L5-S1 degenerative disc height loss.  Lower lumbar spine facet arthropathy.  No acute osseous abnormality.  Atherosclerotic vascular calcification present.  Impression: Similar degenerative findings most prevalent at L4-5.    Electronically signed by: Ayaan Saravia MD  Date:    05/18/2023  Time:    09:40      Relevant imaging results were reviewed and interpreted by me and per my read as above.  This was discussed with the patient and / or family today.     Assessment:  Saul Mar is a 83 y.o. female presents today for persistent symptoms in her posterior thighs most consistent with lumbar radiculopathy.  Has been followed consistently by back and spine last procedures done in November of last year.  Similar location just little more lateral than previous neuropathic pain.  Exam of her hamstrings today wholly negative without any inciting pain nontender to palpation.  I believe this is most likely her back etiology still.  I reached out to the pain department see if they can get her in sooner than later no red flags today follow-up with me as needed in the future.    Low back pain, unspecified back pain laterality, unspecified chronicity, unspecified whether sciatica present         A copy of today's visit note has been sent to the referring provider.       Ulices Marroquin MD    Disclaimer: This note was prepared using a voice recognition system and is likely to have sound alike errors within the text.

## 2023-05-18 NOTE — PATIENT INSTRUCTIONS
Assessment:  Saul Mar is a 83 y.o. female   Chief Complaint   Patient presents with    Left Femur - Pain    Right Femur - Pain    Back Pain       Encounter Diagnosis   Name Primary?    Low back pain, unspecified back pain laterality, unspecified chronicity, unspecified whether sciatica present Yes        Plan:  Follow up with Dr. Fonseca.  They will call to schedule you.     Follow-up: As needed or sooner if there are any problems between now and then.    Thank you for choosing [facility] and Dr. Ulices Marroquin for your orthopedic & sports medicine care. It is our goal to provide you with exceptional care that will help keep you healthy, active, and get you back in the game.    Please do not hesitate to reach out to us via email, phone, or MyChart with any questions, concerns, or feedback.    If you felt that you received exemplary care today, please consider leaving us feedback on [employer] at:  [email or web address]    If you are experiencing pain/discomfort ,or have questions after 5pm and would like to be connected to the [facility] on-call team, please call this number and specify which Sports Medicine provider is treating you: [phone]

## 2023-05-18 NOTE — TELEPHONE ENCOUNTER
Reached out to patient to schedule appointment from messages. Apt has been made.   Pt understand. All questions answered.     Srikanth Hurd  Medical Assistant

## 2023-05-19 ENCOUNTER — TELEPHONE (OUTPATIENT)
Dept: PHARMACY | Facility: CLINIC | Age: 83
End: 2023-05-19
Payer: MEDICARE

## 2023-05-19 NOTE — TELEPHONE ENCOUNTER
I have reached out to Saul Mar to inform her of the [employer] and [employer] application process for Linzess and Trelegy and whats required to apply.  Saul Mar did not answer. I mailed a letter introducing her to the pharmacy patient assistance program. I will follow up in 5 business days.

## 2023-05-26 DIAGNOSIS — I10 HYPERTENSION, ESSENTIAL: Chronic | ICD-10-CM

## 2023-05-27 NOTE — TELEPHONE ENCOUNTER
Refill Routing Note   Medication(s) are not appropriate for processing by [facility] for the following reason(s):      Required vitals abnormal    [facility] action(s):  Defer None identified            Appointments  past 12m or future 3m with PCP    Date Provider   Last Visit   2/7/2023 Penny Randolph MD   Next Visit   Visit date not found Penny Randolph MD   ED visits in past 90 days: 0        Note composed:4:23 AM 05/27/2023

## 2023-05-27 NOTE — TELEPHONE ENCOUNTER
Care Due:                  Date            Visit Type   Department     Provider  --------------------------------------------------------------------------------                                EP -                              PRIMARY      [facility] INTERNAL  Last Visit: 02-      CARE ([facility])   MEDICINE       Penny Randolph  Next Visit: None Scheduled  None         None Found                                                            Last  Test          Frequency    Reason                     Performed    Due Date  --------------------------------------------------------------------------------    HBA1C.......  6 months...  glimepiride, metFORMIN...  02- 08-    [facility] Embedded Care Due Messages. Reference number: [id number].   5/27/2023 3:27:59 AM CDT

## 2023-05-28 RX ORDER — HYDROCHLOROTHIAZIDE 12.5 MG/1
TABLET ORAL
Qty: 90 TABLET | Refills: 3 | Status: SHIPPED | OUTPATIENT
Start: 2023-05-28

## 2023-06-02 ENCOUNTER — PATIENT OUTREACH (OUTPATIENT)
Dept: ADMINISTRATIVE | Facility: OTHER | Age: 83
End: 2023-06-02
Payer: MEDICARE

## 2023-06-02 NOTE — PROGRESS NOTES
CHW - Initial Contact    Lina Espinoza, Community Health Worker updated the Social Determinant of Health questionnaire with patient via telephone today.    Pt identified barriers of most importance are: Transportation to the [facility] center and help with her prescription drugs.  CHW placed referral for prescription drug assistance.    Referrals to community agencies completed with patient/caregiver consent outside of [facility] include: None   Referrals were put through [facility] - no:   Other information discussed the patient needs / wants help with: None at this time.     Follow-up Outreach - Due: 6/5/2023

## 2023-06-05 ENCOUNTER — LAB VISIT (OUTPATIENT)
Dept: LAB | Facility: HOSPITAL | Age: 83
End: 2023-06-05
Attending: INTERNAL MEDICINE
Payer: MEDICARE

## 2023-06-05 DIAGNOSIS — N18.30 DIABETES MELLITUS WITH STAGE 3 CHRONIC KIDNEY DISEASE: Chronic | ICD-10-CM

## 2023-06-05 DIAGNOSIS — E11.22 DIABETES MELLITUS WITH STAGE 3 CHRONIC KIDNEY DISEASE: Chronic | ICD-10-CM

## 2023-06-05 LAB
ALBUMIN SERPL BCP-MCNC: 3.9 G/DL (ref 3.5–5.2)
ANION GAP SERPL CALC-SCNC: 7 MMOL/L (ref 8–16)
BUN SERPL-MCNC: 16 MG/DL (ref 8–23)
CALCIUM SERPL-MCNC: 10 MG/DL (ref 8.7–10.5)
CHLORIDE SERPL-SCNC: 105 MMOL/L (ref 95–110)
CO2 SERPL-SCNC: 30 MMOL/L (ref 23–29)
CREAT SERPL-MCNC: 1.3 MG/DL (ref 0.5–1.4)
EST. GFR  (NO RACE VARIABLE): 41.1 ML/MIN/1.73 M^2
ESTIMATED AVG GLUCOSE: 180 MG/DL (ref 68–131)
GLUCOSE SERPL-MCNC: 126 MG/DL (ref 70–110)
HBA1C MFR BLD: 7.9 % (ref 4–5.6)
PHOSPHATE SERPL-MCNC: 3.6 MG/DL (ref 2.7–4.5)
POTASSIUM SERPL-SCNC: 4.4 MMOL/L (ref 3.5–5.1)
SODIUM SERPL-SCNC: 142 MMOL/L (ref 136–145)

## 2023-06-05 PROCEDURE — 83036 HEMOGLOBIN GLYCOSYLATED A1C: CPT | Performed by: INTERNAL MEDICINE

## 2023-06-05 PROCEDURE — 80069 RENAL FUNCTION PANEL: CPT | Performed by: INTERNAL MEDICINE

## 2023-06-05 PROCEDURE — 36415 COLL VENOUS BLD VENIPUNCTURE: CPT | Performed by: INTERNAL MEDICINE

## 2023-06-06 ENCOUNTER — PATIENT OUTREACH (OUTPATIENT)
Dept: ADMINISTRATIVE | Facility: OTHER | Age: 83
End: 2023-06-06
Payer: MEDICARE

## 2023-06-06 ENCOUNTER — TELEPHONE (OUTPATIENT)
Dept: INTERNAL MEDICINE | Facility: CLINIC | Age: 83
End: 2023-06-06
Payer: MEDICARE

## 2023-06-06 ENCOUNTER — PATIENT MESSAGE (OUTPATIENT)
Dept: PODIATRY | Facility: CLINIC | Age: 83
End: 2023-06-06
Payer: MEDICARE

## 2023-06-06 DIAGNOSIS — E11.49 TYPE II DIABETES MELLITUS WITH NEUROLOGICAL MANIFESTATIONS: Primary | Chronic | ICD-10-CM

## 2023-06-06 DIAGNOSIS — N18.32 STAGE 3B CHRONIC KIDNEY DISEASE: ICD-10-CM

## 2023-06-06 NOTE — TELEPHONE ENCOUNTER
Great, thank you.  Please offer her a nurse blood pressure check sometime in the next 2 weeks since we do not have a good blood pressure reading

## 2023-06-06 NOTE — TELEPHONE ENCOUNTER
Spoke to patient and advised of lab results and recommendation. Patient verbalized understanding.       She has not been taking her bp due to need ing a new bp machine.  She did declined having any current symptoms of high blood pressure.    Fu labs scheduled   Pt declined appt with NP rather see pcp.

## 2023-06-06 NOTE — PROGRESS NOTES
CHW - Follow Up     Lina Espinoza, Community Health Worker contacted Ms. Kirkpatrick for a follow up visit with patient via telephone today. CHW left a message for Pt to return her call.    Follow-up Outreach - Due: 6/13/2023

## 2023-06-06 NOTE — TELEPHONE ENCOUNTER
Stable labs.  Continue current regimen.  Keep working on diabetic eating plan, exercise and weight loss    How is blood pressure doing?    Please return to clinic in 6 months with labs ordered, she can see Grace for this thank you

## 2023-06-07 ENCOUNTER — PATIENT MESSAGE (OUTPATIENT)
Dept: DIABETES | Facility: CLINIC | Age: 83
End: 2023-06-07
Payer: MEDICARE

## 2023-06-07 ENCOUNTER — PATIENT OUTREACH (OUTPATIENT)
Dept: ADMINISTRATIVE | Facility: OTHER | Age: 83
End: 2023-06-07
Payer: MEDICARE

## 2023-06-07 NOTE — PROGRESS NOTES
CHW - Follow Up    Lina Espinoza, Community Health Worker completed a follow up visit with patient via telephone today.  Community Health Worker provided:  CHW informed Pt that the [facility] Assistance application is being mailed to her.  CHW mailed Pt coupons for Glucerna to help manage blood sugar response.   Follow-up Outreach - Due: 6/13/2023

## 2023-06-08 ENCOUNTER — HOSPITAL ENCOUNTER (OUTPATIENT)
Dept: RADIOLOGY | Facility: HOSPITAL | Age: 83
Discharge: HOME | End: 2023-06-08
Attending: INTERNAL MEDICINE
Payer: MEDICARE

## 2023-06-08 DIAGNOSIS — Z12.31 VISIT FOR SCREENING MAMMOGRAM: ICD-10-CM

## 2023-06-08 PROCEDURE — 77067 MAMMO DIGITAL SCREENING BILAT WITH TOMO: ICD-10-PCS | Mod: 26,,, | Performed by: RADIOLOGY

## 2023-06-08 PROCEDURE — 77067 SCR MAMMO BI INCL CAD: CPT | Mod: TC

## 2023-06-08 PROCEDURE — 77067 SCR MAMMO BI INCL CAD: CPT | Mod: 26,,, | Performed by: RADIOLOGY

## 2023-06-08 PROCEDURE — 77063 MAMMO DIGITAL SCREENING BILAT WITH TOMO: ICD-10-PCS | Mod: 26,,, | Performed by: RADIOLOGY

## 2023-06-08 PROCEDURE — 77063 BREAST TOMOSYNTHESIS BI: CPT | Mod: 26,,, | Performed by: RADIOLOGY

## 2023-06-13 ENCOUNTER — PATIENT OUTREACH (OUTPATIENT)
Dept: ADMINISTRATIVE | Facility: OTHER | Age: 83
End: 2023-06-13
Payer: MEDICARE

## 2023-06-13 RX ORDER — LANCETS
EACH MISCELLANEOUS
Qty: 100 EACH | Refills: 12 | Status: SHIPPED | OUTPATIENT
Start: 2023-06-13 | End: 2023-07-12

## 2023-06-13 RX ORDER — INSULIN PUMP SYRINGE, 3 ML
EACH MISCELLANEOUS
Qty: 1 EACH | Refills: 0 | Status: SHIPPED | OUTPATIENT
Start: 2023-06-13 | End: 2023-07-12

## 2023-06-16 ENCOUNTER — TELEPHONE (OUTPATIENT)
Dept: PHARMACY | Facility: CLINIC | Age: 83
End: 2023-06-16
Payer: MEDICARE

## 2023-06-16 NOTE — TELEPHONE ENCOUNTER
I have confirmed with Ms. by PHONE that she does not need prescription assistance at this time.  stated, she is not sending her household income information to the drug programs, she also stated , they should be asking foe her household expenses and not her income.

## 2023-06-19 ENCOUNTER — PATIENT OUTREACH (OUTPATIENT)
Dept: ADMINISTRATIVE | Facility: OTHER | Age: 83
End: 2023-06-19
Payer: MEDICARE

## 2023-06-19 NOTE — PROGRESS NOTES
CHW - Outreach Attempt    Lina Espinoza Community Health Worker left a voicemail message for 2nd attempt to contact patient regarding: [facility]   Community Health Worker to attempt to contact patient on: June 19.

## 2023-06-20 NOTE — PROGRESS NOTES
CHW - Follow Up    Lina Espinoza, Community Health Worker completed a follow up visit with patient via telephone today.  Community Health Worker provided: CHW is mailing Pt an application for the [facility] [employer] card.   Follow-up Outreach - Due: 6/27/2023

## 2023-06-22 ENCOUNTER — CLINICAL SUPPORT (OUTPATIENT)
Dept: INTERNAL MEDICINE | Facility: CLINIC | Age: 83
End: 2023-06-22
Payer: MEDICARE

## 2023-06-22 ENCOUNTER — TELEPHONE (OUTPATIENT)
Dept: INTERNAL MEDICINE | Facility: CLINIC | Age: 83
End: 2023-06-22

## 2023-06-22 DIAGNOSIS — I10 HYPERTENSION, ESSENTIAL: Primary | Chronic | ICD-10-CM

## 2023-06-22 NOTE — TELEPHONE ENCOUNTER
Thanks for doing the nurse BP check    BP is still high even on 4 meds    I would recommend reviewing a low salt DASH diet and seeing a cardiologist to assist with management.   Referral is in.  Could be in [place].    Please let me know when scheduled thanks

## 2023-06-22 NOTE — PROGRESS NOTES
Saul Kirkpatrick Zaid 83 y.o. female is here for Blood Pressure check. in person    Manual Blood pressure reading was  150/74, Pulse 64. (Checked at the beginning of the visit)    If high, was it repeated after 15 minutes? yes     Diagnosed with Hypertension yes.    Patient took blood pressure medication today yes.      All Medications and OTC medication updated yes    Does patient have record of home blood pressure readings / Blood Pressure Log no.     Does the pt have any complaints today in regards to their blood pressure medication? no. Patient is asymptomatic.     Were you sitting still for 5-10 minutes prior to taking your Blood pressure? yes     Has your blood pressure monitor ever been checked? no     Updated vitals yes        Creatinine   Date Value Ref Range Status   06/05/2023 1.3 0.5 - 1.4 mg/dL Final     Sodium   Date Value Ref Range Status   06/05/2023 142 136 - 145 mmol/L Final     Potassium   Date Value Ref Range Status   06/05/2023 4.4 3.5 - 5.1 mmol/L Final      Manual Blood pressure reading was  140/72, Pulse 64. (Checked at the end of the visit)    If high, was it repeated after 15 minutes? no    Dr. Randolph notified.

## 2023-06-23 NOTE — TELEPHONE ENCOUNTER
Spoke to patient and advised of high bp reading and recommendation to see cardiology. . Reviewed DASH DIET and will sent information via mail.   Patient verbalized understanding.     Pt stated she see cardiologist Dr. Grayson  [facility] [place] [facility]

## 2023-06-26 ENCOUNTER — PATIENT OUTREACH (OUTPATIENT)
Dept: ADMINISTRATIVE | Facility: OTHER | Age: 83
End: 2023-06-26
Payer: MEDICARE

## 2023-06-27 NOTE — PROGRESS NOTES
CHW - Follow Up    Lina Espinoza, Community Health Worker completed a follow up visit with patient via telephone today.  Pt/Caregiver reported: Ms. Casimiro Mar reported that she received the [facility] [employer] market card but needs the [facility] Transportation application.   Community Health Worker provided:  CHW mailed Pt the [facility] Transportation Application to take on her next doctor's visit to be completed.    Follow-up Outreach - Due: 7/14/2023

## 2023-07-07 DIAGNOSIS — E11.49 TYPE II DIABETES MELLITUS WITH NEUROLOGICAL MANIFESTATIONS: Primary | ICD-10-CM

## 2023-07-07 DIAGNOSIS — M20.42 HAMMER TOES OF BOTH FEET: ICD-10-CM

## 2023-07-07 DIAGNOSIS — L84 CORN OR CALLUS: ICD-10-CM

## 2023-07-07 DIAGNOSIS — M20.41 HAMMER TOES OF BOTH FEET: ICD-10-CM

## 2023-07-12 ENCOUNTER — OFFICE VISIT (OUTPATIENT)
Dept: PAIN MEDICINE | Facility: CLINIC | Age: 83
End: 2023-07-12
Payer: MEDICARE

## 2023-07-12 VITALS
SYSTOLIC BLOOD PRESSURE: 128 MMHG | BODY MASS INDEX: 30.08 KG/M2 | DIASTOLIC BLOOD PRESSURE: 64 MMHG | HEART RATE: 62 BPM | WEIGHT: 180.56 LBS | HEIGHT: 65 IN

## 2023-07-12 DIAGNOSIS — M54.16 LUMBAR RADICULOPATHY, CHRONIC: ICD-10-CM

## 2023-07-12 DIAGNOSIS — M48.061 SPINAL STENOSIS OF LUMBAR REGION WITHOUT NEUROGENIC CLAUDICATION: Primary | ICD-10-CM

## 2023-07-12 DIAGNOSIS — M43.06 SPONDYLOLYSIS OF LUMBAR REGION: ICD-10-CM

## 2023-07-12 PROCEDURE — 99999 PR PBB SHADOW E&M-EST. PATIENT-LVL IV: CPT | Mod: PBBFAC,,, | Performed by: ANESTHESIOLOGY

## 2023-07-12 PROCEDURE — 99999 PR PBB SHADOW E&M-EST. PATIENT-LVL IV: ICD-10-PCS | Mod: PBBFAC,,, | Performed by: ANESTHESIOLOGY

## 2023-07-12 PROCEDURE — 1159F PR MEDICATION LIST DOCUMENTED IN MEDICAL RECORD: ICD-10-PCS | Mod: CPTII,S$GLB,, | Performed by: ANESTHESIOLOGY

## 2023-07-12 PROCEDURE — 3074F SYST BP LT 130 MM HG: CPT | Mod: CPTII,S$GLB,, | Performed by: ANESTHESIOLOGY

## 2023-07-12 PROCEDURE — 3074F PR MOST RECENT SYSTOLIC BLOOD PRESSURE < 130 MM HG: ICD-10-PCS | Mod: CPTII,S$GLB,, | Performed by: ANESTHESIOLOGY

## 2023-07-12 PROCEDURE — 99214 OFFICE O/P EST MOD 30 MIN: CPT | Mod: S$GLB,,, | Performed by: ANESTHESIOLOGY

## 2023-07-12 PROCEDURE — 1125F AMNT PAIN NOTED PAIN PRSNT: CPT | Mod: CPTII,S$GLB,, | Performed by: ANESTHESIOLOGY

## 2023-07-12 PROCEDURE — 99214 PR OFFICE/OUTPT VISIT, EST, LEVL IV, 30-39 MIN: ICD-10-PCS | Mod: S$GLB,,, | Performed by: ANESTHESIOLOGY

## 2023-07-12 PROCEDURE — 3078F PR MOST RECENT DIASTOLIC BLOOD PRESSURE < 80 MM HG: ICD-10-PCS | Mod: CPTII,S$GLB,, | Performed by: ANESTHESIOLOGY

## 2023-07-12 PROCEDURE — 1159F MED LIST DOCD IN RCRD: CPT | Mod: CPTII,S$GLB,, | Performed by: ANESTHESIOLOGY

## 2023-07-12 PROCEDURE — 1125F PR PAIN SEVERITY QUANTIFIED, PAIN PRESENT: ICD-10-PCS | Mod: CPTII,S$GLB,, | Performed by: ANESTHESIOLOGY

## 2023-07-12 PROCEDURE — 3078F DIAST BP <80 MM HG: CPT | Mod: CPTII,S$GLB,, | Performed by: ANESTHESIOLOGY

## 2023-07-12 RX ORDER — LANCETS 33 GAUGE
EACH MISCELLANEOUS
COMMUNITY
Start: 2023-06-13

## 2023-07-12 RX ORDER — LANCETS 30 GAUGE
EACH MISCELLANEOUS
COMMUNITY
Start: 2023-06-13

## 2023-07-12 NOTE — H&P (VIEW-ONLY)
Established Patient Chronic Pain Note (Follow up Visit)    Referring Physician: No ref. provider found    PCP: Penny Randolph MD    Chief Complaint:   Bilateral leg pain       SUBJECTIVE:  Interval Hx: 07/12/2023  Patient presents for follow-up of lower back and leg pain.  Today patient reports pain in the lower back and leg has become severe over the last several months.  Pain is constant and today is rated a 6/10.  Pain is described as aching and shooting in nature.  Patient reports pain in the lower back which radiates down the lateral and posterior aspect of the right lower extremity in L4-S1 distribution to the knee.  Pain is exacerbated with standing or with ambulation.  Patient reports pain has severely limited her day-to-day activities and quality of life.  Patient would like to pursue repeat intervention.  Patient has continued physician directed physical therapy exercises at home over the last 8 weeks without meaningful improvement in her pain.    Of note, patient saw Odilia Valverde PA-C in Neurosurgery 08/12/2022 with the following evaluation:        Interval history 10/26/2022  Patient presents status post bilateral L2/3 transforaminal epidural steroid injection 10/03/2022.  Patient reports limited 50% relief along the posterior aspect of the right lower extremity following her procedure.  She continues to report pain along the lateral aspect of the right lower extremity to the calf in L4 distribution.  Pain today is a 10/10.  Patient does endorse associated weakness in the lower extremity associated with her pain.  Patient reports her leg pain is more severe and debilitating than the back pain.  Patient reports she is scheduled to restart physical therapy the following week.  Patient has discontinued gabapentin secondary to intolerable side effect.    Interval History (7/18/2022): Saul Mar presents today for follow-up visit.  she underwent bilateral L4/5 transforaminal epidural steroid  injection on 6/17/22.  The patient reports that she is/was unchanged following the procedure.  she reports 0% pain relief.  Reports pain is worsened with prolonged sitting, improved with leaning forward (shopping cart sign). Reports continued aching, stinging low back pain in a band-like distribution with radiation down bilateral lower extremities. Reports she was unable to tolerate Gabapentin due to drowsiness and discontinued. Reports she received no relief while taking this medication.  Patient reports pain as 8/10 today.    Initial HPI  Saul Mar is a 83 y.o. female with past medical history significant for CVA, MDD, primary open angle glaucoma, DMII, AFib, HLD, HTN, diastolic dysfunction, PAD, CKD III, SStrait, GERD who presents with bilateral leg pain.  Patient reports pain began approximately 1 year prior without inciting accident injury or trauma.  Today patient reports pain which is constant which is rated a 10/10.  Pain is described as aching in nature.  Patient reports pain originating in bilateral buttocks and radiating to bilateral calves in L4-L5 distribution.  Pain is equally worse on both sides.  Pain is exacerbated with prolonged standing and with ambulation.  Patient ambulates with a walker and reports she is only able to ambulate a few steps before requiring rest.  Patient reports pain is improved with lumbar flexion and rest.  Patient does report associated weakness in bilateral lower extremities associated with her pain.  She denies bowel or bladder incontinence or saddle anesthesia.  Patient reports completing several sessions of conventional physical therapy with initial improvement in her symptoms, however with increased intensity of exercises, exacerbation of pain.    Pt last saw Dr. Sanchez 8/24/21 with recommendation to continue with PT and discussion of future MBB.    Patient reports significant motor weakness and loss of sensations.  Patient denies night fever/night sweats,  urinary incontinence, bowel incontinence and significant weight loss.      Pain Disability Index Review:     Last 3 PDI Scores 7/12/2023 10/26/2022 7/18/2022   Pain Disability Index (PDI) 42 52 34       Non-Pharmacologic Treatments:  Physical Therapy/Home Exercise: yes  Ice/Heat:yes  TENS: no  Acupuncture: no  Massage: no  Chiropractic: no    Other: no      Pain Medications:  - Opioids: Vicodin ( Hydrocodone/Acetaminophen)  - Adjuvant Medications: Neurontin (Gabapentin) and Xanax (Alprazolam). 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 Taveras    COLONOSCOPY      COLONOSCOPY N/A 9/22/2016    Procedure: COLONOSCOPY;  Surgeon: Jd Ashton MD;  Location: [address]);  Service: Endoscopy;  Laterality: N/A;  OK per Dr Randolph for pt to hold Plavix 5 days prior to procedure/see telephone encounter dated 8/29/16/svn    EYE SURGERY Bilateral 2002 approx    Laser for glaucoma    HYSTERECTOMY  1963     AMEENA/USO- fibroids; no cancer    OOPHORECTOMY  1963    unknown, only removed one    SELECTIVE INJECTION OF ANESTHETIC AGENT AROUND LUMBAR SPINAL  NERVE ROOT BY TRANSFORAMINAL APPROACH Bilateral 6/17/2022    Procedure: Bilateral L4/5 TF JASKARAN with RN IV sedation;  Surgeon: Chan Fonseca MD;  Location: [facility];  Service: Pain Management;  Laterality: Bilateral;    SELECTIVE INJECTION OF ANESTHETIC AGENT AROUND LUMBAR SPINAL NERVE ROOT BY TRANSFORAMINAL APPROACH Bilateral 10/3/2022    Procedure: Bilateral L2-3 transforaminal epidural steroid injection with RN IV sedation;  Surgeon: Chan Fonseca MD;  [facility];  Service: Pain Management;  Laterality: Bilateral;     Review of patient's allergies indicates:   Allergen Reactions    Pravachol [pravastatin] Nausea Only       Current Outpatient Medications   Medication Sig    albuterol (PROVENTIL/VENTOLIN HFA) 90 mcg/actuation inhaler INHALE 2 PUFFS INTO THE LUNGS EVERY 6 (SIX) HOURS AS NEEDED FOR WHEEZING. RESCUE    ALPRAZolam (XANAX) 0.5 MG tablet TAKE 1 TABLET BY MOUTH NIGHTLY AS NEEDED FOR ANXIETY (MAY TAKE 1-2 TIMES WEEKLY FOR ANXIETY)    amLODIPine (NORVASC) 5 MG tablet Take 1 tablet (5 mg total) by mouth 2 (two) times daily.    atorvastatin (LIPITOR) 80 MG tablet TAKE 1 TABLET BY MOUTH EVERY DAY    blood sugar diagnostic Strp 1 strip by Misc.(Non-Drug; Combo Route) route 2 (two) times daily. Insurance preferred test stripes [id number]    blood sugar diagnostic Strp For use with insurance covered glucometer; test daily    brimonidine 0.2% (ALPHAGAN) 0.2 % Drop Place 1 drop into both eyes 3 (three) times daily.    carvediloL (COREG) 12.5 MG tablet Take 12.5 mg by mouth.    cholecalciferol, vitamin D3, (VITAMIN D3) 1,000 unit capsule Take 1 capsule (1,000 Units total) by mouth once daily.    ferrous sulfate (FEOSOL) 325 mg (65 mg iron) Tab tablet Take 1 tablet (325 mg total) by mouth 2 (two) times daily.    flecainide (TAMBOCOR) 50 MG Tab Take 1 tablet (50 mg total) by mouth 2 (two) times daily.    glimepiride (AMARYL) 4 MG tablet TAKE 1 TABLET BY MOUTH IN THE MORNING WITH BREAKFAST     "hydroCHLOROthiazide (HYDRODIURIL) 12.5 MG Tab TAKE 1 TABLET BY MOUTH EVERY DAY    levalbuterol (XOPENEX HFA) 45 mcg/actuation inhaler INHALE 1-2 PUFFS INTO THE LUNGS EVERY 6 (SIX) HOURS AS NEEDED FOR WHEEZING. RESCUE    linaCLOtide (LINZESS) 145 mcg Cap capsule TAKE 1 CAPSULE (145 MCG TOTAL) BY MOUTH BEFORE BREAKFAST.    losartan (COZAAR) 50 MG tablet TAKE 1 TABLET BY MOUTH TWICE A DAY    meclizine (ANTIVERT) 25 mg tablet Take 1 tablet (25 mg total) by mouth daily as needed for Dizziness.    metFORMIN (GLUCOPHAGE-XR) 500 MG ER 24hr tablet TAKE 2 TABLETS BY MOUTH EVERY DAY    metoprolol succinate (TOPROL-XL) 50 MG 24 hr tablet TAKE 1 TABLET BY MOUTH EVERY DAY    ONETOUCH DELICA PLUS LANCET 33 gauge Misc Apply topically.    ONETOUCH ULTRA2 METER Misc SMARTSIG:Via Meter As Directed    XARELTO 15 mg Tab TAKE 1 TABLET (15 MG TOTAL) BY MOUTH DAILY WITH DINNER OR EVENING MEAL.     No current facility-administered medications for this visit.       Review of Systems     GENERAL:  No weight loss, malaise or fevers.  HEENT:   No recent changes in vision or hearing  NECK:  Negative for lumps, no difficulty with swallowing.  RESPIRATORY:  Negative for cough, wheezing or shortness of breath, patient denies any recent URI.  CARDIOVASCULAR:  Negative for chest pain, leg swelling or palpitations.  GI:  Negative for abdominal discomfort, blood in stools or black stools or change in bowel habits.  MUSCULOSKELETAL:  See HPI.  SKIN:  Negative for lesions, rash, and itching.  PSYCH:  No mood disorder or recent psychosocial stressors.   HEMATOLOGY/LYMPHOLOGY:  Negative for prolonged bleeding, bruising easily or swollen nodes.    NEURO:   No history of headaches, syncope, paralysis, seizures or tremors.  All other reviewed and negative other than HPI.    OBJECTIVE:    /64   Pulse 62   Ht 5' 5" (1.651 m)   Wt 81.9 kg (180 lb 8.9 oz)   BMI 30.05 kg/m²       Physical Exam    GENERAL: Well appearing, in no acute distress, alert and " oriented x3.  PSYCH:  Mood and affect appropriate.  SKIN: Skin color, texture, turgor normal, no rashes or lesions.  HEAD/FACE:  Normocephalic, atraumatic. Cranial nerves grossly intact.    CV: RRR with palpation of the radial artery.  PULM: No evidence of respiratory difficulty, symmetric chest rise.  GI:  Soft and non-tender.    BACK:  Thoracic kyphosis Straight leg raising in the sitting and supine positions is negative to radicular pain. pain to palpation over the facet joints of the lumbar spine or spinous processes. reduced range of motion with pain reproduction.  EXTREMITIES: Peripheral joint ROM is reduced with pain without obvious instability or laxity in all four extremities. No deformities, edema, or skin discoloration. Good capillary refill.  MUSCULOSKELETAL: Able to stand on heels & toes.   hip, and knee provocative maneuvers are negative.  There is no pain with palpation over the sacroiliac joints bilaterally.  FABERs test is negative.  Facet loading test is positive bilaterally.   Bilateral upper and lower extremity strength is normal and symmetric.  No atrophy or tone abnormalities are noted.  RIGHT Lower extremity: Hip flexion 5/5, Hip Abduction 5/5, Hip Adduction 5/5, Knee extension 5/5, Knee flexion 5/5, Ankle dorsiflexion5/5, Extensor hallucis longus 5/5, Ankle plantarflexion 5/5  LEFT Lower extremity:  Hip flexion 5/5, Hip Abduction 5/5,Hip Adduction 5/5, Knee extension 5/5, Knee flexion 5/5, Ankle dorsiflexion 5/5, Extensor hallucis longus 5/5, Ankle plantarflexion 5/5  -Normal testing knee (patellar) jerk and ankle (achilles) jerk    NEURO: Bilateral upper and lower extremity coordination and muscle stretch reflexes are physiologic and symmetric. No loss of sensation is noted.  GAIT: normal.    Imagin/10/21    MRI Lumbar Spine Without Contrast    FINDINGS:  Alignment: Mild levocurvature of the lumbar spine.  Grade 1 anterolisthesis L4 on L5.    Vertebrae: Multiple Schmorl's nodes.   Benign osseous hemangioma in the T12 vertebral body.  Degenerative the edema within the bilateral L4-L5 facet joints.  No abnormal marrow signal to suggest infiltrative process.    Discs: Multilevel disc desiccation and height loss, most severe at L4-L5.    Cord: No signal abnormality.  Conus terminates at L2    Degenerative findings:    T12-L1: No significant spinal canal stenosis or neural foraminal narrowing.    L1-L2: Mild diffuse disc bulge.  No significant spinal canal stenosis or neural foraminal narrowing.    L2-L3: Severe diffuse disc bulge, bilateral facet arthropathy and ligamentum flavum buckling.  These findings result in mild spinal canal stenosis, severe right and moderate left neural foraminal narrowing.    L3-L4: Moderate diffuse disc bulge.  These findings result in moderate bilateral neural foraminal narrowing.  No spinal canal stenosis.    L4-L5: Grade 1 anterolisthesis, diffuse disc bulge, bilateral facet arthropathy, and ligamentum flavum buckling.  These findings result in severe spinal canal stenosis and severe right and moderate left neural foraminal narrowing.    L5-S1: Diffuse disc bulge with a superimposed left paracentral protrusion that abuts the left descending S1 nerve root.  Bilateral facet arthropathy. These findings result in mild bilateral neural foraminal narrowing    Paraspinal muscles & soft tissues: T1 hyperintense cortical lesion noted within the right kidney, possibly a proteinaceous or hemorrhagic cyst. left simple renal cyst.    Impression  Multilevel degenerative changes, most pronounced at L4-L5 with severe spinal canal stenosis, severe right and moderate left neural foraminal narrowing.    Bilateral degenerative facet edema at L4-L5.    Probable proteinaceous versus hemorrhagic right renal cortical cyst.  Recommend correlation with a dedicated renal ultrasound.    07/22/21  X-Ray Lumbar Spine Ap And Lateral  FINDINGS:  Five lumbar vertebral bodies.  Vertebral body  heights are maintained.  Disc space narrowing and degenerative endplate changes L4-5 and L5-S1.  Moderate facet arthropathy L4-5 and L5-S1.  Grade 1 anterolisthesis L4 on L5.     04/02/18    X-Ray Cervical Spine Complete 5 view  FINDINGS:  There is anatomic spinal alignment.  Prominent bridging vertebral endplate spurs present anteriorly from C4-5 through C6-7.  Disc interspaces are maintained.  Odontoid is intact.  No fracture or subluxation.      ASSESSMENT: 83 y.o. year old female with lower back and leg pain, consistent with     1. Spinal stenosis of lumbar region without neurogenic claudication        2. Spondylolysis of lumbar region        3. Lumbar radiculopathy, chronic            PLAN:   - Interventions:  Schedule for L4-5 intralaminar epidural steroid injection with right paramedian approach to see if this confers more significant and sustained relief in lower back and right-sided radicular symptoms.  We have discussed the procedure, benefits and potential risk in detail.  Patient has elected to pursue this procedure.    - Interventions: Patient may be a candidate for spinal cord stimulation device secondary to chronic pain syndrome and multiple failed attempts of other interventions (lumbar TFESI x 2) and medical management (membrane stabilizing agents). Explained the risks and benefits of the procedure in detail with the patient today in clinic along with alternative treatment options,   -Will contact [employer] representative to discuss trial/reach out to patient with patient ambassador     - Anticoagulation use: yes  Xarelto  Per ASA guidelines, patient will need to pause Xarelto 3 days prior to her transforaminal epidural steroid injection.  Will obtain cardiac clearance from Dr. Grayson ([facility])     report:  Reviewed and consistent with medication use as prescribed.    - Medications:  - Patient discontinued Gabapentin secondary to somnolence and ineffectivness.  We will pursue  physical therapy and interventional treatment.    - Therapy:  We discussed continuing physical therapy to help manage the patient/s painful condition. The patient was counseled that muscle strengthening will improve the long term prognosis in regards to pain and may also help increase range of motion and mobility.    - Imaging: Reviewed available imaging with patient and answered any questions they had regarding study    -Consults/referral:Neurosurgery PRN     - Follow up visit:  4-6 weeks post injection      The above plan and management options were discussed at length with patient. Patient is in agreement with the above and verbalized understanding.    - I discussed the goals of interventional chronic pain management with the patient on today's visit. We discussed a multimodal and systematic approach to pain.  This includes diagnostic and therapeutic injections, adjuvant pharmacologic treatment, physical therapy, and at times psychiatry.  I emphasized the importance of regular exercise, core strengthening and stretching, diet and weight loss as a cornerstone of long-term pain management.    - This condition does not require this patient to take time off of work, and the primary goal of our Pain Management services is to improve the patient's functional capacity.  - Patient Questions: Answered all of the patient's questions regarding diagnoses, therapy, treatment and next steps        Chan Fonseca MD  Interventional Pain Management  [facility]    Disclaimer:  This note was prepared using voice recognition system and is likely to have sound alike errors that may have been overlooked even after proof reading.  Please call me with any questions

## 2023-07-12 NOTE — PROGRESS NOTES
Established Patient Chronic Pain Note (Follow up Visit)    Referring Physician: No ref. provider found    PCP: Penny Randolph MD    Chief Complaint:   Bilateral leg pain       SUBJECTIVE:  Interval Hx: 07/12/2023  Patient presents for follow-up of lower back and leg pain.  Today patient reports pain in the lower back and leg has become severe over the last several months.  Pain is constant and today is rated a 6/10.  Pain is described as aching and shooting in nature.  Patient reports pain in the lower back which radiates down the lateral and posterior aspect of the right lower extremity in L4-S1 distribution to the knee.  Pain is exacerbated with standing or with ambulation.  Patient reports pain has severely limited her day-to-day activities and quality of life.  Patient would like to pursue repeat intervention.  Patient has continued physician directed physical therapy exercises at home over the last 8 weeks without meaningful improvement in her pain.    Of note, patient saw Odilia Valverde PA-C in Neurosurgery 08/12/2022 with the following evaluation:        Interval history 10/26/2022  Patient presents status post bilateral L2/3 transforaminal epidural steroid injection 10/03/2022.  Patient reports limited 50% relief along the posterior aspect of the right lower extremity following her procedure.  She continues to report pain along the lateral aspect of the right lower extremity to the calf in L4 distribution.  Pain today is a 10/10.  Patient does endorse associated weakness in the lower extremity associated with her pain.  Patient reports her leg pain is more severe and debilitating than the back pain.  Patient reports she is scheduled to restart physical therapy the following week.  Patient has discontinued gabapentin secondary to intolerable side effect.    Interval History (7/18/2022): Saul Mar presents today for follow-up visit.  she underwent bilateral L4/5 transforaminal epidural steroid  injection on 6/17/22.  The patient reports that she is/was unchanged following the procedure.  she reports 0% pain relief.  Reports pain is worsened with prolonged sitting, improved with leaning forward (shopping cart sign). Reports continued aching, stinging low back pain in a band-like distribution with radiation down bilateral lower extremities. Reports she was unable to tolerate Gabapentin due to drowsiness and discontinued. Reports she received no relief while taking this medication.  Patient reports pain as 8/10 today.    Initial HPI  Saul Mar is a 83 y.o. female with past medical history significant for CVA, MDD, primary open angle glaucoma, DMII, AFib, HLD, HTN, diastolic dysfunction, PAD, CKD III, SStrait, GERD who presents with bilateral leg pain.  Patient reports pain began approximately 1 year prior without inciting accident injury or trauma.  Today patient reports pain which is constant which is rated a 10/10.  Pain is described as aching in nature.  Patient reports pain originating in bilateral buttocks and radiating to bilateral calves in L4-L5 distribution.  Pain is equally worse on both sides.  Pain is exacerbated with prolonged standing and with ambulation.  Patient ambulates with a walker and reports she is only able to ambulate a few steps before requiring rest.  Patient reports pain is improved with lumbar flexion and rest.  Patient does report associated weakness in bilateral lower extremities associated with her pain.  She denies bowel or bladder incontinence or saddle anesthesia.  Patient reports completing several sessions of conventional physical therapy with initial improvement in her symptoms, however with increased intensity of exercises, exacerbation of pain.    Pt last saw Dr. Sanchez 8/24/21 with recommendation to continue with PT and discussion of future MBB.    Patient reports significant motor weakness and loss of sensations.  Patient denies night fever/night sweats,  urinary incontinence, bowel incontinence and significant weight loss.      Pain Disability Index Review:     Last 3 PDI Scores 7/12/2023 10/26/2022 7/18/2022   Pain Disability Index (PDI) 42 52 34       Non-Pharmacologic Treatments:  Physical Therapy/Home Exercise: yes  Ice/Heat:yes  TENS: no  Acupuncture: no  Massage: no  Chiropractic: no    Other: no      Pain Medications:  - Opioids: Vicodin ( Hydrocodone/Acetaminophen)  - Adjuvant Medications: Neurontin (Gabapentin) and Xanax (Alprazolam). 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 Taveras    COLONOSCOPY      COLONOSCOPY N/A 9/22/2016    Procedure: COLONOSCOPY;  Surgeon: Jd Ashton MD;  Location: [address]);  Service: Endoscopy;  Laterality: N/A;  OK per Dr Randolph for pt to hold Plavix 5 days prior to procedure/see telephone encounter dated 8/29/16/svn    EYE SURGERY Bilateral 2002 approx    Laser for glaucoma    HYSTERECTOMY  1963     AMEENA/USO- fibroids; no cancer    OOPHORECTOMY  1963    unknown, only removed one    SELECTIVE INJECTION OF ANESTHETIC AGENT AROUND LUMBAR SPINAL  NERVE ROOT BY TRANSFORAMINAL APPROACH Bilateral 6/17/2022    Procedure: Bilateral L4/5 TF JASKARAN with RN IV sedation;  Surgeon: Chan Fonseca MD;  Location: [facility];  Service: Pain Management;  Laterality: Bilateral;    SELECTIVE INJECTION OF ANESTHETIC AGENT AROUND LUMBAR SPINAL NERVE ROOT BY TRANSFORAMINAL APPROACH Bilateral 10/3/2022    Procedure: Bilateral L2-3 transforaminal epidural steroid injection with RN IV sedation;  Surgeon: Chan Fonseca MD;  [facility];  Service: Pain Management;  Laterality: Bilateral;     Review of patient's allergies indicates:   Allergen Reactions    Pravachol [pravastatin] Nausea Only       Current Outpatient Medications   Medication Sig    albuterol (PROVENTIL/VENTOLIN HFA) 90 mcg/actuation inhaler INHALE 2 PUFFS INTO THE LUNGS EVERY 6 (SIX) HOURS AS NEEDED FOR WHEEZING. RESCUE    ALPRAZolam (XANAX) 0.5 MG tablet TAKE 1 TABLET BY MOUTH NIGHTLY AS NEEDED FOR ANXIETY (MAY TAKE 1-2 TIMES WEEKLY FOR ANXIETY)    amLODIPine (NORVASC) 5 MG tablet Take 1 tablet (5 mg total) by mouth 2 (two) times daily.    atorvastatin (LIPITOR) 80 MG tablet TAKE 1 TABLET BY MOUTH EVERY DAY    blood sugar diagnostic Strp 1 strip by Misc.(Non-Drug; Combo Route) route 2 (two) times daily. Insurance preferred test stripes [id number]    blood sugar diagnostic Strp For use with insurance covered glucometer; test daily    brimonidine 0.2% (ALPHAGAN) 0.2 % Drop Place 1 drop into both eyes 3 (three) times daily.    carvediloL (COREG) 12.5 MG tablet Take 12.5 mg by mouth.    cholecalciferol, vitamin D3, (VITAMIN D3) 1,000 unit capsule Take 1 capsule (1,000 Units total) by mouth once daily.    ferrous sulfate (FEOSOL) 325 mg (65 mg iron) Tab tablet Take 1 tablet (325 mg total) by mouth 2 (two) times daily.    flecainide (TAMBOCOR) 50 MG Tab Take 1 tablet (50 mg total) by mouth 2 (two) times daily.    glimepiride (AMARYL) 4 MG tablet TAKE 1 TABLET BY MOUTH IN THE MORNING WITH BREAKFAST     "hydroCHLOROthiazide (HYDRODIURIL) 12.5 MG Tab TAKE 1 TABLET BY MOUTH EVERY DAY    levalbuterol (XOPENEX HFA) 45 mcg/actuation inhaler INHALE 1-2 PUFFS INTO THE LUNGS EVERY 6 (SIX) HOURS AS NEEDED FOR WHEEZING. RESCUE    linaCLOtide (LINZESS) 145 mcg Cap capsule TAKE 1 CAPSULE (145 MCG TOTAL) BY MOUTH BEFORE BREAKFAST.    losartan (COZAAR) 50 MG tablet TAKE 1 TABLET BY MOUTH TWICE A DAY    meclizine (ANTIVERT) 25 mg tablet Take 1 tablet (25 mg total) by mouth daily as needed for Dizziness.    metFORMIN (GLUCOPHAGE-XR) 500 MG ER 24hr tablet TAKE 2 TABLETS BY MOUTH EVERY DAY    metoprolol succinate (TOPROL-XL) 50 MG 24 hr tablet TAKE 1 TABLET BY MOUTH EVERY DAY    ONETOUCH DELICA PLUS LANCET 33 gauge Misc Apply topically.    ONETOUCH ULTRA2 METER Misc SMARTSIG:Via Meter As Directed    XARELTO 15 mg Tab TAKE 1 TABLET (15 MG TOTAL) BY MOUTH DAILY WITH DINNER OR EVENING MEAL.     No current facility-administered medications for this visit.       Review of Systems     GENERAL:  No weight loss, malaise or fevers.  HEENT:   No recent changes in vision or hearing  NECK:  Negative for lumps, no difficulty with swallowing.  RESPIRATORY:  Negative for cough, wheezing or shortness of breath, patient denies any recent URI.  CARDIOVASCULAR:  Negative for chest pain, leg swelling or palpitations.  GI:  Negative for abdominal discomfort, blood in stools or black stools or change in bowel habits.  MUSCULOSKELETAL:  See HPI.  SKIN:  Negative for lesions, rash, and itching.  PSYCH:  No mood disorder or recent psychosocial stressors.   HEMATOLOGY/LYMPHOLOGY:  Negative for prolonged bleeding, bruising easily or swollen nodes.    NEURO:   No history of headaches, syncope, paralysis, seizures or tremors.  All other reviewed and negative other than HPI.    OBJECTIVE:    /64   Pulse 62   Ht 5' 5" (1.651 m)   Wt 81.9 kg (180 lb 8.9 oz)   BMI 30.05 kg/m²       Physical Exam    GENERAL: Well appearing, in no acute distress, alert and " oriented x3.  PSYCH:  Mood and affect appropriate.  SKIN: Skin color, texture, turgor normal, no rashes or lesions.  HEAD/FACE:  Normocephalic, atraumatic. Cranial nerves grossly intact.    CV: RRR with palpation of the radial artery.  PULM: No evidence of respiratory difficulty, symmetric chest rise.  GI:  Soft and non-tender.    BACK:  Thoracic kyphosis Straight leg raising in the sitting and supine positions is negative to radicular pain. pain to palpation over the facet joints of the lumbar spine or spinous processes. reduced range of motion with pain reproduction.  EXTREMITIES: Peripheral joint ROM is reduced with pain without obvious instability or laxity in all four extremities. No deformities, edema, or skin discoloration. Good capillary refill.  MUSCULOSKELETAL: Able to stand on heels & toes.   hip, and knee provocative maneuvers are negative.  There is no pain with palpation over the sacroiliac joints bilaterally.  FABERs test is negative.  Facet loading test is positive bilaterally.   Bilateral upper and lower extremity strength is normal and symmetric.  No atrophy or tone abnormalities are noted.  RIGHT Lower extremity: Hip flexion 5/5, Hip Abduction 5/5, Hip Adduction 5/5, Knee extension 5/5, Knee flexion 5/5, Ankle dorsiflexion5/5, Extensor hallucis longus 5/5, Ankle plantarflexion 5/5  LEFT Lower extremity:  Hip flexion 5/5, Hip Abduction 5/5,Hip Adduction 5/5, Knee extension 5/5, Knee flexion 5/5, Ankle dorsiflexion 5/5, Extensor hallucis longus 5/5, Ankle plantarflexion 5/5  -Normal testing knee (patellar) jerk and ankle (achilles) jerk    NEURO: Bilateral upper and lower extremity coordination and muscle stretch reflexes are physiologic and symmetric. No loss of sensation is noted.  GAIT: normal.    Imagin/10/21    MRI Lumbar Spine Without Contrast    FINDINGS:  Alignment: Mild levocurvature of the lumbar spine.  Grade 1 anterolisthesis L4 on L5.    Vertebrae: Multiple Schmorl's nodes.   Benign osseous hemangioma in the T12 vertebral body.  Degenerative the edema within the bilateral L4-L5 facet joints.  No abnormal marrow signal to suggest infiltrative process.    Discs: Multilevel disc desiccation and height loss, most severe at L4-L5.    Cord: No signal abnormality.  Conus terminates at L2    Degenerative findings:    T12-L1: No significant spinal canal stenosis or neural foraminal narrowing.    L1-L2: Mild diffuse disc bulge.  No significant spinal canal stenosis or neural foraminal narrowing.    L2-L3: Severe diffuse disc bulge, bilateral facet arthropathy and ligamentum flavum buckling.  These findings result in mild spinal canal stenosis, severe right and moderate left neural foraminal narrowing.    L3-L4: Moderate diffuse disc bulge.  These findings result in moderate bilateral neural foraminal narrowing.  No spinal canal stenosis.    L4-L5: Grade 1 anterolisthesis, diffuse disc bulge, bilateral facet arthropathy, and ligamentum flavum buckling.  These findings result in severe spinal canal stenosis and severe right and moderate left neural foraminal narrowing.    L5-S1: Diffuse disc bulge with a superimposed left paracentral protrusion that abuts the left descending S1 nerve root.  Bilateral facet arthropathy. These findings result in mild bilateral neural foraminal narrowing    Paraspinal muscles & soft tissues: T1 hyperintense cortical lesion noted within the right kidney, possibly a proteinaceous or hemorrhagic cyst. left simple renal cyst.    Impression  Multilevel degenerative changes, most pronounced at L4-L5 with severe spinal canal stenosis, severe right and moderate left neural foraminal narrowing.    Bilateral degenerative facet edema at L4-L5.    Probable proteinaceous versus hemorrhagic right renal cortical cyst.  Recommend correlation with a dedicated renal ultrasound.    07/22/21  X-Ray Lumbar Spine Ap And Lateral  FINDINGS:  Five lumbar vertebral bodies.  Vertebral body  heights are maintained.  Disc space narrowing and degenerative endplate changes L4-5 and L5-S1.  Moderate facet arthropathy L4-5 and L5-S1.  Grade 1 anterolisthesis L4 on L5.     04/02/18    X-Ray Cervical Spine Complete 5 view  FINDINGS:  There is anatomic spinal alignment.  Prominent bridging vertebral endplate spurs present anteriorly from C4-5 through C6-7.  Disc interspaces are maintained.  Odontoid is intact.  No fracture or subluxation.      ASSESSMENT: 83 y.o. year old female with lower back and leg pain, consistent with     1. Spinal stenosis of lumbar region without neurogenic claudication        2. Spondylolysis of lumbar region        3. Lumbar radiculopathy, chronic            PLAN:   - Interventions:  Schedule for L4-5 intralaminar epidural steroid injection with right paramedian approach to see if this confers more significant and sustained relief in lower back and right-sided radicular symptoms.  We have discussed the procedure, benefits and potential risk in detail.  Patient has elected to pursue this procedure.    - Interventions: Patient may be a candidate for spinal cord stimulation device secondary to chronic pain syndrome and multiple failed attempts of other interventions (lumbar TFESI x 2) and medical management (membrane stabilizing agents). Explained the risks and benefits of the procedure in detail with the patient today in clinic along with alternative treatment options,   -Will contact [facility] representative to discuss trial/reach out to patient with patient ambassador     - Anticoagulation use: yes  Xarelto  Per ASA guidelines, patient will need to pause Xarelto 3 days prior to her transforaminal epidural steroid injection.  Will obtain cardiac clearance from Dr. Grayson ([facility])     report:  Reviewed and consistent with medication use as prescribed.    - Medications:  - Patient discontinued Gabapentin secondary to somnolence and ineffectivness.  We will pursue  physical therapy and interventional treatment.    - Therapy:  We discussed continuing physical therapy to help manage the patient/s painful condition. The patient was counseled that muscle strengthening will improve the long term prognosis in regards to pain and may also help increase range of motion and mobility.    - Imaging: Reviewed available imaging with patient and answered any questions they had regarding study    -Consults/referral:Neurosurgery PRN     - Follow up visit:  4-6 weeks post injection      The above plan and management options were discussed at length with patient. Patient is in agreement with the above and verbalized understanding.    - I discussed the goals of interventional chronic pain management with the patient on today's visit. We discussed a multimodal and systematic approach to pain.  This includes diagnostic and therapeutic injections, adjuvant pharmacologic treatment, physical therapy, and at times psychiatry.  I emphasized the importance of regular exercise, core strengthening and stretching, diet and weight loss as a cornerstone of long-term pain management.    - This condition does not require this patient to take time off of work, and the primary goal of our Pain Management services is to improve the patient's functional capacity.  - Patient Questions: Answered all of the patient's questions regarding diagnoses, therapy, treatment and next steps        Chan Fonseca MD  Interventional Pain Management  [facility]    Disclaimer:  This note was prepared using voice recognition system and is likely to have sound alike errors that may have been overlooked even after proof reading.  Please call me with any questions

## 2023-07-14 ENCOUNTER — PATIENT OUTREACH (OUTPATIENT)
Dept: ADMINISTRATIVE | Facility: OTHER | Age: 83
End: 2023-07-14
Payer: MEDICARE

## 2023-07-14 NOTE — PROGRESS NOTES
CHW - Follow Up    Lina Espinoza, Community Health Worker contacted Ms. Casimiro Mar for a follow up  visit with patient via telephone today. CHW left a message on Pts telephone.   Follow-up Outreach - Due: 7/20/2023

## 2023-07-19 ENCOUNTER — PATIENT OUTREACH (OUTPATIENT)
Dept: ADMINISTRATIVE | Facility: OTHER | Age: 83
End: 2023-07-19
Payer: MEDICARE

## 2023-07-19 ENCOUNTER — TELEPHONE (OUTPATIENT)
Dept: INTERNAL MEDICINE | Facility: CLINIC | Age: 83
End: 2023-07-19
Payer: MEDICARE

## 2023-07-19 NOTE — TELEPHONE ENCOUNTER
Transportation form completed, please let her know.  We can either mail it to her or she can have someone pick it up.  There are 2 sections that she has to fill, personal information

## 2023-07-19 NOTE — PROGRESS NOTES
"CHW - Follow Up    Lina Espinoza, Community Health Worker completed a follow up visit with patient via telephone today.  Pt/Caregiver reported: Ms. Casimiro Mar reported she asked the "pain doctor" to complete the [employer] Form; however, she was referred to her PCP. Dr. Penny Randolph.   Community Health Worker provided:  CHW provided form to Dr. Londono' medical assistant, Ms. Hudson for Dr. Randolph to review Pts' request for assistance.  CHW also faxed request for case management to [facility].   Follow-up Outreach - Due: 8/10/2023     "

## 2023-07-23 NOTE — PROGRESS NOTES
HPI     Glaucoma     Additional comments: 4 month ck and pt feels vision has changed a little   since last exam            Blurred Vision     Additional comments: Pt feels vision is blurry OU           Comments    DLS: 3/17/23    1) POAG  2) DM with BDR and ME OU  3) Hx RLL Lesion  4) NS OD  5) PCIOL OS  6) Hx CVA   7) Hx Amaurosis Fugax  8) Eyelid Myokymia     Meds:  Brimonidine TID OU           Last edited by Zuri Winkler MA on 7/28/2023 10:48 AM.          Assessment /Plan     For exam results, see Encounter Report.    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 POAG ou   H/O poor compliance   First HVF 1997   First photos 1998      Family history none   Glaucoma meds Has used alphagan in past - poor compliance-stopped on her own   H/O adverse rxn to glaucoma drops none   LASERS No glaucoma laser (+h/o focal OU for BDR)   GLAUCOMA SURGERIES none   OTHER EYE SURGERIES CE/PCIOL OS 2/27/13  CDR 0.8/0.75   Tbase 16-20/16-22   Tmax 20/22   Ttarget 17/17   HVF 16 test 1997 to 20122 - non sp changes  od //  Sup/inf paracentral loss and INS os  (+changes ? 2/2 focal laser )   Gonio +3   /621- thick ou (- 4.5 ou)   OCT 4 test 2006 to 2019  - RNFL - nl od // dec TI os   HRT 6  tests: 2009 to 2018 -  MR -  Dec. I, bord S/T od // bord T/I os /// CDR 0.74 od // 0.70 os  Disc photos 1998, 2000, 2001, 2003, 2003, 2004, 2005, 2006- slides  // 2010 , 2018 - OIS      - Ttoday 15/14  - Test done today  IOP // gonio      2. BDR - h/o CSME - s/p focal ou               Old pt of  "Yung   Now sees Dr Davenport - will F/u 6 months or prn      3. NS OD               Remove prn - pt wants to wait 3/17/2023               BCVA 20/40              BAT 20/60     4. Pseudophakia OS               S/p CE/PCIOL OS               IOL SN60 WF 20.5              -VA limited 2/2 hx of CSME s/p focal scars              BCVA  20/25              Give Rx glasses - 7/2/2013              Had re-bound iritis - resolved     4. H/O RLL lesion - S/P excision with Jovanni      5. Post Nelda lives in [place] - but still likes to come to [place] for care      6. amaurosis fugax / H/O CVA's              Patient reports stroke like symptoms and amaurosis fugax 10/13              -  She was admitted to hospital with very elevated BP              -  Patient had MRI at the time showing ischemic disease              - last Carotid U/S done 11/12- patient reports that                  - PCP gets one yearly- last U/S showed minimal plaque disease              -  H/o stroke- discussed that amaurosis was from elevated BP and if ever recurs needs to report to ER immediately- she voiced understanding     7. Eyelid myokymia  - intermittent - patient also reports eyelid "twitching  "- discussed with patient that myokymia is usually from lack of sleep, caffeine intake, or stress- however nothing to worry about  -  Did not have any "twitching" on exam today     Plan:  BrimonidineTID ou   Can consider CEIOL OD with Rey given poor compliance with gtts and moderate glaucoma.   HVF's are inconsistent with the ON exam / OCT ect   Pt wants to wait on phaco od for now // has done well os      Avoid PG's if possible 2/2 h/o CME 2/2 BDR     Refraction Post op os  is more myopic than expected - review IOL calc if needs 2nd eye done     Repeat OCT of macula 5/17/2022 - - done no CME os// + focal laser scars ou     Photo file updated  3/17/2023     Plan:   F/U 4 months IOP // HVF // DFE // OCT - diabetic check    - photo file - (( first name " lacy Hughes ( leave out the Zaid part))) - updated 6/4/2019      If any changes in DR - will refer back to Dr Davenport - he has seen her once

## 2023-07-24 NOTE — PRE-PROCEDURE INSTRUCTIONS
Spoke with patient regarding procedure scheduled on 8.2     Arrival time 1000     Has patient been sick with fever or on antibiotics within the last 7 days? No     Does the patient have any open wounds, sores or rashes? No     Does the patient have any recent fractures? no     Has patient received a vaccination within the last 7 days? No     Received the COVID vaccination? yes   N  Has the patient stopped all medications as directed? Hold Xarelto 3 days prior to procedure. Cardiac clearance obtained from dr mcintyre on 7.12. located in media.     Does patient have a pacemaker and or defibrillator? no     Does the patient have a ride to and from procedure and someone reliable to remain with patient? coordinating transportation. Aware of policy.     Is the patient diabetic? yes     Does the patient have sleep apnea? Or use O2 at home? no     Is the patient receiving sedation? yes     Is the patient instructed to remain NPO beginning at midnight the night before their procedure? yes     Procedure location confirmed with patient? Yes     Covid- Denies signs/symptoms. Instructed to notify PAT/MD if any changes.

## 2023-07-25 ENCOUNTER — OFFICE VISIT (OUTPATIENT)
Dept: PODIATRY | Facility: CLINIC | Age: 83
End: 2023-07-25
Payer: MEDICARE

## 2023-07-25 VITALS — HEIGHT: 65 IN | WEIGHT: 180.56 LBS | BODY MASS INDEX: 30.08 KG/M2

## 2023-07-25 DIAGNOSIS — M20.42 HAMMER TOES OF BOTH FEET: ICD-10-CM

## 2023-07-25 DIAGNOSIS — B35.1 ONYCHOMYCOSIS DUE TO DERMATOPHYTE: ICD-10-CM

## 2023-07-25 DIAGNOSIS — L84 CORN OR CALLUS: ICD-10-CM

## 2023-07-25 DIAGNOSIS — M20.41 HAMMER TOES OF BOTH FEET: ICD-10-CM

## 2023-07-25 DIAGNOSIS — E11.49 TYPE II DIABETES MELLITUS WITH NEUROLOGICAL MANIFESTATIONS: Primary | ICD-10-CM

## 2023-07-25 PROCEDURE — 99999 PR PBB SHADOW E&M-EST. PATIENT-LVL IV: CPT | Mod: PBBFAC,,, | Performed by: PODIATRIST

## 2023-07-25 PROCEDURE — 1126F AMNT PAIN NOTED NONE PRSNT: CPT | Mod: CPTII,S$GLB,, | Performed by: PODIATRIST

## 2023-07-25 PROCEDURE — 11056 PARNG/CUTG B9 HYPRKR LES 2-4: CPT | Mod: Q9,S$GLB,, | Performed by: PODIATRIST

## 2023-07-25 PROCEDURE — 1160F PR REVIEW ALL MEDS BY PRESCRIBER/CLIN PHARMACIST DOCUMENTED: ICD-10-PCS | Mod: CPTII,S$GLB,, | Performed by: PODIATRIST

## 2023-07-25 PROCEDURE — 1101F PT FALLS ASSESS-DOCD LE1/YR: CPT | Mod: CPTII,S$GLB,, | Performed by: PODIATRIST

## 2023-07-25 PROCEDURE — 1126F PR PAIN SEVERITY QUANTIFIED, NO PAIN PRESENT: ICD-10-PCS | Mod: CPTII,S$GLB,, | Performed by: PODIATRIST

## 2023-07-25 PROCEDURE — 3288F PR FALLS RISK ASSESSMENT DOCUMENTED: ICD-10-PCS | Mod: CPTII,S$GLB,, | Performed by: PODIATRIST

## 2023-07-25 PROCEDURE — 3288F FALL RISK ASSESSMENT DOCD: CPT | Mod: CPTII,S$GLB,, | Performed by: PODIATRIST

## 2023-07-25 PROCEDURE — 99999 PR PBB SHADOW E&M-EST. PATIENT-LVL IV: ICD-10-PCS | Mod: PBBFAC,,, | Performed by: PODIATRIST

## 2023-07-25 PROCEDURE — 99213 PR OFFICE/OUTPT VISIT, EST, LEVL III, 20-29 MIN: ICD-10-PCS | Mod: 25,S$GLB,, | Performed by: PODIATRIST

## 2023-07-25 PROCEDURE — 11721 PR DEBRIDEMENT OF NAILS, 6 OR MORE: ICD-10-PCS | Mod: 59,Q9,S$GLB, | Performed by: PODIATRIST

## 2023-07-25 PROCEDURE — 11721 DEBRIDE NAIL 6 OR MORE: CPT | Mod: 59,Q9,S$GLB, | Performed by: PODIATRIST

## 2023-07-25 PROCEDURE — 1159F PR MEDICATION LIST DOCUMENTED IN MEDICAL RECORD: ICD-10-PCS | Mod: CPTII,S$GLB,, | Performed by: PODIATRIST

## 2023-07-25 PROCEDURE — 11056 PR TRIM BENIGN HYPERKERATOTIC SKIN LESION,2-4: ICD-10-PCS | Mod: Q9,S$GLB,, | Performed by: PODIATRIST

## 2023-07-25 PROCEDURE — 99213 OFFICE O/P EST LOW 20 MIN: CPT | Mod: 25,S$GLB,, | Performed by: PODIATRIST

## 2023-07-25 PROCEDURE — 1160F RVW MEDS BY RX/DR IN RCRD: CPT | Mod: CPTII,S$GLB,, | Performed by: PODIATRIST

## 2023-07-25 PROCEDURE — 1101F PR PT FALLS ASSESS DOC 0-1 FALLS W/OUT INJ PAST YR: ICD-10-PCS | Mod: CPTII,S$GLB,, | Performed by: PODIATRIST

## 2023-07-25 PROCEDURE — 1159F MED LIST DOCD IN RCRD: CPT | Mod: CPTII,S$GLB,, | Performed by: PODIATRIST

## 2023-07-28 ENCOUNTER — TELEPHONE (OUTPATIENT)
Dept: INTERNAL MEDICINE | Facility: CLINIC | Age: 83
End: 2023-07-28
Payer: MEDICARE

## 2023-07-28 ENCOUNTER — OFFICE VISIT (OUTPATIENT)
Dept: OPHTHALMOLOGY | Facility: CLINIC | Age: 83
End: 2023-07-28
Payer: MEDICARE

## 2023-07-28 DIAGNOSIS — H25.11 AGE-RELATED NUCLEAR CATARACT, RIGHT: ICD-10-CM

## 2023-07-28 DIAGNOSIS — E08.3213 DIABETES MELLITUS DUE TO UNDERLYING CONDITION WITH BOTH EYES AFFECTED BY MILD NONPROLIFERATIVE RETINOPATHY AND MACULAR EDEMA, WITH LONG-TERM CURRENT USE OF INSULIN: ICD-10-CM

## 2023-07-28 DIAGNOSIS — H40.1211 LOW-TENSION GLAUCOMA, RIGHT EYE, MILD STAGE: Primary | ICD-10-CM

## 2023-07-28 DIAGNOSIS — Z79.4 DIABETES MELLITUS DUE TO UNDERLYING CONDITION WITH BOTH EYES AFFECTED BY MILD NONPROLIFERATIVE RETINOPATHY AND MACULAR EDEMA, WITH LONG-TERM CURRENT USE OF INSULIN: ICD-10-CM

## 2023-07-28 DIAGNOSIS — E11.3213 BOTH EYES AFFECTED BY MILD NONPROLIFERATIVE DIABETIC RETINOPATHY WITH MACULAR EDEMA, ASSOCIATED WITH TYPE 2 DIABETES MELLITUS: ICD-10-CM

## 2023-07-28 DIAGNOSIS — Z96.1 PSEUDOPHAKIA, LEFT EYE: ICD-10-CM

## 2023-07-28 DIAGNOSIS — H40.1222 LOW-TENSION GLAUCOMA, LEFT EYE, MODERATE STAGE: ICD-10-CM

## 2023-07-28 PROCEDURE — 1101F PR PT FALLS ASSESS DOC 0-1 FALLS W/OUT INJ PAST YR: ICD-10-PCS | Mod: CPTII,S$GLB,, | Performed by: OPHTHALMOLOGY

## 2023-07-28 PROCEDURE — 99999 PR PBB SHADOW E&M-EST. PATIENT-LVL III: CPT | Mod: PBBFAC,,, | Performed by: OPHTHALMOLOGY

## 2023-07-28 PROCEDURE — 1159F PR MEDICATION LIST DOCUMENTED IN MEDICAL RECORD: ICD-10-PCS | Mod: CPTII,S$GLB,, | Performed by: OPHTHALMOLOGY

## 2023-07-28 PROCEDURE — 99999 PR PBB SHADOW E&M-EST. PATIENT-LVL III: ICD-10-PCS | Mod: PBBFAC,,, | Performed by: OPHTHALMOLOGY

## 2023-07-28 PROCEDURE — 92020 PR SPECIAL EYE EVAL,GONIOSCOPY: ICD-10-PCS | Mod: S$GLB,,, | Performed by: OPHTHALMOLOGY

## 2023-07-28 PROCEDURE — 92020 GONIOSCOPY: CPT | Mod: S$GLB,,, | Performed by: OPHTHALMOLOGY

## 2023-07-28 PROCEDURE — 99214 OFFICE O/P EST MOD 30 MIN: CPT | Mod: S$GLB,,, | Performed by: OPHTHALMOLOGY

## 2023-07-28 PROCEDURE — 1159F MED LIST DOCD IN RCRD: CPT | Mod: CPTII,S$GLB,, | Performed by: OPHTHALMOLOGY

## 2023-07-28 PROCEDURE — 3288F PR FALLS RISK ASSESSMENT DOCUMENTED: ICD-10-PCS | Mod: CPTII,S$GLB,, | Performed by: OPHTHALMOLOGY

## 2023-07-28 PROCEDURE — 1101F PT FALLS ASSESS-DOCD LE1/YR: CPT | Mod: CPTII,S$GLB,, | Performed by: OPHTHALMOLOGY

## 2023-07-28 PROCEDURE — 99214 PR OFFICE/OUTPT VISIT, EST, LEVL IV, 30-39 MIN: ICD-10-PCS | Mod: S$GLB,,, | Performed by: OPHTHALMOLOGY

## 2023-07-28 PROCEDURE — 1160F RVW MEDS BY RX/DR IN RCRD: CPT | Mod: CPTII,S$GLB,, | Performed by: OPHTHALMOLOGY

## 2023-07-28 PROCEDURE — 1126F AMNT PAIN NOTED NONE PRSNT: CPT | Mod: CPTII,S$GLB,, | Performed by: OPHTHALMOLOGY

## 2023-07-28 PROCEDURE — 1160F PR REVIEW ALL MEDS BY PRESCRIBER/CLIN PHARMACIST DOCUMENTED: ICD-10-PCS | Mod: CPTII,S$GLB,, | Performed by: OPHTHALMOLOGY

## 2023-07-28 PROCEDURE — 3288F FALL RISK ASSESSMENT DOCD: CPT | Mod: CPTII,S$GLB,, | Performed by: OPHTHALMOLOGY

## 2023-07-28 PROCEDURE — 1126F PR PAIN SEVERITY QUANTIFIED, NO PAIN PRESENT: ICD-10-PCS | Mod: CPTII,S$GLB,, | Performed by: OPHTHALMOLOGY

## 2023-07-28 RX ORDER — BRIMONIDINE TARTRATE 2 MG/ML
1 SOLUTION/ DROPS OPHTHALMIC 3 TIMES DAILY
Qty: 15 ML | Refills: 12 | Status: SHIPPED | OUTPATIENT
Start: 2023-07-28 | End: 2023-07-28 | Stop reason: SDUPTHER

## 2023-07-28 RX ORDER — BRIMONIDINE TARTRATE 2 MG/ML
1 SOLUTION/ DROPS OPHTHALMIC 3 TIMES DAILY
Qty: 15 ML | Refills: 12 | OUTPATIENT
Start: 2023-07-28 | End: 2023-07-28 | Stop reason: SDUPTHER

## 2023-07-28 RX ORDER — BRIMONIDINE TARTRATE 2 MG/ML
1 SOLUTION/ DROPS OPHTHALMIC 3 TIMES DAILY
Qty: 15 ML | Refills: 12 | Status: SHIPPED | OUTPATIENT
Start: 2023-07-28 | End: 2023-11-06 | Stop reason: SDUPTHER

## 2023-07-28 NOTE — TELEPHONE ENCOUNTER
----- Message from Génesis Brock sent at 7/28/2023 12:11 PM CDT -----  Contact: [phone]  Patient is calling for Medical Advice regarding:Patient is dropping off a form that she need the doctor to fill out and mail back to her, please call to advise.

## 2023-08-02 ENCOUNTER — HOSPITAL ENCOUNTER (OUTPATIENT)
Facility: HOSPITAL | Age: 83
Discharge: HOME | End: 2023-08-02
Attending: ANESTHESIOLOGY | Admitting: ANESTHESIOLOGY
Payer: MEDICARE

## 2023-08-02 VITALS
HEIGHT: 65 IN | WEIGHT: 177.56 LBS | DIASTOLIC BLOOD PRESSURE: 62 MMHG | SYSTOLIC BLOOD PRESSURE: 141 MMHG | RESPIRATION RATE: 14 BRPM | OXYGEN SATURATION: 97 % | HEART RATE: 56 BPM | TEMPERATURE: 98 F | BODY MASS INDEX: 29.58 KG/M2

## 2023-08-02 DIAGNOSIS — M54.16 LUMBAR RADICULOPATHY: ICD-10-CM

## 2023-08-02 PROCEDURE — 62323 NJX INTERLAMINAR LMBR/SAC: CPT | Performed by: ANESTHESIOLOGY

## 2023-08-02 PROCEDURE — 25000003 PHARM REV CODE 250: Performed by: ANESTHESIOLOGY

## 2023-08-02 PROCEDURE — 62323 NJX INTERLAMINAR LMBR/SAC: CPT | Mod: ,,, | Performed by: ANESTHESIOLOGY

## 2023-08-02 PROCEDURE — 62323 PR INJ LUMBAR/SACRAL, W/IMAGING GUIDANCE: ICD-10-PCS | Mod: ,,, | Performed by: ANESTHESIOLOGY

## 2023-08-02 PROCEDURE — 25500020 PHARM REV CODE 255: Performed by: ANESTHESIOLOGY

## 2023-08-02 PROCEDURE — 63600175 PHARM REV CODE 636 W HCPCS: Performed by: ANESTHESIOLOGY

## 2023-08-02 RX ORDER — MIDAZOLAM HYDROCHLORIDE 1 MG/ML
INJECTION, SOLUTION INTRAMUSCULAR; INTRAVENOUS
Status: DISCONTINUED | OUTPATIENT
Start: 2023-08-02 | End: 2023-08-02 | Stop reason: HOSPADM

## 2023-08-02 RX ORDER — METHYLPREDNISOLONE ACETATE 40 MG/ML
INJECTION, SUSPENSION INTRA-ARTICULAR; INTRALESIONAL; INTRAMUSCULAR; SOFT TISSUE
Status: DISCONTINUED | OUTPATIENT
Start: 2023-08-02 | End: 2023-08-02 | Stop reason: HOSPADM

## 2023-08-02 RX ORDER — SODIUM BICARBONATE 1 MEQ/ML
SYRINGE (ML) INTRAVENOUS
Status: DISCONTINUED | OUTPATIENT
Start: 2023-08-02 | End: 2023-08-02 | Stop reason: HOSPADM

## 2023-08-02 RX ORDER — FENTANYL CITRATE 50 UG/ML
INJECTION, SOLUTION INTRAMUSCULAR; INTRAVENOUS
Status: DISCONTINUED | OUTPATIENT
Start: 2023-08-02 | End: 2023-08-02 | Stop reason: HOSPADM

## 2023-08-02 RX ORDER — ONDANSETRON 4 MG/1
4 TABLET, ORALLY DISINTEGRATING ORAL ONCE
Status: COMPLETED | OUTPATIENT
Start: 2023-08-02 | End: 2023-08-02

## 2023-08-02 RX ORDER — BUPIVACAINE HYDROCHLORIDE 2.5 MG/ML
INJECTION, SOLUTION EPIDURAL; INFILTRATION; INTRACAUDAL
Status: DISCONTINUED | OUTPATIENT
Start: 2023-08-02 | End: 2023-08-02 | Stop reason: HOSPADM

## 2023-08-02 RX ADMIN — ONDANSETRON 4 MG: 4 TABLET, ORALLY DISINTEGRATING ORAL at 12:08

## 2023-08-02 NOTE — PLAN OF CARE
Pt remains unable to stand. Can move lower extremeties, but has numbness, and cannot bear weight without assistance.Legs buckle with attempt at standing. Dr. Fonseca notified. Will continue to monitor.

## 2023-08-02 NOTE — PLAN OF CARE
Pt able to take two assisted steps before legs buckle. Can move lower extremeties. Numbness in legs improving. Dr. Fonseca at bedside. Will continue to monitor.

## 2023-08-02 NOTE — PLAN OF CARE
Pt unable to stand at time of discharge. Can move lower extremeties, but has numbness, and cannot bear weight without assistance. Dr. Fonseca notified. Will continue to monitor.

## 2023-08-02 NOTE — PLAN OF CARE
Pt able to stand, bear weight, and walk with cane to wheelchair with Dr. Fonseca at bedside. Recovered to baseline. Discharge instructions reviewed. No complaints of pain at this time, and nausea resolved. Pt dressed and wheeled to car by RN.

## 2023-08-02 NOTE — DISCHARGE SUMMARY
Discharge Note  Short Stay      SUMMARY     Admit Date: 8/2/2023    Attending Physician: Chan Fonseca MD        Discharge Physician: Chan Fonseca MD        Discharge Date: 8/2/2023 11:12 AM    Procedure(s) (LRB):  L4-5 interlaminar epidural steroid injection with right paramedian approach with RN IV sedation (N/A)    Final Diagnosis: Lumbar radiculopathy, chronic [M54.16]    Disposition: Home or self care    Patient Instructions:   Current Discharge Medication List        CONTINUE these medications which have NOT CHANGED    Details   albuterol (PROVENTIL/VENTOLIN HFA) 90 mcg/actuation inhaler INHALE 2 PUFFS INTO THE LUNGS EVERY 6 (SIX) HOURS AS NEEDED FOR WHEEZING. RESCUE  Qty: 25.5 g, Refills: 3      ALPRAZolam (XANAX) 0.5 MG tablet TAKE 1 TABLET BY MOUTH NIGHTLY AS NEEDED FOR ANXIETY (MAY TAKE 1-2 TIMES WEEKLY FOR ANXIETY)  Qty: 30 tablet, Refills: 0    Comments: Not to exceed 5 additional fills before 09/27/2020      amLODIPine (NORVASC) 5 MG tablet Take 1 tablet (5 mg total) by mouth 2 (two) times daily.  Qty: 180 tablet, Refills: 3    Comments: .      atorvastatin (LIPITOR) 80 MG tablet TAKE 1 TABLET BY MOUTH EVERY DAY  Qty: 90 tablet, Refills: 2    Associated Diagnoses: Hyperlipidemia, unspecified hyperlipidemia type      carvediloL (COREG) 12.5 MG tablet Take 12.5 mg by mouth.      cholecalciferol, vitamin D3, (VITAMIN D3) 1,000 unit capsule Take 1 capsule (1,000 Units total) by mouth once daily.  Qty: 30 capsule, Refills: 12    Associated Diagnoses: Vitamin D deficiency      ferrous sulfate (FEOSOL) 325 mg (65 mg iron) Tab tablet Take 1 tablet (325 mg total) by mouth 2 (two) times daily.  Qty: 180 tablet, Refills: 3    Associated Diagnoses: Diabetes mellitus type 2, uncontrolled, with complications      flecainide (TAMBOCOR) 50 MG Tab Take 1 tablet (50 mg total) by mouth 2 (two) times daily.  Qty: 60 tablet, Refills: 1      glimepiride (AMARYL) 4 MG tablet TAKE 1 TABLET BY MOUTH IN THE MORNING WITH  BREAKFAST  Qty: 90 tablet, Refills: 0    Associated Diagnoses: Diabetes mellitus with stage 3 chronic kidney disease      hydroCHLOROthiazide (HYDRODIURIL) 12.5 MG Tab TAKE 1 TABLET BY MOUTH EVERY DAY  Qty: 90 tablet, Refills: 3    Comments: DX Code Needed  PATIENT NEEDS REFILL ON HYDROCHLOROTHIAZIDE  12.5.  Associated Diagnoses: Hypertension, essential      levalbuterol (XOPENEX HFA) 45 mcg/actuation inhaler INHALE 1-2 PUFFS INTO THE LUNGS EVERY 6 (SIX) HOURS AS NEEDED FOR WHEEZING. RESCUE  Qty: 15 Inhaler, Refills: 12      linaCLOtide (LINZESS) 145 mcg Cap capsule TAKE 1 CAPSULE (145 MCG TOTAL) BY MOUTH BEFORE BREAKFAST.  Qty: 30 capsule, Refills: 1    Comments: DX Code Needed  .  Associated Diagnoses: Constipation, unspecified constipation type      losartan (COZAAR) 50 MG tablet TAKE 1 TABLET BY MOUTH TWICE A DAY  Qty: 180 tablet, Refills: 2    Associated Diagnoses: Essential hypertension      meclizine (ANTIVERT) 25 mg tablet Take 1 tablet (25 mg total) by mouth daily as needed for Dizziness.  Qty: 30 tablet, Refills: 0      metFORMIN (GLUCOPHAGE-XR) 500 MG ER 24hr tablet TAKE 2 TABLETS BY MOUTH EVERY DAY  Qty: 180 tablet, Refills: 1    Associated Diagnoses: Type 2 diabetes mellitus with diabetic neuropathic arthropathy      metoprolol succinate (TOPROL-XL) 50 MG 24 hr tablet TAKE 1 TABLET BY MOUTH EVERY DAY  Qty: 90 tablet, Refills: 1      !! blood sugar diagnostic Strp 1 strip by Misc.(Non-Drug; Combo Route) route 2 (two) times daily. Insurance preferred test stripes DX:E11.22  Qty: 100 strip, Refills: 11      !! blood sugar diagnostic Strp For use with insurance covered glucometer; test daily  Qty: 100 strip, Refills: 12      brimonidine 0.2% (ALPHAGAN) 0.2 % Drop Place 1 drop into both eyes 3 (three) times daily.  Qty: 15 mL, Refills: 12    Associated Diagnoses: Low-tension glaucoma, right eye, mild stage; Low-tension glaucoma, left eye, moderate stage      ONETOUCH DELICA PLUS LANCET 33 gauge Misc Apply  topically.      ONETOUCH ULTRA2 METER Misc SMARTSIG:Via Meter As Directed      XARELTO 15 mg Tab TAKE 1 TABLET (15 MG TOTAL) BY MOUTH DAILY WITH DINNER OR EVENING MEAL.  Qty: 30 tablet, Refills: 6       !! - Potential duplicate medications found. Please discuss with provider.              Discharge Diagnosis: Lumbar radiculopathy, chronic [M54.16]  Condition on Discharge: Stable with no complications to procedure   Diet on Discharge: Same as before.  Activity: as per instruction sheet.  Discharge to: Home with a responsible adult.  Follow up: 2-4 weeks       Please call the office at [phone] if you experience any weakness or loss of sensation, fever > 101.5, pain uncontrolled with oral medications, persistent nausea/vomiting/or diarrhea, redness or drainage from the incisions, or any other worrisome concerns. If physician on call was not reached or could not communicate with our office for any reason please go to the nearest emergency department

## 2023-08-02 NOTE — DISCHARGE INSTRUCTIONS

## 2023-08-02 NOTE — PLAN OF CARE
Pt remains unable to stand. Can move lower extremeties, but has numbness, and cannot bear weight without assistance.Legs buckle with attempt at standing. Pt complains of nausea. Dr. Fonseca at bedside. Will continue to monitor.

## 2023-08-02 NOTE — OP NOTE
Lumbar Interlaminar Epidural Steroid Injection under Fluoroscopic Guidance.   Time-out taken to identify patient and procedure prior to starting the procedure.     08/02/2023    PROCEDURE: Interlaminar epidural steroid injection under fluoroscopic guidance.     Pre-Op diagnosis: Lumbar radiculopathy, chronic [M54.16]    Post-Op diagnosis: Lumbar radiculopathy, chronic [M54.16]    PHYSICIAN: Chan Fonseca MD    ASSISTANTS: None     ESTIMATED BLOOD LOSS: none.     COMPLICATIONS: none.     SPECIMENS: none    TECHNIQUE: With the patient laying in a prone position, the area was prepped and draped in the usual sterile fashion using ChloraPrep and a fenestrated drape. 1% lidocaine was given using a 27-gauge needle by raising a wheal and going down to the hub of the needle over the L4/5 interlaminar space.  The interlaminar space was then approached with a 3.5 inch 18-gauge Touhy needle was introduced from a R paramedian approach under fluoroscopic guidance in the AP and Lateral view. Once the Ligamentum flavum was encountered loss of resistance to saline was used to enter the epidural space. With positive loss of resistance and negative CSF or Blood, 3mL contrast dye Omnipaque (300mg/ml) was injected to confirm placement and there was no vascular runoff. Then 1ml 80mg/ml Depomedrol + 5 mL 0.25% Bupivicaine was injected slowly. Displacement of the radio opaque contrast after injection of the medication confirmed that the medication went into the area of the epidural space.  The patient tolerated the procedure well.     Conscious sedation provided by M.D    The patient was monitored with continuous pulse oximetry, EKG, and intermittent blood pressure monitors.  The patient was hemodynamically stable throughout the entire process was responsive to voice, and breathing spontaneously.  Supplemental O2 was provided at 2L/min via nasal cannula.  Patient was comfortable for the duration of the procedure. (See nurse documentation  and case log for sedation time)    There was a total of 2mg IV Midazolam and 100mcg Fentanyl titrated for the procedure      The patient was monitored after the procedure.   They were given post-procedure and discharge instructions to follow at home.  The patient was discharged in a stable condition.

## 2023-08-03 ENCOUNTER — TELEPHONE (OUTPATIENT)
Dept: PAIN MEDICINE | Facility: CLINIC | Age: 83
End: 2023-08-03
Payer: MEDICARE

## 2023-08-03 LAB — POCT GLUCOSE: 194 MG/DL (ref 70–110)

## 2023-08-03 NOTE — TELEPHONE ENCOUNTER
Reached out to pt in regards of her  L4-5 intralaminar epidural steroid injection procedure she had on 8/02/23.   Pt reports she is having pain in her legs, its in the back of her thighs and legs. States she is in the bed, she can't move or turn over. Pt stated that this is the first time this happen, Inform pt that it is normal to have pain after receiving an injection due to the numbing medication wearing off. Inform pt that it may take 2-3 weeks for the injection to take full affect.   For your pain, continue taking your medication as prescribed, you can alternate between Tylenol and Ibuprofen every 8 hours do not exceed 3G (3'000mg) a day.. Ice the pain site 20min on 20 min off and rest, if possible.  Generally  we do not prescribe any stronger or new medication as we will not know if it the the procedure working or the medication. Give it some time to subside and check back in with us. Inform pt that I will notify the provider and will get back to her once she responds. Pt understand. All questions answered.

## 2023-08-03 NOTE — TELEPHONE ENCOUNTER
----- Message from Izabelamichelle Lorenzo sent at 8/3/2023  1:59 PM CDT -----  States she had a procedure on yesterday and she is having pain in her legs, its in the back of her thighs and legs. States she is in the bed, she can't move or turn over.  Please call pt [phone]. Thank you

## 2023-08-04 ENCOUNTER — PATIENT MESSAGE (OUTPATIENT)
Dept: PAIN MEDICINE | Facility: CLINIC | Age: 83
End: 2023-08-04
Payer: MEDICARE

## 2023-08-08 DIAGNOSIS — N18.30 DIABETES MELLITUS WITH STAGE 3 CHRONIC KIDNEY DISEASE: Chronic | ICD-10-CM

## 2023-08-08 DIAGNOSIS — E11.22 DIABETES MELLITUS WITH STAGE 3 CHRONIC KIDNEY DISEASE: Chronic | ICD-10-CM

## 2023-08-08 NOTE — TELEPHONE ENCOUNTER
No care due was identified.  Health [facility] Embedded Care Due Messages. Reference number: [id number].   8/08/2023 10:04:01 AM CDT

## 2023-08-09 ENCOUNTER — PATIENT OUTREACH (OUTPATIENT)
Dept: ADMINISTRATIVE | Facility: OTHER | Age: 83
End: 2023-08-09
Payer: MEDICARE

## 2023-08-09 RX ORDER — MECLIZINE HYDROCHLORIDE 25 MG/1
25 TABLET ORAL DAILY PRN
Qty: 30 TABLET | Refills: 0 | Status: SHIPPED | OUTPATIENT
Start: 2023-08-09 | End: 2024-01-02

## 2023-08-09 RX ORDER — GLIMEPIRIDE 4 MG/1
TABLET ORAL
Qty: 90 TABLET | Refills: 0 | Status: SHIPPED | OUTPATIENT
Start: 2023-08-09 | End: 2023-10-26

## 2023-08-09 NOTE — PROGRESS NOTES
CHW - Follow Up    Lina Espinoza, Community Health Worker completed a follow up visit with patient via telephone today.  Pt/Caregiver reported: Ms. Casimiro Mar reported that she received the completed [facility] from Dr. Londono' office.  Pt requested assistance with her utility bills.   Community Health Worker provided:  CHW referred Pt to [facility] [place] [facility] A[facility].     Follow-up Outreach - Due: 8/18/2023

## 2023-08-09 NOTE — TELEPHONE ENCOUNTER
Refill Routing Note   Medication(s) are not appropriate for processing by [facility] for the following reason(s):      Medication outside of protocol    [facility] action(s):  Defer  Approve Care Due:  None identified            Appointments  past 12m or future 3m with PCP    Date Provider   Last Visit   2/7/2023 Penny Randolph MD   Next Visit   12/7/2023 Penny Randolph MD   ED visits in past 90 days: 0        Note composed:8:03 AM 08/09/2023

## 2023-08-10 RX ORDER — METHYLPREDNISOLONE 4 MG/1
4 TABLET ORAL DAILY
Qty: 21 EACH | Refills: 0 | Status: SHIPPED | OUTPATIENT
Start: 2023-08-10 | End: 2023-11-06 | Stop reason: ALTCHOICE

## 2023-08-10 RX ORDER — METHYLPREDNISOLONE 4 MG/1
4 TABLET ORAL
COMMUNITY
End: 2023-08-10 | Stop reason: SDUPTHER

## 2023-08-10 NOTE — TELEPHONE ENCOUNTER
Good Afternoon,     Pt is requesting for a refill of: medrol does pack   Last encounter: 7/12/23  Up coming apt: 9/13/23  Pharmacy:[id number] - [place] [address] AT AT [facility]  Is this something you can do?

## 2023-08-11 ENCOUNTER — PATIENT MESSAGE (OUTPATIENT)
Dept: INTERNAL MEDICINE | Facility: CLINIC | Age: 83
End: 2023-08-11
Payer: MEDICARE

## 2023-08-11 DIAGNOSIS — R41.3 MEMORY IMPAIRMENT: Primary | ICD-10-CM

## 2023-08-13 NOTE — PROGRESS NOTES
Subjective:     Patient ID: Saul Mar is a 83 y.o. female.    Chief Complaint: Nail Care (Diabetic pt wears tennis shoes, PCP Dr. Randolph lat seen 2-7-23)    Saul is a 83 y.o. female who presents to the clinic for evaluation and treatment of high risk feet. 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 The patient's chief complaint is long, thick toenails. This patient has documented high risk feet requiring routine maintenance secondary to diabetes mellitis and those secondary complications of diabetes, as mentioned..    PCP: Penny Randolph MD    Date Last Seen by PCP: 02/07/2023    Current shoe gear:  Affected Foot: Rx diabetic extra depth shoes and custom accommodative insoles     Unaffected Foot: Rx diabetic extra depth shoes and custom accommodative insoles    Hemoglobin A1C   Date Value Ref Range Status   06/05/2023 7.9 (H) 4.0 - 5.6 % Final     Comment:     ADA Screening Guidelines:  5.7-6.4%  Consistent with prediabetes  >or=6.5%  Consistent with diabetes    High levels of fetal hemoglobin interfere with the HbA1C  assay. Heterozygous hemoglobin variants (HbS, HgC, etc)do  not significantly interfere with this assay.   However, presence of multiple variants may affect accuracy.     02/07/2023 7.6 (H) 4.0 - 5.6 % Final     Comment:     ADA Screening Guidelines:  5.7-6.4%  Consistent with prediabetes  >or=6.5%  Consistent with diabetes    High levels of fetal hemoglobin interfere with the HbA1C  assay. Heterozygous  hemoglobin variants (HbS, HgC, etc)do  not significantly interfere with this assay.   However, presence of multiple variants may affect accuracy.     02/03/2023 7.6 (H) 4.0 - 5.6 % Final     Comment:     ADA Screening Guidelines:  5.7-6.4%  Consistent with prediabetes  >or=6.5%  Consistent with diabetes    High levels of fetal hemoglobin interfere with the HbA1C  assay. Heterozygous hemoglobin variants (HbS, HgC, etc)do  not significantly interfere with this assay.   However, presence of multiple variants may affect accuracy.         Patient Active Problem List   Diagnosis    Mixed diabetic hyperlipidemia associated with type 2 diabetes mellitus    Degenerative disc disease    Colon polyp: tubular adenoma 5/13, repeated 2016 to be repeated 2021- declines colonoscopy and Gastro also does not recommend    Multinodular thyroid: thyroid u/s 7/16, stable 2017 and 2019, 2021    POAG (primary open-angle glaucoma) - Both Eyes    Amaurosis fugax    Nuclear sclerosis - Right Eye    Eyelid myokymia    Cerebral microvascular disease: stroke ? 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    Sickle cell trait syndrome    Mixed anxiety depressive disorder    Diverticulosis of large intestine without hemorrhage    Atrial fibrillation    Hyperlipidemia associated with type 2 diabetes mellitus    Bilateral radiating leg pain    PAD (peripheral  artery disease)    Carotid atherosclerosis    Spinal stenosis of lumbar region with neurogenic claudication    Lumbar radiculopathy, chronic    Decreased strength, endurance, and mobility    Gait disorder    Posture abnormality    Both eyes affected by 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, associated with type 2 diabetes mellitus    Low-tension glaucoma, right eye, mild stage    Low-tension glaucoma, left eye, moderate stage    Age-related nuclear cataract, right    Mild episode of recurrent major depressive disorder    Carotid artery disease    DM cataract    Diabetic glaucoma    Diabetic retinopathy with macular edema associated with type 2 diabetes mellitus    Pulmonary hypertension    Diabetes mellitus with microalbuminuria    Abnormality of gait and mobility    Hearing loss    Diabetes mellitus type 2 with complications    Chronic constipation       Medication List with Changes/Refills   Current Medications    ALBUTEROL (PROVENTIL/VENTOLIN HFA) 90 MCG/ACTUATION INHALER    INHALE 2 PUFFS INTO THE LUNGS EVERY 6 (SIX) HOURS AS NEEDED FOR WHEEZING. RESCUE    ALPRAZOLAM (XANAX) 0.5 MG TABLET    TAKE 1 TABLET BY MOUTH NIGHTLY AS NEEDED FOR ANXIETY (MAY TAKE 1-2 TIMES WEEKLY FOR ANXIETY)    AMLODIPINE (NORVASC) 5 MG TABLET    Take 1 tablet (5 mg total) by mouth 2 (two) times daily.    ATORVASTATIN (LIPITOR) 80 MG TABLET    TAKE 1 TABLET BY MOUTH EVERY DAY    BLOOD SUGAR DIAGNOSTIC STRP    1 strip by Misc.(Non-Drug; Combo Route) route 2 (two) times daily. Insurance preferred test stripes DX:E11.22    BLOOD SUGAR DIAGNOSTIC STRP    For use with insurance covered glucometer; test daily    CARVEDILOL (COREG) 12.5 MG TABLET    Take 12.5 mg by mouth.    CHOLECALCIFEROL, VITAMIN D3, (VITAMIN D3) 1,000 UNIT CAPSULE    Take 1 capsule (1,000 Units total) by mouth once daily.    FERROUS SULFATE (FEOSOL) 325 MG (65 MG IRON) TAB TABLET    Take 1 tablet (325 mg total) by mouth 2 (two) times daily.     FLECAINIDE (TAMBOCOR) 50 MG TAB    Take 1 tablet (50 mg total) by mouth 2 (two) times daily.    HYDROCHLOROTHIAZIDE (HYDRODIURIL) 12.5 MG TAB    TAKE 1 TABLET BY MOUTH EVERY DAY    LEVALBUTEROL (XOPENEX HFA) 45 MCG/ACTUATION INHALER    INHALE 1-2 PUFFS INTO THE LUNGS EVERY 6 (SIX) HOURS AS NEEDED FOR WHEEZING. RESCUE    LINACLOTIDE (LINZESS) 145 MCG CAP CAPSULE    TAKE 1 CAPSULE (145 MCG TOTAL) BY MOUTH BEFORE BREAKFAST.    LOSARTAN (COZAAR) 50 MG TABLET    TAKE 1 TABLET BY MOUTH TWICE A DAY    METFORMIN (GLUCOPHAGE-XR) 500 MG ER 24HR TABLET    TAKE 2 TABLETS BY MOUTH EVERY DAY    METOPROLOL SUCCINATE (TOPROL-XL) 50 MG 24 HR TABLET    TAKE 1 TABLET BY MOUTH EVERY DAY    ONETOUCH DELICA PLUS LANCET 33 GAUGE MISC    Apply topically.    ONETOUCH ULTRA2 METER MISC    SMARTSIG:Via Meter As Directed    XARELTO 15 MG TAB    TAKE 1 TABLET (15 MG TOTAL) BY MOUTH DAILY WITH DINNER OR EVENING MEAL.   Changed and/or Refilled Medications    Modified Medication Previous Medication    BRIMONIDINE 0.2% (ALPHAGAN) 0.2 % DROP brimonidine 0.2% (ALPHAGAN) 0.2 % Drop       Place 1 drop into both eyes 3 (three) times daily.    Place 1 drop into both eyes 3 (three) times daily.    GLIMEPIRIDE (AMARYL) 4 MG TABLET glimepiride (AMARYL) 4 MG tablet       TAKE 1 TABLET BY MOUTH IN THE MORNING WITH BREAKFAST    TAKE 1 TABLET BY MOUTH IN THE MORNING WITH BREAKFAST    MECLIZINE (ANTIVERT) 25 MG TABLET meclizine (ANTIVERT) 25 mg tablet       TAKE 1 TABLET (25 MG TOTAL) BY MOUTH DAILY AS NEEDED FOR DIZZINESS.    Take 1 tablet (25 mg total) by mouth daily as needed for Dizziness.    METHYLPREDNISOLONE (MEDROL DOSEPACK) 4 MG TABLET methylPREDNISolone (MEDROL DOSEPACK) 4 mg tablet       Take 1 tablet (4 mg total) by mouth once daily. use as directed    Take 4 mg by mouth. use as directed   Discontinued Medications    BRIMONIDINE 0.2% (ALPHAGAN) 0.2 % DROP    Place 1 drop into both eyes 3 (three) times daily.       Review of patient's allergies  indicates:   Allergen Reactions    Pravachol [pravastatin] Nausea Only       Past Surgical History:   Procedure Laterality Date    CATARACT EXTRACTION W/  INTRAOCULAR LENS IMPLANT Left 02/27/2013    Dr. Taveras    COLONOSCOPY      COLONOSCOPY N/A 9/22/2016    Procedure: COLONOSCOPY;  Surgeon: Jd Ashton MD;  Location: [facility] ENDO (4TH FLR);  Service: Endoscopy;  Laterality: N/A;  OK per Dr Randolph for pt to hold Plavix 5 days prior to procedure/see telephone encounter dated 8/29/16/svn    EPIDURAL STEROID INJECTION N/A 8/2/2023    Procedure: L4-5 interlaminar epidural steroid injection with right paramedian approach with RN IV sedation;  Surgeon: Chan Fonseca MD;  Location: [facility] PAIN MGT;  Service: Pain Management;  Laterality: N/A;    EYE SURGERY Bilateral 2002 approx    Laser for glaucoma    HYSTERECTOMY  1963     AMEENA/USO- fibroids; no cancer    OOPHORECTOMY  1963    unknown, only removed one    SELECTIVE INJECTION OF ANESTHETIC AGENT AROUND LUMBAR SPINAL NERVE ROOT BY TRANSFORAMINAL APPROACH Bilateral 6/17/2022    Procedure: Bilateral L4/5 TF JASKARAN with RN IV sedation;  Surgeon: Chan Fonseca MD;  Location: [facility] PAIN MGT;  Service: Pain Management;  Laterality: Bilateral;    SELECTIVE INJECTION OF ANESTHETIC AGENT AROUND LUMBAR SPINAL NERVE ROOT BY TRANSFORAMINAL APPROACH Bilateral 10/3/2022    Procedure: Bilateral L2-3 transforaminal epidural steroid injection with RN IV sedation;  Surgeon: Chan Fonseca MD;  Location: [facility] PAIN MGT;  Service: Pain Management;  Laterality: Bilateral;       Family History   Problem Relation Age of Onset    Stroke Mother     Mental illness Mother     Hypertension Mother     Stroke Father     Diabetes Maternal Grandmother     Drug abuse Daughter     Cancer Sister 68        gyn    Ovarian cancer Sister     Cancer Maternal Uncle         type not known    Heart disease Paternal Grandmother     Cancer Maternal Aunt         type unknown    Glaucoma Neg Hx     Macular  degeneration Neg Hx     Alcohol abuse Neg Hx     COPD Neg Hx     Asthma Neg Hx     Amblyopia Neg Hx     Blindness Neg Hx     Cataracts Neg Hx     Retinal detachment Neg Hx     Strabismus Neg Hx        Social History     Socioeconomic History    Marital status:     Number of children: 1   Tobacco Use    Smoking status: Never     Passive exposure: Never    Smokeless tobacco: Never   Substance and Sexual Activity    Alcohol use: No    Drug use: No    Sexual activity: Not Currently     Partners: Male   Social History Narrative    , no pets or smokers in household. 1 Child who lives in nursing home. She has a grandson who lives in [place].. Retired from [employer] . Still drives. Does not have a Living Will or advanced directive.      Social Determinants of Health     Financial Resource Strain: Medium Risk (5/16/2023)    Overall Financial Resource Strain (CARDIA)     Difficulty of Paying Living Expenses: Somewhat hard   Food Insecurity: Food Insecurity Present (5/16/2023)    Hunger Vital Sign     Worried About Running Out of Food in the Last Year: Sometimes true     Ran Out of Food in the Last Year: Sometimes true   Transportation Needs: Unmet Transportation Needs (6/2/2023)    PRAPARE - Transportation     Lack of Transportation (Medical): Yes     Lack of Transportation (Non-Medical): Yes   Physical Activity: Inactive (5/16/2023)    Exercise Vital Sign     Days of Exercise per Week: 0 days     Minutes of Exercise per Session: 0 min   Stress: No Stress Concern Present (5/16/2023)    [place] [employer] - Occupational Stress Questionnaire     Feeling of Stress : Not at all   Social Connections: Moderately Integrated (5/16/2023)    Social Connection and Isolation Panel [NHANES]     Frequency of Communication with Friends and Family: More than three times a week     Frequency of Social Gatherings with Friends and Family: Once a week     Attends Restoration Services: More than 4 times per  "year     Active Member of Clubs or Organizations: Yes     Attends Club or Organization Meetings: More than 4 times per year     Marital Status:    Housing Stability: Low Risk  (5/16/2023)    Housing Stability Vital Sign     Unable to Pay for Housing in the Last Year: No     Number of Places Lived in the Last Year: 1     Unstable Housing in the Last Year: No       Vitals:    07/25/23 1106   Weight: 81.9 kg (180 lb 8.9 oz)   Height: 5' 5" (1.651 m)   PainSc: 0-No pain       Hemoglobin A1C   Date Value Ref Range Status   06/05/2023 7.9 (H) 4.0 - 5.6 % Final     Comment:     ADA Screening Guidelines:  5.7-6.4%  Consistent with prediabetes  >or=6.5%  Consistent with diabetes    High levels of fetal hemoglobin interfere with the HbA1C  assay. Heterozygous hemoglobin variants (HbS, HgC, etc)do  not significantly interfere with this assay.   However, presence of multiple variants may affect accuracy.     02/07/2023 7.6 (H) 4.0 - 5.6 % Final     Comment:     ADA Screening Guidelines:  5.7-6.4%  Consistent with prediabetes  >or=6.5%  Consistent with diabetes    High levels of fetal hemoglobin interfere with the HbA1C  assay. Heterozygous hemoglobin variants (HbS, HgC, etc)do  not significantly interfere with this assay.   However, presence of multiple variants may affect accuracy.     02/03/2023 7.6 (H) 4.0 - 5.6 % Final     Comment:     ADA Screening Guidelines:  5.7-6.4%  Consistent with prediabetes  >or=6.5%  Consistent with diabetes    High levels of fetal hemoglobin interfere with the HbA1C  assay. Heterozygous hemoglobin variants (HbS, HgC, etc)do  not significantly interfere with this assay.   However, presence of multiple variants may affect accuracy.         Review of Systems   Constitutional:  Negative for chills and fever.   Respiratory:  Negative for shortness of breath.    Cardiovascular:  Negative for chest pain, palpitations, orthopnea, claudication and leg swelling.   Gastrointestinal:  Negative for " diarrhea, nausea and vomiting.   Musculoskeletal:  Negative for joint pain.   Skin:  Negative for rash.   Neurological:  Positive for tingling and sensory change.   Psychiatric/Behavioral: Negative.               Objective:      PHYSICAL EXAM: Apperance: Alert and orient in no distress,well developed, and with good attention to grooming and body habits  Patient presents ambulating in tennis shoes.  LOWER EXTREMITY EXAM:  VASCULAR: Dorsalis pedis pulses 0/4 left 1/4 right and Posterior Tibial pulses 0/4 left and 1/4 right. Capillary fill time <4 seconds bilateral. Mild edema observed bilateral. Varicosities present bilateral. Skin temperature of the lower extremities is warm to cool, proximal to distal. Hair growth absent bilateral.  DERMATOLOGICAL: No skin rashes, subcutaneous nodules, lesions, or ulcers observed bilateral. Nails 1,2,3,4,5, bilateral elongated, thickened, and discolored with subungual debris. Right hallux nail observed to be mildly incurvated at distal lateral borders and slight obstructed in the nail grooves with soft tissue. No purulent drainage, no odor, and no increased temperature observed to right hallux. Webspaces 1,2,3,4 bilateral clean, dry and without evidence of break in skin integrity. Mild hyperkeratotic tissue noted to bilateral hallux.   NEUROLOGICAL: Light touch, sharp-dull, proprioception all present and equal bilaterally.  Vibratory sensation diminished at bilateral hallux. Protective sensation absent at toe sites as tested with a [employer] 5.07 monofilament.   MUSCULOSKELETAL: Muscle strength is 5/5 for foot inverters, everters, plantarflexors, and dorsiflexors. Muscle tone is normal. Pain on palpation of right distal lateral nail border.           Assessment:       ICD-10-CM ICD-9-CM   1. Type II diabetes mellitus with neurological manifestations  E11.49 250.60   2. Corn or callus  L84 700   3. Hammer toes of both feet  M20.41 735.4    M20.42    4. Onychomycosis due to  dermatophyte  B35.1 110.1       Plan:   Type II diabetes mellitus with neurological manifestations  -     DIABETIC SHOES FOR HOME USE    Corn or callus  -     DIABETIC SHOES FOR HOME USE    Hammer toes of both feet  -     DIABETIC SHOES FOR HOME USE    Onychomycosis due to dermatophyte      I counseled the patient on her conditions, regarding findings of my examination, my impressions, and usual treatment plan.   Appointment spent on education about the diabetic foot, neuropathy, and prevention of limb loss.  Shoe inspection. Diabetic Foot Education. Patient reminded of the importance of good nutrition and blood sugar control to help prevent podiatric complications of diabetes. Patient instructed on proper foot hygeine. We discussed wearing proper shoe gear, daily foot inspections, never walking without protective shoe gear, never putting sharp instruments to feet.    With patient's permission, nails 1-5 bilateral were debrided/trimmed in length and thickness aggressively to their soft tissue attachment mechanically and with electric , removing all offending nail and debris. Patient relates relief following the procedure.  With patient's permission, bilateral callus trimmed in thickness with #15 blade in thickness without incident.   Prescription written for Diabetic shoes and inserts.   Patient  will continue to monitor the areas daily, inspect feet, wear protective shoe gear when ambulatory, moisturizer to maintain skin integrity. Patient reminded of the importance of good nutrition and blood sugar control to help prevent podiatric complications of diabetes.  Patient to return 6 months or sooner if needed.        Zuleima Howell DPM  [facility]

## 2023-08-17 ENCOUNTER — PATIENT OUTREACH (OUTPATIENT)
Dept: ADMINISTRATIVE | Facility: OTHER | Age: 83
End: 2023-08-17
Payer: MEDICARE

## 2023-08-17 NOTE — PROGRESS NOTES
CHW - Follow Up    Lina Espinoza, Community Health Worker completed a follow up visit with patient via telephone today.  Pt/Caregiver reported:  Ms. Kirkpatrick reported that she forgot to call the [facility] [place] on aging regarding her utility bill.  Pt stated she will call this week.   Community Health Worker provided:  CHW mailed Pt an application for the [place] [employer] and [employer] card and a [employer] application.    Follow-up Outreach - Due: 8/25/2023

## 2023-08-24 ENCOUNTER — PATIENT OUTREACH (OUTPATIENT)
Dept: ADMINISTRATIVE | Facility: OTHER | Age: 83
End: 2023-08-24
Payer: MEDICARE

## 2023-08-24 NOTE — PROGRESS NOTES
CHW - Follow Up    Lina Espinoza, Community Health Worker completed a follow up visit with patient via telephone today.  Pt/Caregiver reported:  Ms. Caismiro Mar reported that she has not received the resource information that CHW mailed her on August 17.    Community Health Worker provided: CHW will follow up with Pt on August 28 to check if Pt received it.    Follow-up Outreach - Due: 8/28/2023

## 2023-08-30 ENCOUNTER — PATIENT OUTREACH (OUTPATIENT)
Dept: ADMINISTRATIVE | Facility: OTHER | Age: 83
End: 2023-08-30
Payer: MEDICARE

## 2023-08-30 NOTE — PROGRESS NOTES
CHW - Follow Up    Lina Espinoza, Community Health Worker completed a follow up visit with patient via telephone today.  Pt/Caregiver reported: Ms. Casimiro Mar stated that she received the resource information that CHW mailed her; however, she has not opened the envelope to read it as of yet.   Community Health Worker provided: CHW will contact Pt next week to see if she has any questions.    Follow-up Outreach - Due: 9/8/2023

## 2023-09-11 ENCOUNTER — PATIENT OUTREACH (OUTPATIENT)
Dept: ADMINISTRATIVE | Facility: OTHER | Age: 83
End: 2023-09-11
Payer: MEDICARE

## 2023-09-11 NOTE — PROGRESS NOTES
CHW - Follow Up    Lina Espinoza, Community Health Worker completed a follow up visit with patient via telephone today.  Pt/Caregiver reported: Ms. Casimiro Mar reported that she is out of state attending a family reunion.  Pt asked CHW to call her back in about one week.    Community Health Worker provided:  CHW will call Pt back on September 20, 2023.   Follow-up Outreach - Due: 9/20/2023

## 2023-09-20 ENCOUNTER — TELEPHONE (OUTPATIENT)
Dept: PODIATRY | Facility: CLINIC | Age: 83
End: 2023-09-20
Payer: MEDICARE

## 2023-09-20 ENCOUNTER — PATIENT MESSAGE (OUTPATIENT)
Dept: ADMINISTRATIVE | Facility: OTHER | Age: 83
End: 2023-09-20
Payer: MEDICARE

## 2023-09-20 ENCOUNTER — PATIENT OUTREACH (OUTPATIENT)
Dept: ADMINISTRATIVE | Facility: OTHER | Age: 83
End: 2023-09-20
Payer: MEDICARE

## 2023-09-20 NOTE — PROGRESS NOTES
CHW - Outreach Attempt    Lina Espinoza,  Health Worker left a voicemail message for 1st attempt to contact patient regarding: [facility]   Community Health Worker to attempt to contact patient on: September 26, 2023.

## 2023-09-20 NOTE — TELEPHONE ENCOUNTER
Called the pt, no answer.  But schedule her appt and explain if she have any questions contact us.

## 2023-09-26 ENCOUNTER — OFFICE VISIT (OUTPATIENT)
Dept: PODIATRY | Facility: CLINIC | Age: 83
End: 2023-09-26
Attending: ANESTHESIOLOGY
Payer: MEDICARE

## 2023-09-26 VITALS — WEIGHT: 177.69 LBS | BODY MASS INDEX: 29.61 KG/M2 | HEIGHT: 65 IN

## 2023-09-26 DIAGNOSIS — B35.1 ONYCHOMYCOSIS DUE TO DERMATOPHYTE: ICD-10-CM

## 2023-09-26 DIAGNOSIS — L60.0 ONYCHOCRYPTOSIS: ICD-10-CM

## 2023-09-26 DIAGNOSIS — E11.49 TYPE II DIABETES MELLITUS WITH NEUROLOGICAL MANIFESTATIONS: Primary | ICD-10-CM

## 2023-09-26 PROCEDURE — 99999 PR PBB SHADOW E&M-EST. PATIENT-LVL III: ICD-10-PCS | Mod: PBBFAC,,, | Performed by: PODIATRIST

## 2023-09-26 PROCEDURE — 99999 PR PBB SHADOW E&M-EST. PATIENT-LVL III: CPT | Mod: PBBFAC,,, | Performed by: PODIATRIST

## 2023-09-26 PROCEDURE — 11721 DEBRIDE NAIL 6 OR MORE: CPT | Mod: Q9,S$GLB,, | Performed by: PODIATRIST

## 2023-09-26 PROCEDURE — 99499 NO LOS: ICD-10-PCS | Mod: S$GLB,,, | Performed by: PODIATRIST

## 2023-09-26 PROCEDURE — 11721 PR DEBRIDEMENT OF NAILS, 6 OR MORE: ICD-10-PCS | Mod: Q9,S$GLB,, | Performed by: PODIATRIST

## 2023-09-26 PROCEDURE — 99499 UNLISTED E&M SERVICE: CPT | Mod: S$GLB,,, | Performed by: PODIATRIST

## 2023-09-26 NOTE — PROGRESS NOTES
Subjective:     Patient ID: Saul Mar is a 83 y.o. female.    Chief Complaint: Nail Care (Nail care, (c/o states ingrown right great toe) rates pain 7/10, wearing tennis and socks. Diabetic pt, last seen PCP Dr. Randolph 02/07/23. )    Saul is a 83 y.o. female who presents to the clinic for evaluation and treatment of high risk feet. 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 The patient's chief complaint is painful right hallux toenail. Patient rates pain 7/10. This patient has documented high risk feet requiring routine maintenance secondary to diabetes mellitis and those secondary complications of diabetes, as mentioned..    PCP: Penny Randolph MD    Date Last Seen by PCP: 02/07/2023    Current shoe gear:  Affected Foot: Rx diabetic extra depth shoes and custom accommodative insoles     Unaffected Foot: Rx diabetic extra depth shoes and custom accommodative insoles    Hemoglobin A1C   Date Value Ref Range Status   06/05/2023 7.9 (H) 4.0 - 5.6 % Final     Comment:     ADA Screening Guidelines:  5.7-6.4%  Consistent with prediabetes  >or=6.5%  Consistent with diabetes    High levels of fetal hemoglobin interfere with the HbA1C  assay. Heterozygous hemoglobin variants (HbS, HgC, etc)do  not significantly interfere with this assay.   However, presence of multiple variants may affect accuracy.     02/07/2023 7.6 (H) 4.0 - 5.6 % Final     Comment:     ADA Screening Guidelines:  5.7-6.4%  Consistent with  prediabetes  >or=6.5%  Consistent with diabetes    High levels of fetal hemoglobin interfere with the HbA1C  assay. Heterozygous hemoglobin variants (HbS, HgC, etc)do  not significantly interfere with this assay.   However, presence of multiple variants may affect accuracy.     02/03/2023 7.6 (H) 4.0 - 5.6 % Final     Comment:     ADA Screening Guidelines:  5.7-6.4%  Consistent with prediabetes  >or=6.5%  Consistent with diabetes    High levels of fetal hemoglobin interfere with the HbA1C  assay. Heterozygous hemoglobin variants (HbS, HgC, etc)do  not significantly interfere with this assay.   However, presence of multiple variants may affect accuracy.         Patient Active Problem List   Diagnosis    Mixed diabetic hyperlipidemia associated with type 2 diabetes mellitus    Degenerative disc disease    Colon polyp: tubular adenoma 5/13, repeated 2016 to be repeated 2021- declines colonoscopy and Gastro also does not recommend    Multinodular thyroid: thyroid u/s 7/16, stable 2017 and 2019, 2021    POAG (primary open-angle glaucoma) - Both Eyes    Amaurosis fugax    Nuclear sclerosis - Right Eye    Eyelid myokymia    Cerebral microvascular disease: stroke ? 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    Sickle cell trait syndrome    Mixed anxiety depressive disorder    Diverticulosis of large intestine without hemorrhage    Atrial  fibrillation    Hyperlipidemia associated with type 2 diabetes mellitus    Bilateral radiating leg pain    PAD (peripheral artery disease)    Carotid atherosclerosis    Spinal stenosis of lumbar region with neurogenic claudication    Lumbar radiculopathy, chronic    Decreased strength, endurance, and mobility    Gait disorder    Posture abnormality    Both eyes affected by 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, associated with type 2 diabetes mellitus    Low-tension glaucoma, right eye, mild stage    Low-tension glaucoma, left eye, moderate stage    Age-related nuclear cataract, right    Mild episode of recurrent major depressive disorder    Carotid artery disease    DM cataract    Diabetic glaucoma    Diabetic retinopathy with macular edema associated with type 2 diabetes mellitus    Pulmonary hypertension    Diabetes mellitus with microalbuminuria    Abnormality of gait and mobility    Hearing loss    Diabetes mellitus type 2 with complications    Chronic constipation       Medication List with Changes/Refills   Current Medications    ALBUTEROL (PROVENTIL/VENTOLIN HFA) 90 MCG/ACTUATION INHALER    INHALE 2 PUFFS INTO THE LUNGS EVERY 6 (SIX) HOURS AS NEEDED FOR WHEEZING. RESCUE    ALPRAZOLAM (XANAX) 0.5 MG TABLET    TAKE 1 TABLET BY MOUTH NIGHTLY AS NEEDED FOR ANXIETY (MAY TAKE 1-2 TIMES WEEKLY FOR ANXIETY)    AMLODIPINE (NORVASC) 5 MG TABLET    Take 1 tablet (5 mg total) by mouth 2 (two) times daily.    ATORVASTATIN (LIPITOR) 80 MG TABLET    TAKE 1 TABLET BY MOUTH EVERY DAY    BLOOD SUGAR DIAGNOSTIC STRP    1 strip by Misc.(Non-Drug; Combo Route) route 2 (two) times daily. Insurance preferred test stripes DX:E11.22    BLOOD SUGAR DIAGNOSTIC STRP    For use with insurance covered glucometer; test daily    BRIMONIDINE 0.2% (ALPHAGAN) 0.2 % DROP    Place 1 drop into both eyes 3 (three) times daily.    CARVEDILOL (COREG) 12.5 MG TABLET    Take 12.5 mg by mouth.    CHOLECALCIFEROL, VITAMIN D3,  (VITAMIN D3) 1,000 UNIT CAPSULE    Take 1 capsule (1,000 Units total) by mouth once daily.    FERROUS SULFATE (FEOSOL) 325 MG (65 MG IRON) TAB TABLET    Take 1 tablet (325 mg total) by mouth 2 (two) times daily.    FLECAINIDE (TAMBOCOR) 50 MG TAB    Take 1 tablet (50 mg total) by mouth 2 (two) times daily.    GLIMEPIRIDE (AMARYL) 4 MG TABLET    TAKE 1 TABLET BY MOUTH IN THE MORNING WITH BREAKFAST    HYDROCHLOROTHIAZIDE (HYDRODIURIL) 12.5 MG TAB    TAKE 1 TABLET BY MOUTH EVERY DAY    LEVALBUTEROL (XOPENEX HFA) 45 MCG/ACTUATION INHALER    INHALE 1-2 PUFFS INTO THE LUNGS EVERY 6 (SIX) HOURS AS NEEDED FOR WHEEZING. RESCUE    LINACLOTIDE (LINZESS) 145 MCG CAP CAPSULE    TAKE 1 CAPSULE (145 MCG TOTAL) BY MOUTH BEFORE BREAKFAST.    LOSARTAN (COZAAR) 50 MG TABLET    TAKE 1 TABLET BY MOUTH TWICE A DAY    MECLIZINE (ANTIVERT) 25 MG TABLET    TAKE 1 TABLET (25 MG TOTAL) BY MOUTH DAILY AS NEEDED FOR DIZZINESS.    METFORMIN (GLUCOPHAGE-XR) 500 MG ER 24HR TABLET    TAKE 2 TABLETS BY MOUTH EVERY DAY    METHYLPREDNISOLONE (MEDROL DOSEPACK) 4 MG TABLET    Take 1 tablet (4 mg total) by mouth once daily. use as directed    METOPROLOL SUCCINATE (TOPROL-XL) 50 MG 24 HR TABLET    TAKE 1 TABLET BY MOUTH EVERY DAY    ONETOUCH DELICA PLUS LANCET 33 GAUGE MISC    Apply topically.    ONETOUCH ULTRA2 METER MISC    SMARTSIG:Via Meter As Directed    XARELTO 15 MG TAB    TAKE 1 TABLET (15 MG TOTAL) BY MOUTH DAILY WITH DINNER OR EVENING MEAL.       Review of patient's allergies indicates:   Allergen Reactions    Pravachol [pravastatin] Nausea Only       Past Surgical History:   Procedure Laterality Date    CATARACT EXTRACTION W/  INTRAOCULAR LENS IMPLANT Left 02/27/2013    Dr. Taveras    COLONOSCOPY      COLONOSCOPY N/A 9/22/2016    Procedure: COLONOSCOPY;  Surgeon: Jd Ashton MD;  Location: [facility] ([address] [place] [address]);  Service: Endoscopy;  Laterality: N/A;  OK per Dr Randolph for pt to hold Plavix 5 days prior to procedure/see telephone  encounter dated 8/29/16/svn    EPIDURAL STEROID INJECTION N/A 8/2/2023    Procedure: L4-5 interlaminar epidural steroid injection with right paramedian approach with RN IV sedation;  Surgeon: Chan Fonseca MD;  Location: [facility];  Service: Pain Management;  Laterality: N/A;    EYE SURGERY Bilateral 2002 approx    Laser for glaucoma    HYSTERECTOMY  1963     AMEENA/USO- fibroids; no cancer    OOPHORECTOMY  1963    unknown, only removed one    SELECTIVE INJECTION OF ANESTHETIC AGENT AROUND LUMBAR SPINAL NERVE ROOT BY TRANSFORAMINAL APPROACH Bilateral 6/17/2022    Procedure: Bilateral L4/5 TF JASKARAN with RN IV sedation;  Surgeon: Chan Fonseca MD;  Location: [facility];  Service: Pain Management;  Laterality: Bilateral;    SELECTIVE INJECTION OF ANESTHETIC AGENT AROUND LUMBAR SPINAL NERVE ROOT BY TRANSFORAMINAL APPROACH Bilateral 10/3/2022    Procedure: Bilateral L2-3 transforaminal epidural steroid injection with RN IV sedation;  Surgeon: Chan Fonseca MD;  Location: [facility];  Service: Pain Management;  Laterality: Bilateral;       Family History   Problem Relation Age of Onset    Stroke Mother     Mental illness Mother     Hypertension Mother     Stroke Father     Diabetes Maternal Grandmother     Drug abuse Daughter     Cancer Sister 68        gyn    Ovarian cancer Sister     Cancer Maternal Uncle         type not known    Heart disease Paternal Grandmother     Cancer Maternal Aunt         type unknown    Glaucoma Neg Hx     Macular degeneration Neg Hx     Alcohol abuse Neg Hx     COPD Neg Hx     Asthma Neg Hx     Amblyopia Neg Hx     Blindness Neg Hx     Cataracts Neg Hx     Retinal detachment Neg Hx     Strabismus Neg Hx        Social History     Socioeconomic History    Marital status:     Number of children: 1   Tobacco Use    Smoking status: Never     Passive exposure: Never    Smokeless tobacco: Never   Substance and Sexual Activity    Alcohol use: No    Drug use: No    Sexual activity: Not  "Currently     Partners: Male   Social History Narrative    , no pets or smokers in household. 1 Child who lives in nursing home. She has a grandson who lives in [place].. Retired from [employer] . Still drives. Does not have a Living Will or advanced directive.      Social Determinants of Health     Financial Resource Strain: Medium Risk (5/16/2023)    Overall Financial Resource Strain (CARDIA)     Difficulty of Paying Living Expenses: Somewhat hard   Food Insecurity: Food Insecurity Present (5/16/2023)    Hunger Vital Sign     Worried About Running Out of Food in the Last Year: Sometimes true     Ran Out of Food in the Last Year: Sometimes true   Transportation Needs: Unmet Transportation Needs (6/2/2023)    PRAPARE - Transportation     Lack of Transportation (Medical): Yes     Lack of Transportation (Non-Medical): Yes   Physical Activity: Inactive (5/16/2023)    Exercise Vital Sign     Days of Exercise per Week: 0 days     Minutes of Exercise per Session: 0 min   Stress: No Stress Concern Present (5/16/2023)    [employer] [place] [employer] - Occupational Stress Questionnaire     Feeling of Stress : Not at all   Social Connections: Moderately Integrated (5/16/2023)    Social Connection and Isolation Panel [NHANES]     Frequency of Communication with Friends and Family: More than three times a week     Frequency of Social Gatherings with Friends and Family: Once a week     Attends Episcopal Services: More than 4 times per year     Active Member of Clubs or Organizations: Yes     Attends Club or Organization Meetings: More than 4 times per year     Marital Status:    Housing Stability: Low Risk  (5/16/2023)    Housing Stability Vital Sign     Unable to Pay for Housing in the Last Year: No     Number of Places Lived in the Last Year: 1     Unstable Housing in the Last Year: No       Vitals:    09/26/23 1110   Weight: 80.6 kg (177 lb 11.1 oz)   Height: 5' 5" (1.651 m)   PainSc:   7 "       Hemoglobin A1C   Date Value Ref Range Status   06/05/2023 7.9 (H) 4.0 - 5.6 % Final     Comment:     ADA Screening Guidelines:  5.7-6.4%  Consistent with prediabetes  >or=6.5%  Consistent with diabetes    High levels of fetal hemoglobin interfere with the HbA1C  assay. Heterozygous hemoglobin variants (HbS, HgC, etc)do  not significantly interfere with this assay.   However, presence of multiple variants may affect accuracy.     02/07/2023 7.6 (H) 4.0 - 5.6 % Final     Comment:     ADA Screening Guidelines:  5.7-6.4%  Consistent with prediabetes  >or=6.5%  Consistent with diabetes    High levels of fetal hemoglobin interfere with the HbA1C  assay. Heterozygous hemoglobin variants (HbS, HgC, etc)do  not significantly interfere with this assay.   However, presence of multiple variants may affect accuracy.     02/03/2023 7.6 (H) 4.0 - 5.6 % Final     Comment:     ADA Screening Guidelines:  5.7-6.4%  Consistent with prediabetes  >or=6.5%  Consistent with diabetes    High levels of fetal hemoglobin interfere with the HbA1C  assay. Heterozygous hemoglobin variants (HbS, HgC, etc)do  not significantly interfere with this assay.   However, presence of multiple variants may affect accuracy.         Review of Systems   Constitutional:  Negative for chills and fever.   Respiratory:  Negative for shortness of breath.    Cardiovascular:  Negative for chest pain, palpitations, orthopnea, claudication and leg swelling.   Gastrointestinal:  Negative for diarrhea, nausea and vomiting.   Musculoskeletal:  Negative for joint pain.   Skin:  Negative for rash.   Neurological:  Positive for tingling and sensory change.   Psychiatric/Behavioral: Negative.               Objective:      PHYSICAL EXAM: Apperance: Alert and orient in no distress,well developed, and with good attention to grooming and body habits  Patient presents ambulating in tennis shoes.  LOWER EXTREMITY EXAM:  VASCULAR: Dorsalis pedis pulses 0/4 left 1/4 right and  Posterior Tibial pulses 0/4 left and 1/4 right. Capillary fill time <4 seconds bilateral. Mild edema observed bilateral. Varicosities present bilateral. Skin temperature of the lower extremities is warm to cool, proximal to distal. Hair growth absent bilateral.  DERMATOLOGICAL: No skin rashes, subcutaneous nodules, lesions, or ulcers observed bilateral. Nails 1,2,3,4,5, bilateral elongated, thickened, and discolored with subungual debris. Right hallux nail observed to be mildly incurvated at distal lateral borders and slight obstructed in the nail grooves with soft tissue. No purulent drainage, no odor, and no increased temperature observed to right hallux. Webspaces 1,2,3,4 bilateral clean, dry and without evidence of break in skin integrity. Mild hyperkeratotic tissue noted to bilateral hallux.   NEUROLOGICAL: Light touch, sharp-dull, proprioception all present and equal bilaterally.  Vibratory sensation diminished at bilateral hallux. Protective sensation absent at toe sites as tested with a [place]-Marjan 5.07 monofilament.   MUSCULOSKELETAL: Muscle strength is 5/5 for foot inverters, everters, plantarflexors, and dorsiflexors. Muscle tone is normal. Pain on palpation of right distal lateral nail border.           Assessment:       ICD-10-CM ICD-9-CM   1. Type II diabetes mellitus with neurological manifestations  E11.49 250.60   2. Onychocryptosis  L60.0 703.0   3. Onychomycosis due to dermatophyte  B35.1 110.1       Plan:   Type II diabetes mellitus with neurological manifestations    Onychocryptosis    Onychomycosis due to dermatophyte      I counseled the patient on her conditions, regarding findings of my examination, my impressions, and usual treatment plan.   Appointment spent on education about the diabetic foot, neuropathy, and prevention of limb loss.  Shoe inspection. Diabetic Foot Education. Patient reminded of the importance of good nutrition and blood sugar control to help prevent podiatric  complications of diabetes. Patient instructed on proper foot hygeine. We discussed wearing proper shoe gear, daily foot inspections, never walking without protective shoe gear, never putting sharp instruments to feet.    With patient's permission, nails 1-5 bilateral were debrided/trimmed in length and thickness aggressively to their soft tissue attachment mechanically and with electric , removing all offending nail and debris. Patient relates relief following the procedure.  With patient's permission, bilateral callus trimmed in thickness with #15 blade in thickness without incident.   Prescription written for Diabetic shoes and inserts.   Patient  will continue to monitor the areas daily, inspect feet, wear protective shoe gear when ambulatory, moisturizer to maintain skin integrity. Patient reminded of the importance of good nutrition and blood sugar control to help prevent podiatric complications of diabetes.  Patient to return 6 months or sooner if needed.        Zuleima Howell DPM  [facility]

## 2023-09-27 ENCOUNTER — PATIENT OUTREACH (OUTPATIENT)
Dept: ADMINISTRATIVE | Facility: OTHER | Age: 83
End: 2023-09-27
Payer: MEDICARE

## 2023-09-28 NOTE — PROGRESS NOTES
CHW - Outreach Attempt    Lina Espinoza Community Health Worker left a voicemail message for 1st attempt to contact patient regarding: [facility]   Community Health Worker to attempt to contact patient on: 10/06/2023

## 2023-10-06 ENCOUNTER — PATIENT OUTREACH (OUTPATIENT)
Dept: ADMINISTRATIVE | Facility: OTHER | Age: 83
End: 2023-10-06
Payer: MEDICARE

## 2023-10-06 NOTE — PROGRESS NOTES
CHW - Follow Up    Lina Espinoza, Community Health Worker completed a follow up visit with patient via telephone today.  Pt/Caregiver reported: Pt reported that she needs assistance with her utility bill; but believes she does not qualify due to her income.   Community Health Worker provided:  CHW will call the [facility] on Aging and get back with Pt.  The income levels increased to help more people.   Follow-up Outreach - Due: 10/12/2023

## 2023-10-26 DIAGNOSIS — E11.22 DIABETES MELLITUS WITH STAGE 3 CHRONIC KIDNEY DISEASE: Chronic | ICD-10-CM

## 2023-10-26 DIAGNOSIS — N18.30 DIABETES MELLITUS WITH STAGE 3 CHRONIC KIDNEY DISEASE: Chronic | ICD-10-CM

## 2023-10-26 RX ORDER — GLIMEPIRIDE 4 MG/1
TABLET ORAL
Qty: 90 TABLET | Refills: 0 | Status: SHIPPED | OUTPATIENT
Start: 2023-10-26

## 2023-10-26 NOTE — TELEPHONE ENCOUNTER
Care Due:                  Date            Visit Type   Department     Provider  --------------------------------------------------------------------------------                                EP -                              PRIMARY      [facility] INTERNAL  Last Visit: 02-      CARE ([facility])   JEANA Randolph                               -                              PRIMARY      [facility] INTERNAL  Next Visit: 12-      CARE ([facility])   MEDICINE       Penny Randolph                                                            Last  Test          Frequency    Reason                     Performed    Due Date  --------------------------------------------------------------------------------    HBA1C.......  6 months...  glimepiride, metFORMIN...  06- 12-    [facility] Embedded Care Due Messages. Reference number: [id number].   10/26/2023 1:11:20 AM CDT

## 2023-10-26 NOTE — TELEPHONE ENCOUNTER
Provider Staff:  Action required for this patient     Please see care gap opportunities below in Care Due Message.    Thanks!  [facility]     Appointments      Date Provider   Last Visit   2/7/2023 Penny Randolph MD   Next Visit   12/7/2023 Penny Randolph MD      Refill Decision Note   Saul Mar  is requesting a refill authorization.  Brief Assessment and Rationale for Refill:  Approve     Medication Therapy Plan:         Pharmacist review requested: Yes   Extended chart review required: Yes   Comments:     Note composed:1:52 PM 10/26/2023

## 2023-10-26 NOTE — TELEPHONE ENCOUNTER
Refill Routing Note   Medication(s) are not appropriate for processing by [facility] for the following reason(s):      Required labs abnormal    [facility] action(s):  Defer Care Due:  Labs due            Pharmacist review requested: Yes     Appointments  past 12m or future 3m with PCP    Date Provider   Last Visit   2/7/2023 Penny Randolph MD   Next Visit   12/7/2023 Penny Randolph MD   ED visits in past 90 days: 0        Note composed:12:37 PM 10/26/2023

## 2023-10-31 ENCOUNTER — PATIENT OUTREACH (OUTPATIENT)
Dept: ADMINISTRATIVE | Facility: OTHER | Age: 83
End: 2023-10-31
Payer: MEDICARE

## 2023-10-31 NOTE — PROGRESS NOTES
CHW - Follow Up    Lina Espinoza, Community Health Worker completed a follow up visit with patient via telephone today.  Pt/Caregiver reported:  Ms. Kirkpatrick reported that has not contacted Ms. Matias at the [facility] on Aging regarding her utility bill; but will call her on tomorrow.   Community Health Worker provided: CHW will contact Pt in 2 weeks.   Follow-up Outreach - Due: 11/21/2023

## 2023-11-04 NOTE — PROGRESS NOTES
HPI     Glaucoma            Comments: HVF and OCT review today and pt feels vision has changed a   little bit and states she does not see as well as before          Comments    DLS: 37/20/23    1) POAG  2) DM with BDR and ME OU  3) Hx RLL Lesion  4) NS OD  5) PCIOL OS  6) Hx CVA   7) Hx Amaurosis Fugax  8) Eyelid Myokymia     Meds:  Brimonidine TID OU           Last edited by Zuri Winkler MA on 11/6/2023  8:23 AM.            Assessment /Plan     For exam results, see Encounter Report.    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 POAG ou   H/O poor compliance   First HVF 1997   First photos 1998      Family history none   Glaucoma meds Has used alphagan in past - poor compliance-stopped on her own   H/O adverse rxn to glaucoma drops none   LASERS No glaucoma laser (+h/o focal OU for BDR)   GLAUCOMA SURGERIES none   OTHER EYE SURGERIES CE/PCIOL OS 2/27/13  CDR 0.8/0.75   Tbase 16-20/16-22   Tmax 20/22   Ttarget 17/17   HVF 17 test 1997 to 2023 - IAD/sup paracent od //  IAD / sup paracentral   (+changes ? 2/2 focal laser )   Gonio +3   /621- thick ou (- 4.5 ou)   OCT 2 test 2022 to 2023  - RNFL - nl od // dec TI os   HRT 6  tests: 2009 to 2018 -  MR -  Dec. I, bord S/T od // bord T/I os /// CDR 0.74 od // 0.70 os  Disc photos 1998, 2000, 2001, 2003, 2003, 2004, 2005, 2006- slides  // 2010 , 2018 - OIS      - Ttoday 15/14  - Test done today  IOP // HVF / DFE / OCT      2. 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 NS OD               Remove prn - pt wants to wait 3/17/2023               BCVA 20/40              BAT 20/60     4. Pseudophakia OS                "S/p CE/PCIOL OS               IOL SN60 WF 20.5              -VA limited 2/2 hx of CSME s/p focal scars              BCVA  20/25              Give Rx glasses - 7/2/2013              Had re-bound iritis - resolved     4. H/O RLL lesion - S/P excision with Jovanni      5. Post Nelda lives in [place] - but still likes to come to [place] for care      6. amaurosis fugax / H/O CVA's              Patient reports stroke like symptoms and amaurosis fugax 10/13              -  She was admitted to hospital with very elevated BP              -  Patient had MRI at the time showing ischemic disease              - last Carotid U/S done 11/12- patient reports that                  - PCP gets one yearly- last U/S showed minimal plaque disease              -  H/o stroke- discussed that amaurosis was from elevated BP and if ever recurs needs to report to ER immediately- she voiced understanding     7. Eyelid myokymia  - intermittent - patient also reports eyelid "twitching  "- discussed with patient that myokymia is usually from lack of sleep, caffeine intake, or stress- however nothing to worry about  -  Did not have any "twitching" on exam today     Plan:  BrimonidineTID ou   Can consider CEIOL OD with Rey given poor compliance with gtts and moderate glaucoma.   HVF's are inconsistent with the ON exam (likely 2/2 focal laser)  / OCT ect   Pt wants to wait on phaco od for now // has done well os      Avoid PG's if possible 2/2 h/o CME 2/2 BDR     Refraction Post op os  is more myopic than expected - review IOL calc if needs 2nd eye done     Repeat OCT of macula 5/17/2022 - - done no CME os// + focal laser scars ou     Photo file updated  3/17/2023     Plan:   F/U 4 months IOP    - photo file - (( first name is Saul ( leave out the Zaid part))) - updated 6/4/2019      If any changes in DR - will refer back to Dr Davenport - he has seen her once (1/26/2023 - was to F/U in 6 mos and looks stable)   "

## 2023-11-06 ENCOUNTER — OFFICE VISIT (OUTPATIENT)
Dept: OPHTHALMOLOGY | Facility: CLINIC | Age: 83
End: 2023-11-06
Payer: MEDICARE

## 2023-11-06 ENCOUNTER — CLINICAL SUPPORT (OUTPATIENT)
Dept: OPHTHALMOLOGY | Facility: CLINIC | Age: 83
End: 2023-11-06
Payer: MEDICARE

## 2023-11-06 DIAGNOSIS — H40.1222 LOW-TENSION GLAUCOMA, LEFT EYE, MODERATE STAGE: ICD-10-CM

## 2023-11-06 DIAGNOSIS — H25.11 AGE-RELATED NUCLEAR CATARACT, RIGHT: ICD-10-CM

## 2023-11-06 DIAGNOSIS — H40.1211 LOW-TENSION GLAUCOMA, RIGHT EYE, MILD STAGE: Primary | ICD-10-CM

## 2023-11-06 DIAGNOSIS — Z96.1 PSEUDOPHAKIA, LEFT EYE: ICD-10-CM

## 2023-11-06 DIAGNOSIS — E11.3213 BOTH EYES AFFECTED BY MILD NONPROLIFERATIVE DIABETIC RETINOPATHY WITH MACULAR EDEMA, ASSOCIATED WITH TYPE 2 DIABETES MELLITUS: ICD-10-CM

## 2023-11-06 PROCEDURE — 1159F PR MEDICATION LIST DOCUMENTED IN MEDICAL RECORD: ICD-10-PCS | Mod: CPTII,S$GLB,, | Performed by: OPHTHALMOLOGY

## 2023-11-06 PROCEDURE — 1160F RVW MEDS BY RX/DR IN RCRD: CPT | Mod: CPTII,S$GLB,, | Performed by: OPHTHALMOLOGY

## 2023-11-06 PROCEDURE — 92083 EXTENDED VISUAL FIELD XM: CPT | Mod: S$GLB,,, | Performed by: OPHTHALMOLOGY

## 2023-11-06 PROCEDURE — 99999 PR PBB SHADOW E&M-EST. PATIENT-LVL III: CPT | Mod: PBBFAC,,, | Performed by: OPHTHALMOLOGY

## 2023-11-06 PROCEDURE — 92133 POSTERIOR SEGMENT OCT OPTIC NERVE(OCULAR COHERENCE TOMOGRAPHY) - OU - BOTH EYES: ICD-10-PCS | Mod: S$GLB,,, | Performed by: OPHTHALMOLOGY

## 2023-11-06 PROCEDURE — 99214 PR OFFICE/OUTPT VISIT, EST, LEVL IV, 30-39 MIN: ICD-10-PCS | Mod: S$GLB,,, | Performed by: OPHTHALMOLOGY

## 2023-11-06 PROCEDURE — 99999 PR PBB SHADOW E&M-EST. PATIENT-LVL III: ICD-10-PCS | Mod: PBBFAC,,, | Performed by: OPHTHALMOLOGY

## 2023-11-06 PROCEDURE — 2022F PR DILATED RETINAL EYE EXAM WITH INTERP/REVIEW: ICD-10-PCS | Mod: CPTII,S$GLB,, | Performed by: OPHTHALMOLOGY

## 2023-11-06 PROCEDURE — 92083 HUMPHREY VISUAL FIELD - OU - BOTH EYES: ICD-10-PCS | Mod: S$GLB,,, | Performed by: OPHTHALMOLOGY

## 2023-11-06 PROCEDURE — 2022F DILAT RTA XM EVC RTNOPTHY: CPT | Mod: CPTII,S$GLB,, | Performed by: OPHTHALMOLOGY

## 2023-11-06 PROCEDURE — 3288F PR FALLS RISK ASSESSMENT DOCUMENTED: ICD-10-PCS | Mod: CPTII,S$GLB,, | Performed by: OPHTHALMOLOGY

## 2023-11-06 PROCEDURE — 1101F PT FALLS ASSESS-DOCD LE1/YR: CPT | Mod: CPTII,S$GLB,, | Performed by: OPHTHALMOLOGY

## 2023-11-06 PROCEDURE — 1159F MED LIST DOCD IN RCRD: CPT | Mod: CPTII,S$GLB,, | Performed by: OPHTHALMOLOGY

## 2023-11-06 PROCEDURE — 99214 OFFICE O/P EST MOD 30 MIN: CPT | Mod: S$GLB,,, | Performed by: OPHTHALMOLOGY

## 2023-11-06 PROCEDURE — 92133 CPTRZD OPH DX IMG PST SGM ON: CPT | Mod: S$GLB,,, | Performed by: OPHTHALMOLOGY

## 2023-11-06 PROCEDURE — 1101F PR PT FALLS ASSESS DOC 0-1 FALLS W/OUT INJ PAST YR: ICD-10-PCS | Mod: CPTII,S$GLB,, | Performed by: OPHTHALMOLOGY

## 2023-11-06 PROCEDURE — 1126F AMNT PAIN NOTED NONE PRSNT: CPT | Mod: CPTII,S$GLB,, | Performed by: OPHTHALMOLOGY

## 2023-11-06 PROCEDURE — 1160F PR REVIEW ALL MEDS BY PRESCRIBER/CLIN PHARMACIST DOCUMENTED: ICD-10-PCS | Mod: CPTII,S$GLB,, | Performed by: OPHTHALMOLOGY

## 2023-11-06 PROCEDURE — 3288F FALL RISK ASSESSMENT DOCD: CPT | Mod: CPTII,S$GLB,, | Performed by: OPHTHALMOLOGY

## 2023-11-06 PROCEDURE — 1126F PR PAIN SEVERITY QUANTIFIED, NO PAIN PRESENT: ICD-10-PCS | Mod: CPTII,S$GLB,, | Performed by: OPHTHALMOLOGY

## 2023-11-06 RX ORDER — BRIMONIDINE TARTRATE 2 MG/ML
1 SOLUTION/ DROPS OPHTHALMIC 3 TIMES DAILY
Qty: 15 ML | Refills: 12 | Status: SHIPPED | OUTPATIENT
Start: 2023-11-06

## 2023-11-06 NOTE — PROGRESS NOTES
HVF rel/fix/coop good OU/chart checked for latex allergy/+ .25 + 1.00 x 016 OD -1.00 + .75 x 172 OS - BJ

## 2023-11-09 ENCOUNTER — PATIENT MESSAGE (OUTPATIENT)
Dept: PAIN MEDICINE | Facility: CLINIC | Age: 83
End: 2023-11-09
Payer: MEDICARE

## 2023-11-09 NOTE — PROGRESS NOTES
Established Patient Chronic Pain Note (Follow up Visit)    The patient location is: home  The chief complaint leading to consultation is: back and R leg pain  Visit type: Virtual visit with synchronous audio and video  Total time spent with patient: 11-15m  Each patient to whom he or she provides medical services by telemedicine is: (1) informed of the relationship between the physician and patient and the respective role of any other health care provider with respect to management of the patient; and (2) notified that he or she may decline to receive medical services by telemedicine and may withdraw from such care at any time.    Referring Physician: No ref. provider found    PCP: Penny Randolph MD    Chief Complaint:   Bilateral leg pain       SUBJECTIVE:  Interval history 11/13/2023  Patient presents status post L4-5 interlaminar epidural steroid injection with right paramedian approach 08/02/2023.  Patient reports it took a few weeks for therapeutic benefit the patient reports at least 75-80% improvement in right lower extremity radicular symptoms following her epidural steroid injection.  Patient reports taking a trip to [place] from September 6 through September 12th with significant improvement in her pain and improvement in mobility following her injection.  She reports upon her return pain relief was sustained until 1 week prior.  Today she reports pain has again becomes severe 6-7/10.  Patient reports pain along the lateral and posterior aspect of the right lower extremity in L4-S1 distribution to the calf.  Pain is exacerbated with standing and with ambulation.  Patient reports she would like to wait and see for another week if pain improves.  She has continued physician directed physical therapy exercises at home over the last 3 months with marginal improvement in her symptoms.    Interval Hx: 07/12/2023  Patient presents for follow-up of lower back and leg pain.  Today patient reports pain in  the lower back and leg has become severe over the last several months.  Pain is constant and today is rated a 6/10.  Pain is described as aching and shooting in nature.  Patient reports pain in the lower back which radiates down the lateral and posterior aspect of the right lower extremity in L4-S1 distribution to the knee.  Pain is exacerbated with standing or with ambulation.  Patient reports pain has severely limited her day-to-day activities and quality of life.  Patient would like to pursue repeat intervention.  Patient has continued physician directed physical therapy exercises at home over the last 8 weeks without meaningful improvement in her pain.    Of note, patient saw Odilia Valverde PA-C in Neurosurgery 08/12/2022 with the following evaluation:        Interval history 10/26/2022  Patient presents status post bilateral L2/3 transforaminal epidural steroid injection 10/03/2022.  Patient reports limited 50% relief along the posterior aspect of the right lower extremity following her procedure.  She continues to report pain along the lateral aspect of the right lower extremity to the calf in L4 distribution.  Pain today is a 10/10.  Patient does endorse associated weakness in the lower extremity associated with her pain.  Patient reports her leg pain is more severe and debilitating than the back pain.  Patient reports she is scheduled to restart physical therapy the following week.  Patient has discontinued gabapentin secondary to intolerable side effect.    Interval History (7/18/2022): Saul Mar presents today for follow-up visit.  she underwent bilateral L4/5 transforaminal epidural steroid injection on 6/17/22.  The patient reports that she is/was unchanged following the procedure.  she reports 0% pain relief.  Reports pain is worsened with prolonged sitting, improved with leaning forward (shopping cart sign). Reports continued aching, stinging low back pain in a band-like distribution  with radiation down bilateral lower extremities. Reports she was unable to tolerate Gabapentin due to drowsiness and discontinued. Reports she received no relief while taking this medication.  Patient reports pain as 8/10 today.    Initial HPI  Saul Mar is a 83 y.o. female with past medical history significant for CVA, MDD, primary open angle glaucoma, DMII, AFib, HLD, HTN, diastolic dysfunction, PAD, CKD III, SStrait, GERD who presents with bilateral leg pain.  Patient reports pain began approximately 1 year prior without inciting accident injury or trauma.  Today patient reports pain which is constant which is rated a 10/10.  Pain is described as aching in nature.  Patient reports pain originating in bilateral buttocks and radiating to bilateral calves in L4-L5 distribution.  Pain is equally worse on both sides.  Pain is exacerbated with prolonged standing and with ambulation.  Patient ambulates with a walker and reports she is only able to ambulate a few steps before requiring rest.  Patient reports pain is improved with lumbar flexion and rest.  Patient does report associated weakness in bilateral lower extremities associated with her pain.  She denies bowel or bladder incontinence or saddle anesthesia.  Patient reports completing several sessions of conventional physical therapy with initial improvement in her symptoms, however with increased intensity of exercises, exacerbation of pain.    Pt last saw Dr. Sanchez 8/24/21 with recommendation to continue with PT and discussion of future MBB.    Patient reports significant motor weakness and loss of sensations.  Patient denies night fever/night sweats, urinary incontinence, bowel incontinence and significant weight loss.      Pain Disability Index Review:         7/12/2023     9:16 AM 10/26/2022     9:13 AM 7/18/2022     1:41 PM   Last 3 PDI Scores   Pain Disability Index (PDI) 42 [phone] 34       Non-Pharmacologic Treatments:  Physical Therapy/Home  Exercise: yes  Ice/Heat:yes  TENS: no  Acupuncture: no  Massage: no  Chiropractic: no    Other: no      Pain Medications:  - Opioids: Vicodin ( Hydrocodone/Acetaminophen)  - Adjuvant Medications: Neurontin (Gabapentin) and Xanax (Alprazolam). 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 Taveras    COLONOSCOPY      COLONOSCOPY N/A 9/22/2016    Procedure: COLONOSCOPY;  Surgeon: Jd Ashton MD;  Location: [address];  Service: Endoscopy;  Laterality: N/A;  OK per Dr Randolph for pt to hold Plavix 5 days prior to procedure/see telephone encounter dated 8/29/16/svn    EPIDURAL STEROID INJECTION N/A 8/2/2023    Procedure: L4-5 interlaminar epidural steroid injection with right paramedian approach with RN IV sedation;  Surgeon: Chan Fonseca MD;  Location: [facility];  Service: Pain Management;  Laterality: N/A;    EYE SURGERY Bilateral 2002 approx    Laser for glaucoma    HYSTERECTOMY  1963     AMEENA/USO- fibroids; no cancer    OOPHORECTOMY  1963     unknown, only removed one    SELECTIVE INJECTION OF ANESTHETIC AGENT AROUND LUMBAR SPINAL NERVE ROOT BY TRANSFORAMINAL APPROACH Bilateral 6/17/2022    Procedure: Bilateral L4/5 TF JASKARAN with RN IV sedation;  Surgeon: Chan Fonseca MD;  Location: [facility];  Service: Pain Management;  Laterality: Bilateral;    SELECTIVE INJECTION OF ANESTHETIC AGENT AROUND LUMBAR SPINAL NERVE ROOT BY TRANSFORAMINAL APPROACH Bilateral 10/3/2022    Procedure: Bilateral L2-3 transforaminal epidural steroid injection with RN IV sedation;  Surgeon: Chan Fonseca MD;  Location: [facility];  Service: Pain Management;  Laterality: Bilateral;     Review of patient's allergies indicates:   Allergen Reactions    Pravachol [pravastatin] Nausea Only       Current Outpatient Medications   Medication Sig    albuterol (PROVENTIL/VENTOLIN HFA) 90 mcg/actuation inhaler INHALE 2 PUFFS INTO THE LUNGS EVERY 6 (SIX) HOURS AS NEEDED FOR WHEEZING. RESCUE    ALPRAZolam (XANAX) 0.5 MG tablet TAKE 1 TABLET BY MOUTH NIGHTLY AS NEEDED FOR ANXIETY (MAY TAKE 1-2 TIMES WEEKLY FOR ANXIETY)    amLODIPine (NORVASC) 5 MG tablet Take 1 tablet (5 mg total) by mouth 2 (two) times daily.    atorvastatin (LIPITOR) 80 MG tablet TAKE 1 TABLET BY MOUTH EVERY DAY    blood sugar diagnostic Strp 1 strip by Misc.(Non-Drug; Combo Route) route 2 (two) times daily. Insurance preferred test stripes [id number]    blood sugar diagnostic Strp For use with insurance covered glucometer; test daily    brimonidine 0.2% (ALPHAGAN) 0.2 % Drop Place 1 drop into both eyes 3 (three) times daily.    carvediloL (COREG) 12.5 MG tablet Take 12.5 mg by mouth.    cholecalciferol, vitamin D3, (VITAMIN D3) 1,000 unit capsule Take 1 capsule (1,000 Units total) by mouth once daily.    ferrous sulfate (FEOSOL) 325 mg (65 mg iron) Tab tablet Take 1 tablet (325 mg total) by mouth 2 (two) times daily.    flecainide (TAMBOCOR) 50 MG Tab Take 1 tablet (50 mg total) by mouth 2 (two) times daily.     glimepiride (AMARYL) 4 MG tablet TAKE 1 TABLET BY MOUTH IN THE MORNING WITH BREAKFAST    hydroCHLOROthiazide (HYDRODIURIL) 12.5 MG Tab TAKE 1 TABLET BY MOUTH EVERY DAY    levalbuterol (XOPENEX HFA) 45 mcg/actuation inhaler INHALE 1-2 PUFFS INTO THE LUNGS EVERY 6 (SIX) HOURS AS NEEDED FOR WHEEZING. RESCUE    linaCLOtide (LINZESS) 145 mcg Cap capsule TAKE 1 CAPSULE (145 MCG TOTAL) BY MOUTH BEFORE BREAKFAST.    losartan (COZAAR) 50 MG tablet TAKE 1 TABLET BY MOUTH TWICE A DAY    meclizine (ANTIVERT) 25 mg tablet TAKE 1 TABLET (25 MG TOTAL) BY MOUTH DAILY AS NEEDED FOR DIZZINESS.    metFORMIN (GLUCOPHAGE-XR) 500 MG ER 24hr tablet TAKE 2 TABLETS BY MOUTH EVERY DAY    metoprolol succinate (TOPROL-XL) 50 MG 24 hr tablet TAKE 1 TABLET BY MOUTH EVERY DAY    ONETOUCH DELICA PLUS LANCET 33 gauge Misc Apply topically.    ONETOUCH ULTRA2 METER Misc SMARTSIG:Via Meter As Directed    XARELTO 15 mg Tab TAKE 1 TABLET (15 MG TOTAL) BY MOUTH DAILY WITH DINNER OR EVENING MEAL.     No current facility-administered medications for this visit.       Review of Systems     GENERAL:  No weight loss, malaise or fevers.  HEENT:   No recent changes in vision or hearing  NECK:  Negative for lumps, no difficulty with swallowing.  RESPIRATORY:  Negative for cough, wheezing or shortness of breath, patient denies any recent URI.  CARDIOVASCULAR:  Negative for chest pain, leg swelling or palpitations.  GI:  Negative for abdominal discomfort, blood in stools or black stools or change in bowel habits.  MUSCULOSKELETAL:  See HPI.  SKIN:  Negative for lesions, rash, and itching.  PSYCH:  No mood disorder or recent psychosocial stressors.   HEMATOLOGY/LYMPHOLOGY:  Negative for prolonged bleeding, bruising easily or swollen nodes.    NEURO:   No history of headaches, syncope, paralysis, seizures or tremors.  All other reviewed and negative other than HPI.    OBJECTIVE:    There were no vitals taken for this visit.      Physical Exam    GENERAL: Well  appearing, in no acute distress, alert and oriented x3.  PSYCH:  Mood and affect appropriate.  SKIN: Skin color, texture, turgor normal, no rashes or lesions.  HEAD/FACE:  Normocephalic, atraumatic. Cranial nerves grossly intact.    CV: RRR with palpation of the radial artery.  PULM: No evidence of respiratory difficulty, symmetric chest rise.  GI:  Soft and non-tender.    BACK:  Thoracic kyphosis Straight leg raising in the sitting and supine positions is negative to radicular pain. pain to palpation over the facet joints of the lumbar spine or spinous processes. reduced range of motion with pain reproduction.  EXTREMITIES: Peripheral joint ROM is reduced with pain without obvious instability or laxity in all four extremities. No deformities, edema, or skin discoloration. Good capillary refill.  MUSCULOSKELETAL: Able to stand on heels & toes.   hip, and knee provocative maneuvers are negative.  There is no pain with palpation over the sacroiliac joints bilaterally.  FABERs test is negative.  Facet loading test is positive bilaterally.   Bilateral upper and lower extremity strength is normal and symmetric.  No atrophy or tone abnormalities are noted.  RIGHT Lower extremity: Hip flexion 5/5, Hip Abduction 5/5, Hip Adduction 5/5, Knee extension 5/5, Knee flexion 5/5, Ankle dorsiflexion5/5, Extensor hallucis longus 5/5, Ankle plantarflexion 5/5  LEFT Lower extremity:  Hip flexion 5/5, Hip Abduction 5/5,Hip Adduction 5/5, Knee extension 5/5, Knee flexion 5/5, Ankle dorsiflexion 5/5, Extensor hallucis longus 5/5, Ankle plantarflexion 5/5  -Normal testing knee (patellar) jerk and ankle (achilles) jerk    NEURO: Bilateral upper and lower extremity coordination and muscle stretch reflexes are physiologic and symmetric. No loss of sensation is noted.  GAIT: normal.    Imagin/10/21    MRI Lumbar Spine Without Contrast    FINDINGS:  Alignment: Mild levocurvature of the lumbar spine.  Grade 1 anterolisthesis L4 on  L5.    Vertebrae: Multiple Schmorl's nodes.  Benign osseous hemangioma in the T12 vertebral body.  Degenerative the edema within the bilateral L4-L5 facet joints.  No abnormal marrow signal to suggest infiltrative process.    Discs: Multilevel disc desiccation and height loss, most severe at L4-L5.    Cord: No signal abnormality.  Conus terminates at L2    Degenerative findings:    T12-L1: No significant spinal canal stenosis or neural foraminal narrowing.    L1-L2: Mild diffuse disc bulge.  No significant spinal canal stenosis or neural foraminal narrowing.    L2-L3: Severe diffuse disc bulge, bilateral facet arthropathy and ligamentum flavum buckling.  These findings result in mild spinal canal stenosis, severe right and moderate left neural foraminal narrowing.    L3-L4: Moderate diffuse disc bulge.  These findings result in moderate bilateral neural foraminal narrowing.  No spinal canal stenosis.    L4-L5: Grade 1 anterolisthesis, diffuse disc bulge, bilateral facet arthropathy, and ligamentum flavum buckling.  These findings result in severe spinal canal stenosis and severe right and moderate left neural foraminal narrowing.    L5-S1: Diffuse disc bulge with a superimposed left paracentral protrusion that abuts the left descending S1 nerve root.  Bilateral facet arthropathy. These findings result in mild bilateral neural foraminal narrowing    Paraspinal muscles & soft tissues: T1 hyperintense cortical lesion noted within the right kidney, possibly a proteinaceous or hemorrhagic cyst. left simple renal cyst.    Impression  Multilevel degenerative changes, most pronounced at L4-L5 with severe spinal canal stenosis, severe right and moderate left neural foraminal narrowing.    Bilateral degenerative facet edema at L4-L5.    Probable proteinaceous versus hemorrhagic right renal cortical cyst.  Recommend correlation with a dedicated renal ultrasound.    07/22/21  X-Ray Lumbar Spine Ap And Lateral  FINDINGS:  Five  lumbar vertebral bodies.  Vertebral body heights are maintained.  Disc space narrowing and degenerative endplate changes L4-5 and L5-S1.  Moderate facet arthropathy L4-5 and L5-S1.  Grade 1 anterolisthesis L4 on L5.     04/02/18    X-Ray Cervical Spine Complete 5 view  FINDINGS:  There is anatomic spinal alignment.  Prominent bridging vertebral endplate spurs present anteriorly from C4-5 through C6-7.  Disc interspaces are maintained.  Odontoid is intact.  No fracture or subluxation.      ASSESSMENT: 83 y.o. year old female with lower back and leg pain, consistent with     1. Spinal stenosis of lumbar region without neurogenic claudication        2. Spondylolysis of lumbar region        3. Lumbar radiculopathy, chronic              PLAN:   - Interventions:  Patient received 80% sustained relief in right lower extremity radicular symptoms following L4-5 interlaminar epidural steroid injection with right paramedian approach.  We have discussed repeating this injection in the future should symptoms exacerbate  We have discussed the procedure, benefits and potential risk in detail.    - Interventions: Patient may be a candidate for spinal cord stimulation device secondary to chronic pain syndrome and multiple failed attempts of other interventions (lumbar TFESI x 3) and medical management (membrane stabilizing agents). Explained the risks and benefits of the procedure in detail with the patient today in clinic along with alternative treatment options,   -Patient has been given informational booklets for her review     - Anticoagulation use: yes  Xarelto  -secondary prophylaxis; Dr. Grayson ([facility])     report:  Reviewed and consistent with medication use as prescribed.    - Medications:  - Patient discontinued Gabapentin secondary to somnolence and ineffectivness.  We will continue physical therapy and interventional treatment.    - Therapy:  We discussed continuing physical therapy to help manage the  patient/s painful condition. The patient was counseled that muscle strengthening will improve the long term prognosis in regards to pain and may also help increase range of motion and mobility.    - Imaging: Reviewed available imaging with patient and answered any questions they had regarding study    -Consults/referral:Neurosurgery PRN     - Follow up visit:  3 months. Virtual visit.      The above plan and management options were discussed at length with patient. Patient is in agreement with the above and verbalized understanding.    - I discussed the goals of interventional chronic pain management with the patient on today's visit. We discussed a multimodal and systematic approach to pain.  This includes diagnostic and therapeutic injections, adjuvant pharmacologic treatment, physical therapy, and at times psychiatry.  I emphasized the importance of regular exercise, core strengthening and stretching, diet and weight loss as a cornerstone of long-term pain management.    - This condition does not require this patient to take time off of work, and the primary goal of our Pain Management services is to improve the patient's functional capacity.  - Patient Questions: Answered all of the patient's questions regarding diagnoses, therapy, treatment and next steps        Chan Fonseca MD  Interventional Pain Management  [facility]    Disclaimer:  This note was prepared using voice recognition system and is likely to have sound alike errors that may have been overlooked even after proof reading.  Please call me with any questions

## 2023-11-13 ENCOUNTER — OFFICE VISIT (OUTPATIENT)
Dept: PAIN MEDICINE | Facility: CLINIC | Age: 83
End: 2023-11-13
Payer: MEDICARE

## 2023-11-13 DIAGNOSIS — M43.06 SPONDYLOLYSIS OF LUMBAR REGION: ICD-10-CM

## 2023-11-13 DIAGNOSIS — M48.061 SPINAL STENOSIS OF LUMBAR REGION WITHOUT NEUROGENIC CLAUDICATION: Primary | ICD-10-CM

## 2023-11-13 DIAGNOSIS — M54.16 LUMBAR RADICULOPATHY, CHRONIC: ICD-10-CM

## 2023-11-13 PROCEDURE — 99214 OFFICE O/P EST MOD 30 MIN: CPT | Mod: 95,,, | Performed by: ANESTHESIOLOGY

## 2023-11-13 PROCEDURE — 1160F RVW MEDS BY RX/DR IN RCRD: CPT | Mod: CPTII,95,, | Performed by: ANESTHESIOLOGY

## 2023-11-13 PROCEDURE — 1160F PR REVIEW ALL MEDS BY PRESCRIBER/CLIN PHARMACIST DOCUMENTED: ICD-10-PCS | Mod: CPTII,95,, | Performed by: ANESTHESIOLOGY

## 2023-11-13 PROCEDURE — 1159F PR MEDICATION LIST DOCUMENTED IN MEDICAL RECORD: ICD-10-PCS | Mod: CPTII,95,, | Performed by: ANESTHESIOLOGY

## 2023-11-13 PROCEDURE — 99214 PR OFFICE/OUTPT VISIT, EST, LEVL IV, 30-39 MIN: ICD-10-PCS | Mod: 95,,, | Performed by: ANESTHESIOLOGY

## 2023-11-13 PROCEDURE — 1159F MED LIST DOCD IN RCRD: CPT | Mod: CPTII,95,, | Performed by: ANESTHESIOLOGY

## 2023-11-15 ENCOUNTER — LAB VISIT (OUTPATIENT)
Dept: LAB | Facility: HOSPITAL | Age: 83
End: 2023-11-15
Attending: NURSE PRACTITIONER
Payer: MEDICARE

## 2023-11-15 ENCOUNTER — OFFICE VISIT (OUTPATIENT)
Dept: NEUROLOGY | Facility: CLINIC | Age: 83
End: 2023-11-15
Payer: MEDICARE

## 2023-11-15 ENCOUNTER — PATIENT OUTREACH (OUTPATIENT)
Dept: ADMINISTRATIVE | Facility: HOSPITAL | Age: 83
End: 2023-11-15
Payer: MEDICARE

## 2023-11-15 VITALS
HEART RATE: 65 BPM | RESPIRATION RATE: 16 BRPM | SYSTOLIC BLOOD PRESSURE: 197 MMHG | HEIGHT: 65 IN | BODY MASS INDEX: 30.01 KG/M2 | DIASTOLIC BLOOD PRESSURE: 69 MMHG | WEIGHT: 180.13 LBS

## 2023-11-15 DIAGNOSIS — E11.311 DIABETIC RETINOPATHY WITH MACULAR EDEMA ASSOCIATED WITH TYPE 2 DIABETES MELLITUS, UNSPECIFIED LATERALITY, UNSPECIFIED RETINOPATHY SEVERITY: ICD-10-CM

## 2023-11-15 DIAGNOSIS — M48.062 SPINAL STENOSIS OF LUMBAR REGION WITH NEUROGENIC CLAUDICATION: ICD-10-CM

## 2023-11-15 DIAGNOSIS — E78.5 HYPERLIPIDEMIA ASSOCIATED WITH TYPE 2 DIABETES MELLITUS: ICD-10-CM

## 2023-11-15 DIAGNOSIS — E11.69 HYPERLIPIDEMIA ASSOCIATED WITH TYPE 2 DIABETES MELLITUS: ICD-10-CM

## 2023-11-15 DIAGNOSIS — H91.90 HEARING LOSS, UNSPECIFIED HEARING LOSS TYPE, UNSPECIFIED LATERALITY: ICD-10-CM

## 2023-11-15 DIAGNOSIS — R26.9 ABNORMALITY OF GAIT AND MOBILITY: ICD-10-CM

## 2023-11-15 DIAGNOSIS — Z81.8 FAMILY HISTORY OF STRESS: ICD-10-CM

## 2023-11-15 DIAGNOSIS — I73.9 PAD (PERIPHERAL ARTERY DISEASE): ICD-10-CM

## 2023-11-15 DIAGNOSIS — E11.8 DIABETES MELLITUS TYPE 2 WITH COMPLICATIONS: ICD-10-CM

## 2023-11-15 DIAGNOSIS — R41.9 COGNITIVE COMPLAINTS WITH NORMAL EXAM: Chronic | ICD-10-CM

## 2023-11-15 DIAGNOSIS — R41.3 MEMORY IMPAIRMENT: ICD-10-CM

## 2023-11-15 DIAGNOSIS — H40.1132 PRIMARY OPEN ANGLE GLAUCOMA (POAG) OF BOTH EYES, MODERATE STAGE: Chronic | ICD-10-CM

## 2023-11-15 DIAGNOSIS — M54.16 LUMBAR RADICULOPATHY, CHRONIC: ICD-10-CM

## 2023-11-15 DIAGNOSIS — R41.9 COGNITIVE COMPLAINTS WITH NORMAL EXAM: Primary | Chronic | ICD-10-CM

## 2023-11-15 DIAGNOSIS — I48.91 ATRIAL FIBRILLATION, UNSPECIFIED TYPE: ICD-10-CM

## 2023-11-15 DIAGNOSIS — I67.89 CEREBRAL MICROVASCULAR DISEASE: Chronic | ICD-10-CM

## 2023-11-15 DIAGNOSIS — H25.11 AGE-RELATED NUCLEAR CATARACT, RIGHT: ICD-10-CM

## 2023-11-15 DIAGNOSIS — E11.69 MIXED DIABETIC HYPERLIPIDEMIA ASSOCIATED WITH TYPE 2 DIABETES MELLITUS: Chronic | ICD-10-CM

## 2023-11-15 DIAGNOSIS — F41.8 MIXED ANXIETY DEPRESSIVE DISORDER: ICD-10-CM

## 2023-11-15 DIAGNOSIS — I63.00 CEREBROVASCULAR ACCIDENT (CVA) DUE TO THROMBOSIS OF PRECEREBRAL ARTERY: Chronic | ICD-10-CM

## 2023-11-15 DIAGNOSIS — R41.3 AMNESIA: ICD-10-CM

## 2023-11-15 DIAGNOSIS — I63.89 OTHER CEREBRAL INFARCTION: ICD-10-CM

## 2023-11-15 DIAGNOSIS — E11.36 DM CATARACT: ICD-10-CM

## 2023-11-15 DIAGNOSIS — E78.2 MIXED DIABETIC HYPERLIPIDEMIA ASSOCIATED WITH TYPE 2 DIABETES MELLITUS: Chronic | ICD-10-CM

## 2023-11-15 LAB
FOLATE SERPL-MCNC: 11.2 NG/ML (ref 4–24)
HCYS SERPL-SCNC: 12.4 UMOL/L (ref 4–15.5)
HIV 1+2 AB+HIV1 P24 AG SERPL QL IA: NORMAL

## 2023-11-15 PROCEDURE — 99417 PROLNG OP E/M EACH 15 MIN: CPT | Mod: S$GLB,,, | Performed by: NURSE PRACTITIONER

## 2023-11-15 PROCEDURE — 83090 ASSAY OF HOMOCYSTEINE: CPT | Performed by: NURSE PRACTITIONER

## 2023-11-15 PROCEDURE — 99417 PR PROLONGED SVC, OUTPT, W/WO DIRECT PT CONTACT,  EA ADDTL 15 MIN: ICD-10-PCS | Mod: S$GLB,,, | Performed by: NURSE PRACTITIONER

## 2023-11-15 PROCEDURE — 1101F PT FALLS ASSESS-DOCD LE1/YR: CPT | Mod: CPTII,S$GLB,, | Performed by: NURSE PRACTITIONER

## 2023-11-15 PROCEDURE — 1159F MED LIST DOCD IN RCRD: CPT | Mod: CPTII,S$GLB,, | Performed by: NURSE PRACTITIONER

## 2023-11-15 PROCEDURE — 82746 ASSAY OF FOLIC ACID SERUM: CPT | Performed by: NURSE PRACTITIONER

## 2023-11-15 PROCEDURE — 36415 COLL VENOUS BLD VENIPUNCTURE: CPT | Performed by: NURSE PRACTITIONER

## 2023-11-15 PROCEDURE — 3077F SYST BP >= 140 MM HG: CPT | Mod: CPTII,S$GLB,, | Performed by: NURSE PRACTITIONER

## 2023-11-15 PROCEDURE — 99205 OFFICE O/P NEW HI 60 MIN: CPT | Mod: S$GLB,,, | Performed by: NURSE PRACTITIONER

## 2023-11-15 PROCEDURE — 1159F PR MEDICATION LIST DOCUMENTED IN MEDICAL RECORD: ICD-10-PCS | Mod: CPTII,S$GLB,, | Performed by: NURSE PRACTITIONER

## 2023-11-15 PROCEDURE — 99205 PR OFFICE/OUTPT VISIT, NEW, LEVL V, 60-74 MIN: ICD-10-PCS | Mod: S$GLB,,, | Performed by: NURSE PRACTITIONER

## 2023-11-15 PROCEDURE — 87389 HIV-1 AG W/HIV-1&-2 AB AG IA: CPT | Performed by: NURSE PRACTITIONER

## 2023-11-15 PROCEDURE — 3078F PR MOST RECENT DIASTOLIC BLOOD PRESSURE < 80 MM HG: ICD-10-PCS | Mod: CPTII,S$GLB,, | Performed by: NURSE PRACTITIONER

## 2023-11-15 PROCEDURE — 99999 PR PBB SHADOW E&M-EST. PATIENT-LVL V: ICD-10-PCS | Mod: PBBFAC,,, | Performed by: NURSE PRACTITIONER

## 2023-11-15 PROCEDURE — 1160F RVW MEDS BY RX/DR IN RCRD: CPT | Mod: CPTII,S$GLB,, | Performed by: NURSE PRACTITIONER

## 2023-11-15 PROCEDURE — 3078F DIAST BP <80 MM HG: CPT | Mod: CPTII,S$GLB,, | Performed by: NURSE PRACTITIONER

## 2023-11-15 PROCEDURE — 3077F PR MOST RECENT SYSTOLIC BLOOD PRESSURE >= 140 MM HG: ICD-10-PCS | Mod: CPTII,S$GLB,, | Performed by: NURSE PRACTITIONER

## 2023-11-15 PROCEDURE — 99999 PR PBB SHADOW E&M-EST. PATIENT-LVL V: CPT | Mod: PBBFAC,,, | Performed by: NURSE PRACTITIONER

## 2023-11-15 PROCEDURE — 1126F AMNT PAIN NOTED NONE PRSNT: CPT | Mod: CPTII,S$GLB,, | Performed by: NURSE PRACTITIONER

## 2023-11-15 PROCEDURE — 1101F PR PT FALLS ASSESS DOC 0-1 FALLS W/OUT INJ PAST YR: ICD-10-PCS | Mod: CPTII,S$GLB,, | Performed by: NURSE PRACTITIONER

## 2023-11-15 PROCEDURE — 1160F PR REVIEW ALL MEDS BY PRESCRIBER/CLIN PHARMACIST DOCUMENTED: ICD-10-PCS | Mod: CPTII,S$GLB,, | Performed by: NURSE PRACTITIONER

## 2023-11-15 PROCEDURE — 86038 ANTINUCLEAR ANTIBODIES: CPT | Performed by: NURSE PRACTITIONER

## 2023-11-15 PROCEDURE — 3288F PR FALLS RISK ASSESSMENT DOCUMENTED: ICD-10-PCS | Mod: CPTII,S$GLB,, | Performed by: NURSE PRACTITIONER

## 2023-11-15 PROCEDURE — 3288F FALL RISK ASSESSMENT DOCD: CPT | Mod: CPTII,S$GLB,, | Performed by: NURSE PRACTITIONER

## 2023-11-15 PROCEDURE — 86592 SYPHILIS TEST NON-TREP QUAL: CPT | Performed by: NURSE PRACTITIONER

## 2023-11-15 PROCEDURE — 1126F PR PAIN SEVERITY QUANTIFIED, NO PAIN PRESENT: ICD-10-PCS | Mod: CPTII,S$GLB,, | Performed by: NURSE PRACTITIONER

## 2023-11-15 NOTE — PROGRESS NOTES
Updates were requested from care everywhere.  Health Maintenance has been updated.  [place] immunization registry triggered.  Immunizations were reconciled.  Per SUSI in basket message, pt declined flu vaccine 11/15/2023.

## 2023-11-15 NOTE — PATIENT INSTRUCTIONS
SLEEP HYGIENE     Poor sleep has a negative effect on cognition. Several strategies have been shown to improve sleep:     Caffeine intake in the afternoon and evening, as well as stuffing oneself at supper, can decrease the quality of restful sleep throughout the night.   Bedtime and wake-up times should be consistent every night and morning so the body becomes used to a single routine, even on the weekends.  Engage in daily physical activity, but not 2-3 hours before bedtime.   No technology use (television, computer, iPad) 1-2 hours before bed.   Have a wind down routine (e.g., soft lights in the house, bath before bed, reduced fluid intake, songs, reading, less noise) to promote sleep readiness.   Visit the www.sleepfoundation.org for more strategies.      COGNITIVE HYGIENE     Engage in regular exercise, which increases alertness and arousal and can improve attention and focus.  Consider lower impact exercises, such as yoga or light walking.  Get a good nights sleep, as this can enhance alertness and cognition.  Eat healthy foods and balanced meals. It is notable that research indicates certain nutrients may aid in brain function, such as B vitamins (especially B6, B12, and folic acid), antioxidants (such as vitamins C and E, and beta carotene), and Omega-3 fatty acids. Talk with your physician or nutritionist about whats right for you.   Keep your brain active. Find activities to stay mentally active, such as reading, games (cards, checkers), puzzles (crosswords, Sudoku, jig saw), crafts (models, woodworking), gardening, or participating in activities in the community.  Stay socially engaged. Continue staying active with your family and friends.              HERE ARE 10 WAYS TO REDUCE RISK OF DEMENTIA AND SLOW DOWN THE PROGRESSION OF DEMENTIA        1.Be physically active.    2. Avoid smoking and alcohol consumption.    3. Track your numbers. Keep your blood pressure, cholesterol, blood sugar and  weight within recommended ranges.    4. Stay socially connected.    5. Make healthy food choices. Eat a well-balance and healthy diet that rich in cereals, fish, legumes, and vegetables.    6. Reduce stress.     7. Challenge your brain by trying something new, playing games, or learning a new language.    8. Take care of your hearing. Avoid being continuously exposed to loud sounds and wear a hearing aid if hearing does become a problem.    9. Lower risk of falls. Consider installing handrails on all stairs and grab bars in bathrooms.    10. Reduce your exposure to air pollution, such as exposure to exhaust in heavy traffic.

## 2023-11-15 NOTE — PROGRESS NOTES
Subjective:       Patient ID: Saul Mar is a 83 y.o. female.    Chief Complaint: Memory Loss      Referred by PCP: Dr. LEN Randolph MD     HPI The patient is here for memory loss evaluation.     The patient is new to me presenting with memory changes that she states began in 2020.  The patient is unaccompanied. She lives alone.  The main problems the patient has are related to forgetfulness and word finding difficulty. The patient gave example that she forget words during mid conversation. She is unable to think of the exact she wish to use.  The patient forgot who her great grandson was, she states that she was able to recover but not knowing right away was alarming for her, because before she was very sharp.  The patient excessively forgets where placed certain things, she is able to recover most times. She misplaced her walking cane, able to recover.  The patient is driving and denies getting lost.  No confusion around and inside the house. Patient has trouble remembering date and time. Patient is aware  of major holidays and political changes. The patient is independent in handling finances. The patient is still independent with ADLs. No agitation or aggression. No hallucinations or delusions. No seizures. Hx of stroke in 1999/2017 reports having speech changes and memory impairment, and confusion during stroke event. She had difficulty reading, and slurred speech. Patient reports after therapy she recovered fully. She states   Word finding difficulty.  Has family stressors with daughter. No problems handling tools. No history of headaches. No history of hypothyroidism. No history of alcoholism. No history of B12 deficiency. Hx depression and ALANNAH. No history of bipolar disorder. No history of Untreated Syphilis.  No history of HIV infection. No toxic exposures.  No history of traumatic brain injury. No tremors. No falls. Patient use a canes and walker for  instability, poor posture. Chronic back  problem, patient sees Pain Management.  No urinary incontinence. No change in sleep and Fair appetite. No family history of dementia.             Review of Systems   Constitutional: Negative.    HENT:  Positive for hearing loss.    Eyes:  Positive for visual disturbance.   Gastrointestinal: Negative.    Endocrine: Negative.    Genitourinary: Negative.    Musculoskeletal:  Positive for arthralgias, back pain, gait problem and myalgias.   Skin: Negative.    Allergic/Immunologic: Negative.    Psychiatric/Behavioral:  Positive for confusion, decreased concentration and dysphoric mood. Negative for agitation, behavioral problems, hallucinations, self-injury, sleep disturbance and suicidal ideas. The patient is nervous/anxious. The patient is not hyperactive.         Stress                 Current Outpatient Medications:     albuterol (PROVENTIL/VENTOLIN HFA) 90 mcg/actuation inhaler, INHALE 2 PUFFS INTO THE LUNGS EVERY 6 (SIX) HOURS AS NEEDED FOR WHEEZING. RESCUE, Disp: 25.5 g, Rfl: 3    ALPRAZolam (XANAX) 0.5 MG tablet, TAKE 1 TABLET BY MOUTH NIGHTLY AS NEEDED FOR ANXIETY (MAY TAKE 1-2 TIMES WEEKLY FOR ANXIETY), Disp: 30 tablet, Rfl: 0    amLODIPine (NORVASC) 5 MG tablet, Take 1 tablet (5 mg total) by mouth 2 (two) times daily., Disp: 180 tablet, Rfl: 3    atorvastatin (LIPITOR) 80 MG tablet, TAKE 1 TABLET BY MOUTH EVERY DAY, Disp: 90 tablet, Rfl: 2    blood sugar diagnostic Strp, 1 strip by Misc.(Non-Drug; Combo Route) route 2 (two) times daily. Insurance preferred test stripes DX:E11.22, Disp: 100 strip, Rfl: 11    blood sugar diagnostic Strp, For use with insurance covered glucometer; test daily, Disp: 100 strip, Rfl: 12    brimonidine 0.2% (ALPHAGAN) 0.2 % Drop, Place 1 drop into both eyes 3 (three) times daily., Disp: 15 mL, Rfl: 12    carvediloL (COREG) 12.5 MG tablet, Take 12.5 mg by mouth., Disp: , Rfl:     cholecalciferol, vitamin D3, (VITAMIN D3) 1,000 unit capsule, Take 1 capsule (1,000 Units total) by mouth  once daily., Disp: 30 capsule, Rfl: 12    ferrous sulfate (FEOSOL) 325 mg (65 mg iron) Tab tablet, Take 1 tablet (325 mg total) by mouth 2 (two) times daily., Disp: 180 tablet, Rfl: 3    flecainide (TAMBOCOR) 50 MG Tab, Take 1 tablet (50 mg total) by mouth 2 (two) times daily., Disp: 60 tablet, Rfl: 1    glimepiride (AMARYL) 4 MG tablet, TAKE 1 TABLET BY MOUTH IN THE MORNING WITH BREAKFAST, Disp: 90 tablet, Rfl: 0    hydroCHLOROthiazide (HYDRODIURIL) 12.5 MG Tab, TAKE 1 TABLET BY MOUTH EVERY DAY, Disp: 90 tablet, Rfl: 3    levalbuterol (XOPENEX HFA) 45 mcg/actuation inhaler, INHALE 1-2 PUFFS INTO THE LUNGS EVERY 6 (SIX) HOURS AS NEEDED FOR WHEEZING. RESCUE, Disp: 15 Inhaler, Rfl: 12    linaCLOtide (LINZESS) 145 mcg Cap capsule, TAKE 1 CAPSULE (145 MCG TOTAL) BY MOUTH BEFORE BREAKFAST., Disp: 30 capsule, Rfl: 1    losartan (COZAAR) 50 MG tablet, TAKE 1 TABLET BY MOUTH TWICE A DAY, Disp: 180 tablet, Rfl: 2    meclizine (ANTIVERT) 25 mg tablet, TAKE 1 TABLET (25 MG TOTAL) BY MOUTH DAILY AS NEEDED FOR DIZZINESS., Disp: 30 tablet, Rfl: 0    metFORMIN (GLUCOPHAGE-XR) 500 MG ER 24hr tablet, TAKE 2 TABLETS BY MOUTH EVERY DAY, Disp: 180 tablet, Rfl: 1    metoprolol succinate (TOPROL-XL) 50 MG 24 hr tablet, TAKE 1 TABLET BY MOUTH EVERY DAY, Disp: 90 tablet, Rfl: 1    ONETOUCH DELICA PLUS LANCET 33 gauge Misc, Apply topically., Disp: , Rfl:     ONETOUCH ULTRA2 METER Misc, SMARTSIG:Via Meter As Directed, Disp: , Rfl:     XARELTO 15 mg Tab, TAKE 1 TABLET (15 MG TOTAL) BY MOUTH DAILY WITH DINNER OR EVENING MEAL., Disp: 30 tablet, Rfl: 6  Past Medical History:   Diagnosis Date    A-fib     Atrial fibrillation 10/22/2018    Back pain     Cataract     Degenerative disc disease     Diverticulosis     colonoscopy 9/22/2016    GERD (gastroesophageal reflux disease)     Glaucoma     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 Taveras    COLONOSCOPY      COLONOSCOPY N/A 9/22/2016    Procedure: COLONOSCOPY;  Surgeon: Jd Ashton MD;  Location: [facility] ENDO (4TH FLR);  Service: Endoscopy;  Laterality: N/A;  OK per Dr Randolph for pt to hold Plavix 5 days prior to procedure/see telephone encounter dated 8/29/16/svn    EPIDURAL STEROID INJECTION N/A 8/2/2023    Procedure: L4-5 interlaminar epidural steroid injection with right paramedian approach with RN IV sedation;  Surgeon: Chan Fonseca MD;  Location: [facility] PAIN MGT;  Service: Pain Management;  Laterality: N/A;    EYE SURGERY Bilateral 2002 approx    Laser for glaucoma    HYSTERECTOMY  1963     AMEENA/USO- fibroids; no cancer    OOPHORECTOMY  1963    unknown, only removed one    SELECTIVE INJECTION OF ANESTHETIC AGENT AROUND LUMBAR SPINAL NERVE ROOT BY TRANSFORAMINAL APPROACH Bilateral 6/17/2022    Procedure: Bilateral L4/5 TF JASKARAN with RN IV sedation;  Surgeon: Chan Fonseca MD;  Location: [facility] PAIN MGT;  Service: Pain Management;  Laterality: Bilateral;    SELECTIVE INJECTION OF ANESTHETIC AGENT AROUND LUMBAR SPINAL NERVE ROOT BY TRANSFORAMINAL APPROACH Bilateral 10/3/2022    Procedure: Bilateral L2-3 transforaminal epidural steroid injection with RN IV sedation;  Surgeon: Chan Fonseca MD;  [facility];  Service: Pain Management;  Laterality: Bilateral;     Social History     Socioeconomic History    Marital status:     Number of children: 1   Tobacco Use    Smoking status: Never     Passive exposure: Never    Smokeless tobacco: Never   Substance and Sexual Activity    Alcohol use: No    Drug use: No    Sexual activity: Not Currently      Partners: Male   Social History Narrative    , no pets or smokers in household. 1 Child who lives in nursing home. She has a grandson who lives in [place].. Retired from [employer] . Still drives. Does not have a Living Will or advanced directive.      Social Determinants of Health     Financial Resource Strain: Medium Risk (5/16/2023)    Overall Financial Resource Strain (CARDIA)     Difficulty of Paying Living Expenses: Somewhat hard   Food Insecurity: Food Insecurity Present (5/16/2023)    Hunger Vital Sign     Worried About Running Out of Food in the Last Year: Sometimes true     Ran Out of Food in the Last Year: Sometimes true   Transportation Needs: Unmet Transportation Needs (6/2/2023)    PRAPARE - Transportation     Lack of Transportation (Medical): Yes     Lack of Transportation (Non-Medical): Yes   Physical Activity: Inactive (5/16/2023)    Exercise Vital Sign     Days of Exercise per Week: 0 days     Minutes of Exercise per Session: 0 min   Stress: No Stress Concern Present (5/16/2023)    [employer] [place] [employer] - Occupational Stress Questionnaire     Feeling of Stress : Not at all   Social Connections: Moderately Integrated (5/16/2023)    Social Connection and Isolation Panel [NHANES]     Frequency of Communication with Friends and Family: More than three times a week     Frequency of Social Gatherings with Friends and Family: Once a week     Attends Orthodoxy Services: More than 4 times per year     Active Member of Clubs or Organizations: Yes     Attends Club or Organization Meetings: More than 4 times per year     Marital Status:    Housing Stability: Low Risk  (5/16/2023)    Housing Stability Vital Sign     Unable to Pay for Housing in the Last Year: No     Number of Places Lived in the Last Year: 1     Unstable Housing in the Last Year: No             Past/Current Medical/Surgical History, Past/Current Social History, Past/Current Family History and Past/Current  Medications were reviewed in detail.        Objective:           VITAL SIGNS WERE REVIEWED      GENERAL APPEARANCE:     The patient looks comfortable.    BMI 29.97 KG     No signs of respiratory distress.    Normal breathing pattern.    No dysmorphic features    Normal eye contact.     GENERAL MEDICAL EXAM:    HEENT:  Head is atraumatic normocephalic.     No tender temporal arteries. Fundoscopic (Ophthalmoscopic) exam showed no disc edema.      Neck and Axillae: No JVD. No visible lesions.    No carotid bruits. No thyromegaly. No lymphadenopathy.    Cardiopulmonary: No cyanosis. No tachypnea. Normal respiratory effort.    Gastrointestinal/Urogenital:  No jaundice. No stomas or lesions. No visible hernias. No catheters.     Abdomen is soft non-tender. No masses or organomegaly.    Skin, Hair and Nails: No pathognonomic skin rash. No neurofibromatosis. No visible lesions.No stigmata of autoimmune disease. No clubbing.    Skin is warm and moist. No palpable masses.    Limbs: No varicose veins. No visible swelling.    No palpable edema. Pulses are symmetric. Pedal pulses are palpable.      Muskoskeletal: +OA visible deformities. Poor posture, No visible lesions.    No spine tenderness.+ signs of longstanding neuropathy. No dislocations or fractures.            Neurologic Exam     Mental Status   Oriented to person, place, and time.   Registration: recalls 3 of 3 objects. Recall at 5 minutes: recalls 3 of 3 objects. Follows 3 step commands.   Attention: normal. Concentration: normal.   Speech: speech is normal and not slurred   Level of consciousness: alert  Knowledge: good and consistent with education. Able to perform simple calculations.   Able to name object. Able to read. Able to repeat. Able to write. Normal comprehension.     Cranial Nerves     CN II   Visual fields full to confrontation.   Visual acuity: normal with correction  Right visual field deficit: none  Left visual field deficit: none     CN III, IV, VI    Pupils are equal, round, and reactive to light.  Extraocular motions are normal.   Right pupil: Size: 2 mm. Shape: regular. Reactivity: brisk. Consensual response: intact. Accommodation: intact.   Left pupil: Size: 2 mm. Shape: regular. Reactivity: brisk. Consensual response: intact. Accommodation: intact.   CN III: no CN III palsy  CN VI: no CN VI palsy  Nystagmus: none   Diplopia: none  Ophthalmoparesis: none  Upgaze: normal  Downgaze: normal  Conjugate gaze: present  Vestibulo-ocular reflex: present    CN V   Facial sensation intact.   Right facial sensation deficit: none  Left facial sensation deficit: none    CN VII   Right facial weakness: none  Left facial weakness: none  Left taste: normal    CN VIII   CN VIII normal.   Hearing: impaired    CN IX, X   CN IX normal.   CN X normal.   Palate: symmetric  Right gag reflex: normal  Left gag reflex: normal    CN XI   CN XI normal.   Right sternocleidomastoid strength: normal  Left sternocleidomastoid strength: normal  Right trapezius strength: normal  Left trapezius strength: normal    CN XII   CN XII normal.   Tongue: not atrophic  Fasciculations: absent  Tongue deviation: none    Motor Exam   Muscle bulk: normal  Overall muscle tone: normal  Right arm tone: normal  Left arm tone: normal  Right arm pronator drift: absent  Left arm pronator drift: absent  Right leg tone: normal  Left leg tone: normal    Strength   Strength 5/5 throughout.   Right neck flexion: 5/5  Left neck flexion: 5/5  Right neck extension: 5/5  Left neck extension: 5/5  Right deltoid: 5/5  Left deltoid: 5/5  Right biceps: 5/5  Left biceps: 5/5  Right triceps: 5/5  Left triceps: 5/5  Right wrist flexion: 5/5  Left wrist flexion: 5/5  Right wrist extension: 5/5  Left wrist extension: 5/5  Right interossei: 5/5  Left interossei: 5/5  Right iliopsoas: 5/5  Left iliopsoas: 5/5  Right quadriceps: 5/5  Left quadriceps: 5/5  Right hamstrin/5  Left hamstrin/5  Right glutei: 5/5  Left glutei:  5/5  Right anterior tibial: 5/5  Left anterior tibial: 5/5  Right posterior tibial: 5/5  Left posterior tibial: 5/5  Right peroneal: 5/5  Left peroneal: 5/5  Right gastroc: 5/5  Left gastroc: 5/5    Sensory Exam   Light touch normal.   Right arm light touch: normal  Left arm light touch: normal  Right leg light touch: normal  Left leg light touch: normal  Right arm vibration: normal  Left arm vibration: normal  Right leg vibration: decreased from toes  Left leg vibration: decreased from toes  Right arm proprioception: normal  Left arm proprioception: normal  Right leg proprioception: decreased from toes  Left leg proprioception: decreased from toes  Right arm pinprick: normal  Left arm pinprick: normal  Right leg pinprick: decreased from toes  Left leg pinprick: decreased from toes  Sensory deficit distribution on left: lateral cutaneous thigh  Graphesthesia: normal  Stereognosis: normal  Poor posture      Gait, Coordination, and Reflexes     Gait  Gait: wide-based (ambulates with cane and walker)    Coordination   Romberg: negative  Finger to nose coordination: normal  Heel to shin coordination: normal  Tandem walking coordination: normal    Tremor   Resting tremor: absent  Intention tremor: absent  Action tremor: absent    Reflexes   Right brachioradialis: 2+  Left brachioradialis: 2+  Right biceps: 2+  Left biceps: 2+  Right triceps: 2+  Left triceps: 2+  Right patellar: 2+  Left patellar: 2+  Right achilles: 0  Left achilles: 0  Right : 0  Left : 0  Right plantar: normal  Left plantar: normal  Right August: absent  Left August: absent  Right ankle clonus: absent  Left ankle clonus: absent  Right pendular knee jerk: absent  Left pendular knee jerk: absent    Poor posture            EDWIGE COGNITIVE ASSESSMENT (MOCA) TOTAL SCORE 30         NORMAL-MILD NCD 26-30    MSN Degree    Barriers: Hearing loss, Vision Impairment       DATE 11-       TOTAL SCORE 30/30       VISUOSPATIAL EXECUTIVE (5) 5        NAMING (3) 3       ATTENTION (6) 6       LANGUAGE (3) 3       ABSTRACTION(2) 2       RECALL (5) 5       ORIENTATION (6) 6           Lab Results   Component Value Date    WBC 6.01 02/07/2023    HGB 10.9 (L) 02/07/2023    HCT 34.7 (L) 02/07/2023    MCV 91 02/07/2023     02/07/2023     Sodium   Date Value Ref Range Status   06/05/2023 142 136 - 145 mmol/L Final     Potassium   Date Value Ref Range Status   06/05/2023 4.4 3.5 - 5.1 mmol/L Final     Chloride   Date Value Ref Range Status   06/05/2023 105 95 - 110 mmol/L Final     CO2   Date Value Ref Range Status   06/05/2023 30 (H) 23 - 29 mmol/L Final     Glucose   Date Value Ref Range Status   06/05/2023 126 (H) 70 - 110 mg/dL Final     BUN   Date Value Ref Range Status   06/05/2023 16 8 - 23 mg/dL Final     Creatinine   Date Value Ref Range Status   06/05/2023 1.3 0.5 - 1.4 mg/dL Final     Calcium   Date Value Ref Range Status   06/05/2023 10.0 8.7 - 10.5 mg/dL Final     Total Protein   Date Value Ref Range Status   02/03/2023 7.1 6.0 - 8.4 g/dL Final     Albumin   Date Value Ref Range Status   06/05/2023 3.9 3.5 - 5.2 g/dL Final     Total Bilirubin   Date Value Ref Range Status   02/03/2023 0.4 0.1 - 1.0 mg/dL Final     Comment:     For infants and newborns, interpretation of results should be based  on gestational age, weight and in agreement with clinical  observations.    Premature Infant recommended reference ranges:  Up to 24 hours.............<8.0 mg/dL  Up to 48 hours............<12.0 mg/dL  3-5 days..................<15.0 mg/dL  6-29 days.................<15.0 mg/dL       Alkaline Phosphatase   Date Value Ref Range Status   02/03/2023 85 55 - 135 U/L Final     AST   Date Value Ref Range Status   02/03/2023 17 10 - 40 U/L Final     ALT   Date Value Ref Range Status   02/03/2023 10 10 - 44 U/L Final     Anion Gap   Date Value Ref Range Status   06/05/2023 7 (L) 8 - 16 mmol/L Final     eGFR if    Date Value Ref Range Status   03/02/2022  49.3 (A) >60 mL/min/1.73 m^2 Final     eGFR if non    Date Value Ref Range Status   03/02/2022 42.8 (A) >60 mL/min/1.73 m^2 Final     Comment:     Calculation used to obtain the estimated glomerular filtration  rate (eGFR) is the CKD-EPI equation.        Lab Results   Component Value Date    [id number] 1150 (H) 02/03/2023     Lab Results   Component Value Date    TSH 1.481 02/03/2023    Q2GOVMV 87 03/12/2009    FREET4 1.27 08/13/2015 12-    MRI BRAIN WO    No acute abnormality.  Mild age-related atrophy and white matter degeneration.   07-    MRI BRAIN WO    Tiny acute infarctions seen at the left posterior baylee. This is at the left facial nerve nucleus.       09-    MRI Brain WO    Small remote left temporoparietal infarct.  There has, however, been no significant detrimental interval change since the prior exam of 12/4/09     EEG Results:    12-    The EEG is suggestive of intermittent diffuse encephalopathy.     No seizures noted       No results found in the last 24 hours.  No results found in the last 24 hours.      Reviewed the neuroimaging independently       Assessment:       1. Cognitive complaints with normal exam    2. Family history of stress    3. Memory impairment    4. Other cerebral infarction    5. Cerebral microvascular disease: stroke ? 1999 elsewhere; TIA 10/13    6. Cerebrovascular accident (CVA) due to thrombosis of precerebral artery    7. Lumbar radiculopathy, chronic    8. Mixed anxiety depressive disorder    9. Primary open angle glaucoma (POAG) of both eyes, moderate stage    10. DM cataract    11. Diabetic retinopathy with macular edema associated with type 2 diabetes mellitus, unspecified laterality, unspecified retinopathy severity    12. Hearing loss, unspecified hearing loss type, unspecified laterality    13. Atrial fibrillation, unspecified type    14. Diabetes mellitus type 2 with complications    15. Abnormality of gait and mobility     16. PAD (peripheral artery disease)    17. Hyperlipidemia associated with type 2 diabetes mellitus    18. Mixed diabetic hyperlipidemia associated with type 2 diabetes mellitus    19. Age-related nuclear cataract, right    20. Spinal stenosis of lumbar region with neurogenic claudication    21. Amnesia          Modified Heather Score (m-RS)      0  The patient has no residual symptoms.    1  The patient has no significant disability; able to carry out all pre-stroke activities.    2  The patient has slight disability; unable to carry out all pre-stroke activities but able to look after self without daily help.    3  The patient has moderate disability; requiring some external help but able to walk without the assistance of another individual.    4  The patient has moderately severe disability; unable to walk or attend to bodily functions without assistance of another individual.    5  The patient has severe disability; bedridden, incontinent, requires continuous care.    6  The patient has  (during the hospital stay or after discharge from the hospital).    Plan:         COGNITIVE COMPLAINTS WITH NORMAL EXAM/ FAMILY STRESS/HX OF MULTIPLE STROKES/CAD/HLD/AFIB/DM/GLAUCOMA/[place]/CHRONIC BACK PAIN      EVALUATION     Brain MRI.     FA, HIV, RPR.    Comprehensive Neuropsychological Testing     Normal MOCA exam    NO DMA recommended at this time    Recommend optimization of STRESS/ANXIETY      Explained to the patient and family that medications are intended to slow down the progression and not stop it or reverse the disease.The disease is relentless, progressive and so far cannot be controlled.     I counseled the patient and family that the rate of progression is extremely variable and the average (10 years) is an inaccurate measure.       HX OF MULTIPLE STROKES (RISK FACTORS AFIB, HLD, HTN DM)       Xarleto 15 mg po     Medical supportive care    Vascular Risk Factors (VRFs) stratification (BP, BS, BC control and  Smoking Cessation) is the mainstay of stroke prevention (>90%). The benefit of antiplatelets is < 20% stroke risk reduction.         Healthy diet and exercise    Avoid driving.    Call 911 if any SUDDEN:   Weakness  Numbness  Slurring of speech  Speech difficulty   Vertigo  Loss of balance  Loss of vision  Loss of hearing  Double vision  Trouble swallowing  Trouble breathing  Facial drooping      HOME CARE         Falling Down Precautions. Gait is affected by the disease as well.     Avoid driving and access to firearms     Incremental 24/Care     Help with finances and decision making.    Join support group.    Proofing the house and use labeling.    Avoid antihistamines and anticholinergics.    Avoid changing routine.    Use written reminders.    Avoid multitasking.    Healthy diet, exercise (physical and cognitive).    Good sleep hygiene.        HERE ARE 10 WAYS TO REDUCE RISK OF DEMENTIA AND SLOW DOWN THE PROGRESSION OF DEMENTIA        1.Be physically active.    2. Avoid smoking and alcohol consumption.    3. Track your numbers. Keep your blood pressure, cholesterol, blood sugar and weight within recommended ranges.    4. Stay socially connected.    5. Make healthy food choices. Eat a well-balance and healthy diet that rich in cereals, fish, legumes, and vegetables.    6. Reduce stress.     7. Challenge your brain by trying something new, playing games, or learning a new language.    8. Take care of your hearing. Avoid being continuously exposed to loud sounds and wear a hearing aid if hearing does become a problem.    9. Lower risk of falls. Consider installing handrails on all stairs and grab bars in bathrooms.    10. Reduce your exposure to air pollution, such as exposure to exhaust in heavy traffic.         PREVENTION OF DELIRIUM       1. Good hydration and avoid electrolyte imbalance  2. Recognize and treat infections immediately especially UTI.  3. Bladder emptying and prevent constipation.   4. Provide  stimulating activities and familiar objects  5. Use eyeglasses and hearing aids if needed.   6. Use simple and regular communication about people, current place, and time  7. Mobility and range-of-motion exercises  8. Reduce noise, lighting and avoid sleep interruptions  9. Non-narcotic pain management.  10.Nondrug treatment for sleep problems or anxiety  11. Avoid antihistamines.  12. Avoid narcotics.  13. Avoid benzodiazepines.            SLEEP HYGIENE     Poor sleep has a negative effect on cognition. Several strategies have been shown to improve sleep:     Caffeine intake in the afternoon and evening, as well as stuffing oneself at supper, can decrease the quality of restful sleep throughout the night.   Bedtime and wake-up times should be consistent every night and morning so the body becomes used to a single routine, even on the weekends.  Engage in daily physical activity, but not 2-3 hours before bedtime.   No technology use (television, computer, iPad) 1-2 hours before bed.   Have a wind down routine (e.g., soft lights in the house, bath before bed, reduced fluid intake, songs, reading, less noise) to promote sleep readiness.   Visit the www.sleepfoundation.org for more strategies.      COGNITIVE HYGIENE     Engage in regular exercise, which increases alertness and arousal and can improve attention and focus.  Consider lower impact exercises, such as yoga or light walking.  Get a good nights sleep, as this can enhance alertness and cognition.  Eat healthy foods and balanced meals. It is notable that research indicates certain nutrients may aid in brain function, such as B vitamins (especially B6, B12, and folic acid), antioxidants (such as vitamins C and E, and beta carotene), and Omega-3 fatty acids. Talk with your physician or nutritionist about whats right for you.   Keep your brain active. Find activities to stay mentally active, such as reading, games (cards, checkers), puzzles (crosswords, Sudoku,  jose g saw), crafts ([employer], woodworking), gardening, or participating in activities in the community.  Stay socially engaged. Continue staying active with your family and friends.      MEDICAL/SURGICAL COMORBIDITIES     All relevant medical comorbidities noted and managed by primary care physician and medical care team.          MISCELLANEOUS MEDICAL PROBLEMS       HEALTHY LIFESTYLE AND PREVENTATIVE CARE    Encouraged the patient to adhere to the age-appropriate health maintenance guidelines including screening tests and vaccinations.     Discussed the overall importance of healthy lifestyle, optimal weight, exercise, healthy diet, good sleep hygiene and avoiding drugs including smoking, alcohol and recreational drugs. The patient verbalized full understanding.       Advised the patient to follow COVID-19 prevention measures.       I spent 120 minutes face to face with the patient    Time spent in counseling and coordination of care including discussions etiology of diagnosis, pathogenesis of diagnosis, prognosis of diagnosis,, diagnostic results, impression and recommendations, diagnostic studies, management, risks and benefits of treatment, instructions of disease self-management, treatment instructions, follow up requirements, patient and family counseling/involvement in care compliance with treatment regimen. All of the patient's questions were answered during this discussion.       Jose Ortega, MSN NP      Collaborating Provider: Etienne Guerrero MD, FAAN Neurologist/Epileptologist

## 2023-11-16 LAB
ANA SER QL IF: NORMAL
RPR SER QL: NORMAL

## 2023-11-22 ENCOUNTER — PATIENT OUTREACH (OUTPATIENT)
Dept: ADMINISTRATIVE | Facility: OTHER | Age: 83
End: 2023-11-22
Payer: MEDICARE

## 2023-11-22 NOTE — PROGRESS NOTES
CHW - Follow Up    Lina Espinoza, Community Health Worker completed a follow up visit with patient via telephone today.  Pt/Caregiver reported: Ms. Casimiro Mar stated that she forgot to call the [facility] regarding her utility bill however; she will call next week.   Follow-up Outreach - Due: 11/28/2023

## 2023-11-28 ENCOUNTER — PATIENT OUTREACH (OUTPATIENT)
Dept: ADMINISTRATIVE | Facility: OTHER | Age: 83
End: 2023-11-28
Payer: MEDICARE

## 2023-12-04 ENCOUNTER — LAB VISIT (OUTPATIENT)
Dept: LAB | Facility: HOSPITAL | Age: 83
End: 2023-12-04
Attending: INTERNAL MEDICINE
Payer: MEDICARE

## 2023-12-04 DIAGNOSIS — E11.49 TYPE II DIABETES MELLITUS WITH NEUROLOGICAL MANIFESTATIONS: Chronic | ICD-10-CM

## 2023-12-04 DIAGNOSIS — N18.32 STAGE 3B CHRONIC KIDNEY DISEASE: ICD-10-CM

## 2023-12-04 PROCEDURE — 36415 COLL VENOUS BLD VENIPUNCTURE: CPT | Performed by: INTERNAL MEDICINE

## 2023-12-04 PROCEDURE — 83036 HEMOGLOBIN GLYCOSYLATED A1C: CPT | Performed by: INTERNAL MEDICINE

## 2023-12-04 PROCEDURE — 85025 COMPLETE CBC W/AUTO DIFF WBC: CPT | Performed by: INTERNAL MEDICINE

## 2023-12-04 PROCEDURE — 80069 RENAL FUNCTION PANEL: CPT | Performed by: INTERNAL MEDICINE

## 2023-12-05 ENCOUNTER — PATIENT MESSAGE (OUTPATIENT)
Dept: ADMINISTRATIVE | Facility: OTHER | Age: 83
End: 2023-12-05
Payer: MEDICARE

## 2023-12-05 LAB
ALBUMIN SERPL BCP-MCNC: 3.7 G/DL (ref 3.5–5.2)
ANION GAP SERPL CALC-SCNC: 11 MMOL/L (ref 8–16)
BASOPHILS # BLD AUTO: 0.03 K/UL (ref 0–0.2)
BASOPHILS NFR BLD: 0.5 % (ref 0–1.9)
BUN SERPL-MCNC: 23 MG/DL (ref 8–23)
CALCIUM SERPL-MCNC: 9.4 MG/DL (ref 8.7–10.5)
CHLORIDE SERPL-SCNC: 106 MMOL/L (ref 95–110)
CO2 SERPL-SCNC: 23 MMOL/L (ref 23–29)
CREAT SERPL-MCNC: 1.4 MG/DL (ref 0.5–1.4)
DIFFERENTIAL METHOD: ABNORMAL
EOSINOPHIL # BLD AUTO: 0.2 K/UL (ref 0–0.5)
EOSINOPHIL NFR BLD: 3.3 % (ref 0–8)
ERYTHROCYTE [DISTWIDTH] IN BLOOD BY AUTOMATED COUNT: 14.4 % (ref 11.5–14.5)
EST. GFR  (NO RACE VARIABLE): 37.6 ML/MIN/1.73 M^2
ESTIMATED AVG GLUCOSE: 163 MG/DL (ref 68–131)
GLUCOSE SERPL-MCNC: 185 MG/DL (ref 70–110)
HBA1C MFR BLD: 7.3 % (ref 4–5.6)
HCT VFR BLD AUTO: 33.1 % (ref 37–48.5)
HGB BLD-MCNC: 10.7 G/DL (ref 12–16)
IMM GRANULOCYTES # BLD AUTO: 0.01 K/UL (ref 0–0.04)
IMM GRANULOCYTES NFR BLD AUTO: 0.2 % (ref 0–0.5)
LYMPHOCYTES # BLD AUTO: 1.6 K/UL (ref 1–4.8)
LYMPHOCYTES NFR BLD: 27.4 % (ref 18–48)
MCH RBC QN AUTO: 28.8 PG (ref 27–31)
MCHC RBC AUTO-ENTMCNC: 32.3 G/DL (ref 32–36)
MCV RBC AUTO: 89 FL (ref 82–98)
MONOCYTES # BLD AUTO: 0.4 K/UL (ref 0.3–1)
MONOCYTES NFR BLD: 7 % (ref 4–15)
NEUTROPHILS # BLD AUTO: 3.5 K/UL (ref 1.8–7.7)
NEUTROPHILS NFR BLD: 61.6 % (ref 38–73)
NRBC BLD-RTO: 0 /100 WBC
PHOSPHATE SERPL-MCNC: 3.9 MG/DL (ref 2.7–4.5)
PLATELET # BLD AUTO: 200 K/UL (ref 150–450)
PMV BLD AUTO: 12.4 FL (ref 9.2–12.9)
POTASSIUM SERPL-SCNC: 4.6 MMOL/L (ref 3.5–5.1)
RBC # BLD AUTO: 3.71 M/UL (ref 4–5.4)
SODIUM SERPL-SCNC: 140 MMOL/L (ref 136–145)
WBC # BLD AUTO: 5.73 K/UL (ref 3.9–12.7)

## 2023-12-05 NOTE — PROGRESS NOTES
CHW - Follow Up    Lina Espinoza, Community Health Worker completed a follow up visit with patient via telephone today.  Pt/Caregiver reported: Ms. Maravilla reported that she has not contacted Ms. Matias at the [facility] because she lost the telephone number.   Community Health Worker provided: CHW informed Pt to give Ms. Matias a call and have her utility bill on hand.  CHW put the telephone number in Pts chart.   Follow-up Outreach - Due: 12/12/2023

## 2023-12-06 ENCOUNTER — HOSPITAL ENCOUNTER (OUTPATIENT)
Dept: RADIOLOGY | Facility: HOSPITAL | Age: 83
Discharge: HOME | End: 2023-12-06
Attending: NURSE PRACTITIONER
Payer: MEDICARE

## 2023-12-06 ENCOUNTER — HOSPITAL ENCOUNTER (OUTPATIENT)
Dept: PULMONOLOGY | Facility: HOSPITAL | Age: 83
Discharge: HOME | End: 2023-12-06
Attending: NURSE PRACTITIONER
Payer: MEDICARE

## 2023-12-06 DIAGNOSIS — F41.8 MIXED ANXIETY DEPRESSIVE DISORDER: ICD-10-CM

## 2023-12-06 DIAGNOSIS — E11.36 DM CATARACT: ICD-10-CM

## 2023-12-06 DIAGNOSIS — E11.8 DIABETES MELLITUS TYPE 2 WITH COMPLICATIONS: ICD-10-CM

## 2023-12-06 DIAGNOSIS — E78.5 HYPERLIPIDEMIA ASSOCIATED WITH TYPE 2 DIABETES MELLITUS: ICD-10-CM

## 2023-12-06 DIAGNOSIS — I63.00 CEREBROVASCULAR ACCIDENT (CVA) DUE TO THROMBOSIS OF PRECEREBRAL ARTERY: Chronic | ICD-10-CM

## 2023-12-06 DIAGNOSIS — I67.89 CEREBRAL MICROVASCULAR DISEASE: Chronic | ICD-10-CM

## 2023-12-06 DIAGNOSIS — H91.90 HEARING LOSS, UNSPECIFIED HEARING LOSS TYPE, UNSPECIFIED LATERALITY: ICD-10-CM

## 2023-12-06 DIAGNOSIS — M54.16 LUMBAR RADICULOPATHY, CHRONIC: ICD-10-CM

## 2023-12-06 DIAGNOSIS — I48.91 ATRIAL FIBRILLATION, UNSPECIFIED TYPE: ICD-10-CM

## 2023-12-06 DIAGNOSIS — R26.9 ABNORMALITY OF GAIT AND MOBILITY: ICD-10-CM

## 2023-12-06 DIAGNOSIS — R41.3 AMNESIA: ICD-10-CM

## 2023-12-06 DIAGNOSIS — E11.69 HYPERLIPIDEMIA ASSOCIATED WITH TYPE 2 DIABETES MELLITUS: ICD-10-CM

## 2023-12-06 DIAGNOSIS — H40.1132 PRIMARY OPEN ANGLE GLAUCOMA (POAG) OF BOTH EYES, MODERATE STAGE: Chronic | ICD-10-CM

## 2023-12-06 DIAGNOSIS — E11.69 MIXED DIABETIC HYPERLIPIDEMIA ASSOCIATED WITH TYPE 2 DIABETES MELLITUS: Chronic | ICD-10-CM

## 2023-12-06 DIAGNOSIS — E78.2 MIXED DIABETIC HYPERLIPIDEMIA ASSOCIATED WITH TYPE 2 DIABETES MELLITUS: Chronic | ICD-10-CM

## 2023-12-06 DIAGNOSIS — R41.9 COGNITIVE COMPLAINTS WITH NORMAL EXAM: Chronic | ICD-10-CM

## 2023-12-06 DIAGNOSIS — E11.311 DIABETIC RETINOPATHY WITH MACULAR EDEMA ASSOCIATED WITH TYPE 2 DIABETES MELLITUS, UNSPECIFIED LATERALITY, UNSPECIFIED RETINOPATHY SEVERITY: ICD-10-CM

## 2023-12-06 DIAGNOSIS — M48.062 SPINAL STENOSIS OF LUMBAR REGION WITH NEUROGENIC CLAUDICATION: ICD-10-CM

## 2023-12-06 DIAGNOSIS — I63.89 OTHER CEREBRAL INFARCTION: ICD-10-CM

## 2023-12-06 DIAGNOSIS — R41.3 MEMORY IMPAIRMENT: ICD-10-CM

## 2023-12-06 DIAGNOSIS — H25.11 AGE-RELATED NUCLEAR CATARACT, RIGHT: ICD-10-CM

## 2023-12-06 DIAGNOSIS — I73.9 PAD (PERIPHERAL ARTERY DISEASE): ICD-10-CM

## 2023-12-06 PROCEDURE — 70551 MRI BRAIN WITHOUT CONTRAST: ICD-10-PCS | Mod: 26,,, | Performed by: RADIOLOGY

## 2023-12-06 PROCEDURE — 70551 MRI BRAIN STEM W/O DYE: CPT | Mod: 26,,, | Performed by: RADIOLOGY

## 2023-12-06 PROCEDURE — 95819 PR EEG,W/AWAKE & ASLEEP RECORD: ICD-10-PCS | Mod: 26,,, | Performed by: PSYCHIATRY & NEUROLOGY

## 2023-12-06 PROCEDURE — 95819 EEG AWAKE AND ASLEEP: CPT

## 2023-12-06 PROCEDURE — 70551 MRI BRAIN STEM W/O DYE: CPT | Mod: TC

## 2023-12-06 PROCEDURE — 95819 EEG AWAKE AND ASLEEP: CPT | Mod: 26,,, | Performed by: PSYCHIATRY & NEUROLOGY

## 2023-12-06 NOTE — PROCEDURES
DATE EEG PERFORMED:  2023.      DATE EEG INTERPRETED:  2023.                    DURATION OF EE MINUTES.         LEVEL OF CONSCIOUSENESS    Awake and Sleep.         EEG BACKGROUND    The posterior dominant basic rhythm reaches 8-9 Hz, symmetric, reactive, well-modulated and well-sustained.         EEG CLASSIFICATION      Intermittent Slowing, Generalized., Theta-Delta Activities.        IMPRESSION      The EEG is suggestive of intermittent diffuse encephalopathy.       There are no epileptiform discharges or lateralizing signs. No typical events were recorded. There is no electrographic evidence of seizure.There is no electrographic evidence of status epilepticus.         PLEASE NOTE THAT A NON-EPILEPTIFORM EEG DOES NOT RULE OUT EPILEPSY.        FRANCOIS LEBRON MD, FAAN    Diplomate, American Board of Psychiatry and Neurology    Diplomate, American Board of Clinical Neurophysiology

## 2023-12-07 ENCOUNTER — OFFICE VISIT (OUTPATIENT)
Dept: INTERNAL MEDICINE | Facility: CLINIC | Age: 83
End: 2023-12-07
Payer: MEDICARE

## 2023-12-07 VITALS
WEIGHT: 183 LBS | DIASTOLIC BLOOD PRESSURE: 70 MMHG | SYSTOLIC BLOOD PRESSURE: 134 MMHG | BODY MASS INDEX: 30.49 KG/M2 | HEIGHT: 65 IN

## 2023-12-07 DIAGNOSIS — E04.2 MULTINODULAR THYROID: Chronic | ICD-10-CM

## 2023-12-07 DIAGNOSIS — I70.0 AORTIC ARCH ATHEROSCLEROSIS: Chronic | ICD-10-CM

## 2023-12-07 DIAGNOSIS — E53.8 LOW SERUM VITAMIN B12: ICD-10-CM

## 2023-12-07 DIAGNOSIS — E78.5 HYPERLIPIDEMIA ASSOCIATED WITH TYPE 2 DIABETES MELLITUS: ICD-10-CM

## 2023-12-07 DIAGNOSIS — E11.69 HYPERLIPIDEMIA ASSOCIATED WITH TYPE 2 DIABETES MELLITUS: ICD-10-CM

## 2023-12-07 DIAGNOSIS — N18.30 DIABETES MELLITUS WITH STAGE 3 CHRONIC KIDNEY DISEASE: Primary | Chronic | ICD-10-CM

## 2023-12-07 DIAGNOSIS — I10 HYPERTENSION, ESSENTIAL: Chronic | ICD-10-CM

## 2023-12-07 DIAGNOSIS — E11.22 DIABETES MELLITUS WITH STAGE 3 CHRONIC KIDNEY DISEASE: Primary | Chronic | ICD-10-CM

## 2023-12-07 DIAGNOSIS — D64.9 ANEMIA, UNSPECIFIED TYPE: ICD-10-CM

## 2023-12-07 DIAGNOSIS — R42 VERTIGO: ICD-10-CM

## 2023-12-07 DIAGNOSIS — H61.20 EXCESSIVE CERUMEN IN EAR CANAL, UNSPECIFIED LATERALITY: ICD-10-CM

## 2023-12-07 DIAGNOSIS — R53.83 FATIGUE, UNSPECIFIED TYPE: ICD-10-CM

## 2023-12-07 PROCEDURE — 3075F SYST BP GE 130 - 139MM HG: CPT | Mod: CPTII,S$GLB,, | Performed by: INTERNAL MEDICINE

## 2023-12-07 PROCEDURE — 3078F DIAST BP <80 MM HG: CPT | Mod: CPTII,S$GLB,, | Performed by: INTERNAL MEDICINE

## 2023-12-07 PROCEDURE — 3078F PR MOST RECENT DIASTOLIC BLOOD PRESSURE < 80 MM HG: ICD-10-PCS | Mod: CPTII,S$GLB,, | Performed by: INTERNAL MEDICINE

## 2023-12-07 PROCEDURE — 3288F PR FALLS RISK ASSESSMENT DOCUMENTED: ICD-10-PCS | Mod: CPTII,S$GLB,, | Performed by: INTERNAL MEDICINE

## 2023-12-07 PROCEDURE — 3075F PR MOST RECENT SYSTOLIC BLOOD PRESS GE 130-139MM HG: ICD-10-PCS | Mod: CPTII,S$GLB,, | Performed by: INTERNAL MEDICINE

## 2023-12-07 PROCEDURE — 1159F MED LIST DOCD IN RCRD: CPT | Mod: CPTII,S$GLB,, | Performed by: INTERNAL MEDICINE

## 2023-12-07 PROCEDURE — 1159F PR MEDICATION LIST DOCUMENTED IN MEDICAL RECORD: ICD-10-PCS | Mod: CPTII,S$GLB,, | Performed by: INTERNAL MEDICINE

## 2023-12-07 PROCEDURE — 99999 PR PBB SHADOW E&M-EST. PATIENT-LVL V: ICD-10-PCS | Mod: PBBFAC,,, | Performed by: INTERNAL MEDICINE

## 2023-12-07 PROCEDURE — 99214 OFFICE O/P EST MOD 30 MIN: CPT | Mod: S$GLB,,, | Performed by: INTERNAL MEDICINE

## 2023-12-07 PROCEDURE — 99214 PR OFFICE/OUTPT VISIT, EST, LEVL IV, 30-39 MIN: ICD-10-PCS | Mod: S$GLB,,, | Performed by: INTERNAL MEDICINE

## 2023-12-07 PROCEDURE — 1101F PT FALLS ASSESS-DOCD LE1/YR: CPT | Mod: CPTII,S$GLB,, | Performed by: INTERNAL MEDICINE

## 2023-12-07 PROCEDURE — 99999 PR PBB SHADOW E&M-EST. PATIENT-LVL V: CPT | Mod: PBBFAC,,, | Performed by: INTERNAL MEDICINE

## 2023-12-07 PROCEDURE — 3288F FALL RISK ASSESSMENT DOCD: CPT | Mod: CPTII,S$GLB,, | Performed by: INTERNAL MEDICINE

## 2023-12-07 PROCEDURE — 1101F PR PT FALLS ASSESS DOC 0-1 FALLS W/OUT INJ PAST YR: ICD-10-PCS | Mod: CPTII,S$GLB,, | Performed by: INTERNAL MEDICINE

## 2023-12-07 NOTE — PROGRESS NOTES
Patient ID: Saul Mar is a 83 y.o. female.    Chief Complaint: Follow-up      Assessment:       1. Diabetes mellitus with stage 3 chronic kidney disease    2. Hypertension, essential    3. Aortic arch atherosclerosis    4. Vertigo    5. Anemia, unspecified type    6. Hyperlipidemia associated with type 2 diabetes mellitus    7. Fatigue, unspecified type    8. Low serum vitamin B12    9. Multinodular thyroid: thyroid u/s 7/16, stable 2017 and 2019, 2021    10. Excessive cerumen in ear canal, unspecified laterality          Plan:         1. Diabetes mellitus with stage 3 chronic kidney disease  -     Microalbumin/Creatinine Ratio, Urine; Future; Expected date: 12/07/2023  -     CBC Auto Differential; Future; Expected date: 06/08/2024  -     Comprehensive Metabolic Panel; Future; Expected date: 06/08/2024  -     Hemoglobin A1C; Future; Expected date: 06/04/2024    2. Hypertension, essential; doing well on current regimen    3. Aortic arch atherosclerosis  Overview:  CXR 10/30/2013---Atherosclerosis is seen in the aortic arch.      4. Vertigo  -     Ambulatory referral/consult to ENT; Future; Expected date: 12/14/2023    5. Anemia, unspecified type  -     Ferritin; Future; Expected date: 06/08/2024  -     Iron and TIBC; Future; Expected date: 06/08/2024    6. Hyperlipidemia associated with type 2 diabetes mellitus  -     Lipid Panel; Future; Expected date: 06/08/2024    7. Fatigue, unspecified type  -     TSH; Future; Expected date: 06/08/2024    8. Low serum vitamin B12  -     Vitamin B12; Future; Expected date: 06/05/2024    9. Multinodular thyroid: thyroid u/s 7/16, stable 2017 and 2019, 2021  Overview:  US Soft Tissue 7/19/2016--- Multinodular thyroid.  None of nodules meet criteria for fine needle aspiration.      10. Excessive cerumen in ear canal, unspecified laterality; OTC treatments reviewed, cautions and alarms discussed    Urine today  Labs in the next 4 months  RSV and COVID vaccines  Problem: Patient Care Overview  Goal: Plan of Care Review  Outcome: Ongoing (interventions implemented as appropriate)   03/14/19 7091   Coping/Psychosocial   Plan of Care Reviewed With patient   OTHER   Outcome Summary vss, no falls, c/o pain, prn norco, up in chair, JANAY drain, dc IVF, monitor calcium level, continue to monitor   Plan of Care Review   Progress improving     Goal: Individualization and Mutuality  Outcome: Ongoing (interventions implemented as appropriate)      Problem: Skin Injury Risk (Adult)  Goal: Skin Health and Integrity  Outcome: Ongoing (interventions implemented as appropriate)      Problem: Pain, Acute (Adult)  Goal: Acceptable Pain Control/Comfort Level  Outcome: Ongoing (interventions implemented as appropriate)      Problem: Fall Risk (Adult)  Goal: Absence of Fall  Outcome: Ongoing (interventions implemented as appropriate)         "reviewed  Vertigo symptoms are mild, postural measures and hygienic measures reviewed, schedule ENT follow-up  I will review all studies and determine further tx depending on findings      Subjective:   Blood pressure doing very well     Recent labs acceptable with an A1c < 8.  Up-to-date with screenings.  Due for urine now.    Some "roaring" in the R ear.  No tinnitus or pain.  No change in hearing loss.  Also some slight vertigo rarely.    Vaccines and immunizations reviewed, RSV and COVID booster recommended.    [facility]  "There is calcified coronary artery atherosclerosis. There is moderate calcified atherosclerosis of the abdominal aorta and major pelvic arteries. There is lumbar spine degenerative change. Prior hysterectomy. There is a 2 cm cyst of the left kidney for which no follow-up is specifically indicated."        Had brain MRI and EEG recently.  Brain MRI acceptable.  Other results pending.     CKD 3, stable.  Will be seeing Nephrology soon.  Does have microalbuminuria.     Anemia of longstanding with some evidence of slightly low iron but also has sickle cell trait.     Colonoscopy reviewed- she does not want to do this and Gastro recommended against it.  Currently, she is not having any abdominal pain.  She states her bowels are regular.  She denies any blood in her stool, pencil thin stools or black stools.  She denies GERD.  Last colonoscopy was 2016 and last EGD (which was normal other than a hiatal hernia) was 2016.      Ophthalmology January 2023  Ortho December 2022  Podiatry December 2022  Pain Clinic October 2022  Neurosurgery August 2022  Cardiology June 2022  Gastro May 2022  Mammogram April 2022  Renal ultrasound March 2022  Nephrology February 2022             Review of Systems   Constitutional:  Negative for fatigue.   HENT:  Negative for hearing loss.    Eyes:  Negative for visual disturbance.   Respiratory:  Negative for shortness of breath.    Cardiovascular:  " Negative for chest pain.   Gastrointestinal:  Negative for abdominal pain, constipation and diarrhea.   Genitourinary:  Negative for dysuria, frequency and vaginal bleeding.   Musculoskeletal:  Positive for arthralgias and back pain. Negative for joint swelling.        Chronic stable arthralgias   Skin:  Negative for rash.   Neurological:  Positive for dizziness. Negative for syncope, weakness, light-headedness and headaches.   Psychiatric/Behavioral:  Negative for sleep disturbance.          Objective:      Physical Exam  Vitals and nursing note reviewed.   Constitutional:       Appearance: She is well-developed.   HENT:      Head: Normocephalic and atraumatic.      Comments: Tiny amount of cerumen bilaterally but not impacted     Right Ear: External ear normal.      Left Ear: External ear normal.      Nose: Nose normal.      Mouth/Throat:      Pharynx: No oropharyngeal exudate.   Eyes:      General: No scleral icterus.     Extraocular Movements: Extraocular movements intact.      Conjunctiva/sclera: Conjunctivae normal.   Neck:      Thyroid: Thyromegaly present.      Vascular: No JVD.   Cardiovascular:      Rate and Rhythm: Normal rate and regular rhythm.      Heart sounds: Normal heart sounds. No murmur heard.     No gallop.   Pulmonary:      Effort: Pulmonary effort is normal. No respiratory distress.      Breath sounds: Normal breath sounds. No wheezing.   Abdominal:      General: Bowel sounds are normal. There is no distension.      Palpations: Abdomen is soft. There is no mass.      Tenderness: There is no abdominal tenderness. There is no guarding or rebound.   Musculoskeletal:         General: No tenderness. Normal range of motion.      Cervical back: Normal range of motion and neck supple.   Lymphadenopathy:      Cervical: No cervical adenopathy.   Skin:     General: Skin is warm.      Findings: No erythema or rash.   Neurological:      General: No focal deficit present.      Mental Status: She is alert  and oriented to person, place, and time.      Cranial Nerves: No cranial nerve deficit.      Coordination: Coordination normal.   Psychiatric:         Behavior: Behavior normal.         Thought Content: Thought content normal.         Judgment: Judgment normal.             Health Maintenance Due   Topic Date Due    RSV Vaccine (Age 60+ and Pregnant patients) (1 - 1-dose 60+ series) Never done    COVID-19 Vaccine (5 - 2023-24 season) 09/01/2023    Diabetes Urine Screening  09/19/2023

## 2023-12-08 ENCOUNTER — TELEPHONE (OUTPATIENT)
Dept: NEUROLOGY | Facility: CLINIC | Age: 83
End: 2023-12-08
Payer: MEDICARE

## 2023-12-08 NOTE — TELEPHONE ENCOUNTER
----- Message from Haydee Ortega NP sent at 12/8/2023  3:09 PM CST -----  EEG Results:      12-      The EEG is suggestive of intermittent diffuse encephalopathy.     No seizures noted

## 2023-12-08 NOTE — PROGRESS NOTES
EEG Results:      12-      The EEG is suggestive of intermittent diffuse encephalopathy.     No seizures noted

## 2023-12-08 NOTE — PROGRESS NOTES
12-    MRI BRAIN WO    No acute abnormality.  Mild age-related atrophy and white matter degeneration.

## 2023-12-12 ENCOUNTER — OFFICE VISIT (OUTPATIENT)
Dept: OTOLARYNGOLOGY | Facility: CLINIC | Age: 83
End: 2023-12-12
Payer: MEDICARE

## 2023-12-12 ENCOUNTER — HOSPITAL ENCOUNTER (EMERGENCY)
Facility: HOSPITAL | Age: 83
Discharge: HOME | End: 2023-12-12
Attending: EMERGENCY MEDICINE
Payer: MEDICARE

## 2023-12-12 VITALS
HEART RATE: 63 BPM | WEIGHT: 180.75 LBS | RESPIRATION RATE: 18 BRPM | BODY MASS INDEX: 30.08 KG/M2 | SYSTOLIC BLOOD PRESSURE: 167 MMHG | OXYGEN SATURATION: 99 % | TEMPERATURE: 98 F | DIASTOLIC BLOOD PRESSURE: 68 MMHG

## 2023-12-12 VITALS — BODY MASS INDEX: 30.49 KG/M2 | WEIGHT: 183.19 LBS

## 2023-12-12 DIAGNOSIS — H91.90 HEARING LOSS, UNSPECIFIED HEARING LOSS TYPE, UNSPECIFIED LATERALITY: Primary | ICD-10-CM

## 2023-12-12 DIAGNOSIS — S39.012A STRAIN OF LUMBAR REGION, INITIAL ENCOUNTER: ICD-10-CM
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
## 2023-12-12 DIAGNOSIS — R42 VERTIGO: ICD-10-CM

## 2023-12-12 DIAGNOSIS — M54.50 ACUTE LOW BACK PAIN, UNSPECIFIED BACK PAIN LATERALITY, UNSPECIFIED WHETHER SCIATICA PRESENT: Primary | ICD-10-CM

## 2023-12-12 PROCEDURE — 3288F FALL RISK ASSESSMENT DOCD: CPT | Mod: CPTII,S$GLB,, | Performed by: OTOLARYNGOLOGY

## 2023-12-12 PROCEDURE — 99999 PR PBB SHADOW E&M-EST. PATIENT-LVL III: CPT | Mod: PBBFAC,,, | Performed by: OTOLARYNGOLOGY

## 2023-12-12 PROCEDURE — 1126F PR PAIN SEVERITY QUANTIFIED, NO PAIN PRESENT: ICD-10-PCS | Mod: CPTII,S$GLB,, | Performed by: OTOLARYNGOLOGY

## 2023-12-12 PROCEDURE — 99204 PR OFFICE/OUTPT VISIT, NEW, LEVL IV, 45-59 MIN: ICD-10-PCS | Mod: S$GLB,,, | Performed by: OTOLARYNGOLOGY

## 2023-12-12 PROCEDURE — 1126F AMNT PAIN NOTED NONE PRSNT: CPT | Mod: CPTII,S$GLB,, | Performed by: OTOLARYNGOLOGY

## 2023-12-12 PROCEDURE — 99999 PR PBB SHADOW E&M-EST. PATIENT-LVL III: ICD-10-PCS | Mod: PBBFAC,,, | Performed by: OTOLARYNGOLOGY

## 2023-12-12 PROCEDURE — 99284 EMERGENCY DEPT VISIT MOD MDM: CPT

## 2023-12-12 PROCEDURE — 1100F PR PT FALLS ASSESS DOC 2+ FALLS/FALL W/INJURY/YR: ICD-10-PCS | Mod: CPTII,S$GLB,, | Performed by: OTOLARYNGOLOGY

## 2023-12-12 PROCEDURE — 99204 OFFICE O/P NEW MOD 45 MIN: CPT | Mod: S$GLB,,, | Performed by: OTOLARYNGOLOGY

## 2023-12-12 PROCEDURE — 3288F PR FALLS RISK ASSESSMENT DOCUMENTED: ICD-10-PCS | Mod: CPTII,S$GLB,, | Performed by: OTOLARYNGOLOGY

## 2023-12-12 PROCEDURE — 1100F PTFALLS ASSESS-DOCD GE2>/YR: CPT | Mod: CPTII,S$GLB,, | Performed by: OTOLARYNGOLOGY

## 2023-12-12 RX ORDER — BUTALBITAL, ACETAMINOPHEN AND CAFFEINE 50; 325; 40 MG/1; MG/1; MG/1
1 TABLET ORAL EVERY 6 HOURS PRN
Qty: 15 TABLET | Refills: 0 | Status: SHIPPED | OUTPATIENT
Start: 2023-12-12

## 2023-12-12 NOTE — PROGRESS NOTES
Referring Provider:    Penny Randolph Md  [address] [place] [address]  Subjective:   Patient: Saul Mar [id number], :1940   Visit date:2023 11:18 AM    Chief Complaint:  Dizziness (Started 1mos ago , says she gets off balance or when she lays down she gets real dizzy and feel faint. Has to prop herself up when laying down. ) and Tinnitus (Right ear ringing that's started 3mos ago, has noticed some hearing changes . No fullness , no drainage or pain )    HPI:    Prior notes reviewed by myself.  Clinical documentation obtained by nursing staff reviewed.     83-year-old female presents for evaluation of dizziness.  She notices lightheadedness and possible spinning whenever she lays down on her right side predominantly.  She is adapted her activities to avoid this at all costs.  She has noticed some decreased hearing, tinnitus - she does report intermittent bilateral nonpulsatile tinnitus.  She does have a history of hypertension and diabetes which are controlled, per the patient.  She has not had any recent audiometry or vestibular testing.      Objective:     Physical Exam:  Vitals:  Wt 83.1 kg (183 lb 3.2 oz)   BMI 30.49 kg/m²   General appearance:  Well developed, well nourished    Ears:  Otoscopy of external auditory canals and tympanic membranes was normal, clinical speech reception thresholds grossly intact, no mass/lesion of auricle.    Nose:  No masses/lesions of external nose, nasal mucosa, septum, and turbinates were within normal limits.    Mouth:  No mass/lesion of lips, teeth, gums, hard/soft palate, tongue, tonsils, or oropharynx.    Neck & Lymphatics:  No cervical lymphadenopathy, no neck mass/crepitus/ asymmetry, trachea is midline, no thyroid enlargement/tenderness/mass.    Neuro:  CN II-XII intact bilaterally        [x]  Data Reviewed:    Lab Results   Component Value Date    WBC 5.73 2023    HGB 10.7 (L) 2023    HCT 33.1 (L) 2023    MCV 89  12/04/2023    EOSINOPHIL 3.3 12/04/2023         [x]  Independent interpretation of test:  Normal MRI        MRI Brain Without Contrast  Order: [id number]  Status: Final result       Visible to patient: Yes (not seen)       Next appt: 12/22/2023 at 10:30 AM in Audiology (Gabriel Chaudhry, HETAL-A)       Dx: Cerebral microvascular disease: strok...    2 Result Notes  Details    Reading Physician Reading Date Result Priority   Vazquez Elliott MD  [phone] 12/6/2023 Routine     Narrative & Impression  EXAMINATION:  MRI BRAIN WITHOUT CONTRAST     CLINICAL HISTORY:  CVA.  Follow-up.  Other amnesiaStroke, follow up;Mental status change, unknown cause;     TECHNIQUE:  Standard multiplanar noncontrast sequences of the brain.     COMPARISON:  CT brain January 24, 2019.     FINDINGS:  The ventricles are mildly enlarged as are the extra-axial CSF spaces.  Mild periventricular and subcortical white matter hyperintensity is present as well consistent with age-related small vessel ischemic degeneration.     No definite edema, hemorrhage or mass effect is seen. No extra-axial fluid collection.     Skull base appears normal.     The diffusion sequence is negative.     Impression:     No acute abnormality.  Mild age-related atrophy and white matter degeneration.        Electronically signed by: Vazquez Elliott MD  Date:                                            12/06/2023  Time:                                           11:44    INDEPENDENT REVIEW:  MILD TO MODERATE hf SNHL  Media Information    File Link    Annotation on 3/2/2022  2:13 PM by Anamika Ventura Au.D, HETAL-A: AUDIOGRAM        Key Information    Document ID File Type Document Type Description   [id number] Annotation Annotation AUDIOGRAM     Import Information    Attached At Date Time User Dept   Encounter Level 3/2/2022  2:13 PM Anamika Ventura Au.D, CCC-A [facility] Audiology     Encounter    Clinical Support on 3/2/22 with Kevon  SOWMYA Singer     Media Audit Information    Patient ID was deleted from this patient by Reyna Weldon on 6/23/2014 at 1:05 PM (DCS ID: [id number], File: [id number])   Insurance Documents was deleted from this patient by Natalia Carranza on 11/3/2014 at 1:06 PM (DCS ID: [id number], File: [id number])   Insurance Documents was deleted from this patient by Natalia Carranza on 11/3/2014 at 1:07 PM (DCS ID: [id number], File: [id number])   Insurance Documents was deleted from this patient by Natalia Carranza on 11/3/2014 at 1:07 PM (DCS ID: [id number], File: [id number])   Insurance Documents was deleted from this patient by Natalia Carranza on 11/3/2014 at 1:07 PM (DCS ID: [id number], File: [id number])   Insurance Documents was deleted from this patient by Natalia Carranza on 11/3/2014 at 1:07 PM (DCS ID: [id number], File: [id number])   Sleep Study Report was deleted from this patient by Melissa Faria MD on 6/4/2014 at 12:44 PM (DCS ID: [id number], File: [id number])       Reason: Deleted by the    Insurance Documents was deleted from this patient by [employer] Message on 1/25/2022 at 12:49 PM (DCS ID: [id number], File: [id number])   Insurance Documents was deleted from this patient by [employer] Message on 1/25/2022 at 12:49 PM (DCS ID: [id number], File: [id number])       Assessment & Plan:   Hearing loss, unspecified hearing loss type, unspecified laterality    Vertigo  -     Ambulatory referral/consult to ENT        We reviewed her normal MRI and previous audiogram which does demonstrate some mild-to-moderate high-frequency sensorineural hearing loss.  I recommended an audiology evaluation for [place] and she understands that she may need further testing as well.  We did discuss her multiple other medical issues and medications many of which have dizziness as a possible side effect.  She understands that if her vestibular workup is normal that she will need to discuss this  further with her primary care physician.

## 2023-12-12 NOTE — ED PROVIDER NOTES
Encounter Date: 12/12/2023       History     Chief Complaint   Patient presents with    Fall     Fall 3 days ago, hit top of back on chair, c/o upper back pain and rt. leg pain, ambulatory with walker, denies SOB       83-year-old female with complaint of lower back pain and buttock pain after she fell from chair yesterday.  Patient reports constant aching pain that is worse with any walking and movement.  No other complaints.        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 Taveras    COLONOSCOPY      COLONOSCOPY N/A 9/22/2016    Procedure: COLONOSCOPY;  Surgeon: Jd Ashton MD;  Location: [address]);  Service: Endoscopy;  Laterality: N/A;  OK per Dr Randolph for pt to hold Plavix 5 days prior to procedure/see telephone encounter dated 8/29/16/svn    EPIDURAL STEROID INJECTION N/A 8/2/2023    Procedure: L4-5 interlaminar epidural steroid injection with right paramedian approach with RN IV sedation;  Surgeon: Chan Fonseca MD;  Location: [facility];  Service: Pain Management;  Laterality: N/A;    EYE SURGERY Bilateral 2002 approx    Laser for glaucoma    HYSTERECTOMY  1963     AMEENA/USO- fibroids; no cancer    OOPHORECTOMY   1963    unknown, only removed one    SELECTIVE INJECTION OF ANESTHETIC AGENT AROUND LUMBAR SPINAL NERVE ROOT BY TRANSFORAMINAL APPROACH Bilateral 6/17/2022    Procedure: Bilateral L4/5 TF JASKARAN with RN IV sedation;  Surgeon: Chan Fonseca MD;  Location: [facility] PAIN [facility]GT;  Service: Pain Management;  Laterality: Bilateral;    SELECTIVE INJECTION OF ANESTHETIC AGENT AROUND LUMBAR SPINAL NERVE ROOT BY TRANSFORAMINAL APPROACH Bilateral 10/3/2022    Procedure: Bilateral L2-3 transforaminal epidural steroid injection with RN IV sedation;  Surgeon: Chan Fonseca MD;  Location: [facility] PAIN [facility]GT;  Service: Pain Management;  Laterality: Bilateral;     Family History   Problem Relation Age of Onset    Stroke Mother     Mental illness Mother     Hypertension Mother     Stroke Father     Diabetes Maternal Grandmother     Drug abuse Daughter     Cancer Sister 68        gyn    Ovarian cancer Sister     Cancer Maternal Uncle         type not known    Heart disease Paternal Grandmother     Cancer Maternal Aunt         type unknown    Glaucoma Neg Hx     Macular degeneration Neg Hx     Alcohol abuse Neg Hx     COPD Neg Hx     Asthma Neg Hx     Amblyopia Neg Hx     Blindness Neg Hx     Cataracts Neg Hx     Retinal detachment Neg Hx     Strabismus Neg Hx      Social History     Tobacco Use    Smoking status: Never     Passive exposure: Never    Smokeless tobacco: Never   Substance Use Topics    Alcohol use: No    Drug use: No     Review of Systems   Constitutional:  Negative for fever.   HENT:  Negative for sore throat.    Respiratory:  Negative for shortness of breath.    Cardiovascular:  Negative for chest pain.   Gastrointestinal:  Negative for nausea.   Genitourinary:  Negative for dysuria.   Musculoskeletal:  Positive for back pain.   Skin:  Negative for rash.   Neurological:  Negative for weakness.   Hematological:  Does not bruise/bleed easily.       Physical Exam     Initial Vitals [12/12/23 1141]   BP Pulse Resp Temp SpO2   (!)  167/68 63 18 98.2 °F (36.8 °C) 99 %      MAP       --         Physical Exam    Nursing note and vitals reviewed.  Constitutional: She appears well-developed and well-nourished.   HENT:   Head: Normocephalic and atraumatic.   Eyes: Conjunctivae and EOM are normal. Pupils are equal, round, and reactive to light.   Neck: Neck supple.   Normal range of motion.  Cardiovascular:  Normal rate, regular rhythm, normal heart sounds and intact distal pulses.           Pulmonary/Chest: Breath sounds normal.   Abdominal: Abdomen is soft. There is no abdominal tenderness. There is no rebound and no guarding.   Musculoskeletal:         General: Normal range of motion.      Cervical back: Normal range of motion and neck supple.      Comments: Lower lumbar tenderness, + tenderness of sacral region, full ROM both hips without difficulty     Neurological: She is alert and oriented to person, place, and time. She has normal strength and normal reflexes.   Skin: Skin is warm and dry.   Psychiatric: She has a normal mood and affect. Her behavior is normal. Thought content normal.         ED Course   Procedures  Labs Reviewed - No data to display       Imaging Results              X-Ray Sacrum And Coccyx (Final result)  Result time 12/12/23 12:47:43      Final result by Refugio Conklin MD (12/12/23 12:47:43)                   Impression:      Negative exam.      Electronically signed by: Refugio Conklin  Date:    12/12/2023  Time:    12:47               Narrative:    EXAMINATION:  XR SACRUM AND COCCYX    CLINICAL HISTORY:  Unspecified fall, initial encounter    TECHNIQUE:  Three views of the sacroiliac joints    COMPARISON:  None    FINDINGS:  No evidence of fracture or dislocation.  No significant sclerosis of the SI joints.  Incompletely visualized lower lumbar spine unremarkable.  Hip joint spaces appear well maintained.  Soft tissues unremarkable.                                       X-Ray Pelvis Routine AP (Final result)  Result time  12/12/23 12:48:11      Final result by Refugio Conklin MD (12/12/23 12:48:11)                   Impression:      No acute abnormality.      Electronically signed by: Refugio Conklin  Date:    12/12/2023  Time:    12:48               Narrative:    EXAMINATION:  XR PELVIS ROUTINE AP    CLINICAL HISTORY:  fall;    TECHNIQUE:  AP view of the pelvis was performed.    COMPARISON:  None.    FINDINGS:  No evidence of fracture or dislocation.  SI joints unremarkable.  Mild discogenic degenerative change lower lumbar spine.  Soft tissues unremarkable.  Moderate volume stool throughout the colon.                                       X-Ray Lumbar Spine Ap And Lateral (Final result)  Result time 12/12/23 12:50:29      Final result by Refugio Conklin MD (12/12/23 12:50:29)                   Impression:      No acute abnormality.      Electronically signed by: Refugio Conklin  Date:    12/12/2023  Time:    12:50               Narrative:    EXAMINATION:  XR LUMBAR SPINE AP AND LATERAL    CLINICAL HISTORY:  back pain;    TECHNIQUE:  Three views of the lumbar spine were performed.    COMPARISON:  None    FINDINGS:  Alignment: Alignment is maintained.    Vertebrae: Grade 1 anterolisthesis of L4 on L5. No suspicious appearing lytic or blastic lesions.    Discs and facets: Moderate multilevel discogenic degenerative change.  Moderate to severe lower lumbar facet arthropathy.    Miscellaneous: No additional findings.                                       Medications - No data to display  Medical Decision Making  Amount and/or Complexity of Data Reviewed  Radiology: ordered.    Risk  Prescription drug management.                                      Clinical Impression:  Final diagnoses:  [W19.XXXA] Fall                 Cesar Trejo, DELBERT  12/12/23 1324

## 2023-12-12 NOTE — FIRST PROVIDER EVALUATION
Medical screening examination initiated.  I have conducted a focused provider triage encounter, findings are as follows:    Brief history of present illness:   83-year-old female with complaint of lower back pain and pain around tailbone since she fell out of her chair yesterday.    There were no vitals filed for this visit.    Pertinent physical exam:  lower lumbar tenderness

## 2023-12-13 ENCOUNTER — PATIENT OUTREACH (OUTPATIENT)
Dept: EMERGENCY MEDICINE | Facility: HOSPITAL | Age: 83
End: 2023-12-13

## 2023-12-19 ENCOUNTER — PATIENT MESSAGE (OUTPATIENT)
Dept: NEPHROLOGY | Facility: CLINIC | Age: 83
End: 2023-12-19
Payer: MEDICARE

## 2023-12-19 NOTE — TELEPHONE ENCOUNTER
Patient sent a portal message stating she had a fall and is prescribed Butalbital-acetaminophen-caffeine. Is this safe to take with kidney disease?

## 2023-12-21 ENCOUNTER — PATIENT OUTREACH (OUTPATIENT)
Dept: ADMINISTRATIVE | Facility: OTHER | Age: 83
End: 2023-12-21
Payer: MEDICARE

## 2023-12-21 NOTE — PROGRESS NOTES
CHW - Follow Up    Lina Espinoza, Community Health Worker completed a follow up visit with patient via telephone today.  Pt/Caregiver reported: Ms. Casimiro Mar reported she spoke with Ms. Polly at the [facility] and Pt believes is too high to get assistance for her utility bill.    Community Health Worker provided:  CHW contacted Ms. Cinthia Bang to assist Pt with prescription drugs.   Follow-up Outreach - Due: 1/5/2024

## 2023-12-22 ENCOUNTER — CLINICAL SUPPORT (OUTPATIENT)
Dept: AUDIOLOGY | Facility: CLINIC | Age: 83
End: 2023-12-22
Payer: MEDICARE

## 2023-12-22 DIAGNOSIS — R42 DIZZINESS: ICD-10-CM

## 2023-12-22 DIAGNOSIS — H93.11 TINNITUS, RIGHT: ICD-10-CM

## 2023-12-22 DIAGNOSIS — R42 DIZZINESS: Primary | ICD-10-CM

## 2023-12-22 DIAGNOSIS — H90.3 SENSORINEURAL HEARING LOSS, ASYMMETRICAL: Primary | ICD-10-CM

## 2023-12-22 PROCEDURE — 92557 COMPREHENSIVE HEARING TEST: CPT | Mod: S$GLB,,, | Performed by: AUDIOLOGIST-HEARING AID FITTER

## 2023-12-22 PROCEDURE — 92557 PR COMPREHENSIVE HEARING TEST: ICD-10-PCS | Mod: S$GLB,,, | Performed by: AUDIOLOGIST-HEARING AID FITTER

## 2023-12-22 PROCEDURE — 92567 TYMPANOMETRY: CPT | Mod: S$GLB,,, | Performed by: AUDIOLOGIST-HEARING AID FITTER

## 2023-12-22 PROCEDURE — 92540 BASIC VESTIBULAR EVALUATION: CPT | Mod: S$GLB,,, | Performed by: AUDIOLOGIST-HEARING AID FITTER

## 2023-12-22 PROCEDURE — 92567 PR TYMPA2METRY: ICD-10-PCS | Mod: S$GLB,,, | Performed by: AUDIOLOGIST-HEARING AID FITTER

## 2023-12-22 PROCEDURE — 92537 CALORIC VSTBLR TEST W/REC: CPT | Mod: S$GLB,,, | Performed by: AUDIOLOGIST-HEARING AID FITTER

## 2023-12-22 PROCEDURE — 92540 PR VESTIBULAR EVAL NYSTAG FOVL&PERPH STIM OSCIL TRACKING: ICD-10-PCS | Mod: S$GLB,,, | Performed by: AUDIOLOGIST-HEARING AID FITTER

## 2023-12-22 PROCEDURE — 92537 PR CALORIC VSTBLR TEST W/REC BITHERMAL: ICD-10-PCS | Mod: S$GLB,,, | Performed by: AUDIOLOGIST-HEARING AID FITTER

## 2023-12-22 NOTE — PROGRESS NOTES
"Referring provider: Dr. Stacy Hughes Casimiro Mar was seen 12/22/2023 for an audiological and vestibular evaluation.  Patient complains of dizziness. She notices lightheadedness with sensation that she may "pass out" and possible spinning whenever she lays down on her right side or tilting her head back.  She is adapted her activities to avoid this at all costs. She sleeps semi-recumbent. She has history of hearing loss and tinnitus in the bilateral ear, with her last audiogram in March 2022. She reports tinnitus has been louder in the right ear for the past 4-5 months. She describes non-pulsatile humming. She does have a history of hypertension and diabetes which are controlled, per the patient.     MRI brain w/wo contrast 12/06/2023:   Impression: No acute abnormality.  Mild age-related atrophy and white matter degeneration.    Audiology Report:  Results reveal a mild-to-severe sensorineural hearing loss 250-8000 Hz for the right ear, and a normal-to-moderate sensorineural hearing loss 250-8000 Hz for the left ear.   Speech Reception Thresholds were 15 dBHL for the right ear and 15 dBHL for the left ear.   Word recognition scores were good for the right ear and good for the left ear.   Tympanograms were Type A for the right ear and Type A for the left ear.    Videonystagmography Report (VNG):  Oculomotor function tests (sinusoidal tracking, saccade, optokinetic) were normal and symmetric.  Gaze test was absent for nystagmus.  Spontaneous test was absent for nystagmus.  Head-shake test was absent for after head-shake nystagmus.  Static Positional test revealed <clinically insignificant> nystagmus. Normal fixation suppression. No dizziness was elicited.    Head Center: 2 d/s RB, same on repeat test   Head Right: 3 d/s LB, same on repeat test   Body Right: 3 d/s LB   Head Left: 2 d/s RB, absent for nystagmus on repeat test   Body Left: 3 d/s RB  [place]-Hallpike Right was negative for BPPV. There was 3 d/s " down-beating nystagmus to head-hanging and 2 d/s right-beating nystagmus upon sitting. Dizziness only during sitting up.   [place]-Hallpike Left was negative for BPPV. There was 2 d/s down-beating nystagmus to head-hanging and no nystagmus upon sitting. Dizziness only during sitting up.   Bi-thermal caloric test was Normal.  Fixation suppression following caloric irrigations was normal.  The following values were obtained:  Unilateral weakness (UW): 2% left ear  Directional preponderance (DP): 2% right beating  RC: 12 d/s   d/s   RW: 12 d/s  LW: 11 d/s     Summary: Normal VNG. Negative for BPPV. The presence of <clinically insignificant> positional nystagmus is non-localizing. Patient is provided VRT handout.     Recommendations:  ENT review  Annual audiogram to monitor asymmetry in hearing.  Hearing aids, binaural. Patient is provided a copy of her audiogram and will check into [employer] benefit.  Trial home VRT. Handout was provided and reviewed.     Patient was counseled on the above findings.  Tracings are to be scanned.

## 2023-12-22 NOTE — Clinical Note
For your review. Recent negative MRI. Told pt you will f/u only if you have additional recommendations than mine.

## 2023-12-28 ENCOUNTER — PATIENT MESSAGE (OUTPATIENT)
Dept: PAIN MEDICINE | Facility: CLINIC | Age: 83
End: 2023-12-28
Payer: MEDICARE

## 2023-12-29 DIAGNOSIS — M25.552 LEFT HIP PAIN: Primary | ICD-10-CM

## 2023-12-30 NOTE — PROGRESS NOTES
Orthopaedics Sports Medicine     Hip Initial Visit         12/30/2023    Referring MD: No ref. provider found    No chief complaint on file.        History of Present Illness:   Saul Mar is a 83 y.o. female who presents with left *** hip pain and dysfunction. She was initially seen in the ER on 12/12/23 for this issue and noted lower back pain and buttock pain after she fell from chair the day prior  Patient reported constant aching pain that was worse with any walking and movement.    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 Taveras    COLONOSCOPY      COLONOSCOPY N/A 9/22/2016    Procedure: COLONOSCOPY;  Surgeon: Jd Ashton MD;  Location: [facility] ENDO ([address] [place] [address]);  Service: Endoscopy;  Laterality: N/A;  OK per Dr Randolph for pt to hold Plavix 5 days prior to procedure/see telephone encounter dated 8/29/16/svn    EPIDURAL STEROID INJECTION N/A 8/2/2023    Procedure: L4-5 interlaminar epidural steroid injection with right paramedian approach with RN IV sedation;  Surgeon: Chan Fonseca MD;  Location: [facility] PAIN T;  Service: Pain  Management;  Laterality: N/A;    EYE SURGERY Bilateral 2002 approx    Laser for glaucoma    HYSTERECTOMY  1963     AMEENA/USO- fibroids; no cancer    OOPHORECTOMY  1963    unknown, only removed one    SELECTIVE INJECTION OF ANESTHETIC AGENT AROUND LUMBAR SPINAL NERVE ROOT BY TRANSFORAMINAL APPROACH Bilateral 6/17/2022    Procedure: Bilateral L4/5 TF JASKARAN with RN IV sedation;  Surgeon: Chan Fonseca MD;  Location: [facility]IN [facility]GT;  Service: Pain Management;  Laterality: Bilateral;    SELECTIVE INJECTION OF ANESTHETIC AGENT AROUND LUMBAR SPINAL NERVE ROOT BY TRANSFORAMINAL APPROACH Bilateral 10/3/2022    Procedure: Bilateral L2-3 transforaminal epidural steroid injection with RN IV sedation;  Surgeon: Chan Fonseca MD;  Location: [facility]IN [facility];  Service: Pain Management;  Laterality: Bilateral;       Medications:  Patient's Medications   New Prescriptions    No medications on file   Previous Medications    ALBUTEROL (PROVENTIL/VENTOLIN HFA) 90 MCG/ACTUATION INHALER    INHALE 2 PUFFS INTO THE LUNGS EVERY 6 (SIX) HOURS AS NEEDED FOR WHEEZING. RESCUE    ALPRAZOLAM (XANAX) 0.5 MG TABLET    TAKE 1 TABLET BY MOUTH NIGHTLY AS NEEDED FOR ANXIETY (MAY TAKE 1-2 TIMES WEEKLY FOR ANXIETY)    AMLODIPINE (NORVASC) 5 MG TABLET    Take 1 tablet (5 mg total) by mouth 2 (two) times daily.    ATORVASTATIN (LIPITOR) 80 MG TABLET    TAKE 1 TABLET BY MOUTH EVERY DAY    BLOOD SUGAR DIAGNOSTIC STRP    1 strip by Misc.(Non-Drug; Combo Route) route 2 (two) times daily. Insurance preferred test stripes [id number]    BLOOD SUGAR DIAGNOSTIC STRP    For use with insurance covered glucometer; test daily    BRIMONIDINE 0.2% (ALPHAGAN) 0.2 % DROP    Place 1 drop into both eyes 3 (three) times daily.    BUTALBITAL-ACETAMINOPHEN-CAFFEINE -40 MG (FIORICET, ESGIC) -40 MG PER TABLET    Take 1 tablet by mouth every 6 (six) hours as needed for Pain.    CARVEDILOL (COREG) 12.5 MG TABLET    Take 12.5 mg by mouth.    CHOLECALCIFEROL, VITAMIN D3,  (VITAMIN D3) 1,000 UNIT CAPSULE    Take 1 capsule (1,000 Units total) by mouth once daily.    FERROUS SULFATE (FEOSOL) 325 MG (65 MG IRON) TAB TABLET    Take 1 tablet (325 mg total) by mouth 2 (two) times daily.    FLECAINIDE (TAMBOCOR) 50 MG TAB    Take 1 tablet (50 mg total) by mouth 2 (two) times daily.    GLIMEPIRIDE (AMARYL) 4 MG TABLET    TAKE 1 TABLET BY MOUTH IN THE MORNING WITH BREAKFAST    HYDROCHLOROTHIAZIDE (HYDRODIURIL) 12.5 MG TAB    TAKE 1 TABLET BY MOUTH EVERY DAY    LEVALBUTEROL (XOPENEX HFA) 45 MCG/ACTUATION INHALER    INHALE 1-2 PUFFS INTO THE LUNGS EVERY 6 (SIX) HOURS AS NEEDED FOR WHEEZING. RESCUE    LINACLOTIDE (LINZESS) 145 MCG CAP CAPSULE    TAKE 1 CAPSULE (145 MCG TOTAL) BY MOUTH BEFORE BREAKFAST.    LOSARTAN (COZAAR) 50 MG TABLET    TAKE 1 TABLET BY MOUTH TWICE A DAY    MECLIZINE (ANTIVERT) 25 MG TABLET    TAKE 1 TABLET (25 MG TOTAL) BY MOUTH DAILY AS NEEDED FOR DIZZINESS.    METFORMIN (GLUCOPHAGE-XR) 500 MG ER 24HR TABLET    TAKE 2 TABLETS BY MOUTH EVERY DAY    METOPROLOL SUCCINATE (TOPROL-XL) 50 MG 24 HR TABLET    TAKE 1 TABLET BY MOUTH EVERY DAY    ONETOUCH DELICA PLUS LANCET 33 GAUGE MISC    Apply topically.    ONETOUCH ULTRA2 METER MISC    SMARTSIG:Via Meter As Directed    XARELTO 15 MG TAB    TAKE 1 TABLET (15 MG TOTAL) BY MOUTH DAILY WITH DINNER OR EVENING MEAL.   Modified Medications    No medications on file   Discontinued Medications    No medications on file       Allergies:   Review of patient's allergies indicates:   Allergen Reactions    Pravachol [pravastatin] Nausea Only       Social History:   Home town: [place], [place]  Occupation: ***  Alcohol use: She reports no history of alcohol use.  Tobacco use: She reports that she has never smoked. She has never been exposed to tobacco smoke. She has never used smokeless tobacco.    Review of systems:  History of recent illness, fevers, shakes, or chills: no***  History of cardiac problems or chest pain: Yes  History of pulmonary  "problems or asthma: no***  History of diabetes: Yes  History of prior dvt or clotting problems: no***  History of sleep apnea: no***      Physical Examination:  Estimated body mass index is 30.08 kg/m² as calculated from the following:    Height as of 12/7/23: 5' 5" (1.651 m).    Weight as of 12/12/23: 82 kg (180 lb 12.4 oz).    General  Healthy appearing female in no acute distress  Alert and oriented, normal mood, appropriate affect    Ortho Exam        Imaging:  X-Ray Lumbar Spine Ap And Lateral  Narrative: EXAMINATION:  XR LUMBAR SPINE AP AND LATERAL    CLINICAL HISTORY:  back pain;    TECHNIQUE:  Three views of the lumbar spine were performed.    COMPARISON:  None    FINDINGS:  Alignment: Alignment is maintained.    Vertebrae: Grade 1 anterolisthesis of L4 on L5. No suspicious appearing lytic or blastic lesions.    Discs and facets: Moderate multilevel discogenic degenerative change.  Moderate to severe lower lumbar facet arthropathy.    Miscellaneous: No additional findings.  Impression: No acute abnormality.    Electronically signed by: Refugio Conklin  Date:    12/12/2023  Time:    12:50  X-Ray Pelvis Routine AP  Narrative: EXAMINATION:  XR PELVIS ROUTINE AP    CLINICAL HISTORY:  fall;    TECHNIQUE:  AP view of the pelvis was performed.    COMPARISON:  None.    FINDINGS:  No evidence of fracture or dislocation.  SI joints unremarkable.  Mild discogenic degenerative change lower lumbar spine.  Soft tissues unremarkable.  Moderate volume stool throughout the colon.  Impression: No acute abnormality.    Electronically signed by: Refugio Conklin  Date:    12/12/2023  Time:    12:48  X-Ray Sacrum And Coccyx  Narrative: EXAMINATION:  XR SACRUM AND COCCYX    CLINICAL HISTORY:  Unspecified fall, initial encounter    TECHNIQUE:  Three views of the sacroiliac joints    COMPARISON:  None    FINDINGS:  No evidence of fracture or dislocation.  No significant sclerosis of the SI joints.  Incompletely visualized lower lumbar spine " unremarkable.  Hip joint spaces appear well maintained.  Soft tissues unremarkable.  Impression: Negative exam.    Electronically signed by: Refugio Conklin  Date:    12/12/2023  Time:    12:47       Physician Read: I agree with the above impression.***      Impression:  83 y.o. female with ***      Plan:  Discussed diagnosis and treatment options with the patient today. ***  Follow up ***           Jose Luis Oscar MD    I, Baljinder Kulkarni, acted as a scribe for Jose Luis Oscar MD for the duration of this office visit.

## 2024-01-02 ENCOUNTER — HOSPITAL ENCOUNTER (OUTPATIENT)
Dept: RADIOLOGY | Facility: HOSPITAL | Age: 84
Discharge: HOME | End: 2024-01-02
Attending: STUDENT IN AN ORGANIZED HEALTH CARE EDUCATION/TRAINING PROGRAM
Payer: MEDICARE

## 2024-01-02 ENCOUNTER — PATIENT MESSAGE (OUTPATIENT)
Dept: PAIN MEDICINE | Facility: CLINIC | Age: 84
End: 2024-01-02
Payer: MEDICARE

## 2024-01-02 ENCOUNTER — OFFICE VISIT (OUTPATIENT)
Dept: SPORTS MEDICINE | Facility: CLINIC | Age: 84
End: 2024-01-02
Payer: MEDICARE

## 2024-01-02 VITALS — RESPIRATION RATE: 17 BRPM | WEIGHT: 180 LBS | HEIGHT: 65 IN | BODY MASS INDEX: 29.99 KG/M2

## 2024-01-02 DIAGNOSIS — M25.552 LEFT HIP PAIN: ICD-10-CM

## 2024-01-02 DIAGNOSIS — S70.00XA CONTUSION OF HIP, INITIAL ENCOUNTER: Primary | ICD-10-CM

## 2024-01-02 PROCEDURE — 72100 X-RAY EXAM L-S SPINE 2/3 VWS: CPT | Mod: 26,,, | Performed by: RADIOLOGY

## 2024-01-02 PROCEDURE — 99213 OFFICE O/P EST LOW 20 MIN: CPT | Mod: S$GLB,,, | Performed by: STUDENT IN AN ORGANIZED HEALTH CARE EDUCATION/TRAINING PROGRAM

## 2024-01-02 PROCEDURE — 72100 X-RAY EXAM L-S SPINE 2/3 VWS: CPT | Mod: TC

## 2024-01-02 PROCEDURE — 99999 PR PBB SHADOW E&M-EST. PATIENT-LVL V: CPT | Mod: PBBFAC,,, | Performed by: STUDENT IN AN ORGANIZED HEALTH CARE EDUCATION/TRAINING PROGRAM

## 2024-01-02 PROCEDURE — 73502 X-RAY EXAM HIP UNI 2-3 VIEWS: CPT | Mod: 26,LT,, | Performed by: RADIOLOGY

## 2024-01-02 PROCEDURE — 73502 X-RAY EXAM HIP UNI 2-3 VIEWS: CPT | Mod: TC,LT

## 2024-01-02 RX ORDER — MECLIZINE HYDROCHLORIDE 25 MG/1
25 TABLET ORAL DAILY PRN
Qty: 30 TABLET | Refills: 0 | Status: SHIPPED | OUTPATIENT
Start: 2024-01-02

## 2024-01-02 NOTE — PROGRESS NOTES
Orthopaedics Sports Medicine     Hip Initial Visit         1/2/2024    Referring MD: No ref. provider found    Chief Complaint   Patient presents with    Left Hip - Pain           History of Present Illness:   Saul Mar is a 83 y.o. female who presents with left  hip pain and dysfunction. She was initially seen in the ER on 12/12/23 for this issue and noted lower back pain and buttock pain after she fell from chair the day prior  Patient reported constant aching pain that was worse with any walking and movement.    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 Taveras    COLONOSCOPY      COLONOSCOPY N/A 9/22/2016    Procedure: COLONOSCOPY;  Surgeon: Jd Ashton MD;  Location: [facility] ([address] [place] [address]);  Service: Endoscopy;  Laterality: N/A;  OK per Dr Randolph for pt to hold Plavix 5 days prior to procedure/see telephone encounter dated 8/29/16/svn    EPIDURAL STEROID INJECTION N/A 8/2/2023    Procedure: L4-5 interlaminar epidural steroid injection with right paramedian approach with  RN IV sedation;  Surgeon: Chan Fonseca MD;  Location: [facility];  Service: Pain Management;  Laterality: N/A;    EYE SURGERY Bilateral 2002 approx    Laser for glaucoma    HYSTERECTOMY  1963     AMEENA/USO- fibroids; no cancer    OOPHORECTOMY  1963    unknown, only removed one    SELECTIVE INJECTION OF ANESTHETIC AGENT AROUND LUMBAR SPINAL NERVE ROOT BY TRANSFORAMINAL APPROACH Bilateral 6/17/2022    Procedure: Bilateral L4/5 TF JASKARAN with RN IV sedation;  Surgeon: Chan Fonseca MD;  Location: [facility];  Service: Pain Management;  Laterality: Bilateral;    SELECTIVE INJECTION OF ANESTHETIC AGENT AROUND LUMBAR SPINAL NERVE ROOT BY TRANSFORAMINAL APPROACH Bilateral 10/3/2022    Procedure: Bilateral L2-3 transforaminal epidural steroid injection with RN IV sedation;  Surgeon: Chan Fonseca MD;  Location: [facility];  Service: Pain Management;  Laterality: Bilateral;       Medications:  Patient's Medications   New Prescriptions    No medications on file   Previous Medications    ALBUTEROL (PROVENTIL/VENTOLIN HFA) 90 MCG/ACTUATION INHALER    INHALE 2 PUFFS INTO THE LUNGS EVERY 6 (SIX) HOURS AS NEEDED FOR WHEEZING. RESCUE    ALPRAZOLAM (XANAX) 0.5 MG TABLET    TAKE 1 TABLET BY MOUTH NIGHTLY AS NEEDED FOR ANXIETY (MAY TAKE 1-2 TIMES WEEKLY FOR ANXIETY)    AMLODIPINE (NORVASC) 5 MG TABLET    Take 1 tablet (5 mg total) by mouth 2 (two) times daily.    ATORVASTATIN (LIPITOR) 80 MG TABLET    TAKE 1 TABLET BY MOUTH EVERY DAY    BLOOD SUGAR DIAGNOSTIC STRP    1 strip by Misc.(Non-Drug; Combo Route) route 2 (two) times daily. Insurance preferred test stripes [id number]:[id number]    BLOOD SUGAR DIAGNOSTIC STRP    For use with insurance covered glucometer; test daily    BRIMONIDINE 0.2% (ALPHAGAN) 0.2 % DROP    Place 1 drop into both eyes 3 (three) times daily.    BUTALBITAL-ACETAMINOPHEN-CAFFEINE -40 MG (FIORICET, ESGIC) -40 MG PER TABLET    Take 1 tablet by mouth every 6 (six) hours as needed for Pain.    CARVEDILOL  (COREG) 12.5 MG TABLET    Take 12.5 mg by mouth.    CHOLECALCIFEROL, VITAMIN D3, (VITAMIN D3) 1,000 UNIT CAPSULE    Take 1 capsule (1,000 Units total) by mouth once daily.    FERROUS SULFATE (FEOSOL) 325 MG (65 MG IRON) TAB TABLET    Take 1 tablet (325 mg total) by mouth 2 (two) times daily.    FLECAINIDE (TAMBOCOR) 50 MG TAB    Take 1 tablet (50 mg total) by mouth 2 (two) times daily.    GLIMEPIRIDE (AMARYL) 4 MG TABLET    TAKE 1 TABLET BY MOUTH IN THE MORNING WITH BREAKFAST    HYDROCHLOROTHIAZIDE (HYDRODIURIL) 12.5 MG TAB    TAKE 1 TABLET BY MOUTH EVERY DAY    LEVALBUTEROL (XOPENEX HFA) 45 MCG/ACTUATION INHALER    INHALE 1-2 PUFFS INTO THE LUNGS EVERY 6 (SIX) HOURS AS NEEDED FOR WHEEZING. RESCUE    LINACLOTIDE (LINZESS) 145 MCG CAP CAPSULE    TAKE 1 CAPSULE (145 MCG TOTAL) BY MOUTH BEFORE BREAKFAST.    LOSARTAN (COZAAR) 50 MG TABLET    TAKE 1 TABLET BY MOUTH TWICE A DAY    MECLIZINE (ANTIVERT) 25 MG TABLET    TAKE 1 TABLET (25 MG TOTAL) BY MOUTH DAILY AS NEEDED FOR DIZZINESS.    METFORMIN (GLUCOPHAGE-XR) 500 MG ER 24HR TABLET    TAKE 2 TABLETS BY MOUTH EVERY DAY    METOPROLOL SUCCINATE (TOPROL-XL) 50 MG 24 HR TABLET    TAKE 1 TABLET BY MOUTH EVERY DAY    ONETOUCH DELICA PLUS LANCET 33 GAUGE MISC    Apply topically.    ONETOUCH ULTRA2 METER MISC    SMARTSIG:Via Meter As Directed    XARELTO 15 MG TAB    TAKE 1 TABLET (15 MG TOTAL) BY MOUTH DAILY WITH DINNER OR EVENING MEAL.   Modified Medications    No medications on file   Discontinued Medications    No medications on file       Allergies:   Review of patient's allergies indicates:   Allergen Reactions    Pravachol [pravastatin] Nausea Only       Social History:   Home town: [place], [place]  Occupation: Retired  Alcohol use: She reports no history of alcohol use.  Tobacco use: She reports that she has never smoked. She has never been exposed to tobacco smoke. She has never used smokeless tobacco.    Review of systems:  History of recent illness, fevers, shakes, or  "chills: no  History of cardiac problems or chest pain: Yes  History of pulmonary problems or asthma: no  History of diabetes: Yes  History of prior dvt or clotting problems: no  History of sleep apnea: no      Physical Examination:  Estimated body mass index is 29.95 kg/m² as calculated from the following:    Height as of this encounter: 5' 5" (1.651 m).    Weight as of this encounter: 81.6 kg (180 lb).    General  Healthy appearing female in no acute distress  Alert and oriented, normal mood, appropriate affect    Ortho Exam  Patient is ambulatory with a walker  TTP along left side of lumbar spine and SI joint    Sensation equal and intact    MMT:   Hip flexion 5/5  Knee extension 5/5  Plantar flexion 5/5        Imaging:  X-Ray Lumbar Spine 2 Or 3 Views  Narrative: EXAM: XR LUMBAR SPINE 2 OR 3 VIEWS    CLINICAL HISTORY: Lower back pain.    TECHNIQUE: Three-view lumbar spine series.    COMPARISON: 05/18/2023.    FINDINGS: Disc space narrowing with slight ventral subluxation at L4-5.  Mild disc space narrowing at L2-3.  Multilevel osteophyte formation.  Arthritic changes involving the facets at the L4-5 and L5-S1 levels.  Impression:  Multilevel degenerative disc disease.  Findings appear similar to examination dated 05/18/2023.    Finalized on: 1/2/2024 3:25 PM By:  Ben Richardson MD  R# [id number]      2024-01-02 15:27:29.008    [facility]  X-Ray Hip 2 or 3 views Left (with Pelvis when performed)  Narrative: EXAM: XR HIP WITH PELVIS WHEN PERFORMED, 2 OR 3 VIEWS LEFT    CLINICAL HISTORY: Left hip pain.    TECHNIQUE: AP pelvis and left hip.    FINDINGS: No fracture or dislocation is demonstrated. Mild arthritic change involving both hips.  SPECT ankylosis of the SI joints.  Multiple calcifications in the pelvis.  Impression:  Mild arthritic change involving both hips.  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    Finalized on: 1/2/2024 3:14 PM By:  Ben Richardson MD  [facility]# [id number]      2024-01-02 15:16:58.105    [facility]       Physician " Read: I agree with the above impression.      Impression:  83 y.o. female with Left lumbar/hip deep bruising       Plan:  Discussed diagnosis and treatment options with the patient today. Her exam is most consistent with left lumbar/hip deep bone bruising. I explained her bony anatomy looked stable and I am not overly concerned for a fracture.   I explained we should treat it as a deep bone bruise until proven otherwise. I discussed that after roughly 6 weeks from injury these things usually start to improve.   I recommend talking with her nephrologist to discuss which anti-inflammatories she can safely take. I recommend tylenol in the mean time for pain control.   I also recommend starting physical therapy to work on core strength and gait training. Referral was placed today.  Follow up as needed.            Jose Luis Oscar MD    I, Baljinder Kulkarni, acted as a scribe for Jose Luis Oscar MD for the duration of this office visit.

## 2024-01-03 ENCOUNTER — PATIENT MESSAGE (OUTPATIENT)
Dept: PAIN MEDICINE | Facility: CLINIC | Age: 84
End: 2024-01-03
Payer: MEDICARE

## 2024-01-03 ENCOUNTER — PATIENT MESSAGE (OUTPATIENT)
Dept: PODIATRY | Facility: CLINIC | Age: 84
End: 2024-01-03
Payer: MEDICARE

## 2024-01-03 NOTE — PROGRESS NOTES
Established Patient Chronic Pain Note (Follow up Visit)    The patient location is: home  The chief complaint leading to consultation is: L hip pain  Visit type: Virtual visit with synchronous audio and video  Total time spent with patient: 11-15m  Each patient to whom he or she provides medical services by telemedicine is: (1) informed of the relationship between the physician and patient and the respective role of any other health care provider with respect to management of the patient; and (2) notified that he or she may decline to receive medical services by telemedicine and may withdraw from such care at any time.    Referring Physician: No ref. provider found    PCP: Penny Randolph MD    Chief Complaint:   L hip pain     Interval History (1/5/2024):  Patient Saul Mar presents today for follow-up visit.  Patient experienced a fall in 12/10 where her chair was pulled out from behind her and she fell onto her bottom.  She went to the ER for evaluation with x-rays of the lumbar spine and left hip which were both negative for fracture.  She is since then seen Dr. Jacobsen who has ordered repeat x-rays of the lumbar spine and x-ray and again both were negative for fracture or dislocation.  She has physical therapy ordered and is supposed to start on January 9th.  Today she rates her pain at 8/10 but stay at its worst a 10/10.  Pain is continuous and does not radiate down the lower extremities.  She is currently using Voltaren gel and was prescribed Fioricet in the ER with no relief of symptoms.  Patient denies night fever/night sweats, urinary incontinence, bowel incontinence, significant weight loss and significant motor weakness.   Patient denies any other complaints or concerns at this time.      SUBJECTIVE:  Interval history 11/13/2023  Patient presents status post L4-5 interlaminar epidural steroid injection with right paramedian approach 08/02/2023.  Patient reports it took a few weeks for  therapeutic benefit the patient reports at least 75-80% improvement in right lower extremity radicular symptoms following her epidural steroid injection.  Patient reports taking a trip to [place] from September 6 through September 12th with significant improvement in her pain and improvement in mobility following her injection.  She reports upon her return pain relief was sustained until 1 week prior.  Today she reports pain has again becomes severe 6-7/10.  Patient reports pain along the lateral and posterior aspect of the right lower extremity in L4-S1 distribution to the calf.  Pain is exacerbated with standing and with ambulation.  Patient reports she would like to wait and see for another week if pain improves.  She has continued physician directed physical therapy exercises at home over the last 3 months with marginal improvement in her symptoms.    Interval Hx: 07/12/2023  Patient presents for follow-up of lower back and leg pain.  Today patient reports pain in the lower back and leg has become severe over the last several months.  Pain is constant and today is rated a 6/10.  Pain is described as aching and shooting in nature.  Patient reports pain in the lower back which radiates down the lateral and posterior aspect of the right lower extremity in L4-S1 distribution to the knee.  Pain is exacerbated with standing or with ambulation.  Patient reports pain has severely limited her day-to-day activities and quality of life.  Patient would like to pursue repeat intervention.  Patient has continued physician directed physical therapy exercises at home over the last 8 weeks without meaningful improvement in her pain.    Of note, patient saw Odilia Valverde PA-C in Neurosurgery 08/12/2022 with the following evaluation:        Interval history 10/26/2022  Patient presents status post bilateral L2/3 transforaminal epidural steroid injection 10/03/2022.  Patient reports limited 50% relief along the posterior  aspect of the right lower extremity following her procedure.  She continues to report pain along the lateral aspect of the right lower extremity to the calf in L4 distribution.  Pain today is a 10/10.  Patient does endorse associated weakness in the lower extremity associated with her pain.  Patient reports her leg pain is more severe and debilitating than the back pain.  Patient reports she is scheduled to restart physical therapy the following week.  Patient has discontinued gabapentin secondary to intolerable side effect.    Interval History (7/18/2022): Saul Mar presents today for follow-up visit.  she underwent bilateral L4/5 transforaminal epidural steroid injection on 6/17/22.  The patient reports that she is/was unchanged following the procedure.  she reports 0% pain relief.  Reports pain is worsened with prolonged sitting, improved with leaning forward (shopping cart sign). Reports continued aching, stinging low back pain in a band-like distribution with radiation down bilateral lower extremities. Reports she was unable to tolerate Gabapentin due to drowsiness and discontinued. Reports she received no relief while taking this medication.  Patient reports pain as 8/10 today.    Initial HPI  Saul Mar is a 83 y.o. female with past medical history significant for CVA, MDD, primary open angle glaucoma, DMII, AFib, HLD, HTN, diastolic dysfunction, PAD, CKD III, SStrait, GERD who presents with bilateral leg pain.  Patient reports pain began approximately 1 year prior without inciting accident injury or trauma.  Today patient reports pain which is constant which is rated a 10/10.  Pain is described as aching in nature.  Patient reports pain originating in bilateral buttocks and radiating to bilateral calves in L4-L5 distribution.  Pain is equally worse on both sides.  Pain is exacerbated with prolonged standing and with ambulation.  Patient ambulates with a walker and reports she is only  able to ambulate a few steps before requiring rest.  Patient reports pain is improved with lumbar flexion and rest.  Patient does report associated weakness in bilateral lower extremities associated with her pain.  She denies bowel or bladder incontinence or saddle anesthesia.  Patient reports completing several sessions of conventional physical therapy with initial improvement in her symptoms, however with increased intensity of exercises, exacerbation of pain.    Pt last saw Dr. Sanchez 8/24/21 with recommendation to continue with PT and discussion of future MBB.    Patient reports significant motor weakness and loss of sensations.  Patient denies night fever/night sweats, urinary incontinence, bowel incontinence and significant weight loss.      Pain Disability Index Review:         7/12/2023     9:16 AM 10/26/2022     9:13 AM 7/18/2022     1:41 PM   Last 3 PDI Scores   Pain Disability Index (PDI) 42 [phone] 34       Non-Pharmacologic Treatments:  Physical Therapy/Home Exercise: yes  Ice/Heat:yes  TENS: no  Acupuncture: no  Massage: no  Chiropractic: no    Other: no      Pain Medications:  - Opioids: Vicodin ( Hydrocodone/Acetaminophen)  - Adjuvant Medications: Neurontin (Gabapentin) and Xanax (Alprazolam). 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 Taveras    COLONOSCOPY      COLONOSCOPY N/A 9/22/2016    Procedure: COLONOSCOPY;  Surgeon: Jd Ashton MD;  Location: [facility] ENDO (4TH FLR);  Service: Endoscopy;  Laterality: N/A;  OK per Dr Randolph for pt to hold Plavix 5 days prior to procedure/see telephone encounter dated 8/29/16/svn    EPIDURAL STEROID INJECTION N/A 8/2/2023    Procedure: L4-5 interlaminar epidural steroid injection with right paramedian approach with RN IV sedation;  Surgeon: Chan Fonseca MD;  Location: [facility] PAIN MGT;  Service: Pain Management;  Laterality: N/A;    EYE SURGERY Bilateral 2002 approx    Laser for glaucoma    HYSTERECTOMY  1963     AMEENA/USO- fibroids; no cancer    OOPHORECTOMY  1963    unknown, only removed one    SELECTIVE INJECTION OF ANESTHETIC AGENT AROUND LUMBAR SPINAL NERVE ROOT BY TRANSFORAMINAL APPROACH Bilateral 6/17/2022    Procedure: Bilateral L4/5 TF JASKARAN with RN IV sedation;  Surgeon: Chan Fonseca MD;  Location: [facility] PAIN MGT;  Service: Pain Management;  Laterality: Bilateral;    SELECTIVE INJECTION OF ANESTHETIC AGENT AROUND LUMBAR SPINAL NERVE ROOT BY TRANSFORAMINAL APPROACH Bilateral 10/3/2022    Procedure: Bilateral L2-3 transforaminal epidural steroid injection with RN IV sedation;  Surgeon: Chan Fonseca MD;  [facility];  Service: Pain Management;  Laterality: Bilateral;     Review of patient's allergies indicates:   Allergen Reactions    Pravachol [pravastatin] Nausea Only       Current Outpatient Medications   Medication Sig    albuterol (PROVENTIL/VENTOLIN HFA) 90 mcg/actuation inhaler INHALE 2 PUFFS INTO THE LUNGS EVERY 6 (SIX) HOURS AS NEEDED FOR WHEEZING. RESCUE    ALPRAZolam (XANAX) 0.5 MG tablet  TAKE 1 TABLET BY MOUTH NIGHTLY AS NEEDED FOR ANXIETY (MAY TAKE 1-2 TIMES WEEKLY FOR ANXIETY)    amLODIPine (NORVASC) 5 MG tablet Take 1 tablet (5 mg total) by mouth 2 (two) times daily.    atorvastatin (LIPITOR) 80 MG tablet TAKE 1 TABLET BY MOUTH EVERY DAY    blood sugar diagnostic Strp 1 strip by Misc.(Non-Drug; Combo Route) route 2 (two) times daily. Insurance preferred test stripes [id number]    blood sugar diagnostic Strp For use with insurance covered glucometer; test daily    brimonidine 0.2% (ALPHAGAN) 0.2 % Drop Place 1 drop into both eyes 3 (three) times daily.    butalbital-acetaminophen-caffeine -40 mg (FIORICET, ESGIC) -40 mg per tablet Take 1 tablet by mouth every 6 (six) hours as needed for Pain.    carvediloL (COREG) 12.5 MG tablet Take 12.5 mg by mouth.    cholecalciferol, vitamin D3, (VITAMIN D3) 1,000 unit capsule Take 1 capsule (1,000 Units total) by mouth once daily.    ferrous sulfate (FEOSOL) 325 mg (65 mg iron) Tab tablet Take 1 tablet (325 mg total) by mouth 2 (two) times daily.    flecainide (TAMBOCOR) 50 MG Tab Take 1 tablet (50 mg total) by mouth 2 (two) times daily.    glimepiride (AMARYL) 4 MG tablet TAKE 1 TABLET BY MOUTH IN THE MORNING WITH BREAKFAST    hydroCHLOROthiazide (HYDRODIURIL) 12.5 MG Tab TAKE 1 TABLET BY MOUTH EVERY DAY    levalbuterol (XOPENEX HFA) 45 mcg/actuation inhaler INHALE 1-2 PUFFS INTO THE LUNGS EVERY 6 (SIX) HOURS AS NEEDED FOR WHEEZING. RESCUE    linaCLOtide (LINZESS) 145 mcg Cap capsule TAKE 1 CAPSULE (145 MCG TOTAL) BY MOUTH BEFORE BREAKFAST.    losartan (COZAAR) 50 MG tablet TAKE 1 TABLET BY MOUTH TWICE A DAY    meclizine (ANTIVERT) 25 mg tablet TAKE 1 TABLET (25 MG TOTAL) BY MOUTH DAILY AS NEEDED FOR DIZZINESS.    metFORMIN (GLUCOPHAGE-XR) 500 MG ER 24hr tablet TAKE 2 TABLETS BY MOUTH EVERY DAY    metoprolol succinate (TOPROL-XL) 50 MG 24 hr tablet TAKE 1 TABLET BY MOUTH EVERY DAY    ONETOUCH DELICA PLUS LANCET 33 gauge Misc Apply topically.     ONETOUCH ULTRA2 METER Misc SMARTSIG:Via Meter As Directed    XARELTO 15 mg Tab TAKE 1 TABLET (15 MG TOTAL) BY MOUTH DAILY WITH DINNER OR EVENING MEAL.     No current facility-administered medications for this visit.       Review of Systems     GENERAL:  No weight loss, malaise or fevers.  HEENT:   No recent changes in vision or hearing  NECK:  Negative for lumps, no difficulty with swallowing.  RESPIRATORY:  Negative for cough, wheezing or shortness of breath, patient denies any recent URI.  CARDIOVASCULAR:  Negative for chest pain, leg swelling or palpitations.  GI:  Negative for abdominal discomfort, blood in stools or black stools or change in bowel habits.  MUSCULOSKELETAL:  See HPI.  SKIN:  Negative for lesions, rash, and itching.  PSYCH:  No mood disorder or recent psychosocial stressors.   HEMATOLOGY/LYMPHOLOGY:  Negative for prolonged bleeding, bruising easily or swollen nodes.    NEURO:   No history of headaches, syncope, paralysis, seizures or tremors.  All other reviewed and negative other than HPI.    OBJECTIVE:    There were no vitals taken for this visit.    Telemedicine Exam  There were no vitals filed for this visit.  There is no height or weight on file to calculate BMI.   (reviewed on 1/5/2024)     GENERAL: Well appearing, in no acute distress, alert and oriented x3.  Cooperative.  PSYCH:  Mood and affect appropriate.  SKIN: Skin color & texture with no obvious abnormalities.    HEAD/FACE:  Normocephalic, atraumatic.    PULM:  No difficulty breathing. No nasal flaring. No obvious wheezing.  EXTREMITIES: No obvious deformities. Moving all extremities well, appears to have symmetric strength throughout.  MUSCULOSKELETAL: No obvious atrophy abnormalities are noted.   NEURO: No obvious neurologic deficit.   GAIT: sitting.     Physical Exam: last in clinic visit:   Physical Exam    GENERAL: Well appearing, in no acute distress, alert and oriented x3.  PSYCH:  Mood and affect appropriate.  SKIN: Skin  color, texture, turgor normal, no rashes or lesions.  HEAD/FACE:  Normocephalic, atraumatic. Cranial nerves grossly intact.    CV: RRR with palpation of the radial artery.  PULM: No evidence of respiratory difficulty, symmetric chest rise.  GI:  Soft and non-tender.    BACK:  Thoracic kyphosis Straight leg raising in the sitting and supine positions is negative to radicular pain. pain to palpation over the facet joints of the lumbar spine or spinous processes. reduced range of motion with pain reproduction.  EXTREMITIES: Peripheral joint ROM is reduced with pain without obvious instability or laxity in all four extremities. No deformities, edema, or skin discoloration. Good capillary refill.  MUSCULOSKELETAL: Able to stand on heels & toes.   hip, and knee provocative maneuvers are negative.  There is no pain with palpation over the sacroiliac joints bilaterally.  FABERs test is negative.  Facet loading test is positive bilaterally.   Bilateral upper and lower extremity strength is normal and symmetric.  No atrophy or tone abnormalities are noted.  RIGHT Lower extremity: Hip flexion 5/5, Hip Abduction 5/5, Hip Adduction 5/5, Knee extension 5/5, Knee flexion 5/5, Ankle dorsiflexion5/5, Extensor hallucis longus 5/5, Ankle plantarflexion 5/5  LEFT Lower extremity:  Hip flexion 5/5, Hip Abduction 5/5,Hip Adduction 5/5, Knee extension 5/5, Knee flexion 5/5, Ankle dorsiflexion 5/5, Extensor hallucis longus 5/5, Ankle plantarflexion 5/5  -Normal testing knee (patellar) jerk and ankle (achilles) jerk    NEURO: Bilateral upper and lower extremity coordination and muscle stretch reflexes are physiologic and symmetric. No loss of sensation is noted.  GAIT: normal.  Per video visit: Patient unable to maneuver leg to do fabers. She points to area of pain which is over L SI joint    Imaging:  Xray lumbar 12/29/2023   EXAM: XR LUMBAR SPINE 2 OR 3 VIEWS  CLINICAL HISTORY: Lower back pain.  TECHNIQUE: Three-view lumbar spine  series.  COMPARISON: 05/18/2023.  FINDINGS: Disc space narrowing with slight ventral subluxation at L4-5.  Mild disc space narrowing at L2-3.  Multilevel osteophyte formation.  Arthritic changes involving the facets at the L4-5 and L5-S1 levels.     Xray hip 12/29/2023   EXAM: XR HIP WITH PELVIS WHEN PERFORMED, 2 OR 3 VIEWS LEFT  CLINICAL HISTORY: Left hip pain.  TECHNIQUE: AP pelvis and left hip.  FINDINGS: No fracture or dislocation is demonstrated. Mild arthritic change involving both hips.  SPECT ankylosis of the SI joints.  Multiple calcifications in the pelvis.        Impression:   Mild arthritic change involving both hips.  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  Impression:   Multilevel degenerative disc disease.  Findings appear similar to examination dated 05/18/2023.   12/10/21    MRI Lumbar Spine Without Contrast    FINDINGS:  Alignment: Mild levocurvature of the lumbar spine.  Grade 1 anterolisthesis L4 on L5.    Vertebrae: Multiple Schmorl's nodes.  Benign osseous hemangioma in the T12 vertebral body.  Degenerative the edema within the bilateral L4-L5 facet joints.  No abnormal marrow signal to suggest infiltrative process.    Discs: Multilevel disc desiccation and height loss, most severe at L4-L5.    Cord: No signal abnormality.  Conus terminates at L2    Degenerative findings:    T12-L1: No significant spinal canal stenosis or neural foraminal narrowing.    L1-L2: Mild diffuse disc bulge.  No significant spinal canal stenosis or neural foraminal narrowing.    L2-L3: Severe diffuse disc bulge, bilateral facet arthropathy and ligamentum flavum buckling.  These findings result in mild spinal canal stenosis, severe right and moderate left neural foraminal narrowing.    L3-L4: Moderate diffuse disc bulge.  These findings result in moderate bilateral neural foraminal narrowing.  No spinal canal stenosis.    L4-L5: Grade 1 anterolisthesis, diffuse disc bulge, bilateral facet arthropathy, and ligamentum  flavum buckling.  These findings result in severe spinal canal stenosis and severe right and moderate left neural foraminal narrowing.    L5-S1: Diffuse disc bulge with a superimposed left paracentral protrusion that abuts the left descending S1 nerve root.  Bilateral facet arthropathy. These findings result in mild bilateral neural foraminal narrowing    Paraspinal muscles & soft tissues: T1 hyperintense cortical lesion noted within the right kidney, possibly a proteinaceous or hemorrhagic cyst. left simple renal cyst.    Impression  Multilevel degenerative changes, most pronounced at L4-L5 with severe spinal canal stenosis, severe right and moderate left neural foraminal narrowing.    Bilateral degenerative facet edema at L4-L5.    Probable proteinaceous versus hemorrhagic right renal cortical cyst.  Recommend correlation with a dedicated renal ultrasound.    07/22/21  X-Ray Lumbar Spine Ap And Lateral  FINDINGS:  Five lumbar vertebral bodies.  Vertebral body heights are maintained.  Disc space narrowing and degenerative endplate changes L4-5 and L5-S1.  Moderate facet arthropathy L4-5 and L5-S1.  Grade 1 anterolisthesis L4 on L5.     04/02/18    X-Ray Cervical Spine Complete 5 view  FINDINGS:  There is anatomic spinal alignment.  Prominent bridging vertebral endplate spurs present anteriorly from C4-5 through C6-7.  Disc interspaces are maintained.  Odontoid is intact.  No fracture or subluxation.      ASSESSMENT: 83 y.o. year old female with lower back and leg pain, consistent with     No diagnosis found.        PLAN:   - Interventions:  Patient is scheduled to begin physical therapy on January 9th.  I advised she move forward with the physical therapy and if not improving after a couple of weeks for her to come into the office to be evaluated in person as I feel some of her pain could also be coming from her SI joint.  Due to her Xarelto she is limited on NSAIDs I will send over a prescription for compound  cream that includes a combination of diclofenac, lidocaine, and prilocaine.     Patient received 80% sustained relief in right lower extremity radicular symptoms following L4-5 interlaminar epidural steroid injection with right paramedian approach.  We have discussed repeating this injection in the future should symptoms exacerbate  We have discussed the procedure, benefits and potential risk in detail.    - Interventions: None today  Patient may be a candidate for spinal cord stimulation device secondary to chronic pain syndrome and multiple failed attempts of other interventions (lumbar TFESI x 3) and medical management (membrane stabilizing agents). Explained the risks and benefits of the procedure in detail with the patient today in clinic along with alternative treatment options,   -Patient has been given informational booklets for her review     - Anticoagulation use: yes  Xarelto  -secondary prophylaxis; Dr. Grayson ([facility])     report:  Reviewed and consistent with medication use as prescribed.    - Medications:  - Patient discontinued Gabapentin secondary to somnolence and ineffectivness.  We will continue physical therapy and interventional treatment.    - Therapy:  We discussed continuing physical therapy to help manage the patient/s painful condition. The patient was counseled that muscle strengthening will improve the long term prognosis in regards to pain and may also help increase range of motion and mobility.    - Imaging: Reviewed available imaging with patient and answered any questions they had regarding study    -Consults/referral:Neurosurgery PRN     - Follow up visit:  PRN for new hip pain. 3 months for chronic low back pain.       The above plan and management options were discussed at length with patient. Patient is in agreement with the above and verbalized understanding.    - I discussed the goals of interventional chronic pain management with the patient on today's visit.  We discussed a multimodal and systematic approach to pain.  This includes diagnostic and therapeutic injections, adjuvant pharmacologic treatment, physical therapy, and at times psychiatry.  I emphasized the importance of regular exercise, core strengthening and stretching, diet and weight loss as a cornerstone of long-term pain management.    - This condition does not require this patient to take time off of work, and the primary goal of our Pain Management services is to improve the patient's functional capacity.  - Patient Questions: Answered all of the patient's questions regarding diagnoses, therapy, treatment and next steps        Olivia Peralta NP  Interventional Pain Management  [facility]    Disclaimer:  This note was prepared using voice recognition system and is likely to have sound alike errors that may have been overlooked even after proof reading.  Please call me with any questions

## 2024-01-04 ENCOUNTER — OFFICE VISIT (OUTPATIENT)
Dept: PODIATRY | Facility: CLINIC | Age: 84
End: 2024-01-04
Payer: MEDICARE

## 2024-01-04 VITALS — WEIGHT: 179.88 LBS | BODY MASS INDEX: 29.97 KG/M2 | HEIGHT: 65 IN

## 2024-01-04 DIAGNOSIS — L84 CORN OR CALLUS: ICD-10-CM

## 2024-01-04 DIAGNOSIS — E11.49 TYPE II DIABETES MELLITUS WITH NEUROLOGICAL MANIFESTATIONS: Primary | ICD-10-CM

## 2024-01-04 DIAGNOSIS — B35.1 ONYCHOMYCOSIS DUE TO DERMATOPHYTE: ICD-10-CM

## 2024-01-04 DIAGNOSIS — L60.0 ONYCHOCRYPTOSIS: ICD-10-CM

## 2024-01-04 PROCEDURE — 99999 PR PBB SHADOW E&M-EST. PATIENT-LVL III: CPT | Mod: PBBFAC,,, | Performed by: PODIATRIST

## 2024-01-04 NOTE — PROGRESS NOTES
Subjective:     Patient ID: Saul Mar is a 83 y.o. female.    Chief Complaint: Nail Care (Diabetic pt wears tennis shoes, PCP Dr. Randolph lat seen 12-7-23)    Saul is a 83 y.o. female who presents to the clinic for evaluation and treatment of high risk feet. 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 The patient's chief complaint is long thick toenails. This patient has documented high risk feet requiring routine maintenance secondary to diabetes mellitis and those secondary complications of diabetes, as mentioned..    PCP: Penny Randolph MD    Date Last Seen by PCP: 12/07/2023    Current shoe gear:  Affected Foot: Rx diabetic extra depth shoes and custom accommodative insoles     Unaffected Foot: Rx diabetic extra depth shoes and custom accommodative insoles    Hemoglobin A1C   Date Value Ref Range Status   12/04/2023 7.3 (H) 4.0 - 5.6 % Final     Comment:     ADA Screening Guidelines:  5.7-6.4%  Consistent with prediabetes  >or=6.5%  Consistent with diabetes    High levels of fetal hemoglobin interfere with the HbA1C  assay. Heterozygous hemoglobin variants (HbS, HgC, etc)do  not significantly interfere with this assay.   However, presence of multiple variants may affect accuracy.     06/05/2023 7.9 (H) 4.0 - 5.6 % Final     Comment:     ADA Screening Guidelines:  5.7-6.4%  Consistent with prediabetes  >or=6.5%  Consistent with diabetes    High levels of fetal hemoglobin interfere with the HbA1C  assay. Heterozygous  hemoglobin variants (HbS, HgC, etc)do  not significantly interfere with this assay.   However, presence of multiple variants may affect accuracy.     02/07/2023 7.6 (H) 4.0 - 5.6 % Final     Comment:     ADA Screening Guidelines:  5.7-6.4%  Consistent with prediabetes  >or=6.5%  Consistent with diabetes    High levels of fetal hemoglobin interfere with the HbA1C  assay. Heterozygous hemoglobin variants (HbS, HgC, etc)do  not significantly interfere with this assay.   However, presence of multiple variants may affect accuracy.         Patient Active Problem List   Diagnosis    Mixed diabetic hyperlipidemia associated with type 2 diabetes mellitus    Degenerative disc disease    Colon polyp: tubular adenoma 5/13, repeated 2016 to be repeated 2021- declines colonoscopy and Gastro also does not recommend    Multinodular thyroid: thyroid u/s 7/16, stable 2017 and 2019, 2021    POAG (primary open-angle glaucoma) - Both Eyes    Amaurosis fugax    Nuclear sclerosis - Right Eye    Eyelid myokymia    Cerebral microvascular disease: stroke ? 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    Sickle cell trait syndrome    Mixed anxiety depressive disorder    Diverticulosis of large intestine without hemorrhage    Atrial fibrillation    Hyperlipidemia associated with type 2 diabetes mellitus    Bilateral radiating leg pain    PAD (peripheral  artery disease)    Carotid atherosclerosis    Spinal stenosis of lumbar region with neurogenic claudication    Lumbar radiculopathy, chronic    Decreased strength, endurance, and mobility    Gait disorder    Posture abnormality    Both eyes affected by 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, associated with type 2 diabetes mellitus    Low-tension glaucoma, right eye, mild stage    Low-tension glaucoma, left eye, moderate stage    Age-related nuclear cataract, right    Mild episode of recurrent major depressive disorder    Carotid artery disease    DM cataract    Diabetic glaucoma    Diabetic retinopathy with macular edema associated with type 2 diabetes mellitus    Pulmonary hypertension    Diabetes mellitus with microalbuminuria    Abnormality of gait and mobility    Hearing loss    Diabetes mellitus type 2 with complications    Chronic constipation    Cognitive complaints with normal exam       Medication List with Changes/Refills   Current Medications    ALBUTEROL (PROVENTIL/VENTOLIN HFA) 90 MCG/ACTUATION INHALER    INHALE 2 PUFFS INTO THE LUNGS EVERY 6 (SIX) HOURS AS NEEDED FOR WHEEZING. RESCUE    ALPRAZOLAM (XANAX) 0.5 MG TABLET    TAKE 1 TABLET BY MOUTH NIGHTLY AS NEEDED FOR ANXIETY (MAY TAKE 1-2 TIMES WEEKLY FOR ANXIETY)    AMLODIPINE (NORVASC) 5 MG TABLET    Take 1 tablet (5 mg total) by mouth 2 (two) times daily.    ATORVASTATIN (LIPITOR) 80 MG TABLET    TAKE 1 TABLET BY MOUTH EVERY DAY    BLOOD SUGAR DIAGNOSTIC STRP    1 strip by Misc.(Non-Drug; Combo Route) route 2 (two) times daily. Insurance preferred test stripes DX:E11.22    BLOOD SUGAR DIAGNOSTIC STRP    For use with insurance covered glucometer; test daily    BRIMONIDINE 0.2% (ALPHAGAN) 0.2 % DROP    Place 1 drop into both eyes 3 (three) times daily.    BUTALBITAL-ACETAMINOPHEN-CAFFEINE -40 MG (FIORICET, ESGIC) -40 MG PER TABLET    Take 1 tablet by mouth every 6 (six) hours as needed for Pain.    CARVEDILOL (COREG)  12.5 MG TABLET    Take 12.5 mg by mouth.    CHOLECALCIFEROL, VITAMIN D3, (VITAMIN D3) 1,000 UNIT CAPSULE    Take 1 capsule (1,000 Units total) by mouth once daily.    FERROUS SULFATE (FEOSOL) 325 MG (65 MG IRON) TAB TABLET    Take 1 tablet (325 mg total) by mouth 2 (two) times daily.    FLECAINIDE (TAMBOCOR) 50 MG TAB    Take 1 tablet (50 mg total) by mouth 2 (two) times daily.    GLIMEPIRIDE (AMARYL) 4 MG TABLET    TAKE 1 TABLET BY MOUTH IN THE MORNING WITH BREAKFAST    HYDROCHLOROTHIAZIDE (HYDRODIURIL) 12.5 MG TAB    TAKE 1 TABLET BY MOUTH EVERY DAY    LEVALBUTEROL (XOPENEX HFA) 45 MCG/ACTUATION INHALER    INHALE 1-2 PUFFS INTO THE LUNGS EVERY 6 (SIX) HOURS AS NEEDED FOR WHEEZING. RESCUE    LINACLOTIDE (LINZESS) 145 MCG CAP CAPSULE    TAKE 1 CAPSULE (145 MCG TOTAL) BY MOUTH BEFORE BREAKFAST.    LOSARTAN (COZAAR) 50 MG TABLET    TAKE 1 TABLET BY MOUTH TWICE A DAY    MECLIZINE (ANTIVERT) 25 MG TABLET    TAKE 1 TABLET (25 MG TOTAL) BY MOUTH DAILY AS NEEDED FOR DIZZINESS.    METFORMIN (GLUCOPHAGE-XR) 500 MG ER 24HR TABLET    TAKE 2 TABLETS BY MOUTH EVERY DAY    METOPROLOL SUCCINATE (TOPROL-XL) 50 MG 24 HR TABLET    TAKE 1 TABLET BY MOUTH EVERY DAY    ONETOUCH DELICA PLUS LANCET 33 GAUGE MISC    Apply topically.    ONETOUCH ULTRA2 METER MISC    SMARTSIG:Via Meter As Directed    XARELTO 15 MG TAB    TAKE 1 TABLET (15 MG TOTAL) BY MOUTH DAILY WITH DINNER OR EVENING MEAL.       Review of patient's allergies indicates:   Allergen Reactions    Pravachol [pravastatin] Nausea Only       Past Surgical History:   Procedure Laterality Date    CATARACT EXTRACTION W/  INTRAOCULAR LENS IMPLANT Left 02/27/2013    Dr. Taveras    COLONOSCOPY      COLONOSCOPY N/A 9/22/2016    Procedure: COLONOSCOPY;  Surgeon: Jd Ashton MD;  [facility] ([address] [place] [address]);  Service: Endoscopy;  Laterality: N/A;  OK per Dr Randolph for pt to hold Plavix 5 days prior to procedure/see telephone encounter dated 8/29/16/svn    EPIDURAL STEROID  INJECTION N/A 8/2/2023    Procedure: L4-5 interlaminar epidural steroid injection with right paramedian approach with RN IV sedation;  Surgeon: Chan Fonseca MD;  Location: [facility];  Service: Pain Management;  Laterality: N/A;    EYE SURGERY Bilateral 2002 approx    Laser for glaucoma    HYSTERECTOMY  1963     AMEENA/USO- fibroids; no cancer    OOPHORECTOMY  1963    unknown, only removed one    SELECTIVE INJECTION OF ANESTHETIC AGENT AROUND LUMBAR SPINAL NERVE ROOT BY TRANSFORAMINAL APPROACH Bilateral 6/17/2022    Procedure: Bilateral L4/5 TF JASKARAN with RN IV sedation;  Surgeon: Chan Fonseca MD;  Location: [facility];  Service: Pain Management;  Laterality: Bilateral;    SELECTIVE INJECTION OF ANESTHETIC AGENT AROUND LUMBAR SPINAL NERVE ROOT BY TRANSFORAMINAL APPROACH Bilateral 10/3/2022    Procedure: Bilateral L2-3 transforaminal epidural steroid injection with RN IV sedation;  Surgeon: Chan Fonseca MD;  Location: [facility];  Service: Pain Management;  Laterality: Bilateral;       Family History   Problem Relation Age of Onset    Stroke Mother     Mental illness Mother     Hypertension Mother     Stroke Father     Diabetes Maternal Grandmother     Drug abuse Daughter     Cancer Sister 68        gyn    Ovarian cancer Sister     Cancer Maternal Uncle         type not known    Heart disease Paternal Grandmother     Cancer Maternal Aunt         type unknown    Glaucoma Neg Hx     Macular degeneration Neg Hx     Alcohol abuse Neg Hx     COPD Neg Hx     Asthma Neg Hx     Amblyopia Neg Hx     Blindness Neg Hx     Cataracts Neg Hx     Retinal detachment Neg Hx     Strabismus Neg Hx        Social History     Socioeconomic History    Marital status:     Number of children: 1   Tobacco Use    Smoking status: Never     Passive exposure: Never    Smokeless tobacco: Never   Substance and Sexual Activity    Alcohol use: No    Drug use: No    Sexual activity: Not Currently     Partners: Male   Social History  "Narrative    , no pets or smokers in household. 1 Child who lives in nursing home. She has a grandson who lives in [place].. Retired from [employer] . Still drives. Does not have a Living Will or advanced directive.      Social Determinants of Health     Financial Resource Strain: Medium Risk (5/16/2023)    Overall Financial Resource Strain (CARDIA)     Difficulty of Paying Living Expenses: Somewhat hard   Food Insecurity: Food Insecurity Present (5/16/2023)    Hunger Vital Sign     Worried About Running Out of Food in the Last Year: Sometimes true     Ran Out of Food in the Last Year: Sometimes true   Transportation Needs: Unmet Transportation Needs (6/2/2023)    PRAPARE - Transportation     Lack of Transportation (Medical): Yes     Lack of Transportation (Non-Medical): Yes   Physical Activity: Inactive (5/16/2023)    Exercise Vital Sign     Days of Exercise per Week: 0 days     Minutes of Exercise per Session: 0 min   Stress: No Stress Concern Present (5/16/2023)    [place] [employer] - Occupational Stress Questionnaire     Feeling of Stress : Not at all   Social Connections: Moderately Integrated (5/16/2023)    Social Connection and Isolation Panel [NHANES]     Frequency of Communication with Friends and Family: More than three times a week     Frequency of Social Gatherings with Friends and Family: Once a week     Attends Evangelical Services: More than 4 times per year     Active Member of Clubs or Organizations: Yes     Attends Club or Organization Meetings: More than 4 times per year     Marital Status:    Housing Stability: Low Risk  (5/16/2023)    Housing Stability Vital Sign     Unable to Pay for Housing in the Last Year: No     Number of Places Lived in the Last Year: 1     Unstable Housing in the Last Year: No       Vitals:    01/04/24 0739   Weight: 81.6 kg (179 lb 14.3 oz)   Height: 5' 5" (1.651 m)       Hemoglobin A1C   Date Value Ref Range Status   12/04/2023 7.3 " (H) 4.0 - 5.6 % Final     Comment:     ADA Screening Guidelines:  5.7-6.4%  Consistent with prediabetes  >or=6.5%  Consistent with diabetes    High levels of fetal hemoglobin interfere with the HbA1C  assay. Heterozygous hemoglobin variants (HbS, HgC, etc)do  not significantly interfere with this assay.   However, presence of multiple variants may affect accuracy.     06/05/2023 7.9 (H) 4.0 - 5.6 % Final     Comment:     ADA Screening Guidelines:  5.7-6.4%  Consistent with prediabetes  >or=6.5%  Consistent with diabetes    High levels of fetal hemoglobin interfere with the HbA1C  assay. Heterozygous hemoglobin variants (HbS, HgC, etc)do  not significantly interfere with this assay.   However, presence of multiple variants may affect accuracy.     02/07/2023 7.6 (H) 4.0 - 5.6 % Final     Comment:     ADA Screening Guidelines:  5.7-6.4%  Consistent with prediabetes  >or=6.5%  Consistent with diabetes    High levels of fetal hemoglobin interfere with the HbA1C  assay. Heterozygous hemoglobin variants (HbS, HgC, etc)do  not significantly interfere with this assay.   However, presence of multiple variants may affect accuracy.         Review of Systems   Constitutional:  Negative for chills and fever.   Respiratory:  Negative for shortness of breath.    Cardiovascular:  Negative for chest pain, palpitations, orthopnea, claudication and leg swelling.   Gastrointestinal:  Negative for diarrhea, nausea and vomiting.   Musculoskeletal:  Negative for joint pain.   Skin:  Negative for rash.   Neurological:  Positive for tingling and sensory change.   Psychiatric/Behavioral: Negative.               Objective:      PHYSICAL EXAM: Apperance: Alert and orient in no distress,well developed, and with good attention to grooming and body habits  Patient presents ambulating in tennis shoes.  LOWER EXTREMITY EXAM:  VASCULAR: Dorsalis pedis pulses 0/4 left 1/4 right and Posterior Tibial pulses 0/4 left and 1/4 right. Capillary fill time  <4 seconds bilateral. Mild edema observed bilateral. Varicosities present bilateral. Skin temperature of the lower extremities is warm to cool, proximal to distal. Hair growth absent bilateral.  DERMATOLOGICAL: No skin rashes, subcutaneous nodules, lesions, or ulcers observed bilateral. Nails 1,2,3,4,5, bilateral elongated, thickened, and discolored with subungual debris. Right hallux nail observed to be mildly incurvated at distal lateral borders and slight obstructed in the nail grooves with soft tissue. No purulent drainage, no odor, and no increased temperature observed to right hallux. Webspaces 1,2,3,4 bilateral clean, dry and without evidence of break in skin integrity. Mild hyperkeratotic tissue noted to bilateral hallux.   NEUROLOGICAL: Light touch, sharp-dull, proprioception all present and equal bilaterally.  Vibratory sensation diminished at bilateral hallux. Protective sensation absent at toe sites as tested with a Crowley-Marjan 5.07 monofilament.   MUSCULOSKELETAL: Muscle strength is 5/5 for foot inverters, everters, plantarflexors, and dorsiflexors. Muscle tone is normal. Pain on palpation of right distal lateral nail border.           Assessment:       ICD-10-CM ICD-9-CM   1. Type II diabetes mellitus with neurological manifestations  E11.49 250.60   2. Onychocryptosis  L60.0 703.0   3. Onychomycosis due to dermatophyte  B35.1 110.1   4. Corn or callus  L84 700       Plan:   Type II diabetes mellitus with neurological manifestations    Onychocryptosis    Onychomycosis due to dermatophyte    Corn or callus      I counseled the patient on her conditions, regarding findings of my examination, my impressions, and usual treatment plan.   Appointment spent on education about the diabetic foot, neuropathy, and prevention of limb loss.  Shoe inspection. Diabetic Foot Education. Patient reminded of the importance of good nutrition and blood sugar control to help prevent podiatric complications of diabetes.  Patient instructed on proper foot hygeine. We discussed wearing proper shoe gear, daily foot inspections, never walking without protective shoe gear, never putting sharp instruments to feet.    With patient's permission, nails 1-5 bilateral were debrided/trimmed in length and thickness aggressively to their soft tissue attachment mechanically and with electric , removing all offending nail and debris. Patient relates relief following the procedure.  With patient's permission, bilateral callus trimmed in thickness with #15 blade in thickness without incident.   Patient  will continue to monitor the areas daily, inspect feet, wear protective shoe gear when ambulatory, moisturizer to maintain skin integrity. Patient reminded of the importance of good nutrition and blood sugar control to help prevent podiatric complications of diabetes.  Patient to return 6 months or sooner if needed.        Zuleima Howell DPM  [facility]

## 2024-01-05 ENCOUNTER — OFFICE VISIT (OUTPATIENT)
Dept: PAIN MEDICINE | Facility: CLINIC | Age: 84
End: 2024-01-05
Payer: MEDICARE

## 2024-01-05 ENCOUNTER — PATIENT MESSAGE (OUTPATIENT)
Dept: NEPHROLOGY | Facility: CLINIC | Age: 84
End: 2024-01-05
Payer: MEDICARE

## 2024-01-05 DIAGNOSIS — M51.36 LUMBAR DEGENERATIVE DISC DISEASE: ICD-10-CM

## 2024-01-05 DIAGNOSIS — M16.10 ARTHRITIS, HIP: ICD-10-CM

## 2024-01-05 DIAGNOSIS — M25.552 LEFT HIP PAIN: Primary | ICD-10-CM

## 2024-01-05 PROCEDURE — 99213 OFFICE O/P EST LOW 20 MIN: CPT | Mod: 95,,, | Performed by: NURSE PRACTITIONER

## 2024-01-08 ENCOUNTER — PATIENT OUTREACH (OUTPATIENT)
Dept: ADMINISTRATIVE | Facility: OTHER | Age: 84
End: 2024-01-08
Payer: MEDICARE

## 2024-01-08 NOTE — PROGRESS NOTES
CHW - Outreach Attempt    Lina Espinoza Community Health Worker left a voicemail message for 1st attempt to contact patient regarding: [facility]   Community Health Worker to attempt to contact patient on:  January 14, 2024.

## 2024-01-10 ENCOUNTER — CLINICAL SUPPORT (OUTPATIENT)
Dept: REHABILITATION | Facility: HOSPITAL | Age: 84
End: 2024-01-10
Attending: STUDENT IN AN ORGANIZED HEALTH CARE EDUCATION/TRAINING PROGRAM
Payer: MEDICARE

## 2024-01-10 DIAGNOSIS — R26.89 POOR BALANCE: ICD-10-CM

## 2024-01-10 DIAGNOSIS — Z74.09 DECREASED FUNCTIONAL MOBILITY AND ENDURANCE: Primary | ICD-10-CM

## 2024-01-10 DIAGNOSIS — M25.552 LEFT HIP PAIN: ICD-10-CM

## 2024-01-10 PROCEDURE — 97162 PT EVAL MOD COMPLEX 30 MIN: CPT

## 2024-01-10 PROCEDURE — 97110 THERAPEUTIC EXERCISES: CPT

## 2024-01-12 PROBLEM — Z74.09 DECREASED FUNCTIONAL MOBILITY AND ENDURANCE: Status: ACTIVE | Noted: 2024-01-12

## 2024-01-12 PROBLEM — R26.89 POOR BALANCE: Status: ACTIVE | Noted: 2024-01-12

## 2024-01-12 PROBLEM — M62.81 PROXIMAL MUSCLE WEAKNESS: Status: ACTIVE | Noted: 2022-11-08

## 2024-01-13 NOTE — PLAN OF CARE
[facility]   Physical Therapy Initial Evaluation      Date: 1/10/2024   Name: Saul Mar  [facility] Number: [id number]    Therapy Diagnosis:   Encounter Diagnoses   Name Primary?    Left hip pain     Decreased functional mobility and endurance Yes    Poor balance       Physician: Jose Luis Oscar*     Physician Orders: PT Eval and Treat  Medical Diagnosis from Referral: Left Hip Pain  Evaluation Date: 1/10/2024  Authorization Period Expiration: 12/31/2024   Plan of Care Expiration: 2/21/24  Progress Note Due: 2/10/24  Visit # / Visits authorized: 1/1   FOTO: 1/3 (last performed on 1/10/2024)    Precautions: Standard and A-Fib, DDD, Glaucoma, sickle cell, HTN, PAD, polyneuropathy, T2D, asthma    Time In: 1100  Time Out: 1210  Total Billable Time (timed & untimed codes): 65 minutes    Subjective     Date of onset: 12/11/23 (fall from a chair)    History of current condition - Saul reports she fell from a chair and has had L hip pain since then. She never had hip pain prior to that. Pt reports she has not been able to get a good night sleep, as she has pain with laying on either side. She has a history of sciatica (bilateral), which mostly hurts if she is standing too long. She has had sciatica for about 4 years. Pt reports she had difficulty with dressing and standing activities prior to fall, but they have gotten worse since the fall. It takes her a long time to perform ADLs. Pt reports she has pain with sitting currently, which is new since recent fall.     Falls: 12/11/23 ([place] trip). She stood up to take pictures, and was taking a break to sit, but her chair was moved and she sat down where the chair was.     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    Pain:  Current 8/10, worst 10/10, best 6/10   Location: [] Right   [x] Left:  hip  Description: sharp  Aggravating Factors: Certain movements,  laying down, sitting  Easing Factors: activity avoidance, rest, heating pad    Prior Therapy:   [] N/A    [x] Yes: sciatica (didn't help much, was completing HEP prior to fall: ball roll outs, SLR, LAQs)  Social History: Pt lives alone, no help at home  Occupation: Pt is retired ([employer])  Prior Level of Function: Independent and pain free with all ADL, IADL, community mobility and functional activities. Increased time required and pain. Difficulty with cooking meals due to having to stand. Rollator for 10 years (uses that or two canes to ambulate- for pain relief in lumbar spine).   Current Level of Function: Independent with all ADL, IADL, community mobility and functional activities with reports of increased pain and need for increased time and frequent breaks.  RW for ambulation.    Dominant Extremity:    [x] Right    [] Left    Pts goals: Pt reported goals are to decrease overall pain levels in order to return to prior functional level. Relieve pain.     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    Multinodular thyroid     PAD (peripheral artery disease)     Polyneuropathy     Type 2 diabetes with peripheral circulatory disorder, controlled     Type 2 diabetes, uncontrolled, 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/18/2015       Surgical History:   Saul Mar  has a past surgical history that includes Oophorectomy (1963); Colonoscopy; Colonoscopy (N/A, 9/22/2016); Hysterectomy (1963 ); Eye surgery (Bilateral, 2002 approx); Cataract extraction w/  intraocular lens implant (Left, 02/27/2013); Selective injection of anesthetic agent around lumbar spinal nerve root by  transforaminal approach (Bilateral, 6/17/2022); Selective injection of anesthetic agent around lumbar spinal nerve root by transforaminal approach (Bilateral, 10/3/2022); and Epidural steroid injection (N/A, 8/2/2023).    Medications:   Saul has a current medication list which includes the following prescription(s): albuterol, alprazolam, amlodipine, atorvastatin, blood sugar diagnostic, blood sugar diagnostic, brimonidine 0.2%, butalbital-acetaminophen-caffeine -40 mg, carvedilol, cholecalciferol (vitamin d3), ferrous sulfate, flecainide, glimepiride, hydrochlorothiazide, levalbuterol, linzess, losartan, meclizine, metformin, metoprolol succinate, onetouch delica plus lancet, onetouch ultra2 meter, and xarelto.    Allergies:   Review of patient's allergies indicates:   Allergen Reactions    Pravachol [pravastatin] Nausea Only        Objective        RANGE OF MOTION:   Lumbar ROM Right  (spine) Left   Pain/Dysfunction with Movement Goal   Lumbar Flexion (60º) WFL ---     Lumbar Extension (30º) 0% ---     Lumbar Side Bending (25º) 50% 50%     Lumbar Rotation WFL          Hip AROM/PROM Right Left Pain/Dysfunction with Movement Goal   Hip Flexion (120º) WFL WFL     Hip Extension (30º) NT NT     Hip Abduction (45º) NT NT     Hip IR (45º) 20 20     Hip ER (45º) 25 25         Knee AROM/PROM Right Left Pain/Dysfunction with Movement Goal   Knee Flexion (135º) WFL WFL     Knee Extension (0º) WFL WFL         Ankle/Foot AROM/PROM Right Left Pain/Dysfunction with Movement Goal   Dorsiflexion (20º) WFL WFL     Plantarflexion (50º) WFL WFL          STRENGTH:   L/E MMT Right  (spine) Left Pain/Dysfunction with Movement Goal   Hip Flexion  4/5 4+/5  4+/5 B   Hip Extension  NT NT  4+/5 B   Hip Abduction  4/5 4+/5  4+/5 B   Knee Extension 4+/5 4+/5  5/5 B   Knee Flexion 4+/5 4+/5  5/5 B   Hip IR 4/5 4+/5  4+/5 B   Hip ER 4/5 4+/5  4+/5 B   Ankle DF 4-/5 4/5  5/5 B   Ankle PF NT NT  5/5 B        SENSATION  [x] Intact to Light  Touch   [] Impaired:      PALPATION: Muscles: Increased tone and tenderness to palpation of: left , piriformis, deep hip rotators. Structures: Increased tenderness to palpation of: left , LOWER STRUCTURES : PSIS      POSTURE:  Pt presents with postural abnormalities which include:    [x] Forward Head   [] Increased Lumbar Lordosis   [x] Rounded Shoulder   [] Genu Recurvatum   [] Increased Thoracic Kyphosis [] Genu Valgus   [] Trunk Deviated    [] Pes Planus   [] Scapular Winging    [x] Other: limited trunk extension        GAIT ANALYSIS The patient ambulated with the following assistive device: rollator; the pt presents with the following gait abnormalities: decreased heel strike bilateral, decreased push off bilateral, decreased hip extension bilateral, and forward trunk flexion, decreased dorsiflexion in swing phase and initial contact, foot-flat contact. Decreased speed.    FUNCTIONAL MOVEMENT PATTERNS  Movement Analysis Notes   Bed Mobility  []Functional  [x]Dysfunctional:  [x]Painful  []Non-Painful       Sit to Stand []Functional  [x]Dysfunctional:  [x]Painful  []Non-Painful    LBP, limited trunk extension, increased time required   Squat []Functional  [x]Dysfunctional:  [x]Painful  []Non-Painful    LBP, thigh pain, valgus collapse, excessive trunk flexion       FUNCTIONAL TESTING    Outcome Norms Goal   Five Time Sit to Stand 20 seconds  (Table height= 21.5 inches)   80s: 14.8 sec 14.8 seconds         Function:     Intake Outcome Measure for FOTO Lumbar Spine Survey    Therapist reviewed FOTO scores for Saul on 1/10/2024.   FOTO report - see Media section or FOTO account for episode details    Intake Score: 42%         Treatment     Total Treatment time (time-based codes) separate from Evaluation: (13) minutes     Saul received the treatments listed below:       THERAPEUTIC EXERCISES to develop strength, endurance, ROM, flexibility, posture, and core stabilization for (8) minutes including:    Performed  Today:   Piriformis stretch 3x30s  Heel Digs 5s holds x 1'  Supine Clamshells 2x10 YTB   Interventions DEFERRED today              Next Session:  STS without UE  TA activation + pelvic tilt  Soft Tissue Mobilization (piriformis/hip rotators, glutes)    Patient Education and Home Exercises     Education provided: (5) minutes  PURPOSE: Patient educated on the impairments noted above and the effects of physical therapy intervention to improve overall condition and QOL.   EXERCISE: Patient was educated on all the above exercise prior/during/after for proper posture, positioning, and execution for safe performance with home exercise program.   GAIT & BALANCE: Patient educated on the importance of strong core and lower extremity musculature in order to improve both static and dynamic balance, improve gait mechanics, reduce fall risk and improve household and community mobility.   POSTURE: Patient educated on postural awareness to reduce stress and maintain optimal alignment of the spine with static positions and dynamic movement   SLEEPING POSITIONS: Patient educated on the use of pillows to aid in neutral alignment of spine and extremities when sleeping in supine or side lying.    Written Home Exercises Provided: yes.  Exercises were reviewed and Saul was able to demonstrate them prior to the end of the session.  Saul demonstrated good  understanding of the education provided. See EMR under Patient Instructions for exercises provided during therapy sessions.    Assessment     Saul is a 83 y.o. female referred to outpatient Physical Therapy with a medical diagnosis of Left Hip Pain. Pt presents with impairments in the following categories: IMPAIRMENTS: ROM, strength, posture, gait mechanics, and functional movement patterns. The patient's functional status has been negatively impacted by recent falls, requiring a drastically increased amount of time to perform ADLs currently. This is reflected in her 5 time sit to  "stand test score, as she required more time than the age-related norm. This score also categorizes the patient as a high fall risk, necessitating skilled physical therapy intervention to reduce fall risk and improve overall level of function.     Pt prognosis is Good  Pt will benefit from skilled outpatient Physical Therapy to address the deficits stated above and in the chart below, provide pt/family education, and to maximize pt's level of independence.     Plan of care discussed with patient: Yes  Pt's spiritual, cultural and educational needs considered and patient is agreeable to the plan of care and goals as stated below:     Anticipated Barriers for therapy: co-morbidities    Medical Necessity is demonstrated by the following  History  Co-morbidities and personal factors that may impact the plan of care [] LOW: no personal factors / co-morbidities  [] MODERATE: 1-2 personal factors / co-morbidities  [x] HIGH: 3+ personal factors / co-morbidities    Moderate / High Support Documentation:   Past Medical History:   Diagnosis Date    A-fib     Atrial fibrillation 10/22/2018    Back pain     Cataract     Degenerative disc disease     Diverticulosis     colonoscopy 9/22/2016    GERD (gastroesophageal reflux disease)     Glaucoma     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    Hypertension     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    Multinodular thyroid     PAD (peripheral artery disease)     Polyneuropathy     Type 2 diabetes with peripheral circulatory disorder, controlled     Type 2 diabetes, uncontrolled, 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/18/2015        Examination  Body Structures and Functions, activity limitations and participation restrictions that may impact the plan of care [] LOW: addressing 1-2 elements  [x] MODERATE: 3+ elements  [] HIGH: 4+ elements (please support below)    Moderate / High Support Documentation: See above in "Current Level of Function"      Clinical " Presentation [] LOW: stable  [x] MODERATE: Evolving  [] HIGH: Unstable     Decision Making/ Complexity Score: moderate         Short Term Goals:  3 weeks Status  Date Met   PAIN: Pt will report worst pain of 7/10 in order to progress toward max functional ability and improve quality of life. [x] Progressing  [] Met  [] Not Met    FUNCTION: Patient will demonstrate improved function as indicated by a score of greater than or equal to 46 out of 100 on FOTO. [x] Progressing  [] Met  [] Not Met    MOBILITY: Patient will improve AROM to 50% of stated goals, listed in objective measures above, in order to progress towards independence with functional activities.  [x] Progressing  [] Met  [] Not Met    STRENGTH: Patient will improve strength to 50% of stated goals, listed in objective measures above, in order to progress towards independence with functional activities. [x] Progressing  [] Met  [] Not Met    POSTURE: Patient will correct postural deviations in sitting and standing, to decrease pain and promote long term stability.  [x] Progressing  [] Met  [] Not Met    GAIT: Patient will demonstrate improved gait mechanics including decreased forward trunk flexion in order to improve functional mobility, improve quality of life, and decrease risk of further injury or fall.  [x] Progressing  [] Met  [] Not Met    HEP: Patient will demonstrate independence with HEP in order to progress toward functional independence. [x] Progressing  [] Met  [] Not Met      Long Term Goals:  6 weeks Status Date Met   PAIN: Pt will report worst pain of 4/10 in order to progress toward max functional ability and improve quality of life [x] Progressing  [] Met  [] Not Met    FUNCTION: Patient will demonstrate improved function as indicated by a score of greater than or equal to 50 out of 100 on FOTO. [x] Progressing  [] Met  [] Not Met    MOBILITY: Patient will improve AROM to stated goals, listed in objective measures above, in order to return  to maximal functional potential and improve quality of life.  [x] Progressing  [] Met  [] Not Met    STRENGTH: Patient will improve strength to stated goals, listed in objective measures above, in order to improve functional independence and quality of life.  [x] Progressing  [] Met  [] Not Met    GAIT: Patient will demonstrate normalized gait mechanics with minimal compensation in order to return to PLOF. [x] Progressing  [] Met  [] Not Met    Patient will return to normal ADL's, IADL's, community involvement, recreational activities, and work-related activities with less than or equal to 5/10 pain and maximal function.  [x] Progressing  [] Met  [] Not Met      Plan     Plan of care Certification: 1/10/2024 to 2/21/24.    Outpatient Physical Therapy 2 times weekly for 6 weeks to include any combination of the following interventions: virtual visits, dry needling, modalities, electrical stimulation (IFC, Pre-Mod, Attended with Functional Dry Needling), Gait Training, Manual Therapy, Neuromuscular Re-ed, Patient Education, Self Care, Therapeutic Exercise, and Therapeutic Activites     Olivia Brock, PT

## 2024-01-16 ENCOUNTER — PATIENT OUTREACH (OUTPATIENT)
Dept: ADMINISTRATIVE | Facility: OTHER | Age: 84
End: 2024-01-16
Payer: MEDICARE

## 2024-01-16 ENCOUNTER — TELEPHONE (OUTPATIENT)
Dept: INTERNAL MEDICINE | Facility: CLINIC | Age: 84
End: 2024-01-16
Payer: MEDICARE

## 2024-01-16 NOTE — TELEPHONE ENCOUNTER
----- Message from Lina Espinoza sent at 1/16/2024 10:27 AM CST -----  Regarding: Home Health and Transportation  Good morning,     Ms. Casimiro Mar is interested in home health and or home care is she is eligible.     Community Health Worker provided: CHW mailed Pt a [facility] Transportation system ([employer])  On Demand and scanned to her chart.   CHW contacted Dr. Londono office about Pts request for home health and to complete the professional verification for [employer].     Lina Espinoza, Community Health Worker

## 2024-01-16 NOTE — PROGRESS NOTES
CHW - Follow Up    Lina Espinoza Community Health Worker completed a follow up visit with patient via telephone today.  Pt/Caregiver reported: Ms. Casimiro Mar reported that she thought she had transportation with [employer] but that is not the case unless she has dual enrollment.  Ms. Casimiro Mar is interested in home health and or home care is she is eligible.     Community Health Worker provided: CHW mailed Pt a [facility] Transportation system ([employer])  On Demand and scanned to her chart.   CHW contacted Dr. Londono office about Pts request for home health and to complete the professional verification for [employer].     Follow-up Outreach - Due: 1/19/2024

## 2024-01-18 ENCOUNTER — TELEPHONE (OUTPATIENT)
Dept: INTERNAL MEDICINE | Facility: CLINIC | Age: 84
End: 2024-01-18
Payer: MEDICARE

## 2024-01-19 DIAGNOSIS — E11.610 TYPE 2 DIABETES MELLITUS WITH DIABETIC NEUROPATHIC ARTHROPATHY: ICD-10-CM

## 2024-01-19 RX ORDER — METFORMIN HYDROCHLORIDE 500 MG/1
TABLET, EXTENDED RELEASE ORAL
Qty: 180 TABLET | Refills: 1 | Status: SHIPPED | OUTPATIENT
Start: 2024-01-19

## 2024-01-19 NOTE — TELEPHONE ENCOUNTER
Care Due:                  Date            Visit Type   Department     Provider  --------------------------------------------------------------------------------                                EP -                              PRIMARY      [facility]  Last Visit: 12-      CARE ([facility])   MEDICINE       Penny Randolph  Next Visit: None Scheduled  None         None Found                                                            Last  Test          Frequency    Reason                     Performed    Due Date  --------------------------------------------------------------------------------    CMP.........  12 months..  atorvastatin.............  02- 01-    Lipid Panel.  12 months..  atorvastatin.............  02- 01-    [employer] Embedded Care Due Messages. Reference number: [id number].   1/19/2024 12:10:08 AM CST

## 2024-01-22 ENCOUNTER — PATIENT OUTREACH (OUTPATIENT)
Dept: ADMINISTRATIVE | Facility: OTHER | Age: 84
End: 2024-01-22
Payer: MEDICARE

## 2024-01-23 ENCOUNTER — TELEPHONE (OUTPATIENT)
Dept: PAIN MEDICINE | Facility: CLINIC | Age: 84
End: 2024-01-23
Payer: MEDICARE

## 2024-01-23 ENCOUNTER — CLINICAL SUPPORT (OUTPATIENT)
Dept: REHABILITATION | Facility: HOSPITAL | Age: 84
End: 2024-01-23
Payer: MEDICARE

## 2024-01-23 DIAGNOSIS — Z74.09 DECREASED FUNCTIONAL MOBILITY AND ENDURANCE: ICD-10-CM

## 2024-01-23 DIAGNOSIS — R26.89 POOR BALANCE: ICD-10-CM

## 2024-01-23 DIAGNOSIS — M62.81 PROXIMAL MUSCLE WEAKNESS: Primary | ICD-10-CM

## 2024-01-23 DIAGNOSIS — R26.9 GAIT ABNORMALITY: ICD-10-CM

## 2024-01-23 PROCEDURE — 97530 THERAPEUTIC ACTIVITIES: CPT

## 2024-01-23 PROCEDURE — 97112 NEUROMUSCULAR REEDUCATION: CPT

## 2024-01-23 PROCEDURE — 97110 THERAPEUTIC EXERCISES: CPT

## 2024-01-23 NOTE — TELEPHONE ENCOUNTER
Reached out to patient to reschedule appointment because she an appt on 1/05/24. No answer. Left message on patients voice mail to call back at earliest convenience to schedule apt.     Srikanth Hurd  Medical Assistant

## 2024-01-23 NOTE — PROGRESS NOTES
CHW - Follow Up    Lina Espinoza, Community Health Worker completed a follow up visit with patient via telephone today.  Community Health Worker provided:  CHW informed Pt that Dr. Londono' office completed the professional verification for her [employer] application. CHW informed Pt that she can ask staff at the clinic to print it out for her today during her visit.      Follow-up Outreach - Due: 2/6/2024

## 2024-01-23 NOTE — PROGRESS NOTES
[facility]   Physical Therapy Treatment Note        Name: Saul Kirkpatrick [facility] Number: [id number]    Therapy Diagnosis:   Encounter Diagnoses   Name Primary?    Left hip pain      Decreased functional mobility and endurance Yes    Poor balance      Physician: Jose Luis Oscar    Visit Date: 1/23/2024    Physician Orders: PT Eval and Treat  Medical Diagnosis from Referral: Left Hip Pain  Evaluation Date: 1/10/2024  Authorization Period Expiration: 12/31/2024   Plan of Care Expiration: 2/21/24  Progress Note Due: 2/10/24  Visit # / Visits authorized: 1/20 (+1 from evaluation)   FOTO: 1/3 (last performed on 1/10/2024)     Precautions: Standard and A-Fib, DDD, Glaucoma, sickle cell, HTN, PAD, polyneuropathy, T2D, asthma    PTA Visit #: 0/5     Time In: 1100  Time Out: 1155  Total Billable Time: 55 minutes (Billing reflects 1 on 1 treatment time spent with patient)    Subjective     Patient reports: She feels about the same as previous session. She has been unable to perform HEP so far because she was away from home for previous week.     He/She was not compliant with home exercise program.  Response to previous treatment: none reported  Functional change: None reported    Pain: 8/10     Location: R buttocks/thigh, L hip     Objective      Objective Measures updated at progress report or POC update only unless otherwise noted.       Treatment     Saul received the treatments listed below:       MANUAL THERAPY TECHNIQUES were applied for (0) minutes, including:    Soft tissue mobilization:   left glutes, piriformis, deep hip rotators (NT)  Joint mobilizations:   N/A  Functional dry needling: DEFERRED        THERAPEUTIC EXERCISES to develop strength, endurance, ROM, flexibility, posture, and core stabilization for (30) minutes including:    Performed Today:     Bike 5'  Seated HS stretch 3x30s B  Piriformis stretch 3x30s B  Heel Digs: 5s holds 2x1'   Double KTC with ball:  3'  Supine Clamshells 3x10 YTB     Interventions DEFERRED today                  NEUROMUSCULAR RE-EDUCATION ACTIVITIES to improve Balance, Coordination, Kinesthetic, Sense, Proprioception, and Posture for (10) minutes.  The following were included:    Performed Today:     Sciatic nerve glides (seated) 2x10 B  TA activation + pelvic tilt: 2' x 5s holds   Interventions DEFERRED today                  THERAPEUTIC ACTIVITIES to improve dynamic and functional  performance for (15) minutes including:    Performed Today:     Prone prop on elbows: DC 2* pain  STS w/o UE 3x8  Semi tandem stance: 3x30s B, RW (hover)     Interventions DEFERRED today                  Next Session:  Prone press ups: limit ROM, 3x10   D/C heel digs; Seated HS curls (YTB)  Supine marches w TA activation  Ball squeezes (hooklying)      Patient Education and Home Exercises       Home Exercises Provided and Patient Education Provided     Education provided: (included in TE) minutes  PURPOSE: Patient educated on the impairments noted above and the effects of physical therapy intervention to improve overall condition and QOL.   EXERCISE: Patient was educated on all the above exercise prior/during/after for proper posture, positioning, and execution for safe performance with home exercise program.   STRENGTH: Patient educated on the importance of improved core and extremity strength in order to improve alignment of the spine and extremities with static positions and dynamic movement.   GAIT & BALANCE: Patient educated on the importance of strong core and lower extremity musculature in order to improve both static and dynamic balance, improve gait mechanics, reduce fall risk and improve household and community mobility.   POSTURE: Patient educated on postural awareness to reduce stress and maintain optimal alignment of the spine with static positions and dynamic movement     Written Home Exercises Provided: yes.  Exercises were reviewed and Saul was  able to demonstrate them prior to the end of the session.  Saul demonstrated good  understanding of the education provided. See EMR under Patient Instructions for exercises provided during therapy sessions.    Assessment     Pt tolerated session well today. She has no reported changes from initial visit, likely attributed to inability to perform HEP so far. She was unable to tolerate prone prop position, so this exercise was discontinued. She demonstrated appropriate level of challenge and appropriate response to all other activities initiated today. She will benefit from further functional interventions to address functional limitations noted during evaluation.    Saul is progressing well towards her goals.   Patient prognosis is Good.     Patient will continue to benefit from skilled outpatient physical therapy to address the deficits listed in the problem list box on initial evaluation, provide pt/family education and to maximize patient's level of independence in the home and community environment.     Patient's spiritual, cultural and educational needs considered and pt agreeable to plan of care and goals.     Anticipated Barriers for therapy: co-morbidities     Medical Necessity is demonstrated by the following  History  Co-morbidities and personal factors that may impact the plan of care [] LOW: no personal factors / co-morbidities  [] MODERATE: 1-2 personal factors / co-morbidities  [x] HIGH: 3+ personal factors / co-morbidities     Moderate / High Support Documentation:        Past Medical History:   Diagnosis Date    A-fib      Atrial fibrillation 10/22/2018    Back pain      Cataract      Degenerative disc disease      Diverticulosis       colonoscopy 9/22/2016    GERD (gastroesophageal reflux disease)      Glaucoma      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    Hypertension      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    Multinodular thyroid      PAD (peripheral artery disease)       "Polyneuropathy      Type 2 diabetes with peripheral circulatory disorder, controlled      Type 2 diabetes, uncontrolled, 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/18/2015          Examination  Body Structures and Functions, activity limitations and participation restrictions that may impact the plan of care [] LOW: addressing 1-2 elements  [x] MODERATE: 3+ elements  [] HIGH: 4+ elements (please support below)     Moderate / High Support Documentation: See above in "Current Level of Function"       Clinical Presentation [] LOW: stable  [x] MODERATE: Evolving  [] HIGH: Unstable      Decision Making/ Complexity Score: moderate            Short Term Goals:  3 weeks Status  Date Met   PAIN: Pt will report worst pain of 7/10 in order to progress toward max functional ability and improve quality of life. [x] Progressing  [] Met  [] Not Met     FUNCTION: Patient will demonstrate improved function as indicated by a score of greater than or equal to 46 out of 100 on FOTO. [x] Progressing  [] Met  [] Not Met     MOBILITY: Patient will improve AROM to 50% of stated goals, listed in objective measures above, in order to progress towards independence with functional activities.  [x] Progressing  [] Met  [] Not Met     STRENGTH: Patient will improve strength to 50% of stated goals, listed in objective measures above, in order to progress towards independence with functional activities. [x] Progressing  [] Met  [] Not Met     POSTURE: Patient will correct postural deviations in sitting and standing, to decrease pain and promote long term stability.  [x] Progressing  [] Met  [] Not Met     GAIT: Patient will demonstrate improved gait mechanics including decreased forward trunk flexion in order to improve functional mobility, improve quality of life, and decrease risk of further injury or fall.  [x] Progressing  [] Met  [] Not Met     HEP: Patient will demonstrate independence with HEP in order to progress toward functional " independence. [x] Progressing  [] Met  [] Not Met        Long Term Goals:  6 weeks Status Date Met   PAIN: Pt will report worst pain of 4/10 in order to progress toward max functional ability and improve quality of life [x] Progressing  [] Met  [] Not Met     FUNCTION: Patient will demonstrate improved function as indicated by a score of greater than or equal to 50 out of 100 on FOTO. [x] Progressing  [] Met  [] Not Met     MOBILITY: Patient will improve AROM to stated goals, listed in objective measures above, in order to return to maximal functional potential and improve quality of life.  [x] Progressing  [] Met  [] Not Met     STRENGTH: Patient will improve strength to stated goals, listed in objective measures above, in order to improve functional independence and quality of life.  [x] Progressing  [] Met  [] Not Met     GAIT: Patient will demonstrate normalized gait mechanics with minimal compensation in order to return to PLOF. [x] Progressing  [] Met  [] Not Met     Patient will return to normal ADL's, IADL's, community involvement, recreational activities, and work-related activities with less than or equal to 5/10 pain and maximal function.  [x] Progressing  [] Met  [] Not Met           Plan     Continue Plan of Care (POC) and progress per patient tolerance. See treatment section for details on planned progressions next session.      Keysha Botello, SPT

## 2024-01-24 ENCOUNTER — TELEPHONE (OUTPATIENT)
Dept: INTERNAL MEDICINE | Facility: CLINIC | Age: 84
End: 2024-01-24
Payer: MEDICARE

## 2024-01-24 ENCOUNTER — PATIENT OUTREACH (OUTPATIENT)
Dept: ADMINISTRATIVE | Facility: OTHER | Age: 84
End: 2024-01-24
Payer: MEDICARE

## 2024-01-24 NOTE — PROGRESS NOTES
CHW - Follow Up    Lina Espinoza, Community Health Worker completed a follow up visit with patient via telephone today.  Community Health Worker provided: CHW helped Pt finish completing [employer] application and faxed to [employer] Office.   Follow-up Outreach - Due: 2/8/2024

## 2024-01-24 NOTE — TELEPHONE ENCOUNTER
----- Message from Lina Espinoza sent at 1/23/2024 11:48 AM CST -----  Regarding: Re:   Professional Vertification for LETTY Hudson, where is the professional verification pages for Ms. Casimiro Mar.  Are they in the chart or in the mail?      Lina Espinoza

## 2024-01-26 ENCOUNTER — CLINICAL SUPPORT (OUTPATIENT)
Dept: REHABILITATION | Facility: HOSPITAL | Age: 84
End: 2024-01-26
Payer: MEDICARE

## 2024-01-26 DIAGNOSIS — R26.89 POOR BALANCE: ICD-10-CM

## 2024-01-26 DIAGNOSIS — R26.9 GAIT ABNORMALITY: ICD-10-CM

## 2024-01-26 DIAGNOSIS — M62.81 PROXIMAL MUSCLE WEAKNESS: Primary | ICD-10-CM

## 2024-01-26 DIAGNOSIS — Z74.09 DECREASED FUNCTIONAL MOBILITY AND ENDURANCE: ICD-10-CM

## 2024-01-26 PROCEDURE — 97140 MANUAL THERAPY 1/> REGIONS: CPT

## 2024-01-26 PROCEDURE — 97112 NEUROMUSCULAR REEDUCATION: CPT

## 2024-01-26 PROCEDURE — 97110 THERAPEUTIC EXERCISES: CPT

## 2024-01-26 NOTE — PROGRESS NOTES
[facility]   Physical Therapy Treatment Note        Name: Saul Kirkpatrick [facility] Number: [id number]    Therapy Diagnosis:   Encounter Diagnoses   Name Primary?    Left hip pain      Decreased functional mobility and endurance Yes    Poor balance      Physician: Jose Luis Oscar    Visit Date: 1/26/2024    Physician Orders: PT Eval and Treat  Medical Diagnosis from Referral: Left Hip Pain  Evaluation Date: 1/10/2024  Authorization Period Expiration: 12/31/2024   Plan of Care Expiration: 2/21/24  Progress Note Due: 2/10/24  Visit # / Visits authorized: 2/20 (+1 from evaluation)   FOTO: 1/3 (last performed on 1/10/2024)     Precautions: Standard and A-Fib, DDD, Glaucoma, sickle cell, HTN, PAD, polyneuropathy, T2D, asthma    PTA Visit #: 0/5     Time In: 1100  Time Out: 1200  Total Billable Time: 60 minutes (Billing reflects 1 on 1 treatment time spent with patient)    Subjective     Patient reports: She feels about the same as previous session. She states her pain is at baseline levels currently (9/10).     He/She was not compliant with home exercise program.  Response to previous treatment: none reported  Functional change: None reported    Pain: 8/10     Location: R buttocks/thigh, L hip     Objective      Objective Measures updated at progress report or POC update only unless otherwise noted.       Treatment     Saul received the treatments listed below:       MANUAL THERAPY TECHNIQUES were applied for (10) minutes, including:    Soft tissue mobilization:   left glutes, piriformis, deep hip rotators  Joint mobilizations:   N/A  Functional dry needling: DEFERRED        THERAPEUTIC EXERCISES to develop strength, endurance, ROM, flexibility, posture, and core stabilization for (25) minutes including:    Performed Today:     Bike 6'  Seated HS stretch 3x30s B  Double KTC with ball: 3'  Seated HS curls YTB 2x10 B  Supine Clamshells 3x10 YTB  Ball squeezes: hooklying 3' x 5s  holds  Piriformis stretch 3x30s B       Interventions DEFERRED today                  NEUROMUSCULAR RE-EDUCATION ACTIVITIES to improve Balance, Coordination, Kinesthetic, Sense, Proprioception, and Posture for (15) minutes.  The following were included:    Performed Today:     Sciatic nerve glides (seated) 2x10 B (visual and verbal cueing required throughout)  Bridges + TA/pelvic tilt: 2' x 3s holds   Interventions DEFERRED today                  THERAPEUTIC ACTIVITIES to improve dynamic and functional  performance for (10) minutes including:    Performed Today:     STS w/o UE 3x8 (breaks required within sets, PT supervision required)         Interventions DEFERRED today     Prone prop on elbows: DC 2* pain    Semi tandem stance: 3x30s B, RW (hover)         Next Session:    Supine marches w TA activation  Ball squeezes (hooklying)      Patient Education and Home Exercises       Home Exercises Provided and Patient Education Provided     Education provided: (included in TE) minutes  PURPOSE: Patient educated on the impairments noted above and the effects of physical therapy intervention to improve overall condition and QOL.   EXERCISE: Patient was educated on all the above exercise prior/during/after for proper posture, positioning, and execution for safe performance with home exercise program.   STRENGTH: Patient educated on the importance of improved core and extremity strength in order to improve alignment of the spine and extremities with static positions and dynamic movement.   GAIT & BALANCE: Patient educated on the importance of strong core and lower extremity musculature in order to improve both static and dynamic balance, improve gait mechanics, reduce fall risk and improve household and community mobility.   POSTURE: Patient educated on postural awareness to reduce stress and maintain optimal alignment of the spine with static positions and dynamic movement     Written Home Exercises Provided:  yes.  Exercises were reviewed and Saul was able to demonstrate them prior to the end of the session.  Saul demonstrated good  understanding of the education provided. See EMR under Patient Instructions for exercises provided during therapy sessions.    Assessment     Pt tolerated session well today. She reported no increase in symptoms during or following any exercises today. Ball squeezes and seated HS curls were initiated today in order to address strength deficits with these movements. Manual therapy was initiated today in order to address pain and stiffness in musculature surrounding site of bone bruise.    Saul is progressing well towards her goals.   Patient prognosis is Good.     Patient will continue to benefit from skilled outpatient physical therapy to address the deficits listed in the problem list box on initial evaluation, provide pt/family education and to maximize patient's level of independence in the home and community environment.     Patient's spiritual, cultural and educational needs considered and pt agreeable to plan of care and goals.     Anticipated Barriers for therapy: co-morbidities     Medical Necessity is demonstrated by the following  History  Co-morbidities and personal factors that may impact the plan of care [] LOW: no personal factors / co-morbidities  [] MODERATE: 1-2 personal factors / co-morbidities  [x] HIGH: 3+ personal factors / co-morbidities     Moderate / High Support Documentation:        Past Medical History:   Diagnosis Date    A-fib      Atrial fibrillation 10/22/2018    Back pain      Cataract      Degenerative disc disease      Diverticulosis       colonoscopy 9/22/2016    GERD (gastroesophageal reflux disease)      Glaucoma      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    Hypertension      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    Multinodular thyroid      PAD (peripheral artery disease)      Polyneuropathy      Type 2 diabetes with peripheral  "circulatory disorder, controlled      Type 2 diabetes, uncontrolled, 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/18/2015          Examination  Body Structures and Functions, activity limitations and participation restrictions that may impact the plan of care [] LOW: addressing 1-2 elements  [x] MODERATE: 3+ elements  [] HIGH: 4+ elements (please support below)     Moderate / High Support Documentation: See above in "Current Level of Function"       Clinical Presentation [] LOW: stable  [x] MODERATE: Evolving  [] HIGH: Unstable      Decision Making/ Complexity Score: moderate            Short Term Goals:  3 weeks Status  Date Met   PAIN: Pt will report worst pain of 7/10 in order to progress toward max functional ability and improve quality of life. [x] Progressing  [] Met  [] Not Met     FUNCTION: Patient will demonstrate improved function as indicated by a score of greater than or equal to 46 out of 100 on FOTO. [x] Progressing  [] Met  [] Not Met     MOBILITY: Patient will improve AROM to 50% of stated goals, listed in objective measures above, in order to progress towards independence with functional activities.  [x] Progressing  [] Met  [] Not Met     STRENGTH: Patient will improve strength to 50% of stated goals, listed in objective measures above, in order to progress towards independence with functional activities. [x] Progressing  [] Met  [] Not Met     POSTURE: Patient will correct postural deviations in sitting and standing, to decrease pain and promote long term stability.  [x] Progressing  [] Met  [] Not Met     GAIT: Patient will demonstrate improved gait mechanics including decreased forward trunk flexion in order to improve functional mobility, improve quality of life, and decrease risk of further injury or fall.  [x] Progressing  [] Met  [] Not Met     HEP: Patient will demonstrate independence with HEP in order to progress toward functional independence. [x] Progressing  [] Met  [] Not Met   "      Long Term Goals:  6 weeks Status Date Met   PAIN: Pt will report worst pain of 4/10 in order to progress toward max functional ability and improve quality of life [x] Progressing  [] Met  [] Not Met     FUNCTION: Patient will demonstrate improved function as indicated by a score of greater than or equal to 50 out of 100 on FOTO. [x] Progressing  [] Met  [] Not Met     MOBILITY: Patient will improve AROM to stated goals, listed in objective measures above, in order to return to maximal functional potential and improve quality of life.  [x] Progressing  [] Met  [] Not Met     STRENGTH: Patient will improve strength to stated goals, listed in objective measures above, in order to improve functional independence and quality of life.  [x] Progressing  [] Met  [] Not Met     GAIT: Patient will demonstrate normalized gait mechanics with minimal compensation in order to return to PLOF. [x] Progressing  [] Met  [] Not Met     Patient will return to normal ADL's, IADL's, community involvement, recreational activities, and work-related activities with less than or equal to 5/10 pain and maximal function.  [x] Progressing  [] Met  [] Not Met           Plan     Continue Plan of Care (POC) and progress per patient tolerance. See treatment section for details on planned progressions next session.      Keysha Botello, SPT

## 2024-01-30 ENCOUNTER — CLINICAL SUPPORT (OUTPATIENT)
Dept: REHABILITATION | Facility: HOSPITAL | Age: 84
End: 2024-01-30
Payer: MEDICARE

## 2024-01-30 DIAGNOSIS — R26.9 GAIT ABNORMALITY: ICD-10-CM

## 2024-01-30 DIAGNOSIS — R26.89 POOR BALANCE: ICD-10-CM

## 2024-01-30 DIAGNOSIS — Z74.09 DECREASED FUNCTIONAL MOBILITY AND ENDURANCE: ICD-10-CM

## 2024-01-30 DIAGNOSIS — M62.81 PROXIMAL MUSCLE WEAKNESS: Primary | ICD-10-CM

## 2024-01-30 PROCEDURE — 97112 NEUROMUSCULAR REEDUCATION: CPT

## 2024-01-30 PROCEDURE — 97110 THERAPEUTIC EXERCISES: CPT

## 2024-01-30 NOTE — PROGRESS NOTES
[facility]   Physical Therapy Treatment Note        Name: Saul Kirkpatrick [facility] Number: [id number]    Therapy Diagnosis:   Encounter Diagnoses   Name Primary?    Left hip pain      Decreased functional mobility and endurance Yes    Poor balance      Physician: Jose Luis Oscar    Visit Date: 1/30/2024    Physician Orders: PT Eval and Treat  Medical Diagnosis from Referral: Left Hip Pain  Evaluation Date: 1/10/2024  Authorization Period Expiration: 12/31/2024   Plan of Care Expiration: 2/21/24  Progress Note Due: 2/10/24  Visit # / Visits authorized: 3/20 (+1 from evaluation)   FOTO: 1/3 (last performed on 1/10/2024)     Precautions: Standard and A-Fib, DDD, Glaucoma, sickle cell, HTN, PAD, polyneuropathy, T2D, asthma    PTA Visit #: 0/5     Time In: 1100  Time Out: 1205  Total Billable Time: 65 minutes (Billing reflects 1 on 1 treatment time spent with patient)    Subjective     Patient reports: Patient reports she felt sore and out of shape after previous session. However her pain didn't feel worse. This morning she rates her pain at a 7/10, which is an improvement from previous session. She reports dizziness for the past 4 months that makes her uncomfortable with laying flat, including during exercises performed in PT.    He/She was not compliant with home exercise program.  Response to previous treatment: none reported  Functional change: None reported    Pain: 8/10     Location: R buttocks/thigh, L hip     Objective      Objective Measures updated at progress report or POC update only unless otherwise noted.       Treatment     Saul received the treatments listed below:       MANUAL THERAPY TECHNIQUES were applied for (0) minutes, including:    Soft tissue mobilization:   left glutes, piriformis, deep hip rotators  Joint mobilizations:   N/A  Functional dry needling: DEFERRED        THERAPEUTIC EXERCISES to develop strength, endurance, ROM, flexibility, posture, and  "core stabilization for (35) minutes including:    Performed Today:     3 way seated ball roll outs: silver ball, 3'  Seated HS stretch 3x30s B 4" box + strap  Seated HS curls YTB 2x10 B  Double KTC with ball: 3'  Ball squeezes: hooklying 3' x 5s holds  Supine Clamshells 3x10 (2' timer) YTB  Piriformis stretch 3x30s B   Interventions DEFERRED today     Bike 6'             NEUROMUSCULAR RE-EDUCATION ACTIVITIES to improve Balance, Coordination, Kinesthetic, Sense, Proprioception, and Posture for (30) minutes.  The following were included:    Performed Today:     Bridges + TA/pelvic tilt: 2' x 3s holds  Supine marches + TA activation 1'x3  Sciatic nerve glides (supine) 2x10 B (visual and verbal cueing required throughout)  R Epley Maneuver (2' holds)       Interventions DEFERRED today                THERAPEUTIC ACTIVITIES to improve dynamic and functional  performance for (0) minutes including:    Performed Today:          Interventions DEFERRED today     Prone prop on elbows: DC 2* pain  STS w/o UE 3x8 (breaks required within sets, PT supervision required)  Semi tandem stance: 3x30s B, RW (hover)         Next Session:      Patient Education and Home Exercises       Home Exercises Provided and Patient Education Provided     Education provided: (included in TE) minutes  PURPOSE: Patient educated on the impairments noted above and the effects of physical therapy intervention to improve overall condition and QOL.   EXERCISE: Patient was educated on all the above exercise prior/during/after for proper posture, positioning, and execution for safe performance with home exercise program.   STRENGTH: Patient educated on the importance of improved core and extremity strength in order to improve alignment of the spine and extremities with static positions and dynamic movement.   GAIT & BALANCE: Patient educated on the importance of strong core and lower extremity musculature in order to improve both static and dynamic balance, " improve gait mechanics, reduce fall risk and improve household and community mobility.   POSTURE: Patient educated on postural awareness to reduce stress and maintain optimal alignment of the spine with static positions and dynamic movement     Written Home Exercises Provided: yes.  Exercises were reviewed and Saul was able to demonstrate them prior to the end of the session.  Saul demonstrated good  understanding of the education provided. See EMR under Patient Instructions for exercises provided during therapy sessions.    Assessment     Pt tolerated session well today. She reported no increase in symptoms during or following any exercises today. Ball roll outs were initiated today in order to address limitations with gross trunk flexion. Supine marches + TA activations were initiated today in order to address deficits with motor coordination of trunk stabilizing muscles. An incidental finding of R Posterior canalithiasis was discovered during today's session with Upward Rotary nystagmus observed in R Estrella-Hallpike position. This was subsequently addressed with R Epley maneuver, with patient demonstrating favorable response. She was educated on symptom expectation and precautions following the maneuver.    Saul is progressing well towards her goals.   Patient prognosis is Good.     Patient will continue to benefit from skilled outpatient physical therapy to address the deficits listed in the problem list box on initial evaluation, provide pt/family education and to maximize patient's level of independence in the home and community environment.     Patient's spiritual, cultural and educational needs considered and pt agreeable to plan of care and goals.     Anticipated Barriers for therapy: co-morbidities     Medical Necessity is demonstrated by the following  History  Co-morbidities and personal factors that may impact the plan of care [] LOW: no personal factors / co-morbidities  [] MODERATE: 1-2 personal  "factors / co-morbidities  [x] HIGH: 3+ personal factors / co-morbidities     Moderate / High Support Documentation:        Past Medical History:   Diagnosis Date    A-fib      Atrial fibrillation 10/22/2018    Back pain      Cataract      Degenerative disc disease      Diverticulosis       colonoscopy 9/22/2016    GERD (gastroesophageal reflux disease)      Glaucoma      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    Hypertension      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    Multinodular thyroid      PAD (peripheral artery disease)      Polyneuropathy      Type 2 diabetes with peripheral circulatory disorder, controlled      Type 2 diabetes, uncontrolled, 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/18/2015          Examination  Body Structures and Functions, activity limitations and participation restrictions that may impact the plan of care [] LOW: addressing 1-2 elements  [x] MODERATE: 3+ elements  [] HIGH: 4+ elements (please support below)     Moderate / High Support Documentation: See above in "Current Level of Function"       Clinical Presentation [] LOW: stable  [x] MODERATE: Evolving  [] HIGH: Unstable      Decision Making/ Complexity Score: moderate            Short Term Goals:  3 weeks Status  Date Met   PAIN: Pt will report worst pain of 7/10 in order to progress toward max functional ability and improve quality of life. [x] Progressing  [] Met  [] Not Met     FUNCTION: Patient will demonstrate improved function as indicated by a score of greater than or equal to 46 out of 100 on FOTO. [x] Progressing  [] Met  [] Not Met     MOBILITY: Patient will improve AROM to 50% of stated goals, listed in objective measures above, in order to progress towards independence with functional activities.  [x] Progressing  [] Met  [] Not Met     STRENGTH: Patient will improve strength to 50% of stated goals, listed in objective measures above, in order to progress towards independence with functional " activities. [x] Progressing  [] Met  [] Not Met     POSTURE: Patient will correct postural deviations in sitting and standing, to decrease pain and promote long term stability.  [x] Progressing  [] Met  [] Not Met     GAIT: Patient will demonstrate improved gait mechanics including decreased forward trunk flexion in order to improve functional mobility, improve quality of life, and decrease risk of further injury or fall.  [x] Progressing  [] Met  [] Not Met     HEP: Patient will demonstrate independence with HEP in order to progress toward functional independence. [x] Progressing  [] Met  [] Not Met        Long Term Goals:  6 weeks Status Date Met   PAIN: Pt will report worst pain of 4/10 in order to progress toward max functional ability and improve quality of life [x] Progressing  [] Met  [] Not Met     FUNCTION: Patient will demonstrate improved function as indicated by a score of greater than or equal to 50 out of 100 on FOTO. [x] Progressing  [] Met  [] Not Met     MOBILITY: Patient will improve AROM to stated goals, listed in objective measures above, in order to return to maximal functional potential and improve quality of life.  [x] Progressing  [] Met  [] Not Met     STRENGTH: Patient will improve strength to stated goals, listed in objective measures above, in order to improve functional independence and quality of life.  [x] Progressing  [] Met  [] Not Met     GAIT: Patient will demonstrate normalized gait mechanics with minimal compensation in order to return to PLOF. [x] Progressing  [] Met  [] Not Met     Patient will return to normal ADL's, IADL's, community involvement, recreational activities, and work-related activities with less than or equal to 5/10 pain and maximal function.  [x] Progressing  [] Met  [] Not Met           Plan     Continue Plan of Care (POC) and progress per patient tolerance. See treatment section for details on planned progressions next session.      Keyhsa Botello, SPT

## 2024-02-01 ENCOUNTER — CLINICAL SUPPORT (OUTPATIENT)
Dept: REHABILITATION | Facility: HOSPITAL | Age: 84
End: 2024-02-01
Payer: MEDICARE

## 2024-02-01 DIAGNOSIS — Z74.09 DECREASED FUNCTIONAL MOBILITY AND ENDURANCE: ICD-10-CM

## 2024-02-01 DIAGNOSIS — M62.81 PROXIMAL MUSCLE WEAKNESS: Primary | ICD-10-CM

## 2024-02-01 DIAGNOSIS — R26.9 GAIT ABNORMALITY: ICD-10-CM

## 2024-02-01 DIAGNOSIS — R26.89 POOR BALANCE: ICD-10-CM

## 2024-02-01 PROCEDURE — 97110 THERAPEUTIC EXERCISES: CPT

## 2024-02-01 PROCEDURE — 97112 NEUROMUSCULAR REEDUCATION: CPT

## 2024-02-01 PROCEDURE — 97530 THERAPEUTIC ACTIVITIES: CPT

## 2024-02-01 NOTE — PROGRESS NOTES
[facility]   Physical Therapy Treatment Note        Name: Saul Kirkpatrick [facility] Number: [id number]    Therapy Diagnosis:   Encounter Diagnoses   Name Primary?    Left hip pain      Decreased functional mobility and endurance Yes    Poor balance      Physician: Jose Luis Oscar    Visit Date: 2/1/2024    Physician Orders: PT Eval and Treat  Medical Diagnosis from Referral: Left Hip Pain  Evaluation Date: 1/10/2024  Authorization Period Expiration: 12/31/2024   Plan of Care Expiration: 2/21/24  Progress Note Due: 2/10/24  Visit # / Visits authorized: 3/20 (+1 from evaluation)   FOTO: 1/3 (last performed on 1/10/2024)     Precautions: Standard and A-Fib, DDD, Glaucoma, sickle cell, HTN, PAD, polyneuropathy, T2D, asthma    PTA Visit #: 0/5     Time In: 1100  Time Out: 1157  Total Billable Time: 57 minutes (Billing reflects 1 on 1 treatment time spent with patient)    Subjective     Patient reports: Patient states she was not sore after previous session. She reports she has not had any dizziness since Epley maneuver was performed at previous visit. She reports feeling as if she has more energy recently and like she is tolerating therapy better.     He/She was not compliant with home exercise program.  Response to previous treatment: none reported  Functional change: None reported    Pain: 8/10     Location: R buttocks/thigh, L hip     Objective      Objective Measures updated at progress report or POC update only unless otherwise noted.       Treatment     Saul received the treatments listed below:       MANUAL THERAPY TECHNIQUES were applied for (0) minutes, including:    Soft tissue mobilization:   left glutes, piriformis, deep hip rotators  Joint mobilizations:   N/A  Functional dry needling: DEFERRED        THERAPEUTIC EXERCISES to develop strength, endurance, ROM, flexibility, posture, and core stabilization for (37) minutes including:    Performed Today:     Double KTC  with ball: 3'  Bike 6'  Ball squeezes: hooklying 3' x 5s holds  Supine Clamshells 3x10 (2' timer) YTB  Piriformis stretch 3x30s B  Supine HS stretch 3x30s B strap  Seated HS curls YTB 2x10 B  Seated hip ER YTB 2x10 B   Interventions DEFERRED today     3 way seated ball roll outs: silver ball, 3'           NEUROMUSCULAR RE-EDUCATION ACTIVITIES to improve Balance, Coordination, Kinesthetic, Sense, Proprioception, and Posture for (12) minutes.  The following were included:    Performed Today:     Hooklying marches + TA activation 1'x3  Sciatic nerve glides (supine) x10 B (visual and verbal cueing required throughout)   Interventions DEFERRED today       R Epley Maneuver (2' holds)         THERAPEUTIC ACTIVITIES to improve dynamic and functional  performance for (8) minutes including:    Performed Today:     STS w/o UE 3x8 (breaks required within sets, PT supervision required)  Bridges + TA/pelvic tilt: 2' x 3s holds   Interventions DEFERRED today     Prone prop on elbows: DC 2* pain  Semi tandem stance: 3x30s B, RW (hover)         Next Session:  Rogelio yellow -> red  Shuttle      Patient Education and Home Exercises       Home Exercises Provided and Patient Education Provided     Education provided: (included in TE) minutes  PURPOSE: Patient educated on the impairments noted above and the effects of physical therapy intervention to improve overall condition and QOL.   EXERCISE: Patient was educated on all the above exercise prior/during/after for proper posture, positioning, and execution for safe performance with home exercise program.   STRENGTH: Patient educated on the importance of improved core and extremity strength in order to improve alignment of the spine and extremities with static positions and dynamic movement.   GAIT & BALANCE: Patient educated on the importance of strong core and lower extremity musculature in order to improve both static and dynamic balance, improve gait mechanics, reduce fall risk  and improve household and community mobility.   POSTURE: Patient educated on postural awareness to reduce stress and maintain optimal alignment of the spine with static positions and dynamic movement     Written Home Exercises Provided: yes.  Exercises were reviewed and Saul was able to demonstrate them prior to the end of the session.  Saul demonstrated good  understanding of the education provided. See EMR under Patient Instructions for exercises provided during therapy sessions.    Assessment     Pt tolerated session well today. She reported no increase in symptoms during or following any exercises today. Patient reported positive response to R Epley maneuver performed during previous visit, with no lingering dizziness, so canalith testing was not repeated. She was progressed from seated to supine HS stretch in order to increase the challenge, as her tolerance has improved to the point that this was tolerable. Seated hip external rotation was initiated today in order to increase strength of hip rotators and pelvis stabilizers.    Saul is progressing well towards her goals.   Patient prognosis is Good.     Patient will continue to benefit from skilled outpatient physical therapy to address the deficits listed in the problem list box on initial evaluation, provide pt/family education and to maximize patient's level of independence in the home and community environment.     Patient's spiritual, cultural and educational needs considered and pt agreeable to plan of care and goals.     Anticipated Barriers for therapy: co-morbidities     Medical Necessity is demonstrated by the following  History  Co-morbidities and personal factors that may impact the plan of care [] LOW: no personal factors / co-morbidities  [] MODERATE: 1-2 personal factors / co-morbidities  [x] HIGH: 3+ personal factors / co-morbidities     Moderate / High Support Documentation:        Past Medical History:   Diagnosis Date    A-fib      Atrial  "fibrillation 10/22/2018    Back pain      Cataract      Degenerative disc disease      Diverticulosis       colonoscopy 9/22/2016    GERD (gastroesophageal reflux disease)      Glaucoma      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    Hypertension      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    Multinodular thyroid      PAD (peripheral artery disease)      Polyneuropathy      Type 2 diabetes with peripheral circulatory disorder, controlled      Type 2 diabetes, uncontrolled, 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/18/2015          Examination  Body Structures and Functions, activity limitations and participation restrictions that may impact the plan of care [] LOW: addressing 1-2 elements  [x] MODERATE: 3+ elements  [] HIGH: 4+ elements (please support below)     Moderate / High Support Documentation: See above in "Current Level of Function"       Clinical Presentation [] LOW: stable  [x] MODERATE: Evolving  [] HIGH: Unstable      Decision Making/ Complexity Score: moderate            Short Term Goals:  3 weeks Status  Date Met   PAIN: Pt will report worst pain of 7/10 in order to progress toward max functional ability and improve quality of life. [x] Progressing  [] Met  [] Not Met     FUNCTION: Patient will demonstrate improved function as indicated by a score of greater than or equal to 46 out of 100 on FOTO. [x] Progressing  [] Met  [] Not Met     MOBILITY: Patient will improve AROM to 50% of stated goals, listed in objective measures above, in order to progress towards independence with functional activities.  [x] Progressing  [] Met  [] Not Met     STRENGTH: Patient will improve strength to 50% of stated goals, listed in objective measures above, in order to progress towards independence with functional activities. [x] Progressing  [] Met  [] Not Met     POSTURE: Patient will correct postural deviations in sitting and standing, to decrease pain and promote long term stability.  [x] " Progressing  [] Met  [] Not Met     GAIT: Patient will demonstrate improved gait mechanics including decreased forward trunk flexion in order to improve functional mobility, improve quality of life, and decrease risk of further injury or fall.  [x] Progressing  [] Met  [] Not Met     HEP: Patient will demonstrate independence with HEP in order to progress toward functional independence. [x] Progressing  [] Met  [] Not Met        Long Term Goals:  6 weeks Status Date Met   PAIN: Pt will report worst pain of 4/10 in order to progress toward max functional ability and improve quality of life [x] Progressing  [] Met  [] Not Met     FUNCTION: Patient will demonstrate improved function as indicated by a score of greater than or equal to 50 out of 100 on FOTO. [x] Progressing  [] Met  [] Not Met     MOBILITY: Patient will improve AROM to stated goals, listed in objective measures above, in order to return to maximal functional potential and improve quality of life.  [x] Progressing  [] Met  [] Not Met     STRENGTH: Patient will improve strength to stated goals, listed in objective measures above, in order to improve functional independence and quality of life.  [x] Progressing  [] Met  [] Not Met     GAIT: Patient will demonstrate normalized gait mechanics with minimal compensation in order to return to PLOF. [x] Progressing  [] Met  [] Not Met     Patient will return to normal ADL's, IADL's, community involvement, recreational activities, and work-related activities with less than or equal to 5/10 pain and maximal function.  [x] Progressing  [] Met  [] Not Met           Plan     Continue Plan of Care (POC) and progress per patient tolerance. See treatment section for details on planned progressions next session.      Keysha Botello, SPT

## 2024-02-06 ENCOUNTER — PATIENT OUTREACH (OUTPATIENT)
Dept: ADMINISTRATIVE | Facility: OTHER | Age: 84
End: 2024-02-06
Payer: MEDICARE

## 2024-02-06 ENCOUNTER — CLINICAL SUPPORT (OUTPATIENT)
Dept: REHABILITATION | Facility: HOSPITAL | Age: 84
End: 2024-02-06
Payer: MEDICARE

## 2024-02-06 DIAGNOSIS — M62.81 PROXIMAL MUSCLE WEAKNESS: Primary | ICD-10-CM

## 2024-02-06 DIAGNOSIS — R26.9 GAIT ABNORMALITY: ICD-10-CM

## 2024-02-06 DIAGNOSIS — R26.89 POOR BALANCE: ICD-10-CM

## 2024-02-06 DIAGNOSIS — Z74.09 DECREASED FUNCTIONAL MOBILITY AND ENDURANCE: ICD-10-CM

## 2024-02-06 PROCEDURE — 97110 THERAPEUTIC EXERCISES: CPT

## 2024-02-06 PROCEDURE — 97530 THERAPEUTIC ACTIVITIES: CPT

## 2024-02-06 PROCEDURE — 97112 NEUROMUSCULAR REEDUCATION: CPT

## 2024-02-06 NOTE — PROGRESS NOTES
CH[facility] - Follow Up    Lina Espinoza, Community Health Worker completed a follow up visit with patient via telephone today.  Pt/Caregiver reported: Ms. Casimiro Mar stated that she has an appointment with [facility] for mobility impaired transportation is February 29.    Community Health Worker provided: CHW provided Pt with a list of food medina in [place] and an application for the [place] [employer].    Follow-up Outreach - Due: 3/4/2024

## 2024-02-06 NOTE — PROGRESS NOTES
[facility]   Physical Therapy Treatment Note        Name: Saul Kirkpatrick [facility] Number: [id number]    Therapy Diagnosis:   Encounter Diagnoses   Name Primary?    Left hip pain      Decreased functional mobility and endurance Yes    Poor balance      Physician: Jose Luis Oscar    Visit Date: 2/6/2024    Physician Orders: PT Eval and Treat  Medical Diagnosis from Referral: Left Hip Pain  Evaluation Date: 1/10/2024  Authorization Period Expiration: 12/31/2024   Plan of Care Expiration: 2/21/24  Progress Note Due: 2/10/24  Visit # / Visits authorized: 5/20 (+1 from evaluation)   FOTO: 1/3 (last performed on 1/10/2024)     Precautions: Standard and A-Fib, DDD, Glaucoma, sickle cell, HTN, PAD, polyneuropathy, T2D, asthma    PTA Visit #: 0/5     Time In: 1100  Time Out: 1158  Total Billable Time: 54 minutes (Billing reflects 1 on 1 treatment time spent with patient)    Subjective     Patient reports: she is feeling pretty good with no significant pain on the L today. Reports 4/10 max pain with certain movements. Notes that R sided hip pain is 8/10 but states that this is chronic pain that she has had for years       He/She was not compliant with home exercise program.  Response to previous treatment: none reported  Functional change: None reported    Pain: 8/10     Location: R buttocks/thigh, L hip     Objective      Objective Measures updated at progress report or POC update only unless otherwise noted.       Treatment     Saul received the treatments listed below:       MANUAL THERAPY TECHNIQUES were applied for (0) minutes, including:    Soft tissue mobilization:   left glutes, piriformis, deep hip rotators  Joint mobilizations:   N/A  Functional dry needling: DEFERRED        THERAPEUTIC EXERCISES to develop strength, endurance, ROM, flexibility, posture, and core stabilization for (8) minutes including:    Performed Today:     Bike 6'  Double KTC with ball: 2' x 10s holds         Interventions DEFERRED today     3 way seated ball roll outs: silver ball, 3'  Supine HS stretch 3x30s B strap  Seated hip ER YTB 2x10 B  Ball squeezes: hooklying 3' x 5s holds  Supine Clamshells 3x10 (2' timer) YTB  Piriformis stretch 3x30s B           NEUROMUSCULAR RE-EDUCATION ACTIVITIES to improve Balance, Coordination, Kinesthetic, Sense, Proprioception, and Posture for (16) minutes.  The following were included:    Performed Today:     Sciatic nerve glides (supine): 2 minutes x 5s holds B   Posterior pelvic tilt 2 minutes x 10s holds   Side lying clamshells 90s on R only   Side lying reverse clamshells 90s on R only    Interventions DEFERRED today       R Epley Maneuver (2' holds)         THERAPEUTIC ACTIVITIES to improve dynamic and functional  performance for (30) minutes including:    Performed Today:     STS w/o UE 3x8 (breaks required within sets, PT supervision required)  Bridges + abduction isometric: green band, 2 x 90s   Seated marches (edge of mat) 90s   LAQ: 2x1 minute B   Seated hamstring curls: red band 2x10 B   Calf raises 2x8   Side stepping 3 laps        Interventions DEFERRED today     Prone prop on elbows: DC 2* pain  Semi tandem stance: 3x30s B, RW (hover)         Next Session:  Clamshells yellow -> red  Shuttle      Patient Education and Home Exercises       Home Exercises Provided and Patient Education Provided     Education provided: (included in TE) minutes  PURPOSE: Patient educated on the impairments noted above and the effects of physical therapy intervention to improve overall condition and QOL.   EXERCISE: Patient was educated on all the above exercise prior/during/after for proper posture, positioning, and execution for safe performance with home exercise program.   STRENGTH: Patient educated on the importance of improved core and extremity strength in order to improve alignment of the spine and extremities with static positions and dynamic movement.   GAIT & BALANCE:  Patient educated on the importance of strong core and lower extremity musculature in order to improve both static and dynamic balance, improve gait mechanics, reduce fall risk and improve household and community mobility.   POSTURE: Patient educated on postural awareness to reduce stress and maintain optimal alignment of the spine with static positions and dynamic movement     Written Home Exercises Provided: yes.  Exercises were reviewed and Saul was able to demonstrate them prior to the end of the session.  Saul demonstrated good  understanding of the education provided. See EMR under Patient Instructions for exercises provided during therapy sessions.    Assessment     Pt tolerated session well today. Progressed to side lying clamshells, reverse clamshells and side stepping along mat to improve lateral hip strength and endurance. Incorporated seated calf raises, sit to stand and calf raises to improve functional strength; significant muscle fatigue noted with all interventions     Saul is progressing well towards her goals.   Patient prognosis is Good.     Patient will continue to benefit from skilled outpatient physical therapy to address the deficits listed in the problem list box on initial evaluation, provide pt/family education and to maximize patient's level of independence in the home and community environment.     Patient's spiritual, cultural and educational needs considered and pt agreeable to plan of care and goals.     Anticipated Barriers for therapy: co-morbidities     Medical Necessity is demonstrated by the following  History  Co-morbidities and personal factors that may impact the plan of care [] LOW: no personal factors / co-morbidities  [] MODERATE: 1-2 personal factors / co-morbidities  [x] HIGH: 3+ personal factors / co-morbidities     Moderate / High Support Documentation:        Past Medical History:   Diagnosis Date    A-fib      Atrial fibrillation 10/22/2018    Back pain      Cataract  "     Degenerative disc disease      Diverticulosis       colonoscopy 9/22/2016    GERD (gastroesophageal reflux disease)      Glaucoma      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    Hypertension      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    Multinodular thyroid      PAD (peripheral artery disease)      Polyneuropathy      Type 2 diabetes with peripheral circulatory disorder, controlled      Type 2 diabetes, uncontrolled, 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/18/2015          Examination  Body Structures and Functions, activity limitations and participation restrictions that may impact the plan of care [] LOW: addressing 1-2 elements  [x] MODERATE: 3+ elements  [] HIGH: 4+ elements (please support below)     Moderate / High Support Documentation: See above in "Current Level of Function"       Clinical Presentation [] LOW: stable  [x] MODERATE: Evolving  [] HIGH: Unstable      Decision Making/ Complexity Score: moderate            Short Term Goals:  3 weeks Status  Date Met   PAIN: Pt will report worst pain of 7/10 in order to progress toward max functional ability and improve quality of life. [x] Progressing  [] Met  [] Not Met     FUNCTION: Patient will demonstrate improved function as indicated by a score of greater than or equal to 46 out of 100 on FOTO. [x] Progressing  [] Met  [] Not Met     MOBILITY: Patient will improve AROM to 50% of stated goals, listed in objective measures above, in order to progress towards independence with functional activities.  [x] Progressing  [] Met  [] Not Met     STRENGTH: Patient will improve strength to 50% of stated goals, listed in objective measures above, in order to progress towards independence with functional activities. [x] Progressing  [] Met  [] Not Met     POSTURE: Patient will correct postural deviations in sitting and standing, to decrease pain and promote long term stability.  [x] Progressing  [] Met  [] Not Met     GAIT: Patient will " demonstrate improved gait mechanics including decreased forward trunk flexion in order to improve functional mobility, improve quality of life, and decrease risk of further injury or fall.  [x] Progressing  [] Met  [] Not Met     HEP: Patient will demonstrate independence with HEP in order to progress toward functional independence. [x] Progressing  [] Met  [] Not Met        Long Term Goals:  6 weeks Status Date Met   PAIN: Pt will report worst pain of 4/10 in order to progress toward max functional ability and improve quality of life [x] Progressing  [] Met  [] Not Met     FUNCTION: Patient will demonstrate improved function as indicated by a score of greater than or equal to 50 out of 100 on FOTO. [x] Progressing  [] Met  [] Not Met     MOBILITY: Patient will improve AROM to stated goals, listed in objective measures above, in order to return to maximal functional potential and improve quality of life.  [x] Progressing  [] Met  [] Not Met     STRENGTH: Patient will improve strength to stated goals, listed in objective measures above, in order to improve functional independence and quality of life.  [x] Progressing  [] Met  [] Not Met     GAIT: Patient will demonstrate normalized gait mechanics with minimal compensation in order to return to PLOF. [x] Progressing  [] Met  [] Not Met     Patient will return to normal ADL's, IADL's, community involvement, recreational activities, and work-related activities with less than or equal to 5/10 pain and maximal function.  [x] Progressing  [] Met  [] Not Met           Plan     Continue Plan of Care (POC) and progress per patient tolerance. See treatment section for details on planned progressions next session.      Olivia Brock, PT

## 2024-02-09 ENCOUNTER — CLINICAL SUPPORT (OUTPATIENT)
Dept: REHABILITATION | Facility: HOSPITAL | Age: 84
End: 2024-02-09
Payer: MEDICARE

## 2024-02-09 DIAGNOSIS — Z74.09 DECREASED FUNCTIONAL MOBILITY AND ENDURANCE: ICD-10-CM

## 2024-02-09 DIAGNOSIS — R26.89 POOR BALANCE: ICD-10-CM

## 2024-02-09 DIAGNOSIS — R26.9 GAIT ABNORMALITY: ICD-10-CM

## 2024-02-09 DIAGNOSIS — M62.81 PROXIMAL MUSCLE WEAKNESS: Primary | ICD-10-CM

## 2024-02-09 PROCEDURE — 97530 THERAPEUTIC ACTIVITIES: CPT

## 2024-02-09 PROCEDURE — 97110 THERAPEUTIC EXERCISES: CPT

## 2024-02-09 PROCEDURE — 97112 NEUROMUSCULAR REEDUCATION: CPT

## 2024-02-09 NOTE — PROGRESS NOTES
[facility]   Physical Therapy Treatment Note        Name: Saul Kirkpatrick [facility] Number: [id number]    Therapy Diagnosis:   Encounter Diagnoses   Name Primary?    Left hip pain      Decreased functional mobility and endurance Yes    Poor balance      Physician: Jose Luis Oscar    Visit Date: 2/9/2024    Physician Orders: PT Eval and Treat  Medical Diagnosis from Referral: Left Hip Pain  Evaluation Date: 1/10/2024  Authorization Period Expiration: 12/31/2024   Plan of Care Expiration: 2/21/24  Progress Note Due: 2/10/24  Visit # / Visits authorized: 6/20 (+1 from evaluation)   FOTO: 1/3 (last performed on 1/10/2024)     Precautions: Standard and A-Fib, DDD, Glaucoma, sickle cell, HTN, PAD, polyneuropathy, T2D, asthma    PTA Visit #: 0/5     Time In: 1052  Time Out: 1151  Total Billable Time: 59 minutes (Billing reflects 1 on 1 treatment time spent with patient)    Subjective     Patient reports: She has not had any dizziness/vertigo since Epley was performed. She reports minimal L hip pain, mostly feeling it when she sleeps on the opposite side at night, but it is overall milder.      He/She was not compliant with home exercise program.  Response to previous treatment: none reported  Functional change: None reported    Pain: 8/10     Location: R buttocks/thigh, L hip     Objective      Objective Measures updated at progress report or POC update only unless otherwise noted.       Treatment     Saul received the treatments listed below:       MANUAL THERAPY TECHNIQUES were applied for (0) minutes, including:    Soft tissue mobilization:   left glutes, piriformis, deep hip rotators  Joint mobilizations:   N/A  Functional dry needling: DEFERRED        THERAPEUTIC EXERCISES to develop strength, endurance, ROM, flexibility, posture, and core stabilization for (22) minutes including:    Performed Today:     Bike 7'  Double KTC with ball: 2' x 10s holds   Ball squeezes: hooklying 3'  x 5s holds  Supine HS stretch 3x30s B strap  Piriformis stretch 3x30s B   Interventions DEFERRED today     3 way seated ball roll outs: silver ball, 3'  Supine Clamshells 3x10 (2' timer) YTB             NEUROMUSCULAR RE-EDUCATION ACTIVITIES to improve Balance, Coordination, Kinesthetic, Sense, Proprioception, and Posture for (12) minutes.  The following were included:    Performed Today:     Side lying clamshells 2' on R only YTB  Side lying reverse clamshells 2' on R only YTB  Seated hip ER RTB 2' B       Interventions DEFERRED today       R Epley Maneuver (2' holds)  Posterior pelvic tilt 2 minutes x 10s holds   Sciatic nerve glides (supine): 2 minutes x 5s holds B          THERAPEUTIC ACTIVITIES to improve dynamic and functional  performance for (25) minutes including:    Performed Today:     Bridges + abduction isometric: red band, 2'  Prone hamstring curls: red band 2x10 B   Seated marches (edge of mat) 2x1'   STS w/o UE 3x8 (breaks required within sets, PT supervision required)           Interventions DEFERRED today     Prone prop on elbows: DC 2* pain  Semi tandem stance: 3x30s B, RW (hover)  LAQ: 2x1 minute B   Leg press 15#   Calf raises 2x8   Side stepping 3 laps          Next Session:  SLR      Patient Education and Home Exercises       Home Exercises Provided and Patient Education Provided     Education provided: (included in TE) minutes  PURPOSE: Patient educated on the impairments noted above and the effects of physical therapy intervention to improve overall condition and QOL.   EXERCISE: Patient was educated on all the above exercise prior/during/after for proper posture, positioning, and execution for safe performance with home exercise program.   STRENGTH: Patient educated on the importance of improved core and extremity strength in order to improve alignment of the spine and extremities with static positions and dynamic movement.   GAIT & BALANCE: Patient educated on the importance of strong core  and lower extremity musculature in order to improve both static and dynamic balance, improve gait mechanics, reduce fall risk and improve household and community mobility.   POSTURE: Patient educated on postural awareness to reduce stress and maintain optimal alignment of the spine with static positions and dynamic movement     Written Home Exercises Provided: yes.  Exercises were reviewed and Saul was able to demonstrate them prior to the end of the session.  Saul demonstrated good  understanding of the education provided. See EMR under Patient Instructions for exercises provided during therapy sessions.    Assessment   Pt tolerated session well today, demonstrating improved overall endurance and exercise tolerance. She was progressed in difficulty with established exercises today in order to maintain level of challenge.    Saul is progressing well towards her goals.   Patient prognosis is Good.     Patient will continue to benefit from skilled outpatient physical therapy to address the deficits listed in the problem list box on initial evaluation, provide pt/family education and to maximize patient's level of independence in the home and community environment.     Patient's spiritual, cultural and educational needs considered and pt agreeable to plan of care and goals.     Anticipated Barriers for therapy: co-morbidities     Medical Necessity is demonstrated by the following  History  Co-morbidities and personal factors that may impact the plan of care [] LOW: no personal factors / co-morbidities  [] MODERATE: 1-2 personal factors / co-morbidities  [x] HIGH: 3+ personal factors / co-morbidities     Moderate / High Support Documentation:        Past Medical History:   Diagnosis Date    A-fib      Atrial fibrillation 10/22/2018    Back pain      Cataract      Degenerative disc disease      Diverticulosis       colonoscopy 9/22/2016    GERD (gastroesophageal reflux disease)      Glaucoma      Hemoglobin S trait  "7/5/2018    Hypertension      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    Multinodular thyroid      PAD (peripheral artery disease)      Polyneuropathy      Type 2 diabetes with peripheral circulatory disorder, controlled      Type 2 diabetes, uncontrolled, 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/18/2015          Examination  Body Structures and Functions, activity limitations and participation restrictions that may impact the plan of care [] LOW: addressing 1-2 elements  [x] MODERATE: 3+ elements  [] HIGH: 4+ elements (please support below)     Moderate / High Support Documentation: See above in "Current Level of Function"       Clinical Presentation [] LOW: stable  [x] MODERATE: Evolving  [] HIGH: Unstable      Decision Making/ Complexity Score: moderate            Short Term Goals:  3 weeks Status  Date Met   PAIN: Pt will report worst pain of 7/10 in order to progress toward max functional ability and improve quality of life. [x] Progressing  [] Met  [] Not Met     FUNCTION: Patient will demonstrate improved function as indicated by a score of greater than or equal to 46 out of 100 on FOTO. [x] Progressing  [] Met  [] Not Met     MOBILITY: Patient will improve AROM to 50% of stated goals, listed in objective measures above, in order to progress towards independence with functional activities.  [x] Progressing  [] Met  [] Not Met     STRENGTH: Patient will improve strength to 50% of stated goals, listed in objective measures above, in order to progress towards independence with functional activities. [x] Progressing  [] Met  [] Not Met     POSTURE: Patient will correct postural deviations in sitting and standing, to decrease pain and promote long term stability.  [x] Progressing  [] Met  [] Not Met     GAIT: Patient will demonstrate improved gait mechanics including decreased forward trunk flexion in order to improve functional mobility, improve quality of life, and decrease risk of " further injury or fall.  [x] Progressing  [] Met  [] Not Met     HEP: Patient will demonstrate independence with HEP in order to progress toward functional independence. [x] Progressing  [] Met  [] Not Met        Long Term Goals:  6 weeks Status Date Met   PAIN: Pt will report worst pain of 4/10 in order to progress toward max functional ability and improve quality of life [x] Progressing  [] Met  [] Not Met     FUNCTION: Patient will demonstrate improved function as indicated by a score of greater than or equal to 50 out of 100 on FOTO. [x] Progressing  [] Met  [] Not Met     MOBILITY: Patient will improve AROM to stated goals, listed in objective measures above, in order to return to maximal functional potential and improve quality of life.  [x] Progressing  [] Met  [] Not Met     STRENGTH: Patient will improve strength to stated goals, listed in objective measures above, in order to improve functional independence and quality of life.  [x] Progressing  [] Met  [] Not Met     GAIT: Patient will demonstrate normalized gait mechanics with minimal compensation in order to return to PLOF. [x] Progressing  [] Met  [] Not Met     Patient will return to normal ADL's, IADL's, community involvement, recreational activities, and work-related activities with less than or equal to 5/10 pain and maximal function.  [x] Progressing  [] Met  [] Not Met           Plan     Continue Plan of Care (POC) and progress per patient tolerance. See treatment section for details on planned progressions next session.      Keysha Botello, SPT

## 2024-02-12 ENCOUNTER — TELEPHONE (OUTPATIENT)
Dept: INTERNAL MEDICINE | Facility: CLINIC | Age: 84
End: 2024-02-12

## 2024-02-12 DIAGNOSIS — M79.643 PAIN OF HAND, UNSPECIFIED LATERALITY: Primary | ICD-10-CM

## 2024-02-12 NOTE — TELEPHONE ENCOUNTER
----- Message from Linda Mantilla sent at 2/12/2024 10:14 AM CST -----  Contact: Self/ [phone]  [email or web address]     Patient is calling for Medical Advice regarding:problems with hand and question about a referral     Would like response via [employer]:  Would like a return call     Comments: pt stated that she is having problems with her hand and would like to know if she needs a referral to see a Rheumatologist

## 2024-02-13 ENCOUNTER — OFFICE VISIT (OUTPATIENT)
Dept: RHEUMATOLOGY | Facility: CLINIC | Age: 84
End: 2024-02-13
Payer: MEDICARE

## 2024-02-13 ENCOUNTER — HOSPITAL ENCOUNTER (OUTPATIENT)
Dept: RADIOLOGY | Facility: HOSPITAL | Age: 84
Discharge: HOME | End: 2024-02-13
Attending: INTERNAL MEDICINE
Payer: MEDICARE

## 2024-02-13 VITALS
WEIGHT: 187.19 LBS | DIASTOLIC BLOOD PRESSURE: 69 MMHG | BODY MASS INDEX: 31.15 KG/M2 | HEART RATE: 67 BPM | SYSTOLIC BLOOD PRESSURE: 168 MMHG

## 2024-02-13 DIAGNOSIS — M79.643 PAIN OF HAND, UNSPECIFIED LATERALITY: ICD-10-CM

## 2024-02-13 DIAGNOSIS — M65.4 TENDINITIS, DE QUERVAIN'S: Primary | ICD-10-CM

## 2024-02-13 DIAGNOSIS — M65.4 TENDINITIS, DE QUERVAIN'S: ICD-10-CM

## 2024-02-13 PROCEDURE — 73130 X-RAY EXAM OF HAND: CPT | Mod: 26,50,, | Performed by: RADIOLOGY

## 2024-02-13 PROCEDURE — 73130 X-RAY EXAM OF HAND: CPT | Mod: TC,50

## 2024-02-13 PROCEDURE — 99999 PR PBB SHADOW E&M-EST. PATIENT-LVL III: CPT | Mod: PBBFAC,,, | Performed by: INTERNAL MEDICINE

## 2024-02-13 PROCEDURE — 99214 OFFICE O/P EST MOD 30 MIN: CPT | Mod: S$GLB,,, | Performed by: INTERNAL MEDICINE

## 2024-02-13 RX ORDER — DICLOFENAC SODIUM 10 MG/G
2 GEL TOPICAL 2 TIMES DAILY
Qty: 50 G | Refills: 11 | Status: SHIPPED | OUTPATIENT
Start: 2024-02-13

## 2024-02-13 NOTE — PROGRESS NOTES
RHEUMATOLOGY CLINIC FOLLOW UP VISIT  Chief complaints, HPI, ROS, EXAM, Assessment & Plans:-  Saul Mejia a 83 y.o. pleasant female comes in for worsening left hand pain.  She has osteoarthritis.  She comes in today with severe worsening pain of left thumb, left wrist and lateral aspect of left forearm for the past month.  Severe pain with any movement of the thumb joint.  Significant swelling.  Denies any other significant pain today.  Rheumatological review of system negative otherwise.  Exam shows significant de Quervain tendonitis with positive Finkelstein test left hand.  Mild osteoarthritis present over bilateral D IP and CMC joints.  No significant synovitis..    1. Tendinitis, de Quervain's    2. Pain of hand, unspecified laterality      Problem List Items Addressed This Visit       Tendinitis, de Quervain's - Primary    Relevant Medications    diclofenac sodium (VOLTAREN) 1 % Gel    Other Relevant Orders    X-Ray Hand 3 View Bilateral (Completed)    Pain of hand    Relevant Orders    X-Ray Hand 3 View Bilateral (Completed)       Labs reviewed today:-   Latest Reference Range & Units Most Recent   TIO Neg <1:160  Negative  4/10/08 07:21   TIO Screen Negative <1:80  Negative <1:80  11/15/23 09:20   CCP Antibodies <5.0 U/mL <0.5  8/13/15 12:02   Protein, Serum 6.0 - 8.4 g/dL 6.6  6/29/18 10:11   Albumin grams/dl 3.35 - 5.55 g/dL 3.83  6/29/18 10:11   Alpha-1 grams/dl 0.17 - 0.41 g/dL 0.26  6/29/18 10:11   Alpha-2 0.43 - 0.99 g/dL 0.71  6/29/18 10:11   Beta 0.50 - 1.10 g/dL 0.82  6/29/18 10:11   Gamma 0.67 - 1.58 g/dL 0.98  6/29/18 10:11   Pathologist Interpretation SPE  REVIEWED  6/29/18 10:11   SPE Interp.  See Comment  1/23/14 08:20   Rheumatoid Factor 0.0 - 15.0 IU/mL <10.0  8/13/15 12:02         Severe worsening de Quervain tenosynovitis left and hand.  NSAIDs contraindicated due to chronic kidney disease.  Steroids contraindicated due to  diabetes.  She has not interested in doing injection at this time.  Try conservative treatment with application of ice, Voltaren gel and exercise.  If no significant improvement, consult hand orthopedic surgeon for tendon sheath corticosteroid injection.  I have explained all of the above in detail and the patient understands all of the current recommendation(s). I have answered all questions to the best of my ability and to their complete satisfaction.     Total time spent 30 minutes including time needed to review the records, the   patient evaluation, documentation, face-to-face discussion with the patient,   coordination of care and counseling.    Level IV visit.    # Follow up if symptoms worsen or fail to improve.      Disclaimer: This note was prepared using voice recognition system and is likely to have sound alike errors and is not proof read.  Please call me with any questions.

## 2024-02-13 NOTE — PATIENT INSTRUCTIONS
Take Arthritis strength extended release Tylenol 650 mg 1 tablet 3 times daily as needed for pain.  Start turmeric supplements 1000 mg 2 times daily for anti-inflammatory benefit.  Start osteo Bi-Flex triple strength 1 capsule 2 times daily for joint protection.  Try high fiber whole food plant based diet with less animal products with less sugars.

## 2024-02-20 ENCOUNTER — CLINICAL SUPPORT (OUTPATIENT)
Dept: REHABILITATION | Facility: HOSPITAL | Age: 84
End: 2024-02-20
Payer: MEDICARE

## 2024-02-20 DIAGNOSIS — R26.9 GAIT ABNORMALITY: ICD-10-CM

## 2024-02-20 DIAGNOSIS — M62.81 PROXIMAL MUSCLE WEAKNESS: Primary | ICD-10-CM

## 2024-02-20 DIAGNOSIS — Z74.09 DECREASED FUNCTIONAL MOBILITY AND ENDURANCE: ICD-10-CM

## 2024-02-20 DIAGNOSIS — R26.89 POOR BALANCE: ICD-10-CM

## 2024-02-20 PROCEDURE — 97110 THERAPEUTIC EXERCISES: CPT

## 2024-02-20 PROCEDURE — 97530 THERAPEUTIC ACTIVITIES: CPT

## 2024-02-20 PROCEDURE — 97112 NEUROMUSCULAR REEDUCATION: CPT

## 2024-02-20 NOTE — PROGRESS NOTES
[facility]   Physical Therapy Treatment Note        Name: Saul Kirkpatrick [facility] Number: [id number]    Therapy Diagnosis:   Encounter Diagnoses   Name Primary?    Left hip pain      Decreased functional mobility and endurance Yes    Poor balance      Physician: Jose Luis Oscar    Visit Date: 2/20/2024    Physician Orders: PT Eval and Treat  Medical Diagnosis from Referral: Left Hip Pain  Evaluation Date: 1/10/2024  Authorization Period Expiration: 12/31/2024   Plan of Care Expiration: 2/21/24  Progress Note Due: 2/10/24  Visit # / Visits authorized: 7/20 (+1 from evaluation)   FOTO: 1/3 (last performed on 1/10/2024)     Precautions: Standard and A-Fib, DDD, Glaucoma, sickle cell, HTN, PAD, polyneuropathy, T2D, asthma    PTA Visit #: 0/5     Time In: 1100  Time Out: 1152  Total Billable Time: 45 minutes (Billing reflects 1 on 1 treatment time spent with patient)    Subjective     Patient reports: she felt fine for a couple of days following previous session but noted significant pain started over the weekend. She was able to do some of her stretches and feels a little better now but has not completely returned to baseline       He/She was not compliant with home exercise program.  Response to previous treatment: none reported  Functional change: None reported    Pain: 8/10     Location: R buttocks/thigh, L hip     Objective      Objective Measures updated at progress report or POC update only unless otherwise noted.       Treatment     Saul received the treatments listed below:       MANUAL THERAPY TECHNIQUES were applied for (0) minutes, including:    Soft tissue mobilization:   left glutes, piriformis, deep hip rotators  Joint mobilizations:   N/A  Functional dry needling: DEFERRED        THERAPEUTIC EXERCISES to develop strength, endurance, ROM, flexibility, posture, and core stabilization for (20) minutes including:    Performed Today:     Nu step 7'  Double KTC with  ball: 2' x 10s holds   Ball squeezes: hooklying 3' x 5s holds  Supine HS stretch 3x30s B strap  Piriformis stretch 3x30s B  Lower trunk rotations: 3 minutes      Interventions DEFERRED today     3 way seated ball roll outs: silver ball, 3'  Supine Clamshells 3x10 (2' timer) YTB             NEUROMUSCULAR RE-EDUCATION ACTIVITIES to improve Balance, Coordination, Kinesthetic, Sense, Proprioception, and Posture for (10) minutes.  The following were included:    Performed Today:     Supine clamshells 3x10  red band   Seated hip IR 3x10        Interventions DEFERRED today     R Epley Maneuver (2' holds)  Posterior pelvic tilt 2 minutes x 10s holds   Sciatic nerve glides (supine): 2 minutes x 5s holds B          THERAPEUTIC ACTIVITIES to improve dynamic and functional  performance for (15) minutes including:    Performed Today:     LAQ: 2x1 minute B   supine marches 2x1'   Seated hamstring curls- red band 2x10 B          Interventions DEFERRED today     Prone prop on elbows: DC 2* pain  Semi tandem stance: 3x30s B, RW (hover)  Leg press 15#   Calf raises 2x8   Side stepping 3 laps   Bridges + abduction isometric: red band, 2'  Prone hamstring curls: red band 2x10 B   STS w/o UE 3x8 (breaks required within sets, PT supervision required)         Next Session:  SLR      Patient Education and Home Exercises       Home Exercises Provided and Patient Education Provided     Education provided: (included in TE) minutes  PURPOSE: Patient educated on the impairments noted above and the effects of physical therapy intervention to improve overall condition and QOL.   EXERCISE: Patient was educated on all the above exercise prior/during/after for proper posture, positioning, and execution for safe performance with home exercise program.   STRENGTH: Patient educated on the importance of improved core and extremity strength in order to improve alignment of the spine and extremities with static positions and dynamic movement.   GAIT &  BALANCE: Patient educated on the importance of strong core and lower extremity musculature in order to improve both static and dynamic balance, improve gait mechanics, reduce fall risk and improve household and community mobility.   POSTURE: Patient educated on postural awareness to reduce stress and maintain optimal alignment of the spine with static positions and dynamic movement     Written Home Exercises Provided: yes.  Exercises were reviewed and Saul was able to demonstrate them prior to the end of the session.  Saul demonstrated good  understanding of the education provided. See EMR under Patient Instructions for exercises provided during therapy sessions.    Assessment   Pt demonstrates decreased tolerance for session today due to recent flare in symptoms which have not yet returned to baseline. Regressed to lower level mat based interventions. Pt notes improved symptoms following session. Pt requests to end session early because she feels very tired following exercises performed today.     Saul is progressing well towards her goals.   Patient prognosis is Good.     Patient will continue to benefit from skilled outpatient physical therapy to address the deficits listed in the problem list box on initial evaluation, provide pt/family education and to maximize patient's level of independence in the home and community environment.     Patient's spiritual, cultural and educational needs considered and pt agreeable to plan of care and goals.     Anticipated Barriers for therapy: co-morbidities     Medical Necessity is demonstrated by the following  History  Co-morbidities and personal factors that may impact the plan of care [] LOW: no personal factors / co-morbidities  [] MODERATE: 1-2 personal factors / co-morbidities  [x] HIGH: 3+ personal factors / co-morbidities     Moderate / High Support Documentation:        Past Medical History:   Diagnosis Date    A-fib      Atrial fibrillation 10/22/2018    Back  "pain      Cataract      Degenerative disc disease      Diverticulosis       colonoscopy 9/22/2016    GERD (gastroesophageal reflux disease)      Glaucoma      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    Hypertension      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    Multinodular thyroid      PAD (peripheral artery disease)      Polyneuropathy      Type 2 diabetes with peripheral circulatory disorder, controlled      Type 2 diabetes, uncontrolled, 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/18/2015          Examination  Body Structures and Functions, activity limitations and participation restrictions that may impact the plan of care [] LOW: addressing 1-2 elements  [x] MODERATE: 3+ elements  [] HIGH: 4+ elements (please support below)     Moderate / High Support Documentation: See above in "Current Level of Function"       Clinical Presentation [] LOW: stable  [x] MODERATE: Evolving  [] HIGH: Unstable      Decision Making/ Complexity Score: moderate            Short Term Goals:  3 weeks Status  Date Met   PAIN: Pt will report worst pain of 7/10 in order to progress toward max functional ability and improve quality of life. [x] Progressing  [] Met  [] Not Met     FUNCTION: Patient will demonstrate improved function as indicated by a score of greater than or equal to 46 out of 100 on FOTO. [x] Progressing  [] Met  [] Not Met     MOBILITY: Patient will improve AROM to 50% of stated goals, listed in objective measures above, in order to progress towards independence with functional activities.  [x] Progressing  [] Met  [] Not Met     STRENGTH: Patient will improve strength to 50% of stated goals, listed in objective measures above, in order to progress towards independence with functional activities. [x] Progressing  [] Met  [] Not Met     POSTURE: Patient will correct postural deviations in sitting and standing, to decrease pain and promote long term stability.  [x] Progressing  [] Met  [] Not Met     GAIT: " Patient will demonstrate improved gait mechanics including decreased forward trunk flexion in order to improve functional mobility, improve quality of life, and decrease risk of further injury or fall.  [x] Progressing  [] Met  [] Not Met     HEP: Patient will demonstrate independence with HEP in order to progress toward functional independence. [x] Progressing  [] Met  [] Not Met        Long Term Goals:  6 weeks Status Date Met   PAIN: Pt will report worst pain of 4/10 in order to progress toward max functional ability and improve quality of life [x] Progressing  [] Met  [] Not Met     FUNCTION: Patient will demonstrate improved function as indicated by a score of greater than or equal to 50 out of 100 on FOTO. [x] Progressing  [] Met  [] Not Met     MOBILITY: Patient will improve AROM to stated goals, listed in objective measures above, in order to return to maximal functional potential and improve quality of life.  [x] Progressing  [] Met  [] Not Met     STRENGTH: Patient will improve strength to stated goals, listed in objective measures above, in order to improve functional independence and quality of life.  [x] Progressing  [] Met  [] Not Met     GAIT: Patient will demonstrate normalized gait mechanics with minimal compensation in order to return to PLOF. [x] Progressing  [] Met  [] Not Met     Patient will return to normal ADL's, IADL's, community involvement, recreational activities, and work-related activities with less than or equal to 5/10 pain and maximal function.  [x] Progressing  [] Met  [] Not Met           Plan     Continue Plan of Care (POC) and progress per patient tolerance. See treatment section for details on planned progressions next session.      Olivia Brock, PT

## 2024-02-23 ENCOUNTER — CLINICAL SUPPORT (OUTPATIENT)
Dept: REHABILITATION | Facility: HOSPITAL | Age: 84
End: 2024-02-23
Payer: MEDICARE

## 2024-02-23 ENCOUNTER — LAB VISIT (OUTPATIENT)
Dept: LAB | Facility: HOSPITAL | Age: 84
End: 2024-02-23
Attending: INTERNAL MEDICINE
Payer: MEDICARE

## 2024-02-23 DIAGNOSIS — R26.89 POOR BALANCE: ICD-10-CM

## 2024-02-23 DIAGNOSIS — M62.81 PROXIMAL MUSCLE WEAKNESS: Primary | ICD-10-CM

## 2024-02-23 DIAGNOSIS — R26.9 GAIT ABNORMALITY: ICD-10-CM

## 2024-02-23 DIAGNOSIS — Z74.09 DECREASED FUNCTIONAL MOBILITY AND ENDURANCE: ICD-10-CM

## 2024-02-23 DIAGNOSIS — N18.30 STAGE 3 CHRONIC KIDNEY DISEASE, UNSPECIFIED WHETHER STAGE 3A OR 3B CKD: ICD-10-CM

## 2024-02-23 LAB
ALBUMIN SERPL BCP-MCNC: 3.7 G/DL (ref 3.5–5.2)
ANION GAP SERPL CALC-SCNC: 8 MMOL/L (ref 8–16)
BUN SERPL-MCNC: 21 MG/DL (ref 8–23)
CALCIUM SERPL-MCNC: 9.5 MG/DL (ref 8.7–10.5)
CHLORIDE SERPL-SCNC: 105 MMOL/L (ref 95–110)
CO2 SERPL-SCNC: 26 MMOL/L (ref 23–29)
CREAT SERPL-MCNC: 1.4 MG/DL (ref 0.5–1.4)
EST. GFR  (NO RACE VARIABLE): 37.6 ML/MIN/1.73 M^2
GLUCOSE SERPL-MCNC: 130 MG/DL (ref 70–110)
PHOSPHATE SERPL-MCNC: 3.7 MG/DL (ref 2.7–4.5)
POTASSIUM SERPL-SCNC: 4.5 MMOL/L (ref 3.5–5.1)
SODIUM SERPL-SCNC: 139 MMOL/L (ref 136–145)

## 2024-02-23 PROCEDURE — 36415 COLL VENOUS BLD VENIPUNCTURE: CPT | Performed by: INTERNAL MEDICINE

## 2024-02-23 PROCEDURE — 97110 THERAPEUTIC EXERCISES: CPT

## 2024-02-23 PROCEDURE — 97112 NEUROMUSCULAR REEDUCATION: CPT

## 2024-02-23 PROCEDURE — 80069 RENAL FUNCTION PANEL: CPT | Performed by: INTERNAL MEDICINE

## 2024-02-23 NOTE — PROGRESS NOTES
[facility]   Physical Therapy Treatment Note        Name: Saul Kirkpatrick [facility] Number: [id number]    Therapy Diagnosis:   Encounter Diagnoses   Name Primary?    Proximal muscle weakness Yes    Gait abnormality     Decreased functional mobility and endurance     Poor balance      Physician: Jose Luis Oscar    Visit Date: 2/23/2024    Physician Orders: PT Eval and Treat  Medical Diagnosis from Referral: Left Hip Pain  Evaluation Date: 1/10/2024  Authorization Period Expiration: 12/31/2024   Plan of Care Expiration: 2/21/24  Progress Note Due: 2/10/24  Visit # / Visits authorized: 8/20 (+1 from evaluation)   FOTO: 1/3 (last performed on 1/10/2024)     Precautions: Standard and A-Fib, DDD, Glaucoma, sickle cell, HTN, PAD, polyneuropathy, T2D, asthma    PTA Visit #: 0/5     Time In: 1230 (PN + FOTO Next Session)   Time Out: 1320  Total Billable Time: 50 minutes (Billing reflects 1 on 1 treatment time spent with patient)    Subjective     Patient reports: Her L hip has not been bothering her much today. She reports that she doesn't feel great in general secondary to chronic A-Fib and had to take her inhaler this morning. She expresses that she is unsure if she will be able to make the full hour of therapy today.      He/She was not compliant with home exercise program.  Response to previous treatment: none reported  Functional change: None reported    Pain: 8/10     Location: R buttocks/thigh, L hip     Objective      Objective Measures updated at progress report or POC update only unless otherwise noted.       Treatment     Saul received the treatments listed below:       MANUAL THERAPY TECHNIQUES were applied for (0) minutes, including:    Soft tissue mobilization:   left glutes, piriformis, deep hip rotators  Joint mobilizations:   N/A  Functional dry needling: DEFERRED        THERAPEUTIC EXERCISES to develop strength, endurance, ROM, flexibility, posture, and core  stabilization for (42) minutes including:    Performed Today:     NuStep 5'  3 way ball roll outs 3' silver ball  Double KTC with ball: 2' x 10s holds   Ball squeezes: hooklying 3' x 5s holds  Supine Clamshells 3' RTB  Piriformis stretch 3x30s B  Supine HS stretch 3x30s B strap   Interventions DEFERRED today     3 way seated ball roll outs: silver ball, 3'    Bike 7'         NEUROMUSCULAR RE-EDUCATION ACTIVITIES to improve Balance, Coordination, Kinesthetic, Sense, Proprioception, and Posture for (8) minutes.  The following were included:    Performed Today:       Seated hip ER with ball 2'  Seated hip IR RTB 2'   Bridges, 2'     Interventions DEFERRED today     R Epley Maneuver (2' holds)  Posterior pelvic tilt 2 minutes x 10s holds   Sciatic nerve glides (supine): 2 minutes x 5s holds B   Side lying clamshells 2' on R only YTB  Side lying reverse clamshells 2' on R only YTB         THERAPEUTIC ACTIVITIES to improve dynamic and functional  performance for (0) minutes including:    Performed Today:                  Interventions DEFERRED today     Prone prop on elbows: DC 2* pain  Semi tandem stance: 3x30s B, RW (hover)  LAQ: 2x1 minute B   Leg press 15#   Calf raises 2x8   Side stepping 3 laps   Bridges + abduction isometric: red band, 2'  Prone hamstring curls: red band 2x10 B   Seated marches (edge of mat) 2x1'   STS w/o UE 3x8 (breaks required within sets, PT supervision required)    Leg Press         Next Session:  SLR      Patient Education and Home Exercises       Home Exercises Provided and Patient Education Provided     Education provided: (included in treatment time) minutes  PURPOSE: Patient educated on the impairments noted above and the effects of physical therapy intervention to improve overall condition and QOL.   EXERCISE: Patient was educated on all the above exercise prior/during/after for proper posture, positioning, and execution for safe performance with home exercise program.   STRENGTH:  Patient educated on the importance of improved core and extremity strength in order to improve alignment of the spine and extremities with static positions and dynamic movement.   GAIT & BALANCE: Patient educated on the importance of strong core and lower extremity musculature in order to improve both static and dynamic balance, improve gait mechanics, reduce fall risk and improve household and community mobility.   POSTURE: Patient educated on postural awareness to reduce stress and maintain optimal alignment of the spine with static positions and dynamic movement     Written Home Exercises Provided: yes.  Exercises were reviewed and Saul was able to demonstrate them prior to the end of the session.  Saul demonstrated good  understanding of the education provided. See EMR under Patient Instructions for exercises provided during therapy sessions.    Assessment   Pt tolerated session fairly well today, with increased tolerance compared to previous session. Today's session was limited by pt fatigue 2* chronic A-fib, per pt report. She was initiated with Nustep in place of recumbent bike today, which she tolerated better and reported decreased fatigue with this versus the bike.      Saul is progressing well towards her goals.   Patient prognosis is Good.     Patient will continue to benefit from skilled outpatient physical therapy to address the deficits listed in the problem list box on initial evaluation, provide pt/family education and to maximize patient's level of independence in the home and community environment.     Patient's spiritual, cultural and educational needs considered and pt agreeable to plan of care and goals.     Anticipated Barriers for therapy: co-morbidities     Medical Necessity is demonstrated by the following  History  Co-morbidities and personal factors that may impact the plan of care [] LOW: no personal factors / co-morbidities  [] MODERATE: 1-2 personal factors / co-morbidities  [x]  "HIGH: 3+ personal factors / co-morbidities     Moderate / High Support Documentation:        Past Medical History:   Diagnosis Date    A-fib      Atrial fibrillation 10/22/2018    Back pain      Cataract      Degenerative disc disease      Diverticulosis       colonoscopy 9/22/2016    GERD (gastroesophageal reflux disease)      Glaucoma      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    Hypertension      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    Multinodular thyroid      PAD (peripheral artery disease)      Polyneuropathy      Type 2 diabetes with peripheral circulatory disorder, controlled      Type 2 diabetes, uncontrolled, 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/18/2015          Examination  Body Structures and Functions, activity limitations and participation restrictions that may impact the plan of care [] LOW: addressing 1-2 elements  [x] MODERATE: 3+ elements  [] HIGH: 4+ elements (please support below)     Moderate / High Support Documentation: See above in "Current Level of Function"       Clinical Presentation [] LOW: stable  [x] MODERATE: Evolving  [] HIGH: Unstable      Decision Making/ Complexity Score: moderate            Short Term Goals:  3 weeks Status  Date Met   PAIN: Pt will report worst pain of 7/10 in order to progress toward max functional ability and improve quality of life. [x] Progressing  [] Met  [] Not Met     FUNCTION: Patient will demonstrate improved function as indicated by a score of greater than or equal to 46 out of 100 on FOTO. [x] Progressing  [] Met  [] Not Met     MOBILITY: Patient will improve AROM to 50% of stated goals, listed in objective measures above, in order to progress towards independence with functional activities.  [x] Progressing  [] Met  [] Not Met     STRENGTH: Patient will improve strength to 50% of stated goals, listed in objective measures above, in order to progress towards independence with functional activities. [x] Progressing  [] " Met  [] Not Met     POSTURE: Patient will correct postural deviations in sitting and standing, to decrease pain and promote long term stability.  [x] Progressing  [] Met  [] Not Met     GAIT: Patient will demonstrate improved gait mechanics including decreased forward trunk flexion in order to improve functional mobility, improve quality of life, and decrease risk of further injury or fall.  [x] Progressing  [] Met  [] Not Met     HEP: Patient will demonstrate independence with HEP in order to progress toward functional independence. [x] Progressing  [] Met  [] Not Met        Long Term Goals:  6 weeks Status Date Met   PAIN: Pt will report worst pain of 4/10 in order to progress toward max functional ability and improve quality of life [x] Progressing  [] Met  [] Not Met     FUNCTION: Patient will demonstrate improved function as indicated by a score of greater than or equal to 50 out of 100 on FOTO. [x] Progressing  [] Met  [] Not Met     MOBILITY: Patient will improve AROM to stated goals, listed in objective measures above, in order to return to maximal functional potential and improve quality of life.  [x] Progressing  [] Met  [] Not Met     STRENGTH: Patient will improve strength to stated goals, listed in objective measures above, in order to improve functional independence and quality of life.  [x] Progressing  [] Met  [] Not Met     GAIT: Patient will demonstrate normalized gait mechanics with minimal compensation in order to return to PLOF. [x] Progressing  [] Met  [] Not Met     Patient will return to normal ADL's, IADL's, community involvement, recreational activities, and work-related activities with less than or equal to 5/10 pain and maximal function.  [x] Progressing  [] Met  [] Not Met           Plan     Continue Plan of Care (POC) and progress per patient tolerance. See treatment section for details on planned progressions next session.      Olivia Brock, PT

## 2024-02-28 ENCOUNTER — OFFICE VISIT (OUTPATIENT)
Dept: NEPHROLOGY | Facility: CLINIC | Age: 84
End: 2024-02-28
Payer: MEDICARE

## 2024-02-28 ENCOUNTER — CLINICAL SUPPORT (OUTPATIENT)
Dept: REHABILITATION | Facility: HOSPITAL | Age: 84
End: 2024-02-28
Payer: MEDICARE

## 2024-02-28 VITALS
HEART RATE: 62 BPM | WEIGHT: 181.88 LBS | RESPIRATION RATE: 18 BRPM | HEIGHT: 65 IN | BODY MASS INDEX: 30.3 KG/M2 | DIASTOLIC BLOOD PRESSURE: 74 MMHG | SYSTOLIC BLOOD PRESSURE: 160 MMHG

## 2024-02-28 DIAGNOSIS — R26.89 POOR BALANCE: ICD-10-CM

## 2024-02-28 DIAGNOSIS — R26.9 GAIT ABNORMALITY: ICD-10-CM

## 2024-02-28 DIAGNOSIS — Z74.09 DECREASED FUNCTIONAL MOBILITY AND ENDURANCE: ICD-10-CM

## 2024-02-28 DIAGNOSIS — M62.81 PROXIMAL MUSCLE WEAKNESS: Primary | ICD-10-CM

## 2024-02-28 DIAGNOSIS — N18.32 CHRONIC KIDNEY DISEASE, STAGE 3B: ICD-10-CM

## 2024-02-28 DIAGNOSIS — N18.30 STAGE 3 CHRONIC KIDNEY DISEASE, UNSPECIFIED WHETHER STAGE 3A OR 3B CKD: Primary | ICD-10-CM

## 2024-02-28 DIAGNOSIS — I27.20 PULMONARY HYPERTENSION: ICD-10-CM

## 2024-02-28 PROCEDURE — 99215 OFFICE O/P EST HI 40 MIN: CPT | Mod: S$GLB,,, | Performed by: INTERNAL MEDICINE

## 2024-02-28 PROCEDURE — 97110 THERAPEUTIC EXERCISES: CPT

## 2024-02-28 PROCEDURE — 99999 PR PBB SHADOW E&M-EST. PATIENT-LVL IV: CPT | Mod: PBBFAC,,, | Performed by: INTERNAL MEDICINE

## 2024-02-28 PROCEDURE — 97112 NEUROMUSCULAR REEDUCATION: CPT

## 2024-02-28 PROCEDURE — 97530 THERAPEUTIC ACTIVITIES: CPT

## 2024-02-28 NOTE — PROGRESS NOTES
NEPHROLOGY CLINIC FOLLOWUP NOTE  Date of clinic visit: 2/28/24     REASON FOR FOLLOWUP AND CHIEF COMPLAINT: CKD stage 3, Proteinuria, and history of diabetes mellitus.      HISTORY OF PRESENT ILLNESS: Ms. Kirkpatrick is a 83 year-old female with a history of CKD stage 3, diabetes mellitus and documented diabetic retinopathy, who presents for follow-up. Chart was reviewed. Pt was last seen by us one year ago. Noted BP is elevated today. Pt says that she did not take any of her 4 BP meds today. Meds reviewed with her. On her last visit with us her BP was better controlled. No new events, no new c/o's. Pt feels no discomfort today. No new issues. Labs and meds reviewed with pt.     As documented before, she was found to have a  right kidney cyst incidentally. Discussed with pt last visit. Did not want to see urology. MRI shows a simple cyst. Renal u/s shows a small cyst which appears mildly complex.         PAST MEDICAL HISTORY:  1. CKD stage III.  2. Diabetes mellitus for 30 years.  3. Hypertension.  4. Diabetic retinopathy.  5. Diastolic congestive heart failure.  6. Degenerative joint disease.  7. Thyroid disease.  8. GERD.  9. Cerebrovascular disease/stroke.  10. Colon polyps in 2012.  11. Anxiety.  12. Glaucoma.     PAST SURGICAL HISTORY: Reviewed and unchanged from before. Please see initial    note from 07/25/2016.     FAMILY HISTORY: Reviewed. Grandmother with diabetes mellitus.     ALLERGIES: Reviewed, to Pravachol.     SOCIAL HISTORY: Negative for smoking. No alcohol use.     MEDICATIONS: Reviewed.      Current Outpatient Medications:     albuterol (PROVENTIL/VENTOLIN HFA) 90 mcg/actuation inhaler, INHALE 2 PUFFS INTO THE LUNGS EVERY 6 (SIX) HOURS AS NEEDED FOR WHEEZING. RESCUE, Disp: 25.5 g, Rfl: 3    ALPRAZolam (XANAX) 0.5 MG tablet, TAKE 1 TABLET BY MOUTH NIGHTLY AS NEEDED FOR ANXIETY (MAY TAKE 1-2 TIMES WEEKLY FOR ANXIETY), Disp: 30 tablet, Rfl: 0    amLODIPine (NORVASC) 5 MG tablet, Take 1 tablet (5 mg  total) by mouth 2 (two) times daily., Disp: 180 tablet, Rfl: 3    atorvastatin (LIPITOR) 80 MG tablet, TAKE 1 TABLET BY MOUTH EVERY DAY, Disp: 90 tablet, Rfl: 2    blood sugar diagnostic Strp, 1 strip by Misc.(Non-Drug; Combo Route) route 2 (two) times daily. Insurance preferred test stripes DX:E11.22, Disp: 100 strip, Rfl: 11    blood sugar diagnostic Strp, For use with insurance covered glucometer; test daily, Disp: 100 strip, Rfl: 12    brimonidine 0.2% (ALPHAGAN) 0.2 % Drop, Place 1 drop into both eyes 3 (three) times daily., Disp: 15 mL, Rfl: 12    butalbital-acetaminophen-caffeine -40 mg (FIORICET, ESGIC) -40 mg per tablet, Take 1 tablet by mouth every 6 (six) hours as needed for Pain., Disp: 15 tablet, Rfl: 0    carvediloL (COREG) 12.5 MG tablet, Take 12.5 mg by mouth., Disp: , Rfl:     cholecalciferol, vitamin D3, (VITAMIN D3) 1,000 unit capsule, Take 1 capsule (1,000 Units total) by mouth once daily., Disp: 30 capsule, Rfl: 12    diclofenac sodium (VOLTAREN) 1 % Gel, Apply 2 g topically 2 (two) times daily., Disp: 50 g, Rfl: 11    ferrous sulfate (FEOSOL) 325 mg (65 mg iron) Tab tablet, Take 1 tablet (325 mg total) by mouth 2 (two) times daily., Disp: 180 tablet, Rfl: 3    flecainide (TAMBOCOR) 50 MG Tab, Take 1 tablet (50 mg total) by mouth 2 (two) times daily., Disp: 60 tablet, Rfl: 1    glimepiride (AMARYL) 4 MG tablet, TAKE 1 TABLET BY MOUTH IN THE MORNING WITH BREAKFAST, Disp: 90 tablet, Rfl: 0    hydroCHLOROthiazide (HYDRODIURIL) 12.5 MG Tab, TAKE 1 TABLET BY MOUTH EVERY DAY, Disp: 90 tablet, Rfl: 3    levalbuterol (XOPENEX HFA) 45 mcg/actuation inhaler, INHALE 1-2 PUFFS INTO THE LUNGS EVERY 6 (SIX) HOURS AS NEEDED FOR WHEEZING. RESCUE, Disp: 15 Inhaler, Rfl: 12    linaCLOtide (LINZESS) 145 mcg Cap capsule, TAKE 1 CAPSULE (145 MCG TOTAL) BY MOUTH BEFORE BREAKFAST., Disp: 30 capsule, Rfl: 1    losartan (COZAAR) 50 MG tablet, TAKE 1 TABLET BY MOUTH TWICE A DAY, Disp: 180 tablet, Rfl: 2     meclizine (ANTIVERT) 25 mg tablet, TAKE 1 TABLET (25 MG TOTAL) BY MOUTH DAILY AS NEEDED FOR DIZZINESS., Disp: 30 tablet, Rfl: 0    metFORMIN (GLUCOPHAGE-XR) 500 MG ER 24hr tablet, TAKE 2 TABLETS BY MOUTH EVERY DAY, Disp: 180 tablet, Rfl: 1    metoprolol succinate (TOPROL-XL) 50 MG 24 hr tablet, TAKE 1 TABLET BY MOUTH EVERY DAY, Disp: 90 tablet, Rfl: 1    ONETOUCH DELICA PLUS LANCET 33 gauge Misc, Apply topically., Disp: , Rfl:     ONETOUCH ULTRA2 METER Misc, SMARTSIG:Via Meter As Directed, Disp: , Rfl:     XARELTO 15 mg Tab, TAKE 1 TABLET (15 MG TOTAL) BY MOUTH DAILY WITH DINNER OR EVENING MEAL., Disp: 30 tablet, Rfl: 6     REVIEW OF SYSTEMS: No recent hospitalizations.  GENERAL: Negative.  HEAD, EYES, EARS, NOSE, AND THROAT: Negative.  CARDIAC: Negative.  PULMONARY: Negative.  GASTROINTESTINAL: Negative.  GENITOURINARY: Negative.  PSYCHOLOGICAL: Negative.  NEUROLOGICAL: Negative.  ENDOCRINE: As above, otherwise negative.  HEMATOLOGIC AND ONCOLOGIC: Negative.  The rest of the review of systems negative.     PHYSICAL EXAMINATION:  VITAL SIGNS: Blood pressure 160/74, pulse 70, weight 82.5 Kg, from 80.7 Kg  Ambulatory unassisted  In NAD  Speech and thought process normal  No JVD  RRR with s1 and s2 audible  CTA B no rales, symmetric and unlabored  Abd soft  No edema in ext's bilaterally  Back and sides non-tender     LABORATORY: Labs are reviewed  BMP  Lab Results   Component Value Date     02/23/2024    K 4.5 02/23/2024     02/23/2024    CO2 26 02/23/2024    BUN 21 02/23/2024    CREATININE 1.4 02/23/2024    CALCIUM 9.5 02/23/2024    ANIONGAP 8 02/23/2024    EGFRNORACEVR 37.6 (A) 02/23/2024     Lab Results   Component Value Date    WBC 5.73 12/04/2023    HGB 10.7 (L) 12/04/2023    HCT 33.1 (L) 12/04/2023    MCV 89 12/04/2023     12/04/2023       Urine microalb/Cr 181     U p/Cr 0.65, from 0.25 g, from 2.1 g  U/a: no protein, no blood, no RBC's, no casts     HgA1C 7.3 %     Spine MRI reviewed    "  Renal u/s: 12/17/21 reviewed  Right kidney: The right kidney measures 10.7 cm. No cortical thinning. No loss of corticomedullary distinction. There is a small cyst at the upper pole measuring up to 12 mm which may be mildly complicated by low level internal echoes. Further characterization is limited by overall exam limitations as above.  No stone visualized.  No hydronephrosis.  Left kidney: The left kidney measures 10.8 cm. No cortical thinning. No loss of corticomedullary distinction. No mass lesions visualized.  Note that the left kidney is partially obscured by bowel gas artifact.  No definite correlate seen for previous MRI findings.  No stone visualized.  No hydronephrosis.           ASSESSMENT AND PLAN: This is a 83 year-old female with CKD and diabetes mellitus, who presents for followup:  The impression is as follows:     1. Renal. s Cr stable, at baseline.   Stable renal function. CKD stage 3  Likely reason for CKD is diabetic retinopathy, HTN, and some age related decline in kidney function  Proteinuria, stable, suspected due to diabetic nephropathy. Stable, improved with the ARB, continue   K normal     2. Renal cyst: in right kidney, no symptoms. Reviewed with pt again today  Still does not want referral to urology.  Is small, looks simple (benign) on MRI, "may be mildly complex" per radiologist on u/s     3. Hypertension. Blood pressure not controlled. Previously better controlled and was appropriate for pt's advanced age  Meds reviewed  Did not take any of the 4 BP meds she has. Says normally is compliant   Advised pt not to miss meds, take them regularly     4. DM-2: as above  Discussed with pt  Needs better control  Better control of DM, diet, activity discussed     5. The patient was noncompliant with diabetic management in the past  Life style issues has been reviewed with pt: ADA diet, physical activity, control wt.  Hemoglobin A1c is elevated but overall improved     PLANS AND " RECOMMENDATIONS:   As discussed above  RTC 1 year  Opportunity for questions and discussion provided.  Total time spent 40 minutes including time needed to review the records, the   patient evaluation, documentation, discussion with the patient,   more than 50% of the time was spent on coordination of care and counseling.    Level V visit.     Giselle Valenzuela MD

## 2024-02-28 NOTE — PROGRESS NOTES
"[facility]   Physical Therapy Treatment Note        Name: Saul Kirkpatrick Zaid  [facility] Number: [id number]    Therapy Diagnosis:   Encounter Diagnoses   Name Primary?    Proximal muscle weakness Yes    Gait abnormality     Decreased functional mobility and endurance     Poor balance      Physician: Jose Luis Oscar    Visit Date: 2/28/2024    Physician Orders: PT Eval and Treat  Medical Diagnosis from Referral: Left Hip Pain  Evaluation Date: 1/10/2024  Authorization Period Expiration: 12/31/2024   Plan of Care Expiration: 2/21/24  Progress Note Due: 2/10/24  Visit # / Visits authorized: 9/20 (+1 from evaluation)   FOTO: 1/3 (last performed on 1/10/2024)     Precautions: Standard and A-Fib, DDD, Glaucoma, sickle cell, HTN, PAD, polyneuropathy, T2D, asthma    PTA Visit #: 0/5     Time In: 1300 (PN + FOTO Next Session)   Time Out: 1358  Total Billable Time: 55 minutes (Billing reflects 1 on 1 treatment time spent with patient)    Subjective     Patient reports: her hamstrings feel tight but notes that her left hip is "really coming along"       He/She was not compliant with home exercise program.  Response to previous treatment: none reported  Functional change: None reported    Pain: 9/10     Location: R buttocks/thigh, L hip     Objective      Objective Measures updated at progress report or POC update only unless otherwise noted.       Treatment     Saul received the treatments listed below:       MANUAL THERAPY TECHNIQUES were applied for (0) minutes, including:    Soft tissue mobilization:   left glutes, piriformis, deep hip rotators  Joint mobilizations:   N/A  Functional dry needling: DEFERRED        THERAPEUTIC EXERCISES to develop strength, endurance, ROM, flexibility, posture, and core stabilization for (10) minutes including:    Performed Today:     NuStep 7'  Supine HS stretch 90s x 10s holds B    Interventions DEFERRED today     3 way ball roll outs 3' silver " ball  Double KTC with ball: 2' x 10s holds   Ball squeezes: hooklying 3' x 5s holds  Supine Clamshells 3' RTB  Piriformis stretch 3x30s B         NEUROMUSCULAR RE-EDUCATION ACTIVITIES to improve Balance, Coordination, Kinesthetic, Sense, Proprioception, and Posture for (10) minutes.  The following were included:    Performed Today:     Supine fall outs: green band 90s B   Seated hip ER with ball 2'  Seated hip IR RTB 2'      Interventions DEFERRED today     R Epley Maneuver (2' holds)  Posterior pelvic tilt 2 minutes x 10s holds   Sciatic nerve glides (supine): 2 minutes x 5s holds B   Side lying clamshells 2' on R only YTB  Side lying reverse clamshells 2' on R only YTB         THERAPEUTIC ACTIVITIES to improve dynamic and functional  performance for (35) minutes including:    Performed Today:     Bridges with ball squeeze: 3x10   Leg press: 35# 2x90s   Knee extensions 15# 2x1 minute   Hamstring curls 15# 2x1 minute   Calf raises 3x10   Side stepping in // bars: 3 laps        Interventions DEFERRED today     Prone prop on elbows: DC 2* pain  Semi tandem stance: 3x30s B, RW (hover)  Calf raises 2x8   Prone hamstring curls: red band 2x10 B   Seated marches (edge of mat) 2x1'   STS w/o UE 3x8 (breaks required within sets, PT supervision required)           Next Session:  SLR      Patient Education and Home Exercises       Home Exercises Provided and Patient Education Provided     Education provided: (included in treatment time) minutes  PURPOSE: Patient educated on the impairments noted above and the effects of physical therapy intervention to improve overall condition and QOL.   EXERCISE: Patient was educated on all the above exercise prior/during/after for proper posture, positioning, and execution for safe performance with home exercise program.   STRENGTH: Patient educated on the importance of improved core and extremity strength in order to improve alignment of the spine and extremities with static positions and  dynamic movement.   GAIT & BALANCE: Patient educated on the importance of strong core and lower extremity musculature in order to improve both static and dynamic balance, improve gait mechanics, reduce fall risk and improve household and community mobility.   POSTURE: Patient educated on postural awareness to reduce stress and maintain optimal alignment of the spine with static positions and dynamic movement     Written Home Exercises Provided: yes.  Exercises were reviewed and Saul was able to demonstrate them prior to the end of the session.  Saul demonstrated good  understanding of the education provided. See EMR under Patient Instructions for exercises provided during therapy sessions.    Assessment   Pt tolerated session fairly well today, with increased tolerance compared to previous session. Today's session was limited by pt fatigue 2* chronic A-fib, per pt report. She was initiated with Nustep in place of recumbent bike today, which she tolerated better and reported decreased fatigue with this versus the bike.      Saul is progressing well towards her goals.   Patient prognosis is Good.     Patient will continue to benefit from skilled outpatient physical therapy to address the deficits listed in the problem list box on initial evaluation, provide pt/family education and to maximize patient's level of independence in the home and community environment.     Patient's spiritual, cultural and educational needs considered and pt agreeable to plan of care and goals.     Anticipated Barriers for therapy: co-morbidities     Medical Necessity is demonstrated by the following  History  Co-morbidities and personal factors that may impact the plan of care [] LOW: no personal factors / co-morbidities  [] MODERATE: 1-2 personal factors / co-morbidities  [x] HIGH: 3+ personal factors / co-morbidities     Moderate / High Support Documentation:        Past Medical History:   Diagnosis Date    A-fib      Atrial  "fibrillation 10/22/2018    Back pain      Cataract      Degenerative disc disease      Diverticulosis       colonoscopy 9/22/2016    GERD (gastroesophageal reflux disease)      Glaucoma      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    Hypertension      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    Multinodular thyroid      PAD (peripheral artery disease)      Polyneuropathy      Type 2 diabetes with peripheral circulatory disorder, controlled      Type 2 diabetes, uncontrolled, 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/18/2015          Examination  Body Structures and Functions, activity limitations and participation restrictions that may impact the plan of care [] LOW: addressing 1-2 elements  [x] MODERATE: 3+ elements  [] HIGH: 4+ elements (please support below)     Moderate / High Support Documentation: See above in "Current Level of Function"       Clinical Presentation [] LOW: stable  [x] MODERATE: Evolving  [] HIGH: Unstable      Decision Making/ Complexity Score: moderate            Short Term Goals:  3 weeks Status  Date Met   PAIN: Pt will report worst pain of 7/10 in order to progress toward max functional ability and improve quality of life. [x] Progressing  [] Met  [] Not Met     FUNCTION: Patient will demonstrate improved function as indicated by a score of greater than or equal to 46 out of 100 on FOTO. [x] Progressing  [] Met  [] Not Met     MOBILITY: Patient will improve AROM to 50% of stated goals, listed in objective measures above, in order to progress towards independence with functional activities.  [x] Progressing  [] Met  [] Not Met     STRENGTH: Patient will improve strength to 50% of stated goals, listed in objective measures above, in order to progress towards independence with functional activities. [x] Progressing  [] Met  [] Not Met     POSTURE: Patient will correct postural deviations in sitting and standing, to decrease pain and promote long term stability.  [x] " Progressing  [] Met  [] Not Met     GAIT: Patient will demonstrate improved gait mechanics including decreased forward trunk flexion in order to improve functional mobility, improve quality of life, and decrease risk of further injury or fall.  [x] Progressing  [] Met  [] Not Met     HEP: Patient will demonstrate independence with HEP in order to progress toward functional independence. [x] Progressing  [] Met  [] Not Met        Long Term Goals:  6 weeks Status Date Met   PAIN: Pt will report worst pain of 4/10 in order to progress toward max functional ability and improve quality of life [x] Progressing  [] Met  [] Not Met     FUNCTION: Patient will demonstrate improved function as indicated by a score of greater than or equal to 50 out of 100 on FOTO. [x] Progressing  [] Met  [] Not Met     MOBILITY: Patient will improve AROM to stated goals, listed in objective measures above, in order to return to maximal functional potential and improve quality of life.  [x] Progressing  [] Met  [] Not Met     STRENGTH: Patient will improve strength to stated goals, listed in objective measures above, in order to improve functional independence and quality of life.  [x] Progressing  [] Met  [] Not Met     GAIT: Patient will demonstrate normalized gait mechanics with minimal compensation in order to return to PLOF. [x] Progressing  [] Met  [] Not Met     Patient will return to normal ADL's, IADL's, community involvement, recreational activities, and work-related activities with less than or equal to 5/10 pain and maximal function.  [x] Progressing  [] Met  [] Not Met           Plan     Continue Plan of Care (POC) and progress per patient tolerance. See treatment section for details on planned progressions next session.      Olivia Brock, PT

## 2024-03-04 ENCOUNTER — PATIENT OUTREACH (OUTPATIENT)
Dept: ADMINISTRATIVE | Facility: OTHER | Age: 84
End: 2024-03-04
Payer: MEDICARE

## 2024-03-04 NOTE — PROGRESS NOTES
CHW - Outreach Attempt    Lina Espinoza Community Health Worker left a voicemail message for 1st attempt to contact patient regarding: [facility]   Community Health Worker to attempt to contact patient on:  March 12, 2024.

## 2024-03-08 ENCOUNTER — PATIENT OUTREACH (OUTPATIENT)
Dept: ADMINISTRATIVE | Facility: OTHER | Age: 84
End: 2024-03-08
Payer: MEDICARE

## 2024-03-08 NOTE — PROGRESS NOTES
CHW - Follow Up    Lina Espinoza, Community Health Worker completed a follow up visit with patient via telephone today.  Pt/Caregiver reported: Ms. Casimiro Mar reported that her daughter is in hospice and she has to sit with her daughter and needs caregiver support.    Community Health Worker provided: CHW referred Pt to the [facility] for Respite Care.   Follow-up Outreach - Due: 3/14/2024

## 2024-03-18 ENCOUNTER — TELEPHONE (OUTPATIENT)
Dept: INTERNAL MEDICINE | Facility: CLINIC | Age: 84
End: 2024-03-18
Payer: MEDICARE

## 2024-03-18 ENCOUNTER — PATIENT OUTREACH (OUTPATIENT)
Dept: ADMINISTRATIVE | Facility: OTHER | Age: 84
End: 2024-03-18
Payer: MEDICARE

## 2024-04-02 ENCOUNTER — OFFICE VISIT (OUTPATIENT)
Dept: NEUROLOGY | Facility: CLINIC | Age: 84
End: 2024-04-02
Payer: COMMERCIAL

## 2024-04-02 ENCOUNTER — OFFICE VISIT (OUTPATIENT)
Dept: NEUROLOGY | Facility: CLINIC | Age: 84
End: 2024-04-02
Payer: MEDICARE

## 2024-04-02 DIAGNOSIS — I63.00 CEREBROVASCULAR ACCIDENT (CVA) DUE TO THROMBOSIS OF PRECEREBRAL ARTERY: Chronic | ICD-10-CM

## 2024-04-02 DIAGNOSIS — F43.21 GRIEF: ICD-10-CM

## 2024-04-02 DIAGNOSIS — R41.9 COGNITIVE COMPLAINTS WITH NORMAL EXAM: Chronic | ICD-10-CM

## 2024-04-02 DIAGNOSIS — F41.8 MIXED ANXIETY DEPRESSIVE DISORDER: ICD-10-CM

## 2024-04-02 DIAGNOSIS — G31.84 MILD NEUROCOGNITIVE DISORDER: Primary | ICD-10-CM

## 2024-04-02 PROCEDURE — 99999 PR PBB SHADOW E&M-EST. PATIENT-LVL II: CPT | Mod: PBBFAC,,, | Performed by: STUDENT IN AN ORGANIZED HEALTH CARE EDUCATION/TRAINING PROGRAM

## 2024-04-02 PROCEDURE — 99499 UNLISTED E&M SERVICE: CPT | Mod: S$GLB,,, | Performed by: STUDENT IN AN ORGANIZED HEALTH CARE EDUCATION/TRAINING PROGRAM

## 2024-04-02 PROCEDURE — 99999 PR PBB SHADOW E&M-EST. PATIENT-LVL I: CPT | Mod: PBBFAC,,, | Performed by: STUDENT IN AN ORGANIZED HEALTH CARE EDUCATION/TRAINING PROGRAM

## 2024-04-02 PROCEDURE — 96132 NRPSYC TST EVAL PHYS/QHP 1ST: CPT | Mod: S$GLB,,, | Performed by: STUDENT IN AN ORGANIZED HEALTH CARE EDUCATION/TRAINING PROGRAM

## 2024-04-02 PROCEDURE — 90791 PSYCH DIAGNOSTIC EVALUATION: CPT | Mod: S$GLB,,, | Performed by: STUDENT IN AN ORGANIZED HEALTH CARE EDUCATION/TRAINING PROGRAM

## 2024-04-02 PROCEDURE — 96138 PSYCL/NRPSYC TECH 1ST: CPT | Mod: S$GLB,,, | Performed by: STUDENT IN AN ORGANIZED HEALTH CARE EDUCATION/TRAINING PROGRAM

## 2024-04-02 PROCEDURE — 96139 PSYCL/NRPSYC TST TECH EA: CPT | Mod: S$GLB,,, | Performed by: STUDENT IN AN ORGANIZED HEALTH CARE EDUCATION/TRAINING PROGRAM

## 2024-04-02 PROCEDURE — 96133 NRPSYC TST EVAL PHYS/QHP EA: CPT | Mod: S$GLB,,, | Performed by: STUDENT IN AN ORGANIZED HEALTH CARE EDUCATION/TRAINING PROGRAM

## 2024-04-02 NOTE — PROGRESS NOTES
NEUROPSYCHOLOGY CONSULT    Referral Information  NAME:  Saul Mar DATE OF SERVICE: 2024   MRN#:  [id number] EDUCATION: 18   AGE: 83 y.o. HANDEDNESS: Right    : 1940 RACE: Black or    SEX: Female REFERRAL: Haydee Ortega NP;  Neurology, [facility]     Evaluation methods: I had the pleasure of seeing Saul Mar on 2024 in person at the [facility], Department of Neurology. Data sources for the below report include review of the available medical record, an interview with the patient, and administration of a series of neuropsychological tests listed in the Results section of this report. At the outset of the appointment, the undersigned explained the rationale for the evaluation along with the limits of confidentiality; and verbal informed consent for this evaluation was obtained.    The chief complaint/medical necessity leading to consultation/medical necessity is: cognitive decline    NEUROPSYCHOLOGICAL EVALUATION - CONFIDENTIAL    SUMMARY/TREATMENT PLAN   Summary of History:  Ms. Casimiro Mar is a 83 y.o., right-handed, Black or , female with 18 years of formal education. She was referred by her neurology nurse practitioner due to cognitive concerns in the context of a history oc thrombotic CVA in , atrial fibrillation, hyperlipidemia, chronic pain, hearing loss, depression, anxiety, and multiple life stressors. Medical history is additionally notable for diabetes type 2, kidney disease stage 3, and hypertension. Cognitive screening completed by her referring neurology team on 11/15/2023 was within normal limits (MoCA = 30/30). Most-recent neurologic exam completed on 11/15/2023 was documented as reflecting a bilateral and symmetric pattern of sensory loss in her toes; and poor posture. . Most-recent brain MRI completed on 11/15/2023 was documented as reflecting mild chronic ischemic changes and ventricular  enlargement. Most-recent blood laboratory studies drawn on 12/04/2023 have been reviewed by her primary care physician as falling within acceptable limits based on the above history. Urinalysis collected on 12/12/2023 was reported as reflecting high protein content for which consultation with nephrology was recommend.    During interview, Ms. Casimiro Mar reported the onset of cognitive changes at the time of her stroke in 1999; and the subsequent insidious onset and progressive course of cognitive concerns beginning 10 years ago with more-precipitous worsening over the last 5 years. Specifically, she characterized difficulties with word-finding, visual attention, slowed processing speed, and difficulties with memory for where she has placed objects in her home. She also discussed the recent onset of perceiving that objects are moving in her peripheral lower left visual field since early 2024.     Emotionally, Ms. Casimiro Mar discussed grief and loss related to the death of her daughter from cancer three weeks ago. She otherwise denied symptoms of current depression or anxiety that affect her quality of life. As to grief, she discussed that she has feelings of loss, anger, and sadness at times but does not perceive that her grief is such that it is interfering with focus and concentration or her day-to-day life at this time. She denied symptoms concerning of an unhealthy grief process. She denied current clinically significant depression outside the context of grief.     Ms. Casimiro Mar denied changes in functional independence related to her above cognitive concerns. She discussed that due to physical limitations, she has experienced difficulties with daily living skills and is actively considering home health or other support options.      Test Results:    [place] Findings:   Ms. Casimiro Mar is a woman of estimated low-average to average baseline cognitive functioning on the basis of a combined  metric including demographic data and her performance on a single-word reading task.  Current intellectual functioning is Average with a relative personal strength in processing speed; consistent with baseline estimates.   Performances in the following areas were within normal limits, suggesting intact cognitive functioning in relevant domains:  Auditory attention and working memory  Visuospatial skills  Information processing speed (areas of normative strength)  Receptive language  Phonemic verbal fluency  Semantic verbal fluency (subtle relative weakness)  Learning, retention, recall, and recognition for rote verbal information  Encoding, recall (normative strength), and recognition of structured verbal information  Learning, recall, and recognition of visual information  Verbal and visual abstract reasoning  Performances in the following areas were below normal limits, suggesting weakness or decline in relevant domains:  Naming/ word-finding  Set-shifting/ multitasking (subtly slow and error-prone responding)  On screening measures of depression and anxiety, Ms. Casimiro Mar did not endorse clinically significant depression or anxiety.    Data Synthesis: Cognitive test results suggest subtle frontal-subcortical and temporal systems dysfunction.      Diagnostic Considerations: Ms. Casimiro Mar has evidence for subtle clinically significant cognitive decline relative to her baseline. Although there has been cognitive decline, evidence from interview and her level of cognitive difficulties do not raise concern for clinically significant functional decline. As such, she qualifies for a mild neurocognitive disorder diagnosis.    Etiologic Considerations: Given her history of CVA with reported symptoms of aphasia treated with speech therapy services, cognitive test results suggest good recovery following her stroke in 1999. Test results do not clearly indicate a co-occurring process at this time.      Diagnoses  1. Mild neurocognitive disorder        2. Cerebrovascular accident (CVA) due to thrombosis of precerebral artery        3. Grief          Provider Recommendations:   Ms. Casimiro Mar will be encouraged to follow up with her referring provider in order to integrate these findings into the overall context of her clinical care, and to implement any of the below recommendations as appropriate.     Continued close management of Ms. Casimiro Mar's cerebrovascular risk factors will be important moving forward.     Medications to manage cognitive decline can be deferred at this time,      Repeat assessment is recommended in 12 months, in order to assess for changes over time and update her treatment plan. Scores from this assessment should be used as a baseline for comparison at that time.     Patient Recommendations:  The next step in your care is to follow up with your referring provider in order to help with continued management of your care. The below recommendations may help you and your family compensate for your difficulties and better understand the reasons for your cognitive changes.     At this time the single most important thing you can do to reduce your risk of future cognitive decline is to reduce your risk of stroke. Follow with your primary care and neurology team for management of medical risk factors.     Given very subtle language and multitasking difficulties, medications to treat cognitive decline can be deferred at this time.    There are steps you can take to improve your long-term brain health and reduce your risk for cognitive decline in the future. I encourage you to follow the recommendations in your daily life:  Engage in physical activity (i.e., something that gets your heart rate up) totaling at least 15-30 minutes per day. Regular exercise is very important for both long-term health and stress management. Walking, hiking, elliptical, stationary biking, dancing, and yoga  are all good options. Because pain affects your ability to exercise, consult with your doctor and/or a physical therapist to develop a regimen that works for you.  Work closely with your doctor(s) to manage any vascular conditions such as high blood pressure, high cholesterol, and diabetes. Follow the management guidelines from the American Heart Association at www.heart.org.  Remain mentally engaged by keeping socially active, reading, learning new skills, playing games, or participating in a hobby.  Get a good night's sleep by avoiding screens 30-60 minutes before bed and sticking to a regular sleep schedule.  Eat a balanced, heart-healthy diet that is low in salt, saturated fats, and added sugar. The Mediterranean diet has been shown to be especially healthy; studies show that it may reduce the risk of developing dementia and slow the rate of age-related cognitive decline.      Repeat assessment is indicated in 12 months, or sooner in the event that you or your family notice significant cognitive or functional declines. At that time scores from this assessment should be used as a baseline for comparison.    Ms. Casimiro Mar will be provided the results of the evaluation.     Thank you for allowing me to participate in Ms. Casimiro Kelsey care.  If you have any questions, please contact me at [phone].        _____________________  Raoul Milton, Ph.D.  Neuropsychologist  Department of Neuropsychology  [facility], [place]    HISTORY OF PRESENT ILLNESS: Ms. Saul Mar is an 83 y.o., right-handed, -American female with 18 years of education who was referred for a neuropsychological evaluation in the setting of cognitive changes and the above medical history.       Onset and Course of Cognitive Concerns: Immediate, beginning at the time of her first diagnosed stroke in 1999.  Ms. Casimiro Mar reported that her cognitive skills have been slowly worsening over the last 10 years,  "with more-noticeable worsening over the last 5 years.    Reported Symptoms of CVA: In 1999, Ms. Casimiro Mar was producing a report for a class she was taking, working on her computer. She realized that when she attempted to read the word, "highlight" it came out as "a foreign language." She then rested and later attempted to make a call half of the numbers were "black." She ultimately called 9-1-1 and was transported to the hospital. They told her she had a blockage on the left side in her neck. She completed one year of speech therapy.    Characterization of Cognitive Concerns  Attention/ working memory: Ms. Casimiro Mar denied attention or working memory concerns.  Processing speed: Ms. Casimiro Mar reported difficulties with slowed thinking speed. She denied slowed reaction time when driving.   Language: Ms. Casimiro Mar discussed difficulties with retrieving the names of family and long-term friends. She also discussed difficulties with word-finding that affects her in casual conversation. She uses circumlocution to compensate. She denied receptive language concerns.   Visual-spatial/ navigation: Ms. Casimiro Mar discussed difficulties with visual inattention, e.g. she doesn't watch her feet and this leads to tripping. She also reported that it is very hard for her to find common objects, e.g. her cane even after coating it in foil to increase visibility.   Psychomotor: Ms. Casimiro Mar discussed that she has mobility difficulties due to physical disability. She denied frequent falls.   Memory: Ms. Casimiro Mar discussed that she often misplaces objects around the house though did not report other memory problems. She denied difficulties with navigation.   Executive Functions: Ms. Casimiro Mar discussed that multitasking and problem-solving abilities remain subjectively intact.  Orientation: Ms. Casimiro Mar denied errors in orientation.     Neuropsychiatric " Symptoms:  Hallucinations: She denied visual hallucinations, though did report visual illusions perceiving that things are moving in her periphery that she knows are not actually moving; when she looks at the object it is still. She perceives that this occurs in the lower left visual field only and has been present since early .  Delusional/Paranoid Thinking: Denied  Apathy: Denied  Irritability/Agitation: Denied  Disinhibition: Denied  Depression/Labile Mood: Ms. Casimiro Mar discussed grief int he context of her daughter's death. She discussed that otherwise she has never perceived depression in the way that others have discussed with her.   Anxiety: Denied currently. She discussed that when she was younger, this was a source of concern for her that has abated as stressors have reduced.   Grief: Her daughter  on 2024 and was buried on Saturday before this interview. Ms. Casimiro Mar discussed some sadness, loss, and anger as above related to her grief but that these symptoms are transient and she remains generally euthymic day-to-day at this time. She specifically perceived and articulated prior to my inquiry that her level of perceived grief and loss would not be sufficient to cause distraction or disruption to her ability to perform on tests of memory.     DAILY FUNCTIONING:  Living Environment: She lives alone in a single family home without stairs.     BASIC ADLS:  Feeding: independent  Dressing: She discussed that this activity now takes her a long time due to physical limitations.   Bathing: independent  Toileting: independent    IADLS:  Support System: She does not have anyone in her home to assist her but she is interested in home health services to accommodate her physical limitations.   Appointment Management: independent; and her record is absent frequent no-shows.   Medication Compliance: independent reportedly without error. She uses a pill-box for morning and evening  "medications.   Financial Management: independent and reportedly effective.   Cooking: She cooks less often due to difficulties standing for a long time. She denied errors in tracking or safety errors when she does prepare food.   Driving: She drives independently without reported errors.       BRAIN HEALTH RISK FACTORS:  Hearing Loss: Endorsed, though she was good with conversational speech.   Vision Loss: She wears bifocals and has glaucoma and cataracts. She has had laser surgery to correct diabetic retinopathy to good effect.   Physical Activity: Reduced - She discussed that pain affects her ability to engage.   Social Isolation: Denied  Falls: Endorsed, most recently last year. She discussed having fallen down the steps "some years ago" and having fallen in her house from time to time due to tripping over things.   Sleep: She discussed that she sleeps between 6-10 hours a night depending on her schedule. She discussed that she naps perhaps 2 days a week.     MEDICAL HISTORY: Ms. 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    NEUROIMAGING:  Results for orders placed or performed during the hospital encounter of 12/06/23   MRI Brain Without Contrast    Narrative    EXAMINATION:  MRI BRAIN WITHOUT CONTRAST    CLINICAL HISTORY:  CVA.  Follow-up.  Other amnesiaStroke, follow up;Mental status change, unknown cause;    TECHNIQUE:  Standard multiplanar noncontrast sequences of the brain.    COMPARISON:  CT brain January 24, " 2019.    FINDINGS:  The ventricles are mildly enlarged as are the extra-axial CSF spaces.  Mild periventricular and subcortical white matter hyperintensity is present as well consistent with age-related small vessel ischemic degeneration.    No definite edema, hemorrhage or mass effect is seen. No extra-axial fluid collection.    Skull base appears normal.    The diffusion sequence is negative.      Impression    No acute abnormality.  Mild age-related atrophy and white matter degeneration.      Electronically signed by: Vazquez Elliott MD  Date:    12/06/2023  Time:    11:44   Results for orders placed or performed during the hospital encounter of 01/24/19   CT Head Without Contrast    Narrative    EXAMINATION:  CT HEAD WITHOUT CONTRAST    CLINICAL HISTORY:  Dizziness; Dizziness and giddiness    TECHNIQUE:  Low dose axial images were obtained through the head.  Coronal and sagittal reformations were also performed. Contrast was not administered.    COMPARISON:  September 5, 2015    FINDINGS:  Stable and symmetric mild prominence of the ventricles and sulci consistent with age-appropriate generalized atrophy and minimal bilateral small vessel ischemic change.  Gray-white matter differentiation is maintained and there is no acute intracranial hemorrhage or infarction.  No intra or extra-axial mass lesion or fluid collection.  Visualized sinuses remarkable for minimal mucosal thickening within the ethmoid air cells without opacification.  Remaining sinuses and mastoid air cells are symmetrically aerated and clear.  Calvarium is intact without depressed or displaced fracture.  Hyperostosis frontalis interna incidentally noted.  Focal area of increased calcific density inner table right frontal bone without change back to prior exams from 2013.  Possible etiologies would include tiny calcified meningioma.  Again no significant change back to 2013.  Scalp soft tissues within normal limits.    Atherosclerotic  calcification noted within the cavernous portion internal carotid arteries bilaterally.  Similar atherosclerotic disease noted involving the vertebral arteries.      Impression    Stable age-appropriate generalized atrophy and minimal bilateral small vessel ischemic change.    No acute intracranial hemorrhage or infarction.    Additional chronic findings as above.      Electronically signed by: Shimon Hilario MD  Date:    01/24/2019  Time:    14:51     *Note: Due to a large number of results and/or encounters for the requested time period, some results have not been displayed. A complete set of results can be found in Results Review.       Current medications: Ms. Casimiro Mar has a current medication list which includes the following prescription(s): albuterol, alprazolam, amlodipine, atorvastatin, blood sugar diagnostic, blood sugar diagnostic, brimonidine 0.2%, butalbital-acetaminophen-caffeine -40 mg, carvedilol, cholecalciferol (vitamin d3), diclofenac sodium, ferrous sulfate, flecainide, glimepiride, hydrochlorothiazide, levalbuterol, linzess, losartan, meclizine, metformin, metoprolol succinate, onetouch delica plus lancet, onetouch ultra2 meter, and xarelto.     Review of patient's allergies indicates:   Allergen Reactions    Pravachol [pravastatin] Nausea Only     PSYCHIATRIC HISTORY: She takes alprazolam now for her heart. She was prescribed a different medication by her primary care doctor in the past during her twenties.     SUICIDAL IDEATION:  History: Denied  Current Stressors: Denied  Active Suicidal Ideation:Denied  Plan/Intent: Denied    FAMILY HISTORY: family history includes Cancer in her maternal aunt and maternal uncle; Cancer (age of onset: 68) in her sister; Diabetes in her maternal grandmother; Drug abuse in her daughter; Heart disease in her paternal grandmother; Hypertension in her mother; Mental illness in her mother; Ovarian cancer in her sister; Stroke in her father and  "mother.    PSYCHOSOCIAL HISTORY:   Education:   Level Attained: She has a Master's degree in [employer] studies.    Learning Difficulties: Denied, except to note that she required tutoring in mathematics. She graduated with honors in her high school, cum Laude from her Bachelor's degree, earning an award in her MA.   Special Education: Denied   Repeated Grade: Denied     Vocation:   Highest Attained:  at [employer]   Retired:     Relationship Status/ Support Network:   : Yes   : no, both husbands have .    Children: 1 daughter who  as above.     SUBSTANCE USE: Ms. Casimiro Mar denied a history of problematic alcohol or recreational substance use and is a lifelong non-smoker.    MENTAL STATUS AND OBSERVATIONS:  APPEARANCE: Casually dressed and adequate grooming/hygiene.   ALERTNESS/ORIENTATION: Attentive and alert. Ms. Casimiro Mar was fully oriented to person, place, time, and situation.   GAIT/MOTOR: Ms. Casimiro Mar arrived in a motorized scooter. She transitioned independently from this scooter to a chair without error. No fine or gross motor abnormality was observed in this context.    SENSORY: Vision was limited and corrected with bifocals; however, she had difficulty efficiently perceiving small text in this context. Hearing was adequate for testing.   SPEECH/LANGUAGE: Normal in rate, rhythm, tone, and volume. Expressive language was notable for occasional word-finding errors in casual speech, e.g. for "pill-box." Receptive language was generally intact.   MOOD/AFFECT: Mood was generally euthymic and affect was mood-congruent.   INTERPERSONAL BEHAVIOR: Rapport was quickly and easily established   THOUGHT PROCESSES: Thoughts seemed logical and goal-directed.   TESTING OBSERVATIONS: Ms. Casimiro Mar did all that was asked of her without complaint or criticism. She appeared to easily understand and retain task instructions and demonstrated no " difficulties with tracking during the assessment. She remained calm, cooperative, and euthymic affect persisted throughout.     RESULTS     Tests Administered (manual norms used unless otherwise indicated): [employer] - Test of Premorbid Functioning (TOPF),  [place] in the Hand Test , Wechsler Adult Intelligence Scale 4th Ed. (WAIS-IV) select subtests, Controlled Oral Word Association Test (COWAT; [facility] norms), Semantic Fluency (Animals; [facility] norms), Neuropsychological Assessment Battery (NAB) Auditory Comprehension and Naming , Luke Verbal Learning Test, Revised (HVLT-R), Wechsler Memory Scale, 4th Ed. Logical Memory (WMS IV-LM), Brief Visuospatial Memory Test, Revised (BVMT-R; Joseph et al. norms), Judgement of Line Orientation (JOLO), Line Bisection task , Trail Making Test (TMT A/B; [facility] norms), Grooved Pegboard Test ([facility] norms), Geriatric Depression Scale (GDS) and Frias Anxiety Inventory (KATHRYN).    Score Label T-Score Standard Score Z-Score Scaled Score %ile Rank   Exceptionally High > 70 > 130 > 2.0  > 16 > 98   Above Average 64-69 120-129 1.4-1.9 15 91-97   High Average 57-63 110-119 0.7-1.3 12-14 75-90   Average 44-56  0.6 to -0.6 8-11 25-74   Low Average 37-43 80-89 -1.3 to -0.7 6-7 9-24   Below Average 30-36 70-79 -2.0 to -1.4 4-5 2-8   Exceptionally Low < 30 < 70  < -2.0 < 4 < 2       Performance Validity: Ms. Casimiro Mar completed both stand-alone and embedded measures of task engagement. Below-cutoff performance on any one stand-alone measure and/ or any two embedded measures of task engagement is highly unlikely to occur outside the context of poor or inconsistent effort during testing. Ms. Casimiro Mar scored above predetermined cutoffs on all administered measures of task engagement. As such, there is no evidence to suggest poor or inconsistent engagement and their performance is deemed to be a reasonable reflection of their day-to-day cognitive status.      PREMORBID FUNCTIONING Raw Score Type of Standardized Score Standardized Score Percentile/CP Descriptor   TOPF simple + pred. eFSIQ - SS 88 21 Low Average   INTELLECTUAL FUNCTIONING Raw Score Type of Standardized Score Standardized Score Percentile/CP Descriptor   WAIS-IV         VCI -  50 Average   CLARISA - SS 94 34 Average   WMI -  55 Average   PSI -  87 High Average   FSIQ -  55 Average   LANGUAGE FUNCTIONING Raw Score Type of Standardized Score Standardized Score Percentile/CP Descriptor   WAIS-IV Information 8 ss 8 25 Average   TOPF Word Reading 21 SS 85 16 Low Average   NAB Auditory Comprehension 89 Tscore 58 79 High Average   NAB Auditory Comprehension Colors 13 - - 100 WNL   NAB Auditory Comprehension Shapes 22 - - 100 WNL   NAB Auditory Comprehension Colors/Shapes/Numbers 21 - - 100 WNL   NAB Auditory Comprehension Pointing 6 - - 100 WNL   NAB Auditory Comprehension Yes/No 10 - - 100 WNL   NAB Auditory Comprehension Paper Folding 17 - - 100 WNL   NAB Naming 24 Tscore 30 2 Below Average   NAB Naming Percent Correct After Semantic Cuing 43 - - 61 Average   NAB Naming Percent Correct After Phonemic Cuing 25 - - 24 Low Average   FAS 33 Tscore 46 34 Average   Animal Naming 11 Tscore 41 18 Low Average   VISUOSPATIAL FUNCTIONING Raw Score Type of Standardized Score Standardized Score Percentile/CP Descriptor   Davis JOLO 23 - - 40 Average   WAIS-IV Block Design 28 ss 11 63 Average   WAIS-IV Visual Puzzles 7 ss 8 25 Average   Line Bisection - - - - WNL   BVMT-R Copy 11 - - - WNL   LEARNING & MEMORY Raw Score Type of Standardized Score Standardized Score Percentile/CP Descriptor   HVLT-R         Total Immediate 24 Tscore 52 58 Average   Delayed Recall 10 Tscore 58 79 High Average   Retention % 100 Tscore 59 82 High Average   Hits 12 - - - -   False Positives 0 - - - -   Discrimination  12 Tscore 61 86 High Average   WMS-IV Subtests         LM I 33 ss 12 75 High Average   LM II 24 ss 14 91  Above Average   LM Recognition 20 - - >75 High Average   BVMT-R         IR 17 zscore 0.0 51 Average   DR 7 zscore 0.2 56 Average   Discrimination Index 5 - -0.2 43 Average   ATTENTION/WORKING MEMORY Raw Score Type of Standardized Score Standardized Score Percentile/CP Descriptor   WAIS-IV Digit Span 22 ss 10 50 Average         DS Forward 8 ss 8 25 Average         DS Backward 8 ss 11 63 Average         DS Sequence 6 ss 10 50 Average         Longest Digit Forward 5 - - - -         Longest Digit Backward 4 - - - -         Longest Digit Sequence 4 - - - -   WAIS-IV Arithmetic 13 ss 11 63 Average   MENTAL PROCESSING SPEED Raw Score Type of Standardized Score Standardized Score Percentile/CP Descriptor   WAIS-IV Symbol Search 23 ss 12 75 High Average   WAIS-IV Coding 59 ss 14 91 Above Average   TMT A  48 Tscore 45 31 Average   TMT A errors 0 - - - -   EXECUTIVE FUNCTIONING Raw Score Type of Standardized Score Standardized Score Percentile/CP Descriptor   TMT B 231 Tscore 37 9 Low Average   TMT B errors 2 - - - -   WAIS-IV Similarities 26 ss 12 75 High Average   WAIS-IV Matrix Reasoning 7 ss 8 25 Average   FRONTOMOTOR  Raw Score Type of Standardized Score Standardized Score Percentile/CP Descriptor   GPT  Tscore 41 18 Low Average   GPD  Tscore 40 16 Low Average   MOOD & PERSONALITY Raw Score Type of Standardized Score Standardized Score Percentile/CP Descriptor   GDS-30 4 - - - WNL   KATHRYN 5 - - - Minimal   ss = scaled score (mean = 10, SD = 3); SS = standard score (mean = 100, SD = 15); Tscore mean = 50, SD = 10; zscore (mean = 0.00, SD = 1)       Billing Documentation     Time spent conducting face to face interview with the patient: 54 minutes; 73747.  Time on review of neuropsychological test data and relevant records, data interpretation, and writing/ documentation: 95 minutes; [id number] &[id number] (x1).  Psychometrist time spent in the administration and scoring of 2 or more neuropsychological tests 247 minutes;  71069 & [id number] (x7).     Referral Diagnoses:  R41.9 (ICD-10-CM) - Cognitive complaints with normal exam    I63.00 (ICD-10-CM) - Cerebrovascular accident (CVA) due to thrombosis of precerebral artery    F41.8 (ICD-10-CM) - Mixed anxiety depressive disorder

## 2024-04-02 NOTE — PROGRESS NOTES
Ms. Casimiro Mar was seen during this appointment for the testing component of her neuropsychological evaluation. Please see my visit note from this same date of service for documentation of her encounter.        _____________________  Raoul Milton, Ph.D.  Neuropsychologist  Department of Neuropsychology  [facility], [place]

## 2024-04-08 ENCOUNTER — PATIENT OUTREACH (OUTPATIENT)
Dept: ADMINISTRATIVE | Facility: OTHER | Age: 84
End: 2024-04-08
Payer: MEDICARE

## 2024-04-08 NOTE — PROGRESS NOTES
CHW - Follow Up    Lina Espinoza, Community Health Worker completed a follow up visit with patient via telephone today.  Pt/Caregiver reported: Ms. Maravilla reported that she doing well and trying to catch up with her medical appointments.   Community Health Worker provided: CHW informed Pt that she will follow up with her in a couple of weeks to see if she additional community resources.   Follow-up Outreach-Due: May 3, 2024

## 2024-04-16 NOTE — PROGRESS NOTES
Established Patient Chronic Pain Note (Follow up Visit)    Referring Physician: No ref. provider found    PCP: Penny Randolph MD    Chief Complaint:   L hip pain  Interval History (4/25/2024):  Patient Saul Mar presents today for follow-up visit.  Patient being seen today for follow-up of lower back pain that radiates down the posterior aspect of the bilateral lower extremities.  Pain is worse on the right than the left.  She rates her pain today an 8/10.  She has had previous epidural steroid injections but she states this only provided relief for a couple of weeks.  She continues to take Tylenol and use a topical compound with some relief.  Pain is worse with extending back and she will often get relief whenever she bends forward.  Pain is also worse with prolonged standing or walking.  Patient denies night fever/night sweats, urinary incontinence, bowel incontinence, significant weight loss and significant motor weakness.   Patient denies any other complaints or concerns at this time.       Interval History (1/5/2024):  Patient Saul Mar presents today for follow-up visit.  Patient experienced a fall in 12/10 where her chair was pulled out from behind her and she fell onto her bottom.  She went to the ER for evaluation with x-rays of the lumbar spine and left hip which were both negative for fracture.  She is since then seen Dr. Jacobsen who has ordered repeat x-rays of the lumbar spine and x-ray and again both were negative for fracture or dislocation.  She has physical therapy ordered and is supposed to start on January 9th.  Today she rates her pain at 8/10 but stay at its worst a 10/10.  Pain is continuous and does not radiate down the lower extremities.  She is currently using Voltaren gel and was prescribed Fioricet in the ER with no relief of symptoms.  Patient denies night fever/night sweats, urinary incontinence, bowel incontinence, significant weight loss and significant  motor weakness.   Patient denies any other complaints or concerns at this time.      SUBJECTIVE:  Interval history 11/13/2023  Patient presents status post L4-5 interlaminar epidural steroid injection with right paramedian approach 08/02/2023.  Patient reports it took a few weeks for therapeutic benefit the patient reports at least 75-80% improvement in right lower extremity radicular symptoms following her epidural steroid injection.  Patient reports taking a trip to [place] from September 6 through September 12th with significant improvement in her pain and improvement in mobility following her injection.  She reports upon her return pain relief was sustained until 1 week prior.  Today she reports pain has again becomes severe 6-7/10.  Patient reports pain along the lateral and posterior aspect of the right lower extremity in L4-S1 distribution to the calf.  Pain is exacerbated with standing and with ambulation.  Patient reports she would like to wait and see for another week if pain improves.  She has continued physician directed physical therapy exercises at home over the last 3 months with marginal improvement in her symptoms.    Interval Hx: 07/12/2023  Patient presents for follow-up of lower back and leg pain.  Today patient reports pain in the lower back and leg has become severe over the last several months.  Pain is constant and today is rated a 6/10.  Pain is described as aching and shooting in nature.  Patient reports pain in the lower back which radiates down the lateral and posterior aspect of the right lower extremity in L4-S1 distribution to the knee.  Pain is exacerbated with standing or with ambulation.  Patient reports pain has severely limited her day-to-day activities and quality of life.  Patient would like to pursue repeat intervention.  Patient has continued physician directed physical therapy exercises at home over the last 8 weeks without meaningful improvement in her pain.    Of  note, patient saw Odilia Valverde PA-C in Neurosurgery 08/12/2022 with the following evaluation:        Interval history 10/26/2022  Patient presents status post bilateral L2/3 transforaminal epidural steroid injection 10/03/2022.  Patient reports limited 50% relief along the posterior aspect of the right lower extremity following her procedure.  She continues to report pain along the lateral aspect of the right lower extremity to the calf in L4 distribution.  Pain today is a 10/10.  Patient does endorse associated weakness in the lower extremity associated with her pain.  Patient reports her leg pain is more severe and debilitating than the back pain.  Patient reports she is scheduled to restart physical therapy the following week.  Patient has discontinued gabapentin secondary to intolerable side effect.    Interval History (7/18/2022): Saul Mar presents today for follow-up visit.  she underwent bilateral L4/5 transforaminal epidural steroid injection on 6/17/22.  The patient reports that she is/was unchanged following the procedure.  she reports 0% pain relief.  Reports pain is worsened with prolonged sitting, improved with leaning forward (shopping cart sign). Reports continued aching, stinging low back pain in a band-like distribution with radiation down bilateral lower extremities. Reports she was unable to tolerate Gabapentin due to drowsiness and discontinued. Reports she received no relief while taking this medication.  Patient reports pain as 8/10 today.    Initial HPI  Saul Mar is a 83 y.o. female with past medical history significant for CVA, MDD, primary open angle glaucoma, DMII, AFib, HLD, HTN, diastolic dysfunction, PAD, CKD III, SStrait, GERD who presents with bilateral leg pain.  Patient reports pain began approximately 1 year prior without inciting accident injury or trauma.  Today patient reports pain which is constant which is rated a 10/10.  Pain is described as  aching in nature.  Patient reports pain originating in bilateral buttocks and radiating to bilateral calves in L4-L5 distribution.  Pain is equally worse on both sides.  Pain is exacerbated with prolonged standing and with ambulation.  Patient ambulates with a walker and reports she is only able to ambulate a few steps before requiring rest.  Patient reports pain is improved with lumbar flexion and rest.  Patient does report associated weakness in bilateral lower extremities associated with her pain.  She denies bowel or bladder incontinence or saddle anesthesia.  Patient reports completing several sessions of conventional physical therapy with initial improvement in her symptoms, however with increased intensity of exercises, exacerbation of pain.    Pt last saw Dr. Sanchez 8/24/21 with recommendation to continue with PT and discussion of future MBB.    Patient reports significant motor weakness and loss of sensations.  Patient denies night fever/night sweats, urinary incontinence, bowel incontinence and significant weight loss.      Pain Disability Index Review:         7/12/2023     9:16 AM 10/26/2022     9:13 AM 7/18/2022     1:41 PM   Last 3 PDI Scores   Pain Disability Index (PDI) 42 52 34       Non-Pharmacologic Treatments:  Physical Therapy/Home Exercise: yes  Ice/Heat:yes  TENS: no  Acupuncture: no  Massage: no  Chiropractic: no    Other: no      Pain Medications:  - Opioids: Vicodin ( Hydrocodone/Acetaminophen)  - Adjuvant Medications: Neurontin (Gabapentin) and Xanax (Alprazolam). Robaxin  - Anti-Coagulants: Xarelto    Pain Procedures:   -08/02/2023: L4-5 interlaminar epidural steroid injection with right paramedian approach with 75-80% improvement in right lower extremity radicular symptoms  -10/03/2022: Bilateral L2/3 transforaminal epidural steroid injection with 50% relief along the posterior aspect of the right lower extremity  -06/17/2022: Bilateral L4/5 transforaminal epidural steroid  injection    Past Medical History:   Diagnosis Date    A-fib     Atrial fibrillation 10/22/2018    Back pain     Cataract     Degenerative disc disease     Diverticulosis     colonoscopy 9/22/2016    GERD (gastroesophageal reflux disease)     Glaucoma     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 Taveras    COLONOSCOPY      COLONOSCOPY N/A 9/22/2016    Procedure: COLONOSCOPY;  Surgeon: Jd Ashton MD;  Location: [facility] ([address] [place] [address]);  Service: Endoscopy;  Laterality: N/A;  OK per Dr Randolph for pt to hold Plavix 5 days prior to procedure/see telephone encounter dated 8/29/16/svn    EPIDURAL STEROID INJECTION N/A 8/2/2023    Procedure: L4-5 interlaminar epidural steroid injection with right paramedian approach with RN IV sedation;  Surgeon: Chan Fonseca MD;  Location: [facility];  Service: Pain Management;  Laterality: N/A;    EYE SURGERY Bilateral 2002 approx    Laser for glaucoma    HYSTERECTOMY  1963     AMEENA/USO- fibroids; no cancer    OOPHORECTOMY  1963    unknown, only removed one    SELECTIVE INJECTION OF ANESTHETIC AGENT AROUND LUMBAR SPINAL NERVE ROOT BY TRANSFORAMINAL APPROACH Bilateral 6/17/2022    Procedure: Bilateral L4/5 TF JASKARAN with RN IV sedation;  Surgeon: Chan Fonseca MD;  Location: [facility];  Service: Pain Management;  Laterality: Bilateral;    SELECTIVE INJECTION OF ANESTHETIC AGENT AROUND LUMBAR SPINAL NERVE ROOT BY TRANSFORAMINAL APPROACH Bilateral 10/3/2022    Procedure: Bilateral L2-3 transforaminal epidural steroid injection with RN IV  sedation;  Surgeon: Chan Fonseca MD;  Location: [facility];  Service: Pain Management;  Laterality: Bilateral;     Review of patient's allergies indicates:   Allergen Reactions    Pravachol [pravastatin] Nausea Only       Current Outpatient Medications   Medication Sig Dispense Refill    albuterol (PROVENTIL/VENTOLIN HFA) 90 mcg/actuation inhaler INHALE 2 PUFFS INTO THE LUNGS EVERY 6 (SIX) HOURS AS NEEDED FOR WHEEZING. RESCUE 25.5 g 3    ALPRAZolam (XANAX) 0.5 MG tablet TAKE 1 TABLET BY MOUTH NIGHTLY AS NEEDED FOR ANXIETY (MAY TAKE 1-2 TIMES WEEKLY FOR ANXIETY) 30 tablet 0    amLODIPine (NORVASC) 5 MG tablet Take 1 tablet (5 mg total) by mouth 2 (two) times daily. 180 tablet 3    atorvastatin (LIPITOR) 80 MG tablet TAKE 1 TABLET BY MOUTH EVERY DAY 90 tablet 2    blood sugar diagnostic Strp 1 strip by Misc.(Non-Drug; Combo Route) route 2 (two) times daily. Insurance preferred test stripes [id number] 100 strip 11    blood sugar diagnostic Strp For use with insurance covered glucometer; test daily 100 strip 12    brimonidine 0.2% (ALPHAGAN) 0.2 % Drop Place 1 drop into both eyes 3 (three) times daily. 15 mL 12    butalbital-acetaminophen-caffeine -40 mg (FIORICET, ESGIC) -40 mg per tablet Take 1 tablet by mouth every 6 (six) hours as needed for Pain. 15 tablet 0    carvediloL (COREG) 12.5 MG tablet Take 12.5 mg by mouth.      cholecalciferol, vitamin D3, (VITAMIN D3) 1,000 unit capsule Take 1 capsule (1,000 Units total) by mouth once daily. 30 capsule 12    diclofenac sodium (VOLTAREN) 1 % Gel Apply 2 g topically 2 (two) times daily. 50 g 11    ferrous sulfate (FEOSOL) 325 mg (65 mg iron) Tab tablet Take 1 tablet (325 mg total) by mouth 2 (two) times daily. 180 tablet 3    flecainide (TAMBOCOR) 50 MG Tab Take 1 tablet (50 mg total) by mouth 2 (two) times daily. 60 tablet 1    glimepiride (AMARYL) 4 MG tablet TAKE 1 TABLET BY MOUTH IN THE MORNING WITH BREAKFAST 90 tablet 0    hydroCHLOROthiazide  "(HYDRODIURIL) 12.5 MG Tab TAKE 1 TABLET BY MOUTH EVERY DAY 90 tablet 3    levalbuterol (XOPENEX HFA) 45 mcg/actuation inhaler INHALE 1-2 PUFFS INTO THE LUNGS EVERY 6 (SIX) HOURS AS NEEDED FOR WHEEZING. RESCUE 15 Inhaler 12    linaCLOtide (LINZESS) 145 mcg Cap capsule TAKE 1 CAPSULE (145 MCG TOTAL) BY MOUTH BEFORE BREAKFAST. 30 capsule 1    losartan (COZAAR) 50 MG tablet TAKE 1 TABLET BY MOUTH TWICE A  tablet 2    meclizine (ANTIVERT) 25 mg tablet TAKE 1 TABLET (25 MG TOTAL) BY MOUTH DAILY AS NEEDED FOR DIZZINESS. 30 tablet 0    metFORMIN (GLUCOPHAGE-XR) 500 MG ER 24hr tablet TAKE 2 TABLETS BY MOUTH EVERY  tablet 1    metoprolol succinate (TOPROL-XL) 50 MG 24 hr tablet TAKE 1 TABLET BY MOUTH EVERY DAY 90 tablet 1    ONETOUCH DELICA PLUS LANCET 33 gauge Misc Apply topically.      ONETOUCH ULTRA2 METER Misc SMARTSIG:Via Meter As Directed      XARELTO 15 mg Tab TAKE 1 TABLET (15 MG TOTAL) BY MOUTH DAILY WITH DINNER OR EVENING MEAL. 30 tablet 6     No current facility-administered medications for this visit.       Review of Systems     GENERAL:  No weight loss, malaise or fevers.  HEENT:   No recent changes in vision or hearing  NECK:  Negative for lumps, no difficulty with swallowing.  RESPIRATORY:  Negative for cough, wheezing or shortness of breath, patient denies any recent URI.  CARDIOVASCULAR:  Negative for chest pain, leg swelling or palpitations.  GI:  Negative for abdominal discomfort, blood in stools or black stools or change in bowel habits.  MUSCULOSKELETAL:  See HPI.  SKIN:  Negative for lesions, rash, and itching.  PSYCH:  No mood disorder or recent psychosocial stressors.   HEMATOLOGY/LYMPHOLOGY:  Negative for prolonged bleeding, bruising easily or swollen nodes.    NEURO:   No history of headaches, syncope, paralysis, seizures or tremors.  All other reviewed and negative other than HPI.    OBJECTIVE:    BP (!) 148/69   Pulse 62   Ht 5' 5" (1.651 m)   Wt 81.7 kg (180 lb 1.9 oz)   BMI " "29.97 kg/m²     Vitals:    04/25/24 1029   BP: (!) 148/69   Pulse: 62   Weight: 81.7 kg (180 lb 1.9 oz)   Height: 5' 5" (1.651 m)     Body mass index is 29.97 kg/m².   (reviewed on 4/25/2024)       Physical Exam    GENERAL: Well appearing, in no acute distress, alert and oriented x3.  PSYCH:  Mood and affect appropriate.  SKIN: Skin color, texture, turgor normal, no rashes or lesions.  HEAD/FACE:  Normocephalic, atraumatic. Cranial nerves grossly intact.    CV: RRR with palpation of the radial artery.  PULM: No evidence of respiratory difficulty, symmetric chest rise.  GI:  Soft and non-tender.    BACK:  Thoracic kyphosis Straight leg raising in the sitting and supine positions is negative to radicular pain. pain to palpation over the facet joints of the lumbar spine or spinous processes. reduced range of motion with pain reproduction. Pain with back extension, no pain with flexion.  EXTREMITIES: Peripheral joint ROM is reduced with pain without obvious instability or laxity in all four extremities. No deformities, edema, or skin discoloration. Good capillary refill.  MUSCULOSKELETAL: Able to stand on heels & toes.   hip, and knee provocative maneuvers are negative.  There is no pain with palpation over the sacroiliac joints bilaterally.  FABERs test is negative.  Facet loading test is positive bilaterally.   Bilateral upper and lower extremity strength is normal and symmetric.  No atrophy or tone abnormalities are noted.  RIGHT Lower extremity: Hip flexion 5/5, Hip Abduction 5/5, Hip Adduction 5/5, Knee extension 5/5, Knee flexion 5/5, Ankle dorsiflexion5/5, Extensor hallucis longus 5/5, Ankle plantarflexion 5/5  LEFT Lower extremity:  Hip flexion 5/5, Hip Abduction 5/5,Hip Adduction 5/5, Knee extension 5/5, Knee flexion 5/5, Ankle dorsiflexion 5/5, Extensor hallucis longus 5/5, Ankle plantarflexion 5/5  -Normal testing knee (patellar) jerk and ankle (achilles) jerk    NEURO: Bilateral upper and lower extremity " coordination and muscle stretch reflexes are physiologic and symmetric. No loss of sensation is noted.  GAIT: normal.      Imaging:  Xray lumbar 12/29/2023   EXAM: XR LUMBAR SPINE 2 OR 3 VIEWS  CLINICAL HISTORY: Lower back pain.  TECHNIQUE: Three-view lumbar spine series.  COMPARISON: 05/18/2023.  FINDINGS: Disc space narrowing with slight ventral subluxation at L4-5.  Mild disc space narrowing at L2-3.  Multilevel osteophyte formation.  Arthritic changes involving the facets at the L4-5 and L5-S1 levels.     Xray hip 12/29/2023   EXAM: XR HIP WITH PELVIS WHEN PERFORMED, 2 OR 3 VIEWS LEFT  CLINICAL HISTORY: Left hip pain.  TECHNIQUE: AP pelvis and left hip.  FINDINGS: No fracture or dislocation is demonstrated. Mild arthritic change involving both hips.  SPECT ankylosis of the SI joints.  Multiple calcifications in the pelvis.        Impression:   Mild arthritic change involving both hips.  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  Impression:   Multilevel degenerative disc disease.  Findings appear similar to examination dated 05/18/2023.   12/10/21    MRI Lumbar Spine Without Contrast    FINDINGS:  Alignment: Mild levocurvature of the lumbar spine.  Grade 1 anterolisthesis L4 on L5.    Vertebrae: Multiple Schmorl's nodes.  Benign osseous hemangioma in the T12 vertebral body.  Degenerative the edema within the bilateral L4-L5 facet joints.  No abnormal marrow signal to suggest infiltrative process.    Discs: Multilevel disc desiccation and height loss, most severe at L4-L5.    Cord: No signal abnormality.  Conus terminates at L2    Degenerative findings:    T12-L1: No significant spinal canal stenosis or neural foraminal narrowing.    L1-L2: Mild diffuse disc bulge.  No significant spinal canal stenosis or neural foraminal narrowing.    L2-L3: Severe diffuse disc bulge, bilateral facet arthropathy and ligamentum flavum buckling.  These findings result in mild spinal canal stenosis, severe right and moderate left  neural foraminal narrowing.    L3-L4: Moderate diffuse disc bulge.  These findings result in moderate bilateral neural foraminal narrowing.  No spinal canal stenosis.    L4-L5: Grade 1 anterolisthesis, diffuse disc bulge, bilateral facet arthropathy, and ligamentum flavum buckling.  These findings result in severe spinal canal stenosis and severe right and moderate left neural foraminal narrowing.    L5-S1: Diffuse disc bulge with a superimposed left paracentral protrusion that abuts the left descending S1 nerve root.  Bilateral facet arthropathy. These findings result in mild bilateral neural foraminal narrowing    Paraspinal muscles & soft tissues: T1 hyperintense cortical lesion noted within the right kidney, possibly a proteinaceous or hemorrhagic cyst. left simple renal cyst.    Impression  Multilevel degenerative changes, most pronounced at L4-L5 with severe spinal canal stenosis, severe right and moderate left neural foraminal narrowing.    Bilateral degenerative facet edema at L4-L5.    Probable proteinaceous versus hemorrhagic right renal cortical cyst.  Recommend correlation with a dedicated renal ultrasound.    07/22/21  X-Ray Lumbar Spine Ap And Lateral  FINDINGS:  Five lumbar vertebral bodies.  Vertebral body heights are maintained.  Disc space narrowing and degenerative endplate changes L4-5 and L5-S1.  Moderate facet arthropathy L4-5 and L5-S1.  Grade 1 anterolisthesis L4 on L5.     04/02/18    X-Ray Cervical Spine Complete 5 view  FINDINGS:  There is anatomic spinal alignment.  Prominent bridging vertebral endplate spurs present anteriorly from C4-5 through C6-7.  Disc interspaces are maintained.  Odontoid is intact.  No fracture or subluxation.      ASSESSMENT: 83 y.o. year old female with lower back and leg pain, consistent with     1. Lumbar degenerative disc disease        2. Lumbar radiculopathy, chronic        3. Spondylolysis of lumbar region                PLAN:   - Interventions:  None at  this time. Discussed JASKARAN, she would like to defer at this time. While her denise notes dictate 80% relief for several weeks, she states it was more like 1-2 weeks relief    Patient received 80% sustained relief in right lower extremity radicular symptoms following L4-5 interlaminar epidural steroid injection with right paramedian approach.  We have discussed repeating this injection in the future should symptoms exacerbate  We have discussed the procedure, benefits and potential risk in detail.    - Interventions: None today  Patient may be a candidate for spinal cord stimulation device secondary to chronic pain syndrome and multiple failed attempts of other interventions (lumbar TFESI x 3) and medical management (membrane stabilizing agents). Explained the risks and benefits of the procedure in detail with the patient today in clinic along with alternative treatment options,   -Patient has been given informational booklets for her review in the past- currently would like to hold off on this     - Anticoagulation use: yes  Xarelto  -secondary prophylaxis; Dr. Grayson ([facility])     report:  Reviewed and consistent with medication use as prescribed.    - Medications:  - Patient discontinued Gabapentin secondary to somnolence and ineffectivness.  We will continue physical therapy and interventional treatment.  Will do trial on lyrica 75mg QHS x 1 week then BID. Creatine clearance calculated today at 40. UTD utilized for dosing reference. We discussed potential side effects of this medication which may include drowsiness,dizziness, dry mouth, constipation or peripheral edema.  Continue compound cream      - Therapy:  We discussed continuing physical therapy to help manage the patient/s painful condition. The patient was counseled that muscle strengthening will improve the long term prognosis in regards to pain and may also help increase range of motion and mobility.    - Imaging: Reviewed available  imaging with patient and answered any questions they had regarding study    -Consults/referral:Neurosurgery PRN     - Follow up visit:  3 months or sooner if needed      The above plan and management options were discussed at length with patient. Patient is in agreement with the above and verbalized understanding.    - I discussed the goals of interventional chronic pain management with the patient on today's visit. We discussed a multimodal and systematic approach to pain.  This includes diagnostic and therapeutic injections, adjuvant pharmacologic treatment, physical therapy, and at times psychiatry.  I emphasized the importance of regular exercise, core strengthening and stretching, diet and weight loss as a cornerstone of long-term pain management.    - This condition does not require this patient to take time off of work, and the primary goal of our Pain Management services is to improve the patient's functional capacity.  - Patient Questions: Answered all of the patient's questions regarding diagnoses, therapy, treatment and next steps        Olivia Peralta NP  Interventional Pain Management  [facility]    Disclaimer:  This note was prepared using voice recognition system and is likely to have sound alike errors that may have been overlooked even after proof reading.  Please call me with any questions

## 2024-04-25 ENCOUNTER — OFFICE VISIT (OUTPATIENT)
Dept: PAIN MEDICINE | Facility: CLINIC | Age: 84
End: 2024-04-25
Payer: MEDICARE

## 2024-04-25 VITALS
BODY MASS INDEX: 30.01 KG/M2 | HEART RATE: 62 BPM | SYSTOLIC BLOOD PRESSURE: 148 MMHG | DIASTOLIC BLOOD PRESSURE: 69 MMHG | HEIGHT: 65 IN | WEIGHT: 180.13 LBS

## 2024-04-25 DIAGNOSIS — M43.06 SPONDYLOLYSIS OF LUMBAR REGION: ICD-10-CM

## 2024-04-25 DIAGNOSIS — M51.36 LUMBAR DEGENERATIVE DISC DISEASE: Primary | ICD-10-CM

## 2024-04-25 DIAGNOSIS — M54.16 LUMBAR RADICULOPATHY, CHRONIC: ICD-10-CM

## 2024-04-25 PROCEDURE — 3078F DIAST BP <80 MM HG: CPT | Mod: CPTII,S$GLB,, | Performed by: NURSE PRACTITIONER

## 2024-04-25 PROCEDURE — 99214 OFFICE O/P EST MOD 30 MIN: CPT | Mod: S$GLB,,, | Performed by: NURSE PRACTITIONER

## 2024-04-25 PROCEDURE — 99999 PR PBB SHADOW E&M-EST. PATIENT-LVL III: CPT | Mod: PBBFAC,,, | Performed by: NURSE PRACTITIONER

## 2024-04-25 PROCEDURE — 3077F SYST BP >= 140 MM HG: CPT | Mod: CPTII,S$GLB,, | Performed by: NURSE PRACTITIONER

## 2024-04-25 PROCEDURE — 1125F AMNT PAIN NOTED PAIN PRSNT: CPT | Mod: CPTII,S$GLB,, | Performed by: NURSE PRACTITIONER

## 2024-04-25 PROCEDURE — 3288F FALL RISK ASSESSMENT DOCD: CPT | Mod: CPTII,S$GLB,, | Performed by: NURSE PRACTITIONER

## 2024-04-25 PROCEDURE — 1101F PT FALLS ASSESS-DOCD LE1/YR: CPT | Mod: CPTII,S$GLB,, | Performed by: NURSE PRACTITIONER

## 2024-04-25 RX ORDER — PREGABALIN 75 MG/1
CAPSULE ORAL
Qty: 173 CAPSULE | Refills: 0 | Status: SHIPPED | OUTPATIENT
Start: 2024-04-25 | End: 2024-07-24

## 2024-04-27 NOTE — PROGRESS NOTES
HPI     Glaucoma            Comments: 4 month IOP ck and pt feels vision is getting foggy since last   exam           Comments    DLS: 11/6/23    1) POAG  2) DM with BDR and ME OU  3) Hx RLL Lesion  4) NS OD  5) PCIOL OS  6) Hx CVA   7) Hx Amaurosis Fugax  8) Eyelid Myokymia     Meds:  Brimonidine TID OU           Last edited by Zuri Winkler MA on 5/2/2024 11:27 AM.              Assessment /Plan     For exam results, see Encounter Report.    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 POAG ou   H/O poor compliance   First HVF 1997   First photos 1998      Family history none   Glaucoma meds Has used alphagan in past - poor compliance-stopped on her own   H/O adverse rxn to glaucoma drops none   LASERS No glaucoma laser (+h/o focal OU for BDR)   GLAUCOMA SURGERIES none   OTHER EYE SURGERIES CE/PCIOL OS 2/27/13  CDR 0.8/0.75   Tbase 16-20/16-22   Tmax 20/22   Ttarget 17/17   HVF 17 test 1997 to 2023 - IAD/sup paracent od //  IAD / sup paracentral   (+changes ? 2/2 focal laser )   Gonio +3   /621- thick ou (- 4.5 ou)   OCT 2 test 2022 to 2023  - RNFL - nl od // dec TI os   HRT 6  tests: 2009 to 2018 -  MR -  Dec. I, bord S/T od // bord T/I os /// CDR 0.74 od // 0.70 os  Disc photos 1998, 2000, 2001, 2003, 2003, 2004, 2005, 2006- slides  // 2010 , 2018 - OIS      - Ttoday 13/12  - Test done today  IOP      2. 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 NS OD               Remove prn - pt wants to wait 3/17/2023               BCVA 20/40              BAT 20/60     4. Pseudophakia OS               S/p CE/PCIOL OS               IOL SN60 WF 20.5              -VA  "limited 2/2 hx of CSME s/p focal scars              BCVA  20/25              Give Rx glasses - 7/2/2013              Had re-bound iritis - resolved     4. H/O RLL lesion - S/P excision with Jovanni      5. Post Nelda lives in [place] - but still likes to come to [place] for care      6. amaurosis fugax / H/O CVA's              Patient reports stroke like symptoms and amaurosis fugax 10/13              -  She was admitted to hospital with very elevated BP              -  Patient had MRI at the time showing ischemic disease              - last Carotid U/S done 11/12- patient reports that                  - PCP gets one yearly- last U/S showed minimal plaque disease              -  H/o stroke- discussed that amaurosis was from elevated BP and if ever recurs needs to report to ER immediately- she voiced understanding     7. Eyelid myokymia  - intermittent - patient also reports eyelid "twitching  "- discussed with patient that myokymia is usually from lack of sleep, caffeine intake, or stress- however nothing to worry about  -  Did not have any "twitching" on exam today     Plan:  BrimonidineTID ou   Can consider CEIOL OD with Rey given poor compliance with gtts and moderate glaucoma.   HVF's are inconsistent with the ON exam (likely 2/2 focal laser)  / OCT ect   Pt wants to wait on phaco od for now // has done well os      Avoid PG's if possible 2/2 h/o CME 2/2 BDR     Refraction Post op os  is more myopic than expected - review IOL calc if needs 2nd eye done     Repeat OCT of macula 5/17/2022 - - done no CME os// + focal laser scars ou     Photo file updated  3/17/2023     5/2/2024   Had to put appt off - her daughter passed form cancer recently    They had a nice service for her - all the children and grand children came     Plan:   F/U 4 months IOP // AR/MR/BAT cataract check - can have phaco/IOL od prn // review retina notes      Refer back Issac for diabetic check - past due last seen 1/26/2023     - " photo file - (( first name is Saul ( leave out the Zaid part))) - updated 6/4/2019

## 2024-04-27 NOTE — TELEPHONE ENCOUNTER
Care Due:                  Date            Visit Type   Department     Provider  --------------------------------------------------------------------------------                                EP -                              PRIMARY      [facility]  Last Visit: 12-      CARE ([facility])   MEDICINE       Penny Randolph  Next Visit: None Scheduled  None         None Found                                                            Last  Test          Frequency    Reason                     Performed    Due Date  --------------------------------------------------------------------------------    HBA1C.......  6 months...  glimepiride, metFORMIN...  12- 06-    [facility] Embedded Care Due Messages. Reference number: [id number].   4/27/2024 9:18:16 AM CDT

## 2024-04-29 RX ORDER — ALBUTEROL SULFATE 90 UG/1
AEROSOL, METERED RESPIRATORY (INHALATION)
Qty: 18 G | Refills: 3 | Status: SHIPPED | OUTPATIENT
Start: 2024-04-29

## 2024-05-02 ENCOUNTER — OFFICE VISIT (OUTPATIENT)
Dept: OPHTHALMOLOGY | Facility: CLINIC | Age: 84
End: 2024-05-02
Payer: MEDICARE

## 2024-05-02 DIAGNOSIS — E11.3213 BOTH EYES AFFECTED BY MILD NONPROLIFERATIVE DIABETIC RETINOPATHY WITH MACULAR EDEMA, ASSOCIATED WITH TYPE 2 DIABETES MELLITUS: ICD-10-CM

## 2024-05-02 DIAGNOSIS — H25.11 AGE-RELATED NUCLEAR CATARACT, RIGHT: ICD-10-CM

## 2024-05-02 DIAGNOSIS — Z96.1 PSEUDOPHAKIA, LEFT EYE: ICD-10-CM

## 2024-05-02 DIAGNOSIS — H40.1211 LOW-TENSION GLAUCOMA, RIGHT EYE, MILD STAGE: Primary | ICD-10-CM

## 2024-05-02 DIAGNOSIS — H40.1222 LOW-TENSION GLAUCOMA, LEFT EYE, MODERATE STAGE: ICD-10-CM

## 2024-05-02 PROCEDURE — 3288F FALL RISK ASSESSMENT DOCD: CPT | Mod: CPTII,S$GLB,, | Performed by: OPHTHALMOLOGY

## 2024-05-02 PROCEDURE — 1126F AMNT PAIN NOTED NONE PRSNT: CPT | Mod: CPTII,S$GLB,, | Performed by: OPHTHALMOLOGY

## 2024-05-02 PROCEDURE — 99999 PR PBB SHADOW E&M-EST. PATIENT-LVL III: CPT | Mod: PBBFAC,,, | Performed by: OPHTHALMOLOGY

## 2024-05-02 PROCEDURE — 99214 OFFICE O/P EST MOD 30 MIN: CPT | Mod: S$GLB,,, | Performed by: OPHTHALMOLOGY

## 2024-05-02 PROCEDURE — 1160F RVW MEDS BY RX/DR IN RCRD: CPT | Mod: CPTII,S$GLB,, | Performed by: OPHTHALMOLOGY

## 2024-05-02 PROCEDURE — 1159F MED LIST DOCD IN RCRD: CPT | Mod: CPTII,S$GLB,, | Performed by: OPHTHALMOLOGY

## 2024-05-02 PROCEDURE — 1101F PT FALLS ASSESS-DOCD LE1/YR: CPT | Mod: CPTII,S$GLB,, | Performed by: OPHTHALMOLOGY

## 2024-05-08 ENCOUNTER — OFFICE VISIT (OUTPATIENT)
Dept: NEUROLOGY | Facility: CLINIC | Age: 84
End: 2024-05-08
Payer: MEDICARE

## 2024-05-08 ENCOUNTER — PATIENT OUTREACH (OUTPATIENT)
Dept: ADMINISTRATIVE | Facility: OTHER | Age: 84
End: 2024-05-08
Payer: MEDICARE

## 2024-05-08 DIAGNOSIS — Z86.73 HISTORY OF CVA (CEREBROVASCULAR ACCIDENT): ICD-10-CM

## 2024-05-08 DIAGNOSIS — G31.84 MILD NEUROCOGNITIVE DISORDER: Primary | ICD-10-CM

## 2024-05-08 PROCEDURE — 99999 PR PBB SHADOW E&M-EST. PATIENT-LVL I: CPT | Mod: PBBFAC,,, | Performed by: STUDENT IN AN ORGANIZED HEALTH CARE EDUCATION/TRAINING PROGRAM

## 2024-05-08 PROCEDURE — 99499 UNLISTED E&M SERVICE: CPT | Mod: S$GLB,,, | Performed by: STUDENT IN AN ORGANIZED HEALTH CARE EDUCATION/TRAINING PROGRAM

## 2024-05-08 PROCEDURE — 96132 NRPSYC TST EVAL PHYS/QHP 1ST: CPT | Mod: S$GLB,,, | Performed by: STUDENT IN AN ORGANIZED HEALTH CARE EDUCATION/TRAINING PROGRAM

## 2024-05-08 NOTE — PROGRESS NOTES
Ms. Casimiro Mar  attended a in-person feedback session to discuss the results from her recent neuropsychological testing (see report dated 04/02/2024). We discussed her test results, diagnoses, and my recommendations. Ms. Casimiro Mar voiced her understanding of the information provided, and voiced her intention to follow through on the recommendations provided. All questions were answered to her satisfaction and Ms. Casimiro Mar was provided with a copy of her report. she was encouraged to contact our office with any future questions or concerns.         _____________________  Raoul Milton, Ph.D.  Neuropsychologist  Department of Neuropsychology  [facility], [place]       [facility]     Time on review of neuropsychological test data and relevant records, data interpretation, providing feedback about these results, and writing/ documentation: 38 minutes; [id number]    [id number]

## 2024-05-14 DIAGNOSIS — N18.30 DIABETES MELLITUS WITH STAGE 3 CHRONIC KIDNEY DISEASE: Chronic | ICD-10-CM

## 2024-05-14 DIAGNOSIS — E11.22 DIABETES MELLITUS WITH STAGE 3 CHRONIC KIDNEY DISEASE: Chronic | ICD-10-CM

## 2024-05-14 RX ORDER — GLIMEPIRIDE 4 MG/1
4 TABLET ORAL
Qty: 90 TABLET | Refills: 0 | Status: SHIPPED | OUTPATIENT
Start: 2024-05-14

## 2024-05-14 NOTE — TELEPHONE ENCOUNTER
No care due was identified.  [facility] Embedded Care Due Messages. Reference number: [id number].   5/14/2024 7:43:42 AM CDT

## 2024-05-14 NOTE — TELEPHONE ENCOUNTER
Refill Decision Note   Ruthluanne Kirkpatrick Zaid  is requesting a refill authorization.  Brief Assessment and Rationale for Refill:  Approve     Medication Therapy Plan:  No dose adjustment recommended per renal fxn.   ED visits 12/12/23 & 12/27/23 for back pain. No changes to therapy.      Pharmacist review requested: Yes   Extended chart review required: Yes   Comments:     Note composed:12:48 PM 05/14/2024

## 2024-05-14 NOTE — TELEPHONE ENCOUNTER
Refill Routing Note   Medication(s) are not appropriate for processing by [facility] for the following reason(s):        Required labs abnormal: Cr  ED/Hospital Visit since last OV with provider    [facility] action(s):  Defer          Appointments  past 12m or future 3m with PCP    Date Provider   Last Visit   12/7/2023 Penny Randolph MD   Next Visit   Visit date not found Penny Randolph MD   ED visits in past 90 days: 0        Note composed:11:18 AM 05/14/2024

## 2024-05-30 ENCOUNTER — PATIENT OUTREACH (OUTPATIENT)
Dept: ADMINISTRATIVE | Facility: OTHER | Age: 84
End: 2024-05-30
Payer: MEDICARE

## 2024-05-30 ENCOUNTER — TELEPHONE (OUTPATIENT)
Dept: INTERNAL MEDICINE | Facility: CLINIC | Age: 84
End: 2024-05-30
Payer: MEDICARE

## 2024-05-30 NOTE — TELEPHONE ENCOUNTER
----- Message from Misti Donnelly sent at 5/30/2024  2:36 PM CDT -----  Contact: [phone] Patient  Patient would like to get medical advice.  Symptoms (please be specific):   Pt is requesting an afternoon appt with Dr Randolph on 06/04 for a follow up and home health orders.   How long have you had these symptoms: N/A  Would you like a call back, or a response through your [facility] portal?:   both   Pharmacy name and phone # (copy from chart):   N/A  Comments:

## 2024-05-31 ENCOUNTER — TELEPHONE (OUTPATIENT)
Dept: INTERNAL MEDICINE | Facility: CLINIC | Age: 84
End: 2024-05-31

## 2024-05-31 ENCOUNTER — OFFICE VISIT (OUTPATIENT)
Dept: INTERNAL MEDICINE | Facility: CLINIC | Age: 84
End: 2024-05-31
Payer: MEDICARE

## 2024-05-31 VITALS — SYSTOLIC BLOOD PRESSURE: 136 MMHG | DIASTOLIC BLOOD PRESSURE: 78 MMHG

## 2024-05-31 DIAGNOSIS — I70.0 AORTIC ARCH ATHEROSCLEROSIS: ICD-10-CM

## 2024-05-31 DIAGNOSIS — F33.0 MILD EPISODE OF RECURRENT MAJOR DEPRESSIVE DISORDER: ICD-10-CM

## 2024-05-31 DIAGNOSIS — D57.3 SICKLE CELL TRAIT SYNDROME: ICD-10-CM

## 2024-05-31 DIAGNOSIS — M54.9 BILATERAL BACK PAIN, UNSPECIFIED BACK LOCATION, UNSPECIFIED CHRONICITY: Primary | ICD-10-CM

## 2024-05-31 DIAGNOSIS — M48.062 SPINAL STENOSIS OF LUMBAR REGION WITH NEUROGENIC CLAUDICATION: ICD-10-CM

## 2024-05-31 DIAGNOSIS — E11.8 DIABETES MELLITUS TYPE 2 WITH COMPLICATIONS: ICD-10-CM

## 2024-05-31 PROCEDURE — 3075F SYST BP GE 130 - 139MM HG: CPT | Mod: CPTII,95,, | Performed by: INTERNAL MEDICINE

## 2024-05-31 PROCEDURE — 3078F DIAST BP <80 MM HG: CPT | Mod: CPTII,95,, | Performed by: INTERNAL MEDICINE

## 2024-05-31 PROCEDURE — 99214 OFFICE O/P EST MOD 30 MIN: CPT | Mod: 95,,, | Performed by: INTERNAL MEDICINE

## 2024-05-31 PROCEDURE — 1159F MED LIST DOCD IN RCRD: CPT | Mod: CPTII,95,, | Performed by: INTERNAL MEDICINE

## 2024-05-31 PROCEDURE — 1160F RVW MEDS BY RX/DR IN RCRD: CPT | Mod: CPTII,95,, | Performed by: INTERNAL MEDICINE

## 2024-05-31 NOTE — PROGRESS NOTES
Telemedicine Video Visit    The patient location is:  Patient Home   The chief complaint leading to consultation is: follow up  Visit type: Virtual visit with synchronous audio and video  Total time spent with patient: 25 minutes  Each patient to whom he or she provides medical services by telemedicine is:  (1) informed of the relationship between the physician and patient and the respective role of any other health care provider with respect to management of the patient; and (2) notified that he or she may decline to receive medical services by telemedicine and may withdraw from such care at any time.     She feels she needs assistance in her home, at least breakfast.  She cannot stand over the stove easily.  Pain in back and legs.    No falls of late.    Seen in Pain Management January, has had 2-3 JASKARAN, no real help.  Next step might be surgery but she is ambivalent.  She has also done PT and completed this.    She lives in [place].  Has a few children none of whom live close by.    Diabetes stable, she is due for labs.    CKD stable, has seen Nephrology recently.    Patient Active Problem List   Diagnosis    Mixed diabetic hyperlipidemia associated with type 2 diabetes mellitus    Degenerative disc disease    Colon polyp: tubular adenoma 5/13, repeated 2016 to be repeated 2021- declines colonoscopy and Gastro also does not recommend    Multinodular thyroid: thyroid u/s 7/16, stable 2017 and 2019, 2021    POAG (primary open-angle glaucoma) - Both Eyes    Amaurosis fugax    Nuclear sclerosis - Right Eye    Eyelid myokymia    Cerebral microvascular disease: stroke ? 1999 elsewhere; TIA 10/13    Vitamin D insufficiency    Left ventricular diastolic dysfunction with preserved systolic function    Hypertension, essential    Gastroesophageal reflux disease without esophagitis    Type 2 diabetes, uncontrolled, with background retinopathy with macular edema    Diabetes mellitus with proteinuria    Type II diabetes  mellitus with neurological manifestations    Aortic arch atherosclerosis    Abnormal ankle brachial index    Diabetes mellitus with stage 3 chronic kidney disease    Cerebrovascular accident (CVA) due to thrombosis of precerebral artery    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    Sickle cell trait syndrome    Mixed anxiety depressive disorder    Diverticulosis of large intestine without hemorrhage    Atrial fibrillation    Hyperlipidemia associated with type 2 diabetes mellitus    Bilateral radiating leg pain    PAD (peripheral artery disease)    Carotid atherosclerosis    Spinal stenosis of lumbar region with neurogenic claudication    Lumbar radiculopathy, chronic    Proximal muscle weakness    Gait disorder    Posture abnormality    Both eyes affected by 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, associated with type 2 diabetes mellitus    Low-tension glaucoma, right eye, mild stage    Low-tension glaucoma, left eye, moderate stage    Age-related nuclear cataract, right    Mild episode of recurrent major depressive disorder    Carotid artery disease    DM cataract    Diabetic glaucoma    Diabetic retinopathy with macular edema associated with type 2 diabetes mellitus    Pulmonary hypertension    Diabetes mellitus with microalbuminuria    Gait abnormality    Hearing loss    Diabetes mellitus type 2 with complications    Chronic constipation    Cognitive complaints with normal exam    Decreased functional mobility and endurance    Poor balance    Tendinitis, de Quervain's    Pain of hand    Chronic kidney disease, stage 3b    Mild neurocognitive disorder          Answers submitted by the patient for this visit:  Back Pain Questionnaire (Submitted on 5/31/2024)  Chief Complaint: Back pain  Chronicity: chronic  Onset: more than 1 year ago  Frequency: daily  Progression since onset: slowly worsening  Pain location: lumbar spine  Pain quality: aching  Radiates to: right thigh  Pain is:  worse during the day  Aggravated by: standing  Stiffness is present: all day  bowel incontinence: No  chest pain: No  dysuria: No  fever: No  headaches: No  leg pain: Yes  numbness: No  paresis: No  paresthesias: Yes  pelvic pain: No  perianal numbness: No  tingling: No  weakness: No  weight loss: No  genital pain: No  hematuria: No  Pain severity: severe  Improvement on treatment: no relief    Physical Exam  Constitutional:       Appearance: She is well-developed.   HENT:      Head: Normocephalic and atraumatic.   Eyes:      Extraocular Movements: Extraocular movements intact.      Conjunctiva/sclera: Conjunctivae normal.   Neck:      Thyroid: No thyromegaly.   Pulmonary:      Effort: No respiratory distress.   Neurological:      General: No focal deficit present.      Mental Status: She is alert and oriented to person, place, and time.   Psychiatric:         Mood and Affect: Mood normal.         Behavior: Behavior normal.         Thought Content: Thought content normal.         Judgment: Judgment normal.        1. Bilateral back pain, unspecified back location, unspecified chronicity  -     Ambulatory referral/consult to Outpatient Case Management  -     Ambulatory referral/consult to Home Health; Future; Expected date: 06/01/2024    2. Sickle cell trait syndrome  -     Ambulatory referral/consult to Home Health; Future; Expected date: 06/01/2024    3. Mild episode of recurrent major depressive disorder    4. Aortic arch atherosclerosis  Overview:  CXR 10/30/2013---Atherosclerosis is seen in the aortic arch.      5. Spinal stenosis of lumbar region with neurogenic claudication  -     Ambulatory referral/consult to Outpatient Case Management  -     Ambulatory referral/consult to Home Health; Future; Expected date: 06/01/2024    6. Diabetes mellitus type 2 with complications       The patient already has been established in pain management.  She does not want surgery.  Recommend she contact them for a sooner  appointment  Alarm symptoms, red flags and ED cautions reviewed, she has having no bladder or bowel issues.  She is mainly asking me to arrange for home health services  Not sure if she qualifies for home health but will order  Also will order case management  I recommended she discuss her home needs with her family who may need to step in for some assistance to her  Sickle cell trait has been stable times years  Mood is stable on her current regimen  ASCVD without syncope, chest pain, pressure, tightness or shortness a breath  Diabetes stable  Labs and review

## 2024-06-02 ENCOUNTER — PATIENT MESSAGE (OUTPATIENT)
Dept: DIABETES | Facility: CLINIC | Age: 84
End: 2024-06-02
Payer: MEDICARE

## 2024-06-03 NOTE — PROGRESS NOTES
CHW - Outreach Attempt    Lina Espinoza Community Health Worker left a voicemail message for 1st attempt to contact patient regarding: [facility]   Community Health Worker to attempt to contact patient on: June 5, 2024

## 2024-06-04 ENCOUNTER — OFFICE VISIT (OUTPATIENT)
Dept: OPHTHALMOLOGY | Facility: CLINIC | Age: 84
End: 2024-06-04
Payer: MEDICARE

## 2024-06-04 DIAGNOSIS — Z96.1 PSEUDOPHAKIA, LEFT EYE: ICD-10-CM

## 2024-06-04 DIAGNOSIS — H25.11 AGE-RELATED NUCLEAR CATARACT, RIGHT: ICD-10-CM

## 2024-06-04 DIAGNOSIS — H40.1211 LOW-TENSION GLAUCOMA, RIGHT EYE, MILD STAGE: ICD-10-CM

## 2024-06-04 DIAGNOSIS — H40.1222 LOW-TENSION GLAUCOMA, LEFT EYE, MODERATE STAGE: ICD-10-CM

## 2024-06-04 DIAGNOSIS — E11.3213 BOTH EYES AFFECTED BY MILD NONPROLIFERATIVE DIABETIC RETINOPATHY WITH MACULAR EDEMA, ASSOCIATED WITH TYPE 2 DIABETES MELLITUS: Primary | ICD-10-CM

## 2024-06-04 PROCEDURE — 92134 CPTRZ OPH DX IMG PST SGM RTA: CPT | Mod: S$GLB,,, | Performed by: OPHTHALMOLOGY

## 2024-06-04 PROCEDURE — 3288F FALL RISK ASSESSMENT DOCD: CPT | Mod: CPTII,S$GLB,, | Performed by: OPHTHALMOLOGY

## 2024-06-04 PROCEDURE — 1101F PT FALLS ASSESS-DOCD LE1/YR: CPT | Mod: CPTII,S$GLB,, | Performed by: OPHTHALMOLOGY

## 2024-06-04 PROCEDURE — 1159F MED LIST DOCD IN RCRD: CPT | Mod: CPTII,S$GLB,, | Performed by: OPHTHALMOLOGY

## 2024-06-04 PROCEDURE — 92014 COMPRE OPH EXAM EST PT 1/>: CPT | Mod: S$GLB,,, | Performed by: OPHTHALMOLOGY

## 2024-06-04 PROCEDURE — 1126F AMNT PAIN NOTED NONE PRSNT: CPT | Mod: CPTII,S$GLB,, | Performed by: OPHTHALMOLOGY

## 2024-06-04 PROCEDURE — 99999 PR PBB SHADOW E&M-EST. PATIENT-LVL III: CPT | Mod: PBBFAC,,, | Performed by: OPHTHALMOLOGY

## 2024-06-04 PROCEDURE — 92202 OPSCPY EXTND ON/MAC DRAW: CPT | Mod: 59,S$GLB,, | Performed by: OPHTHALMOLOGY

## 2024-06-04 PROCEDURE — 1160F RVW MEDS BY RX/DR IN RCRD: CPT | Mod: CPTII,S$GLB,, | Performed by: OPHTHALMOLOGY

## 2024-06-04 PROCEDURE — 2022F DILAT RTA XM EVC RTNOPTHY: CPT | Mod: CPTII,S$GLB,, | Performed by: OPHTHALMOLOGY

## 2024-06-04 NOTE — PROGRESS NOTES
HPI    6 mo DFE/OCTm OU     DLS: 01/26/2023 by Dr. HARRIS Davenport MD    CC: I am doing well and my cotting is pealing on my glasses.     -blurred va  -diplopia  -eye pain   -flashes/floaters  ++headaches (q once in a while)  --curtain/shadows/veils    Eye Meds: Alphagan OU BID    POHx:   1) POAG    2) DM with BDR and ME OU  H/O remote Focal OU (09/14/2022 by Dr. Penny Randolph MD    3) Hx RLL Lesion  4) NS OD  5) PCIOL OS  6) Hx CVA   7) Hx Amaurosis Fugax  8) Eyelid Myokymia        Last edited by Kady Barclay MA on 6/4/2024 11:11 AM.       A/P    ICD-10-CM ICD-9-CM   1. 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 Low-tension glaucoma, right eye, mild stage  H40.1211 365.12     365.71   3. Low-tension glaucoma, left eye, moderate stage  H40.1222 365.12     365.72   4. Age-related nuclear cataract, right  H25.11 366.16   5. Pseudophakia, left eye  Z96.1 V43.1         1. 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 Low-tension glaucoma, right eye, mild stage  3. Low-tension glaucoma, left eye, moderate stage  F/b Dr. Taveras - Recent notes reviewed   IOP 10/13  Plan: Continue Present Management     4. Age-related nuclear cataract, right  Mod NS  Plan: Will refer for updated Mrx, current ones are scuffed    RTC 12 mo DFE/OCTm OU       I saw and examined the patient and reviewed in detail the findings  documented. The final examination findings, image interpretations, and plan as documented in the record represent my personal judgment and conclusions.    Steve Davenport MD  Vitreoretinal Surgery   [facility]

## 2024-06-05 ENCOUNTER — TELEPHONE (OUTPATIENT)
Dept: INTERNAL MEDICINE | Facility: CLINIC | Age: 84
End: 2024-06-05
Payer: MEDICARE

## 2024-06-05 NOTE — TELEPHONE ENCOUNTER
I see she went to the ER with high blood pressure- is she doing all right?     I would recommend that she return for an appointment to see Grace or me in the next 1-2 weeks if she is able, to review all the ER information thanks

## 2024-06-05 NOTE — TELEPHONE ENCOUNTER
"Patient states she is "feeling better" B/P running in the 150's systolic. Patient denies Headaches or dizziness, chest pain or arm pain at present. An appointment scheduled on 06/13/2024 with Magdalena.  "

## 2024-06-08 PROCEDURE — G0180 MD CERTIFICATION HHA PATIENT: HCPCS | Mod: ,,, | Performed by: INTERNAL MEDICINE

## 2024-06-10 ENCOUNTER — PATIENT OUTREACH (OUTPATIENT)
Dept: ADMINISTRATIVE | Facility: OTHER | Age: 84
End: 2024-06-10
Payer: MEDICARE

## 2024-06-10 ENCOUNTER — PATIENT MESSAGE (OUTPATIENT)
Dept: INTERNAL MEDICINE | Facility: CLINIC | Age: 84
End: 2024-06-10
Payer: MEDICARE

## 2024-06-10 NOTE — PROGRESS NOTES
CHW - Follow Up    Lina Espinoza, Community Health Worker completed a follow up visit with patient via telephone today.  Pt/Caregiver reported: Ms. Casimiro Mar stated that she it getting ready to start OT and PT on Tuesdays and Thursdays.  Pt stated she missed her appointment with the [employer]  ([employer] for transportation.  Pt states that she will call ([employer])  today.  Pt stated she receives some food from the [employer], but does not thinks she qualifies for SNAP Benefits due to her income.     Community Health Worker provided: CHW contacted the [facility] on Aging to sign Pt up for Meals on Wheels.  CHW and Ms. Casimiro Mar also spoke with Ms. Matias at the [facility] and was told that they only have money for disconnected utilities at this time.   Follow-up Outreach - Due: 6/20/2024

## 2024-06-19 ENCOUNTER — PATIENT OUTREACH (OUTPATIENT)
Dept: ADMINISTRATIVE | Facility: OTHER | Age: 84
End: 2024-06-19
Payer: MEDICARE

## 2024-06-19 NOTE — PROGRESS NOTES
CHW - Follow Up    Lina Espinoza, Community Health Worker completed a follow up visit with patient via telephone today.  Pt/Caregiver reported: Ms. Casimiro Mar reported that she has still not contacted [employer] to reschedule her appointment for transportation.    Community Health Worker provided: CHW encouraged her to call [employer] and she will speak with her on next week.   Follow-up Outreach - Due: 6/28/2024

## 2024-06-24 ENCOUNTER — OFFICE VISIT (OUTPATIENT)
Dept: PODIATRY | Facility: CLINIC | Age: 84
End: 2024-06-24
Payer: MEDICARE

## 2024-06-24 VITALS — WEIGHT: 180.13 LBS | HEIGHT: 65 IN | BODY MASS INDEX: 30.01 KG/M2

## 2024-06-24 DIAGNOSIS — E11.49 TYPE II DIABETES MELLITUS WITH NEUROLOGICAL MANIFESTATIONS: ICD-10-CM

## 2024-06-24 DIAGNOSIS — L60.0 ONYCHOCRYPTOSIS: ICD-10-CM

## 2024-06-24 DIAGNOSIS — E11.9 ENCOUNTER FOR COMPREHENSIVE DIABETIC FOOT EXAMINATION, TYPE 2 DIABETES MELLITUS: Primary | ICD-10-CM

## 2024-06-24 DIAGNOSIS — B35.1 ONYCHOMYCOSIS DUE TO DERMATOPHYTE: ICD-10-CM

## 2024-06-24 PROCEDURE — 3288F FALL RISK ASSESSMENT DOCD: CPT | Mod: CPTII,S$GLB,, | Performed by: PODIATRIST

## 2024-06-24 PROCEDURE — 99213 OFFICE O/P EST LOW 20 MIN: CPT | Mod: 25,S$GLB,, | Performed by: PODIATRIST

## 2024-06-24 PROCEDURE — 1101F PT FALLS ASSESS-DOCD LE1/YR: CPT | Mod: CPTII,S$GLB,, | Performed by: PODIATRIST

## 2024-06-24 PROCEDURE — 1126F AMNT PAIN NOTED NONE PRSNT: CPT | Mod: CPTII,S$GLB,, | Performed by: PODIATRIST

## 2024-06-24 PROCEDURE — 1160F RVW MEDS BY RX/DR IN RCRD: CPT | Mod: CPTII,S$GLB,, | Performed by: PODIATRIST

## 2024-06-24 PROCEDURE — 11721 DEBRIDE NAIL 6 OR MORE: CPT | Mod: Q9,S$GLB,, | Performed by: PODIATRIST

## 2024-06-24 PROCEDURE — 1159F MED LIST DOCD IN RCRD: CPT | Mod: CPTII,S$GLB,, | Performed by: PODIATRIST

## 2024-06-24 PROCEDURE — 99999 PR PBB SHADOW E&M-EST. PATIENT-LVL IV: CPT | Mod: PBBFAC,,, | Performed by: PODIATRIST

## 2024-06-26 DIAGNOSIS — N18.30 DIABETES MELLITUS WITH STAGE 3 CHRONIC KIDNEY DISEASE: Chronic | ICD-10-CM

## 2024-06-26 DIAGNOSIS — E11.22 DIABETES MELLITUS WITH STAGE 3 CHRONIC KIDNEY DISEASE: Chronic | ICD-10-CM

## 2024-06-27 RX ORDER — GLIMEPIRIDE 4 MG/1
4 TABLET ORAL
Qty: 90 TABLET | Refills: 0 | Status: SHIPPED | OUTPATIENT
Start: 2024-06-27

## 2024-06-27 NOTE — TELEPHONE ENCOUNTER
Care Due:                  Date            Visit Type   Department     Provider  --------------------------------------------------------------------------------                                ESTABLISHED                              PATIENT -    [facility] INTERNAL  Last Visit: 05-      [facility]      MEDICINE       Penny Randolph  Next Visit: None Scheduled  None         None Found                                                            Last  Test          Frequency    Reason                     Performed    Due Date  --------------------------------------------------------------------------------    HBA1C.......  6 months...  glimepiride, metFORMIN...  12- 06-    [facility] Embedded Care Due Messages. Reference number: [id number].   6/26/2024 8:39:07 PM CDT

## 2024-06-27 NOTE — TELEPHONE ENCOUNTER
Refill Routing Note   Medication(s) are not appropriate for processing by [facility] for the following reason(s):        Required labs outdated  Required labs abnormal    [facility] action(s):  Defer        Medication Therapy Plan: FLOS      Appointments  past 12m or future 3m with PCP    Date Provider   Last Visit   5/31/2024 Penny Randolph MD   Next Visit   Visit date not found Penny Randolph MD   ED visits in past 90 days: 0        Note composed:8:40 PM 06/26/2024

## 2024-06-28 ENCOUNTER — EXTERNAL HOME HEALTH (OUTPATIENT)
Dept: HOME HEALTH SERVICES | Facility: HOSPITAL | Age: 84
End: 2024-06-28
Payer: MEDICARE

## 2024-06-28 ENCOUNTER — PATIENT OUTREACH (OUTPATIENT)
Dept: ADMINISTRATIVE | Facility: OTHER | Age: 84
End: 2024-06-28
Payer: MEDICARE

## 2024-06-28 NOTE — PROGRESS NOTES
CHW - Outreach Attempt    Lina Espinoza Community Health Worker left a voicemail message for 2nd attempt to contact patient regarding: [facility]   Community Health Worker to attempt to contact patient on:  July 5

## 2024-06-30 NOTE — PROGRESS NOTES
Subjective:     Patient ID: Saul Mar is a 84 y.o. female.    Chief Complaint: Nail Care (6 month nail care, pt rates pain 0/10 , pt is diabetic, pt last seen pcp Penny Randolph MD 5/31/24, pt wears shoes and socks/)    Saul is a 84 y.o. female who presents to the clinic for evaluation and treatment of high risk feet. 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 The patient's chief complaint is long thick toenails. This patient has documented high risk feet requiring routine maintenance secondary to diabetes mellitis and those secondary complications of diabetes, as mentioned..    PCP: Penny Randolph MD    Date Last Seen by PCP: 05/31/2024    Current shoe gear:  Affected Foot: Rx diabetic extra depth shoes and custom accommodative insoles     Unaffected Foot: Rx diabetic extra depth shoes and custom accommodative insoles    Hemoglobin A1C   Date Value Ref Range Status   12/04/2023 7.3 (H) 4.0 - 5.6 % Final     Comment:     ADA Screening Guidelines:  5.7-6.4%  Consistent with prediabetes  >or=6.5%  Consistent with diabetes    High levels of fetal hemoglobin interfere with the HbA1C  assay. Heterozygous hemoglobin variants (HbS, HgC, etc)do  not significantly interfere with this assay.   However, presence of multiple variants may affect accuracy.     06/05/2023 7.9 (H) 4.0 - 5.6 % Final     Comment:     ADA Screening Guidelines:  5.7-6.4%  Consistent with prediabetes  >or=6.5%  Consistent with diabetes    High levels  of fetal hemoglobin interfere with the HbA1C  assay. Heterozygous hemoglobin variants (HbS, HgC, etc)do  not significantly interfere with this assay.   However, presence of multiple variants may affect accuracy.     02/07/2023 7.6 (H) 4.0 - 5.6 % Final     Comment:     ADA Screening Guidelines:  5.7-6.4%  Consistent with prediabetes  >or=6.5%  Consistent with diabetes    High levels of fetal hemoglobin interfere with the HbA1C  assay. Heterozygous hemoglobin variants (HbS, HgC, etc)do  not significantly interfere with this assay.   However, presence of multiple variants may affect accuracy.         Patient Active Problem List   Diagnosis    Mixed diabetic hyperlipidemia associated with type 2 diabetes mellitus    Degenerative disc disease    Colon polyp: tubular adenoma 5/13, repeated 2016 to be repeated 2021- declines colonoscopy and Gastro also does not recommend    Multinodular thyroid: thyroid u/s 7/16, stable 2017 and 2019, 2021    POAG (primary open-angle glaucoma) - Both Eyes    Amaurosis fugax    Nuclear sclerosis - Right Eye    Eyelid myokymia    Cerebral microvascular disease: stroke ? 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    Sickle cell trait syndrome    Mixed anxiety depressive disorder    Diverticulosis of large intestine without hemorrhage    Atrial fibrillation    Hyperlipidemia associated with type 2 diabetes  mellitus    Bilateral radiating leg pain    PAD (peripheral artery disease)    Carotid atherosclerosis    Spinal stenosis of lumbar region with neurogenic claudication    Lumbar radiculopathy, chronic    Proximal muscle weakness    Gait disorder    Posture abnormality    Both eyes affected by 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, associated with type 2 diabetes mellitus    Low-tension glaucoma, right eye, mild stage    Low-tension glaucoma, left eye, moderate stage    Age-related nuclear cataract, right    Mild episode of recurrent major depressive disorder    Carotid artery disease    DM cataract    Diabetic glaucoma    Diabetic retinopathy with macular edema associated with type 2 diabetes mellitus    Pulmonary hypertension    Diabetes mellitus with microalbuminuria    Gait abnormality    Hearing loss    Diabetes mellitus type 2 with complications    Chronic constipation    Cognitive complaints with normal exam    Decreased functional mobility and endurance    Poor balance    Tendinitis, de Quervain's    Pain of hand    Chronic kidney disease, stage 3b    Mild neurocognitive disorder       Medication List with Changes/Refills   Current Medications    ALBUTEROL (PROVENTIL/VENTOLIN HFA) 90 MCG/ACTUATION INHALER    INHALE 2 PUFFS INTO THE LUNGS EVERY 6 (SIX) HOURS AS NEEDED FOR WHEEZING. RESCUE    ALPRAZOLAM (XANAX) 0.5 MG TABLET    TAKE 1 TABLET BY MOUTH NIGHTLY AS NEEDED FOR ANXIETY (MAY TAKE 1-2 TIMES WEEKLY FOR ANXIETY)    AMLODIPINE (NORVASC) 5 MG TABLET    Take 1 tablet (5 mg total) by mouth 2 (two) times daily.    ATORVASTATIN (LIPITOR) 80 MG TABLET    TAKE 1 TABLET BY MOUTH EVERY DAY    BLOOD SUGAR DIAGNOSTIC STRP    For use with insurance covered glucometer; test daily    BRIMONIDINE 0.2% (ALPHAGAN) 0.2 % DROP    Place 1 drop into both eyes 3 (three) times daily.    BUTALBITAL-ACETAMINOPHEN-CAFFEINE -40 MG (FIORICET, ESGIC) -40 MG PER TABLET    Take 1 tablet by mouth  every 6 (six) hours as needed for Pain.    CARVEDILOL (COREG) 12.5 MG TABLET    Take 12.5 mg by mouth.    CHOLECALCIFEROL, VITAMIN D3, (VITAMIN D3) 1,000 UNIT CAPSULE    Take 1 capsule (1,000 Units total) by mouth once daily.    DICLOFENAC SODIUM (VOLTAREN) 1 % GEL    Apply 2 g topically 2 (two) times daily.    FERROUS SULFATE (FEOSOL) 325 MG (65 MG IRON) TAB TABLET    Take 1 tablet (325 mg total) by mouth 2 (two) times daily.    FLECAINIDE (TAMBOCOR) 50 MG TAB    Take 1 tablet (50 mg total) by mouth 2 (two) times daily.    HYDROCHLOROTHIAZIDE (HYDRODIURIL) 12.5 MG TAB    TAKE 1 TABLET BY MOUTH EVERY DAY    LEVALBUTEROL (XOPENEX HFA) 45 MCG/ACTUATION INHALER    INHALE 1-2 PUFFS INTO THE LUNGS EVERY 6 (SIX) HOURS AS NEEDED FOR WHEEZING. RESCUE    LINACLOTIDE (LINZESS) 145 MCG CAP CAPSULE    TAKE 1 CAPSULE (145 MCG TOTAL) BY MOUTH BEFORE BREAKFAST.    LOSARTAN (COZAAR) 50 MG TABLET    TAKE 1 TABLET BY MOUTH TWICE A DAY    MECLIZINE (ANTIVERT) 25 MG TABLET    TAKE 1 TABLET (25 MG TOTAL) BY MOUTH DAILY AS NEEDED FOR DIZZINESS.    METFORMIN (GLUCOPHAGE-XR) 500 MG ER 24HR TABLET    TAKE 2 TABLETS BY MOUTH EVERY DAY    METOPROLOL SUCCINATE (TOPROL-XL) 50 MG 24 HR TABLET    TAKE 1 TABLET BY MOUTH EVERY DAY    ONETOUCH DELICA PLUS LANCET 33 GAUGE MISC    Apply topically.    ONETOUCH ULTRA2 METER MISC    SMARTSIG:Via Meter As Directed    PREGABALIN (LYRICA) 75 MG CAPSULE    Take 1 capsule (75 mg total) by mouth every evening for 7 days, THEN 1 capsule (75 mg total) 2 (two) times daily.    XARELTO 15 MG TAB    TAKE 1 TABLET (15 MG TOTAL) BY MOUTH DAILY WITH DINNER OR EVENING MEAL.   Changed and/or Refilled Medications    Modified Medication Previous Medication    GLIMEPIRIDE (AMARYL) 4 MG TABLET glimepiride (AMARYL) 4 MG tablet       TAKE 1 TABLET BY MOUTH DAILY WITH BREAKFAST    Take 1 tablet (4 mg total) by mouth daily with breakfast.       Review of patient's allergies indicates:   Allergen Reactions    Pravachol  [pravastatin] Nausea Only       Past Surgical History:   Procedure Laterality Date    CATARACT EXTRACTION W/  INTRAOCULAR LENS IMPLANT Left 02/27/2013    Dr. Taveras    COLONOSCOPY      COLONOSCOPY N/A 9/22/2016    Procedure: COLONOSCOPY;  Surgeon: Jd Ashton MD;  Location: [facility] ENDO (4TH FLR);  Service: Endoscopy;  Laterality: N/A;  OK per Dr Randolph for pt to hold Plavix 5 days prior to procedure/see telephone encounter dated 8/29/16/svn    EPIDURAL STEROID INJECTION N/A 8/2/2023    Procedure: L4-5 interlaminar epidural steroid injection with right paramedian approach with RN IV sedation;  Surgeon: Chan Fonseca MD;  Location: [facility] PAIN MGT;  Service: Pain Management;  Laterality: N/A;    EYE SURGERY Bilateral 2002 approx    Laser for glaucoma    HYSTERECTOMY  1963     AMEENA/USO- fibroids; no cancer    OOPHORECTOMY  1963    unknown, only removed one    SELECTIVE INJECTION OF ANESTHETIC AGENT AROUND LUMBAR SPINAL NERVE ROOT BY TRANSFORAMINAL APPROACH Bilateral 6/17/2022    Procedure: Bilateral L4/5 TF JASKARAN with RN IV sedation;  Surgeon: Chan Fonseca MD;  Location: [facility] PAIN MGT;  Service: Pain Management;  Laterality: Bilateral;    SELECTIVE INJECTION OF ANESTHETIC AGENT AROUND LUMBAR SPINAL NERVE ROOT BY TRANSFORAMINAL APPROACH Bilateral 10/3/2022    Procedure: Bilateral L2-3 transforaminal epidural steroid injection with RN IV sedation;  Surgeon: Chan Fonseca MD;  Location: [facility] PAIN MGT;  Service: Pain Management;  Laterality: Bilateral;       Family History   Problem Relation Name Age of Onset    Stroke Mother      Mental illness Mother      Hypertension Mother      Stroke Father      Cancer Sister  68        gyn    Ovarian cancer Sister      Diabetes Maternal Grandmother      Heart disease Paternal Grandmother      Drug abuse Daughter      Cancer Maternal Aunt          type unknown    Cancer Maternal Uncle          type not known    Glaucoma Neg Hx      Macular degeneration Neg Hx      Alcohol  abuse Neg Hx      COPD Neg Hx      Asthma Neg Hx      Amblyopia Neg Hx      Blindness Neg Hx      Cataracts Neg Hx      Retinal detachment Neg Hx      Strabismus Neg Hx         Social History     Socioeconomic History    Marital status:     Number of children: 1   Tobacco Use    Smoking status: Never     Passive exposure: Never    Smokeless tobacco: Never   Substance and Sexual Activity    Alcohol use: No    Drug use: No    Sexual activity: Not Currently     Partners: Male   Social History Narrative    , no pets or smokers in household. 1 Child who lives in nursing home. She has a grandson who lives in [place].. Retired from [employer] . Still drives. Does not have a Living Will or advanced directive.      Social Determinants of Health     Financial Resource Strain: Medium Risk (5/31/2024)    Overall Financial Resource Strain (CARDIA)     Difficulty of Paying Living Expenses: Somewhat hard   Food Insecurity: No Food Insecurity (5/31/2024)    Hunger Vital Sign     Worried About Running Out of Food in the Last Year: Never true     Ran Out of Food in the Last Year: Never true   Recent Concern: Food Insecurity - Food Insecurity Present (4/8/2024)    Hunger Vital Sign     Worried About Running Out of Food in the Last Year: Sometimes true     Ran Out of Food in the Last Year: Sometimes true   Transportation Needs: Unmet Transportation Needs (4/8/2024)    PRAPARE - Transportation     Lack of Transportation (Medical): No     Lack of Transportation (Non-Medical): Yes   Physical Activity: Insufficiently Active (5/31/2024)    Exercise Vital Sign     Days of Exercise per Week: 3 days     Minutes of Exercise per Session: 20 min   Stress: No Stress Concern Present (5/31/2024)    [employer] [place] [employer] - Occupational Stress Questionnaire     Feeling of Stress : Not at all   Housing Stability: Unknown (5/31/2024)    Housing Stability Vital Sign     Unable to Pay for Housing in the Last Year:  "Patient declined       Vitals:    06/24/24 1004   Weight: 81.7 kg (180 lb 1.9 oz)   Height: 5' 5" (1.651 m)   PainSc: 0-No pain       Hemoglobin A1C   Date Value Ref Range Status   12/04/2023 7.3 (H) 4.0 - 5.6 % Final     Comment:     ADA Screening Guidelines:  5.7-6.4%  Consistent with prediabetes  >or=6.5%  Consistent with diabetes    High levels of fetal hemoglobin interfere with the HbA1C  assay. Heterozygous hemoglobin variants (HbS, HgC, etc)do  not significantly interfere with this assay.   However, presence of multiple variants may affect accuracy.     06/05/2023 7.9 (H) 4.0 - 5.6 % Final     Comment:     ADA Screening Guidelines:  5.7-6.4%  Consistent with prediabetes  >or=6.5%  Consistent with diabetes    High levels of fetal hemoglobin interfere with the HbA1C  assay. Heterozygous hemoglobin variants (HbS, HgC, etc)do  not significantly interfere with this assay.   However, presence of multiple variants may affect accuracy.     02/07/2023 7.6 (H) 4.0 - 5.6 % Final     Comment:     ADA Screening Guidelines:  5.7-6.4%  Consistent with prediabetes  >or=6.5%  Consistent with diabetes    High levels of fetal hemoglobin interfere with the HbA1C  assay. Heterozygous hemoglobin variants (HbS, HgC, etc)do  not significantly interfere with this assay.   However, presence of multiple variants may affect accuracy.         Review of Systems   Constitutional:  Negative for chills and fever.   Respiratory:  Negative for shortness of breath.    Cardiovascular:  Negative for chest pain, palpitations, orthopnea, claudication and leg swelling.   Gastrointestinal:  Negative for diarrhea, nausea and vomiting.   Musculoskeletal:  Negative for joint pain.   Skin:  Negative for rash.   Neurological:  Positive for tingling and sensory change.   Psychiatric/Behavioral: Negative.               Objective:      PHYSICAL EXAM: Apperance: Alert and orient in no distress,well developed, and with good attention to grooming and body " habits  Patient presents ambulating in tennis shoes.  LOWER EXTREMITY EXAM:  VASCULAR: Dorsalis pedis pulses 0/4 left 1/4 right and Posterior Tibial pulses 0/4 left and 1/4 right. Capillary fill time <4 seconds bilateral. Mild edema observed bilateral. Varicosities present bilateral. Skin temperature of the lower extremities is warm to cool, proximal to distal. Hair growth absent bilateral.  DERMATOLOGICAL: No skin rashes, subcutaneous nodules, lesions, or ulcers observed bilateral. Nails 1,2,3,4,5, bilateral elongated, thickened, and discolored with subungual debris. Right hallux nail observed to be mildly incurvated at distal lateral borders and slight obstructed in the nail grooves with soft tissue. No purulent drainage, no odor, and no increased temperature observed to right hallux. Webspaces 1,2,3,4 bilateral clean, dry and without evidence of break in skin integrity. Mild hyperkeratotic tissue noted to bilateral hallux.   NEUROLOGICAL: Light touch, sharp-dull, proprioception all present and equal bilaterally.  Vibratory sensation diminished at bilateral hallux. Protective sensation absent at toe sites as tested with a [place] 5.07 monofilament.   MUSCULOSKELETAL: Muscle strength is 5/5 for foot inverters, everters, plantarflexors, and dorsiflexors. Muscle tone is normal. Pain on palpation of right distal lateral nail border.           Assessment:       ICD-10-CM ICD-9-CM   1. Encounter for comprehensive diabetic foot examination, type 2 diabetes mellitus  E11.9 250.00   2. Type II diabetes mellitus with neurological manifestations  E11.49 250.60   3. Onychocryptosis  L60.0 703.0   4. Onychomycosis due to dermatophyte  B35.1 110.1       Plan:   Encounter for comprehensive diabetic foot examination, type 2 diabetes mellitus    Type II diabetes mellitus with neurological manifestations    Onychocryptosis    Onychomycosis due to dermatophyte      I counseled the patient on her conditions, regarding  findings of my examination, my impressions, and usual treatment plan.   This visit spent on counseling and coordination of care.  Appointment spent on education about the diabetic foot, neuropathy, and prevention of limb loss.  Shoe inspection. Diabetic Foot Education. Patient reminded of the importance of good nutrition and blood sugar control to help prevent podiatric complications of diabetes. Patient instructed on proper foot hygeine. We discussed wearing proper shoe gear, daily foot inspections, never walking without protective shoe gear, never putting sharp instruments to feet.    With patient's permission, nails 1-5 bilateral were debrided/trimmed in length and thickness aggressively to their soft tissue attachment mechanically and with electric , removing all offending nail and debris. Patient relates relief following the procedure.  With patient's permission, bilateral callus trimmed in thickness with #15 blade in thickness without incident.   Patient  will continue to monitor the areas daily, inspect feet, wear protective shoe gear when ambulatory, moisturizer to maintain skin integrity. Patient reminded of the importance of good nutrition and blood sugar control to help prevent podiatric complications of diabetes.  Patient to return 6 months or sooner if needed.        Zuleima Howell DPM  [facility]

## 2024-07-09 ENCOUNTER — OFFICE VISIT (OUTPATIENT)
Dept: INTERNAL MEDICINE | Facility: CLINIC | Age: 84
End: 2024-07-09
Payer: MEDICARE

## 2024-07-09 ENCOUNTER — LAB VISIT (OUTPATIENT)
Dept: LAB | Facility: HOSPITAL | Age: 84
End: 2024-07-09
Attending: INTERNAL MEDICINE
Payer: MEDICARE

## 2024-07-09 VITALS
HEIGHT: 65 IN | SYSTOLIC BLOOD PRESSURE: 146 MMHG | WEIGHT: 176.38 LBS | RESPIRATION RATE: 18 BRPM | TEMPERATURE: 98 F | BODY MASS INDEX: 29.38 KG/M2 | DIASTOLIC BLOOD PRESSURE: 78 MMHG | HEART RATE: 68 BPM

## 2024-07-09 DIAGNOSIS — E78.2 MIXED DIABETIC HYPERLIPIDEMIA ASSOCIATED WITH TYPE 2 DIABETES MELLITUS: Chronic | ICD-10-CM

## 2024-07-09 DIAGNOSIS — I65.29 CAROTID ATHEROSCLEROSIS, UNSPECIFIED LATERALITY: ICD-10-CM

## 2024-07-09 DIAGNOSIS — I73.9 PAD (PERIPHERAL ARTERY DISEASE): ICD-10-CM

## 2024-07-09 DIAGNOSIS — E11.69 HYPERLIPIDEMIA ASSOCIATED WITH TYPE 2 DIABETES MELLITUS: ICD-10-CM

## 2024-07-09 DIAGNOSIS — M48.062 SPINAL STENOSIS OF LUMBAR REGION WITH NEUROGENIC CLAUDICATION: ICD-10-CM

## 2024-07-09 DIAGNOSIS — E11.22 DIABETES MELLITUS WITH STAGE 3 CHRONIC KIDNEY DISEASE: Chronic | ICD-10-CM

## 2024-07-09 DIAGNOSIS — E11.8 DIABETES MELLITUS TYPE 2 WITH COMPLICATIONS: ICD-10-CM

## 2024-07-09 DIAGNOSIS — I10 HYPERTENSION, ESSENTIAL: Chronic | ICD-10-CM

## 2024-07-09 DIAGNOSIS — I48.91 ATRIAL FIBRILLATION, UNSPECIFIED TYPE: ICD-10-CM

## 2024-07-09 DIAGNOSIS — D64.9 ANEMIA, UNSPECIFIED TYPE: ICD-10-CM

## 2024-07-09 DIAGNOSIS — G31.84 MILD NEUROCOGNITIVE DISORDER: ICD-10-CM

## 2024-07-09 DIAGNOSIS — H40.1132 PRIMARY OPEN ANGLE GLAUCOMA (POAG) OF BOTH EYES, MODERATE STAGE: Chronic | ICD-10-CM

## 2024-07-09 DIAGNOSIS — N18.30 DIABETES MELLITUS WITH STAGE 3 CHRONIC KIDNEY DISEASE: Chronic | ICD-10-CM

## 2024-07-09 DIAGNOSIS — E11.69 MIXED DIABETIC HYPERLIPIDEMIA ASSOCIATED WITH TYPE 2 DIABETES MELLITUS: Chronic | ICD-10-CM

## 2024-07-09 DIAGNOSIS — I27.20 PULMONARY HYPERTENSION: ICD-10-CM

## 2024-07-09 DIAGNOSIS — Z00.00 ENCOUNTER FOR PREVENTIVE HEALTH EXAMINATION: Primary | ICD-10-CM

## 2024-07-09 DIAGNOSIS — R53.83 FATIGUE, UNSPECIFIED TYPE: ICD-10-CM

## 2024-07-09 DIAGNOSIS — I70.0 AORTIC ARCH ATHEROSCLEROSIS: Chronic | ICD-10-CM

## 2024-07-09 DIAGNOSIS — I67.89 CEREBRAL MICROVASCULAR DISEASE: Chronic | ICD-10-CM

## 2024-07-09 DIAGNOSIS — E53.8 LOW SERUM VITAMIN B12: ICD-10-CM

## 2024-07-09 DIAGNOSIS — E78.5 HYPERLIPIDEMIA ASSOCIATED WITH TYPE 2 DIABETES MELLITUS: ICD-10-CM

## 2024-07-09 DIAGNOSIS — E11.3213 BOTH EYES AFFECTED BY MILD NONPROLIFERATIVE DIABETIC RETINOPATHY WITH MACULAR EDEMA, ASSOCIATED WITH TYPE 2 DIABETES MELLITUS: ICD-10-CM

## 2024-07-09 LAB
ALBUMIN SERPL BCP-MCNC: 3.8 G/DL (ref 3.5–5.2)
ALP SERPL-CCNC: 95 U/L (ref 55–135)
ALT SERPL W/O P-5'-P-CCNC: 13 U/L (ref 10–44)
ANION GAP SERPL CALC-SCNC: 11 MMOL/L (ref 8–16)
ANISOCYTOSIS BLD QL SMEAR: SLIGHT
AST SERPL-CCNC: 19 U/L (ref 10–40)
BASOPHILS # BLD AUTO: 0.04 K/UL (ref 0–0.2)
BASOPHILS NFR BLD: 0.9 % (ref 0–1.9)
BILIRUB SERPL-MCNC: 0.4 MG/DL (ref 0.1–1)
BUN SERPL-MCNC: 18 MG/DL (ref 8–23)
CALCIUM SERPL-MCNC: 10.1 MG/DL (ref 8.7–10.5)
CHLORIDE SERPL-SCNC: 107 MMOL/L (ref 95–110)
CHOLEST SERPL-MCNC: 172 MG/DL (ref 120–199)
CHOLEST/HDLC SERPL: 3.1 {RATIO} (ref 2–5)
CO2 SERPL-SCNC: 24 MMOL/L (ref 23–29)
CREAT SERPL-MCNC: 1.2 MG/DL (ref 0.5–1.4)
DIFFERENTIAL METHOD BLD: ABNORMAL
EOSINOPHIL # BLD AUTO: 0.2 K/UL (ref 0–0.5)
EOSINOPHIL NFR BLD: 4.6 % (ref 0–8)
ERYTHROCYTE [DISTWIDTH] IN BLOOD BY AUTOMATED COUNT: 15.1 % (ref 11.5–14.5)
EST. GFR  (NO RACE VARIABLE): 44.9 ML/MIN/1.73 M^2
ESTIMATED AVG GLUCOSE: 192 MG/DL (ref 68–131)
FERRITIN SERPL-MCNC: 257 NG/ML (ref 20–300)
GLUCOSE SERPL-MCNC: 180 MG/DL (ref 70–110)
HBA1C MFR BLD: 8.3 % (ref 4–5.6)
HCT VFR BLD AUTO: 33.2 % (ref 37–48.5)
HDLC SERPL-MCNC: 55 MG/DL (ref 40–75)
HDLC SERPL: 32 % (ref 20–50)
HGB BLD-MCNC: 10.9 G/DL (ref 12–16)
IMM GRANULOCYTES # BLD AUTO: 0.02 K/UL (ref 0–0.04)
IMM GRANULOCYTES NFR BLD AUTO: 0.4 % (ref 0–0.5)
IRON SERPL-MCNC: 66 UG/DL (ref 30–160)
LDLC SERPL CALC-MCNC: 106.8 MG/DL (ref 63–159)
LYMPHOCYTES # BLD AUTO: 1.8 K/UL (ref 1–4.8)
LYMPHOCYTES NFR BLD: 39.5 % (ref 18–48)
MCH RBC QN AUTO: 29.6 PG (ref 27–31)
MCHC RBC AUTO-ENTMCNC: 32.8 G/DL (ref 32–36)
MCV RBC AUTO: 90 FL (ref 82–98)
MONOCYTES # BLD AUTO: 0.4 K/UL (ref 0.3–1)
MONOCYTES NFR BLD: 8.5 % (ref 4–15)
NEUTROPHILS # BLD AUTO: 2.1 K/UL (ref 1.8–7.7)
NEUTROPHILS NFR BLD: 46.1 % (ref 38–73)
NONHDLC SERPL-MCNC: 117 MG/DL
NRBC BLD-RTO: 0 /100 WBC
OVALOCYTES BLD QL SMEAR: ABNORMAL
PLATELET # BLD AUTO: 190 K/UL (ref 150–450)
PLATELET BLD QL SMEAR: ABNORMAL
PMV BLD AUTO: 11.7 FL (ref 9.2–12.9)
POIKILOCYTOSIS BLD QL SMEAR: SLIGHT
POTASSIUM SERPL-SCNC: 4.5 MMOL/L (ref 3.5–5.1)
PROT SERPL-MCNC: 7.1 G/DL (ref 6–8.4)
RBC # BLD AUTO: 3.68 M/UL (ref 4–5.4)
SATURATED IRON: 22 % (ref 20–50)
SCHISTOCYTES BLD QL SMEAR: PRESENT
SODIUM SERPL-SCNC: 142 MMOL/L (ref 136–145)
SPHEROCYTES BLD QL SMEAR: ABNORMAL
TOTAL IRON BINDING CAPACITY: 296 UG/DL (ref 250–450)
TRANSFERRIN SERPL-MCNC: 200 MG/DL (ref 200–375)
TRIGL SERPL-MCNC: 51 MG/DL (ref 30–150)
TSH SERPL DL<=0.005 MIU/L-ACNC: 1.14 UIU/ML (ref 0.4–4)
VIT B12 SERPL-MCNC: >2000 PG/ML (ref 210–950)
WBC # BLD AUTO: 4.61 K/UL (ref 3.9–12.7)

## 2024-07-09 PROCEDURE — 84443 ASSAY THYROID STIM HORMONE: CPT | Performed by: INTERNAL MEDICINE

## 2024-07-09 PROCEDURE — 1159F MED LIST DOCD IN RCRD: CPT | Mod: CPTII,S$GLB,, | Performed by: NURSE PRACTITIONER

## 2024-07-09 PROCEDURE — 84466 ASSAY OF TRANSFERRIN: CPT | Performed by: INTERNAL MEDICINE

## 2024-07-09 PROCEDURE — 3078F DIAST BP <80 MM HG: CPT | Mod: CPTII,S$GLB,, | Performed by: NURSE PRACTITIONER

## 2024-07-09 PROCEDURE — 85025 COMPLETE CBC W/AUTO DIFF WBC: CPT | Performed by: INTERNAL MEDICINE

## 2024-07-09 PROCEDURE — 3077F SYST BP >= 140 MM HG: CPT | Mod: CPTII,S$GLB,, | Performed by: NURSE PRACTITIONER

## 2024-07-09 PROCEDURE — 1160F RVW MEDS BY RX/DR IN RCRD: CPT | Mod: CPTII,S$GLB,, | Performed by: NURSE PRACTITIONER

## 2024-07-09 PROCEDURE — 82728 ASSAY OF FERRITIN: CPT | Performed by: INTERNAL MEDICINE

## 2024-07-09 PROCEDURE — 83036 HEMOGLOBIN GLYCOSYLATED A1C: CPT | Performed by: INTERNAL MEDICINE

## 2024-07-09 PROCEDURE — 80061 LIPID PANEL: CPT | Performed by: INTERNAL MEDICINE

## 2024-07-09 PROCEDURE — 3288F FALL RISK ASSESSMENT DOCD: CPT | Mod: CPTII,S$GLB,, | Performed by: NURSE PRACTITIONER

## 2024-07-09 PROCEDURE — 83540 ASSAY OF IRON: CPT | Performed by: INTERNAL MEDICINE

## 2024-07-09 PROCEDURE — 99999 PR PBB SHADOW E&M-EST. PATIENT-LVL V: CPT | Mod: PBBFAC,,, | Performed by: NURSE PRACTITIONER

## 2024-07-09 PROCEDURE — 36415 COLL VENOUS BLD VENIPUNCTURE: CPT | Performed by: INTERNAL MEDICINE

## 2024-07-09 PROCEDURE — G0439 PPPS, SUBSEQ VISIT: HCPCS | Mod: S$GLB,,, | Performed by: NURSE PRACTITIONER

## 2024-07-09 PROCEDURE — 82607 VITAMIN B-12: CPT | Performed by: INTERNAL MEDICINE

## 2024-07-09 PROCEDURE — 1101F PT FALLS ASSESS-DOCD LE1/YR: CPT | Mod: CPTII,S$GLB,, | Performed by: NURSE PRACTITIONER

## 2024-07-09 PROCEDURE — 80053 COMPREHEN METABOLIC PANEL: CPT | Performed by: INTERNAL MEDICINE

## 2024-07-09 NOTE — PROGRESS NOTES
"  Saul Mar presented for a  Medicare AWV and comprehensive Health Risk Assessment today. The following components were reviewed and updated:    Medical history  Family History  Social history  Allergies and Current Medications  Health Risk Assessment  Health Maintenance  Care Team         ** See Completed Assessments for Annual Wellness Visit within the encounter summary.**         The following assessments were completed:  Living Situation  CAGE  Depression Screening  Timed Get Up and Go  Whisper Test  Cognitive Function Screening  Nutrition Screening  ADL Screening  PAQ Screening      Opioid documentation:      Patient does not have a current opioid prescription.        Vitals:    07/09/24 1047   BP: (!) 146/78   Pulse: 68   Resp: 18   Temp: 97.6 °F (36.4 °C)   Weight: 80 kg (176 lb 5.9 oz)   Height: 5' 5" (1.651 m)     Body mass index is 29.35 kg/m².  Physical Exam  Constitutional:       Appearance: Normal appearance.   Cardiovascular:      Rate and Rhythm: Normal rate.   Pulmonary:      Effort: Pulmonary effort is normal.   Musculoskeletal:      Lumbar back: Decreased range of motion.   Skin:     General: Skin is warm.   Neurological:      Mental Status: She is alert and oriented to person, place, and time. Mental status is at baseline.      Motor: Weakness present.      Gait: Gait abnormal.      Comments: Use of rollator     Psychiatric:         Mood and Affect: Mood normal.         Behavior: Behavior normal.         Thought Content: Thought content normal.         Judgment: Judgment normal.         Current Outpatient Medications   Medication Instructions    albuterol (PROVENTIL/VENTOLIN HFA) 90 mcg/actuation inhaler INHALE 2 PUFFS INTO THE LUNGS EVERY 6 (SIX) HOURS AS NEEDED FOR WHEEZING. RESCUE    ALPRAZolam (XANAX) 0.5 MG tablet TAKE 1 TABLET BY MOUTH NIGHTLY AS NEEDED FOR ANXIETY (MAY TAKE 1-2 TIMES WEEKLY FOR ANXIETY)    amLODIPine (NORVASC) 5 mg, Oral, 2 times daily    atorvastatin " (LIPITOR) 80 MG tablet TAKE 1 TABLET BY MOUTH EVERY DAY    blood sugar diagnostic Strp For use with insurance covered glucometer; test daily    brimonidine 0.2% (ALPHAGAN) 0.2 % Drop 1 drop, Both Eyes, 3 times daily    butalbital-acetaminophen-caffeine -40 mg (FIORICET, ESGIC) -40 mg per tablet 1 tablet, Oral, Every 6 hours PRN    carvediloL (COREG) 12.5 mg, Oral, 2 times daily    cholecalciferol (vitamin D3) (VITAMIN D3) 1,000 Units, Oral, Daily    diclofenac sodium (VOLTAREN) 2 g, Topical (Top), 2 times daily    ferrous sulfate (FEOSOL) 325 mg, Oral, 2 times daily    flecainide (TAMBOCOR) 50 mg, Oral, 2 times daily    glimepiride (AMARYL) 4 mg, Oral    hydroCHLOROthiazide (HYDRODIURIL) 12.5 MG Tab TAKE 1 TABLET BY MOUTH EVERY DAY    levalbuterol (XOPENEX HFA) 45 mcg/actuation inhaler 1-2 puffs, Inhalation, Every 6 hours PRN, Rescue    linaCLOtide (LINZESS) 145 mcg Cap capsule TAKE 1 CAPSULE (145 MCG TOTAL) BY MOUTH BEFORE BREAKFAST.    losartan (COZAAR) 50 MG tablet TAKE 1 TABLET BY MOUTH TWICE A DAY    meclizine (ANTIVERT) 25 mg, Oral, Daily PRN    metFORMIN (GLUCOPHAGE-XR) 500 MG ER 24hr tablet TAKE 2 TABLETS BY MOUTH EVERY DAY    metoprolol succinate (TOPROL-XL) 50 MG 24 hr tablet TAKE 1 TABLET BY MOUTH EVERY DAY    ONETOUCH DELICA PLUS LANCET 33 gauge Misc Topical (Top)    ONETOUCH ULTRA2 METER Misc SMARTSIG:Via Meter As Directed    pregabalin (LYRICA) 75 MG capsule Take 1 capsule (75 mg total) by mouth every evening for 7 days, THEN 1 capsule (75 mg total) 2 (two) times daily.    XARELTO 15 mg Tab TAKE 1 TABLET (15 MG TOTAL) BY MOUTH DAILY WITH DINNER OR EVENING MEAL.         Diagnoses and health risks identified today and associated recommendations/orders:    1. Encounter for preventive health examination   Rsv  Covid  Flu shote in fall    2. Atrial fibrillation, unspecified type  Chronic and Stable see med list. Continue current treatment plan as previously prescribed with  your PCP/card  md      3. Carotid atherosclerosis, unspecified laterality  Chronic and Stable see med list. Continue current treatment plan as previously prescribed with your PCP/card  md    4. Aortic arch atherosclerosis  Chronic and Stable see med list. Continue current treatment plan as previously prescribed with your PCP/card  md    5. Diabetes mellitus type 2 with complications  Chronic and ongoing   see med list.  Referred to DM management    6. Diabetes mellitus with stage 3 chronic kidney disease  Chronic and Stable. Continue current treatment plan as previously prescribed with your neuro made    7. Mixed diabetic hyperlipidemia associated with type 2 diabetes mellitus  Chronic and Stable on med's and diet. Continue current treatment plan as previously prescribed with your PCP/card    8. Pulmonary hypertension  Chronic and Stable on med's. Continue current treatment plan as previously prescribed with your PCP/card    9. PAD (peripheral artery disease)  Chronic and Stable on med's. Continue current treatment plan as previously prescribed with your PCP/card    10. Cerebral microvascular disease: stroke ? 1999 elsewhere; TIA 10/13  Chronic and Stable on med's. Continue current treatment plan as previously prescribed with your PCP/card    11. Both eyes affected by mild non proliferative diabetic retinopathy with macular edema, associated with type 2 diabetes mellitus  Chronic and Stable. Continue current treatment plan as previously prescribed with your  neuro Md     12. Hypertension, essential  Chronic and Stable on med's. Continue current treatment plan as previously prescribed with your PCP/card    13. Mild neurocognitive disorder  Chronic-stable pa;passed cognition screening-followed by neuro     14. Spinal stenosis of lumbar region with neurogenic claudication  Chronic and Ongoing.recommend to restart Lyrica- followed by Pain management    15. Primary open angle glaucoma (PO AG) of both eyes, moderate  stage  Chronic and Stable on drops. Continue current treatment plan as previously prescribed with your opth    Provided Reola with a 5-10 year written screening schedule and personal prevention plan. Recommendations were developed using the USPSTF age appropriate recommendations. Education, counseling, and referrals were provided as needed. After Visit Summary printed and given to patient which includes a list of additional screenings\tests needed.    No follow-ups on file.    Sherley Michelle NP

## 2024-07-09 NOTE — PATIENT INSTRUCTIONS
Counseling and Referral of Other Preventative  (Italic type indicates deductible and co-insurance are waived)    Patient Name: Saul Mar  Today's Date: 7/9/2024    Health Maintenance       Date Due Completion Date    RSV Vaccine (Age 60+ and Pregnant patients) (1 - 1-dose 60+ series) Never done ---    COVID-19 Vaccine (5 - 2023-24 season) 09/01/2023 9/17/2022    Lipid Panel 02/03/2024 2/3/2023    Hemoglobin A1c 06/04/2024 12/4/2023    Influenza Vaccine (1) 09/01/2024 2/20/2024    Override on 11/15/2023: Declined (Declined for season)    Override on 12/2/2013: Done    Override on 12/18/2012: Done    Diabetes Urine Screening 12/12/2024 12/12/2023    Eye Exam 06/04/2025 6/4/2024    Override on 2/22/2018: Done    Mammogram 06/08/2025 6/8/2023    Foot Exam 06/24/2025 6/24/2024 (Done)    Override on 6/24/2024: Done    Override on 3/2/2022: Done    Override on 8/25/2021: Done    Override on 2/9/2021: Done    Override on 7/8/2019: Done    Override on 4/8/2019: Done    Override on 9/12/2018: Done    Override on 5/7/2018: Done    Override on 5/31/2017: Done    Override on 1/12/2017: Done    Override on 4/7/2015: Done    DEXA Scan 07/23/2025 7/23/2021    TETANUS VACCINE 11/29/2031 11/29/2021        No orders of the defined types were placed in this encounter.    The following information is provided to all patients.  This information is to help you find resources for any of the problems found today that may be affecting your health:                  Living healthy guide: www.[facility].[place].gov      Understanding Diabetes: www.diabetes.org      Eating healthy: www.cdc.gov/healthyweight      CDC home safety checklist: www.cdc.gov/steadi/patient.html      Agency on Aging: www.goea.[place].gov      Alcoholics anonymous (AA): www.aa.org      Physical Activity: www.dameon.nih.gov/ho9pudd      Tobacco use: www.quitwithusla.org

## 2024-07-10 ENCOUNTER — PATIENT OUTREACH (OUTPATIENT)
Dept: ADMINISTRATIVE | Facility: OTHER | Age: 84
End: 2024-07-10
Payer: MEDICARE

## 2024-07-12 ENCOUNTER — TELEPHONE (OUTPATIENT)
Dept: INTERNAL MEDICINE | Facility: CLINIC | Age: 84
End: 2024-07-12
Payer: MEDICARE

## 2024-07-12 NOTE — TELEPHONE ENCOUNTER
----- Message from Génesis Brock sent at 7/12/2024  3:02 PM CDT -----  Contact: [phone]  [email or web address]     Patient is calling for Medical Advice regarding: Patient would like her appointment that is scheduled for 7/17/2024 for 1:30 to be a virtual same day and time  please call to reschedule.     Comments: Please call patient to change to virtual    Please advise patient replies from provider may take up to 48 hours.

## 2024-07-12 NOTE — TELEPHONE ENCOUNTER
She has multiple lab abnormalities and elevated blood pressure    Anemia, uncontrolled diabetes, elevated B12, kidney impairment    Is she able to come in for an office visit to see me?  If not, we can do a virtual visit to discuss her test results

## 2024-07-16 ENCOUNTER — OFFICE VISIT (OUTPATIENT)
Dept: DIABETES | Facility: CLINIC | Age: 84
End: 2024-07-16
Payer: MEDICARE

## 2024-07-16 VITALS
WEIGHT: 175.94 LBS | SYSTOLIC BLOOD PRESSURE: 151 MMHG | BODY MASS INDEX: 29.28 KG/M2 | DIASTOLIC BLOOD PRESSURE: 61 MMHG | HEART RATE: 57 BPM

## 2024-07-16 DIAGNOSIS — E11.22 DIABETES MELLITUS WITH STAGE 3 CHRONIC KIDNEY DISEASE: Primary | Chronic | ICD-10-CM

## 2024-07-16 DIAGNOSIS — N18.30 DIABETES MELLITUS WITH STAGE 3 CHRONIC KIDNEY DISEASE: Primary | Chronic | ICD-10-CM

## 2024-07-16 DIAGNOSIS — E11.69 HYPERLIPIDEMIA ASSOCIATED WITH TYPE 2 DIABETES MELLITUS: ICD-10-CM

## 2024-07-16 DIAGNOSIS — E78.5 HYPERLIPIDEMIA ASSOCIATED WITH TYPE 2 DIABETES MELLITUS: ICD-10-CM

## 2024-07-16 DIAGNOSIS — I10 HYPERTENSION, ESSENTIAL: Chronic | ICD-10-CM

## 2024-07-16 LAB — GLUCOSE SERPL-MCNC: 111 MG/DL (ref 70–110)

## 2024-07-16 PROCEDURE — 3078F DIAST BP <80 MM HG: CPT | Mod: CPTII,S$GLB,, | Performed by: NURSE PRACTITIONER

## 2024-07-16 PROCEDURE — 82962 GLUCOSE BLOOD TEST: CPT | Mod: S$GLB,,, | Performed by: NURSE PRACTITIONER

## 2024-07-16 PROCEDURE — 1125F AMNT PAIN NOTED PAIN PRSNT: CPT | Mod: CPTII,S$GLB,, | Performed by: NURSE PRACTITIONER

## 2024-07-16 PROCEDURE — 1101F PT FALLS ASSESS-DOCD LE1/YR: CPT | Mod: CPTII,S$GLB,, | Performed by: NURSE PRACTITIONER

## 2024-07-16 PROCEDURE — 3288F FALL RISK ASSESSMENT DOCD: CPT | Mod: CPTII,S$GLB,, | Performed by: NURSE PRACTITIONER

## 2024-07-16 PROCEDURE — 3077F SYST BP >= 140 MM HG: CPT | Mod: CPTII,S$GLB,, | Performed by: NURSE PRACTITIONER

## 2024-07-16 PROCEDURE — 99999 PR PBB SHADOW E&M-EST. PATIENT-LVL V: CPT | Mod: PBBFAC,,, | Performed by: NURSE PRACTITIONER

## 2024-07-16 PROCEDURE — 1159F MED LIST DOCD IN RCRD: CPT | Mod: CPTII,S$GLB,, | Performed by: NURSE PRACTITIONER

## 2024-07-16 PROCEDURE — 1160F RVW MEDS BY RX/DR IN RCRD: CPT | Mod: CPTII,S$GLB,, | Performed by: NURSE PRACTITIONER

## 2024-07-16 PROCEDURE — 99204 OFFICE O/P NEW MOD 45 MIN: CPT | Mod: S$GLB,,, | Performed by: NURSE PRACTITIONER

## 2024-07-16 NOTE — PATIENT INSTRUCTIONS
Take glimepiride 4 mg every morning with breakfast    Take Metformin  mg with supper    Check blood sugar before a different meal each day    Eat 3 meals per day - carbohydrate. Protein and vegetable

## 2024-07-16 NOTE — PROGRESS NOTES
Patient ID: Saul Mar is a 84 y.o. female.  Patient's current PCP is Penny Randolph MD.     Chief Complaint: Diabetes    HPI  Saul Mar is a 84 y.o. Black or  female presenting for a new consult for diabetes. Patient has been diagnosed with type 2 diabetes since 30 years .    History of diabetes related hospitalizations:none  Complications related to diabetes: nephropathy and retinopathy vascular disease  Current issues:high blood sugars    Previous diabetes education:yes, would like to return to education    CURRENT DM MEDICATIONS:   Diabetes Medications               glimepiride (AMARYL) 4 MG tablet TAKE 1 TABLET BY MOUTH DAILY WITH BREAKFAST    metFORMIN (GLUCOPHAGE-XR) 500 MG ER 24hr tablet TAKE 2 TABLETS BY MOUTH EVERY DAY              Past failed treatment(s) include:   None known  Jardiance - cost prohibitive    Blood glucose testing is performed sporadically. Patient is testing 3 times per day.  Meter/cgm: Not with her for review    Any episodes of hypoglycemia? rare    Her blood sugar in the clinic today was:   Lab Results   Component Value Date    POCGLU 111 (A) 07/16/2024       Current diet: 2 meals per day. Skips lunch. HS snack. No set plan  Activity Level: none set  Occupation:Retired, lives alone    Lab Results   Component Value Date    HGBA1C 8.3 (H) 07/09/2024    HGBA1C 7.3 (H) 12/04/2023    HGBA1C 7.9 (H) 06/05/2023       STANDARDS OF CARE  Eye exam: current - [facility]  Foot exam: current - [facility]  Ace/Arb: losartan 50 mg  Statin: atorvastatin 80 mg  ASA: xarelto 15 mg    Preferred lab site: [facility]  Preferred visit site:[facility]    Labs reviewed and are noted below.    Lab Results   Component Value Date    WBC 4.61 07/09/2024    HGB 10.9 (L) 07/09/2024    HCT 33.2 (L) 07/09/2024     07/09/2024    CHOL 172 07/09/2024    TRIG 51 07/09/2024    HDL 55 07/09/2024    LDLCALC 106.8 07/09/2024    ALT 13 07/09/2024    AST 19 07/09/2024      "07/09/2024    K 4.5 07/09/2024     07/09/2024    ANIONGAP 11 07/09/2024    CREATININE 1.2 07/09/2024    ESTGFRAFRICA 49.3 (A) 03/02/2022    EGFRNONAA 42.8 (A) 03/02/2022    BUN 18 07/09/2024    CO2 24 07/09/2024    TSH 1.140 07/09/2024    INR 1.0 10/30/2013     (H) 07/09/2024    UTPCR Unable to calculate 09/21/2020     Lab Results   Component Value Date    CPEPTIDE 1.8 03/12/2009    FREET4 1.27 08/13/2015    TSH 1.140 07/09/2024    IRON 66 07/09/2024    TIBC 296 07/09/2024    FERRITIN 257 07/09/2024    ULJPAPJB25 >2000 (H) 07/09/2024    .0 (H) 06/25/2020    CALCIUM 10.1 07/09/2024    PHOS 3.7 02/23/2024     Lab Results   Component Value Date    CPEPTIDE 1.8 03/12/2009     No results found for: "GLUTAMICACID"  Glucose   Date Value Ref Range Status   07/09/2024 180 (H) 70 - 110 mg/dL Final     Anion Gap   Date Value Ref Range Status   07/09/2024 11 8 - 16 mmol/L Final     eGFR if    Date Value Ref Range Status   03/02/2022 49.3 (A) >60 mL/min/1.73 m^2 Final     eGFR if non    Date Value Ref Range Status   03/02/2022 42.8 (A) >60 mL/min/1.73 m^2 Final     Comment:     Calculation used to obtain the estimated glomerular filtration  rate (eGFR) is the CKD-EPI equation.              Review of patient's allergies indicates:   Allergen Reactions    Pravachol [pravastatin] Nausea Only     Social History     Socioeconomic History    Marital status:     Number of children: 1   Tobacco Use    Smoking status: Never     Passive exposure: Never    Smokeless tobacco: Never   Substance and Sexual Activity    Alcohol use: No    Drug use: No    Sexual activity: Not Currently     Partners: Male   Social History Narrative    , no pets or smokers in household. 1 Child who lives in nursing home. She has a grandson who lives in [place].. Retired from [employer] . Still drives. Does not have a Living Will or advanced directive.      Social Determinants of Health "     Financial Resource Strain: Medium Risk (7/9/2024)    Overall Financial Resource Strain (CARDIA)     Difficulty of Paying Living Expenses: Somewhat hard   Food Insecurity: No Food Insecurity (7/9/2024)    Hunger Vital Sign     Worried About Running Out of Food in the Last Year: Never true     Ran Out of Food in the Last Year: Never true   Transportation Needs: Unmet Transportation Needs (7/9/2024)    PRAPARE - Transportation     Lack of Transportation (Medical): No     Lack of Transportation (Non-Medical): Yes   Physical Activity: Insufficiently Active (7/9/2024)    Exercise Vital Sign     Days of Exercise per Week: 3 days     Minutes of Exercise per Session: 40 min   Stress: No Stress Concern Present (7/9/2024)    [employer] [place] [employer] - Occupational Stress Questionnaire     Feeling of Stress : Not at all   Housing Stability: Low Risk  (7/9/2024)    Housing Stability Vital Sign     Unable to Pay for Housing in the Last Year: No     Homeless in the Last Year: No     Past Medical History:   Diagnosis Date    A-fib     Atrial fibrillation 10/22/2018    Back pain     Cataract     Degenerative disc disease     Diverticulosis     colonoscopy 9/22/2016    GERD (gastroesophageal reflux disease)     Glaucoma     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   Eyes:  Negative for blurred vision and double vision.   Respiratory:  Negative for shortness of breath.    Cardiovascular: Negative.    Gastrointestinal: Negative.    Genitourinary:  Negative for frequency.   Musculoskeletal:  Negative for myalgias.    Neurological: Negative.    Psychiatric/Behavioral: Negative.       Physical Exam  Vitals reviewed.   Constitutional:       Appearance: Normal appearance. She is obese.   Eyes:      Conjunctiva/sclera: Conjunctivae normal.      Pupils: Pupils are equal, round, and reactive to light.   Neck:      Thyroid: No thyroid mass, thyromegaly or thyroid tenderness.      Vascular: No carotid bruit.   Cardiovascular:      Rate and Rhythm: Normal rate and regular rhythm.      Pulses: Normal pulses.      Heart sounds: Normal heart sounds.   Pulmonary:      Effort: Pulmonary effort is normal.      Breath sounds: Normal breath sounds.   Abdominal:      General: Bowel sounds are normal.      Palpations: Abdomen is soft.   Musculoskeletal:      Cervical back: Normal range of motion and neck supple.      Right lower leg: No edema.      Left lower leg: No edema.   Feet:      Right foot:      Skin integrity: Skin integrity normal.      Toenail Condition: Right toenails are normal.      Left foot:      Skin integrity: Skin integrity normal.      Toenail Condition: Left toenails are normal.      Comments: Foot exam UTD per Podiatry  Skin:     General: Skin is warm and dry.      Comments: Sites without hypertrophy and/or infection   Neurological:      General: No focal deficit present.      Mental Status: She is alert and oriented to person, place, and time.   Psychiatric:         Mood and Affect: Mood normal.         Behavior: Behavior normal.        Assessment & Plan    1. Diabetes mellitus with stage 3 chronic kidney disease  -     POCT Glucose, Hand-Held Device  Take glimepiride 4 mg every morning with breakfast    Take Metformin  mg with supper    Check blood sugar before a different meal each day    Eat 3 meals per day - carbohydrate. Protein and vegetable    Consider starting SGLT2 - cost may be issue   Send to DM education for refresher and diet update    2. Hypertension, essential - not at goal today. No med this am. On  ARB  - Take medication upon return home  - Recheck in 4 weeks    3. Hyperlipidemia associated with type 2 diabetes mellitus - on statin

## 2024-07-16 NOTE — PROGRESS NOTES
CHW - Follow Up    Lina Espinoza, Community Health Worker completed a follow up visit with patient via telephone today.  Pt/Caregiver reported: Ms. Casimiro Mar stated that she needs  services.    Community Health Worker provided: CHW contacted the [facility] to assist Ms. Casimiro Mar with  services.    Follow-up Outreach - Due: 7/17/2024

## 2024-07-17 ENCOUNTER — OFFICE VISIT (OUTPATIENT)
Dept: INTERNAL MEDICINE | Facility: CLINIC | Age: 84
End: 2024-07-17
Payer: MEDICARE

## 2024-07-17 ENCOUNTER — PATIENT OUTREACH (OUTPATIENT)
Dept: ADMINISTRATIVE | Facility: OTHER | Age: 84
End: 2024-07-17
Payer: MEDICARE

## 2024-07-17 ENCOUNTER — TELEPHONE (OUTPATIENT)
Dept: INTERNAL MEDICINE | Facility: CLINIC | Age: 84
End: 2024-07-17

## 2024-07-17 DIAGNOSIS — D64.9 ANEMIA, UNSPECIFIED TYPE: ICD-10-CM

## 2024-07-17 DIAGNOSIS — D50.1 IRON DEFICIENCY ANEMIA DUE TO SIDEROPENIC DYSPHAGIA: ICD-10-CM

## 2024-07-17 DIAGNOSIS — E11.22 DIABETES MELLITUS WITH STAGE 3 CHRONIC KIDNEY DISEASE: Chronic | ICD-10-CM

## 2024-07-17 DIAGNOSIS — N18.30 DIABETES MELLITUS WITH STAGE 3 CHRONIC KIDNEY DISEASE: Chronic | ICD-10-CM

## 2024-07-17 DIAGNOSIS — N93.9 VAGINAL BLEEDING: ICD-10-CM

## 2024-07-17 DIAGNOSIS — I10 ESSENTIAL HYPERTENSION: ICD-10-CM

## 2024-07-17 DIAGNOSIS — E78.5 HYPERLIPIDEMIA ASSOCIATED WITH TYPE 2 DIABETES MELLITUS: ICD-10-CM

## 2024-07-17 DIAGNOSIS — N18.32 CHRONIC KIDNEY DISEASE, STAGE 3B: ICD-10-CM

## 2024-07-17 DIAGNOSIS — N18.32 CHRONIC KIDNEY DISEASE, STAGE 3B: Primary | ICD-10-CM

## 2024-07-17 DIAGNOSIS — D57.3 SICKLE CELL TRAIT SYNDROME: ICD-10-CM

## 2024-07-17 DIAGNOSIS — D64.9 ANEMIA, UNSPECIFIED TYPE: Primary | ICD-10-CM

## 2024-07-17 DIAGNOSIS — E11.69 HYPERLIPIDEMIA ASSOCIATED WITH TYPE 2 DIABETES MELLITUS: ICD-10-CM

## 2024-07-17 PROBLEM — R26.89 POOR BALANCE: Status: RESOLVED | Noted: 2024-01-12 | Resolved: 2024-07-17

## 2024-07-17 PROBLEM — M79.643 PAIN OF HAND: Status: RESOLVED | Noted: 2024-02-13 | Resolved: 2024-07-17

## 2024-07-17 PROBLEM — E11.29 DIABETES MELLITUS WITH MICROALBUMINURIA: Status: RESOLVED | Noted: 2023-05-15 | Resolved: 2024-07-17

## 2024-07-17 PROBLEM — R80.9 DIABETES MELLITUS WITH MICROALBUMINURIA: Status: RESOLVED | Noted: 2023-05-15 | Resolved: 2024-07-17

## 2024-07-17 PROBLEM — M62.81 PROXIMAL MUSCLE WEAKNESS: Status: RESOLVED | Noted: 2022-11-08 | Resolved: 2024-07-17

## 2024-07-17 PROBLEM — E11.8 DIABETES MELLITUS TYPE 2 WITH COMPLICATIONS: Status: RESOLVED | Noted: 2023-05-16 | Resolved: 2024-07-17

## 2024-07-17 PROBLEM — R26.9 GAIT ABNORMALITY: Status: RESOLVED | Noted: 2023-05-16 | Resolved: 2024-07-17

## 2024-07-17 PROCEDURE — 1160F RVW MEDS BY RX/DR IN RCRD: CPT | Mod: CPTII,95,, | Performed by: INTERNAL MEDICINE

## 2024-07-17 PROCEDURE — 1159F MED LIST DOCD IN RCRD: CPT | Mod: CPTII,95,, | Performed by: INTERNAL MEDICINE

## 2024-07-17 PROCEDURE — 99214 OFFICE O/P EST MOD 30 MIN: CPT | Mod: 95,,, | Performed by: INTERNAL MEDICINE

## 2024-07-17 RX ORDER — LOSARTAN POTASSIUM 50 MG/1
50 TABLET ORAL 2 TIMES DAILY
Qty: 180 TABLET | Refills: 2 | Status: SHIPPED | OUTPATIENT
Start: 2024-07-17

## 2024-07-17 RX ORDER — LANOLIN ALCOHOL/MO/W.PET/CERES
CREAM (GRAM) TOPICAL
Start: 2024-07-17

## 2024-07-17 NOTE — TELEPHONE ENCOUNTER
I did a virtual visit with her today    She needs to do a urinalysis home collect, I ordered it, please contact her to go over where she should drop that off.  I think she lives in [place]    Please schedule for gyn appointment, referral is in    Labs in 3 months, orders in thank you

## 2024-07-17 NOTE — PROGRESS NOTES
CHW - Follow Up    Lina Community Health Worker completed a follow up visit with patient via telephone today.  Pt/Caregiver reported: Ms. Casimiro Mar stated that no one from the [facility] has called her yet.    Community Health Worker provided: CHW will follow up with the [facility] and will follow up with Pt.   Follow-up Outreach - Due: 7/26/2024

## 2024-07-17 NOTE — PROGRESS NOTES
Telemedicine Video Visit    The patient location is:  Patient Home   The chief complaint leading to consultation is: follow up multiple issues  Visit type: Virtual visit with synchronous audio and video  Total time spent with patient: 25 minutes  Each patient to whom he or she provides medical services by telemedicine is:  (1) informed of the relationship between the physician and patient and the respective role of any other health care provider with respect to management of the patient; and (2) notified that he or she may decline to receive medical services by telemedicine and may withdraw from such care at any time.     She has multiple lab abnormalities and elevated blood pressure     Anemia, uncontrolled diabetes, elevated B12, kidney impairment    A few weeks ago went to ER with jaw pain and high blood pressure.    In reviewing her medications, she has not been using HCTZ and she has not feel losartan for over a year.  We will restart the losartan.  She will be returning to the clinic within another week and we will recheck her blood pressure at that time.    She is taking her metformin roughly once daily.  Given kidney function, do not want to increase this or the Amaryl but will try low-dose Jardiance, cautions and side effects reviewed.  She also has diabetes education scheduled.    Anemia has been present for some time.  Iron levels and B12 levels are normal.  I suspect this is related to CKD, will continue to monitor.    However, she does tell me that she wears a pad most I am most days sees some spotting on the pad.  She has not sure if it is coming from her bladder or from her vagina.  She is pretty confident it is not coming from her rectum.  She does not have any blood when she wipes herself after urinating.  She has not seen any blood in her urine.  She has not seen any obvious vaginal bleeding.  She is status post a hysterectomy.  Will obtain a urine to start and then a gyn assessment.  She may need  to see Urology.    She will reduce her B12.  She only needs to take iron every other day.       Patient Active Problem List   Diagnosis    Degenerative disc disease    Colon polyp: tubular adenoma 5/13, repeated 2016 to be repeated 2021- declines colonoscopy and Gastro also does not recommend    Multinodular thyroid: thyroid u/s 7/16, stable 2017 and 2019, 2021    POAG (primary open-angle glaucoma) - Both Eyes    Amaurosis fugax    Nuclear sclerosis - Right Eye    Eyelid myokymia    Cerebral microvascular disease: stroke ? 1999 elsewhere; TIA 10/13    Vitamin D insufficiency    Left ventricular diastolic dysfunction with preserved systolic function    Hypertension, essential    Gastroesophageal reflux disease without esophagitis    Diabetes mellitus with proteinuria    Type II diabetes mellitus with neurological manifestations    Aortic arch atherosclerosis    Abnormal ankle brachial index    Diabetes mellitus with stage 3 chronic kidney disease    Cerebrovascular accident (CVA) due to thrombosis of precerebral artery    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    Sickle cell trait syndrome    Mixed anxiety depressive disorder    Diverticulosis of large intestine without hemorrhage    Atrial fibrillation    Hyperlipidemia associated with type 2 diabetes mellitus    Bilateral radiating leg pain    PAD (peripheral artery disease)    Carotid atherosclerosis    Spinal stenosis of lumbar region with neurogenic claudication    Lumbar radiculopathy, chronic    Gait disorder    Posture abnormality    Both eyes affected by 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, associated with type 2 diabetes mellitus    Low-tension glaucoma, right eye, mild stage    Low-tension glaucoma, left eye, moderate stage    Age-related nuclear cataract, right    Mild episode of recurrent major depressive disorder    Carotid artery disease    DM cataract    Diabetic glaucoma    Diabetic retinopathy with macular  edema associated with type 2 diabetes mellitus    Pulmonary hypertension    Hearing loss    Chronic constipation    Cognitive complaints with normal exam    Decreased functional mobility and endurance    Tendinitis, de Quervain's    Chronic kidney disease, stage 3b    Mild neurocognitive disorder        Review of Systems   HENT:  Negative for hearing loss.    Eyes:  Negative for discharge.   Respiratory:  Negative for wheezing.    Cardiovascular:  Negative for chest pain and palpitations.   Gastrointestinal:  Positive for constipation. Negative for blood in stool, diarrhea and vomiting.   Genitourinary:  Negative for dysuria and hematuria.        See HPI   Musculoskeletal:  Negative for neck pain.   Neurological:  Negative for weakness and headaches.   Endo/Heme/Allergies:  Negative for polydipsia.        Physical Exam  Constitutional:       Appearance: She is well-developed.   HENT:      Head: Normocephalic and atraumatic.   Eyes:      Extraocular Movements: Extraocular movements intact.      Conjunctiva/sclera: Conjunctivae normal.   Neck:      Thyroid: No thyromegaly.   Pulmonary:      Effort: No respiratory distress.   Neurological:      General: No focal deficit present.      Mental Status: She is alert and oriented to person, place, and time.   Psychiatric:         Mood and Affect: Mood normal.         Behavior: Behavior normal.         Thought Content: Thought content normal.         Judgment: Judgment normal.          1. Chronic kidney disease, stage 3b    2. Diabetes mellitus with stage 3 chronic kidney disease    3. Essential hypertension  -     losartan (COZAAR) 50 MG tablet; Take 1 tablet (50 mg total) by mouth 2 (two) times daily.  Dispense: 180 tablet; Refill: 2    4. Hyperlipidemia associated with type 2 diabetes mellitus    5. 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    6. Sickle cell trait syndrome    7. Anemia, unspecified type    8. Vaginal bleeding  -     Urinalysis, Reflex to Urine  Culture Urine, Clean Catch; Future; Expected date: 07/17/2024  -     Ambulatory referral/consult to Obstetrics / Gynecology; Future; Expected date: 07/24/2024    Other orders  -     empagliflozin (JARDIANCE) 10 mg tablet; Take 1 tablet (10 mg total) by mouth once daily.  Dispense: 30 tablet; Refill: 6  -     ferrous sulfate (FEOSOL) Tab tablet; Take every other day with orange juice         Resume losartan  Low salt diet, exercise, 15-20-# weight loss in next 6-12 months' time.  Call if BP > 130/80 on a regular basis.  Gentle hydration, avoid nephrotoxins  Start Jardiance, cautions and side effects reviewed  Anemia issues discussed, she has having no alarm symptoms  Can discontinue B12  Can reduce iron to every other day  Urinalysis  Gyn assessment  May need Urology assessment  Keep diabetes education and follow-up  BP check in the next 2 weeks    Answers submitted by the patient for this visit:  Review of Systems Questionnaire (Submitted on 7/17/2024)  activity change: No  unexpected weight change: No  rhinorrhea: No  trouble swallowing: No  visual disturbance: Yes- chronic  chest tightness: No  polyuria: No  difficulty urinating: No  menstrual problem: No  joint swelling: No  arthralgias: No  confusion: No  dysphoric mood: No

## 2024-07-19 ENCOUNTER — DOCUMENT SCAN (OUTPATIENT)
Dept: HOME HEALTH SERVICES | Facility: HOSPITAL | Age: 84
End: 2024-07-19
Payer: MEDICARE

## 2024-07-22 ENCOUNTER — DOCUMENT SCAN (OUTPATIENT)
Dept: HOME HEALTH SERVICES | Facility: HOSPITAL | Age: 84
End: 2024-07-22
Payer: MEDICARE

## 2024-07-22 ENCOUNTER — TELEPHONE (OUTPATIENT)
Dept: PAIN MEDICINE | Facility: CLINIC | Age: 84
End: 2024-07-22
Payer: MEDICARE

## 2024-07-23 NOTE — PROGRESS NOTES
Established Patient Interventional Pain Note (Follow up Visit)    The patient location is: home  The chief complaint leading to consultation is: leg pain  Visit type: Virtual visit with synchronous audio and video  Total time spent with patient: 11-15m  Each patient to whom he or she provides medical services by telemedicine is: (1) informed of the relationship between the physician and patient and the respective role of any other health care provider with respect to management of the patient; and (2) notified that he or she may decline to receive medical services by telemedicine and may withdraw from such care at any time.    Referring Physician: No ref. provider found    PCP: Penny Randolph MD    Chief Complaint:   Leg pain    Interval history 07/24/2024  Patient presents for three-month follow-up.  Today she reports she has been tolerating the pain.  Again she experiences pain in the lower back which can radiate down the posterior aspects of bilateral lower extremities in L5-S2 distribution to the foot.  Pain is more severe on the right than on the left.  Pain today is rated an 8/10.  Patient has continued physician directed physical therapy exercises at home for lower back and leg pain over the last 8 weeks from 05/24/2024 through 07/24/2024 with marginal improvement in her symptoms.  She is continued Lyrica 75 mg in the evening but she does report this causes significant daytime somnolence.  Patient has not trialed Cymbalta in the past.    Interval History (04/25/2024):  Patient Saul Mar presents today for follow-up visit.  Patient being seen today for follow-up of lower back pain that radiates down the posterior aspect of the bilateral lower extremities.  Pain is worse on the right than the left.  She rates her pain today an 8/10.  She has had previous epidural steroid injections but she states this only provided relief for a couple of weeks.  She continues to take Tylenol and use a topical  compound with some relief.  Pain is worse with extending back and she will often get relief whenever she bends forward.  Pain is also worse with prolonged standing or walking.  Patient denies night fever/night sweats, urinary incontinence, bowel incontinence, significant weight loss and significant motor weakness.   Patient denies any other complaints or concerns at this time.       Interval History (1/5/2024):  Patient Saul Mar presents today for follow-up visit.  Patient experienced a fall in 12/10 where her chair was pulled out from behind her and she fell onto her bottom.  She went to the ER for evaluation with x-rays of the lumbar spine and left hip which were both negative for fracture.  She is since then seen Dr. Jacobsen who has ordered repeat x-rays of the lumbar spine and x-ray and again both were negative for fracture or dislocation.  She has physical therapy ordered and is supposed to start on January 9th.  Today she rates her pain at 8/10 but stay at its worst a 10/10.  Pain is continuous and does not radiate down the lower extremities.  She is currently using Voltaren gel and was prescribed Fioricet in the ER with no relief of symptoms.  Patient denies night fever/night sweats, urinary incontinence, bowel incontinence, significant weight loss and significant motor weakness.   Patient denies any other complaints or concerns at this time.      SUBJECTIVE:  Interval history 11/13/2023  Patient presents status post L4-5 interlaminar epidural steroid injection with right paramedian approach 08/02/2023.  Patient reports it took a few weeks for therapeutic benefit the patient reports at least 75-80% improvement in right lower extremity radicular symptoms following her epidural steroid injection.  Patient reports taking a trip to [place] from September 6 through September 12th with significant improvement in her pain and improvement in mobility following her injection.  She reports upon her  return pain relief was sustained until 1 week prior.  Today she reports pain has again becomes severe 6-7/10.  Patient reports pain along the lateral and posterior aspect of the right lower extremity in L4-S1 distribution to the calf.  Pain is exacerbated with standing and with ambulation.  Patient reports she would like to wait and see for another week if pain improves.  She has continued physician directed physical therapy exercises at home over the last 3 months with marginal improvement in her symptoms.    Interval Hx: 07/12/2023  Patient presents for follow-up of lower back and leg pain.  Today patient reports pain in the lower back and leg has become severe over the last several months.  Pain is constant and today is rated a 6/10.  Pain is described as aching and shooting in nature.  Patient reports pain in the lower back which radiates down the lateral and posterior aspect of the right lower extremity in L4-S1 distribution to the knee.  Pain is exacerbated with standing or with ambulation.  Patient reports pain has severely limited her day-to-day activities and quality of life.  Patient would like to pursue repeat intervention.  Patient has continued physician directed physical therapy exercises at home over the last 8 weeks without meaningful improvement in her pain.    Of note, patient saw Odilia Valverde PA-C in Neurosurgery 08/12/2022 with the following evaluation:        Interval history 10/26/2022  Patient presents status post bilateral L2/3 transforaminal epidural steroid injection 10/03/2022.  Patient reports limited 50% relief along the posterior aspect of the right lower extremity following her procedure.  She continues to report pain along the lateral aspect of the right lower extremity to the calf in L4 distribution.  Pain today is a 10/10.  Patient does endorse associated weakness in the lower extremity associated with her pain.  Patient reports her leg pain is more severe and debilitating than  the back pain.  Patient reports she is scheduled to restart physical therapy the following week.  Patient has discontinued gabapentin secondary to intolerable side effect.    Interval History (7/18/2022): Saul Mar presents today for follow-up visit.  she underwent bilateral L4/5 transforaminal epidural steroid injection on 6/17/22.  The patient reports that she is/was unchanged following the procedure.  she reports 0% pain relief.  Reports pain is worsened with prolonged sitting, improved with leaning forward (shopping cart sign). Reports continued aching, stinging low back pain in a band-like distribution with radiation down bilateral lower extremities. Reports she was unable to tolerate Gabapentin due to drowsiness and discontinued. Reports she received no relief while taking this medication.  Patient reports pain as 8/10 today.    Initial HPI  Saul Mar is a 84 y.o. female with past medical history significant for CVA, MDD, primary open angle glaucoma, DMII, AFib, HLD, HTN, diastolic dysfunction, PAD, CKD III, SStrait, GERD who presents with bilateral leg pain.  Patient reports pain began approximately 1 year prior without inciting accident injury or trauma.  Today patient reports pain which is constant which is rated a 10/10.  Pain is described as aching in nature.  Patient reports pain originating in bilateral buttocks and radiating to bilateral calves in L4-L5 distribution.  Pain is equally worse on both sides.  Pain is exacerbated with prolonged standing and with ambulation.  Patient ambulates with a walker and reports she is only able to ambulate a few steps before requiring rest.  Patient reports pain is improved with lumbar flexion and rest.  Patient does report associated weakness in bilateral lower extremities associated with her pain.  She denies bowel or bladder incontinence or saddle anesthesia.  Patient reports completing several sessions of conventional physical therapy  with initial improvement in her symptoms, however with increased intensity of exercises, exacerbation of pain.    Pt last saw Dr. Sanchez 8/24/21 with recommendation to continue with PT and discussion of future MBB.    Patient reports significant motor weakness and loss of sensations.  Patient denies night fever/night sweats, urinary incontinence, bowel incontinence and significant weight loss.      Pain Disability Index Review:         7/12/2023     9:16 AM 10/26/2022     9:13 AM 7/18/2022     1:41 PM   Last 3 PDI Scores   Pain Disability Index (PDI) 42 [phone] 34       Non-Pharmacologic Treatments:  Physical Therapy/Home Exercise: yes  Ice/Heat:yes  TENS: no  Acupuncture: no  Massage: no  Chiropractic: no    Other: no      Pain Medications:  - Opioids: Vicodin ( Hydrocodone/Acetaminophen)  - Adjuvant Medications: Neurontin (Gabapentin) and Xanax (Alprazolam). Robaxin  - Anti-Coagulants: Xarelto    Pain Procedures:   -08/02/2023: L4-5 interlaminar epidural steroid injection with right paramedian approach with 75-80% improvement in right lower extremity radicular symptoms  -10/03/2022: Bilateral L2/3 transforaminal epidural steroid injection with 50% relief along the posterior aspect of the right lower extremity  -06/17/2022: Bilateral L4/5 transforaminal epidural steroid injection    Past Medical History:   Diagnosis Date    A-fib     Atrial fibrillation 10/22/2018    Back pain     Cataract     Degenerative disc disease     Diverticulosis     colonoscopy 9/22/2016    GERD (gastroesophageal reflux disease)     Glaucoma     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 Taveras    COLONOSCOPY      COLONOSCOPY N/A 9/22/2016    Procedure: COLONOSCOPY;  Surgeon: Jd Ashton MD;  Location: [facility] ([address] [place] [address]);  Service: Endoscopy;  Laterality: N/A;  OK per Dr Randolph for pt to hold Plavix 5 days prior to procedure/see telephone encounter dated 8/29/16/svn    EPIDURAL STEROID INJECTION N/A 8/2/2023    Procedure: L4-5 interlaminar epidural steroid injection with right paramedian approach with RN IV sedation;  Surgeon: Chan Fonseca MD;  Location: [facility];  Service: Pain Management;  Laterality: N/A;    EYE SURGERY Bilateral 2002 approx    Laser for glaucoma    HYSTERECTOMY  1963     AMEENA/USO- fibroids; no cancer    OOPHORECTOMY  1963    unknown, only removed one    SELECTIVE INJECTION OF ANESTHETIC AGENT AROUND LUMBAR SPINAL NERVE ROOT BY TRANSFORAMINAL APPROACH Bilateral 6/17/2022    Procedure: Bilateral L4/5 TF JASKARAN with RN IV sedation;  Surgeon: Chan Fonseca MD;  Location: [facility];  Service: Pain Management;  Laterality: Bilateral;    SELECTIVE INJECTION OF ANESTHETIC AGENT AROUND LUMBAR SPINAL NERVE ROOT BY TRANSFORAMINAL APPROACH Bilateral 10/3/2022    Procedure: Bilateral L2-3 transforaminal epidural steroid injection with RN IV sedation;  Surgeon: Chan Fonseca MD;  Location: [facility];  Service: Pain Management;  Laterality: Bilateral;     Review of patient's allergies indicates:   Allergen Reactions    Pravachol [pravastatin] Nausea Only       Current Outpatient Medications   Medication Sig    albuterol (PROVENTIL/VENTOLIN HFA) 90 mcg/actuation inhaler INHALE 2 PUFFS INTO THE LUNGS EVERY 6 (SIX) HOURS AS NEEDED FOR WHEEZING. RESCUE    ALPRAZolam (XANAX) 0.5 MG tablet TAKE 1 TABLET BY MOUTH NIGHTLY AS NEEDED FOR ANXIETY (MAY TAKE 1-2 TIMES WEEKLY FOR ANXIETY)    amLODIPine (NORVASC) 5 MG tablet Take 1 tablet (5 mg total) by mouth 2 (two) times daily.    atorvastatin (LIPITOR) 80  MG tablet TAKE 1 TABLET BY MOUTH EVERY DAY    blood sugar diagnostic Strp For use with insurance covered glucometer; test daily    brimonidine 0.2% (ALPHAGAN) 0.2 % Drop Place 1 drop into both eyes 3 (three) times daily.    carvediloL (COREG) 12.5 MG tablet Take 12.5 mg by mouth 2 (two) times daily.    cholecalciferol, vitamin D3, (VITAMIN D3) 1,000 unit capsule Take 1 capsule (1,000 Units total) by mouth once daily.    diclofenac sodium (VOLTAREN) 1 % Gel Apply 2 g topically 2 (two) times daily.    DULoxetine (CYMBALTA) 30 MG capsule Take 1 capsule (30 mg total) by mouth once daily.    empagliflozin (JARDIANCE) 10 mg tablet Take 1 tablet (10 mg total) by mouth once daily.    ferrous sulfate (FEOSOL) Tab tablet Take every other day with orange juice    flecainide (TAMBOCOR) 50 MG Tab Take 1 tablet (50 mg total) by mouth 2 (two) times daily.    glimepiride (AMARYL) 4 MG tablet TAKE 1 TABLET BY MOUTH DAILY WITH BREAKFAST    levalbuterol (XOPENEX HFA) 45 mcg/actuation inhaler INHALE 1-2 PUFFS INTO THE LUNGS EVERY 6 (SIX) HOURS AS NEEDED FOR WHEEZING. RESCUE    linaCLOtide (LINZESS) 145 mcg Cap capsule TAKE 1 CAPSULE (145 MCG TOTAL) BY MOUTH BEFORE BREAKFAST.    losartan (COZAAR) 50 MG tablet Take 1 tablet (50 mg total) by mouth 2 (two) times daily.    meclizine (ANTIVERT) 25 mg tablet TAKE 1 TABLET (25 MG TOTAL) BY MOUTH DAILY AS NEEDED FOR DIZZINESS.    metFORMIN (GLUCOPHAGE-XR) 500 MG ER 24hr tablet TAKE 2 TABLETS BY MOUTH EVERY DAY    metoprolol succinate (TOPROL-XL) 50 MG 24 hr tablet TAKE 1 TABLET BY MOUTH EVERY DAY    ONETOUCH DELICA PLUS LANCET 33 gauge Misc Apply topically.    ONETOUCH ULTRA2 METER Misc SMARTSIG:Via Meter As Directed    XARELTO 15 mg Tab TAKE 1 TABLET (15 MG TOTAL) BY MOUTH DAILY WITH DINNER OR EVENING MEAL.     No current facility-administered medications for this visit.       Review of Systems     GENERAL:  No weight loss, malaise or fevers.  HEENT:   No recent changes in vision or  hearing  NECK:  Negative for lumps, no difficulty with swallowing.  RESPIRATORY:  Negative for cough, wheezing or shortness of breath, patient denies any recent URI.  CARDIOVASCULAR:  Negative for chest pain, leg swelling or palpitations.  GI:  Negative for abdominal discomfort, blood in stools or black stools or change in bowel habits.  MUSCULOSKELETAL:  See HPI.  SKIN:  Negative for lesions, rash, and itching.  PSYCH:  No mood disorder or recent psychosocial stressors.   HEMATOLOGY/LYMPHOLOGY:  Negative for prolonged bleeding, bruising easily or swollen nodes.    NEURO:   No history of headaches, syncope, paralysis, seizures or tremors.  All other reviewed and negative other than HPI.    OBJECTIVE:    There were no vitals taken for this visit.    There were no vitals filed for this visit.    There is no height or weight on file to calculate BMI.   (reviewed on 7/24/2024)       Physical Exam    GENERAL: Well appearing, in no acute distress, alert and oriented x3.  PSYCH:  Mood and affect appropriate.  SKIN: Skin color, texture, turgor normal, no rashes or lesions.  HEAD/FACE:  Normocephalic, atraumatic. Cranial nerves grossly intact.    CV: RRR with palpation of the radial artery.  PULM: No evidence of respiratory difficulty, symmetric chest rise.  GI:  Soft and non-tender.    BACK:  Thoracic kyphosis Straight leg raising in the sitting and supine positions is negative to radicular pain. pain to palpation over the facet joints of the lumbar spine or spinous processes. reduced range of motion with pain reproduction. Pain with back extension, no pain with flexion.  EXTREMITIES: Peripheral joint ROM is reduced with pain without obvious instability or laxity in all four extremities. No deformities, edema, or skin discoloration. Good capillary refill.  MUSCULOSKELETAL: Able to stand on heels & toes.   hip, and knee provocative maneuvers are negative.  There is no pain with palpation over the sacroiliac joints  bilaterally.  FABERs test is negative.  Facet loading test is positive bilaterally.   Bilateral upper and lower extremity strength is normal and symmetric.  No atrophy or tone abnormalities are noted.  RIGHT Lower extremity: Hip flexion 5/5, Hip Abduction 5/5, Hip Adduction 5/5, Knee extension 5/5, Knee flexion 5/5, Ankle dorsiflexion5/5, Extensor hallucis longus 5/5, Ankle plantarflexion 5/5  LEFT Lower extremity:  Hip flexion 5/5, Hip Abduction 5/5,Hip Adduction 5/5, Knee extension 5/5, Knee flexion 5/5, Ankle dorsiflexion 5/5, Extensor hallucis longus 5/5, Ankle plantarflexion 5/5  -Normal testing knee (patellar) jerk and ankle (achilles) jerk    NEURO: Bilateral upper and lower extremity coordination and muscle stretch reflexes are physiologic and symmetric. No loss of sensation is noted.  GAIT: normal.      Imaging:  Xray lumbar 12/29/2023   EXAM: XR LUMBAR SPINE 2 OR 3 VIEWS  CLINICAL HISTORY: Lower back pain.  TECHNIQUE: Three-view lumbar spine series.  COMPARISON: 05/18/2023.  FINDINGS: Disc space narrowing with slight ventral subluxation at L4-5.  Mild disc space narrowing at L2-3.  Multilevel osteophyte formation.  Arthritic changes involving the facets at the L4-5 and L5-S1 levels.     Xray hip 12/29/2023   EXAM: XR HIP WITH PELVIS WHEN PERFORMED, 2 OR 3 VIEWS LEFT  CLINICAL HISTORY: Left hip pain.  TECHNIQUE: AP pelvis and left hip.  FINDINGS: No fracture or dislocation is demonstrated. Mild arthritic change involving both hips.  SPECT ankylosis of the SI joints.  Multiple calcifications in the pelvis.        Impression:   Mild arthritic change involving both hips.  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  Impression:   Multilevel degenerative disc disease.  Findings appear similar to examination dated 05/18/2023.   12/10/21    MRI Lumbar Spine Without Contrast    FINDINGS:  Alignment: Mild levocurvature of the lumbar spine.  Grade 1 anterolisthesis L4 on L5.    Vertebrae: Multiple Schmorl's nodes.  Benign  osseous hemangioma in the T12 vertebral body.  Degenerative the edema within the bilateral L4-L5 facet joints.  No abnormal marrow signal to suggest infiltrative process.    Discs: Multilevel disc desiccation and height loss, most severe at L4-L5.    Cord: No signal abnormality.  Conus terminates at L2    Degenerative findings:    T12-L1: No significant spinal canal stenosis or neural foraminal narrowing.    L1-L2: Mild diffuse disc bulge.  No significant spinal canal stenosis or neural foraminal narrowing.    L2-L3: Severe diffuse disc bulge, bilateral facet arthropathy and ligamentum flavum buckling.  These findings result in mild spinal canal stenosis, severe right and moderate left neural foraminal narrowing.    L3-L4: Moderate diffuse disc bulge.  These findings result in moderate bilateral neural foraminal narrowing.  No spinal canal stenosis.    L4-L5: Grade 1 anterolisthesis, diffuse disc bulge, bilateral facet arthropathy, and ligamentum flavum buckling.  These findings result in severe spinal canal stenosis and severe right and moderate left neural foraminal narrowing.    L5-S1: Diffuse disc bulge with a superimposed left paracentral protrusion that abuts the left descending S1 nerve root.  Bilateral facet arthropathy. These findings result in mild bilateral neural foraminal narrowing    Paraspinal muscles & soft tissues: T1 hyperintense cortical lesion noted within the right kidney, possibly a proteinaceous or hemorrhagic cyst. left simple renal cyst.    Impression  Multilevel degenerative changes, most pronounced at L4-L5 with severe spinal canal stenosis, severe right and moderate left neural foraminal narrowing.    Bilateral degenerative facet edema at L4-L5.    Probable proteinaceous versus hemorrhagic right renal cortical cyst.  Recommend correlation with a dedicated renal ultrasound.    07/22/21  X-Ray Lumbar Spine Ap And Lateral  FINDINGS:  Five lumbar vertebral bodies.  Vertebral body heights are  maintained.  Disc space narrowing and degenerative endplate changes L4-5 and L5-S1.  Moderate facet arthropathy L4-5 and L5-S1.  Grade 1 anterolisthesis L4 on L5.     04/02/18    X-Ray Cervical Spine Complete 5 view  FINDINGS:  There is anatomic spinal alignment.  Prominent bridging vertebral endplate spurs present anteriorly from C4-5 through C6-7.  Disc interspaces are maintained.  Odontoid is intact.  No fracture or subluxation.      ASSESSMENT: 84 y.o. year old female with lower back and leg pain, consistent with     1. Lumbar radiculopathy, chronic  MRI Lumbar Spine Without Contrast      2. Lumbar degenerative disc disease        3. Spondylolysis of lumbar region                  PLAN:   - Interventions:  -Patient received 80% sustained relief in right lower extremity radicular symptoms following L4-5 interlaminar epidural steroid injection with right paramedian approach.  We will 1st review updated lumbar MRI before considering future intervention.      - Anticoagulation use: yes  Xarelto  -secondary prophylaxis:  Atrial fibrillation; Dr. Grayson ([facility])     report:  Reviewed and consistent with medication use as prescribed.    - Medications:  - DC Gabapentin and Lyrica 2/2 somnolence and inefficacy    -We will start Cymbalta (duloxetine) 30 mg once daily.   We have discussed potential common side effects of duloxetine including nausea, diarrhea/constipation, fatigue, drowsiness or dry mouth.  Patient expresses understanding.      - Therapy:  We discussed continuing at home physician directed physical therapy to help manage the patient/s painful condition. The patient was counseled that muscle strengthening will improve the long term prognosis in regards to pain and may also help increase range of motion and mobility.    - Imaging: Reviewed available imaging (MRI lumbar spine) with patient and answered any questions they had regarding study.  Updated MRI lumbar spine to better evaluate pain  and weakness    -Consults/referral:Neurosurgery PRN     - Follow up visit:  4-6 weeks.  Virtual visit.      The above plan and management options were discussed at length with patient. Patient is in agreement with the above and verbalized understanding.    - I discussed the goals of interventional chronic pain management with the patient on today's visit. We discussed a multimodal and systematic approach to pain.  This includes diagnostic and therapeutic injections, adjuvant pharmacologic treatment, physical therapy, and at times psychiatry.  I emphasized the importance of regular exercise, core strengthening and stretching, diet and weight loss as a cornerstone of long-term pain management.    - This condition does not require this patient to take time off of work, and the primary goal of our Pain Management services is to improve the patient's functional capacity.  - Patient Questions: Answered all of the patient's questions regarding diagnoses, therapy, treatment and next steps        Chan Fonseca MD  Interventional Pain Management  [facility]    Disclaimer:  This note was prepared using voice recognition system and is likely to have sound alike errors that may have been overlooked even after proof reading.  Please call me with any questions

## 2024-07-24 ENCOUNTER — PATIENT OUTREACH (OUTPATIENT)
Dept: ADMINISTRATIVE | Facility: OTHER | Age: 84
End: 2024-07-24
Payer: MEDICARE

## 2024-07-24 ENCOUNTER — OFFICE VISIT (OUTPATIENT)
Dept: PAIN MEDICINE | Facility: CLINIC | Age: 84
End: 2024-07-24
Payer: MEDICARE

## 2024-07-24 DIAGNOSIS — M51.36 LUMBAR DEGENERATIVE DISC DISEASE: ICD-10-CM

## 2024-07-24 DIAGNOSIS — M43.06 SPONDYLOLYSIS OF LUMBAR REGION: ICD-10-CM

## 2024-07-24 DIAGNOSIS — M54.16 LUMBAR RADICULOPATHY, CHRONIC: Primary | ICD-10-CM

## 2024-07-24 PROCEDURE — 99214 OFFICE O/P EST MOD 30 MIN: CPT | Mod: 95,,, | Performed by: ANESTHESIOLOGY

## 2024-07-24 PROCEDURE — 1159F MED LIST DOCD IN RCRD: CPT | Mod: CPTII,95,, | Performed by: ANESTHESIOLOGY

## 2024-07-24 PROCEDURE — 1160F RVW MEDS BY RX/DR IN RCRD: CPT | Mod: CPTII,95,, | Performed by: ANESTHESIOLOGY

## 2024-07-24 RX ORDER — DULOXETIN HYDROCHLORIDE 30 MG/1
30 CAPSULE, DELAYED RELEASE ORAL DAILY
Qty: 30 CAPSULE | Refills: 0 | Status: SHIPPED | OUTPATIENT
Start: 2024-07-24 | End: 2024-08-23

## 2024-07-25 ENCOUNTER — TELEPHONE (OUTPATIENT)
Dept: INTERNAL MEDICINE | Facility: CLINIC | Age: 84
End: 2024-07-25
Payer: MEDICARE

## 2024-07-25 NOTE — TELEPHONE ENCOUNTER
----- Message from Genevieve Dutton MA sent at 7/25/2024 12:28 PM CDT -----  Contact: Saul @ [phone]  The patient calling to speak with staff about her going to the ER last night and that is why she canceled her appointment. Please give her a call back at [phone].

## 2024-07-26 ENCOUNTER — TELEPHONE (OUTPATIENT)
Dept: INTERNAL MEDICINE | Facility: CLINIC | Age: 84
End: 2024-07-26
Payer: MEDICARE

## 2024-07-26 DIAGNOSIS — R05.1 ACUTE COUGH: Primary | ICD-10-CM

## 2024-07-26 NOTE — TELEPHONE ENCOUNTER
Patient tested positive for Covid on 07/24/2024.Patient has cough, runny nose, had headache and body ache. Requesting something for a cough. Please advise

## 2024-07-26 NOTE — TELEPHONE ENCOUNTER
----- Message from Robyn Bernabe sent at 7/26/2024 12:51 PM CDT -----  Contact: [phone] Patient  [email or web address]     Patient is calling for Medical Advice regarding: cough, runny nose, had headache and body ache. Pt has Covid    How long has patient had these symptoms: 3 days    Pharmacy name and phone#:   [id number] [place] - [address] AT AT [facility]  [address]  [place] [address]  Phone: [phone] Fax: [phone]        Patient wants a call back or thru [facility]: Call Back Please. Pt is asking if Dr Randolph can please call something in for her cough. Pt went to the ER but they did not give anything for her cough.     Comments:    Please advise patient replies from provider may take up to 48 hours.

## 2024-07-29 RX ORDER — BENZONATATE 100 MG/1
100 CAPSULE ORAL 3 TIMES DAILY PRN
Qty: 30 CAPSULE | Refills: 0 | Status: SHIPPED | OUTPATIENT
Start: 2024-07-29 | End: 2024-08-08

## 2024-07-29 NOTE — PROGRESS NOTES
CHW - Outreach Attempt    Lina Espinoza Community Health Worker left a voicemail message for 2nd attempt to contact patient regarding: [facility]   Community Health Worker to attempt to contact patient on:  May 2, 2024

## 2024-08-02 ENCOUNTER — PATIENT OUTREACH (OUTPATIENT)
Dept: ADMINISTRATIVE | Facility: OTHER | Age: 84
End: 2024-08-02
Payer: MEDICARE

## 2024-08-02 NOTE — PROGRESS NOTES
CHW - Follow Up    Lina Espinoza, Community Health Worker completed a follow up visit with patient via telephone today.  Pt/Caregiver reported: Ms. Casimiro Mar stated that she was feeling better today; however, no one from the [facility] has contacted her.   Community Health Worker provided: CHW contacted the [facility] to follow up with Ms. Casimiro Mar regarding Meals on Wheels and other services.   Follow-up Outreach - Due: 8/8/2024

## 2024-08-08 ENCOUNTER — PATIENT OUTREACH (OUTPATIENT)
Dept: ADMINISTRATIVE | Facility: OTHER | Age: 84
End: 2024-08-08
Payer: MEDICARE

## 2024-08-10 RX ORDER — ALBUTEROL SULFATE 90 UG/1
INHALANT RESPIRATORY (INHALATION)
Qty: 54 G | Refills: 3 | Status: SHIPPED | OUTPATIENT
Start: 2024-08-10

## 2024-08-10 NOTE — TELEPHONE ENCOUNTER
No care due was identified.  [facility] Embedded Care Due Messages. Reference number: [id number].   8/10/2024 7:02:48 AM CDT

## 2024-08-11 NOTE — TELEPHONE ENCOUNTER
Refill Decision Note   Ruthluanne Kirkpatrick Zaid  is requesting a refill authorization.  Brief Assessment and Rationale for Refill:  Approve     Medication Therapy Plan:         Alert overridden per protocol: Yes   Comments:     Note composed:10:59 PM 08/10/2024

## 2024-08-13 ENCOUNTER — OFFICE VISIT (OUTPATIENT)
Dept: DIABETES | Facility: CLINIC | Age: 84
End: 2024-08-13
Payer: MEDICARE

## 2024-08-13 VITALS
BODY MASS INDEX: 28.21 KG/M2 | DIASTOLIC BLOOD PRESSURE: 58 MMHG | WEIGHT: 169.56 LBS | SYSTOLIC BLOOD PRESSURE: 130 MMHG

## 2024-08-13 DIAGNOSIS — E11.22 DIABETES MELLITUS WITH STAGE 3 CHRONIC KIDNEY DISEASE: Primary | ICD-10-CM

## 2024-08-13 DIAGNOSIS — E11.3213 BOTH EYES AFFECTED BY MILD NONPROLIFERATIVE DIABETIC RETINOPATHY WITH MACULAR EDEMA, ASSOCIATED WITH TYPE 2 DIABETES MELLITUS: ICD-10-CM

## 2024-08-13 DIAGNOSIS — E11.69 HYPERLIPIDEMIA ASSOCIATED WITH TYPE 2 DIABETES MELLITUS: ICD-10-CM

## 2024-08-13 DIAGNOSIS — E78.5 HYPERLIPIDEMIA ASSOCIATED WITH TYPE 2 DIABETES MELLITUS: ICD-10-CM

## 2024-08-13 DIAGNOSIS — N18.30 DIABETES MELLITUS WITH STAGE 3 CHRONIC KIDNEY DISEASE: Primary | ICD-10-CM

## 2024-08-13 DIAGNOSIS — E11.49 TYPE II DIABETES MELLITUS WITH NEUROLOGICAL MANIFESTATIONS: Chronic | ICD-10-CM

## 2024-08-13 DIAGNOSIS — I10 HYPERTENSION, ESSENTIAL: Chronic | ICD-10-CM

## 2024-08-13 PROCEDURE — G2211 COMPLEX E/M VISIT ADD ON: HCPCS | Mod: S$GLB,,, | Performed by: NURSE PRACTITIONER

## 2024-08-13 PROCEDURE — 1159F MED LIST DOCD IN RCRD: CPT | Mod: CPTII,S$GLB,, | Performed by: NURSE PRACTITIONER

## 2024-08-13 PROCEDURE — 1160F RVW MEDS BY RX/DR IN RCRD: CPT | Mod: CPTII,S$GLB,, | Performed by: NURSE PRACTITIONER

## 2024-08-13 PROCEDURE — 3075F SYST BP GE 130 - 139MM HG: CPT | Mod: CPTII,S$GLB,, | Performed by: NURSE PRACTITIONER

## 2024-08-13 PROCEDURE — 1125F AMNT PAIN NOTED PAIN PRSNT: CPT | Mod: CPTII,S$GLB,, | Performed by: NURSE PRACTITIONER

## 2024-08-13 PROCEDURE — 99999 PR PBB SHADOW E&M-EST. PATIENT-LVL IV: CPT | Mod: PBBFAC,,, | Performed by: NURSE PRACTITIONER

## 2024-08-13 PROCEDURE — 3078F DIAST BP <80 MM HG: CPT | Mod: CPTII,S$GLB,, | Performed by: NURSE PRACTITIONER

## 2024-08-13 PROCEDURE — 99214 OFFICE O/P EST MOD 30 MIN: CPT | Mod: S$GLB,,, | Performed by: NURSE PRACTITIONER

## 2024-08-13 RX ORDER — PREGABALIN 75 MG/1
CAPSULE ORAL
COMMUNITY

## 2024-08-13 RX ORDER — BENZONATATE 100 MG/1
CAPSULE ORAL
COMMUNITY

## 2024-08-13 RX ORDER — GLIMEPIRIDE 4 MG/1
4 TABLET ORAL 2 TIMES DAILY WITH MEALS
Qty: 180 TABLET | Refills: 0 | Status: SHIPPED | OUTPATIENT
Start: 2024-08-13

## 2024-08-13 NOTE — PATIENT INSTRUCTIONS
Change Glimepiride to 4 mg before breakfast and BEFORE SUPPER    Continue Metformin  mg before breakfast and before supper    Continue Jardiance 10 mg before breakfast - when complete the bottle, we will increase to 25 mg before breakfast

## 2024-08-14 ENCOUNTER — PATIENT OUTREACH (OUTPATIENT)
Dept: ADMINISTRATIVE | Facility: OTHER | Age: 84
End: 2024-08-14
Payer: MEDICARE

## 2024-08-14 NOTE — PROGRESS NOTES
CHW - Follow Up    Lina Espinoza, Community Health Worker completed a follow up visit with patient via telephone today.  Pt/Caregiver reported:  Ms. Casimiro Mar stated that the [facility] provided her with 3 companies for home care.  One was the [facility] for assistance.  She was not able to think of the other two right away because she was not home; but she will follow up with them and reschedule her [employer] appointment for transportation this week.   Community Health Worker provided: CHW will follow up with Pt in 2 weeks.   Follow-up Outreach - Due: 8/28/2024

## 2024-08-15 ENCOUNTER — TELEPHONE (OUTPATIENT)
Dept: INTERNAL MEDICINE | Facility: CLINIC | Age: 84
End: 2024-08-15
Payer: MEDICARE

## 2024-08-15 NOTE — TELEPHONE ENCOUNTER
Pt confirmed she has not had a bm in 2 weeks.  She explained she was given the bowel prep Go-lytely and did have results but nothing since.    Pt is drinking approximately 5 - 16oz btls of water a day.  She had an omelette , avacado, oatmeal w/ raisins + lemonade    Pt has orange , apple/ cherry juice in the fridge - pt eating 2 meals a day and a snack.  She denied eating red meat often and has plans for greens w/ dinner today.  She will incorporate more green salad, coffee and prune juice in her weekly regimen.     She said she is using stool softeners daily.  She recently used the last of the Miralax.  She is considering an enema or trying the go-lytely again.   Encouraged mobility - walking and bed exercises.

## 2024-08-15 NOTE — TELEPHONE ENCOUNTER
If she has not had a bowel movement in 2 full weeks, she could be impacted for which I would recommend an emergency room evaluation.      On the other hand, if it is mainly that her bowels are sluggish, I agree that an enema would be the next step and to continue with high-fiber eating, fluids, stool softener and MiraLax

## 2024-08-15 NOTE — TELEPHONE ENCOUNTER
----- Message from Misti Donnelly sent at 8/15/2024 11:06 AM CDT -----  Contact: [phone] Patient  Patient would like to get medical advice.  Symptoms (please be specific):   on going constipation  How long have you had these symptoms: about 2 weeks with no bowel movement   Would you like a call back, or a response through your [facility] portal?:   call back   Pharmacy name and phone # (copy from chart):     [id number] [place] - [address] AT AT [facility]  [address] [place] [address]  Phone: [phone] Fax: [phone]     Comments:

## 2024-08-15 NOTE — TELEPHONE ENCOUNTER
Pt was notified of provider message and she checked her calendar to confirm it has been 8 days since the last bm so she will start with the enema and continue with high-fiber eating, fluids, stool softener and MiraLax regimen.

## 2024-08-19 RX ORDER — DULOXETIN HYDROCHLORIDE 30 MG/1
30 CAPSULE, DELAYED RELEASE ORAL
Qty: 90 CAPSULE | Refills: 1 | Status: SHIPPED | OUTPATIENT
Start: 2024-08-19

## 2024-08-19 NOTE — PROGRESS NOTES
CHW - Outreach Attempt    Lina Espinoza Community Health Worker left a voicemail message for 2nd attempt to contact patient regarding: [facility]   Community Health Worker to attempt to contact patient on:  August 14.

## 2024-08-19 NOTE — TELEPHONE ENCOUNTER
Pt is requesting for a refill of: [id number] [place]   Last filed:7/24/24   Last encounter: 7/24/24   Up coming apt: 9/04/24   Ph[id number] [place]   Is this something you can do?

## 2024-08-20 ENCOUNTER — HOSPITAL ENCOUNTER (OUTPATIENT)
Dept: RADIOLOGY | Facility: HOSPITAL | Age: 84
Discharge: HOME | End: 2024-08-20
Attending: ANESTHESIOLOGY
Payer: MEDICARE

## 2024-08-20 ENCOUNTER — OFFICE VISIT (OUTPATIENT)
Dept: INTERNAL MEDICINE | Facility: CLINIC | Age: 84
End: 2024-08-20
Payer: MEDICARE

## 2024-08-20 DIAGNOSIS — M54.16 LUMBAR RADICULOPATHY, CHRONIC: ICD-10-CM

## 2024-08-20 DIAGNOSIS — E11.22 DIABETES MELLITUS WITH STAGE 3 CHRONIC KIDNEY DISEASE: Chronic | ICD-10-CM

## 2024-08-20 DIAGNOSIS — K59.09 CHRONIC CONSTIPATION: Primary | ICD-10-CM

## 2024-08-20 DIAGNOSIS — N18.30 DIABETES MELLITUS WITH STAGE 3 CHRONIC KIDNEY DISEASE: Chronic | ICD-10-CM

## 2024-08-20 DIAGNOSIS — N18.32 CHRONIC KIDNEY DISEASE, STAGE 3B: ICD-10-CM

## 2024-08-20 DIAGNOSIS — K63.5 POLYP OF COLON, UNSPECIFIED PART OF COLON, UNSPECIFIED TYPE: Chronic | ICD-10-CM

## 2024-08-20 DIAGNOSIS — U07.1 COVID-19: ICD-10-CM

## 2024-08-20 PROBLEM — D50.1 IRON DEFICIENCY ANEMIA DUE TO SIDEROPENIC DYSPHAGIA: Status: RESOLVED | Noted: 2017-07-28 | Resolved: 2024-08-20

## 2024-08-20 PROCEDURE — 99213 OFFICE O/P EST LOW 20 MIN: CPT | Mod: 95,,, | Performed by: INTERNAL MEDICINE

## 2024-08-20 PROCEDURE — 1159F MED LIST DOCD IN RCRD: CPT | Mod: CPTII,95,, | Performed by: INTERNAL MEDICINE

## 2024-08-20 PROCEDURE — 1160F RVW MEDS BY RX/DR IN RCRD: CPT | Mod: CPTII,95,, | Performed by: INTERNAL MEDICINE

## 2024-08-20 PROCEDURE — 72148 MRI LUMBAR SPINE W/O DYE: CPT | Mod: 26,,, | Performed by: RADIOLOGY

## 2024-08-20 PROCEDURE — 72148 MRI LUMBAR SPINE W/O DYE: CPT | Mod: TC

## 2024-08-20 PROCEDURE — G2211 COMPLEX E/M VISIT ADD ON: HCPCS | Mod: 95,,, | Performed by: INTERNAL MEDICINE

## 2024-08-20 NOTE — PROGRESS NOTES
Telemedicine Video Visit    The patient location is:  Patient Home   The chief complaint leading to consultation is: constipation  Visit type: Virtual visit with synchronous audio and video  Total time spent with patient: 25 minutes  Each patient to whom he or she provides medical services by telemedicine is:  (1) informed of the relationship between the physician and patient and the respective role of any other health care provider with respect to management of the patient; and (2) notified that he or she may decline to receive medical services by telemedicine and may withdraw from such care at any time.     Patient Active Problem List   Diagnosis    Degenerative disc disease    Colon polyp: tubular adenoma 5/13, repeated 2016 to be repeated 2021- declines colonoscopy and Gastro also does not recommend    Multinodular thyroid: thyroid u/s 7/16, stable 2017 and 2019, 2021    POAG (primary open-angle glaucoma) - Both Eyes    Amaurosis fugax    Nuclear sclerosis - Right Eye    Eyelid myokymia    Cerebral microvascular disease: stroke ? 1999 elsewhere; TIA 10/13    Vitamin D insufficiency    Left ventricular diastolic dysfunction with preserved systolic function    Hypertension, essential    Gastroesophageal reflux disease without esophagitis    Diabetes mellitus with proteinuria    Type II diabetes mellitus with neurological manifestations    Aortic arch atherosclerosis    Abnormal ankle brachial index    Diabetes mellitus with stage 3 chronic kidney disease    Cerebrovascular accident (CVA) due to thrombosis of precerebral artery    Sickle cell trait syndrome    Mixed anxiety depressive disorder    Diverticulosis of large intestine without hemorrhage    Atrial fibrillation    Hyperlipidemia associated with type 2 diabetes mellitus    Bilateral radiating leg pain    PAD (peripheral artery disease)    Carotid atherosclerosis    Spinal stenosis of lumbar region with neurogenic claudication    Lumbar radiculopathy,  chronic    Gait disorder    Posture abnormality    Both eyes affected by 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, associated with type 2 diabetes mellitus    Low-tension glaucoma, right eye, mild stage    Low-tension glaucoma, left eye, moderate stage    Age-related nuclear cataract, right    Mild episode of recurrent major depressive disorder    Carotid artery disease    DM cataract    Diabetic glaucoma    Diabetic retinopathy with macular edema associated with type 2 diabetes mellitus    Pulmonary hypertension    Hearing loss    Chronic constipation    Cognitive complaints with normal exam    Decreased functional mobility and endurance    Tendinitis, de Quervain's    Chronic kidney disease, stage 3b    Mild neurocognitive disorder      Concerned about her bowels.  Prune juice helped on Friday, had a good BM.  On Sunday did an enema and had a small BM then.      Interestingly, she recently had COVID which in many cases has been implicated in constipation.  Discussed.    Medications reviewed.  She is still on iron.  At this point, iron levels are significantly better.  Given that iron is very constipating, will have her discontinue iron for the present time.    She denies abdominal pain, nausea or vomiting.  She denies fever, chills, sweats or any blood in her stool.    Review of Systems   HENT:  Negative for hearing loss.    Eyes:  Negative for discharge.   Respiratory:  Negative for wheezing.    Cardiovascular:  Negative for chest pain and palpitations.   Gastrointestinal:  Positive for constipation. Negative for blood in stool, diarrhea and vomiting.        See HPI, last BM 1-2 days ago, no blood in the stool   Genitourinary:  Negative for dysuria and hematuria.   Musculoskeletal:  Negative for neck pain.   Neurological:  Negative for weakness and headaches.   Endo/Heme/Allergies:  Negative for polydipsia.        Physical Exam  Constitutional:       Appearance: She is well-developed.   HENT:       Head: Normocephalic and atraumatic.   Eyes:      Extraocular Movements: Extraocular movements intact.      Conjunctiva/sclera: Conjunctivae normal.   Neck:      Thyroid: No thyromegaly.   Pulmonary:      Effort: No respiratory distress.   Neurological:      General: No focal deficit present.      Mental Status: She is alert and oriented to person, place, and time.   Psychiatric:         Mood and Affect: Mood normal.         Behavior: Behavior normal.         Thought Content: Thought content normal.         Judgment: Judgment normal.          1. Chronic constipation    2. COVID-19    3. Diabetes mellitus with stage 3 chronic kidney disease    4. Polyp of colon, unspecified part of colon, unspecified type    5. Chronic kidney disease, stage 3b         Hygienic measures reviewed  Continue with hydration  MiraLax sparingly  Discontinue iron  COVID issues discussed  Diabetes sick day issues reviewed  She will let me know within the next week as to how she is doing with her current symptoms  Review of chart shows that when she was seen in Gastro in 2022, she declined colonoscopy.  She is still not interested today- however we discussed that if symptoms continue to be problematic in the next 1-2 weeks, this may need to be considered with another GI consultation first, she is aware.  Thyroid labs recently normal  Gentle hydration, CKD issues reviewed with regard to her medication    Prior note reviewed, her urinalysis did not reveal significant hematuria.  She has the appointment to see Gynecology this week.    Visit today manifests increased complexity associated with the care of the multiple chronic and episodic problems I addressed.  I am managing a longitudinal care plan of the patient due to the serious and complex problems listed above.

## 2024-08-23 ENCOUNTER — LAB VISIT (OUTPATIENT)
Dept: LAB | Facility: HOSPITAL | Age: 84
End: 2024-08-23
Payer: MEDICARE

## 2024-08-23 ENCOUNTER — OFFICE VISIT (OUTPATIENT)
Dept: OBSTETRICS AND GYNECOLOGY | Facility: CLINIC | Age: 84
End: 2024-08-23
Payer: MEDICARE

## 2024-08-23 VITALS
HEIGHT: 65 IN | SYSTOLIC BLOOD PRESSURE: 142 MMHG | WEIGHT: 168 LBS | BODY MASS INDEX: 27.99 KG/M2 | DIASTOLIC BLOOD PRESSURE: 66 MMHG

## 2024-08-23 DIAGNOSIS — N93.9 VAGINAL BLEEDING: ICD-10-CM

## 2024-08-23 DIAGNOSIS — N95.2 VAGINAL ATROPHY: Primary | ICD-10-CM

## 2024-08-23 LAB
BACTERIA #/AREA URNS HPF: ABNORMAL /HPF
BILIRUB UR QL STRIP: NEGATIVE
CLARITY UR: CLEAR
COLOR UR: ABNORMAL
GLUCOSE UR QL STRIP: ABNORMAL
HGB UR QL STRIP: NEGATIVE
KETONES UR QL STRIP: NEGATIVE
LEUKOCYTE ESTERASE UR QL STRIP: ABNORMAL
MICROSCOPIC COMMENT: ABNORMAL
NITRITE UR QL STRIP: NEGATIVE
PH UR STRIP: 6 [PH] (ref 5–8)
PROT UR QL STRIP: NEGATIVE
RBC #/AREA URNS HPF: 0 /HPF (ref 0–4)
SP GR UR STRIP: 1.01 (ref 1–1.03)
URN SPEC COLLECT METH UR: ABNORMAL
WBC #/AREA URNS HPF: 8 /HPF (ref 0–5)
YEAST URNS QL MICRO: ABNORMAL

## 2024-08-23 PROCEDURE — 81000 URINALYSIS NONAUTO W/SCOPE: CPT | Performed by: INTERNAL MEDICINE

## 2024-08-23 PROCEDURE — 99999 PR PBB SHADOW E&M-EST. PATIENT-LVL III: CPT | Mod: PBBFAC,,, | Performed by: OBSTETRICS & GYNECOLOGY

## 2024-08-23 RX ORDER — ESTRADIOL 0.1 MG/G
1 CREAM VAGINAL
Qty: 42.5 G | Refills: 12 | Status: SHIPPED | OUTPATIENT
Start: 2024-08-23 | End: 2025-08-23

## 2024-08-23 NOTE — PROGRESS NOTES
"  Subjective:       Patient ID: Saul Mar is a 84 y.o. female.    Chief Complaint:  Vaginal Bleeding      History of Present Illness  Vaginal Bleeding      Having some spotting noted on incontinence pad recently   Hysterectomy distant past benign   No hormonal therapy   No blood in the urine, no pelvic pain, no GI symptoms     Health Maintenance   Topic Date Due    Hemoglobin A1c  10/09/2024    Eye Exam  2025    Mammogram  2025    Foot Exam  2025    Lipid Panel  2025    DEXA Scan  2025    TETANUS VACCINE  2031    Shingles Vaccine  Completed    Colonoscopy  Discontinued     GYN & OB History  No LMP recorded. Patient has had a hysterectomy.   Date of Last Pap: No result found    OB History    Para Term  AB Living   2 1 1     1   SAB IAB Ectopic Multiple Live Births                  # Outcome Date GA Lbr Carlo/2nd Weight Sex Type Anes PTL Lv   2 Term            1                 Review of Systems  Review of Systems   Genitourinary:  Positive for vaginal bleeding.           Objective:   BP (!) 142/66   Ht 5' 5" (1.651 m)   Wt 76.2 kg (167 lb 15.9 oz)   BMI 27.96 kg/m²    Physical Exam:               Genitourinary: The external female genitalia was normal.     Labial bartholins normal.There is no rash, tenderness or lesion on the right labia. There is no rash, tenderness or lesion on the left labia. Vagina exhibits no lesion. No vaginal discharge, tenderness, bleeding, rectocele, cystocele or prolapse of vaginal walls in the vagina.    No foreign body in the vagina.      There are signs of injury (small tear at posterior entrance) in the vagina.   Vaginal atrophy noted.                      Assessment:        1. Vaginal atrophy    2. Vaginal bleeding                Plan:            Saul was seen today for vaginal bleeding.    Diagnoses and all orders for this visit:    Vaginal atrophy  -     estradioL (ESTRACE) 0.01 % (0.1 mg/gram) vaginal cream; " Place 1 g vaginally 3 (three) times a week.    Vaginal bleeding  -     Ambulatory referral/consult to Obstetrics / Gynecology

## 2024-09-03 ENCOUNTER — TELEPHONE (OUTPATIENT)
Dept: INTERNAL MEDICINE | Facility: CLINIC | Age: 84
End: 2024-09-03
Payer: MEDICARE

## 2024-09-03 DIAGNOSIS — Z12.31 VISIT FOR SCREENING MAMMOGRAM: Primary | ICD-10-CM

## 2024-09-03 NOTE — TELEPHONE ENCOUNTER
----- Message from Grace Bruner sent at 9/3/2024  8:43 AM CDT -----  Type:  Orders     Who Called:pt     Does the patient know what this is regarding?:mammogram   Would the patient rather a call back or a response via [facility]? Call   Best Call Back Number:[phone]  Additional Information:

## 2024-09-03 NOTE — PROGRESS NOTES
Established Patient Interventional Pain Note (Follow up Visit)    The patient location is: home  The chief complaint leading to consultation is: leg pain  Visit type: Virtual visit with synchronous audio and video  Total time spent with patient: 11-15m  Each patient to whom he or she provides medical services by telemedicine is: (1) informed of the relationship between the physician and patient and the respective role of any other health care provider with respect to management of the patient; and (2) notified that he or she may decline to receive medical services by telemedicine and may withdraw from such care at any time.    Referring Physician: No ref. provider found    PCP: Penny Randolph MD    Chief Complaint:   Leg pain    Interval history 09/04/2024  Patient presents for 2-1/2 month follow-up for MRI review.  Today she again reports pain in the lower back which can radiate down the lateral and posterior aspects of bilateral lower extremities in L4-S1 distribution to the feet.  Pain is still more severe on the right than on the left.  Pain is intermittent and today is rated an 8/10.  She has continued physician directed physical therapy exercises for lower back and leg pain over the last 8 weeks from 07/04/2024 through 09/04/2024 with marginal improvement in her symptoms.  She has been sparingly taking Cymbalta 30 mg not daily, concerned for renal function.  She denies any side effects from this medication.  She does associate significant weakness in the lower extremities associated with her pain and exertion.    Interval history 07/24/2024  Patient presents for three-month follow-up.  Today she reports she has been tolerating the pain.  Again she experiences pain in the lower back which can radiate down the posterior aspects of bilateral lower extremities in L5-S2 distribution to the foot.  Pain is more severe on the right than on the left.  Pain today is rated an 8/10.  Patient has continued physician  directed physical therapy exercises at home for lower back and leg pain over the last 8 weeks from 05/24/2024 through 07/24/2024 with marginal improvement in her symptoms.  She is continued Lyrica 75 mg in the evening but she does report this causes significant daytime somnolence.  Patient has not trialed Cymbalta in the past.    Interval History (04/25/2024):  Patient Saul Mar presents today for follow-up visit.  Patient being seen today for follow-up of lower back pain that radiates down the posterior aspect of the bilateral lower extremities.  Pain is worse on the right than the left.  She rates her pain today an 8/10.  She has had previous epidural steroid injections but she states this only provided relief for a couple of weeks.  She continues to take Tylenol and use a topical compound with some relief.  Pain is worse with extending back and she will often get relief whenever she bends forward.  Pain is also worse with prolonged standing or walking.  Patient denies night fever/night sweats, urinary incontinence, bowel incontinence, significant weight loss and significant motor weakness.   Patient denies any other complaints or concerns at this time.       Interval History (1/5/2024):  Patient Saul Mar presents today for follow-up visit.  Patient experienced a fall in 12/10 where her chair was pulled out from behind her and she fell onto her bottom.  She went to the ER for evaluation with x-rays of the lumbar spine and left hip which were both negative for fracture.  She is since then seen Dr. Jacobsen who has ordered repeat x-rays of the lumbar spine and x-ray and again both were negative for fracture or dislocation.  She has physical therapy ordered and is supposed to start on January 9th.  Today she rates her pain at 8/10 but stay at its worst a 10/10.  Pain is continuous and does not radiate down the lower extremities.  She is currently using Voltaren gel and was prescribed  Fioricet in the ER with no relief of symptoms.  Patient denies night fever/night sweats, urinary incontinence, bowel incontinence, significant weight loss and significant motor weakness.   Patient denies any other complaints or concerns at this time.      SUBJECTIVE:  Interval history 11/13/2023  Patient presents status post L4-5 interlaminar epidural steroid injection with right paramedian approach 08/02/2023.  Patient reports it took a few weeks for therapeutic benefit the patient reports at least 75-80% improvement in right lower extremity radicular symptoms following her epidural steroid injection.  Patient reports taking a trip to [place] from September 6 through September 12th with significant improvement in her pain and improvement in mobility following her injection.  She reports upon her return pain relief was sustained until 1 week prior.  Today she reports pain has again becomes severe 6-7/10.  Patient reports pain along the lateral and posterior aspect of the right lower extremity in L4-S1 distribution to the calf.  Pain is exacerbated with standing and with ambulation.  Patient reports she would like to wait and see for another week if pain improves.  She has continued physician directed physical therapy exercises at home over the last 3 months with marginal improvement in her symptoms.    Interval Hx: 07/12/2023  Patient presents for follow-up of lower back and leg pain.  Today patient reports pain in the lower back and leg has become severe over the last several months.  Pain is constant and today is rated a 6/10.  Pain is described as aching and shooting in nature.  Patient reports pain in the lower back which radiates down the lateral and posterior aspect of the right lower extremity in L4-S1 distribution to the knee.  Pain is exacerbated with standing or with ambulation.  Patient reports pain has severely limited her day-to-day activities and quality of life.  Patient would like to pursue  repeat intervention.  Patient has continued physician directed physical therapy exercises at home over the last 8 weeks without meaningful improvement in her pain.    Of note, patient saw Odilia Valverde PA-C in Neurosurgery 08/12/2022 with the following evaluation:        Interval history 10/26/2022  Patient presents status post bilateral L2/3 transforaminal epidural steroid injection 10/03/2022.  Patient reports limited 50% relief along the posterior aspect of the right lower extremity following her procedure.  She continues to report pain along the lateral aspect of the right lower extremity to the calf in L4 distribution.  Pain today is a 10/10.  Patient does endorse associated weakness in the lower extremity associated with her pain.  Patient reports her leg pain is more severe and debilitating than the back pain.  Patient reports she is scheduled to restart physical therapy the following week.  Patient has discontinued gabapentin secondary to intolerable side effect.    Interval History (7/18/2022): Saul Mar presents today for follow-up visit.  she underwent bilateral L4/5 transforaminal epidural steroid injection on 6/17/22.  The patient reports that she is/was unchanged following the procedure.  she reports 0% pain relief.  Reports pain is worsened with prolonged sitting, improved with leaning forward (shopping cart sign). Reports continued aching, stinging low back pain in a band-like distribution with radiation down bilateral lower extremities. Reports she was unable to tolerate Gabapentin due to drowsiness and discontinued. Reports she received no relief while taking this medication.  Patient reports pain as 8/10 today.    Initial HPI  Saul Mar is a 84 y.o. female with past medical history significant for CVA, MDD, primary open angle glaucoma, DMII, AFib, HLD, HTN, diastolic dysfunction, PAD, CKD III, SStrait, GERD who presents with bilateral leg pain.  Patient reports pain  began approximately 1 year prior without inciting accident injury or trauma.  Today patient reports pain which is constant which is rated a 10/10.  Pain is described as aching in nature.  Patient reports pain originating in bilateral buttocks and radiating to bilateral calves in L4-L5 distribution.  Pain is equally worse on both sides.  Pain is exacerbated with prolonged standing and with ambulation.  Patient ambulates with a walker and reports she is only able to ambulate a few steps before requiring rest.  Patient reports pain is improved with lumbar flexion and rest.  Patient does report associated weakness in bilateral lower extremities associated with her pain.  She denies bowel or bladder incontinence or saddle anesthesia.  Patient reports completing several sessions of conventional physical therapy with initial improvement in her symptoms, however with increased intensity of exercises, exacerbation of pain.    Pt last saw Dr. Sanchez 8/24/21 with recommendation to continue with PT and discussion of future MBB.    Patient reports significant motor weakness and loss of sensations.  Patient denies night fever/night sweats, urinary incontinence, bowel incontinence and significant weight loss.      Pain Disability Index Review:         7/12/2023     9:16 AM 10/26/2022     9:13 AM 7/18/2022     1:41 PM   Last 3 PDI Scores   Pain Disability Index (PDI) 42 [phone] 34       Non-Pharmacologic Treatments:  Physical Therapy/Home Exercise: yes  Ice/Heat:yes  TENS: no  Acupuncture: no  Massage: no  Chiropractic: no    Other: no      Pain Medications:  - Opioids: Vicodin ( Hydrocodone/Acetaminophen)  - Adjuvant Medications: Neurontin (Gabapentin) and Xanax (Alprazolam). Robaxin  - Anti-Coagulants: Xarelto    Pain Procedures:   -08/02/2023: L4-5 interlaminar epidural steroid injection with right paramedian approach with 75-80% improvement in right lower extremity radicular symptoms  -10/03/2022: Bilateral L2/3 transforaminal  epidural steroid injection with 50% relief along the posterior aspect of the right lower extremity  -06/17/2022: Bilateral L4/5 transforaminal epidural steroid injection    Past Medical History:   Diagnosis Date    A-fib     Atrial fibrillation 10/22/2018    Back pain     Cataract     Degenerative disc disease     Diverticulosis     colonoscopy 9/22/2016    GERD (gastroesophageal reflux disease)     Glaucoma     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 Taveras    COLONOSCOPY      COLONOSCOPY N/A 9/22/2016    Procedure: COLONOSCOPY;  Surgeon: Jd Ashton MD;  Location: [facility] ([address] [place] [address]);  Service: Endoscopy;  Laterality: N/A;  OK per Dr Randolph for pt to hold Plavix 5 days prior to procedure/see telephone encounter dated 8/29/16/svn    EPIDURAL STEROID INJECTION N/A 8/2/2023    Procedure: L4-5 interlaminar epidural steroid injection with right paramedian approach with RN IV sedation;  Surgeon: Chan Fonseca MD;  Location: [facility];  Service: Pain Management;  Laterality: N/A;    EYE SURGERY Bilateral 2002 approx    Laser for glaucoma    HYSTERECTOMY  1963     AMEENA/USO- fibroids; no cancer    OOPHORECTOMY  1963    unknown, only removed one    SELECTIVE INJECTION OF ANESTHETIC AGENT AROUND LUMBAR SPINAL NERVE ROOT BY TRANSFORAMINAL APPROACH Bilateral 6/17/2022    Procedure: Bilateral L4/5 TF JASKARAN with RN IV sedation;  Surgeon: Chan Fonseca MD;  Location: [facility];  Service: Pain Management;  Laterality: Bilateral;    SELECTIVE INJECTION OF ANESTHETIC AGENT AROUND  LUMBAR SPINAL NERVE ROOT BY TRANSFORAMINAL APPROACH Bilateral 10/3/2022    Procedure: Bilateral L2-3 transforaminal epidural steroid injection with RN IV sedation;  Surgeon: Chan Fonseca MD;  Location: [facility];  Service: Pain Management;  Laterality: Bilateral;     Review of patient's allergies indicates:   Allergen Reactions    Pravachol [pravastatin] Nausea Only       Current Outpatient Medications   Medication Sig    albuterol (PROVENTIL/VENTOLIN HFA) 90 mcg/actuation inhaler INHALE 2 PUFFS INTO THE LUNGS EVERY 6 (SIX) HOURS AS NEEDED FOR WHEEZING. RESCUE    ALPRAZolam (XANAX) 0.5 MG tablet TAKE 1 TABLET BY MOUTH NIGHTLY AS NEEDED FOR ANXIETY (MAY TAKE 1-2 TIMES WEEKLY FOR ANXIETY)    amLODIPine (NORVASC) 5 MG tablet Take 1 tablet (5 mg total) by mouth 2 (two) times daily.    atorvastatin (LIPITOR) 80 MG tablet TAKE 1 TABLET BY MOUTH EVERY DAY    benzonatate (TESSALON) 100 MG capsule TAKE 1 CAPSULE BY MOUTH THREE TIMES A DAY AS NEEDED Oral for 10 Days    blood sugar diagnostic Strp For use with insurance covered glucometer; test daily    brimonidine 0.2% (ALPHAGAN) 0.2 % Drop Place 1 drop into both eyes 3 (three) times daily.    carvediloL (COREG) 12.5 MG tablet Take 12.5 mg by mouth 2 (two) times daily.    cholecalciferol, vitamin D3, (VITAMIN D3) 1,000 unit capsule Take 1 capsule (1,000 Units total) by mouth once daily.    diclofenac sodium (VOLTAREN) 1 % Gel Apply 2 g topically 2 (two) times daily.    DULoxetine (CYMBALTA) 30 MG capsule Take 1 capsule (30 mg total) by mouth once daily.    empagliflozin (JARDIANCE) 25 mg tablet Take 1 tablet (25 mg total) by mouth once daily.    estradioL (ESTRACE) 0.01 % (0.1 mg/gram) vaginal cream Place 1 g vaginally 3 (three) times a week.    flecainide (TAMBOCOR) 50 MG Tab Take 1 tablet (50 mg total) by mouth 2 (two) times daily.    glimepiride (AMARYL) 4 MG tablet Take 1 tablet (4 mg total) by mouth 2 (two) times daily with meals.    levalbuterol (XOPENEX HFA) 45  mcg/actuation inhaler INHALE 1-2 PUFFS INTO THE LUNGS EVERY 6 (SIX) HOURS AS NEEDED FOR WHEEZING. RESCUE    linaCLOtide (LINZESS) 145 mcg Cap capsule TAKE 1 CAPSULE (145 MCG TOTAL) BY MOUTH BEFORE BREAKFAST.    losartan (COZAAR) 50 MG tablet Take 1 tablet (50 mg total) by mouth 2 (two) times daily.    meclizine (ANTIVERT) 25 mg tablet TAKE 1 TABLET (25 MG TOTAL) BY MOUTH DAILY AS NEEDED FOR DIZZINESS.    metFORMIN (GLUCOPHAGE-XR) 500 MG ER 24hr tablet TAKE 2 TABLETS BY MOUTH EVERY DAY    metoprolol succinate (TOPROL-XL) 50 MG 24 hr tablet TAKE 1 TABLET BY MOUTH EVERY DAY    ONETOUCH DELICA PLUS LANCET 33 gauge Misc Apply topically.    ONETOUCH ULTRA2 METER Misc SMARTSIG:Via Meter As Directed    pregabalin (LYRICA) 75 MG capsule Oral for 90 Days    XARELTO 15 mg Tab TAKE 1 TABLET (15 MG TOTAL) BY MOUTH DAILY WITH DINNER OR EVENING MEAL.     No current facility-administered medications for this visit.       Review of Systems     GENERAL:  No weight loss, malaise or fevers.  HEENT:   No recent changes in vision or hearing  NECK:  Negative for lumps, no difficulty with swallowing.  RESPIRATORY:  Negative for cough, wheezing or shortness of breath, patient denies any recent URI.  CARDIOVASCULAR:  Negative for chest pain, leg swelling or palpitations.  GI:  Negative for abdominal discomfort, blood in stools or black stools or change in bowel habits.  MUSCULOSKELETAL:  See HPI.  SKIN:  Negative for lesions, rash, and itching.  PSYCH:  No mood disorder or recent psychosocial stressors.   HEMATOLOGY/LYMPHOLOGY:  Negative for prolonged bleeding, bruising easily or swollen nodes.    NEURO:   No history of headaches, syncope, paralysis, seizures or tremors.  All other reviewed and negative other than HPI.    OBJECTIVE:    There were no vitals taken for this visit.    There were no vitals filed for this visit.    There is no height or weight on file to calculate BMI.   (reviewed on 9/4/2024)       Physical Exam    GENERAL: Well  appearing, in no acute distress, alert and oriented x3.  PSYCH:  Mood and affect appropriate.  SKIN: Skin color, texture, turgor normal, no rashes or lesions.  HEAD/FACE:  Normocephalic, atraumatic. Cranial nerves grossly intact.    CV: RRR with palpation of the radial artery.  PULM: No evidence of respiratory difficulty, symmetric chest rise.  GI:  Soft and non-tender.    BACK:  Thoracic kyphosis Straight leg raising in the sitting and supine positions is negative to radicular pain. pain to palpation over the facet joints of the lumbar spine or spinous processes. reduced range of motion with pain reproduction. Pain with back extension, no pain with flexion.  EXTREMITIES: Peripheral joint ROM is reduced with pain without obvious instability or laxity in all four extremities. No deformities, edema, or skin discoloration. Good capillary refill.  MUSCULOSKELETAL: Able to stand on heels & toes.   hip, and knee provocative maneuvers are negative.  There is no pain with palpation over the sacroiliac joints bilaterally.  FABERs test is negative.  Facet loading test is positive bilaterally.   Bilateral upper and lower extremity strength is normal and symmetric.  No atrophy or tone abnormalities are noted.  RIGHT Lower extremity: Hip flexion 5/5, Hip Abduction 5/5, Hip Adduction 5/5, Knee extension 5/5, Knee flexion 5/5, Ankle dorsiflexion5/5, Extensor hallucis longus 5/5, Ankle plantarflexion 5/5  LEFT Lower extremity:  Hip flexion 5/5, Hip Abduction 5/5,Hip Adduction 5/5, Knee extension 5/5, Knee flexion 5/5, Ankle dorsiflexion 5/5, Extensor hallucis longus 5/5, Ankle plantarflexion 5/5  -Normal testing knee (patellar) jerk and ankle (achilles) jerk    NEURO: Bilateral upper and lower extremity coordination and muscle stretch reflexes are physiologic and symmetric. No loss of sensation is noted.  GAIT: normal.      Imaging:  Xray lumbar 12/29/2023   EXAM: XR LUMBAR SPINE 2 OR 3 VIEWS  CLINICAL HISTORY: Lower back  pain.  TECHNIQUE: Three-view lumbar spine series.  COMPARISON: 05/18/2023.  FINDINGS: Disc space narrowing with slight ventral subluxation at L4-5.  Mild disc space narrowing at L2-3.  Multilevel osteophyte formation.  Arthritic changes involving the facets at the L4-5 and L5-S1 levels.     Xray hip 12/29/2023   EXAM: XR HIP WITH PELVIS WHEN PERFORMED, 2 OR 3 VIEWS LEFT  CLINICAL HISTORY: Left hip pain.  TECHNIQUE: AP pelvis and left hip.  FINDINGS: No fracture or dislocation is demonstrated. Mild arthritic change involving both hips.  SPECT ankylosis of the SI joints.  Multiple calcifications in the pelvis.       MRI Lumbar Spine Without Contrast 08/20/2024  FINDINGS:  The distal cord and conus reveal normal signal and morphology.     Multilevel degenerative disc disease.  There are endplate Schmorl's nodes at L2-3 and L4-5.     There is grade 1 L4-5 spondylolisthesis of approximately 3 mm.     T12-L1: Mild disc desiccation with disc bulge.     L1-2:     Mild disc degeneration with disc desiccation and mild disc bulge.     L2-3:     Moderate disc degeneration with moderate disc bulge.  Mild hypertrophic facet arthrosis.  Mild central canal stenosis with severe right and moderate left foraminal stenosis.     L3-4:     Mild disc degeneration with disc bulge.  Mild hypertrophic ligamentum flavum and facet arthrosis.  Mild central canal stenosis with moderate foraminal stenosis.     L4-5:     Mild to moderate disc degeneration with moderate disc bulge.  Moderate hypertrophic facet arthrosis.  Moderate to severe central canal stenosis as well as left lateral recess stenosis.  Moderate to severe bilateral foraminal stenosis.     L5-S1:    Mild disc degeneration with disc desiccation and disc bulge.  Mild facet arthrosis with mild left lateral recess stenosis.  Mild foraminal stenosis.         07/22/21  X-Ray Lumbar Spine Ap And Lateral  FINDINGS:  Five lumbar vertebral bodies.  Vertebral body heights are maintained.   Disc space narrowing and degenerative endplate changes L4-5 and L5-S1.  Moderate facet arthropathy L4-5 and L5-S1.  Grade 1 anterolisthesis L4 on L5.     04/02/18    X-Ray Cervical Spine Complete 5 view  FINDINGS:  There is anatomic spinal alignment.  Prominent bridging vertebral endplate spurs present anteriorly from C4-5 through C6-7.  Disc interspaces are maintained.  Odontoid is intact.  No fracture or subluxation.      ASSESSMENT: 84 y.o. year old female with lower back and leg pain, consistent with     1. Lumbar radiculopathy, chronic  Case Request-RAD/Other Procedure Area: Lumbar L4/5 IL JASKARAN      2. Lumbar degenerative disc disease        3. Spondylolysis of lumbar region                    PLAN:   - Interventions:  Schedule for L4-5 interlaminar epidural steroid injection for lumbar radiculopathy.  Patient received 80% relief exceeding 3 months in duration with prior epidural steroid injection.  We have discussed the procedure, benefits and potential risk and alternative options in detail.  Patient has elected to pursue this procedure.      - Anticoagulation use: yes  Xarelto  -secondary prophylaxis:  Atrial fibrillation; Dr. Grayson ([facility])  --per BRITNEY guidelines, patient will need to pause Xarelto 3 days prior to epidural steroid injection.  We will obtain clearance.     report:  Reviewed and consistent with medication use as prescribed.    - Medications:  - DC Gabapentin and Lyrica 2/2 somnolence and inefficacy    -Continue Cymbalta (duloxetine) 30 mg once daily.   We have discussed potential common side effects of duloxetine including nausea, diarrhea/constipation, fatigue, drowsiness or dry mouth.  Patient expresses understanding.  - 90 day supply sent in    - Therapy:  We discussed continuing at home physician directed physical therapy to help manage the patient/s painful condition. The patient was counseled that muscle strengthening will improve the long term prognosis in regards to  pain and may also help increase range of motion and mobility.    - Imaging: Reviewed available imaging (new MRI lumbar spine) with patient and answered any questions they had regarding study.      -Consults/referral:Neurosurgery PRN     - Follow up visit:  4-6 weeks.  Virtual visit.  Status post injection.      The above plan and management options were discussed at length with patient. Patient is in agreement with the above and verbalized understanding.    - I discussed the goals of interventional chronic pain management with the patient on today's visit. We discussed a multimodal and systematic approach to pain.  This includes diagnostic and therapeutic injections, adjuvant pharmacologic treatment, physical therapy, and at times psychiatry.  I emphasized the importance of regular exercise, core strengthening and stretching, diet and weight loss as a cornerstone of long-term pain management.    - This condition does not require this patient to take time off of work, and the primary goal of our Pain Management services is to improve the patient's functional capacity.  - Patient Questions: Answered all of the patient's questions regarding diagnoses, therapy, treatment and next steps        Chan Fonseca MD  Interventional Pain Management  [facility]    Disclaimer:  This note was prepared using voice recognition system and is likely to have sound alike errors that may have been overlooked even after proof reading.  Please call me with any questions

## 2024-09-04 ENCOUNTER — OFFICE VISIT (OUTPATIENT)
Dept: PAIN MEDICINE | Facility: CLINIC | Age: 84
End: 2024-09-04
Payer: MEDICARE

## 2024-09-04 DIAGNOSIS — M51.36 LUMBAR DEGENERATIVE DISC DISEASE: ICD-10-CM

## 2024-09-04 DIAGNOSIS — M54.16 LUMBAR RADICULOPATHY, CHRONIC: Primary | ICD-10-CM

## 2024-09-04 DIAGNOSIS — M43.06 SPONDYLOLYSIS OF LUMBAR REGION: ICD-10-CM

## 2024-09-04 PROCEDURE — 99214 OFFICE O/P EST MOD 30 MIN: CPT | Mod: 95,,, | Performed by: ANESTHESIOLOGY

## 2024-09-04 RX ORDER — DULOXETIN HYDROCHLORIDE 30 MG/1
30 CAPSULE, DELAYED RELEASE ORAL DAILY
Qty: 90 CAPSULE | Refills: 0 | Status: SHIPPED | OUTPATIENT
Start: 2024-09-04 | End: 2024-12-03

## 2024-09-06 ENCOUNTER — PATIENT MESSAGE (OUTPATIENT)
Dept: NEUROLOGY | Facility: CLINIC | Age: 84
End: 2024-09-06
Payer: MEDICARE

## 2024-09-09 ENCOUNTER — PATIENT OUTREACH (OUTPATIENT)
Dept: ADMINISTRATIVE | Facility: OTHER | Age: 84
End: 2024-09-09
Payer: MEDICARE

## 2024-09-09 NOTE — PROGRESS NOTES
CHW - Outreach Attempt    Lina Espinoza Community Health Worker left a voicemail message for 1st attempt to contact patient regarding: [facility]   Community Health Worker to attempt to contact patient on:  September 17, 2024

## 2024-09-17 ENCOUNTER — PATIENT OUTREACH (OUTPATIENT)
Dept: ADMINISTRATIVE | Facility: OTHER | Age: 84
End: 2024-09-17
Payer: MEDICARE

## 2024-09-17 NOTE — PROGRESS NOTES
CHW - Follow Up    Lina Espinoza Community Health Worker completed a follow up visit with patient via telephone today.  Pt/Caregiver reported: Ms. Casimiro Mar stated that she has not called the [facility] regarding [facility] care and has not called to reschedule her [facility] appointment to assist with transportation.  Ms. Casimiro Mar said she is not feeling too well, but does not need to be seen today.  Pt stated she has had a lot of medical appointments.    Community Health Worker provided: CHW will call Pt back in about 2 weeks.     Follow-up Outreach - Due: 10/3/2024

## 2024-09-19 ENCOUNTER — TELEPHONE (OUTPATIENT)
Dept: PAIN MEDICINE | Facility: CLINIC | Age: 84
End: 2024-09-19
Payer: MEDICARE

## 2024-09-19 NOTE — LETTER
September 19, 2024        Phuc Grayson MD    [address]    [address]    [place] [address] [phone] (Work)    [phone] (Fax)               [facility]  [address] [place] [address]  Phone: [phone]  Fax: [phone]   Patient: Saul Mar   MR Number: [id number]   YOB: 1940   Date of Visit: 9/19/2024      Dr. Grayson,    Saul Mar was seen in office with complaints of severe low back pain. Dr. Fonseca would like to perform lumbar epidural, and Saul Mar would like to proceed. Dr. Fonseca is requesting for clearance to hold  Xarelto  3 days prior to procedure. Patient Saul Mar can resume medication following the procedure.     EXTERNAL REQUEST:  If you are in agreement with this request, please sign below and fax back to our clinic.         Patient is cleared to proceed with procedure listed above with these anticoagulant protocols. Patient will be instructed when to resume these medication following the procedure.      Thank you,      _________________________________                         (Please Sign Here)

## 2024-09-27 ENCOUNTER — PATIENT OUTREACH (OUTPATIENT)
Dept: ADMINISTRATIVE | Facility: OTHER | Age: 84
End: 2024-09-27
Payer: MEDICARE

## 2024-09-27 NOTE — PROGRESS NOTES
CHW - Outreach Attempt    Lina Espinoza Community Health Worker left a voicemail message for 1st attempt to contact patient regarding: [facility]   Community Health Worker to attempt to contact patient on: October 3, 2024.

## 2024-10-01 ENCOUNTER — CLINICAL SUPPORT (OUTPATIENT)
Dept: DIABETES | Facility: CLINIC | Age: 84
End: 2024-10-01
Payer: MEDICARE

## 2024-10-01 ENCOUNTER — OFFICE VISIT (OUTPATIENT)
Dept: DIABETES | Facility: CLINIC | Age: 84
End: 2024-10-01
Payer: MEDICARE

## 2024-10-01 VITALS
SYSTOLIC BLOOD PRESSURE: 135 MMHG | HEART RATE: 77 BPM | DIASTOLIC BLOOD PRESSURE: 68 MMHG | BODY MASS INDEX: 28.18 KG/M2 | WEIGHT: 169.31 LBS

## 2024-10-01 VITALS — WEIGHT: 169.31 LBS | BODY MASS INDEX: 28.18 KG/M2

## 2024-10-01 DIAGNOSIS — I10 HYPERTENSION, ESSENTIAL: Chronic | ICD-10-CM

## 2024-10-01 DIAGNOSIS — N18.30 DIABETES MELLITUS WITH STAGE 3 CHRONIC KIDNEY DISEASE: Chronic | ICD-10-CM

## 2024-10-01 DIAGNOSIS — E11.49 TYPE II DIABETES MELLITUS WITH NEUROLOGICAL MANIFESTATIONS: Chronic | ICD-10-CM

## 2024-10-01 DIAGNOSIS — E11.22 DIABETES MELLITUS WITH STAGE 3 CHRONIC KIDNEY DISEASE: Chronic | ICD-10-CM

## 2024-10-01 DIAGNOSIS — E11.22 DIABETES MELLITUS WITH STAGE 3 CHRONIC KIDNEY DISEASE: Primary | ICD-10-CM

## 2024-10-01 DIAGNOSIS — E11.69 HYPERLIPIDEMIA ASSOCIATED WITH TYPE 2 DIABETES MELLITUS: ICD-10-CM

## 2024-10-01 DIAGNOSIS — N18.30 DIABETES MELLITUS WITH STAGE 3 CHRONIC KIDNEY DISEASE: Primary | ICD-10-CM

## 2024-10-01 DIAGNOSIS — E78.5 HYPERLIPIDEMIA ASSOCIATED WITH TYPE 2 DIABETES MELLITUS: ICD-10-CM

## 2024-10-01 LAB — GLUCOSE SERPL-MCNC: 202 MG/DL (ref 70–110)

## 2024-10-01 PROCEDURE — 1125F AMNT PAIN NOTED PAIN PRSNT: CPT | Mod: CPTII,S$GLB,, | Performed by: NURSE PRACTITIONER

## 2024-10-01 PROCEDURE — 82962 GLUCOSE BLOOD TEST: CPT | Mod: S$GLB,,, | Performed by: NURSE PRACTITIONER

## 2024-10-01 PROCEDURE — 1160F RVW MEDS BY RX/DR IN RCRD: CPT | Mod: CPTII,S$GLB,, | Performed by: NURSE PRACTITIONER

## 2024-10-01 PROCEDURE — 1159F MED LIST DOCD IN RCRD: CPT | Mod: CPTII,S$GLB,, | Performed by: NURSE PRACTITIONER

## 2024-10-01 PROCEDURE — 99999 PR PBB SHADOW E&M-EST. PATIENT-LVL IV: CPT | Mod: PBBFAC,,, | Performed by: NURSE PRACTITIONER

## 2024-10-01 PROCEDURE — 3075F SYST BP GE 130 - 139MM HG: CPT | Mod: CPTII,S$GLB,, | Performed by: NURSE PRACTITIONER

## 2024-10-01 PROCEDURE — G2211 COMPLEX E/M VISIT ADD ON: HCPCS | Mod: S$GLB,,, | Performed by: NURSE PRACTITIONER

## 2024-10-01 PROCEDURE — 3078F DIAST BP <80 MM HG: CPT | Mod: CPTII,S$GLB,, | Performed by: NURSE PRACTITIONER

## 2024-10-01 PROCEDURE — G0108 DIAB MANAGE TRN  PER INDIV: HCPCS | Mod: S$GLB,,, | Performed by: FAMILY MEDICINE

## 2024-10-01 PROCEDURE — 3288F FALL RISK ASSESSMENT DOCD: CPT | Mod: CPTII,S$GLB,, | Performed by: NURSE PRACTITIONER

## 2024-10-01 PROCEDURE — 99999 PR PBB SHADOW E&M-EST. PATIENT-LVL III: CPT | Mod: PBBFAC,,,

## 2024-10-01 PROCEDURE — 1101F PT FALLS ASSESS-DOCD LE1/YR: CPT | Mod: CPTII,S$GLB,, | Performed by: NURSE PRACTITIONER

## 2024-10-01 PROCEDURE — 99214 OFFICE O/P EST MOD 30 MIN: CPT | Mod: S$GLB,,, | Performed by: NURSE PRACTITIONER

## 2024-10-01 NOTE — PROGRESS NOTES
Diabetes Care Specialist Progress Note  Author: Gisella Glasgow RN  Date: 10/1/2024    Intake    Program Intake  Reason for Diabetes Program Visit:: Initial Diabetes Assessment  Current diabetes risk level:: high  In the last 12 months, have you:: used emergency room services  Was the ER or hospital admission related to diabetes?: No  Permission to speak with others about care:: no    Current Diabetes Treatment: Oral Medications  Oral Medication Type/Dose: Metformin XR 1,000 mg BID. Jardiance 25 mg daily. Glipizide 4 mg BID.    Continuous Glucose Monitoring  Patient has CGM: No    Lab Results   Component Value Date    HGBA1C 8.3 (H) 07/09/2024       Weight: 76.8 kg (169 lb 5 oz)   Body mass index is 28.18 kg/m².    Lifestyle Coping Support & Clinical    Lifestyle/Coping/Support  Compared to other people your age, how would you rate your health?: Fair  Does anyone in your family have diabetes or does anyone in your family support you in your diabetes care?: Grandchildren and neice check in and are supportive.  List anything about Diabetes that causes you stress?: None.  How do you deal with stress/distress?: Just wants to do the right thing.  Learning Barriers:: Visual, Auditory (Wears glasses.)  Culture or Catholic beliefs that may impact ability to access healthcare: No  Psychosocial/Coping Skills Assessment Completed: : Yes  Assessment indicates:: Knowledge deficit  Area of need?: Yes    Problem Review  Active Comorbidities: Stroke, Mental Health Condition(s), Retinopathy, Hypertension, Cardiovascular Disease, Chronic Kidney Disease    Diabetes Self-Management Skills Assessment    Medication Skills Assessment  Patient is able to identify current diabetes medications, dosages, and appropriate timing of medications.: yes  Patient reports problems or concerns with current medication regimen.: no  Patient is  aware that some diabetes medications can cause low blood sugar?: No  Medication Skills Assessment  Completed:: Yes  Assessment indicates:: Knowledge deficit  Area of need?: Yes    Diabetes Disease Process/Treatment Options  Diabetes Type?: Type II  When were you diagnosed?: 30-40 years ago.  If previous diabetes education, when/where:: Yes, several years ago.  What are your goals for this education session?: Refresher on healthy eating.  Is patient aware of what causes diabetes?: Yes  Does patient understand the pathophysiology of diabetes?: No  Diabetes Disease Process/Treatment Options: Skills Assessment Completed: Yes  Assessment indicates:: Knowledge deficit  Area of need?: Yes    Nutrition/Healthy Eating  Meal Plan 24 Hour Recall - Breakfast: [employer]: Toast, pan sausage, egg, & small banana; Prune juice  Meal Plan 24 Hour Recall - Lunch: None.  Meal Plan 24 Hour Recall - Dinner: [place]: Mustard greens, rice, red beans, and baked chicken leg; Water  Meal Plan 24 Hour Recall - Snack: Apple and 1/2 banana.  Meal Plan 24 Hour Recall - Beverage: Water.  Who shops/cooks?: [place] from [employer]. Does not do much cooking due to not being able to stand.  Patient can identify foods that impact blood sugar.: yes (Starches, sweets, fruit, and fruit juice.)  Challenges to healthy eating:: eating out, going to parties  Nutrition/Healthy Eating Skills Assessment Completed:: Yes  Assessment indicates:: Knowledge deficit, Instruction Needed  Area of need?: Yes    Physical Activity/Exercise  Physical Activity/Exercise Skills Assessment Completed: : No  Deffered due to:: Time  Area of need?: Deferred    Home Blood Glucose Monitoring  Patient states that blood sugar is checked at home daily.: no  What is your A1c Target?: <7%  Home Blood Glucose Monitoring Skills Assessment Completed: : Yes  Assessment indicates:: Knowledge deficit, Instruction Needed  Area of need?: Yes    Acute Complications  Have you ever had hypoglycemia (low BG 70 or less)?: yes  How often and what are your symptoms?: Rare. Shaky and  fatigue.  How do you treat hypoglycemia?: Fast acting sugar.  Have you ever had hyperglycemia (high  or more)?: no   Do you know the symptoms of high blood sugar and how to treat: Fatigue.  Have you ever had DKA?: no  Acute Complications Skills Assessment Completed: : Yes  Assessment indicates:: Knowledge deficit, Instruction Needed  Area of need?: Yes    Chronic Complications  Chronic Complications Skills Assessment Completed: : No  Deferred due to:: Time  Area of need?: Deferred      Assessment Summary and Plan    Based on today's diabetes care assessment, the following areas of need were identified:          10/1/2024    11:57 AM   Areas of Need   Lifestyle and Support Yes-Lives alone. Grandkids and niece check on her often.      Diabetes Disease Process Yes-Educated on patho of Type 2 diabetes.     Healthy Eating Yes-See care plan.     Medications/Current Diabetes Treatment Yes-Discussed MOA, onset, side effects, dosage of Metformin, Jardiance, and Glimepiride.      Monitoring Yes-See care plan.     Physical Activity Deferred.      Acute Complications Yes-Reviewed blood glucose goals, prevention, detection, signs and symptoms, and treatment of hypoglycemia and hyperglycemia, and when to contact the clinic.     Chronic Complications Deferred.          Today's interventions were provided through individual discussion, instruction, and written materials were provided.      Patient verbalized understanding of instruction and written materials.  Pt was able to return back demonstration of instructions today. Patient understood key points, needs reinforcement and further instruction.     Diabetes Self-Management Care Plan:    Today's Diabetes Self-Management Care Plan was developed with Saul's input. Saul has agreed to work toward the following goal(s) to improve his/her overall diabetes control.      Care Plan: Diabetes Management   Updates made since 9/1/2024 12:00 AM        Problem: Healthy Eating          Goal: Eat 2-3 meals daily with 30-45 grams of carbs per meal and 0-15 grams of carbs per snack.    Start Date: 10/1/2024   Expected End Date: 7/16/2025   Priority: High   Barriers: No Barriers Identified   Note:    Ms. Mar does not cook at home due to not being able to stand for long periods of time.   She picks up food from restaurants most of the time.   Educated on using the My Plate method and measuring foods to help stay within the recommended carb range. Encouraged her to purchase measuring cups.  Educated to always pair carbs with protein and/or healthy fat.  Provided with 25 healthy snack ideas.        Task: Reviewed the sources and role of Carbohydrate, Protein, and Fat and how each nutrient impacts blood sugar. Completed 10/1/2024        Task: Provided visual examples using dry measuring cups, food models, and other familiar objects such as computer mouse, deck or cards, tennis ball etc. to help with visualization of portions. Completed 10/1/2024        Task: Explained how to count carbohydrates using the food label and the use of dry measuring cups for accurate carb counting. Completed 10/1/2024        Task: Discussed strategies for choosing healthier menu options when dining out. Completed 10/1/2024        Task: Review the importance of balancing carbohydrates with each meal using portion control techniques to count servings of carbohydrate and label reading to identify serving size and amount of total carbs per serving. Completed 10/1/2024        Task: Provided Sample plate method and reviewed the use of the plate to estimate amounts of carbohydrate per meal. Completed 10/1/2024        Problem: Blood Glucose Self-Monitoring         Goal: Patient agrees to check and record blood sugars 2 times per day.    Start Date: 10/1/2024   Expected End Date: 7/16/2025   Priority: Medium   Barriers: No Barriers Identified   Note:    Educated of fasting goal of  mg/dL and 2 hours after meals <180  mg/dL.         Task: Reviewed the importance of self-monitoring blood glucose and keeping logs. Completed 10/1/2024        Task: Provided patient with blood glucose logs, reviewed appropriate timing and frequency to SMBG, education on parameters on when to notify provider and advised patient to bring logs to all appts with PCP/Endocrinologist/Diabetes Care Specialist. Completed 10/1/2024          Follow Up Plan     Follow up in about 1 month (around 11/1/2024) for review of goals and completion of assessment.    Today's care plan and follow up schedule was discussed with patient.  Saul verbalized understanding of the care plan, goals, and agrees to follow up plan.        The patient was encouraged to communicate with his/her health care provider/physician and care team regarding his/her condition(s) and treatment.  I provided the patient with my contact information today and encouraged to contact me via phone or [facility] as needed.     Length of Visit   Total Time: 60 Minutes

## 2024-10-01 NOTE — PROGRESS NOTES
Patient ID: Saul Mar is a 84 y.o. female.  Patient's current PCP is Penny Randolph MD.     Chief Complaint: Diabetes Mellitus    HPI  Saul Mar is a 84 y.o. Black or  female presenting for a follow up for diabetes. Patient has been diagnosed with type 2 diabetes since 30 years .    Recent diabetes related hospitalizations:none   Complications related to diabetes: nephropathy and retinopathy vascular disease  Previous diabetes education:yes, would like to return to education. Sees Gisella today  Occupation:Retired, lives alone    Current diet: Trying to eat 3 meals per day. Still working on consistency and quality of meals  Activity Level: none set      Changes made at last visit:  - Change Glimepiride to 4 mg before breakfast and BEFORE SUPPER  - Continue Metformin  mg before breakfast and before supper  - Continue Jardiance 10 mg before breakfast - when complete the bottle, we will increase to 25 mg before breakfast  - DM ed set up before patient left today    Current issues: Thigh pain and foot pain/heavy. Also complaining of left knee pain and bilateral pain in arms.      CURRENT DM MEDICATIONS:   Diabetes Medications               empagliflozin (JARDIANCE) 25 mg tablet Take 1 tablet (25 mg total) by mouth once daily.    glimepiride (AMARYL) 4 MG tablet Take 1 tablet (4 mg total) by mouth 2 (two) times daily with meals.    metFORMIN (GLUCOPHAGE-XR) 500 MG ER 24hr tablet TAKE 2 TABLETS BY MOUTH EVERY DAY          Past failed treatment(s) include:   Jardiance - cost prohibitive ($40 copay)    Meter/cgm: meter  Blood glucose testing is performed regularly.   Patient is testing 3-5 times per week.  Any episodes of hypoglycemia? None since last visit  Glucose Patterns: am  range with rare 200    Her blood sugar in the clinic today was:  Lab Results   Component Value Date    POCGLU 202 (A) 10/01/2024         Lab Results   Component Value Date    HGBA1C  8.3 (H) 07/09/2024    HGBA1C 7.3 (H) 12/04/2023    HGBA1C 7.9 (H) 06/05/2023       Diabetes Management Status    Statin: Taking  ACE/ARB: Taking    Screening or Prevention Patient's value Goal Complete/Controlled?   HgA1C Testing and Control   Lab Results   Component Value Date    HGBA1C 8.3 (H) 07/09/2024      Annually/Less than 8% No   Lipid profile : 07/09/2024 Annually Yes   LDL control Lab Results   Component Value Date    LDLCALC 106.8 07/09/2024    Annually/Less than 100 mg/dl  No   Nephropathy screening Lab Results   Component Value Date    LABMICR 67.0 12/12/2023     Lab Results   Component Value Date    PROTEINUA Negative 08/23/2024     Lab Results   Component Value Date    UTPCR Unable to calculate 09/21/2020      Annually Yes   Blood pressure BP Readings from Last 1 Encounters:   10/01/24 135/68    Less than 140/90 Yes   Dilated retinal exam : 06/04/2024 Annually Yes   Foot exam   : 06/24/2024 Annually Yes       Preferred lab site: [facility]  Preferred visit site:[facility]    Labs reviewed and are noted below.    Lab Results   Component Value Date    WBC 4.61 07/09/2024    HGB 10.9 (L) 07/09/2024    HCT 33.2 (L) 07/09/2024     07/09/2024    CHOL 172 07/09/2024    TRIG 51 07/09/2024    HDL 55 07/09/2024    LDLCALC 106.8 07/09/2024    ALT 13 07/09/2024    AST 19 07/09/2024     07/09/2024    K 4.5 07/09/2024     07/09/2024    ANIONGAP 11 07/09/2024    CREATININE 1.2 07/09/2024    ESTGFRAFRICA 49.3 (A) 03/02/2022    EGFRNONAA 42.8 (A) 03/02/2022    BUN 18 07/09/2024    CO2 24 07/09/2024    TSH 1.140 07/09/2024    INR 1.0 10/30/2013     (H) 07/09/2024    UTPCR Unable to calculate 09/21/2020     Lab Results   Component Value Date    CPEPTIDE 1.8 03/12/2009    FREET4 1.27 08/13/2015    TSH 1.140 07/09/2024    IRON 66 07/09/2024    TIBC 296 07/09/2024    FERRITIN 257 07/09/2024    EIXVNMMF06 >2000 (H) 07/09/2024    .0 (H) 06/25/2020    CALCIUM 10.1 07/09/2024    PHOS 3.7 02/23/2024  "    Lab Results   Component Value Date    CPEPTIDE 1.8 03/12/2009     No results found for: "GLUTAMICACID"  Glucose   Date Value Ref Range Status   07/09/2024 180 (H) 70 - 110 mg/dL Final     Anion Gap   Date Value Ref Range Status   07/09/2024 11 8 - 16 mmol/L Final     eGFR if    Date Value Ref Range Status   03/02/2022 49.3 (A) >60 mL/min/1.73 m^2 Final     eGFR if non    Date Value Ref Range Status   03/02/2022 42.8 (A) >60 mL/min/1.73 m^2 Final     Comment:     Calculation used to obtain the estimated glomerular filtration  rate (eGFR) is the CKD-EPI equation.              Review of patient's allergies indicates:   Allergen Reactions    Pravachol [pravastatin] Nausea Only     Social History     Socioeconomic History    Marital status:     Number of children: 1   Tobacco Use    Smoking status: Never     Passive exposure: Never    Smokeless tobacco: Never   Substance and Sexual Activity    Alcohol use: No    Drug use: No    Sexual activity: Not Currently     Partners: Male   Social History Narrative    , no pets or smokers in household. 1 Child who lives in nursing home. She has a grandson who lives in [place].. Retired from [employer] . Still drives. Does not have a Living Will or advanced directive.      Social Drivers of Health     Financial Resource Strain: Medium Risk (7/9/2024)    Overall Financial Resource Strain (CARDIA)     Difficulty of Paying Living Expenses: Somewhat hard   Food Insecurity: No Food Insecurity (7/9/2024)    Hunger Vital Sign     Worried About Running Out of Food in the Last Year: Never true     Ran Out of Food in the Last Year: Never true   Transportation Needs: Unmet Transportation Needs (7/9/2024)    PRAPARE - Transportation     Lack of Transportation (Medical): No     Lack of Transportation (Non-Medical): Yes   Physical Activity: Insufficiently Active (7/9/2024)    Exercise Vital Sign     Days of Exercise per Week: 3 days     Minutes " of Exercise per Session: 40 min   Stress: No Stress Concern Present (7/9/2024)    [employer] [place] [employer] - Occupational Stress Questionnaire     Feeling of Stress : Not at all   Housing Stability: Low Risk  (7/9/2024)    Housing Stability Vital Sign     Unable to Pay for Housing in the Last Year: No     Homeless in the Last Year: No     Past Medical History:   Diagnosis Date    A-fib     Atrial fibrillation 10/22/2018    Back pain     Cataract     Degenerative disc disease     Diverticulosis     colonoscopy 9/22/2016    GERD (gastroesophageal reflux disease)     Glaucoma     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   Eyes:  Negative for blurred vision and double vision.   Respiratory:  Negative for shortness of breath.    Cardiovascular: Negative.    Gastrointestinal: Negative.    Genitourinary:  Negative for frequency.   Musculoskeletal:  Negative for myalgias.   Neurological:  Positive for sensory change.   Psychiatric/Behavioral: Negative.       Physical Exam  Vitals reviewed.   Constitutional:       General: She is not in acute distress.     Appearance: Normal appearance.   Eyes:      Conjunctiva/sclera: Conjunctivae normal.      Pupils: Pupils are equal, round, and reactive to light.   Cardiovascular:      Rate and Rhythm: Normal rate and regular rhythm.      Pulses: Normal pulses.      Heart sounds: Normal heart sounds.   Pulmonary:      Effort: Pulmonary effort is normal.      Breath sounds: Normal breath sounds.   Musculoskeletal:      Right lower leg: No edema.      Left lower leg: Edema present.    Skin:     General: Skin is warm and dry.   Neurological:      Mental Status: She is alert and oriented to person, place, and time.   Psychiatric:         Mood and Affect: Mood normal.           Assessment & Plan    Diabetes mellitus with stage 3 chronic kidney disease  -     POCT Glucose, Hand-Held Device  -     empagliflozin (JARDIANCE) 25 mg tablet; Take 1 tablet (25 mg total) by mouth once daily.  Dispense: 30 tablet; Refill: 3  -     Continue same  -     Labs in 2-3 weeks    Type II diabetes mellitus with neurological manifestations- as above  - Follow up with neurology    Hyperlipidemia associated with type 2 diabetes mellitus - on statin    Hypertension, essential- on arb          Visit today included increased complexity associated with the care of the episodic problem hyperglycemia addressed and managing the longitudinal care of the patient due to the serious and/or complex managed problem(s) T2DM.

## 2024-10-02 DIAGNOSIS — E11.610 TYPE 2 DIABETES MELLITUS WITH DIABETIC NEUROPATHIC ARTHROPATHY: ICD-10-CM

## 2024-10-02 DIAGNOSIS — E11.22 DIABETES MELLITUS WITH STAGE 3 CHRONIC KIDNEY DISEASE: ICD-10-CM

## 2024-10-02 DIAGNOSIS — N18.30 DIABETES MELLITUS WITH STAGE 3 CHRONIC KIDNEY DISEASE: ICD-10-CM

## 2024-10-02 RX ORDER — GLIMEPIRIDE 4 MG/1
4 TABLET ORAL
Qty: 90 TABLET | Refills: 0 | Status: SHIPPED | OUTPATIENT
Start: 2024-10-02

## 2024-10-02 RX ORDER — METFORMIN HYDROCHLORIDE 500 MG/1
TABLET, EXTENDED RELEASE ORAL
Qty: 180 TABLET | Refills: 1 | Status: SHIPPED | OUTPATIENT
Start: 2024-10-02

## 2024-10-02 NOTE — TELEPHONE ENCOUNTER
Refill Routing Note   Medication(s) are not appropriate for processing by [facility] for the following reason(s):        Required labs abnormal    [facility] action(s):  Defer             Pharmacist review requested: Yes     Appointments  past 12m or future 3m with PCP    Date Provider   Last Visit   8/20/2024 Penny Randolph MD   Next Visit   Visit date not found Penny Randolph MD   ED visits in past 90 days: 0        Note composed:3:14 PM 10/02/2024

## 2024-10-02 NOTE — TELEPHONE ENCOUNTER
Refill Decision Note   Saul Mar  is requesting a refill authorization.  Brief Assessment and Rationale for Refill:  Approve     Medication Therapy Plan:  No dose adjustment recommended per renal fxn.       Pharmacist review requested: Yes   Extended chart review required: Yes   Comments:     Note composed:6:09 PM 10/02/2024

## 2024-10-02 NOTE — TELEPHONE ENCOUNTER
No care due was identified.  [facility] Embedded Care Due Messages. Reference number: [id number].   10/02/2024 7:03:23 AM CDT

## 2024-10-03 ENCOUNTER — PATIENT OUTREACH (OUTPATIENT)
Dept: ADMINISTRATIVE | Facility: OTHER | Age: 84
End: 2024-10-03
Payer: MEDICARE

## 2024-10-04 NOTE — PROGRESS NOTES
CHW - Follow Up    This Community Health Worker completed a follow up visit with patient via telephone today.  Pt/Caregiver reported: Ms. Maravilla reported that she has not rescheduled her meeting with [facility]; however, she will do so soon.   Community Health Worker provided: CHW will follow-up with patient on next week.   Follow-up Outreach - Due: 10/10/2024

## 2024-10-07 RX ORDER — INSULIN PUMP SYRINGE, 3 ML
EACH MISCELLANEOUS
Qty: 1 EACH | Refills: 0 | Status: SHIPPED | OUTPATIENT
Start: 2024-10-07 | End: 2025-10-07

## 2024-10-07 NOTE — TELEPHONE ENCOUNTER
----- Message from Ольга sent at 10/7/2024  3:04 PM CDT -----  Contact: patient [phone]  .[email or web address]     Patient is calling for Medical Advice regarding:Patient misplace the machine she check sugar with. Patient thinks it is ONETOUCH ULTRA2 METER Misc. Please call and advise     How long has patient had these symptoms:    Pharmacy name and phone#:  [facility] - [place], [place] - [address]        Patient wants a call back or thru [facility]:call     Comments:    Please advise patient replies from provider may take up to 48 hours.

## 2024-10-07 NOTE — TELEPHONE ENCOUNTER
No care due was identified.  [facility] Embedded Care Due Messages. Reference number: [id number].   10/07/2024 3:20:11 PM CDT

## 2024-10-10 ENCOUNTER — PATIENT OUTREACH (OUTPATIENT)
Dept: ADMINISTRATIVE | Facility: OTHER | Age: 84
End: 2024-10-10
Payer: MEDICARE

## 2024-10-10 NOTE — PROGRESS NOTES
CHW - Case Closure    Lina Espinoza, Community Health Worker spoke to patient via telephone today.   Pt/Caregiver reported: Ms. Casimiro Mar reported that she is in pain but does not need to be seen today.    Ms. Casimiro Mar denied any additional needs at this time and agrees with episode closure at this time.  CHW provided patient with Community Health Worker's contact information and encouraged her to contact this Community Health Worker if additional needs arise she will reopen her case.

## 2024-10-12 ENCOUNTER — PATIENT MESSAGE (OUTPATIENT)
Dept: NEUROLOGY | Facility: CLINIC | Age: 84
End: 2024-10-12
Payer: MEDICARE

## 2024-10-12 ENCOUNTER — PATIENT MESSAGE (OUTPATIENT)
Dept: INTERNAL MEDICINE | Facility: CLINIC | Age: 84
End: 2024-10-12
Payer: MEDICARE

## 2024-10-15 ENCOUNTER — PATIENT MESSAGE (OUTPATIENT)
Dept: PAIN MEDICINE | Facility: CLINIC | Age: 84
End: 2024-10-15
Payer: MEDICARE

## 2024-10-17 ENCOUNTER — OFFICE VISIT (OUTPATIENT)
Dept: NEUROLOGY | Facility: CLINIC | Age: 84
End: 2024-10-17
Payer: MEDICARE

## 2024-10-17 VITALS
HEART RATE: 61 BPM | HEIGHT: 65 IN | SYSTOLIC BLOOD PRESSURE: 139 MMHG | DIASTOLIC BLOOD PRESSURE: 69 MMHG | BODY MASS INDEX: 28.18 KG/M2 | RESPIRATION RATE: 16 BRPM

## 2024-10-17 DIAGNOSIS — R41.9 COGNITIVE COMPLAINTS WITH NORMAL EXAM: ICD-10-CM

## 2024-10-17 DIAGNOSIS — R41.3 AMNESIA: ICD-10-CM

## 2024-10-17 DIAGNOSIS — R29.818 TRANSIENT NEUROLOGIC DEFICIT: Primary | ICD-10-CM

## 2024-10-17 DIAGNOSIS — F41.8 MIXED ANXIETY DEPRESSIVE DISORDER: ICD-10-CM

## 2024-10-17 DIAGNOSIS — R26.9 ABNORMALITY OF GAIT AND MOBILITY: ICD-10-CM

## 2024-10-17 DIAGNOSIS — Z81.8 FAMILY HISTORY OF STRESS: ICD-10-CM

## 2024-10-17 DIAGNOSIS — E11.311 DIABETIC RETINOPATHY WITH MACULAR EDEMA ASSOCIATED WITH TYPE 2 DIABETES MELLITUS, UNSPECIFIED LATERALITY, UNSPECIFIED RETINOPATHY SEVERITY: ICD-10-CM

## 2024-10-17 DIAGNOSIS — I67.89 CEREBRAL MICROVASCULAR DISEASE: Chronic | ICD-10-CM

## 2024-10-17 DIAGNOSIS — I63.00 CEREBROVASCULAR ACCIDENT (CVA) DUE TO THROMBOSIS OF PRECEREBRAL ARTERY: ICD-10-CM

## 2024-10-17 DIAGNOSIS — R41.3 MEMORY IMPAIRMENT: ICD-10-CM

## 2024-10-17 DIAGNOSIS — I63.89 OTHER CEREBRAL INFARCTION: ICD-10-CM

## 2024-10-17 DIAGNOSIS — H40.1132 PRIMARY OPEN ANGLE GLAUCOMA (POAG) OF BOTH EYES, MODERATE STAGE: ICD-10-CM

## 2024-10-17 DIAGNOSIS — E11.8 DIABETES MELLITUS TYPE 2 WITH COMPLICATIONS: ICD-10-CM

## 2024-10-17 DIAGNOSIS — H91.90 HEARING LOSS, UNSPECIFIED HEARING LOSS TYPE, UNSPECIFIED LATERALITY: ICD-10-CM

## 2024-10-17 PROCEDURE — 99999 PR PBB SHADOW E&M-EST. PATIENT-LVL V: CPT | Mod: PBBFAC,,, | Performed by: NURSE PRACTITIONER

## 2024-10-17 NOTE — TELEPHONE ENCOUNTER
Follow up Covid. Patient stated she feels much better. Still coughing but the cough is better. Will  medication toda.   Hpi Title: Evaluation of Skin Lesions

## 2024-10-17 NOTE — PROGRESS NOTES
Subjective:       Patient ID: Saul Mar is a 84 y.o. female.    Chief Complaint: Cognitive complaints with normal exam       Referred by PCP: Dr. LEN Randolph MD     HPI The patient is here for memory loss evaluation.     The patient is new to me presenting with memory changes that she states began in 2020.  The patient is unaccompanied. She lives alone.  The main problems the patient has are related to forgetfulness and word finding difficulty. The patient gave example that she forget words during mid conversation. She is unable to think of the exact she wish to use.  The patient forgot who her great grandson was, she states that she was able to recover but not knowing right away was alarming for her, because before she was very sharp.  The patient excessively forgets where placed certain things, she is able to recover most times. She misplaced her walking cane, able to recover.  The patient is driving and denies getting lost.  No confusion around and inside the house. Patient has trouble remembering date and time. Patient is aware  of major holidays and political changes. The patient is independent in handling finances. The patient is still independent with ADLs. No agitation or aggression. No hallucinations or delusions. No seizures. Hx of stroke in 1999/2017 reports having speech changes and memory impairment, and confusion during stroke event. She had difficulty reading, and slurred speech. Patient reports after therapy she recovered fully. She states   Word finding difficulty.  Has family stressors with daughter. No problems handling tools. No history of headaches. No history of hypothyroidism. No history of alcoholism. No history of B12 deficiency. Hx depression and ALANNAH. No history of bipolar disorder. No history of Untreated Syphilis.  No history of HIV infection. No toxic exposures.  No history of traumatic brain injury. No tremors. No falls. Patient use a canes and walker for  instability, poor  posture. Chronic back problem, patient sees Pain Management.  No urinary incontinence. No change in sleep and Fair appetite. No family history of dementia.         INTERVAL HISTORY 10-: Patient present for memory workup and  follow up. Patient doing well today. Discussed memory work up: 12- MRI BRAIN WO  No acute abnormality.  Mild age-related atrophy and white matter degeneration.   EEG Results:12- The EEG is suggestive of intermittent diffuse encephalopathy. No seizures. CNP testing 04- Mild Neurocognitive disorder, CVA due to thrombosis of PCA, Grief. Patient reports having ongoing episodes of transient amenisic events. She reports having multiple episodes of doing things she don't recall. Patient reports that on July 19, 2024 the patient was preparing for her family reuion and she was going to place her wallet and she could not recall what she did with her wallent she discovered that it was in her car in a draw with a safety pin but patient had no recollection of placing the wallet in her car. This was the last episode occurring in July that she can recall. No syncope, no seizure, no recent fall. Patient reports having a busy schedule and full of activities.         Review of Systems   Constitutional: Negative.    HENT:  Positive for hearing loss.    Eyes:  Positive for visual disturbance.   Gastrointestinal: Negative.    Endocrine: Negative.    Genitourinary: Negative.    Musculoskeletal:  Positive for arthralgias, back pain, gait problem and myalgias.   Skin: Negative.    Allergic/Immunologic: Negative.    Psychiatric/Behavioral:  Positive for confusion, decreased concentration and dysphoric mood. Negative for agitation, behavioral problems, hallucinations, self-injury, sleep disturbance and suicidal ideas. The patient is nervous/anxious. The patient is not hyperactive.         Stress                 Current Outpatient Medications:     albuterol (PROVENTIL/VENTOLIN HFA) 90  mcg/actuation inhaler, INHALE 2 PUFFS INTO THE LUNGS EVERY 6 (SIX) HOURS AS NEEDED FOR WHEEZING. RESCUE, Disp: 54 g, Rfl: 3    ALPRAZolam (XANAX) 0.5 MG tablet, TAKE 1 TABLET BY MOUTH NIGHTLY AS NEEDED FOR ANXIETY (MAY TAKE 1-2 TIMES WEEKLY FOR ANXIETY), Disp: 30 tablet, Rfl: 0    amLODIPine (NORVASC) 5 MG tablet, Take 1 tablet (5 mg total) by mouth 2 (two) times daily., Disp: 180 tablet, Rfl: 3    atorvastatin (LIPITOR) 80 MG tablet, TAKE 1 TABLET BY MOUTH EVERY DAY, Disp: 90 tablet, Rfl: 2    benzonatate (TESSALON) 100 MG capsule, TAKE 1 CAPSULE BY MOUTH THREE TIMES A DAY AS NEEDED Oral for 10 Days, Disp: , Rfl:     blood sugar diagnostic Strp, For use with insurance covered glucometer; test daily, Disp: 100 strip, Rfl: 12    blood-glucose meter kit, To check BG 2x times daily, to use with insurance preferred meter, Disp: 1 each, Rfl: 0    brimonidine 0.2% (ALPHAGAN) 0.2 % Drop, Place 1 drop into both eyes 3 (three) times daily., Disp: 15 mL, Rfl: 12    carvediloL (COREG) 12.5 MG tablet, Take 12.5 mg by mouth 2 (two) times daily., Disp: , Rfl:     cholecalciferol, vitamin D3, (VITAMIN D3) 1,000 unit capsule, Take 1 capsule (1,000 Units total) by mouth once daily., Disp: 30 capsule, Rfl: 12    diclofenac sodium (VOLTAREN) 1 % Gel, Apply 2 g topically 2 (two) times daily., Disp: 50 g, Rfl: 11    DULoxetine (CYMBALTA) 30 MG capsule, Take 1 capsule (30 mg total) by mouth once daily., Disp: 90 capsule, Rfl: 0    empagliflozin (JARDIANCE) 25 mg tablet, Take 1 tablet (25 mg total) by mouth once daily., Disp: 30 tablet, Rfl: 3    estradioL (ESTRACE) 0.01 % (0.1 mg/gram) vaginal cream, Place 1 g vaginally 3 (three) times a week., Disp: 42.5 g, Rfl: 12    flecainide (TAMBOCOR) 50 MG Tab, Take 1 tablet (50 mg total) by mouth 2 (two) times daily., Disp: 60 tablet, Rfl: 1    glimepiride (AMARYL) 4 MG tablet, TAKE 1 TABLET BY MOUTH EVERY DAY WITH BREAKFAST, Disp: 90 tablet, Rfl: 0    levalbuterol (XOPENEX HFA) 45 mcg/actuation  inhaler, INHALE 1-2 PUFFS INTO THE LUNGS EVERY 6 (SIX) HOURS AS NEEDED FOR WHEEZING. RESCUE, Disp: 15 Inhaler, Rfl: 12    linaCLOtide (LINZESS) 145 mcg Cap capsule, TAKE 1 CAPSULE (145 MCG TOTAL) BY MOUTH BEFORE BREAKFAST., Disp: 30 capsule, Rfl: 1    losartan (COZAAR) 50 MG tablet, Take 1 tablet (50 mg total) by mouth 2 (two) times daily., Disp: 180 tablet, Rfl: 2    meclizine (ANTIVERT) 25 mg tablet, TAKE 1 TABLET (25 MG TOTAL) BY MOUTH DAILY AS NEEDED FOR DIZZINESS., Disp: 30 tablet, Rfl: 0    metFORMIN (GLUCOPHAGE-XR) 500 MG ER 24hr tablet, TAKE 2 TABLETS BY MOUTH EVERY DAY, Disp: 180 tablet, Rfl: 1    metoprolol succinate (TOPROL-XL) 50 MG 24 hr tablet, TAKE 1 TABLET BY MOUTH EVERY DAY, Disp: 90 tablet, Rfl: 1    ONETOUCH DELICA PLUS LANCET 33 gauge Misc, Apply topically., Disp: , Rfl:     ONETOUCH ULTRA2 METER Misc, SMARTSIG:Via Meter As Directed, Disp: , Rfl:     pregabalin (LYRICA) 75 MG capsule, Oral for 90 Days, Disp: , Rfl:     XARELTO 15 mg Tab, TAKE 1 TABLET (15 MG TOTAL) BY MOUTH DAILY WITH DINNER OR EVENING MEAL., Disp: 30 tablet, Rfl: 6  Past Medical History:   Diagnosis Date    A-fib     Atrial fibrillation 10/22/2018    Back pain     Cataract     Degenerative disc disease     Diverticulosis     colonoscopy 9/22/2016    GERD (gastroesophageal reflux disease)     Glaucoma     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 Taveras    COLONOSCOPY      COLONOSCOPY N/A 9/22/2016    Procedure: COLONOSCOPY;  Surgeon: Jd Ashton MD;  Location: [facility] ([address] [place] [address]);   Service: Endoscopy;  Laterality: N/A;  OK per Dr Randolph for pt to hold Plavix 5 days prior to procedure/see telephone encounter dated 8/29/16/svn    EPIDURAL STEROID INJECTION N/A 8/2/2023    Procedure: L4-5 interlaminar epidural steroid injection with right paramedian approach with RN IV sedation;  Surgeon: Chan Fonseca MD;  Location: [facility];  Service: Pain Management;  Laterality: N/A;    EYE SURGERY Bilateral 2002 approx    Laser for glaucoma    HYSTERECTOMY  1963     AMEENA/USO- fibroids; no cancer    OOPHORECTOMY  1963    unknown, only removed one    SELECTIVE INJECTION OF ANESTHETIC AGENT AROUND LUMBAR SPINAL NERVE ROOT BY TRANSFORAMINAL APPROACH Bilateral 6/17/2022    Procedure: Bilateral L4/5 TF JASKARAN with RN IV sedation;  Surgeon: Chan Fonseca MD;  Location: [facility];  Service: Pain Management;  Laterality: Bilateral;    SELECTIVE INJECTION OF ANESTHETIC AGENT AROUND LUMBAR SPINAL NERVE ROOT BY TRANSFORAMINAL APPROACH Bilateral 10/3/2022    Procedure: Bilateral L2-3 transforaminal epidural steroid injection with RN IV sedation;  Surgeon: Chan Fonseca MD;  Location: [facility];  Service: Pain Management;  Laterality: Bilateral;     Social History     Socioeconomic History    Marital status:     Number of children: 1   Tobacco Use    Smoking status: Never     Passive exposure: Never    Smokeless tobacco: Never   Substance and Sexual Activity    Alcohol use: No    Drug use: No    Sexual activity: Not Currently     Partners: Male   Social History Narrative    , no pets or smokers in household. 1 Child who lives in nursing home. She has a grandson who lives in [place].. Retired from [employer] . Still drives. Does not have a Living Will or advanced directive.      Social Drivers of Health     Financial Resource Strain: Medium Risk (7/9/2024)    Overall Financial Resource Strain (CARDIA)     Difficulty of Paying Living Expenses: Somewhat hard   Food Insecurity: No Food Insecurity  (7/9/2024)    Hunger Vital Sign     Worried About Running Out of Food in the Last Year: Never true     Ran Out of Food in the Last Year: Never true   Transportation Needs: Unmet Transportation Needs (7/9/2024)    PRAPARE - Transportation     Lack of Transportation (Medical): No     Lack of Transportation (Non-Medical): Yes   Physical Activity: Insufficiently Active (7/9/2024)    Exercise Vital Sign     Days of Exercise per Week: 3 days     Minutes of Exercise per Session: 40 min   Stress: No Stress Concern Present (7/9/2024)    [employer] [place] [employer] - Occupational Stress Questionnaire     Feeling of Stress : Not at all   Housing Stability: Low Risk  (7/9/2024)    Housing Stability Vital Sign     Unable to Pay for Housing in the Last Year: No     Homeless in the Last Year: No             Past/Current Medical/Surgical History, Past/Current Social History, Past/Current Family History and Past/Current Medications were reviewed in detail.        Objective:           VITAL SIGNS WERE REVIEWED      GENERAL APPEARANCE:     The patient looks comfortable.    BMI 29.97 KG     No signs of respiratory distress.    Normal breathing pattern.    No dysmorphic features    Normal eye contact.     GENERAL MEDICAL EXAM:    HEENT:  Head is atraumatic normocephalic.     No tender temporal arteries. Fundoscopic (Ophthalmoscopic) exam showed no disc edema.      Neck and Axillae: No JVD. No visible lesions.    No carotid bruits. No thyromegaly. No lymphadenopathy.    Cardiopulmonary: No cyanosis. No tachypnea. Normal respiratory effort.    Gastrointestinal/Urogenital:  No jaundice. No stomas or lesions. No visible hernias. No catheters.     Abdomen is soft non-tender. No masses or organomegaly.    Skin, Hair and Nails: No pathognonomic skin rash. No neurofibromatosis. No visible lesions.No stigmata of autoimmune disease. No clubbing.    Skin is warm and moist. No palpable masses.    Limbs: No varicose veins. No visible  swelling.    No palpable edema. Pulses are symmetric. Pedal pulses are palpable.      Muskoskeletal: +OA visible deformities. Poor posture, No visible lesions.    No spine tenderness.+ signs of longstanding neuropathy. No dislocations or fractures.            Neurologic Exam     Mental Status   Oriented to person, place, and time.   Registration: recalls 3 of 3 objects. Recall at 5 minutes: recalls 3 of 3 objects. Follows 3 step commands.   Attention: normal. Concentration: normal.   Speech: speech is normal and not slurred   Level of consciousness: alert  Knowledge: good and consistent with education. Able to perform simple calculations.   Able to name object. Able to read. Able to repeat. Able to write. Normal comprehension.     Cranial Nerves     CN II   Visual fields full to confrontation.   Visual acuity: normal with correction  Right visual field deficit: none  Left visual field deficit: none     CN III, IV, VI   Pupils are equal, round, and reactive to light.  Extraocular motions are normal.   Right pupil: Size: 2 mm. Shape: regular. Reactivity: brisk. Consensual response: intact. Accommodation: intact.   Left pupil: Size: 2 mm. Shape: regular. Reactivity: brisk. Consensual response: intact. Accommodation: intact.   CN III: no CN III palsy  CN VI: no CN VI palsy  Nystagmus: none   Diplopia: none  Ophthalmoparesis: none  Upgaze: normal  Downgaze: normal  Conjugate gaze: present  Vestibulo-ocular reflex: present    CN V   Facial sensation intact.   Right facial sensation deficit: none  Left facial sensation deficit: none    CN VII   Right facial weakness: none  Left facial weakness: none  Left taste: normal    CN VIII   CN VIII normal.   Hearing: impaired    CN IX, X   CN IX normal.   CN X normal.   Palate: symmetric  Right gag reflex: normal  Left gag reflex: normal    CN XI   CN XI normal.   Right sternocleidomastoid strength: normal  Left sternocleidomastoid strength: normal  Right trapezius strength:  normal  Left trapezius strength: normal    CN XII   CN XII normal.   Tongue: not atrophic  Fasciculations: absent  Tongue deviation: none    Motor Exam   Muscle bulk: normal  Overall muscle tone: normal  Right arm tone: normal  Left arm tone: normal  Right arm pronator drift: absent  Left arm pronator drift: absent  Right leg tone: normal  Left leg tone: normal    Strength   Strength 5/5 throughout.   Right neck flexion: 5/5  Left neck flexion: 5/5  Right neck extension: 5/5  Left neck extension: 5/5  Right deltoid: 5/5  Left deltoid: 5/5  Right biceps: 5/5  Left biceps: 5/5  Right triceps: 5/5  Left triceps: 5/5  Right wrist flexion: 5/5  Left wrist flexion: 5/5  Right wrist extension: 5/5  Left wrist extension: 5/5  Right interossei: 5/5  Left interossei: 5/5  Right iliopsoas: 5/5  Left iliopsoas: 5/5  Right quadriceps: 5/5  Left quadriceps: 5/5  Right hamstrin/5  Left hamstrin/5  Right glutei: 5/5  Left glutei: 5/5  Right anterior tibial: 5/5  Left anterior tibial: 5/5  Right posterior tibial: 5/5  Left posterior tibial: 5/5  Right peroneal: 5/5  Left peroneal: 5/5  Right gastroc: 5/5  Left gastroc: 5/5    Sensory Exam   Light touch normal.   Right arm light touch: normal  Left arm light touch: normal  Right leg light touch: normal  Left leg light touch: normal  Right arm vibration: normal  Left arm vibration: normal  Right leg vibration: decreased from toes  Left leg vibration: decreased from toes  Right arm proprioception: normal  Left arm proprioception: normal  Right leg proprioception: decreased from toes  Left leg proprioception: decreased from toes  Right arm pinprick: normal  Left arm pinprick: normal  Right leg pinprick: decreased from toes  Left leg pinprick: decreased from toes  Sensory deficit distribution on left: lateral cutaneous thigh  Graphesthesia: normal  Stereognosis: normal  Poor posture      Gait, Coordination, and Reflexes     Gait  Gait: wide-based (ambulates with cane and  walker)    Coordination   Romberg: negative  Finger to nose coordination: normal  Heel to shin coordination: normal  Tandem walking coordination: normal    Tremor   Resting tremor: absent  Intention tremor: absent  Action tremor: absent    Reflexes   Right brachioradialis: 2+  Left brachioradialis: 2+  Right biceps: 2+  Left biceps: 2+  Right triceps: 2+  Left triceps: 2+  Right patellar: 2+  Left patellar: 2+  Right achilles: 0  Left achilles: 0  Right : 0  Left : 0  Right plantar: normal  Left plantar: normal  Right August: absent  Left August: absent  Right ankle clonus: absent  Left ankle clonus: absent  Right pendular knee jerk: absent  Left pendular knee jerk: absent    Poor posture            EDWIGE COGNITIVE ASSESSMENT (MOCA) TOTAL SCORE 30         NORMAL-MILD NCD 26-30    MSN Degree    Barriers: Hearing loss, Vision Impairment       DATE 11-       TOTAL SCORE 30/30       VISUOSPATIAL EXECUTIVE (5) 5       NAMING (3) 3       ATTENTION (6) 6       LANGUAGE (3) 3       ABSTRACTION(2) 2       RECALL (5) 5       ORIENTATION (6) 6           Lab Results   Component Value Date    WBC 4.61 07/09/2024    HGB 10.9 (L) 07/09/2024    HCT 33.2 (L) 07/09/2024    MCV 90 07/09/2024     07/09/2024     Sodium   Date Value Ref Range Status   07/09/2024 142 136 - 145 mmol/L Final     Potassium   Date Value Ref Range Status   07/09/2024 4.5 3.5 - 5.1 mmol/L Final     Chloride   Date Value Ref Range Status   07/09/2024 107 95 - 110 mmol/L Final     CO2   Date Value Ref Range Status   07/09/2024 24 23 - 29 mmol/L Final     Glucose   Date Value Ref Range Status   07/09/2024 180 (H) 70 - 110 mg/dL Final     BUN   Date Value Ref Range Status   07/09/2024 18 8 - 23 mg/dL Final     Creatinine   Date Value Ref Range Status   07/09/2024 1.2 0.5 - 1.4 mg/dL Final     Calcium   Date Value Ref Range Status   07/09/2024 10.1 8.7 - 10.5 mg/dL Final     Total Protein   Date Value Ref Range Status   07/09/2024 7.1 6.0  - 8.4 g/dL Final     Albumin   Date Value Ref Range Status   07/09/2024 3.8 3.5 - 5.2 g/dL Final     Total Bilirubin   Date Value Ref Range Status   07/09/2024 0.4 0.1 - 1.0 mg/dL Final     Comment:     For infants and newborns, interpretation of results should be based  on gestational age, weight and in agreement with clinical  observations.    Premature Infant recommended reference ranges:  Up to 24 hours.............<8.0 mg/dL  Up to 48 hours............<12.0 mg/dL  3-5 days..................<15.0 mg/dL  6-29 days.................<15.0 mg/dL       Alkaline Phosphatase   Date Value Ref Range Status   07/09/2024 95 55 - 135 U/L Final     AST   Date Value Ref Range Status   07/09/2024 19 10 - 40 U/L Final     ALT   Date Value Ref Range Status   07/09/2024 13 10 - 44 U/L Final     Anion Gap   Date Value Ref Range Status   07/09/2024 11 8 - 16 mmol/L Final     eGFR if    Date Value Ref Range Status   03/02/2022 49.3 (A) >60 mL/min/1.73 m^2 Final     eGFR if non    Date Value Ref Range Status   03/02/2022 42.8 (A) >60 mL/min/1.73 m^2 Final     Comment:     Calculation used to obtain the estimated glomerular filtration  rate (eGFR) is the CKD-EPI equation.        Lab Results   Component Value Date    [id number] >2000 (H) 07/09/2024     Lab Results   Component Value Date    TSH 1.140 07/09/2024    W3DEABD 87 03/12/2009    FREET4 1.27 08/13/2015 12-    MRI BRAIN WO    No acute abnormality.  Mild age-related atrophy and white matter degeneration.   07-    MRI BRAIN WO    Tiny acute infarctions seen at the left posterior baylee. This is at the left facial nerve nucleus.       09-    MRI Brain WO    Small remote left temporoparietal infarct.  There has, however, been no significant detrimental interval change since the prior exam of 12/4/09     EEG Results:    12-    The EEG is suggestive of intermittent diffuse encephalopathy.     No seizures noted     CNP  TESTING    2024     Mild Neurocognitive disorder, CVA due to thrombosis of PCA, Grief.       Reviewed the neuroimaging independently       Assessment:       1. Transient neurologic deficit    2. Amnesia    3. Cognitive complaints with normal exam    4. Cerebral microvascular disease: stroke ?  elsewhere; TIA 10/13    5. Other cerebral infarction    6. Family history of stress    7. Mixed anxiety depressive disorder    8. Hearing loss, unspecified hearing loss type, unspecified laterality    9. Cerebrovascular accident (CVA) due to thrombosis of precerebral artery    10. Memory impairment    11. Primary open angle glaucoma (POAG) of both eyes, moderate stage    12. 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 Diabetes mellitus type 2 with complications    14. Abnormality of gait and mobility            Modified Arthur Score (m-RS)      0  The patient has no residual symptoms.    1  The patient has no significant disability; able to carry out all pre-stroke activities.    2  The patient has slight disability; unable to carry out all pre-stroke activities but able to look after self without daily help.    3  The patient has moderate disability; requiring some external help but able to walk without the assistance of another individual.    4  The patient has moderately severe disability; unable to walk or attend to bodily functions without assistance of another individual.    5  The patient has severe disability; bedridden, incontinent, requires continuous care.    6  The patient has  (during the hospital stay or after discharge from the hospital).    Plan:         TRANSIENT NEUROLOGICAL EVENTS/ AMNESTIC/ MILD NEUROCOGNITIVE DISORDER/ GRIEF/  FAMILY STRESS/HX OF MULTIPLE STROKES/CAD/HLD/AFIB/DM/GLAUCOMA/[place]/CHRONIC BACK PAIN      EVALUATION     Proceed with AEEG     CT OF HEAD WO    Comprehensive  Neuropsychological Testing Repeat in     Normal MOCA exam    NO DMA recommended at this time    Recommend optimization of STRESS/ANXIETY      Explained to the patient and family that medications are intended to slow down the progression and not stop it or reverse the disease.The disease is relentless, progressive and so far cannot be controlled.     I counseled the patient and family that the rate of progression is extremely variable and the average (10 years) is an inaccurate measure.       HX OF MULTIPLE STROKES (RISK FACTORS AFIB, HLD, HTN DM)       Xarleto 15 mg po     Medical supportive care    Vascular Risk Factors (VRFs) stratification (BP, BS, BC control and Smoking Cessation) is the mainstay of stroke prevention (>90%). The benefit of antiplatelets is < 20% stroke risk reduction.         Healthy diet and exercise    Avoid driving.    Call 911 if any SUDDEN:   Weakness  Numbness  Slurring of speech  Speech difficulty   Vertigo  Loss of balance  Loss of vision  Loss of hearing  Double vision  Trouble swallowing  Trouble breathing  Facial drooping      HOME CARE         Falling Down Precautions. Gait is affected by the disease as well.     Avoid driving and access to firearms     Incremental 24/Care     Help with finances and decision making.    Join support group.    Proofing the house and use labeling.    Avoid antihistamines and anticholinergics.    Avoid changing routine.    Use written reminders.    Avoid multitasking.    Healthy diet, exercise (physical and cognitive).    Good sleep hygiene.        HERE ARE 10 WAYS TO REDUCE RISK OF DEMENTIA AND SLOW DOWN THE PROGRESSION OF DEMENTIA        1.Be physically active.    2. Avoid smoking and alcohol consumption.    3. Track your numbers. Keep your blood pressure, cholesterol, blood sugar and weight within recommended ranges.    4. Stay socially connected.    5. Make healthy food choices. Eat a well-balance and healthy diet that rich in cereals, fish,  legumes, and vegetables.    6. Reduce stress.     7. Challenge your brain by trying something new, playing games, or learning a new language.    8. Take care of your hearing. Avoid being continuously exposed to loud sounds and wear a hearing aid if hearing does become a problem.    9. Lower risk of falls. Consider installing handrails on all stairs and grab bars in bathrooms.    10. Reduce your exposure to air pollution, such as exposure to exhaust in heavy traffic.         PREVENTION OF DELIRIUM       1. Good hydration and avoid electrolyte imbalance  2. Recognize and treat infections immediately especially UTI.  3. Bladder emptying and prevent constipation.   4. Provide stimulating activities and familiar objects  5. Use eyeglasses and hearing aids if needed.   6. Use simple and regular communication about people, current place, and time  7. Mobility and range-of-motion exercises  8. Reduce noise, lighting and avoid sleep interruptions  9. Non-narcotic pain management.  10.Nondrug treatment for sleep problems or anxiety  11. Avoid antihistamines.  12. Avoid narcotics.  13. Avoid benzodiazepines.            SLEEP HYGIENE     Poor sleep has a negative effect on cognition. Several strategies have been shown to improve sleep:     Caffeine intake in the afternoon and evening, as well as stuffing oneself at supper, can decrease the quality of restful sleep throughout the night.   Bedtime and wake-up times should be consistent every night and morning so the body becomes used to a single routine, even on the weekends.  Engage in daily physical activity, but not 2-3 hours before bedtime.   No technology use (television, computer, iPad) 1-2 hours before bed.   Have a wind down routine (e.g., soft lights in the house, bath before bed, reduced fluid intake, songs, reading, less noise) to promote sleep readiness.   Visit the www.sleepfoundation.org for more strategies.      COGNITIVE HYGIENE     Engage in regular exercise,  which increases alertness and arousal and can improve attention and focus.  Consider lower impact exercises, such as yoga or light walking.  Get a good nights sleep, as this can enhance alertness and cognition.  Eat healthy foods and balanced meals. It is notable that research indicates certain nutrients may aid in brain function, such as B vitamins (especially B6, B12, and folic acid), antioxidants (such as vitamins C and E, and beta carotene), and Omega-3 fatty acids. Talk with your physician or nutritionist about whats right for you.   Keep your brain active. Find activities to stay mentally active, such as reading, games (cards, checkers), puzzles (crosswords, Sudoku, jig saw), crafts (models, woodworking), gardening, or participating in activities in the community.  Stay socially engaged. Continue staying active with your family and friends.      MEDICAL/SURGICAL COMORBIDITIES     All relevant medical comorbidities noted and managed by primary care physician and medical care team.          MISCELLANEOUS MEDICAL PROBLEMS       HEALTHY LIFESTYLE AND PREVENTATIVE CARE    Encouraged the patient to adhere to the age-appropriate health maintenance guidelines including screening tests and vaccinations.     Discussed the overall importance of healthy lifestyle, optimal weight, exercise, healthy diet, good sleep hygiene and avoiding drugs including smoking, alcohol and recreational drugs. The patient verbalized full understanding.       Advised the patient to follow COVID-19 prevention measures.       I spent 40 minutes face to face with the patient    Time spent in counseling and coordination of care including discussions etiology of diagnosis, pathogenesis of diagnosis, prognosis of diagnosis,, diagnostic results, impression and recommendations, diagnostic studies, management, risks and benefits of treatment, instructions of disease self-management, treatment instructions, follow up requirements, patient and family  counseling/involvement in care compliance with treatment regimen. All of the patient's questions were answered during this discussion.       Jose Ortega, MSN NP      Collaborating Provider: Etienne Guerrero MD, FAAN Neurologist/Epileptologist

## 2024-10-21 ENCOUNTER — LAB VISIT (OUTPATIENT)
Dept: LAB | Facility: HOSPITAL | Age: 84
End: 2024-10-21
Attending: INTERNAL MEDICINE
Payer: MEDICARE

## 2024-10-21 DIAGNOSIS — N18.32 CHRONIC KIDNEY DISEASE, STAGE 3B: ICD-10-CM

## 2024-10-21 DIAGNOSIS — E11.22 DIABETES MELLITUS WITH STAGE 3 CHRONIC KIDNEY DISEASE: Chronic | ICD-10-CM

## 2024-10-21 DIAGNOSIS — D64.9 ANEMIA, UNSPECIFIED TYPE: ICD-10-CM

## 2024-10-21 DIAGNOSIS — N18.30 DIABETES MELLITUS WITH STAGE 3 CHRONIC KIDNEY DISEASE: Chronic | ICD-10-CM

## 2024-10-21 LAB
ALBUMIN SERPL BCP-MCNC: 3.9 G/DL (ref 3.5–5.2)
ANION GAP SERPL CALC-SCNC: 11 MMOL/L (ref 8–16)
BASOPHILS # BLD AUTO: 0.04 K/UL (ref 0–0.2)
BASOPHILS NFR BLD: 0.8 % (ref 0–1.9)
BUN SERPL-MCNC: 22 MG/DL (ref 8–23)
CALCIUM SERPL-MCNC: 10.1 MG/DL (ref 8.7–10.5)
CHLORIDE SERPL-SCNC: 109 MMOL/L (ref 95–110)
CO2 SERPL-SCNC: 21 MMOL/L (ref 23–29)
CREAT SERPL-MCNC: 1.3 MG/DL (ref 0.5–1.4)
DIFFERENTIAL METHOD BLD: ABNORMAL
EOSINOPHIL # BLD AUTO: 0.2 K/UL (ref 0–0.5)
EOSINOPHIL NFR BLD: 3.1 % (ref 0–8)
ERYTHROCYTE [DISTWIDTH] IN BLOOD BY AUTOMATED COUNT: 15.9 % (ref 11.5–14.5)
EST. GFR  (NO RACE VARIABLE): 40.8 ML/MIN/1.73 M^2
ESTIMATED AVG GLUCOSE: 148 MG/DL (ref 68–131)
GLUCOSE SERPL-MCNC: 108 MG/DL (ref 70–110)
HBA1C MFR BLD: 6.8 % (ref 4–5.6)
HCT VFR BLD AUTO: 34.2 % (ref 37–48.5)
HGB BLD-MCNC: 10.7 G/DL (ref 12–16)
IMM GRANULOCYTES # BLD AUTO: 0.01 K/UL (ref 0–0.04)
IMM GRANULOCYTES NFR BLD AUTO: 0.2 % (ref 0–0.5)
LYMPHOCYTES # BLD AUTO: 1.8 K/UL (ref 1–4.8)
LYMPHOCYTES NFR BLD: 37.1 % (ref 18–48)
MCH RBC QN AUTO: 29.1 PG (ref 27–31)
MCHC RBC AUTO-ENTMCNC: 31.3 G/DL (ref 32–36)
MCV RBC AUTO: 93 FL (ref 82–98)
MONOCYTES # BLD AUTO: 0.5 K/UL (ref 0.3–1)
MONOCYTES NFR BLD: 9.8 % (ref 4–15)
NEUTROPHILS # BLD AUTO: 2.4 K/UL (ref 1.8–7.7)
NEUTROPHILS NFR BLD: 49 % (ref 38–73)
NRBC BLD-RTO: 0 /100 WBC
PHOSPHATE SERPL-MCNC: 3.2 MG/DL (ref 2.7–4.5)
PLATELET # BLD AUTO: 193 K/UL (ref 150–450)
PMV BLD AUTO: 11.5 FL (ref 9.2–12.9)
POTASSIUM SERPL-SCNC: 4.2 MMOL/L (ref 3.5–5.1)
RBC # BLD AUTO: 3.68 M/UL (ref 4–5.4)
SODIUM SERPL-SCNC: 141 MMOL/L (ref 136–145)
WBC # BLD AUTO: 4.82 K/UL (ref 3.9–12.7)

## 2024-10-21 PROCEDURE — 85025 COMPLETE CBC W/AUTO DIFF WBC: CPT | Performed by: INTERNAL MEDICINE

## 2024-10-21 PROCEDURE — 80069 RENAL FUNCTION PANEL: CPT | Performed by: INTERNAL MEDICINE

## 2024-10-21 PROCEDURE — 83036 HEMOGLOBIN GLYCOSYLATED A1C: CPT | Performed by: INTERNAL MEDICINE

## 2024-10-21 NOTE — PROGRESS NOTES
Established Patient Interventional Pain Note (Follow up Visit)      Referring Physician: No ref. provider found    PCP: Penny Randolph MD    Chief Complaint:   Leg pain    Interval History (10/24/2024):  Patient Saul Mar presents today for follow-up visit.  Patient is being seen today for shooting pains and tingling in the arms from the elbows to the wrists. Pain comes and goes. She's not sure what makes it better or worse. No neck pain. She has a hx of CTS.  She has chronic low back pain with radiculopathy. She has not scheduled her [place] JASKARAN yet. She wasn't quite ready to schedule.  She rates her pain today 7/10  Patient denies night fever/night sweats, urinary incontinence, bowel incontinence, significant weight loss and significant motor weakness.   Patient denies any other complaints or concerns at this time.    Interval history 09/04/2024  Patient presents for 2-1/2 month follow-up for MRI review.  Today she again reports pain in the lower back which can radiate down the lateral and posterior aspects of bilateral lower extremities in L4-S1 distribution to the feet.  Pain is still more severe on the right than on the left.  Pain is intermittent and today is rated an 8/10.  She has continued physician directed physical therapy exercises for lower back and leg pain over the last 8 weeks from 07/04/2024 through 09/04/2024 with marginal improvement in her symptoms.  She has been sparingly taking Cymbalta 30 mg not daily, concerned for renal function.  She denies any side effects from this medication.  She does associate significant weakness in the lower extremities associated with her pain and exertion.    Interval history 07/24/2024  Patient presents for three-month follow-up.  Today she reports she has been tolerating the pain.  Again she experiences pain in the lower back which can radiate down the posterior aspects of bilateral lower extremities in L5-S2 distribution to the foot.  Pain is more  severe on the right than on the left.  Pain today is rated an 8/10.  Patient has continued physician directed physical therapy exercises at home for lower back and leg pain over the last 8 weeks from 05/24/2024 through 07/24/2024 with marginal improvement in her symptoms.  She is continued Lyrica 75 mg in the evening but she does report this causes significant daytime somnolence.  Patient has not trialed Cymbalta in the past.    Interval History (04/25/2024):  Patient Saul Mar presents today for follow-up visit.  Patient being seen today for follow-up of lower back pain that radiates down the posterior aspect of the bilateral lower extremities.  Pain is worse on the right than the left.  She rates her pain today an 8/10.  She has had previous epidural steroid injections but she states this only provided relief for a couple of weeks.  She continues to take Tylenol and use a topical compound with some relief.  Pain is worse with extending back and she will often get relief whenever she bends forward.  Pain is also worse with prolonged standing or walking.  Patient denies night fever/night sweats, urinary incontinence, bowel incontinence, significant weight loss and significant motor weakness.   Patient denies any other complaints or concerns at this time.       Interval History (1/5/2024):  Patient Saul Mar presents today for follow-up visit.  Patient experienced a fall in 12/10 where her chair was pulled out from behind her and she fell onto her bottom.  She went to the ER for evaluation with x-rays of the lumbar spine and left hip which were both negative for fracture.  She is since then seen Dr. Jacobsen who has ordered repeat x-rays of the lumbar spine and x-ray and again both were negative for fracture or dislocation.  She has physical therapy ordered and is supposed to start on January 9th.  Today she rates her pain at 8/10 but stay at its worst a 10/10.  Pain is continuous and does  not radiate down the lower extremities.  She is currently using Voltaren gel and was prescribed Fioricet in the ER with no relief of symptoms.  Patient denies night fever/night sweats, urinary incontinence, bowel incontinence, significant weight loss and significant motor weakness.   Patient denies any other complaints or concerns at this time.      SUBJECTIVE:  Interval history 11/13/2023  Patient presents status post L4-5 interlaminar epidural steroid injection with right paramedian approach 08/02/2023.  Patient reports it took a few weeks for therapeutic benefit the patient reports at least 75-80% improvement in right lower extremity radicular symptoms following her epidural steroid injection.  Patient reports taking a trip to [place] [employer] from September 6 through September 12th with significant improvement in her pain and improvement in mobility following her injection.  She reports upon her return pain relief was sustained until 1 week prior.  Today she reports pain has again becomes severe 6-7/10.  Patient reports pain along the lateral and posterior aspect of the right lower extremity in L4-S1 distribution to the calf.  Pain is exacerbated with standing and with ambulation.  Patient reports she would like to wait and see for another week if pain improves.  She has continued physician directed physical therapy exercises at home over the last 3 months with marginal improvement in her symptoms.    Interval Hx: 07/12/2023  Patient presents for follow-up of lower back and leg pain.  Today patient reports pain in the lower back and leg has become severe over the last several months.  Pain is constant and today is rated a 6/10.  Pain is described as aching and shooting in nature.  Patient reports pain in the lower back which radiates down the lateral and posterior aspect of the right lower extremity in L4-S1 distribution to the knee.  Pain is exacerbated with standing or with ambulation.  Patient reports pain has  severely limited her day-to-day activities and quality of life.  Patient would like to pursue repeat intervention.  Patient has continued physician directed physical therapy exercises at home over the last 8 weeks without meaningful improvement in her pain.    Of note, patient saw Odilia Valverde PA-C in Neurosurgery 08/12/2022 with the following evaluation:        Interval history 10/26/2022  Patient presents status post bilateral L2/3 transforaminal epidural steroid injection 10/03/2022.  Patient reports limited 50% relief along the posterior aspect of the right lower extremity following her procedure.  She continues to report pain along the lateral aspect of the right lower extremity to the calf in L4 distribution.  Pain today is a 10/10.  Patient does endorse associated weakness in the lower extremity associated with her pain.  Patient reports her leg pain is more severe and debilitating than the back pain.  Patient reports she is scheduled to restart physical therapy the following week.  Patient has discontinued gabapentin secondary to intolerable side effect.    Interval History (7/18/2022): Saul Mar presents today for follow-up visit.  she underwent bilateral L4/5 transforaminal epidural steroid injection on 6/17/22.  The patient reports that she is/was unchanged following the procedure.  she reports 0% pain relief.  Reports pain is worsened with prolonged sitting, improved with leaning forward (shopping cart sign). Reports continued aching, stinging low back pain in a band-like distribution with radiation down bilateral lower extremities. Reports she was unable to tolerate Gabapentin due to drowsiness and discontinued. Reports she received no relief while taking this medication.  Patient reports pain as 8/10 today.    Initial HPI  Saul Mar is a 84 y.o. female with past medical history significant for CVA, MDD, primary open angle glaucoma, DMII, AFib, HLD, HTN, diastolic  dysfunction, PAD, CKD III, SStrait, GERD who presents with bilateral leg pain.  Patient reports pain began approximately 1 year prior without inciting accident injury or trauma.  Today patient reports pain which is constant which is rated a 10/10.  Pain is described as aching in nature.  Patient reports pain originating in bilateral buttocks and radiating to bilateral calves in L4-L5 distribution.  Pain is equally worse on both sides.  Pain is exacerbated with prolonged standing and with ambulation.  Patient ambulates with a walker and reports she is only able to ambulate a few steps before requiring rest.  Patient reports pain is improved with lumbar flexion and rest.  Patient does report associated weakness in bilateral lower extremities associated with her pain.  She denies bowel or bladder incontinence or saddle anesthesia.  Patient reports completing several sessions of conventional physical therapy with initial improvement in her symptoms, however with increased intensity of exercises, exacerbation of pain.    Pt last saw Dr. Sanchez 8/24/21 with recommendation to continue with PT and discussion of future MBB.    Patient reports significant motor weakness and loss of sensations.  Patient denies night fever/night sweats, urinary incontinence, bowel incontinence and significant weight loss.      Pain Disability Index Review:         10/24/2024    11:30 AM 7/12/2023     9:16 AM 10/26/2022     9:13 AM   Last 3 PDI Scores   Pain Disability Index (PDI) [phone]       Non-Pharmacologic Treatments:  Physical Therapy/Home Exercise: yes  Ice/Heat:yes  TENS: no  Acupuncture: no  Massage: no  Chiropractic: no    Other: no      Pain Medications:  - Opioids: Vicodin ( Hydrocodone/Acetaminophen)  - Adjuvant Medications: Neurontin (Gabapentin) and Xanax (Alprazolam). Robaxin  - Anti-Coagulants: Xarelto    Pain Procedures:   -08/02/2023: L4-5 interlaminar epidural steroid injection with right paramedian approach with 75-80%  improvement in right lower extremity radicular symptoms  -10/03/2022: Bilateral L2/3 transforaminal epidural steroid injection with 50% relief along the posterior aspect of the right lower extremity  -06/17/2022: Bilateral L4/5 transforaminal epidural steroid injection    Past Medical History:   Diagnosis Date    A-fib     Atrial fibrillation 10/22/2018    Back pain     Cataract     Degenerative disc disease     Diverticulosis     colonoscopy 9/22/2016    GERD (gastroesophageal reflux disease)     Glaucoma     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 Taveras    COLONOSCOPY      COLONOSCOPY N/A 9/22/2016    Procedure: COLONOSCOPY;  Surgeon: Jd Ashton MD;  Location: [facility] ([address] [place] [address]);  Service: Endoscopy;  Laterality: N/A;  OK per Dr Randolph for pt to hold Plavix 5 days prior to procedure/see telephone encounter dated 8/29/16/svn    EPIDURAL STEROID INJECTION N/A 8/2/2023    Procedure: L4-5 interlaminar epidural steroid injection with right paramedian approach with RN IV sedation;  Surgeon: Chan Fonseca MD;  Location: [facility];  Service: Pain Management;  Laterality: N/A;    EYE SURGERY Bilateral 2002 approx    Laser for glaucoma    HYSTERECTOMY  1963     AMEENA/USO- fibroids; no cancer    OOPHORECTOMY  1963    unknown, only removed one    SELECTIVE INJECTION OF ANESTHETIC AGENT AROUND LUMBAR SPINAL NERVE ROOT BY TRANSFORAMINAL APPROACH Bilateral 6/17/2022    Procedure: Bilateral L4/5 TF JASKARAN with RN IV sedation;  Surgeon: Chan Fonseca MD;  Location: [facility];   Service: Pain Management;  Laterality: Bilateral;    SELECTIVE INJECTION OF ANESTHETIC AGENT AROUND LUMBAR SPINAL NERVE ROOT BY TRANSFORAMINAL APPROACH Bilateral 10/3/2022    Procedure: Bilateral L2-3 transforaminal epidural steroid injection with RN IV sedation;  Surgeon: Chan Fonseca MD;  Location: [facility];  Service: Pain Management;  Laterality: Bilateral;     Review of patient's allergies indicates:   Allergen Reactions    Pravachol [pravastatin] Nausea Only       Current Outpatient Medications   Medication Sig    albuterol (PROVENTIL/VENTOLIN HFA) 90 mcg/actuation inhaler INHALE 2 PUFFS INTO THE LUNGS EVERY 6 (SIX) HOURS AS NEEDED FOR WHEEZING. RESCUE    ALPRAZolam (XANAX) 0.5 MG tablet TAKE 1 TABLET BY MOUTH NIGHTLY AS NEEDED FOR ANXIETY (MAY TAKE 1-2 TIMES WEEKLY FOR ANXIETY)    amLODIPine (NORVASC) 5 MG tablet Take 1 tablet (5 mg total) by mouth 2 (two) times daily.    atorvastatin (LIPITOR) 80 MG tablet TAKE 1 TABLET BY MOUTH EVERY DAY    benzonatate (TESSALON) 100 MG capsule TAKE 1 CAPSULE BY MOUTH THREE TIMES A DAY AS NEEDED Oral for 10 Days    blood sugar diagnostic Strp For use with insurance covered glucometer; test daily    blood-glucose meter kit To check BG 2x times daily, to use with insurance preferred meter    brimonidine 0.2% (ALPHAGAN) 0.2 % Drop Place 1 drop into both eyes 3 (three) times daily.    carvediloL (COREG) 12.5 MG tablet Take 12.5 mg by mouth 2 (two) times daily.    cholecalciferol, vitamin D3, (VITAMIN D3) 1,000 unit capsule Take 1 capsule (1,000 Units total) by mouth once daily.    diclofenac sodium (VOLTAREN) 1 % Gel Apply 2 g topically 2 (two) times daily.    DULoxetine (CYMBALTA) 30 MG capsule Take 1 capsule (30 mg total) by mouth once daily.    empagliflozin (JARDIANCE) 25 mg tablet Take 1 tablet (25 mg total) by mouth once daily.    estradioL (ESTRACE) 0.01 % (0.1 mg/gram) vaginal cream Place 1 g vaginally 3 (three) times a week.    flecainide (TAMBOCOR) 50 MG Tab  Take 1 tablet (50 mg total) by mouth 2 (two) times daily.    glimepiride (AMARYL) 4 MG tablet TAKE 1 TABLET BY MOUTH EVERY DAY WITH BREAKFAST    levalbuterol (XOPENEX HFA) 45 mcg/actuation inhaler INHALE 1-2 PUFFS INTO THE LUNGS EVERY 6 (SIX) HOURS AS NEEDED FOR WHEEZING. RESCUE    linaCLOtide (LINZESS) 145 mcg Cap capsule TAKE 1 CAPSULE (145 MCG TOTAL) BY MOUTH BEFORE BREAKFAST.    losartan (COZAAR) 50 MG tablet Take 1 tablet (50 mg total) by mouth 2 (two) times daily.    meclizine (ANTIVERT) 25 mg tablet TAKE 1 TABLET (25 MG TOTAL) BY MOUTH DAILY AS NEEDED FOR DIZZINESS.    metFORMIN (GLUCOPHAGE-XR) 500 MG ER 24hr tablet TAKE 2 TABLETS BY MOUTH EVERY DAY    metoprolol succinate (TOPROL-XL) 50 MG 24 hr tablet TAKE 1 TABLET BY MOUTH EVERY DAY    ONETOUCH DELICA PLUS LANCET 33 gauge Misc Apply topically.    ONETOUCH ULTRA2 METER Misc SMARTSIG:Via Meter As Directed    pregabalin (LYRICA) 75 MG capsule Oral for 90 Days    XARELTO 15 mg Tab TAKE 1 TABLET (15 MG TOTAL) BY MOUTH DAILY WITH DINNER OR EVENING MEAL.     No current facility-administered medications for this visit.       Review of Systems     GENERAL:  No weight loss, malaise or fevers.  HEENT:   No recent changes in vision or hearing  NECK:  Negative for lumps, no difficulty with swallowing.  RESPIRATORY:  Negative for cough, wheezing or shortness of breath, patient denies any recent URI.  CARDIOVASCULAR:  Negative for chest pain, leg swelling or palpitations.  GI:  Negative for abdominal discomfort, blood in stools or black stools or change in bowel habits.  MUSCULOSKELETAL:  See HPI.  SKIN:  Negative for lesions, rash, and itching.  PSYCH:  No mood disorder or recent psychosocial stressors.   HEMATOLOGY/LYMPHOLOGY:  Negative for prolonged bleeding, bruising easily or swollen nodes.    NEURO:   No history of headaches, syncope, paralysis, seizures or tremors.  All other reviewed and negative other than HPI.    OBJECTIVE:    BP (!) 145/72   Pulse 60   Resp  "17   Ht 5' 5" (1.651 m)   Wt 77.1 kg (169 lb 15.6 oz)   BMI 28.29 kg/m²     Vitals:    10/24/24 1124   BP: (!) 145/72   Pulse: 60   Resp: 17   Weight: 77.1 kg (169 lb 15.6 oz)   Height: 5' 5" (1.651 m)       Body mass index is 28.29 kg/m².   (reviewed on 10/24/2024)       Physical Exam    GENERAL: Well appearing, in no acute distress, alert and oriented x3.  PSYCH:  Mood and affect appropriate.  SKIN: Skin color, texture, turgor normal, no rashes or lesions.  HEAD/FACE:  Normocephalic, atraumatic. Cranial nerves grossly intact.    CV: RRR with palpation of the radial artery.  PULM: No evidence of respiratory difficulty, symmetric chest rise.  GI:  Soft and non-tender.    BACK:  Thoracic kyphosis Straight leg raising in the sitting and supine positions is negative to radicular pain. pain to palpation over the facet joints of the lumbar spine or spinous processes. reduced range of motion with pain reproduction. Pain with back extension, no pain with flexion.  EXTREMITIES: Peripheral joint ROM is reduced with pain without obvious instability or laxity in all four extremities. No deformities, edema, or skin discoloration. Good capillary refill.  NECK: FROM without pain. No tenderness over cervical paraspinals. Negative spurling.   MUSCULOSKELETAL: Able to stand on heels & toes.   hip, and knee provocative maneuvers are negative.  There is no pain with palpation over the sacroiliac joints bilaterally.  FABERs test is negative.  Facet loading test is positive bilaterally.   Bilateral upper and lower extremity strength is normal and symmetric.  No atrophy or tone abnormalities are noted.  RIGHT Lower extremity: Hip flexion 5/5, Hip Abduction 5/5, Hip Adduction 5/5, Knee extension 5/5, Knee flexion 5/5, Ankle dorsiflexion5/5, Extensor hallucis longus 5/5, Ankle plantarflexion 5/5  LEFT Lower extremity:  Hip flexion 5/5, Hip Abduction 5/5,Hip Adduction 5/5, Knee extension 5/5, Knee flexion 5/5, Ankle dorsiflexion 5/5, " Extensor hallucis longus 5/5, Ankle plantarflexion 5/5  -Normal testing knee (patellar) jerk and ankle (achilles) jerk  Upper extremities:  Negative forrest. 5/5 strength UE. Negative phalens  NEURO: Bilateral upper and lower extremity coordination and muscle stretch reflexes are physiologic and symmetric. No loss of sensation is noted.  GAIT: normal.      Imaging:  Xray lumbar 12/29/2023   EXAM: XR LUMBAR SPINE 2 OR 3 VIEWS  CLINICAL HISTORY: Lower back pain.  TECHNIQUE: Three-view lumbar spine series.  COMPARISON: 05/18/2023.  FINDINGS: Disc space narrowing with slight ventral subluxation at L4-5.  Mild disc space narrowing at L2-3.  Multilevel osteophyte formation.  Arthritic changes involving the facets at the L4-5 and L5-S1 levels.     Xray hip 12/29/2023   EXAM: XR HIP WITH PELVIS WHEN PERFORMED, 2 OR 3 VIEWS LEFT  CLINICAL HISTORY: Left hip pain.  TECHNIQUE: AP pelvis and left hip.  FINDINGS: No fracture or dislocation is demonstrated. Mild arthritic change involving both hips.  SPECT ankylosis of the SI joints.  Multiple calcifications in the pelvis.       MRI Lumbar Spine Without Contrast 08/20/2024  FINDINGS:  The distal cord and conus reveal normal signal and morphology.     Multilevel degenerative disc disease.  There are endplate Schmorl's nodes at L2-3 and L4-5.     There is grade 1 L4-5 spondylolisthesis of approximately 3 mm.     T12-L1: Mild disc desiccation with disc bulge.     L1-2:     Mild disc degeneration with disc desiccation and mild disc bulge.     L2-3:     Moderate disc degeneration with moderate disc bulge.  Mild hypertrophic facet arthrosis.  Mild central canal stenosis with severe right and moderate left foraminal stenosis.     L3-4:     Mild disc degeneration with disc bulge.  Mild hypertrophic ligamentum flavum and facet arthrosis.  Mild central canal stenosis with moderate foraminal stenosis.     L4-5:     Mild to moderate disc degeneration with moderate disc bulge.  Moderate  hypertrophic facet arthrosis.  Moderate to severe central canal stenosis as well as left lateral recess stenosis.  Moderate to severe bilateral foraminal stenosis.     L5-S1:    Mild disc degeneration with disc desiccation and disc bulge.  Mild facet arthrosis with mild left lateral recess stenosis.  Mild foraminal stenosis.         07/22/21  X-Ray Lumbar Spine Ap And Lateral  FINDINGS:  Five lumbar vertebral bodies.  Vertebral body heights are maintained.  Disc space narrowing and degenerative endplate changes L4-5 and L5-S1.  Moderate facet arthropathy L4-5 and L5-S1.  Grade 1 anterolisthesis L4 on L5.     04/02/18    X-Ray Cervical Spine Complete 5 view  FINDINGS:  There is anatomic spinal alignment.  Prominent bridging vertebral endplate spurs present anteriorly from C4-5 through C6-7.  Disc interspaces are maintained.  Odontoid is intact.  No fracture or subluxation.      ASSESSMENT: 84 y.o. year old female with lower back and leg pain, consistent with     1. Pain in both upper extremities  EMG W/ ULTRASOUND AND NERVE CONDUCTION TEST 2 Extremities      2. Paresthesia of arm  EMG W/ ULTRASOUND AND NERVE CONDUCTION TEST 2 Extremities      3. Degeneration of intervertebral disc of lumbar region with discogenic back pain and lower extremity pain        4. Lumbar radiculopathy, chronic                      PLAN:   - Interventions:  Will get upper extremities EMG    Patient plans to call to Schedule for L4-5 interlaminar epidural steroid injection for lumbar radiculopathy.  Patient received 80% relief exceeding 3 months in duration with prior epidural steroid injection.  We have discussed the procedure, benefits and potential risk and alternative options in detail.  Patient has elected to pursue this procedure.      - Anticoagulation use: yes  Xarelto  -secondary prophylaxis:  Atrial fibrillation; Dr. Grayson ([facility])  --per BRITNEY guidelines, patient will need to pause Xarelto 3 days prior to epidural  steroid injection.  We will obtain clearance.     report:  Reviewed and consistent with medication use as prescribed.    - Medications:  - DC Gabapentin and Lyrica 2/2 somnolence and inefficacy    -Continue Cymbalta (duloxetine) 30 mg once daily.   We have discussed potential common side effects of duloxetine including nausea, diarrhea/constipation, fatigue, drowsiness or dry mouth.  Patient expresses understanding.  Patient is not sure if she is taking. AVS printed for her to check her medications at home    - Therapy:  We discussed continuing at home physician directed physical therapy to help manage the patient/s painful condition. The patient was counseled that muscle strengthening will improve the long term prognosis in regards to pain and may also help increase range of motion and mobility.    - Imaging: Reviewed available imaging (new MRI lumbar spine) with patient and answered any questions they had regarding study.      -Consults/referral:Neurosurgery PRN     - Follow up visit:   Status post injection.      The above plan and management options were discussed at length with patient. Patient is in agreement with the above and verbalized understanding.    - I discussed the goals of interventional chronic pain management with the patient on today's visit. We discussed a multimodal and systematic approach to pain.  This includes diagnostic and therapeutic injections, adjuvant pharmacologic treatment, physical therapy, and at times psychiatry.  I emphasized the importance of regular exercise, core strengthening and stretching, diet and weight loss as a cornerstone of long-term pain management.    - This condition does not require this patient to take time off of work, and the primary goal of our Pain Management services is to improve the patient's functional capacity.  - Patient Questions: Answered all of the patient's questions regarding diagnoses, therapy, treatment and next steps        Olivia Peralta  NP  Interventional Pain Management  [facility]    Disclaimer:  This note was prepared using voice recognition system and is likely to have sound alike errors that may have been overlooked even after proof reading.  Please call me with any questions

## 2024-10-22 ENCOUNTER — TELEPHONE (OUTPATIENT)
Dept: INTERNAL MEDICINE | Facility: CLINIC | Age: 84
End: 2024-10-22
Payer: MEDICARE

## 2024-10-22 NOTE — TELEPHONE ENCOUNTER
----- Message from Lenora sent at 10/22/2024  8:28 AM CDT -----  Contact: self  [phone]  Type:  Needs Medical Advice    Who Called: Reola  Symptoms (please be specific): pain in right breast  How long has patient had these symptoms:    Pharmacy name and phone #:    Would the patient rather a call back or a response via [facility]? Call back   Best Call Back Number:  [phone]  Additional Information: Patient called in this morning to schedule her annual mammogram, but stated she is having pain in the right breast off and on for about a year.

## 2024-10-22 NOTE — TELEPHONE ENCOUNTER
I spoke to the patient. She has been having right breat pain on and off x 1 year. She does not palpate any lumps or abnormality. Her last mammo was normal. I guest the question is, should she have a diagnostic mammogram  without being seen by a provider?

## 2024-10-22 NOTE — TELEPHONE ENCOUNTER
No, she will need an office visit to have her breasts examined IN PERSON (the last several visits with me have been virtual).  Please offer an office visit with Grace or me for this.    Her last mammogram was last year.  I would have her go ahead and get the mammogram that I ordered in September    Recent labs show that her kidney function is a little bit worse from the last time but overall other labs are stable.  She needs to make sure that she is drinking plenty of water.  She is due to see her kidney doctor in February of next year and she should try to schedule that now as it may take a while to get that appointment

## 2024-10-24 ENCOUNTER — OFFICE VISIT (OUTPATIENT)
Dept: PAIN MEDICINE | Facility: CLINIC | Age: 84
End: 2024-10-24
Payer: MEDICARE

## 2024-10-24 ENCOUNTER — TELEPHONE (OUTPATIENT)
Dept: PHYSICAL MEDICINE AND REHAB | Facility: CLINIC | Age: 84
End: 2024-10-24
Payer: MEDICARE

## 2024-10-24 ENCOUNTER — HOSPITAL ENCOUNTER (OUTPATIENT)
Dept: RADIOLOGY | Facility: HOSPITAL | Age: 84
Discharge: HOME | End: 2024-10-24
Attending: NURSE PRACTITIONER
Payer: MEDICARE

## 2024-10-24 VITALS
RESPIRATION RATE: 17 BRPM | HEART RATE: 60 BPM | DIASTOLIC BLOOD PRESSURE: 72 MMHG | BODY MASS INDEX: 28.32 KG/M2 | WEIGHT: 170 LBS | SYSTOLIC BLOOD PRESSURE: 145 MMHG | HEIGHT: 65 IN

## 2024-10-24 DIAGNOSIS — R20.2 PARESTHESIA OF ARM: ICD-10-CM

## 2024-10-24 DIAGNOSIS — I63.89 OTHER CEREBRAL INFARCTION: ICD-10-CM

## 2024-10-24 DIAGNOSIS — M79.601 PAIN IN BOTH UPPER EXTREMITIES: Primary | ICD-10-CM

## 2024-10-24 DIAGNOSIS — M51.362 DEGENERATION OF INTERVERTEBRAL DISC OF LUMBAR REGION WITH DISCOGENIC BACK PAIN AND LOWER EXTREMITY PAIN: ICD-10-CM

## 2024-10-24 DIAGNOSIS — M54.16 LUMBAR RADICULOPATHY, CHRONIC: ICD-10-CM

## 2024-10-24 DIAGNOSIS — M79.602 PAIN IN BOTH UPPER EXTREMITIES: Primary | ICD-10-CM

## 2024-10-24 PROCEDURE — 70450 CT HEAD/BRAIN W/O DYE: CPT | Mod: TC

## 2024-10-24 PROCEDURE — 1101F PT FALLS ASSESS-DOCD LE1/YR: CPT | Mod: CPTII,S$GLB,, | Performed by: NURSE PRACTITIONER

## 2024-10-24 PROCEDURE — 3078F DIAST BP <80 MM HG: CPT | Mod: CPTII,S$GLB,, | Performed by: NURSE PRACTITIONER

## 2024-10-24 PROCEDURE — 3288F FALL RISK ASSESSMENT DOCD: CPT | Mod: CPTII,S$GLB,, | Performed by: NURSE PRACTITIONER

## 2024-10-24 PROCEDURE — 3077F SYST BP >= 140 MM HG: CPT | Mod: CPTII,S$GLB,, | Performed by: NURSE PRACTITIONER

## 2024-10-24 PROCEDURE — 1125F AMNT PAIN NOTED PAIN PRSNT: CPT | Mod: CPTII,S$GLB,, | Performed by: NURSE PRACTITIONER

## 2024-10-24 PROCEDURE — 99213 OFFICE O/P EST LOW 20 MIN: CPT | Mod: S$GLB,,, | Performed by: NURSE PRACTITIONER

## 2024-10-24 PROCEDURE — 1159F MED LIST DOCD IN RCRD: CPT | Mod: CPTII,S$GLB,, | Performed by: NURSE PRACTITIONER

## 2024-10-24 PROCEDURE — 99999 PR PBB SHADOW E&M-EST. PATIENT-LVL V: CPT | Mod: PBBFAC,,, | Performed by: NURSE PRACTITIONER

## 2024-10-24 NOTE — TELEPHONE ENCOUNTER
Spoke to patient scheduled an in office visit. Gave patient the number to schedule mammogram and patient will reach out to Nephrologist to schedule an appointment

## 2024-11-02 NOTE — PROGRESS NOTES
HPI    DLS: 5/02/2024    Pt here for 4 month Check;  Pt states no eye pain or discomfort but feels like vision is getting a   little cloudy.     Meds;  Brimonidine TID OU (At times uses it twice a day)    1) POAG   2) DM with BDR and ME OU   3) Hx RLL Lesion   4) NS OD   5) PCIOL OS   6) Hx CVA   7) Hx Amaurosis Fugax   8) Eyelid Myokymia       Last edited by Shauna Christopher on 11/5/2024  1:35 PM.            Assessment /Plan     For exam results, see Encounter Report.    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 POAG ou   H/O poor compliance   First HVF 1997   First photos 1998      Family history none   Glaucoma meds Has used alphagan in past - poor compliance-stopped on her own   H/O adverse rxn to glaucoma drops none   LASERS No glaucoma laser (+h/o focal OU for BDR)   GLAUCOMA SURGERIES none   OTHER EYE SURGERIES CE/PCIOL OS 2/27/13  CDR 0.8/0.75   Tbase 16-20/16-22   Tmax 20/22   Ttarget 17/17   HVF 17 test 1997 to 2023 - IAD/sup paracent od //  IAD / sup paracentral   (+changes ? 2/2 focal laser )   Gonio +3   /621- thick ou (- 4.5 ou)   OCT 2 test 2022 to 2023  - RNFL - nl od // dec TI os   HRT 6  tests: 2009 to 2018 -  MR -  Dec. I, bord S/T od // bord T/I os /// CDR 0.74 od // 0.70 os  Disc photos 1998, 2000, 2001, 2003, 2003, 2004, 2005, 2006- slides  // 2010 , 2018 - OIS      - Zoezlv30/12  - Test done today  IOP / AR/MR/BAT - cat check od      2. BDR - h/o CSME - s/p focal ou               Old pt of [facility]   Now sees Dr Issac fernandez F/u 12 months or prn      3. NS OD               Remove prn - pt wants to  schedule sx - 11/5/2024                "BCVA 20/40              BAT 20/70     4. Pseudophakia OS - 2/28/2013               S/p CE/PCIOL OS               IOL SN60 WF 20.5              -VA limited 2/2 hx of CSME s/p focal scars              BCVA  20/25              Give Rx glasses - 7/2/2013              Had re-bound iritis - resolved     4. H/O RLL lesion - S/P excision with Jovanni      5. Post Nelda lives in [place] - but still likes to come to [place] for care      6. amaurosis fugax / H/O CVA's              Patient reports stroke like symptoms and amaurosis fugax 10/13              -  She was admitted to hospital with very elevated BP              -  Patient had MRI at the time showing ischemic disease              - last Carotid U/S done 11/12- patient reports that                  - PCP gets one yearly- last U/S showed minimal plaque disease              -  H/o stroke- discussed that amaurosis was from elevated BP and if ever recurs needs to report to ER immediately- she voiced understanding     7. Eyelid myokymia  - intermittent - patient also reports eyelid "twitching  "- discussed with patient that myokymia is usually from lack of sleep, caffeine intake, or stress- however nothing to worry about  -  Did not have any "twitching" on exam today     Plan:  BrimonidineTID ou   Can consider CEIOL OD with possible NATHANAEL's  Jazlynk or istent or GATT   -  or consider phaco/IOL alone - IOP may improve with CE alone   HVF's are inconsistent with the ON exam (likely 2/2 focal laser)  / OCT ect   Pt wants to wait on phaco od for now // has done well os      Avoid PG's if possible 2/2 h/o CME 2/2 BDR     Refraction Post op os  is more myopic than expected - review IOL calc if needs 2nd eye done     Repeat OCT of macula 5/17/2022 - - done no CME os// + focal laser scars ou     Photo file updated  3/17/2023     5/2/2024   Had to put appt off - her daughter passed form cancer recently    They had a nice service for her - all the children and grand children " came     Plan:   11/5/2024   Pt C/O blurry vision od   + mixed nuclear / cortical diabetic cataract   (S/P phaco/IOL os 2/2013)   H/O laser near mac for DME - years ago - old laser scars   Vis sign cat od // moderate dilation - slightly small pupil     NOT ON MANJARO OR TRULICITY LIKE MED     Pt would like to have phaco/IOL od - ? Complex - trypan blue / ? May need valarie ring   Wants surgery in January   F/U for pre-op        Issac for diabetic check - seen 6/4/2024- F/U 1 year      - photo file - (( first name is Saul ( leave out the Zaid part))) - updated 6/4/2019

## 2024-11-05 ENCOUNTER — OFFICE VISIT (OUTPATIENT)
Dept: OPHTHALMOLOGY | Facility: CLINIC | Age: 84
End: 2024-11-05
Payer: MEDICARE

## 2024-11-05 ENCOUNTER — OFFICE VISIT (OUTPATIENT)
Dept: INTERNAL MEDICINE | Facility: CLINIC | Age: 84
End: 2024-11-05
Payer: MEDICARE

## 2024-11-05 ENCOUNTER — IMMUNIZATION (OUTPATIENT)
Dept: INTERNAL MEDICINE | Facility: CLINIC | Age: 84
End: 2024-11-05
Payer: MEDICARE

## 2024-11-05 VITALS — DIASTOLIC BLOOD PRESSURE: 80 MMHG | SYSTOLIC BLOOD PRESSURE: 136 MMHG

## 2024-11-05 DIAGNOSIS — D64.9 ANEMIA, UNSPECIFIED TYPE: ICD-10-CM

## 2024-11-05 DIAGNOSIS — N18.30 DIABETES MELLITUS WITH STAGE 3 CHRONIC KIDNEY DISEASE: Chronic | ICD-10-CM

## 2024-11-05 DIAGNOSIS — Z96.1 PSEUDOPHAKIA, LEFT EYE: ICD-10-CM

## 2024-11-05 DIAGNOSIS — H26.9 CORTICAL CATARACT OF RIGHT EYE: ICD-10-CM

## 2024-11-05 DIAGNOSIS — E04.2 MULTINODULAR THYROID: Chronic | ICD-10-CM

## 2024-11-05 DIAGNOSIS — H40.1211 LOW-TENSION GLAUCOMA, RIGHT EYE, MILD STAGE: ICD-10-CM

## 2024-11-05 DIAGNOSIS — E11.36 DIABETIC CATARACT OF RIGHT EYE: Primary | ICD-10-CM

## 2024-11-05 DIAGNOSIS — N18.32 CHRONIC KIDNEY DISEASE, STAGE 3B: ICD-10-CM

## 2024-11-05 DIAGNOSIS — H25.11 SENILE NUCLEAR CATARACT, RIGHT: ICD-10-CM

## 2024-11-05 DIAGNOSIS — E11.610 TYPE 2 DIABETES MELLITUS WITH DIABETIC NEUROPATHIC ARTHROPATHY: ICD-10-CM

## 2024-11-05 DIAGNOSIS — H40.1222 LOW-TENSION GLAUCOMA, LEFT EYE, MODERATE STAGE: ICD-10-CM

## 2024-11-05 DIAGNOSIS — K44.9 HIATAL HERNIA: ICD-10-CM

## 2024-11-05 DIAGNOSIS — R80.9 DIABETES MELLITUS WITH PROTEINURIA: Chronic | ICD-10-CM

## 2024-11-05 DIAGNOSIS — E11.22 DIABETES MELLITUS WITH STAGE 3 CHRONIC KIDNEY DISEASE: Chronic | ICD-10-CM

## 2024-11-05 DIAGNOSIS — N64.4 BREAST PAIN, RIGHT: Primary | ICD-10-CM

## 2024-11-05 DIAGNOSIS — H57.03 SMALL PUPILS: ICD-10-CM

## 2024-11-05 DIAGNOSIS — E11.29 DIABETES MELLITUS WITH PROTEINURIA: Chronic | ICD-10-CM

## 2024-11-05 DIAGNOSIS — Z23 NEED FOR VACCINATION: Primary | ICD-10-CM

## 2024-11-05 DIAGNOSIS — E11.3213 BOTH EYES AFFECTED BY MILD NONPROLIFERATIVE DIABETIC RETINOPATHY WITH MACULAR EDEMA, ASSOCIATED WITH TYPE 2 DIABETES MELLITUS: ICD-10-CM

## 2024-11-05 PROCEDURE — 1159F MED LIST DOCD IN RCRD: CPT | Mod: CPTII,S$GLB,, | Performed by: INTERNAL MEDICINE

## 2024-11-05 PROCEDURE — 3288F FALL RISK ASSESSMENT DOCD: CPT | Mod: CPTII,S$GLB,, | Performed by: OPHTHALMOLOGY

## 2024-11-05 PROCEDURE — 90662 IIV NO PRSV INCREASED AG IM: CPT | Mod: S$GLB,,, | Performed by: INTERNAL MEDICINE

## 2024-11-05 PROCEDURE — 1101F PT FALLS ASSESS-DOCD LE1/YR: CPT | Mod: CPTII,S$GLB,, | Performed by: OPHTHALMOLOGY

## 2024-11-05 PROCEDURE — G0008 ADMIN INFLUENZA VIRUS VAC: HCPCS | Mod: S$GLB,,, | Performed by: INTERNAL MEDICINE

## 2024-11-05 PROCEDURE — G2211 COMPLEX E/M VISIT ADD ON: HCPCS | Mod: S$GLB,,, | Performed by: INTERNAL MEDICINE

## 2024-11-05 PROCEDURE — 99999 PR PBB SHADOW E&M-EST. PATIENT-LVL III: CPT | Mod: PBBFAC,,, | Performed by: OPHTHALMOLOGY

## 2024-11-05 PROCEDURE — 3288F FALL RISK ASSESSMENT DOCD: CPT | Mod: CPTII,S$GLB,, | Performed by: INTERNAL MEDICINE

## 2024-11-05 PROCEDURE — 99999 PR PBB SHADOW E&M-EST. PATIENT-LVL IV: CPT | Mod: PBBFAC,,, | Performed by: INTERNAL MEDICINE

## 2024-11-05 PROCEDURE — 99214 OFFICE O/P EST MOD 30 MIN: CPT | Mod: S$GLB,,, | Performed by: INTERNAL MEDICINE

## 2024-11-05 PROCEDURE — 1126F AMNT PAIN NOTED NONE PRSNT: CPT | Mod: CPTII,S$GLB,, | Performed by: OPHTHALMOLOGY

## 2024-11-05 PROCEDURE — 3075F SYST BP GE 130 - 139MM HG: CPT | Mod: CPTII,S$GLB,, | Performed by: INTERNAL MEDICINE

## 2024-11-05 PROCEDURE — 3079F DIAST BP 80-89 MM HG: CPT | Mod: CPTII,S$GLB,, | Performed by: INTERNAL MEDICINE

## 2024-11-05 PROCEDURE — 99214 OFFICE O/P EST MOD 30 MIN: CPT | Mod: S$GLB,,, | Performed by: OPHTHALMOLOGY

## 2024-11-05 PROCEDURE — 1101F PT FALLS ASSESS-DOCD LE1/YR: CPT | Mod: CPTII,S$GLB,, | Performed by: INTERNAL MEDICINE

## 2024-11-05 RX ORDER — METFORMIN HYDROCHLORIDE 500 MG/1
500 TABLET, EXTENDED RELEASE ORAL DAILY
Qty: 90 TABLET | Refills: 3 | Status: SHIPPED | OUTPATIENT
Start: 2024-11-05

## 2024-11-07 ENCOUNTER — HOSPITAL ENCOUNTER (OUTPATIENT)
Dept: RADIOLOGY | Facility: HOSPITAL | Age: 84
Discharge: HOME | End: 2024-11-07
Attending: INTERNAL MEDICINE
Payer: MEDICARE

## 2024-11-07 DIAGNOSIS — E04.2 MULTINODULAR THYROID: Chronic | ICD-10-CM

## 2024-11-07 PROCEDURE — 76536 US EXAM OF HEAD AND NECK: CPT | Mod: TC

## 2024-11-09 NOTE — PROGRESS NOTES
Patient ID: Saul aMr is a 84 y.o. female.    Chief Complaint: Breast Pain      Assessment:       1. Breast pain, right    2. Diabetes mellitus with stage 3 chronic kidney disease    3. Chronic kidney disease, stage 3b    4. Diabetes mellitus with proteinuria    5. Multinodular thyroid: thyroid u/s 7/16, stable 2017 and 2019, 2021    6. Type 2 diabetes mellitus with diabetic neuropathic arthropathy    7. Anemia, unspecified type    8. Hiatal hernia: seen on EGD 2016          Plan:           1. Breast pain, right:  Hygienic measures reviewed.  Proper bra.  Exam is benign.  It is getting.  Consider additional evaluation should symptoms persist.  -     Mammo Digital Diagnostic Bilat  -     US Breast Right Limited; Future; Expected date: 11/05/2024    2. Diabetes mellitus with stage 3 chronic kidney disease; stable on regimen without alarm symptoms.  Continue current treatment.  Labs being monitored at the appropriate interval.    3. Chronic kidney disease, stage 3b:  Gentle hydration, avoid nephrotoxins  -     Renal Function Panel; Future; Expected date: 02/05/2025    4. Diabetes mellitus with proteinuria:  Continue current treatment, cautions reviewed  Overview:  Protein / creatinine ratio, urine 10/26/2016        5. Multinodular thyroid: thyroid u/s 7/16, stable 2017 and 2019, 2021:  She is scheduled for a thyroid ultrasound, will follow closely.  May need ENT evaluation should symptoms persist  Overview:  US Soft Tissue 7/19/2016--- Multinodular thyroid.  None of nodules meet criteria for fine needle aspiration.    Orders:  -     US Soft Tissue Head Neck; Future; Expected date: 11/05/2024  -     TSH; Future; Expected date: 02/05/2025    6. Type 2 diabetes mellitus with diabetic neuropathic arthropathy:  Stable on regimen, continue with diabetic eating plan and lifestyle.  -     metFORMIN (GLUCOPHAGE-XR) 500 MG ER 24hr tablet; Take 1 tablet (500 mg total) by mouth once daily.  Dispense: 90 tablet;  Refill: 3  -     Hemoglobin A1C; Future; Expected date: 02/03/2025    7. Anemia, unspecified type:  Has been mild and stable, likely related to CKD.  No evidence of iron deficiency.  Will continue to monitor.  -     CBC Auto Differential; Future; Expected date: 02/05/2025    8. Hiatal hernia: seen on EGD 2016:  Stable without significant symptoms currently.  Diet and lifestyle issues reviewed    I will review all studies and determine further tx depending on findings  Visit today manifests increased complexity associated with the care of the multiple chronic and episodic problems I addressed.  I am managing a longitudinal care plan of the patient due to the serious and complex problems listed above.    Subjective:       CHIEF COMPLAINT:  Saul presents today for right breast pain.    BREAST PAIN:  She reports right breast pain for approximately one year, occurring every two months. The pain comes and goes, primarily on the right side. She uses Vaseline for pain relief, which helps alleviate the pain by the following morning. She denies any injury to the breast, issues with the left breast, bleeding from the nipple, rashes, or discomfort in the axilla area.    WEIGHT LOSS:  She reports unintentional weight loss of approximately 15 lbs, dropping from 185 to 169 lbs since COVID-19 in early August. She maintains an appetite but has reduced meal frequency to two meals per day instead of three, attributing this to waking up late and difficulty standing for extended periods due to orthopedic issues. She attempts to incorporate healthy snacks, such as tuna, between meals. She denies experiencing acid reflux or burning sensations while eating.    VOICE CHANGES:  She reports experiencing voice changes over the past three years, noting a loss of vocal control, particularly when attempting to sing. She expresses concern about the potential relationship between her thyroid condition and these voice changes. She denies any recent  worsening of symptoms, stating that the condition has remained stable over the past few years.    GASTROINTESTINAL:  She reports improvement in constipation with the use of Dulcolax. She initially tried warm prune juice, which was effective the first time but subsequently required additional intervention.    MEDICATIONS:  She reports taking metformin twice daily. She denies any new medications.    DIET:  She drinks coffee daily with breakfast, typically consuming 1-2 cups per day. She denies any recent changes in her caffeine intake pattern.      ROS:  General: -fever, -chills, -fatigue, -weight gain, +weight loss  Eyes: -vision changes, -redness, -discharge  ENT: -ear pain, -nasal congestion, -sore throat  Cardiovascular: -chest pain, -palpitations, -lower extremity edema  Respiratory: -cough, -shortness of breath  Gastrointestinal: -abdominal pain, -nausea, -vomiting, -diarrhea, +constipation, -blood in stool, -heartburn  Genitourinary: -dysuria, -hematuria, -frequency  Musculoskeletal: -joint pain, -muscle pain  Skin: -rash, -lesion  Neurological: -headache, -dizziness, -numbness, -tingling  Psychiatric: -anxiety, -depression, -sleep difficulty  Breasts: +breast pain         Patient Active Problem List   Diagnosis    Degenerative disc disease    Colon polyp: tubular adenoma 5/13, repeated 2016 to be repeated 2021- declines colonoscopy and Gastro also does not recommend    Multinodular thyroid: thyroid u/s 7/16, stable 2017 and 2019, 2021, also 11/24    POAG (primary open-angle glaucoma) - Both Eyes    Amaurosis fugax    Nuclear sclerosis - Right Eye    Eyelid myokymia    Cerebral microvascular disease: stroke ? 1999 elsewhere; TIA 10/13    Vitamin D insufficiency    Left ventricular diastolic dysfunction with preserved systolic function    Hypertension, essential    Gastroesophageal reflux disease without esophagitis    Diabetes mellitus with proteinuria    Type II diabetes mellitus with neurological  manifestations    Aortic arch atherosclerosis    Abnormal ankle brachial index    Diabetes mellitus with stage 3 chronic kidney disease    Cerebrovascular accident (CVA) due to thrombosis of precerebral artery    Sickle cell trait syndrome    Mixed anxiety depressive disorder    Diverticulosis of large intestine without hemorrhage    Atrial fibrillation    Hyperlipidemia associated with type 2 diabetes mellitus    Bilateral radiating leg pain    PAD (peripheral artery disease)    Carotid atherosclerosis    Spinal stenosis of lumbar region with neurogenic claudication    Lumbar radiculopathy, chronic    Gait disorder    Posture abnormality    Both eyes affected by 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, associated with type 2 diabetes mellitus    Low-tension glaucoma, right eye, mild stage    Low-tension glaucoma, left eye, moderate stage    Age-related nuclear cataract, right    Mild episode of recurrent major depressive disorder    Carotid artery disease    DM cataract    Diabetic glaucoma    Diabetic retinopathy with macular edema associated with type 2 diabetes mellitus    Pulmonary hypertension    Hearing loss    Chronic constipation    Cognitive complaints with normal exam    Decreased functional mobility and endurance    Tendinitis, de Quervain's    Chronic kidney disease, stage 3b    Mild neurocognitive disorder    Hiatal hernia: seen on EGD 2016        Review of Systems      Objective:      Physical Exam  Vitals and nursing note reviewed.   Constitutional:       Appearance: She is well-developed.   HENT:      Head: Normocephalic and atraumatic.      Right Ear: External ear normal.      Left Ear: External ear normal.      Nose: Nose normal.      Mouth/Throat:      Pharynx: No oropharyngeal exudate.   Eyes:      General: No scleral icterus.     Extraocular Movements: Extraocular movements intact.      Conjunctiva/sclera: Conjunctivae normal.   Neck:      Thyroid: Thyromegaly present.       Vascular: No JVD.   Cardiovascular:      Rate and Rhythm: Normal rate and regular rhythm.      Heart sounds: Normal heart sounds. No murmur heard.     No gallop.   Pulmonary:      Effort: Pulmonary effort is normal. No respiratory distress.      Breath sounds: Normal breath sounds. No wheezing.   Abdominal:      General: Bowel sounds are normal. There is no distension.      Palpations: Abdomen is soft. There is no mass.      Tenderness: There is no abdominal tenderness. There is no guarding or rebound.   Genitourinary:     Comments: BREASTS: No masses, nipple d/c or LN  Musculoskeletal:         General: No tenderness. Normal range of motion.      Cervical back: Normal range of motion and neck supple.   Lymphadenopathy:      Cervical: No cervical adenopathy.   Skin:     General: Skin is warm.      Findings: No erythema or rash.   Neurological:      General: No focal deficit present.      Mental Status: She is alert and oriented to person, place, and time.      Cranial Nerves: No cranial nerve deficit.      Coordination: Coordination normal.   Psychiatric:         Behavior: Behavior normal.         Thought Content: Thought content normal.         Judgment: Judgment normal.             Health Maintenance Due   Topic Date Due    COVID-19 Vaccine (5 - 2024-25 season) 09/01/2024

## 2024-11-13 ENCOUNTER — HOSPITAL ENCOUNTER (OUTPATIENT)
Dept: RADIOLOGY | Facility: HOSPITAL | Age: 84
Discharge: HOME | End: 2024-11-13
Attending: INTERNAL MEDICINE
Payer: MEDICARE

## 2024-11-13 PROCEDURE — 77066 DX MAMMO INCL CAD BI: CPT | Mod: TC

## 2024-11-13 PROCEDURE — 77062 BREAST TOMOSYNTHESIS BI: CPT | Mod: 26,,, | Performed by: RADIOLOGY

## 2024-11-13 PROCEDURE — 77066 DX MAMMO INCL CAD BI: CPT | Mod: 26,,, | Performed by: RADIOLOGY

## 2024-11-18 ENCOUNTER — TELEPHONE (OUTPATIENT)
Dept: INTERNAL MEDICINE | Facility: CLINIC | Age: 84
End: 2024-11-18
Payer: MEDICARE

## 2024-11-18 ENCOUNTER — TELEPHONE (OUTPATIENT)
Dept: OPHTHALMOLOGY | Facility: CLINIC | Age: 84
End: 2024-11-18
Payer: MEDICARE

## 2024-11-18 DIAGNOSIS — E11.36 DIABETIC CATARACT OF RIGHT EYE: Primary | ICD-10-CM

## 2024-11-18 NOTE — TELEPHONE ENCOUNTER
----- Message from Med Assistant Jj sent at 11/18/2024 11:14 AM CST -----  Regarding: surgery clearance  Patient is scheduled for cataract surgery on 01/22/2025 with Dr. Lina Taveras and will be done under local anesthesia. Patient will need to be cleared for surgery either by chart or please schedule an appointment for patient accordingly. Thank you for your assistance.

## 2024-11-19 NOTE — TELEPHONE ENCOUNTER
Asked to do preop clearance for cataract surgery by chart.  Patient is medically optimized for cataract surgery, no additional testing is necessary- I sent chart to ophthalmology    She needs to hold her diabetes medications day of surgery since she will be fasting  2.   She should review with the ophthalmologist whether they want her to hold her blood thinner but typically they recommend continuing  3.    Do not take Linzess or meclizine day of surgery  4.    She should definitely take metoprolol day of surgery.  I usually recommend that patients take their blood pressure medication so that it will not be extremely elevated on the day of surgery but she could consider holding the losartan if her pressure tends to be low when she has not eaten anything

## 2024-11-25 ENCOUNTER — OFFICE VISIT (OUTPATIENT)
Dept: PODIATRY | Facility: CLINIC | Age: 84
End: 2024-11-25
Payer: MEDICARE

## 2024-11-25 VITALS — HEIGHT: 65 IN | WEIGHT: 166 LBS | BODY MASS INDEX: 27.66 KG/M2

## 2024-11-25 DIAGNOSIS — L60.0 ONYCHOCRYPTOSIS: ICD-10-CM

## 2024-11-25 DIAGNOSIS — B35.1 ONYCHOMYCOSIS DUE TO DERMATOPHYTE: ICD-10-CM

## 2024-11-25 DIAGNOSIS — E11.49 TYPE II DIABETES MELLITUS WITH NEUROLOGICAL MANIFESTATIONS: Primary | ICD-10-CM

## 2024-11-25 PROCEDURE — 99999 PR PBB SHADOW E&M-EST. PATIENT-LVL IV: CPT | Mod: PBBFAC,,, | Performed by: PODIATRIST

## 2024-11-25 PROCEDURE — 11721 DEBRIDE NAIL 6 OR MORE: CPT | Mod: Q9,S$GLB,, | Performed by: PODIATRIST

## 2024-11-25 PROCEDURE — 99499 UNLISTED E&M SERVICE: CPT | Mod: S$GLB,,, | Performed by: PODIATRIST

## 2024-11-25 NOTE — PROGRESS NOTES
Subjective:     Patient ID: Saul Mar is a 84 y.o. female.    Chief Complaint: Nail Care (Diabetic pt wears tennis shoes, PCP Dr. Randolph last seen 11-5-24)    Saul is a 84 y.o. female who presents to the clinic for evaluation and treatment of high risk feet. 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 The patient's chief complaint is long thick toenails. This patient has documented high risk feet requiring routine maintenance secondary to diabetes mellitis and those secondary complications of diabetes, as mentioned..    PCP: Penny Randolph MD    Date Last Seen by PCP: 11/05/2024    Current shoe gear:  Affected Foot: Rx diabetic extra depth shoes and custom accommodative insoles     Unaffected Foot: Rx diabetic extra depth shoes and custom accommodative insoles    Hemoglobin A1C   Date Value Ref Range Status   10/21/2024 6.8 (H) 4.0 - 5.6 % Final     Comment:     ADA Screening Guidelines:  5.7-6.4%  Consistent with prediabetes  >or=6.5%  Consistent with diabetes    High levels of fetal hemoglobin interfere with the HbA1C  assay. Heterozygous hemoglobin variants (HbS, HgC, etc)do  not significantly interfere with this assay.   However, presence of multiple variants may affect accuracy.     07/09/2024 8.3 (H) 4.0 - 5.6 % Final     Comment:     ADA Screening Guidelines:  5.7-6.4%  Consistent with prediabetes  >or=6.5%  Consistent with diabetes    High levels of fetal hemoglobin interfere with the HbA1C  assay. Heterozygous  hemoglobin variants (HbS, HgC, etc)do  not significantly interfere with this assay.   However, presence of multiple variants may affect accuracy.     12/04/2023 7.3 (H) 4.0 - 5.6 % Final     Comment:     ADA Screening Guidelines:  5.7-6.4%  Consistent with prediabetes  >or=6.5%  Consistent with diabetes    High levels of fetal hemoglobin interfere with the HbA1C  assay. Heterozygous hemoglobin variants (HbS, HgC, etc)do  not significantly interfere with this assay.   However, presence of multiple variants may affect accuracy.         Patient Active Problem List   Diagnosis    Degenerative disc disease    Colon polyp: tubular adenoma 5/13, repeated 2016 to be repeated 2021- declines colonoscopy and Gastro also does not recommend    Multinodular thyroid: thyroid u/s 7/16, stable 2017 and 2019, 2021, also 11/24    POAG (primary open-angle glaucoma) - Both Eyes    Amaurosis fugax    Nuclear sclerosis - Right Eye    Eyelid myokymia    Cerebral microvascular disease: stroke ? 1999 elsewhere; TIA 10/13    Vitamin D insufficiency    Left ventricular diastolic dysfunction with preserved systolic function    Hypertension, essential    Gastroesophageal reflux disease without esophagitis    Diabetes mellitus with proteinuria    Type II diabetes mellitus with neurological manifestations    Aortic arch atherosclerosis    Abnormal ankle brachial index    Diabetes mellitus with stage 3 chronic kidney disease    Cerebrovascular accident (CVA) due to thrombosis of precerebral artery    Sickle cell trait syndrome    Mixed anxiety depressive disorder    Diverticulosis of large intestine without hemorrhage    Atrial fibrillation    Hyperlipidemia associated with type 2 diabetes mellitus    Bilateral radiating leg pain    PAD (peripheral artery disease)    Carotid atherosclerosis    Spinal stenosis of lumbar region with neurogenic claudication    Lumbar radiculopathy, chronic    Gait disorder    Posture abnormality    Both eyes affected  by 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, associated with type 2 diabetes mellitus    Low-tension glaucoma, right eye, mild stage    Low-tension glaucoma, left eye, moderate stage    Age-related nuclear cataract, right    Mild episode of recurrent major depressive disorder    Carotid artery disease    DM cataract    Diabetic glaucoma    Diabetic retinopathy with macular edema associated with type 2 diabetes mellitus    Pulmonary hypertension    Hearing loss    Chronic constipation    Cognitive complaints with normal exam    Decreased functional mobility and endurance    Tendinitis, de Quervain's    Chronic kidney disease, stage 3b    Mild neurocognitive disorder    Hiatal hernia: seen on EGD 2016       Medication List with Changes/Refills   Current Medications    ALBUTEROL (PROVENTIL/VENTOLIN HFA) 90 MCG/ACTUATION INHALER    INHALE 2 PUFFS INTO THE LUNGS EVERY 6 (SIX) HOURS AS NEEDED FOR WHEEZING. RESCUE    ALPRAZOLAM (XANAX) 0.5 MG TABLET    TAKE 1 TABLET BY MOUTH NIGHTLY AS NEEDED FOR ANXIETY (MAY TAKE 1-2 TIMES WEEKLY FOR ANXIETY)    AMLODIPINE (NORVASC) 5 MG TABLET    Take 1 tablet (5 mg total) by mouth 2 (two) times daily.    ATORVASTATIN (LIPITOR) 80 MG TABLET    TAKE 1 TABLET BY MOUTH EVERY DAY    BENZONATATE (TESSALON) 100 MG CAPSULE    TAKE 1 CAPSULE BY MOUTH THREE TIMES A DAY AS NEEDED Oral for 10 Days    BLOOD SUGAR DIAGNOSTIC STRP    For use with insurance covered glucometer; test daily    BLOOD-GLUCOSE METER KIT    To check BG 2x times daily, to use with insurance preferred meter    CARVEDILOL (COREG) 12.5 MG TABLET    Take 12.5 mg by mouth 2 (two) times daily.    CHOLECALCIFEROL, VITAMIN D3, (VITAMIN D3) 1,000 UNIT CAPSULE    Take 1 capsule (1,000 Units total) by mouth once daily.    DICLOFENAC SODIUM (VOLTAREN) 1 % GEL    Apply 2 g topically 2 (two) times daily.    DULOXETINE (CYMBALTA) 30 MG CAPSULE    Take 1 capsule (30 mg total) by mouth once daily.    EMPAGLIFLOZIN  (JARDIANCE) 25 MG TABLET    Take 1 tablet (25 mg total) by mouth once daily.    ESTRADIOL (ESTRACE) 0.01 % (0.1 MG/GRAM) VAGINAL CREAM    Place 1 g vaginally 3 (three) times a week.    FLECAINIDE (TAMBOCOR) 50 MG TAB    Take 1 tablet (50 mg total) by mouth 2 (two) times daily.    LEVALBUTEROL (XOPENEX HFA) 45 MCG/ACTUATION INHALER    INHALE 1-2 PUFFS INTO THE LUNGS EVERY 6 (SIX) HOURS AS NEEDED FOR WHEEZING. RESCUE    LINACLOTIDE (LINZESS) 145 MCG CAP CAPSULE    TAKE 1 CAPSULE (145 MCG TOTAL) BY MOUTH BEFORE BREAKFAST.    LOSARTAN (COZAAR) 50 MG TABLET    Take 1 tablet (50 mg total) by mouth 2 (two) times daily.    MECLIZINE (ANTIVERT) 25 MG TABLET    TAKE 1 TABLET (25 MG TOTAL) BY MOUTH DAILY AS NEEDED FOR DIZZINESS.    METFORMIN (GLUCOPHAGE-XR) 500 MG ER 24HR TABLET    Take 1 tablet (500 mg total) by mouth once daily.    METOPROLOL SUCCINATE (TOPROL-XL) 50 MG 24 HR TABLET    TAKE 1 TABLET BY MOUTH EVERY DAY    ONETOUCH DELICA PLUS LANCET 33 GAUGE MISC    Apply topically.    ONETOUCH ULTRA2 METER MISC    SMARTSIG:Via Meter As Directed    PREGABALIN (LYRICA) 75 MG CAPSULE    Oral for 90 Days    XARELTO 15 MG TAB    TAKE 1 TABLET (15 MG TOTAL) BY MOUTH DAILY WITH DINNER OR EVENING MEAL.   Changed and/or Refilled Medications    Modified Medication Previous Medication    BRIMONIDINE 0.2% (ALPHAGAN) 0.2 % DROP brimonidine 0.2% (ALPHAGAN) 0.2 % Drop       INSTILL 1 DROP INTO BOTH EYES 3 TIMES A DAY    Place 1 drop into both eyes 3 (three) times daily.    GLIMEPIRIDE (AMARYL) 4 MG TABLET glimepiride (AMARYL) 4 MG tablet       TAKE 1 TABLET BY MOUTH EVERY DAY WITH BREAKFAST    TAKE 1 TABLET BY MOUTH EVERY DAY WITH BREAKFAST       Review of patient's allergies indicates:   Allergen Reactions    Pravachol [pravastatin] Nausea Only       Past Surgical History:   Procedure Laterality Date    CATARACT EXTRACTION W/  INTRAOCULAR LENS IMPLANT Left 02/27/2013    Dr. Taveras    COLONOSCOPY      COLONOSCOPY N/A 9/22/2016     Procedure: COLONOSCOPY;  Surgeon: Jd Ashton MD;  Location: [facility] ENDO (4TH FLR);  Service: Endoscopy;  Laterality: N/A;  OK per Dr Randolph for pt to hold Plavix 5 days prior to procedure/see telephone encounter dated 8/29/16/svn    EPIDURAL STEROID INJECTION N/A 8/2/2023    Procedure: L4-5 interlaminar epidural steroid injection with right paramedian approach with RN IV sedation;  Surgeon: Chan Fonseca MD;  Location: [facility] PAIN MGT;  Service: Pain Management;  Laterality: N/A;    EYE SURGERY Bilateral 2002 approx    Laser for glaucoma    HYSTERECTOMY  1963     AMEENA/USO- fibroids; no cancer    OOPHORECTOMY  1963    unknown, only removed one    SELECTIVE INJECTION OF ANESTHETIC AGENT AROUND LUMBAR SPINAL NERVE ROOT BY TRANSFORAMINAL APPROACH Bilateral 6/17/2022    Procedure: Bilateral L4/5 TF JASKARAN with RN IV sedation;  Surgeon: Chan Fonseca MD;  Location: [facility] PAIN MGT;  Service: Pain Management;  Laterality: Bilateral;    SELECTIVE INJECTION OF ANESTHETIC AGENT AROUND LUMBAR SPINAL NERVE ROOT BY TRANSFORAMINAL APPROACH Bilateral 10/3/2022    Procedure: Bilateral L2-3 transforaminal epidural steroid injection with RN IV sedation;  Surgeon: Chan Fonseca MD;  Location: [facility] PAIN MGT;  Service: Pain Management;  Laterality: Bilateral;       Family History   Problem Relation Name Age of Onset    Stroke Mother      Mental illness Mother      Hypertension Mother      Stroke Father      Cancer Sister  68        gyn    Ovarian cancer Sister      Diabetes Maternal Grandmother      Heart disease Paternal Grandmother      Drug abuse Daughter      Cancer Maternal Aunt          type unknown    Cancer Maternal Uncle          type not known    Glaucoma Neg Hx      Macular degeneration Neg Hx      Alcohol abuse Neg Hx      COPD Neg Hx      Asthma Neg Hx      Amblyopia Neg Hx      Blindness Neg Hx      Cataracts Neg Hx      Retinal detachment Neg Hx      Strabismus Neg Hx         Social History     Socioeconomic History     "Marital status:     Number of children: 1   Tobacco Use    Smoking status: Never     Passive exposure: Never    Smokeless tobacco: Never   Substance and Sexual Activity    Alcohol use: No    Drug use: No    Sexual activity: Not Currently     Partners: Male   Social History Narrative    , no pets or smokers in household. 1 Child who lives in nursing home. She has a grandson who lives in [place].. Retired from [employer] . Still drives. Does not have a Living Will or advanced directive.      Social Drivers of Health     Financial Resource Strain: Medium Risk (7/9/2024)    Overall Financial Resource Strain (CARDIA)     Difficulty of Paying Living Expenses: Somewhat hard   Food Insecurity: No Food Insecurity (7/9/2024)    Hunger Vital Sign     Worried About Running Out of Food in the Last Year: Never true     Ran Out of Food in the Last Year: Never true   Transportation Needs: Unmet Transportation Needs (7/9/2024)    PRAPARE - Transportation     Lack of Transportation (Medical): No     Lack of Transportation (Non-Medical): Yes   Physical Activity: Insufficiently Active (7/9/2024)    Exercise Vital Sign     Days of Exercise per Week: 3 days     Minutes of Exercise per Session: 40 min   Stress: No Stress Concern Present (7/9/2024)    [employer] - Occupational Stress Questionnaire     Feeling of Stress : Not at all   Housing Stability: Low Risk  (7/9/2024)    Housing Stability Vital Sign     Unable to Pay for Housing in the Last Year: No     Homeless in the Last Year: No       Vitals:    11/25/24 1411   Weight: 75.3 kg (166 lb 0.1 oz)   Height: 5' 5" (1.651 m)   PainSc: 0-No pain       Hemoglobin A1C   Date Value Ref Range Status   10/21/2024 6.8 (H) 4.0 - 5.6 % Final     Comment:     ADA Screening Guidelines:  5.7-6.4%  Consistent with prediabetes  >or=6.5%  Consistent with diabetes    High levels of fetal hemoglobin interfere with the HbA1C  assay. Heterozygous hemoglobin " variants (HbS, HgC, etc)do  not significantly interfere with this assay.   However, presence of multiple variants may affect accuracy.     07/09/2024 8.3 (H) 4.0 - 5.6 % Final     Comment:     ADA Screening Guidelines:  5.7-6.4%  Consistent with prediabetes  >or=6.5%  Consistent with diabetes    High levels of fetal hemoglobin interfere with the HbA1C  assay. Heterozygous hemoglobin variants (HbS, HgC, etc)do  not significantly interfere with this assay.   However, presence of multiple variants may affect accuracy.     12/04/2023 7.3 (H) 4.0 - 5.6 % Final     Comment:     ADA Screening Guidelines:  5.7-6.4%  Consistent with prediabetes  >or=6.5%  Consistent with diabetes    High levels of fetal hemoglobin interfere with the HbA1C  assay. Heterozygous hemoglobin variants (HbS, HgC, etc)do  not significantly interfere with this assay.   However, presence of multiple variants may affect accuracy.         Review of Systems   Constitutional:  Negative for chills and fever.   Respiratory:  Negative for shortness of breath.    Cardiovascular:  Negative for chest pain, palpitations, orthopnea, claudication and leg swelling.   Gastrointestinal:  Negative for diarrhea, nausea and vomiting.   Musculoskeletal:  Negative for joint pain.   Skin:  Negative for rash.   Neurological:  Positive for tingling and sensory change.   Psychiatric/Behavioral: Negative.               Objective:      PHYSICAL EXAM: Apperance: Alert and orient in no distress,well developed, and with good attention to grooming and body habits  Patient presents ambulating in tennis shoes.  LOWER EXTREMITY EXAM:  VASCULAR: Dorsalis pedis pulses 0/4 left 1/4 right and Posterior Tibial pulses 0/4 left and 1/4 right. Capillary fill time <4 seconds bilateral. Mild edema observed bilateral. Varicosities present bilateral. Skin temperature of the lower extremities is warm to cool, proximal to distal. Hair growth absent bilateral.  DERMATOLOGICAL: No skin rashes,  subcutaneous nodules, lesions, or ulcers observed bilateral. Nails 1,2,3,4,5, bilateral elongated, thickened, and discolored with subungual debris. Right hallux nail observed to be mildly incurvated at distal lateral borders and slight obstructed in the nail grooves with soft tissue. No purulent drainage, no odor, and no increased temperature observed to right hallux. Webspaces 1,2,3,4 bilateral clean, dry and without evidence of break in skin integrity. Mild hyperkeratotic tissue noted to bilateral hallux.   NEUROLOGICAL: Light touch, sharp-dull, proprioception all present and equal bilaterally.  Vibratory sensation diminished at bilateral hallux. Protective sensation absent at toe sites as tested with a Thornton-Marjan 5.07 monofilament.   MUSCULOSKELETAL: Muscle strength is 5/5 for foot inverters, everters, plantarflexors, and dorsiflexors. Muscle tone is normal. Pain on palpation of right distal lateral nail border.           Assessment:       ICD-10-CM ICD-9-CM   1. Type II diabetes mellitus with neurological manifestations  E11.49 250.60   2. Onychocryptosis  L60.0 703.0   3. Onychomycosis due to dermatophyte  B35.1 110.1         Plan:   Type II diabetes mellitus with neurological manifestations    Onychocryptosis    Onychomycosis due to dermatophyte    I counseled the patient on her conditions, regarding findings of my examination, my impressions, and usual treatment plan.   Appointment spent on education about the diabetic foot, neuropathy, and prevention of limb loss.  Shoe inspection. Diabetic Foot Education. Patient reminded of the importance of good nutrition and blood sugar control to help prevent podiatric complications of diabetes. Patient instructed on proper foot hygeine. We discussed wearing proper shoe gear, daily foot inspections, never walking without protective shoe gear, never putting sharp instruments to feet.    With patient's permission, nails 1-5 bilateral were debrided/trimmed in length  and thickness aggressively to their soft tissue attachment mechanically and with electric , removing all offending nail and debris. Patient relates relief following the procedure.  With patient's permission, bilateral callus trimmed in thickness with #15 blade in thickness without incident.   Patient  will continue to monitor the areas daily, inspect feet, wear protective shoe gear when ambulatory, moisturizer to maintain skin integrity. Patient reminded of the importance of good nutrition and blood sugar control to help prevent podiatric complications of diabetes.  Patient to return 6 months or sooner if needed.        Zuleima Howell DPM  [facility]

## 2024-11-26 DIAGNOSIS — E11.22 DIABETES MELLITUS WITH STAGE 3 CHRONIC KIDNEY DISEASE: ICD-10-CM

## 2024-11-26 DIAGNOSIS — H40.1211 LOW-TENSION GLAUCOMA, RIGHT EYE, MILD STAGE: ICD-10-CM

## 2024-11-26 DIAGNOSIS — H40.1222 LOW-TENSION GLAUCOMA, LEFT EYE, MODERATE STAGE: ICD-10-CM

## 2024-11-26 DIAGNOSIS — N18.30 DIABETES MELLITUS WITH STAGE 3 CHRONIC KIDNEY DISEASE: ICD-10-CM

## 2024-11-26 RX ORDER — BRIMONIDINE TARTRATE 2 MG/ML
SOLUTION/ DROPS OPHTHALMIC
Qty: 15 ML | Refills: 12 | Status: SHIPPED | OUTPATIENT
Start: 2024-11-26

## 2024-11-26 NOTE — TELEPHONE ENCOUNTER
Refill Routing Note   Medication(s) are not appropriate for processing by [facility] for the following reason(s):        New or recently adjusted medication  Required labs abnormal    [facility] action(s):  Defer             Appointments  past 12m or future 3m with PCP    Date Provider   Last Visit   11/5/2024 Penny Randolph MD   Next Visit   Visit date not found Penny Randolph MD   ED visits in past 90 days: 0        Note composed:2:34 PM 11/26/2024

## 2024-11-26 NOTE — TELEPHONE ENCOUNTER
No care due was identified.  [facility] Embedded Care Due Messages. Reference number: [id number].   11/26/2024 10:17:57 AM CST

## 2024-11-27 RX ORDER — GLIMEPIRIDE 4 MG/1
4 TABLET ORAL
Qty: 90 TABLET | Refills: 1 | Status: SHIPPED | OUTPATIENT
Start: 2024-11-27

## 2024-12-05 ENCOUNTER — TELEPHONE (OUTPATIENT)
Dept: PHYSICAL MEDICINE AND REHAB | Facility: CLINIC | Age: 84
End: 2024-12-05
Payer: MEDICARE

## 2024-12-05 NOTE — TELEPHONE ENCOUNTER
----- Message from Wyatt sent at 12/5/2024  9:06 AM CST -----  Type:  Needs Medical Advice    Who Called: MARIAMA CANO [[id number]]  Symptoms (please be specific):    How long has patient had these symptoms:    Pharmacy name and phone #:    Would the patient rather a call back or a response via [facility]?   Best Call Back Number: [phone]  Additional Information: Patient called in regards to test. Patient would like to know are there resticiton

## 2024-12-06 ENCOUNTER — OFFICE VISIT (OUTPATIENT)
Facility: CLINIC | Age: 84
End: 2024-12-06
Payer: MEDICARE

## 2024-12-06 DIAGNOSIS — R20.2 PARESTHESIA OF ARM: ICD-10-CM

## 2024-12-06 DIAGNOSIS — M79.601 PAIN IN BOTH UPPER EXTREMITIES: ICD-10-CM

## 2024-12-06 DIAGNOSIS — M79.602 PAIN IN BOTH UPPER EXTREMITIES: ICD-10-CM

## 2024-12-06 PROCEDURE — 99999 PR PBB SHADOW E&M-EST. PATIENT-LVL I: CPT | Mod: PBBFAC,,, | Performed by: STUDENT IN AN ORGANIZED HEALTH CARE EDUCATION/TRAINING PROGRAM

## 2024-12-06 NOTE — PROGRESS NOTES
"Physical Medicine and Rehabilitation Electrodiagnostic Clinic Note    [facility]  Dept of Physical Medicine and Rehabilitation        Full Name: Casimiro Hughes YOB: 1940  Patient ID: [id number]      Visit Date: 12/6/2024 9:46 AM  Age: 84 Years  Examining Physician: Serafin Gamboa DO  Referring Physician: Delon  Height: 5 feet 5 inch  Weight: 169 lbs  Paitent History: BUE    12/06/2024    Referred by: Olivia Peralta NP    Referral history: EMG BUE    Arm pain; bilateral      Subjective:    Saul Mar is a 84 y.o. female who is referred for electrodiagnostic evaluation with complaint of arm pain, both sides. Reports feeling of "pin sticking" in the forearms, wrists (points to thumb)    Onset/ duration: <6 months  Progression: improving, not feeling as often  Associated with:  - numbness and pain  - Weakness: yes, dropping things    Relevant functional history:   hand dominance: right  Occupation: retired, service rep at [employer]    Relevant PMHx: diabetes  Relevant trauma: no    PSHx:  None relevant    Family History:  Known neuromuscular disease: no      Clinical Examination Findings:  There were no vitals filed for this visit.    General: Patient is alert, fully oriented and cooperative. No apparent distress  Inspection:  Muscle atrophy: diffuse atrophy bilateral hands  Extremity swelling/ edema: no  Sensory examination: reports normal and symmetrical appreciation of light touch in the lower extremities.  There was no dermatomal or peripheral nerve pattern of sensory loss.    Manual Muscle Testing    Upper Extremity Left Right   Shoulder abduction 5 5   Elbow flexion 5 5   Elbow extension 5 5   Finger flexion 5 5   Finger abduction 5 5     Reflexes symmetric, decreased throughout the upper extremity  Tinel's at the wrist negative              Sensory NCS      Nerve / Sites Rec. Site Onset Lat Peak Lat Amp Segments Distance Velocity Temp. Comment     ms ms µV  cm m/s " °C    L Median - Dig II (Antidromic)      Wrist Index 3.65 4.48 6.9 Wrist - Index 14 38 30.2    L Ulnar - Dig V (Antidromic)      Wrist Dig V 2.81 3.70 10.8 Wrist - Dig V 14 50 30.2    L Radial - Superficial (Antidromic)      Forearm Wrist 2.45 3.12 11.1 Forearm - Wrist 10 41     R Median - Dig II (Antidromic)      Wrist Index NR NR NR Wrist - Index 14 NR     R Ulnar - Dig V (Antidromic)      Wrist Dig V 3.07 3.96 7.9 Wrist - Dig V 14 46     R Radial - Superficial (Antidromic)      Forearm Wrist 2.08 2.81 18.3 Forearm - Wrist 10 48         Motor NCS      Nerve / Sites Muscle Latency Amplitude Segments Dist. Lat Diff Velocity Temp. Comments     ms mV  cm ms m/s °C    L Median - APB      Wrist APB 4.81 4.9 Wrist - APB 8          Elbow APB 9.92 4.0 Elbow - Wrist 23 5.10 45.1 30    L Ulnar - ADM      Wrist ADM 3.31 6.7 Wrist - ADM 8          B.Elbow ADM 8.21 5.9 B.Elbow - Wrist 24 4.90 49.0        A.Elbow ADM 11.27 5.6 A.Elbow - B.Elbow 13 3.06 42.4     R Median - APB      Wrist APB 5.60 6.4 Wrist - APB 8          Elbow APB 11.21 4.4 Elbow - Wrist 24 5.60 43 32    R Ulnar - ADM      Wrist ADM 3.48 6.7 Wrist - ADM 8          B.Elbow ADM 8.40 5.5 B.Elbow - Wrist 24 4.92 48.8        A.Elbow ADM 11.35 5.3 A.Elbow - B.Elbow 13 2.96 43.9         EMG Summary Table     Spontaneous MUAP Recruitment   Muscle IA Fib PSW Fasc Amp Dur. PPP Pattern   R. Abductor pollicis brevis N None None None N N N N       Summary    The motor conduction test was unremarkable in all 4 of the tested nerves: L Median - APB, L Ulnar - ADM, R Median - APB, R Ulnar - ADM.    The sensory conduction test was performed on 6 nerve(s). There were no results within the specified normal range. Findings were unremarkable in 5 nerve(s): L Median - Dig II (Antidromic), L Ulnar - Dig V (Antidromic), L Radial - Superficial (Antidromic), R Ulnar - Dig V (Antidromic), R Radial - Superficial (Antidromic). Results outside the specified normal range were found in 1  nerve(s), as follows:    Notably, pt age (84) is outside reference ranges in AANEM values, which cutoff at age 79. Thus findings below will correlate with age range of 50-79  Prolonged latency L median - Dig II sensory  Decreased amplitude L median - Dig II sensory  Prolonged latency L median motor at the wrist with slowed conduction velocity  L ulnar motor decreased amplitude to wrist, above and below elbow  L ulnar motor with decreased conduction velocity uniformly  Increased latency to R median motor at the wrist  Decreased conduction velocity to R median motor  Decreased conduction velocity to R ulnar motor  In the R Median - Dig II (Antidromic) study  the response was considered absent for Wrist stimulation    The needle EMG study was normal in all 1 tested muscles: R. Abductor pollicis brevis.        Casimiro Hughes [id number] 12/6/2024 9:46 AM     1 of 1    Conclusion:   Abnormal study  Evidence of demyelination (sensory & motor) and axonal loss (sensory) to the median nerve on the RIGHT consistent with severe carpal tunnel syndrome   Evidence of demyelination (sensory & motor) and axonal (sensory) loss to the median nerve on the LEFT consistent with moderate carpal tunnel syndrome  Generalized slowing of the ulnar nerve bilaterally expected with advanced age, there is no focal slowing to suggest nerve entrapment  No EMG findings to suggest radiculopathy on limited evaluation of right APB (pt declined further needle exam)        ____________________________  DO Casimiro Murguia [id number] 12/6/2024 9:46 AM     1 of 1                                        Further Recommendations:  1. Follow-up with referring provider. Pt may benefit from injections or carpal tunnel release   2. Provided handout on carpal tunnel syndrome and associated exercises    Serafin Gamboa  12/06/2024  [facility]

## 2024-12-09 ENCOUNTER — PATIENT MESSAGE (OUTPATIENT)
Dept: PAIN MEDICINE | Facility: CLINIC | Age: 84
End: 2024-12-09
Payer: MEDICARE

## 2024-12-09 DIAGNOSIS — G56.00 CARPAL TUNNEL SYNDROME, UNSPECIFIED LATERALITY: Primary | ICD-10-CM

## 2024-12-19 ENCOUNTER — OFFICE VISIT (OUTPATIENT)
Dept: DIABETES | Facility: CLINIC | Age: 84
End: 2024-12-19
Payer: MEDICARE

## 2024-12-19 VITALS
DIASTOLIC BLOOD PRESSURE: 68 MMHG | SYSTOLIC BLOOD PRESSURE: 143 MMHG | HEART RATE: 55 BPM | WEIGHT: 170.63 LBS | BODY MASS INDEX: 28.4 KG/M2

## 2024-12-19 DIAGNOSIS — E11.49 TYPE II DIABETES MELLITUS WITH NEUROLOGICAL MANIFESTATIONS: Primary | ICD-10-CM

## 2024-12-19 DIAGNOSIS — E11.69 HYPERLIPIDEMIA ASSOCIATED WITH TYPE 2 DIABETES MELLITUS: ICD-10-CM

## 2024-12-19 DIAGNOSIS — M79.641 BILATERAL HAND PAIN: Primary | ICD-10-CM

## 2024-12-19 DIAGNOSIS — I10 HYPERTENSION, ESSENTIAL: Chronic | ICD-10-CM

## 2024-12-19 DIAGNOSIS — E11.3213 BOTH EYES AFFECTED BY MILD NONPROLIFERATIVE DIABETIC RETINOPATHY WITH MACULAR EDEMA, ASSOCIATED WITH TYPE 2 DIABETES MELLITUS: ICD-10-CM

## 2024-12-19 DIAGNOSIS — E11.22 DIABETES MELLITUS WITH STAGE 3 CHRONIC KIDNEY DISEASE: Chronic | ICD-10-CM

## 2024-12-19 DIAGNOSIS — N18.30 DIABETES MELLITUS WITH STAGE 3 CHRONIC KIDNEY DISEASE: Chronic | ICD-10-CM

## 2024-12-19 DIAGNOSIS — E78.5 HYPERLIPIDEMIA ASSOCIATED WITH TYPE 2 DIABETES MELLITUS: ICD-10-CM

## 2024-12-19 DIAGNOSIS — M79.642 BILATERAL HAND PAIN: Primary | ICD-10-CM

## 2024-12-19 LAB — GLUCOSE SERPL-MCNC: 174 MG/DL (ref 70–110)

## 2024-12-19 PROCEDURE — 99999 PR PBB SHADOW E&M-EST. PATIENT-LVL IV: CPT | Mod: PBBFAC,,, | Performed by: NURSE PRACTITIONER

## 2024-12-19 NOTE — PROGRESS NOTES
Patient ID: Saul Mar is a 84 y.o. female.  Patient's current PCP is Penny Randolph MD.     Chief Complaint: Diabetes Mellitus    HPI  Saul Mar is a 84 y.o. Black or  female presenting for a follow up for diabetes.     Patient has been diagnosed with type 2 diabetes since 30 years .  Recent diabetes related hospitalizations:none   Complications related to diabetes: nephropathy and retinopathy vascular disease  Previous diabetes education:yes, [facility] with [place]   Occupation:Retired, lives alone    Current diet: Trying to eat 3 meals per day. Working on consistency and quality of meals. Weight stable  Activity Level: Walking in [place] - 1-2 x week and increasing activity as tolerated around house      Changes made at last visit:  -     Continue same  -     Labs in 2-3 weeks    Current issues: Current with all specialists. Scheduled to see Nathaniel Peterson to address bilateral CTS.       CURRENT DM MEDICATIONS:   Diabetes Medications               empagliflozin (JARDIANCE) 25 mg tablet Take 1 tablet (25 mg total) by mouth once daily.    glimepiride (AMARYL) 4 MG tablet Take 1 tablet (4 mg total) by mouth 2 (two) times daily with meals.    metFORMIN (GLUCOPHAGE-XR) 500 MG ER 24hr tablet TAKE 2 TABLETS BY MOUTH EVERY DAY          Past failed treatment(s) include:   Jardiance - cost prohibitive ($40 copay)    Meter/cgm: meter  Blood glucose testing is performed regularly.   Patient is testing 3-5 times per week.  Any episodes of hypoglycemia? Occasionally if sleeping very late and first meal not until 1 pm  Glucose Patterns: am 90 - 135 with occasional 200 per patient report    Her blood sugar in the clinic today was:  Lab Results   Component Value Date    POCGLU 174 (A) 12/19/2024         Lab Results   Component Value Date    HGBA1C 6.8 (H) 10/21/2024    HGBA1C 8.3 (H) 07/09/2024    HGBA1C 7.3 (H) 12/04/2023       Diabetes Management Status    Statin: Taking  ACE/ARB:  "Taking    Screening or Prevention Patient's value Goal Complete/Controlled?   HgA1C Testing and Control   Lab Results   Component Value Date    HGBA1C 6.8 (H) 10/21/2024      Annually/Less than 8% yes   Lipid profile : 07/09/2024 Annually Yes   LDL control Lab Results   Component Value Date    LDLCALC 106.8 07/09/2024    Annually/Less than 100 mg/dl  No   Nephropathy screening Lab Results   Component Value Date    LABMICR 67.0 12/12/2023     Lab Results   Component Value Date    PROTEINUA Negative 08/23/2024     Lab Results   Component Value Date    UTPCR Unable to calculate 09/21/2020      Annually Yes   Blood pressure BP Readings from Last 1 Encounters:   12/19/24 (!) 143/68    Less than 140/90 Yes   Dilated retinal exam : 06/04/2024 Annually Yes   Foot exam   11/25/24 Annually Yes       Preferred lab site: [facility]  Preferred visit site:[facility]    Labs reviewed and are noted below.    Lab Results   Component Value Date    WBC 4.82 10/21/2024    HGB 10.7 (L) 10/21/2024    HCT 34.2 (L) 10/21/2024     10/21/2024    CHOL 172 07/09/2024    TRIG 51 07/09/2024    HDL 55 07/09/2024    LDLCALC 106.8 07/09/2024    ALT 13 07/09/2024    AST 19 07/09/2024     10/21/2024    K 4.2 10/21/2024     10/21/2024    ANIONGAP 11 10/21/2024    CREATININE 1.3 10/21/2024    ESTGFRAFRICA 49.3 (A) 03/02/2022    EGFRNONAA 42.8 (A) 03/02/2022    BUN 22 10/21/2024    CO2 21 (L) 10/21/2024    TSH 1.140 07/09/2024    INR 1.0 10/30/2013     10/21/2024    UTPCR Unable to calculate 09/21/2020     Lab Results   Component Value Date    CPEPTIDE 1.8 03/12/2009    FREET4 1.27 08/13/2015    TSH 1.140 07/09/2024    IRON 66 07/09/2024    TIBC 296 07/09/2024    FERRITIN 257 07/09/2024    MTDGEPUA28 >2000 (H) 07/09/2024    .0 (H) 06/25/2020    CALCIUM 10.1 10/21/2024    PHOS 3.2 10/21/2024     Lab Results   Component Value Date    CPEPTIDE 1.8 03/12/2009     No results found for: "GLUTAMICACID"  Glucose   Date Value Ref Range " Status   10/21/2024 108 70 - 110 mg/dL Final     Anion Gap   Date Value Ref Range Status   10/21/2024 11 8 - 16 mmol/L Final     eGFR if    Date Value Ref Range Status   03/02/2022 49.3 (A) >60 mL/min/1.73 m^2 Final     eGFR if non    Date Value Ref Range Status   03/02/2022 42.8 (A) >60 mL/min/1.73 m^2 Final     Comment:     Calculation used to obtain the estimated glomerular filtration  rate (eGFR) is the CKD-EPI equation.              Review of patient's allergies indicates:   Allergen Reactions    Pravachol [pravastatin] Nausea Only     Social History     Socioeconomic History    Marital status:     Number of children: 1   Tobacco Use    Smoking status: Never     Passive exposure: Never    Smokeless tobacco: Never   Substance and Sexual Activity    Alcohol use: No    Drug use: No    Sexual activity: Not Currently     Partners: Male   Social History Narrative    , no pets or smokers in household. 1 Child who lives in nursing home. She has a grandson who lives in [place].. Retired from [employer] . Still drives. Does not have a Living Will or advanced directive.      Social Drivers of Health     Financial Resource Strain: Medium Risk (7/9/2024)    Overall Financial Resource Strain (CARDIA)     Difficulty of Paying Living Expenses: Somewhat hard   Food Insecurity: No Food Insecurity (7/9/2024)    Hunger Vital Sign     Worried About Running Out of Food in the Last Year: Never true     Ran Out of Food in the Last Year: Never true   Transportation Needs: Unmet Transportation Needs (7/9/2024)    PRAPARE - Transportation     Lack of Transportation (Medical): No     Lack of Transportation (Non-Medical): Yes   Physical Activity: Insufficiently Active (7/9/2024)    Exercise Vital Sign     Days of Exercise per Week: 3 days     Minutes of Exercise per Session: 40 min   Stress: No Stress Concern Present (7/9/2024)    [place] [facility] - Occupational Stress  Questionnaire     Feeling of Stress : Not at all   Housing Stability: Low Risk  (7/9/2024)    Housing Stability Vital Sign     Unable to Pay for Housing in the Last Year: No     Homeless in the Last Year: No     Past Medical History:   Diagnosis Date    A-fib     Atrial fibrillation 10/22/2018    Back pain     Cataract     Degenerative disc disease     Diverticulosis     colonoscopy 9/22/2016    GERD (gastroesophageal reflux disease)     Glaucoma     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   Eyes:  Negative for blurred vision and double vision.   Respiratory:  Negative for shortness of breath.    Cardiovascular: Negative.    Gastrointestinal: Negative.    Genitourinary:  Negative for frequency.   Musculoskeletal:  Negative for myalgias.   Neurological:  Positive for sensory change.   Psychiatric/Behavioral: Negative.       Physical Exam  Vitals reviewed.   Constitutional:       General: She is not in acute distress.     Appearance: Normal appearance.   Eyes:      Conjunctiva/sclera: Conjunctivae normal.      Pupils: Pupils are equal, round, and reactive to light.   Cardiovascular:      Rate and Rhythm: Normal rate and regular rhythm.      Pulses: Normal pulses.      Heart sounds: Normal heart sounds.   Pulmonary:      Effort: Pulmonary effort is normal.      Breath sounds: Normal breath sounds.   Musculoskeletal:      Right lower leg: No edema.      Left lower leg: No edema.   Skin:     General: Skin is warm and dry.   Neurological:      Mental Status: She is alert and oriented to person, place, and time.   Psychiatric:          Mood and Affect: Mood normal.           Assessment & Plan    Type II diabetes mellitus with neurological manifestations - at goal with A1c 6.8%  -     POCT Glucose, Hand-Held Device  -     Discussed keeping juice or food item next to bed in case delayed in rising to prevent hypoglycemia  -     No change at this time    Diabetes mellitus with stage 3 chronic kidney disease    Both eyes affected by 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, associated with type 2 diabetes mellitus    Hypertension, essential - on arb, monitored by specialists    Hyperlipidemia associated with type 2 diabetes mellitus - on statin    - Reviewed with patient:  The basic pathophysiology of Type 2 diabetes  Mechanism of action and action time of medications  Use of home glucose monitor/cgm  Basic diet/carbohydrate counting/avoiding simple sugars/plate method  Proper hydration   Risk of complications and preventive measures  When to call for assistance          Visit today included increased complexity associated with the care of the episodic problem hyperglycemia addressed and managing the longitudinal care of the patient due to the serious and/or complex managed problem(s) T2DM.

## 2024-12-27 ENCOUNTER — PATIENT MESSAGE (OUTPATIENT)
Dept: ORTHOPEDICS | Facility: CLINIC | Age: 84
End: 2024-12-27
Payer: MEDICARE

## 2024-12-31 ENCOUNTER — OFFICE VISIT (OUTPATIENT)
Dept: ORTHOPEDICS | Facility: CLINIC | Age: 84
End: 2024-12-31
Payer: MEDICARE

## 2024-12-31 ENCOUNTER — HOSPITAL ENCOUNTER (OUTPATIENT)
Dept: RADIOLOGY | Facility: HOSPITAL | Age: 84
Discharge: HOME | End: 2024-12-31
Attending: ORTHOPAEDIC SURGERY
Payer: MEDICARE

## 2024-12-31 VITALS — HEIGHT: 65 IN | BODY MASS INDEX: 28.43 KG/M2 | WEIGHT: 170.63 LBS

## 2024-12-31 DIAGNOSIS — G56.00 CARPAL TUNNEL SYNDROME, UNSPECIFIED LATERALITY: ICD-10-CM

## 2024-12-31 DIAGNOSIS — M79.642 BILATERAL HAND PAIN: ICD-10-CM

## 2024-12-31 DIAGNOSIS — M79.641 BILATERAL HAND PAIN: ICD-10-CM

## 2024-12-31 DIAGNOSIS — G56.03 BILATERAL CARPAL TUNNEL SYNDROME: Primary | ICD-10-CM

## 2024-12-31 PROCEDURE — 73080 X-RAY EXAM OF ELBOW: CPT | Mod: TC,50

## 2024-12-31 PROCEDURE — 1101F PT FALLS ASSESS-DOCD LE1/YR: CPT | Mod: CPTII,S$GLB,, | Performed by: ORTHOPAEDIC SURGERY

## 2024-12-31 PROCEDURE — 99214 OFFICE O/P EST MOD 30 MIN: CPT | Mod: S$GLB,,, | Performed by: ORTHOPAEDIC SURGERY

## 2024-12-31 PROCEDURE — 99999 PR PBB SHADOW E&M-EST. PATIENT-LVL V: CPT | Mod: PBBFAC,,, | Performed by: ORTHOPAEDIC SURGERY

## 2024-12-31 PROCEDURE — 3288F FALL RISK ASSESSMENT DOCD: CPT | Mod: CPTII,S$GLB,, | Performed by: ORTHOPAEDIC SURGERY

## 2024-12-31 PROCEDURE — 1159F MED LIST DOCD IN RCRD: CPT | Mod: CPTII,S$GLB,, | Performed by: ORTHOPAEDIC SURGERY

## 2024-12-31 PROCEDURE — 1125F AMNT PAIN NOTED PAIN PRSNT: CPT | Mod: CPTII,S$GLB,, | Performed by: ORTHOPAEDIC SURGERY

## 2024-12-31 PROCEDURE — 73110 X-RAY EXAM OF WRIST: CPT | Mod: 26,50,, | Performed by: RADIOLOGY

## 2024-12-31 PROCEDURE — 73080 X-RAY EXAM OF ELBOW: CPT | Mod: 26,50,, | Performed by: RADIOLOGY

## 2024-12-31 PROCEDURE — 73110 X-RAY EXAM OF WRIST: CPT | Mod: TC,50

## 2025-01-02 PROBLEM — G56.03 BILATERAL CARPAL TUNNEL SYNDROME: Status: ACTIVE | Noted: 2025-01-02

## 2025-01-02 NOTE — PROGRESS NOTES
[facility]    01/02/2025  Saul Mar    Chief complaint:   R>L hand numbness, bilateral wrist and elbow pain    Pain: 6/10    QuickDASH: 43.2    HPI:  Saul is an 84-year-old Right hand dominant female who presents for evaluation of chronic right greater than left hand numbness and tingling as well as bilateral elbow and wrist pain.  Her main complaint is the right hand pain and numbness.  She was previously diagnosed clinically with carpal tunnel.  She has had a previous carpal tunnel injection on the right side.  He has also tried bracing and exercises.  She has had this for several years.  Nothing she has tried has provided lasting relief.  She was sent for nerve test by her PCP a few weeks ago and was referred to me for treatment thereafter.  She has had no recent injections.  She has tried bracing but she has lost he has over the past year.  It is unclear if the brace has helped.  She did get some relief from the injection but she did not tolerate the injection well.  She does not want another injection today.    She is ambulatory but does do most of her long distance transportation with the use of a motorized scooter.  She presents today with this.  She is able to care for herself at home.  Has 1 child in nursing home.  She is a retired [employer] employee.  Her spouse passed away. She is able to drive herself to and from [facility] as well as around town.    She does not use tobacco products.    Hand dominance: R  Neck pain: N  Prior trauma to head/neck: N  Prior shoulder trauma: N  Prior elbow trauma: N  Prior wrist/hand trauma: N    Duration of symptoms: Several years  Prior injections: Yes on Right years ago. Transient relief of symptoms but pt did not tolerate injection well.   Bracing: Yes in past.   Home or outpatient therapy: Yes.     Hx/o CVA/TIAs: N  Personal or family history of demyelinating disease: Polyneuropathy unspecified but no hx/o CMT, ALS, MS,  etc.   Hx/o Vitamin deficiency and/or malnutrition: N  Metabolic abnormalities acquired/genetic: N  Personal of family hx/o Chiari malformations: N  Oncologic Hx (personal): N  Hx/o chemotherapy: N  Hx/o radiation: N  EtOH abuse: N  Hx/o diabetes 1/2: Yes   Last A1c: 6.8 10/2024   Distal neuropathy w/ S-W testing: N  Tobacco use (incl Vape): No    Anticoagulation/antiPLT: None.     CT-6 Score: 26/26    Amyloidosis Screen:  Tier 1:   M>/=50:    F>/=60: +   Bilateral CT Sx or prior CTR: +  Tier 2:    Lumbar/cervical stenosis: +   BTR:   Afib/flutter: +   PM:   CHF:   FamHx ATTR amyloidosis:    ROS: No fevers, chills, nausea, vomiting, chest pain, shortness of breath, dizziness, abdominal pain, N/T/P beyond described in HPI.     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 Authorizing Provider   albuterol (PROVENTIL/VENTOLIN HFA) 90 mcg/actuation inhaler INHALE 2 PUFFS INTO THE LUNGS EVERY 6 (SIX) HOURS AS NEEDED FOR WHEEZING. RESCUE 8/10/24  Yes Penny Randolph MD   ALPRAZolam (XANAX) 0.5 MG tablet TAKE 1 TABLET BY MOUTH NIGHTLY AS NEEDED FOR ANXIETY (MAY TAKE 1-2 TIMES WEEKLY FOR ANXIETY) 6/29/20  Yes Penny Randolph MD   amLODIPine (NORVASC) 5 MG tablet Take 1 tablet (5 mg total) by mouth 2 (two) times daily. 6/14/22  Yes Penny Randolph MD   atorvastatin (LIPITOR) 80 MG tablet TAKE 1  TABLET BY MOUTH EVERY DAY 3/19/22  Yes Penny Randolph MD   benzonatate (TESSALON) 100 MG capsule TAKE 1 CAPSULE BY MOUTH THREE TIMES A DAY AS NEEDED Oral for 10 Days   Yes Provider, Historical   blood sugar diagnostic Strp For use with insurance covered glucometer; test daily 6/13/23  Yes Penny Randolph MD   blood-glucose meter kit To check BG 2x times daily, to use with insurance preferred meter 10/7/24 10/7/25 Yes Penny Randolph MD   brimonidine 0.2% (ALPHAGAN) 0.2 % Drop INSTILL 1 DROP INTO BOTH EYES 3 TIMES A DAY 11/26/24  Yes Farhad Ca MD   carvediloL (COREG) 12.5 MG tablet Take 12.5 mg by mouth 2 (two) times daily. 12/9/21  Yes Provider, Historical   cholecalciferol, vitamin D3, (VITAMIN D3) 1,000 unit capsule Take 1 capsule (1,000 Units total) by mouth once daily. 10/22/18  Yes Penny Randolph MD   diclofenac sodium (VOLTAREN) 1 % Gel Apply 2 g topically 2 (two) times daily. 2/13/24  Yes Howard Rodriguez MD   empagliflozin (JARDIANCE) 25 mg tablet Take 1 tablet (25 mg total) by mouth once daily. 10/1/24  Yes Cindy Madrigal NP   estradioL (ESTRACE) 0.01 % (0.1 mg/gram) vaginal cream Place 1 g vaginally 3 (three) times a week. 8/23/24 8/23/25 Yes URIEL An MD   flecainide (TAMBOCOR) 50 MG Tab Take 1 tablet (50 mg total) by mouth 2 (two) times daily. 6/3/22  Yes Penny Randolph MD   glimepiride (AMARYL) 4 MG tablet TAKE 1 TABLET BY MOUTH EVERY DAY WITH BREAKFAST 11/27/24  Yes Grace Murrell NP   levalbuterol (XOPENEX HFA) 45 mcg/actuation inhaler INHALE 1-2 PUFFS INTO THE LUNGS EVERY 6 (SIX) HOURS AS NEEDED FOR WHEEZING. RESCUE 4/22/21  Yes Penny Randolph MD   linaCLOtide (LINZESS) 145 mcg Cap capsule TAKE 1 CAPSULE (145 MCG TOTAL) BY MOUTH BEFORE BREAKFAST. 4/10/23  Yes Jen Abdul PA-C   losartan (COZAAR) 50 MG tablet Take 1 tablet (50 mg total) by mouth 2 (two) times daily. 7/17/24  Yes Penny Randolph MD   meclizine (ANTIVERT) 25 mg tablet  TAKE 1 TABLET (25 MG TOTAL) BY MOUTH DAILY AS NEEDED FOR DIZZINESS. 1/2/24  Yes Jennifer Barros MD   metFORMIN (GLUCOPHAGE-XR) 500 MG ER 24hr tablet Take 1 tablet (500 mg total) by mouth once daily. 11/5/24  Yes Penny Randolph MD   metoprolol succinate (TOPROL-XL) 50 MG 24 hr tablet TAKE 1 TABLET BY MOUTH EVERY DAY 12/15/21  Yes Penny Randolph MD   ONETOUCH DELICA PLUS LANCET 33 gauge Misc Apply topically. 6/13/23  Yes Provider, Historical   ONETOUCH ULTRA2 METER Misc SMARTSIG:Via Meter As Directed 6/13/23  Yes Provider, Historical   pregabalin (LYRICA) 75 MG capsule Oral for 90 Days   Yes Provider, Historical   XARELTO 15 mg Tab TAKE 1 TABLET (15 MG TOTAL) BY MOUTH DAILY WITH DINNER OR EVENING MEAL. 3/23/21  Yes Giselle Valenzuela MD   DULoxetine (CYMBALTA) 30 MG capsule Take 1 capsule (30 mg total) by mouth once daily. 9/4/24 12/3/24  Chan Fonseca MD       Past Surgical History:   Procedure Laterality Date    CATARACT EXTRACTION W/  INTRAOCULAR LENS IMPLANT Left 02/27/2013    Dr. Taveras    COLONOSCOPY      COLONOSCOPY N/A 9/22/2016    Procedure: COLONOSCOPY;  Surgeon: Jd Ashton MD;  Location: [address]);  Service: Endoscopy;  Laterality: N/A;  OK per Dr Randolph for pt to hold Plavix 5 days prior to procedure/see telephone encounter dated 8/29/16/svn    EPIDURAL STEROID INJECTION N/A 8/2/2023    Procedure: L4-5 interlaminar epidural steroid injection with right paramedian approach with RN IV sedation;  Surgeon: Chan Fonseca MD;  Location: [facility];  Service: Pain Management;  Laterality: N/A;    EYE SURGERY Bilateral 2002 approx    Laser for glaucoma    HYSTERECTOMY  1963     AMEENA/USO- fibroids; no cancer    OOPHORECTOMY  1963    unknown, only removed one    SELECTIVE INJECTION OF ANESTHETIC AGENT AROUND LUMBAR SPINAL NERVE ROOT BY TRANSFORAMINAL APPROACH Bilateral 6/17/2022    Procedure: Bilateral L4/5 TF JASKARAN with RN IV sedation;  Surgeon: Chan Fonseca MD;  Location: [facility];   Service: Pain Management;  Laterality: Bilateral;    SELECTIVE INJECTION OF ANESTHETIC AGENT AROUND LUMBAR SPINAL NERVE ROOT BY TRANSFORAMINAL APPROACH Bilateral 10/3/2022    Procedure: Bilateral L2-3 transforaminal epidural steroid injection with RN IV sedation;  Surgeon: Chan Fonseca MD;  Location: [facility];  Service: Pain Management;  Laterality: Bilateral;       Family History   Problem Relation Name Age of Onset    Stroke Mother      Mental illness Mother      Hypertension Mother      Stroke Father      Cancer Sister  68        gyn    Ovarian cancer Sister      Diabetes Maternal Grandmother      Heart disease Paternal Grandmother      Drug abuse Daughter      Cancer Maternal Aunt          type unknown    Cancer Maternal Uncle          type not known    Glaucoma Neg Hx      Macular degeneration Neg Hx      Alcohol abuse Neg Hx      COPD Neg Hx      Asthma Neg Hx      Amblyopia Neg Hx      Blindness Neg Hx      Cataracts Neg Hx      Retinal detachment Neg Hx      Strabismus Neg Hx         Social History     Socioeconomic History    Marital status:     Number of children: 1   Tobacco Use    Smoking status: Never     Passive exposure: Never    Smokeless tobacco: Never   Substance and Sexual Activity    Alcohol use: No    Drug use: No    Sexual activity: Not Currently     Partners: Male   Social History Narrative    , no pets or smokers in household. 1 Child who lives in nursing home. She has a grandson who lives in [place].. Retired from [employer] . Still drives. Does not have a Living Will or advanced directive.      Social Drivers of Health     Financial Resource Strain: Medium Risk (7/9/2024)    Overall Financial Resource Strain (CARDIA)     Difficulty of Paying Living Expenses: Somewhat hard   Food Insecurity: No Food Insecurity (7/9/2024)    Hunger Vital Sign     Worried About Running Out of Food in the Last Year: Never true     Ran Out of Food in the Last Year: Never true    Transportation Needs: Unmet Transportation Needs (7/9/2024)    PRAPARE - Transportation     Lack of Transportation (Medical): No     Lack of Transportation (Non-Medical): Yes   Physical Activity: Insufficiently Active (7/9/2024)    Exercise Vital Sign     Days of Exercise per Week: 3 days     Minutes of Exercise per Session: 40 min   Stress: No Stress Concern Present (7/9/2024)    [place] [employer] - Occupational Stress Questionnaire     Feeling of Stress : Not at all   Housing Stability: Low Risk  (7/9/2024)    Housing Stability Vital Sign     Unable to Pay for Housing in the Last Year: No     Homeless in the Last Year: No       Review of patient's allergies indicates:   Allergen Reactions    Pravachol [pravastatin] Nausea Only       Physical Exam:     Patient is alert, well-appearing, and in no acute distress. Breathing comfortably. Extraocular muscles are intact. Pupils equal. Facial muscles symmetric. Voice with good intonation. No ptosis, anhidrosis, or miosis.      C-spine/Plexus/Shoulder  There is no neck discomfort with cervical flexion extension and lateral rotation. No radicular/neuritic symptoms down LUE.   No cervical paraspinal tenderness to palpation.  Scapular motion is symmetric with forward flexion/protraction, retraction, abduction. No crepitus, dyskinesia, or winging.   Negative Savita test.   No jugular engorgement or distal ischemia w/ shoulder ABD/ER.   Bilateral shoulder forward elevation is 140 and painless.  Internal rotation is to L4-5 and painless.  External rotation is 50-60 bilaterally and painless.  There is no Tinel's or pain to percussion along the interscalene triangle or the conjoint tendon/pec minor.  No palpable masses or fullness at neck/SC fossa.     Myelopathic signs:   August's: No  Reflexes   Tri: 1/4   Bi: 1/4   Br: 1/4    Cerebellar exam:  Daniel: Smooth, no fatigue  FTN: Intact with stable tracking  Dysdiadochokinesia: Negative     Distal BUE  exam:  No resting or volitional tremors.   No skin changes or discoloration.  No wounds rashes or lesions.   Mild right APB atrophy.    Elbow, wrist, and extrinsic digital motion is full and painless.    Pronosupination is full and painless.  No crepitus.  No clicks or mechanical blocks to motion.    There is no fullness or any prominences along the supracondylar ridge of Left elbow.  No palpable epitrochlear lymph nodes.  No pain or tenderness along the AIN/pronator interval.  No Tinel's along SRN.   No allodynia, no hyperpathia, no other signs of RSD/CRPS.   No pain over FCU.   No pain over FCR at wrist or proximally.   Medical epicondyle TTP: No.   Lateral epicondyle TTP: No.   Ulnar tunnel/hook of hamate: No pain or fullness to palpation. No pulsatile masses.  No Tinel's proximal to the ulnar tunnel.  No Durkan's over the ulnar nerve just proximal to the ulnar tunnel.    No pain to palpation over any of the A1 pulleys.  No pain at the 1st dorsal compartment on either side.    Thumb CMC  Pain at CMC/STT: No.   Crepitus with grind maneuver?  None.  First webspace adduction contracture?  No  Z-deformity and/or MCP hyperextension?  No              Passively correctable?  Not applicable.    Kapandji score: 9/10 bilaterally.    Sensation:  2 point discriminative touch is >1cm (and pt has difficulty with pressure sensation) in R thumb/index/long. This is 5mm in small/ring. This is 5mm in all on Left.   DBUN deficit: None.   SRN: deficit: None.     APB atrophy: Mild on R; none on Left.    FDI atrophy: No  ADM atrophy: No.    APB: 4+/5 Right, 5/5 Left  FPB: 4+/5 Right, 5/5 Left  FDI: 5/5  ADM: 5/5    : Symmetric.   Key pinch: Symmetric.      Tinel's wrist:  Positive b/l  Durkan's: Positive b/l with earlier onsent worsening N/T on Right  Phalen's:  Positive b/l with earlier onset worsening N/T on Right  Reverse Phalen's:  Positive b/l w/ earlier onset worsening N/T on Right     Tinel's elbow:  None.   Elbow flexion  test:  Negative  Ulnar nerve instability:  Negative    Wartenburg's:  Negative.  Froment's:  Not tested.    Imaging:    AP, lateral, oblique, and radiocapitellar x-rays of the right elbow obtained today in clinic were reviewed and independently interpreted and demonstrate no acute displaced fractures or dislocations.  There was mild-to-moderate ulnohumeral degenerative change with spurring into the cubital tunnel.  Mild-to-moderate enthesophyte complex on the right on the lateral view.  No heterotopic ossifications or calcifications.  No static varus or valgus deformity.  No supracondylar spurring.  Proximal radioulnar and radiocapitellar joint spaces are largely maintained.    AP, lateral, oblique, and radiocapitellar x-rays of the left elbow obtained today in clinic were reviewed and independently interpreted and demonstrate no acute displaced fractures or dislocations.  There was mild-to-moderate ulnohumeral degenerative change with spurring into the cubital tunnel.  Moderate enthesophyte complex on the lateral view.  No heterotopic ossifications or calcifications.  No static varus or valgus deformity.  No supracondylar spurring.  Proximal radioulnar and radiocapitellar joint spaces are maintained.    AP, lateral, oblique, ulnar deviation stress, and carpal tunnel views of the right wrist obtained today in clinic were reviewed and independently interpreted and demonstrate no acute displaced fractures or dislocations.  Ulnar neutral to slightly ulnar positive station.  Radial artery calciphylaxis is noted.  No chondrocalcinosis of the radiocarpal and midcarpal joints.  No significant thumb CMC degenerative changes.  SL and LT intervals are within normal limits.  Scapholunate angle is within normal limits.  No heterotopic ossifications or abnormal calcifications noted.    AP, lateral, oblique, ulnar deviation stress, and carpal tunnel views of the left wrist obtained today in clinic were reviewed and independently  interpreted and demonstrate no acute displaced fractures or dislocations.  Ulnar neutral to slightly ulnar positive station.  Radial artery calciphylaxis is noted.  No chondrocalcinosis of the radiocarpal and midcarpal joints.  No significant thumb CMC degenerative changes.  SL and LT intervals are within normal limits.  Scapholunate angle is within normal limits.  No heterotopic ossifications or abnormal calcifications noted.    Labs:    Last A1c: 6.8 10/2024    Studies:  I reviewed and independently interpreted the nerve test below performed by Dr. Gamboa.  Relevant findings are as follows:  Sensory latency (median): L 3.65 onset, 4.48 peak; R NR/NR.   Motor latency (median): L 4.8, R 5.6.   Motor conduction (ulnar): above-below CV 42.4 m/s and 43.9 m/s L and R respectively; no discrepancy between this and below-elbow wrist segment of >10 m/s  No denervation changes seen in R APB on EMG.     The above findings do show evidence of severe right carpal tunnel based on non recordable sensory conduction findings.  Motor conduction is also at least 1 point above AANEM criteria. Mild-moderate L CTS.  No evidence of focal ulnar nerve slowing across the either elbow.  Patient was unable to tolerate additional EMG testing.    Sensory NCS      Nerve / Sites Rec. Site Onset Lat Peak Lat Amp Segments Distance Velocity Temp. Comment       ms ms µV   cm m/s °C     L Median - Dig II (Antidromic)      Wrist Index 3.65 4.48 6.9 Wrist - Index 14 38 30.2     L Ulnar - Dig V (Antidromic)      Wrist Dig V 2.81 3.70 10.8 Wrist - Dig V 14 50 30.2     L Radial - Superficial (Antidromic)      Forearm Wrist 2.45 3.12 11.1 Forearm - Wrist 10 41       R Median - Dig II (Antidromic)      Wrist Index NR NR NR Wrist - Index 14 NR       R Ulnar - Dig V (Antidromic)      Wrist Dig V 3.07 3.96 7.9 Wrist - Dig V 14 46       R Radial - Superficial (Antidromic)      Forearm Wrist 2.08 2.81 18.3 Forearm - Wrist 10 48           Motor NCS      Nerve /  Sites Muscle Latency Amplitude Segments Dist. Lat Diff Velocity Temp. Comments       ms mV   cm ms m/s °C     L Median - APB      Wrist APB 4.81 4.9 Wrist - APB 8              Elbow APB 9.92 4.0 Elbow - Wrist 23 5.10 45.1 30     L Ulnar - ADM      Wrist ADM 3.31 6.7 Wrist - ADM 8              B.Elbow ADM 8.21 5.9 B.Elbow - Wrist 24 4.90 49.0          A.Elbow ADM 11.27 5.6 A.Elbow - B.Elbow 13 3.06 42.4       R Median - APB      Wrist APB 5.60 6.4 Wrist - APB 8              Elbow APB 11.21 4.4 Elbow - Wrist 24 5.60 43 32     R Ulnar - ADM      Wrist ADM 3.48 6.7 Wrist - ADM 8              B.Elbow ADM 8.40 5.5 B.Elbow - Wrist 24 4.92 48.8          A.Elbow ADM 11.35 5.3 A.Elbow - B.Elbow 13 2.96 43.9                      EMG Summary Table       Spontaneous MUAP Recruitment   Muscle IA Fib PSW Fasc Amp Dur. PPP Pattern   R. Abductor pollicis brevis N None None None N N N N       Summary     The motor conduction test was unremarkable in all 4 of the tested nerves: L Median - APB, L Ulnar - ADM, R Median - APB, R Ulnar - ADM.     The sensory conduction test was performed on 6 nerve(s). There were no results within the specified normal range. Findings were unremarkable in 5 nerve(s): L Median - Dig II (Antidromic), L Ulnar - Dig V (Antidromic), L Radial - Superficial (Antidromic), R Ulnar - Dig V (Antidromic), R Radial - Superficial (Antidromic). Results outside the specified normal range were found in 1 nerve(s), as follows:     Notably, pt age (84) is outside reference ranges in AANEM values, which cutoff at age 79. Thus findings below will correlate with age range of 50-79  Prolonged latency L median - Dig II sensory  Decreased amplitude L median - Dig II sensory  Prolonged latency L median motor at the wrist with slowed conduction velocity  L ulnar motor decreased amplitude to wrist, above and below elbow  L ulnar motor with decreased conduction velocity uniformly  Increased latency to R median motor at the  wrist  Decreased conduction velocity to R median motor  Decreased conduction velocity to R ulnar motor  In the R Median - Dig II (Antidromic) study  the response was considered absent for Wrist stimulation     The needle EMG study was normal in all 1 tested muscles: R. Abductor pollicis brevis.        Casimiro Hughes [id number] 12/6/2024 9:46 AM      1 of 1     Conclusion:   Abnormal study  Evidence of demyelination (sensory & motor) and axonal loss (sensory) to the median nerve on the RIGHT consistent with severe carpal tunnel syndrome   Evidence of demyelination (sensory & motor) and axonal (sensory) loss to the median nerve on the LEFT consistent with moderate carpal tunnel syndrome  Generalized slowing of the ulnar nerve bilaterally expected with advanced age, there is no focal slowing to suggest nerve entrapment  No EMG findings to suggest radiculopathy on limited evaluation of right APB (pt declined further needle exam)       CT-6 Score:    Assessment:  1. Bilateral carpal tunnel syndrome  ORTHOPEDIC BRACING FOR HOME USE - UPPER EXTREMITY                Plan: Saul is an 84-year-old Right hand dominant female who presents for evaluation of right greater than left hand pain and numbness.  She also endorses some bilateral elbow pain and wrist pain but that is much milder.  History, clinical exam, and nerve test to show carpal tunnel on both sides much worse on the right.  There is no concern for a major, superimposed cervical pathology.  She has had previous treatment with injection of steroid and braces.  Both of those have provided relief although transient.  She is not interested in another injection today as she does not seem to have tolerated the injection previously well.  However, that did provide some relief.  She has near chronic numbness on the right side.  I did review her nerve test which shows non recordable sensory potentials in the right.  She also has delayed right greater than left motor  latencies.  It appears that she was not able to tolerate the EMG portion well but at the right APB there were no denervation changes noted.    Given her severe carpal tunnel particularly on the right, I do think that the only thing that may provide lasting relief for her is a carpal tunnel release.  However, I explained to her that the relief of the numbness and return if any function may be a little bit delayed compared to patients with slightly better nerve test findings given her non recordable sensory potentials and the duration of her symptoms.  We reviewed briefly what surgery entails with respect to the size of the incision and the postoperative recovery process.  We also reviewed the risks.  With that said, I did offer continued nonoperative treatment with bracing, nerve gliding exercises, and injections today.  She is not interested in further injections.    After some consideration of the options, she does not want to move forward with surgery at this time.  She would like to try braces again for both sides as well as some nerve gliding exercises.  Those were provided.  She is going to let me know if she is not satisfied with the bracing and the nerve gliding exercises.  She understands that her condition particularly on the right side may  progress with time and that she risks developing some weakness and atrophy in her hand if this is not addressed.    I have also provided information on her condition as well as the nerve gliding exercises in the patient information portion of her after visit summary.  She has access to [facility] in his able to access those.    She is happy with this plan of treatment.  All questions were answered.      QuickDASH: 43.2.       Jd Lloyd Jr. MD  Hand and Upper Extremity Surgery  [facility]    Disclaimer:  Voice dictation software was used for this note.  I have reviewed this note and am happy to provide clarification if needed.  However, please  excuse typos/errors/omissions.

## 2025-02-05 ENCOUNTER — LAB VISIT (OUTPATIENT)
Dept: LAB | Facility: HOSPITAL | Age: 85
End: 2025-02-05
Attending: INTERNAL MEDICINE
Payer: MEDICARE

## 2025-02-05 DIAGNOSIS — E11.610 TYPE 2 DIABETES MELLITUS WITH DIABETIC NEUROPATHIC ARTHROPATHY: ICD-10-CM

## 2025-02-05 DIAGNOSIS — E04.2 MULTINODULAR THYROID: Chronic | ICD-10-CM

## 2025-02-05 DIAGNOSIS — N18.32 CHRONIC KIDNEY DISEASE, STAGE 3B: ICD-10-CM

## 2025-02-05 DIAGNOSIS — D64.9 ANEMIA, UNSPECIFIED TYPE: ICD-10-CM

## 2025-02-05 LAB
ALBUMIN SERPL BCP-MCNC: 3.7 G/DL (ref 3.5–5.2)
ANION GAP SERPL CALC-SCNC: 8 MMOL/L (ref 8–16)
BASOPHILS # BLD AUTO: 0.05 K/UL (ref 0–0.2)
BASOPHILS NFR BLD: 1 % (ref 0–1.9)
BUN SERPL-MCNC: 18 MG/DL (ref 8–23)
CALCIUM SERPL-MCNC: 9.4 MG/DL (ref 8.7–10.5)
CHLORIDE SERPL-SCNC: 107 MMOL/L (ref 95–110)
CO2 SERPL-SCNC: 24 MMOL/L (ref 23–29)
CREAT SERPL-MCNC: 1.3 MG/DL (ref 0.5–1.4)
DIFFERENTIAL METHOD BLD: ABNORMAL
EOSINOPHIL # BLD AUTO: 0.2 K/UL (ref 0–0.5)
EOSINOPHIL NFR BLD: 4.1 % (ref 0–8)
ERYTHROCYTE [DISTWIDTH] IN BLOOD BY AUTOMATED COUNT: 15.2 % (ref 11.5–14.5)
EST. GFR  (NO RACE VARIABLE): 40.8 ML/MIN/1.73 M^2
ESTIMATED AVG GLUCOSE: 169 MG/DL (ref 68–131)
GLUCOSE SERPL-MCNC: 127 MG/DL (ref 70–110)
HBA1C MFR BLD: 7.5 % (ref 4–5.6)
HCT VFR BLD AUTO: 34.4 % (ref 37–48.5)
HGB BLD-MCNC: 11.3 G/DL (ref 12–16)
IMM GRANULOCYTES # BLD AUTO: 0.01 K/UL (ref 0–0.04)
IMM GRANULOCYTES NFR BLD AUTO: 0.2 % (ref 0–0.5)
LYMPHOCYTES # BLD AUTO: 1.7 K/UL (ref 1–4.8)
LYMPHOCYTES NFR BLD: 34.7 % (ref 18–48)
MCH RBC QN AUTO: 29.2 PG (ref 27–31)
MCHC RBC AUTO-ENTMCNC: 32.8 G/DL (ref 32–36)
MCV RBC AUTO: 89 FL (ref 82–98)
MONOCYTES # BLD AUTO: 0.5 K/UL (ref 0.3–1)
MONOCYTES NFR BLD: 9.3 % (ref 4–15)
NEUTROPHILS # BLD AUTO: 2.5 K/UL (ref 1.8–7.7)
NEUTROPHILS NFR BLD: 50.7 % (ref 38–73)
NRBC BLD-RTO: 0 /100 WBC
PHOSPHATE SERPL-MCNC: 3.5 MG/DL (ref 2.7–4.5)
PLATELET # BLD AUTO: 184 K/UL (ref 150–450)
PMV BLD AUTO: 11.7 FL (ref 9.2–12.9)
POTASSIUM SERPL-SCNC: 4.3 MMOL/L (ref 3.5–5.1)
RBC # BLD AUTO: 3.87 M/UL (ref 4–5.4)
SODIUM SERPL-SCNC: 139 MMOL/L (ref 136–145)
TSH SERPL DL<=0.005 MIU/L-ACNC: 1.65 UIU/ML (ref 0.4–4)
WBC # BLD AUTO: 4.93 K/UL (ref 3.9–12.7)

## 2025-02-05 PROCEDURE — 84443 ASSAY THYROID STIM HORMONE: CPT | Performed by: INTERNAL MEDICINE

## 2025-02-05 PROCEDURE — 85025 COMPLETE CBC W/AUTO DIFF WBC: CPT | Performed by: INTERNAL MEDICINE

## 2025-02-05 PROCEDURE — 36415 COLL VENOUS BLD VENIPUNCTURE: CPT | Performed by: INTERNAL MEDICINE

## 2025-02-05 PROCEDURE — 80069 RENAL FUNCTION PANEL: CPT | Performed by: INTERNAL MEDICINE

## 2025-02-05 PROCEDURE — 83036 HEMOGLOBIN GLYCOSYLATED A1C: CPT | Performed by: INTERNAL MEDICINE

## 2025-02-08 ENCOUNTER — PATIENT MESSAGE (OUTPATIENT)
Dept: FAMILY MEDICINE | Facility: CLINIC | Age: 85
End: 2025-02-08
Payer: MEDICARE

## 2025-02-23 NOTE — PROGRESS NOTES
HPI     Pre-op Exam            Comments: Phaco iol od          Comments    Preop Phaco IOL OD (sx 3/12/25)    1) POAG  2) DM with BDR and ME OU  3) Hx RLL Lesion  4) NS OD  5) PCIOL OS  6) Hx CVA   7) Hx Amaurosis Fugax  8) Eyelid Myokymia     Meds:  Brimonidine TID OU = USUALLY GETS IN BID NOT TID          Last edited by Zuri Winkler MA on 2/27/2025  8:17 AM.            Assessment /Plan     For exam results, see Encounter Report.    Diabetic cataract of right eye    Cortical cataract of right eye    Senile nuclear cataract, right    Small pupils    Both eyes affected by 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, associated with type 2 diabetes mellitus    Low-tension glaucoma, right eye, mild stage    Low-tension glaucoma, left eye, moderate stage    Pseudophakia, left eye      Last visit 11/5/2024 - her for pre-op phaco/IOL od    LTFU 10/2019 to 5/2022    1. POAG ou   H/O poor compliance   First HVF 1997   First photos 1998      Family history none   Glaucoma meds Has used alphagan in past - poor compliance-stopped on her own   H/O adverse rxn to glaucoma drops none   LASERS No glaucoma laser (+h/o focal OU for BDR)   GLAUCOMA SURGERIES none   OTHER EYE SURGERIES CE/PCIOL OS 2/27/13  CDR 0.8/0.75   Tbase 16-20/16-22   Tmax 20/22   Ttarget 17/17   HVF 17 test 1997 to 2023 - IAD/sup paracent od //  IAD / sup paracentral   (+changes ? 2/2 focal laser )   Gonio +3   /621- thick ou (- 4.5 ou)   OCT 2 test 2022 to 2023  - RNFL - nl od // dec TI os   HRT 6  tests: 2009 to 2018 -  MR -  Dec. I, bord S/T od // bord T/I os /// CDR 0.74 od // 0.70 os  Disc photos 1998, 2000, 2001, 2003, 2003, 2004, 2005, 2006- slides  // 2010 , 2018 - OIS      - [id number]/15  - Test done today  IOP / pre-op phaco/IOL OD      2. BDR - h/o CSME - s/p focal ou               Old pt of [facility]   Now sees Dr Davenport - will F/u 12 months or prn      3. NS OD               Remove prn - pt wants to  schedule sx - 11/5/2024     "           BCVA 20/40              BAT 20/70     4. Pseudophakia OS - 2/28/2013               S/p CE/PCIOL OS               IOL SN60 WF 20.5              -VA limited 2/2 hx of CSME s/p focal scars              BCVA  20/25              Give Rx glasses - 7/2/2013              Had re-bound iritis - resolved     4. H/O RLL lesion - S/P excision with Jovanni      5. Post Nelda lives in [place] - but still likes to come to [place] for care      6. amaurosis fugax / H/O CVA's              Patient reports stroke like symptoms and amaurosis fugax 10/13              -  She was admitted to hospital with very elevated BP              -  Patient had MRI at the time showing ischemic disease              - last Carotid U/S done 11/12- patient reports that                  - PCP gets one yearly- last U/S showed minimal plaque disease              -  H/o stroke- discussed that amaurosis was from elevated BP and if ever recurs needs to report to ER immediately- she voiced understanding     7. Eyelid myokymia  - intermittent - patient also reports eyelid "twitching  "- discussed with patient that myokymia is usually from lack of sleep, caffeine intake, or stress- however nothing to worry about  -  Did not have any "twitching" on exam today     Plan:  BrimonidineTID ou     Avoid PG's if possible 2/2 h/o CME 2/2 BDR     Refraction Post op os  is more myopic than expected - review IOL calc if needs 2nd eye done     Repeat OCT of macula 5/17/2022 - - done no CME os// + focal laser scars ou     Photo file updated  3/17/2023     5/2/2024   Had to put appt off - her daughter passed form cancer recently    They had a nice service for her - all the children and grand children came     Plan:   11/5/2024   Pt C/O blurry vision od   + mixed nuclear / cortical diabetic cataract   (S/P phaco/IOL os 2/2013)   H/O laser near mac for DME - years ago - old laser scars   Vis sign cat od // moderate dilation - slightly small pupil     NOT ON " MANJARO OR TRULICITY LIKE MED     Pt would like to have phaco/IOL od - ? Complex - trypan blue / ? May need valarie cifuentes   Wants surgery in January F/U for pre-op      Issac for diabetic check - seen 6/4/2024- F/U 1 year      - photo file - (( first name is Saul ( leave out the Zaid part))) - updated 6/4/2019     Addendum - 12/7/2024   Chart reviewed by PCP ( Dr Randolph) for pre-op clearance - recommendations are as follows   Penny Randolph MD Loftfield, Katherine, MD  Asked to do preop clearance for cataract surgery by chart.  Patient is medically optimized for cataract surgery, no additional testing is necessary    She needs to hold her diabetes medications day of surgery since she will be fasting  She should review with the ophthalmologist whether they want her to hold her blood thinner but typically they recommend continuing  Do not take Linzess or meclizine day of surgery  She should definitely take metoprolol day of surgery.  I usually recommend that patients take their blood pressure medication so that it will not be extremely elevated on the day of surgery but she could consider holding the losartan if her pressure tends to be low when she has not eaten anything      2/27/2025   Pre-op phaco/IOL od (first eye was slightly more myopic than aimed for w/ a sn60wf of 20.5)    Complex - small pupil    ?? Potential old laser scars for DME   IOL calcs repeated   DIB00 21.0  [id number]       Hold diabetes meds until after surgery   Hold meclizine day of surgery   Hold linzess day pf surgery   Can hold asa for 2 days pre-op (but ok to go ahead if she forgets )     Risk discussed including but not limited to decrease in vision / loss of vision // loss of eye // need for additional surgery .

## 2025-02-27 ENCOUNTER — OFFICE VISIT (OUTPATIENT)
Dept: OPHTHALMOLOGY | Facility: CLINIC | Age: 85
End: 2025-02-27
Payer: MEDICARE

## 2025-02-27 DIAGNOSIS — H57.03 SMALL PUPILS: ICD-10-CM

## 2025-02-27 DIAGNOSIS — H40.1222 LOW-TENSION GLAUCOMA, LEFT EYE, MODERATE STAGE: ICD-10-CM

## 2025-02-27 DIAGNOSIS — E11.36 DIABETIC CATARACT OF RIGHT EYE: Primary | ICD-10-CM

## 2025-02-27 DIAGNOSIS — Z96.1 PSEUDOPHAKIA, LEFT EYE: ICD-10-CM

## 2025-02-27 DIAGNOSIS — E11.3213 BOTH EYES AFFECTED BY MILD NONPROLIFERATIVE DIABETIC RETINOPATHY WITH MACULAR EDEMA, ASSOCIATED WITH TYPE 2 DIABETES MELLITUS: ICD-10-CM

## 2025-02-27 DIAGNOSIS — H25.11 SENILE NUCLEAR CATARACT, RIGHT: ICD-10-CM

## 2025-02-27 DIAGNOSIS — H26.9 CORTICAL CATARACT OF RIGHT EYE: ICD-10-CM

## 2025-02-27 DIAGNOSIS — H40.1211 LOW-TENSION GLAUCOMA, RIGHT EYE, MILD STAGE: ICD-10-CM

## 2025-02-27 PROCEDURE — 99999 PR PBB SHADOW E&M-EST. PATIENT-LVL III: CPT | Mod: PBBFAC,,, | Performed by: OPHTHALMOLOGY

## 2025-02-27 RX ORDER — PHENYLEPHRINE HYDROCHLORIDE 100 MG/ML
1 SOLUTION/ DROPS OPHTHALMIC
OUTPATIENT
Start: 2025-02-27

## 2025-02-27 RX ORDER — MOXIFLOXACIN 5 MG/ML
1 SOLUTION/ DROPS OPHTHALMIC
OUTPATIENT
Start: 2025-02-27

## 2025-02-27 RX ORDER — KETOROLAC TROMETHAMINE 5 MG/ML
1 SOLUTION OPHTHALMIC
OUTPATIENT
Start: 2025-02-27

## 2025-02-27 RX ORDER — SODIUM CHLORIDE 0.9 % (FLUSH) 0.9 %
10 SYRINGE (ML) INJECTION
Status: SHIPPED | OUTPATIENT
Start: 2025-02-27

## 2025-02-27 RX ORDER — TROPICAMIDE 10 MG/ML
1 SOLUTION/ DROPS OPHTHALMIC
OUTPATIENT
Start: 2025-02-27

## 2025-02-27 RX ORDER — PROPARACAINE HYDROCHLORIDE 5 MG/ML
1 SOLUTION/ DROPS OPHTHALMIC
OUTPATIENT
Start: 2025-02-27

## 2025-02-27 NOTE — H&P
Subjective     Patient ID: Saul Mar is a 84 y.o. female.    Chief Complaint: Pre-op Exam (Phaco iol od)    HPI  Review of Systems   Constitutional: Negative.    HENT: Negative.     Eyes:  Positive for visual disturbance.   Respiratory: Negative.     Cardiovascular: Negative.    Neurological: Negative.    Psychiatric/Behavioral: Negative.            Objective     Physical Exam  Constitutional:       Appearance: She is well-developed.   HENT:      Head: Normocephalic.   Eyes:      Comments: See eye exam   Cardiovascular:      Rate and Rhythm: Normal rate and regular rhythm.   Pulmonary:      Effort: Pulmonary effort is normal.   Neurological:      Mental Status: She is oriented to person, place, and time.            Assessment and Plan     1. Diabetic cataract of right eye  -     IOL Master - OD - Right Eye    2. Cortical cataract of right eye  -     IOL Master - OD - Right Eye    3. Senile nuclear cataract, right  -     IOL Master - OD - Right Eye    4. Small pupils  -     IOL Master - OD - Right Eye    5. Both eyes affected by 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, associated with type 2 diabetes mellitus    6. Low-tension glaucoma, right eye, mild stage    7. Low-tension glaucoma, left eye, moderate stage    8. Pseudophakia, left eye        Phaco/IOL od - cplex - samll pupil / mixed diabetic . Nucellar / cortical / small pupil        Follow up in about 3 weeks (around 3/20/2025) for 1 day post op phaco/IOL od .

## 2025-03-06 ENCOUNTER — TELEPHONE (OUTPATIENT)
Dept: FAMILY MEDICINE | Facility: CLINIC | Age: 85
End: 2025-03-06
Payer: MEDICARE

## 2025-03-06 NOTE — TELEPHONE ENCOUNTER
Thank you for letting me know.  All I can see in her chart is that she had cataract surgery on February 27th.  Is there another hospital stay?  Does she need hospital follow-up?

## 2025-03-06 NOTE — TELEPHONE ENCOUNTER
----- Message from Rand sent at 3/6/2025  3:11 PM CST -----  Contact: [phone] .[email or web address] Patient is calling for Medical Advice regarding:speak to the dr or the nurse How long has patient had these symptoms:wants to let the dr know she has surgery yesterday at [facility] yesterday Pharmacy name and phone#:Patient wants a call back or thru [place]:call back Comments:She is just letting you all know she states she has been in the hospital since Sunday Please advise patient replies from provider may take up to 48 hours.

## 2025-03-07 NOTE — TELEPHONE ENCOUNTER
Spoke to Pt and she is doing better , she is still in the Hospital from her surgery, she will call back to schedule a follow up sometime next month

## 2025-03-10 ENCOUNTER — TELEPHONE (OUTPATIENT)
Dept: OPHTHALMOLOGY | Facility: CLINIC | Age: 85
End: 2025-03-10
Payer: MEDICARE

## 2025-03-10 NOTE — TELEPHONE ENCOUNTER
----- Message from Med Assistant Keating sent at 3/10/2025 11:46 AM CDT -----  Regarding: FW: Sx Wed 3/12 - needs to be r/s  Pt is scheduled for sx this Wednesday  ----- Message -----  From: Barbara Cid RN  Sent: 3/10/2025  10:45 AM CDT  To: Lina Taveras MD; Bernie Pina; Lilo#  Subject: Sx Wed 3/12 - needs to be r/s                    Pt is scheduled for Rt Phaco on Wednesday - 3/12/2025.Pt is currently an inpatient at [facility] in [place], [place] and reports she will need to r/s her procedure.Thank you,-Kartik Cid, BSN, [phone]      Spoke to patient confirming above message. Patient will call to reschedule surgery once she has been discharge and cleared for surgery with . Surgery will be placed on hold.

## 2025-04-16 ENCOUNTER — TELEPHONE (OUTPATIENT)
Dept: NEUROLOGY | Facility: CLINIC | Age: 85
End: 2025-04-16
Payer: MEDICARE

## 2025-04-16 RX ORDER — INSULIN PUMP SYRINGE, 3 ML
EACH MISCELLANEOUS
Qty: 1 EACH | Refills: 0 | Status: SHIPPED | OUTPATIENT
Start: 2025-04-16 | End: 2026-04-16

## 2025-04-16 NOTE — TELEPHONE ENCOUNTER
----- Message from Reyna sent at 4/16/2025  3:31 PM CDT -----  Contact: pt  Type: Request a call backName of Caller: Tyler Call Back Number: [phone] Information:  pt is calling and need more test strips but has an older meter (One touch ultra mini) if no strips for this, then she needs a new meter and strips.  Pt need these to be mailed to her home.  Please call pt for more info (she does not have any strips to test her sugar)

## 2025-04-16 NOTE — TELEPHONE ENCOUNTER
----- Message from Anamika sent at 4/16/2025  9:07 AM CDT -----  Regarding: Medical Advice  Contact: Ruthluanne  Type:  Needs Medical AdviceWho Called:  Miktoms (please be specific):  How long has patient had these symptoms:  Pharmacy name and phone #:  Would the patient rather a call back or a response via My [employer]? [employer] Call Back Number: [phone] Information:  Saul is requesting a callback from the nurse today in regard to discussing her appointment on 04/17/2025 please. She want to come in the week of the 23rd. cp

## 2025-04-16 NOTE — TELEPHONE ENCOUNTER
Patient appt has been changed to virtual due to her not having a ride to appt. Patient did verbalized understanding.

## 2025-04-17 ENCOUNTER — OFFICE VISIT (OUTPATIENT)
Dept: NEUROLOGY | Facility: CLINIC | Age: 85
End: 2025-04-17
Payer: MEDICARE

## 2025-04-17 DIAGNOSIS — E11.311 DIABETIC RETINOPATHY WITH MACULAR EDEMA ASSOCIATED WITH TYPE 2 DIABETES MELLITUS, UNSPECIFIED LATERALITY, UNSPECIFIED RETINOPATHY SEVERITY: ICD-10-CM

## 2025-04-17 DIAGNOSIS — R29.3 POSTURE ABNORMALITY: ICD-10-CM

## 2025-04-17 DIAGNOSIS — R26.9 ABNORMALITY OF GAIT AND MOBILITY: ICD-10-CM

## 2025-04-17 DIAGNOSIS — I63.89 OTHER CEREBRAL INFARCTION: ICD-10-CM

## 2025-04-17 DIAGNOSIS — R80.9 DIABETES MELLITUS WITH PROTEINURIA: Chronic | ICD-10-CM

## 2025-04-17 DIAGNOSIS — E11.69 HYPERLIPIDEMIA ASSOCIATED WITH TYPE 2 DIABETES MELLITUS: ICD-10-CM

## 2025-04-17 DIAGNOSIS — Z81.8 FAMILY HISTORY OF STRESS: ICD-10-CM

## 2025-04-17 DIAGNOSIS — E11.49 TYPE II DIABETES MELLITUS WITH NEUROLOGICAL MANIFESTATIONS: Chronic | ICD-10-CM

## 2025-04-17 DIAGNOSIS — H91.90 HEARING LOSS, UNSPECIFIED HEARING LOSS TYPE, UNSPECIFIED LATERALITY: ICD-10-CM

## 2025-04-17 DIAGNOSIS — R41.3 MEMORY IMPAIRMENT: ICD-10-CM

## 2025-04-17 DIAGNOSIS — R41.3 AMNESIA: ICD-10-CM

## 2025-04-17 DIAGNOSIS — H25.11 AGE-RELATED NUCLEAR CATARACT, RIGHT: ICD-10-CM

## 2025-04-17 DIAGNOSIS — N18.30 DIABETES MELLITUS WITH STAGE 3 CHRONIC KIDNEY DISEASE: Chronic | ICD-10-CM

## 2025-04-17 DIAGNOSIS — I65.29 CAROTID ATHEROSCLEROSIS, UNSPECIFIED LATERALITY: ICD-10-CM

## 2025-04-17 DIAGNOSIS — I67.89 CEREBRAL MICROVASCULAR DISEASE: ICD-10-CM

## 2025-04-17 DIAGNOSIS — I63.00 CEREBROVASCULAR ACCIDENT (CVA) DUE TO THROMBOSIS OF PRECEREBRAL ARTERY: ICD-10-CM

## 2025-04-17 DIAGNOSIS — G56.03 BILATERAL CARPAL TUNNEL SYNDROME: ICD-10-CM

## 2025-04-17 DIAGNOSIS — G31.84 MILD NEUROCOGNITIVE DISORDER: Primary | ICD-10-CM

## 2025-04-17 DIAGNOSIS — I73.9 PAD (PERIPHERAL ARTERY DISEASE): ICD-10-CM

## 2025-04-17 DIAGNOSIS — F41.8 MIXED ANXIETY DEPRESSIVE DISORDER: ICD-10-CM

## 2025-04-17 DIAGNOSIS — H40.1132 PRIMARY OPEN ANGLE GLAUCOMA (POAG) OF BOTH EYES, MODERATE STAGE: ICD-10-CM

## 2025-04-17 DIAGNOSIS — I10 HYPERTENSION, ESSENTIAL: Chronic | ICD-10-CM

## 2025-04-17 DIAGNOSIS — R68.89 ABNORMAL ANKLE BRACHIAL INDEX: ICD-10-CM

## 2025-04-17 DIAGNOSIS — I48.91 ATRIAL FIBRILLATION, UNSPECIFIED TYPE: ICD-10-CM

## 2025-04-17 DIAGNOSIS — E11.29 DIABETES MELLITUS WITH PROTEINURIA: Chronic | ICD-10-CM

## 2025-04-17 DIAGNOSIS — M54.16 LUMBAR RADICULOPATHY, CHRONIC: ICD-10-CM

## 2025-04-17 DIAGNOSIS — E11.69 MIXED DIABETIC HYPERLIPIDEMIA ASSOCIATED WITH TYPE 2 DIABETES MELLITUS: ICD-10-CM

## 2025-04-17 DIAGNOSIS — R29.818 TRANSIENT NEUROLOGIC DEFICIT: ICD-10-CM

## 2025-04-17 DIAGNOSIS — M48.062 SPINAL STENOSIS OF LUMBAR REGION WITH NEUROGENIC CLAUDICATION: ICD-10-CM

## 2025-04-17 DIAGNOSIS — E78.2 MIXED DIABETIC HYPERLIPIDEMIA ASSOCIATED WITH TYPE 2 DIABETES MELLITUS: ICD-10-CM

## 2025-04-17 DIAGNOSIS — E78.5 HYPERLIPIDEMIA ASSOCIATED WITH TYPE 2 DIABETES MELLITUS: ICD-10-CM

## 2025-04-17 DIAGNOSIS — E11.22 DIABETES MELLITUS WITH STAGE 3 CHRONIC KIDNEY DISEASE: Chronic | ICD-10-CM

## 2025-04-17 DIAGNOSIS — E11.36 DM CATARACT: ICD-10-CM

## 2025-04-17 DIAGNOSIS — E11.8 DIABETES MELLITUS TYPE 2 WITH COMPLICATIONS: ICD-10-CM

## 2025-04-17 NOTE — PROGRESS NOTES
Subjective:       Patient ID: Saul Mar is a 84 y.o. female.    Chief Complaint: Transient neurologic deficit      Referred by PCP: Dr. LEN Randolph MD     HPI The patient is here for memory loss evaluation.     The patient is new to me presenting with memory changes that she states began in 2020.  The patient is unaccompanied. She lives alone.  The main problems the patient has are related to forgetfulness and word finding difficulty. The patient gave example that she forget words during mid conversation. She is unable to think of the exact she wish to use.  The patient forgot who her great grandson was, she states that she was able to recover but not knowing right away was alarming for her, because before she was very sharp.  The patient excessively forgets where placed certain things, she is able to recover most times. She misplaced her walking cane, able to recover.  The patient is driving and denies getting lost.  No confusion around and inside the house. Patient has trouble remembering date and time. Patient is aware  of major holidays and political changes. The patient is independent in handling finances. The patient is still independent with ADLs. No agitation or aggression. No hallucinations or delusions. No seizures. Hx of stroke in 1999/2017 reports having speech changes and memory impairment, and confusion during stroke event. She had difficulty reading, and slurred speech. Patient reports after therapy she recovered fully. She states   Word finding difficulty.  Has family stressors with daughter. No problems handling tools. No history of headaches. No history of hypothyroidism. No history of alcoholism. No history of B12 deficiency. Hx depression and ALANNAH. No history of bipolar disorder. No history of Untreated Syphilis.  No history of HIV infection. No toxic exposures.  No history of traumatic brain injury. No tremors. No falls. Patient use a canes and walker for  instability, poor posture.  Chronic back problem, patient sees Pain Management.  No urinary incontinence. No change in sleep and Fair appetite. No family history of dementia. Patient present for memory workup and  follow up. Patient doing well today. Discussed memory work up: 12- MRI BRAIN WO No acute abnormality.  Mild age-related atrophy and white matter degeneration. EEG Results:12- The EEG is suggestive of intermittent diffuse encephalopathy. No seizures. CNP testing 04- Mild Neurocognitive disorder, CVA due to thrombosis of PCA, Grief. Patient reports having ongoing episodes of transient amenisic events. She reports having multiple episodes of doing things she don't recall. Patient reports that on July 19, 2024 the patient was preparing for her family Reunion and she was going to place her wallet and she could not recall what she did with her wallet she discovered that it was in her car in a draw with a safety pin but patient had no recollection of placing the wallet in her car. This was the last episode occurring in July that she can recall. No syncope, no seizure, no recent fall. Patient reports having a busy schedule and full of activities.         INTERVAL HISTORY 04-: Patient present for follow up. Patient doing well. She recently has returned home from skilled nursing home. Patient has a month long stay at [facility] in March due to SBO. Patient is now back to [facility]. Patient cognitive state unchanged. No behavioral disturbance noted.Patient independent with dressing and ADL. Patient doing well today. No syncope, no seizure, no recent fall. Patient reports will have Home Health and PT to start soon.   The originating site (patient location) is: Home.        The distant site (neurologist location) is: Neurology Clinic at [facility].        The chief complaint leading to consultation is: F/U Memory CVA       Visit type: Virtual visit with synchronous audio and video.        Total time spent with patient:  15 minutes         Special circumstances: This visit occurred during COVID-19 Pandemic Public Health Emergency.         Consent: The patient verbally consented to participating in the video visit and informed that may decline to receive medical services by telemedicine and may withdraw from such care at any time.        I discussed with the patient the nature of our telemedicine visits, that:        I  would evaluate the patient and recommend diagnostics and treatments based on my assessment.     Our sessions are not being recorded and that personal health information is protected.     Our team would provide follow up care in person if/when the patient needs it.     Virtual (video/telemedicine) visits have significant limitations. A telemedicine exam is primarily focused on the history and what I can observe. Several critical parts of the neurological exam cannot be performed.       Review of Systems   Constitutional: Negative.    HENT:  Positive for hearing loss.    Eyes:  Positive for visual disturbance.   Gastrointestinal: Negative.    Endocrine: Negative.    Genitourinary: Negative.    Musculoskeletal:  Positive for arthralgias, back pain, gait problem and myalgias.   Skin: Negative.    Allergic/Immunologic: Negative.    Psychiatric/Behavioral:  Positive for confusion, decreased concentration and dysphoric mood. Negative for agitation, behavioral problems, hallucinations, self-injury, sleep disturbance and suicidal ideas. The patient is nervous/anxious. The patient is not hyperactive.         Stress                 Current Outpatient Medications:     albuterol (PROVENTIL/VENTOLIN HFA) 90 mcg/actuation inhaler, INHALE 2 PUFFS INTO THE LUNGS EVERY 6 (SIX) HOURS AS NEEDED FOR WHEEZING. RESCUE, Disp: 54 g, Rfl: 3    ALPRAZolam (XANAX) 0.5 MG tablet, TAKE 1 TABLET BY MOUTH NIGHTLY AS NEEDED FOR ANXIETY (MAY TAKE 1-2 TIMES WEEKLY FOR ANXIETY), Disp: 30 tablet, Rfl: 0    amLODIPine (NORVASC) 5 MG tablet, Take 1 tablet  (5 mg total) by mouth 2 (two) times daily., Disp: 180 tablet, Rfl: 3    atorvastatin (LIPITOR) 80 MG tablet, TAKE 1 TABLET BY MOUTH EVERY DAY, Disp: 90 tablet, Rfl: 2    benzonatate (TESSALON) 100 MG capsule, TAKE 1 CAPSULE BY MOUTH THREE TIMES A DAY AS NEEDED Oral for 10 Days, Disp: , Rfl:     blood sugar diagnostic Strp, For use with insurance covered glucometer; test 2 x daily, Disp: 200 strip, Rfl: 3    blood-glucose meter kit, Use as directed - insurance preferred meter, Disp: 1 each, Rfl: 0    brimonidine 0.2% (ALPHAGAN) 0.2 % Drop, INSTILL 1 DROP INTO BOTH EYES 3 TIMES A DAY, Disp: 15 mL, Rfl: 12    carvediloL (COREG) 12.5 MG tablet, Take 12.5 mg by mouth 2 (two) times daily., Disp: , Rfl:     cholecalciferol, vitamin D3, (VITAMIN D3) 1,000 unit capsule, Take 1 capsule (1,000 Units total) by mouth once daily., Disp: 30 capsule, Rfl: 12    diclofenac sodium (VOLTAREN) 1 % Gel, Apply 2 g topically 2 (two) times daily., Disp: 50 g, Rfl: 11    empagliflozin (JARDIANCE) 25 mg tablet, Take 1 tablet (25 mg total) by mouth once daily., Disp: 30 tablet, Rfl: 3    estradioL (ESTRACE) 0.01 % (0.1 mg/gram) vaginal cream, Place 1 g vaginally 3 (three) times a week., Disp: 42.5 g, Rfl: 12    flecainide (TAMBOCOR) 50 MG Tab, Take 1 tablet (50 mg total) by mouth 2 (two) times daily., Disp: 60 tablet, Rfl: 1    glimepiride (AMARYL) 4 MG tablet, TAKE 1 TABLET BY MOUTH EVERY DAY WITH BREAKFAST, Disp: 90 tablet, Rfl: 1    levalbuterol (XOPENEX HFA) 45 mcg/actuation inhaler, INHALE 1-2 PUFFS INTO THE LUNGS EVERY 6 (SIX) HOURS AS NEEDED FOR WHEEZING. RESCUE, Disp: 15 Inhaler, Rfl: 12    linaCLOtide (LINZESS) 145 mcg Cap capsule, TAKE 1 CAPSULE (145 MCG TOTAL) BY MOUTH BEFORE BREAKFAST., Disp: 30 capsule, Rfl: 1    losartan (COZAAR) 50 MG tablet, Take 1 tablet (50 mg total) by mouth 2 (two) times daily., Disp: 180 tablet, Rfl: 2    meclizine (ANTIVERT) 25 mg tablet, TAKE 1 TABLET (25 MG TOTAL) BY MOUTH DAILY AS NEEDED FOR  DIZZINESS., Disp: 30 tablet, Rfl: 0    metFORMIN (GLUCOPHAGE-XR) 500 MG ER 24hr tablet, Take 1 tablet (500 mg total) by mouth once daily., Disp: 90 tablet, Rfl: 3    metoprolol succinate (TOPROL-XL) 50 MG 24 hr tablet, TAKE 1 TABLET BY MOUTH EVERY DAY, Disp: 90 tablet, Rfl: 1    ONETOUCH DELICA PLUS LANCET 33 gauge Misc, Apply topically., Disp: , Rfl:     pregabalin (LYRICA) 75 MG capsule, Take 75 mg by mouth 2 (two) times daily., Disp: , Rfl:     XARELTO 15 mg Tab, TAKE 1 TABLET (15 MG TOTAL) BY MOUTH DAILY WITH DINNER OR EVENING MEAL., Disp: 30 tablet, Rfl: 6    DULoxetine (CYMBALTA) 30 MG capsule, Take 1 capsule (30 mg total) by mouth once daily., Disp: 90 capsule, Rfl: 0    Current Facility-Administered Medications:     sodium chloride 0.9% flush 10 mL, 10 mL, Intravenous, PRN, Lina Taveras MD  Past Medical History:   Diagnosis Date    A-fib     Atrial fibrillation 10/22/2018    Back pain     Cataract     Degenerative disc disease     Diverticulosis     colonoscopy 9/22/2016    GERD (gastroesophageal reflux disease)     Glaucoma     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 Taveras    COLONOSCOPY      COLONOSCOPY N/A 09/22/2016    Procedure: COLONOSCOPY;  Surgeon: Jd Ashton MD;  Location: [facility] ([address] [place] [address]);  Service: Endoscopy;  Laterality: N/A;  OK per Dr Randolph for pt to hold Plavix 5 days prior to procedure/see telephone encounter dated 8/29/16/svn    EPIDURAL STEROID INJECTION N/A 08/02/2023    Procedure: L4-5 interlaminar epidural steroid injection  with right paramedian approach with RN IV sedation;  Surgeon: Chan Fonseca MD;  Location: [facility];  Service: Pain Management;  Laterality: N/A;    EYE SURGERY Bilateral 2002 approx    Laser for glaucoma    HYSTERECTOMY  1963    AMEENA/USO- fibroids; no cancer    OOPHORECTOMY  1963    unknown, only removed one    SELECTIVE INJECTION OF ANESTHETIC AGENT AROUND LUMBAR SPINAL NERVE ROOT BY TRANSFORAMINAL APPROACH Bilateral 06/17/2022    Procedure: Bilateral L4/5 TF JASKARAN with RN IV sedation;  Surgeon: Chan Fonseca MD;  Location: [facility];  Service: Pain Management;  Laterality: Bilateral;    SELECTIVE INJECTION OF ANESTHETIC AGENT AROUND LUMBAR SPINAL NERVE ROOT BY TRANSFORAMINAL APPROACH Bilateral 10/03/2022    Procedure: Bilateral L2-3 transforaminal epidural steroid injection with RN IV sedation;  Surgeon: Chan Fonseca MD;  Location: [facility];  Service: Pain Management;  Laterality: Bilateral;     Social History     Socioeconomic History    Marital status:     Number of children: 1   Tobacco Use    Smoking status: Never     Passive exposure: Never    Smokeless tobacco: Never   Substance and Sexual Activity    Alcohol use: No    Drug use: No    Sexual activity: Not Currently     Partners: Male   Social History Narrative    , no pets or smokers in household. 1 Child who lives in nursing home. She has a grandson who lives in [place].. Retired from [employer] . Still drives. Does not have a Living Will or advanced directive.      Social Drivers of Health     Financial Resource Strain: Medium Risk (7/9/2024)    Overall Financial Resource Strain (CARDIA)     Difficulty of Paying Living Expenses: Somewhat hard   Food Insecurity: No Food Insecurity (3/23/2025)    [facility] Aff[facility]    Hunger Vital Sign     Worried About Running Out of Food in the Last Year: Never true     Ran Out of Food in the Last Year: Never  true   Transportation Needs: No Transportation Needs (3/23/2025)    [facility]    PRAPARE - Transportation     Lack of Transportation (Medical): No     Lack of Transportation (Non-Medical): No   Physical Activity: Insufficiently Active (7/9/2024)    Exercise Vital Sign     Days of Exercise per Week: 3 days     Minutes of Exercise per Session: 40 min   Stress: No Stress Concern Present (7/9/2024)    [place] [place] [employer] - Occupational Stress Questionnaire     Feeling of Stress : Not at all   Housing Stability: Low Risk  (3/23/2025)    [facility]    Housing Stability Vital Sign     Unable to Pay for Housing in the Last Year: No     Number of Times Moved in the Last Year: 0     Homeless in the Last Year: No             Past/Current Medical/Surgical History, Past/Current Social History, Past/Current Family History and Past/Current Medications were reviewed in detail.        Objective:           VITAL SIGNS WERE REVIEWED      GENERAL APPEARANCE:     The patient looks comfortable.    BMI 29.97 KG     No signs of respiratory distress.    Normal breathing pattern.    No dysmorphic features    Normal eye contact.     GENERAL MEDICAL EXAM:    HEENT:  Head is atraumatic normocephalic.     No tender temporal arteries. Fundoscopic (Ophthalmoscopic) exam showed no disc edema.      Neck and Axillae: No JVD. No visible lesions.    No carotid bruits. No thyromegaly. No lymphadenopathy.    Cardiopulmonary: No cyanosis. No tachypnea. Normal respiratory effort.    Gastrointestinal/Urogenital:  No jaundice. No stomas or lesions. No visible hernias. No catheters.     Abdomen is soft non-tender. No masses or organomegaly.    Skin, Hair and Nails: No pathognonomic skin rash. No neurofibromatosis. No visible lesions.No stigmata of autoimmune  disease. No clubbing.    Skin is warm and moist. No palpable masses.    Limbs: No varicose veins. No visible swelling.    No palpable edema. Pulses are symmetric. Pedal pulses are palpable.      Muskoskeletal: +OA visible deformities. Poor posture, No visible lesions.    No spine tenderness.+ signs of longstanding neuropathy. No dislocations or fractures.            Neurologic Exam     Mental Status   Oriented to person, place, and time.   Registration: recalls 3 of 3 objects. Recall at 5 minutes: recalls 3 of 3 objects. Follows 3 step commands.   Attention: normal. Concentration: normal.   Speech: speech is normal and not slurred   Level of consciousness: alert  Knowledge: good and consistent with education. Able to perform simple calculations.   Able to name object. Able to read. Able to repeat. Able to write. Normal comprehension.     Cranial Nerves     CN II   Visual fields full to confrontation.   Visual acuity: normal with correction  Right visual field deficit: none  Left visual field deficit: none     CN III, IV, VI   Pupils are equal, round, and reactive to light.  Extraocular motions are normal.   Right pupil: Size: 2 mm. Shape: regular. Reactivity: brisk. Consensual response: intact. Accommodation: intact.   Left pupil: Size: 2 mm. Shape: regular. Reactivity: brisk. Consensual response: intact. Accommodation: intact.   CN III: no CN III palsy  CN VI: no CN VI palsy  Nystagmus: none   Diplopia: none  Ophthalmoparesis: none  Upgaze: normal  Downgaze: normal  Conjugate gaze: present  Vestibulo-ocular reflex: present    CN V   Facial sensation intact.   Right facial sensation deficit: none  Left facial sensation deficit: none    CN VII   Right facial weakness: none  Left facial weakness: none  Left taste: normal    CN VIII   CN VIII normal.   Hearing: impaired    CN IX, X   CN IX normal.   CN X normal.   Palate: symmetric  Right gag reflex: normal  Left gag reflex: normal    CN XI   CN XI normal.   Right  sternocleidomastoid strength: normal  Left sternocleidomastoid strength: normal  Right trapezius strength: normal  Left trapezius strength: normal    CN XII   CN XII normal.   Tongue: not atrophic  Fasciculations: absent  Tongue deviation: none    Motor Exam   Muscle bulk: normal  Overall muscle tone: normal  Right arm tone: normal  Left arm tone: normal  Right arm pronator drift: absent  Left arm pronator drift: absent  Right leg tone: normal  Left leg tone: normal    Strength   Strength 5/5 throughout.   Right neck flexion: 5/5  Left neck flexion: 5/5  Right neck extension: 5/5  Left neck extension: 5/5  Right deltoid: 5/5  Left deltoid: 5/5  Right biceps: 5/5  Left biceps: 5/5  Right triceps: 5/5  Left triceps: 5/5  Right wrist flexion: 5/5  Left wrist flexion: 5/5  Right wrist extension: 5/5  Left wrist extension: 5/5  Right interossei: 5/5  Left interossei: 5/5  Right iliopsoas: 5/5  Left iliopsoas: 5/5  Right quadriceps: 5/5  Left quadriceps: 5/5  Right hamstrin/5  Left hamstrin/5  Right glutei: 5/5  Left glutei: 5/5  Right anterior tibial: 5/5  Left anterior tibial: 5/5  Right posterior tibial: 5/5  Left posterior tibial: 5/5  Right peroneal: 5/5  Left peroneal: 5/5  Right gastroc: 5/5  Left gastroc: 5/5    Sensory Exam   Light touch normal.   Right arm light touch: normal  Left arm light touch: normal  Right leg light touch: normal  Left leg light touch: normal  Right arm vibration: normal  Left arm vibration: normal  Right leg vibration: decreased from toes  Left leg vibration: decreased from toes  Right arm proprioception: normal  Left arm proprioception: normal  Right leg proprioception: decreased from toes  Left leg proprioception: decreased from toes  Right arm pinprick: normal  Left arm pinprick: normal  Right leg pinprick: decreased from toes  Left leg pinprick: decreased from toes  Sensory deficit distribution on left: lateral cutaneous thigh  Graphesthesia: normal  Stereognosis: normal  Poor  posture      Gait, Coordination, and Reflexes     Gait  Gait: wide-based (ambulates with cane and walker)    Coordination   Romberg: negative  Finger to nose coordination: normal  Heel to shin coordination: normal  Tandem walking coordination: normal    Tremor   Resting tremor: absent  Intention tremor: absent  Action tremor: absent    Reflexes   Right brachioradialis: 2+  Left brachioradialis: 2+  Right biceps: 2+  Left biceps: 2+  Right triceps: 2+  Left triceps: 2+  Right patellar: 2+  Left patellar: 2+  Right achilles: 0  Left achilles: 0  Right : 0  Left : 0  Right plantar: normal  Left plantar: normal  Right August: absent  Left August: absent  Right ankle clonus: absent  Left ankle clonus: absent  Right pendular knee jerk: absent  Left pendular knee jerk: absent    Poor posture            EDWIGE COGNITIVE ASSESSMENT (MOCA) TOTAL SCORE 30         NORMAL-MILD NCD 26-30    MSN Degree    Barriers: Hearing loss, Vision Impairment       DATE 11-       TOTAL SCORE 30/30       VISUOSPATIAL EXECUTIVE (5) 5       NAMING (3) 3       ATTENTION (6) 6       LANGUAGE (3) 3       ABSTRACTION(2) 2       RECALL (5) 5       ORIENTATION (6) 6           Lab Results   Component Value Date    WBC 4.93 02/05/2025    HGB Negative 03/02/2025    HCT 34.4 (L) 02/05/2025    MCV 89 02/05/2025     02/05/2025     Sodium   Date Value Ref Range Status   03/26/2025 138 136 - 145 mmol/L Final   02/05/2025 139 136 - 145 mmol/L Final     Potassium   Date Value Ref Range Status   03/26/2025 4 3.5 - 5.1 mmol/L Final   02/05/2025 4.3 3.5 - 5.1 mmol/L Final     Chloride   Date Value Ref Range Status   03/26/2025 103 100 - 109 mmol/L Final   02/05/2025 107 95 - 110 mmol/L Final     CO2   Date Value Ref Range Status   02/05/2025 24 23 - 29 mmol/L Final     Carbon Dioxide   Date Value Ref Range Status   03/26/2025 28 22 - 33 mmol/L Final     Glucose   Date Value Ref Range Status   02/05/2025 127 (H) 70 - 110 mg/dL Final     BUN    Date Value Ref Range Status   02/05/2025 18 8 - 23 mg/dL Final     Blood Urea Nitrogen   Date Value Ref Range Status   03/26/2025 6 5 - 25 mg/dL Final     Creatinine   Date Value Ref Range Status   03/26/2025 1.19 (H) 0.55 - 1.02 mg/dL Final   02/05/2025 1.3 0.5 - 1.4 mg/dL Final     Calcium   Date Value Ref Range Status   03/26/2025 8.1 (L) 8.8 - 10.6 mg/dL Final   02/05/2025 9.4 8.7 - 10.5 mg/dL Final     Total Protein   Date Value Ref Range Status   07/09/2024 7.1 6.0 - 8.4 g/dL Final     Albumin   Date Value Ref Range Status   02/05/2025 3.7 3.5 - 5.2 g/dL Final     Albumin Level   Date Value Ref Range Status   03/26/2025 2.6 (L) 3.5 - 5.0 g/dl Final     Total Bilirubin   Date Value Ref Range Status   07/09/2024 0.4 0.1 - 1.0 mg/dL Final     Comment:     For infants and newborns, interpretation of results should be based  on gestational age, weight and in agreement with clinical  observations.    Premature Infant recommended reference ranges:  Up to 24 hours.............<8.0 mg/dL  Up to 48 hours............<12.0 mg/dL  3-5 days..................<15.0 mg/dL  6-29 days.................<15.0 mg/dL       Alkaline Phosphatase   Date Value Ref Range Status   07/09/2024 95 55 - 135 U/L Final     AST   Date Value Ref Range Status   07/09/2024 19 10 - 40 U/L Final     ALT   Date Value Ref Range Status   07/09/2024 13 10 - 44 U/L Final     Anion Gap   Date Value Ref Range Status   03/26/2025 7 5 - 13 mmol/L Final   02/05/2025 8 8 - 16 mmol/L Final     eGFR if    Date Value Ref Range Status   03/02/2022 49.3 (A) >60 mL/min/1.73 m^2 Final     eGFR    Date Value Ref Range Status   03/26/2025 45 mL/min/1.73mSq Final     Comment:     In accordance with NKF-ASN Task Force recommendation, calculation based on the Chronic Kidney Disease Epidemiology Collaboration (CKD-EPI) equation without adjustment for race. eGFR adjusted for gender and age and calculated in ml/min/1.73mSquared. eGFR cannot be  calculated if patient is under 18 years of age.     Reference Range:   >= 60 ml/min/1.73mSquared.     eGFR if non    Date Value Ref Range Status   03/02/2022 42.8 (A) >60 mL/min/1.73 m^2 Final     Comment:     Calculation used to obtain the estimated glomerular filtration  rate (eGFR) is the CKD-EPI equation.        Lab Results   Component Value Date    SNEQLFZF30 >2000 (H) 07/09/2024     Lab Results   Component Value Date    TSH 1.647 02/05/2025    W1KRABL 87 03/12/2009    FREET4 1.27 08/13/2015 12-    MRI BRAIN WO    No acute abnormality.  Mild age-related atrophy and white matter degeneration.   07-    MRI BRAIN WO    Tiny acute infarctions seen at the left posterior baylee. This is at the left facial nerve nucleus.       09-    MRI Brain WO    Small remote left temporoparietal infarct.  There has, however, been no significant detrimental interval change since the prior exam of 12/4/09     EEG Results:    12-    The EEG is suggestive of intermittent diffuse encephalopathy.     No seizures noted     CNP TESTING    04-     Mild Neurocognitive disorder, CVA due to thrombosis of PCA, Grief.       Reviewed the neuroimaging independently       Assessment:       1. Mild neurocognitive disorder    2. Cerebral microvascular disease: stroke ? 1999 elsewhere; TIA 10/13    3. Amnesia    4. Transient neurologic deficit    5. Family history of stress    6. Other cerebral infarction    7. Mixed anxiety depressive disorder    8. Primary open angle glaucoma (POAG) of both eyes, moderate stage    9. Hearing loss, unspecified hearing loss type, unspecified laterality    10. Cerebrovascular accident (CVA) due to thrombosis of precerebral artery    11. Diabetic retinopathy with macular edema associated with type 2 diabetes mellitus, unspecified laterality, unspecified retinopathy severity    12. Memory impairment    13. Diabetes mellitus type 2 with complications    14. Abnormality  of gait and mobility    15. Lumbar radiculopathy, chronic    16. Atrial fibrillation, unspecified type    17. DM cataract    18. PAD (peripheral artery disease)    19. Hyperlipidemia associated with type 2 diabetes mellitus    20. Mixed diabetic hyperlipidemia associated with type 2 diabetes mellitus    21. Age-related nuclear cataract, right    22. Spinal stenosis of lumbar region with neurogenic claudication    23. Diabetes mellitus with proteinuria    24. Type II diabetes mellitus with neurological manifestations    25. Abnormal ankle brachial index    26. Bilateral carpal tunnel syndrome    27. Posture abnormality    28. Carotid atherosclerosis, unspecified laterality    29. Diabetes mellitus with stage 3 chronic kidney disease    30. Hypertension, essential              Modified Heather Score (m-RS)      0  The patient has no residual symptoms.    1  The patient has no significant disability; able to carry out all pre-stroke activities.    2  The patient has slight disability; unable to carry out all pre-stroke activities but able to look after self without daily help.    3  The patient has moderate disability; requiring some external help but able to walk without the assistance of another individual.    4  The patient has moderately severe disability; unable to walk or attend to bodily functions without assistance of another individual.    5  The patient has severe disability; bedridden, incontinent, requires continuous care.    6  The patient has  (during the hospital stay or after discharge from the hospital).    Plan:         TRANSIENT NEUROLOGICAL EVENTS/ AMNESTIC/ MILD NEUROCOGNITIVE DISORDER/ GRIEF/  FAMILY STRESS/HX OF MULTIPLE STROKES/CAD/HLD/AFIB/DM/GLAUCOMA/[place]/CHRONIC BACK PAIN      EVALUATION     NO DMA recommended at this time    Recommend optimization of STRESS/ANXIETY      Explained to the patient and family that medications are intended to slow down the progression and not stop it or  reverse the disease.The disease is relentless, progressive and so far cannot be controlled.     I counseled the patient and family that the rate of progression is extremely variable and the average (10 years) is an inaccurate measure.       HX OF MULTIPLE STROKES (RISK FACTORS AFIB, HLD, HTN DM)       Xarleto 15 mg po     Medical supportive care    Vascular Risk Factors (VRFs) stratification (BP, BS, BC control and Smoking Cessation) is the mainstay of stroke prevention (>90%). The benefit of antiplatelets is < 20% stroke risk reduction.         Healthy diet and exercise    Avoid driving.    Call 911 if any SUDDEN:   Weakness  Numbness  Slurring of speech  Speech difficulty   Vertigo  Loss of balance  Loss of vision  Loss of hearing  Double vision  Trouble swallowing  Trouble breathing  Facial drooping      HOME CARE         Falling Down Precautions. Gait is affected by the disease as well.     Avoid driving and access to firearms     Incremental 24/Care     Help with finances and decision making.    Join support group.    Proofing the house and use labeling.    Avoid antihistamines and anticholinergics.    Avoid changing routine.    Use written reminders.    Avoid multitasking.    Healthy diet, exercise (physical and cognitive).    Good sleep hygiene.        HERE ARE 10 WAYS TO REDUCE RISK OF DEMENTIA AND SLOW DOWN THE PROGRESSION OF DEMENTIA        1.Be physically active.    2. Avoid smoking and alcohol consumption.    3. Track your numbers. Keep your blood pressure, cholesterol, blood sugar and weight within recommended ranges.    4. Stay socially connected.    5. Make healthy food choices. Eat a well-balance and healthy diet that rich in cereals, fish, legumes, and vegetables.    6. Reduce stress.     7. Challenge your brain by trying something new, playing games, or learning a new language.    8. Take care of your hearing. Avoid being continuously exposed to loud sounds and wear a hearing aid if hearing does  become a problem.    9. Lower risk of falls. Consider installing handrails on all stairs and grab bars in bathrooms.    10. Reduce your exposure to air pollution, such as exposure to exhaust in heavy traffic.         PREVENTION OF DELIRIUM       1. Good hydration and avoid electrolyte imbalance  2. Recognize and treat infections immediately especially UTI.  3. Bladder emptying and prevent constipation.   4. Provide stimulating activities and familiar objects  5. Use eyeglasses and hearing aids if needed.   6. Use simple and regular communication about people, current place, and time  7. Mobility and range-of-motion exercises  8. Reduce noise, lighting and avoid sleep interruptions  9. Non-narcotic pain management.  10.Nondrug treatment for sleep problems or anxiety  11. Avoid antihistamines.  12. Avoid narcotics.  13. Avoid benzodiazepines.            SLEEP HYGIENE     Poor sleep has a negative effect on cognition. Several strategies have been shown to improve sleep:     Caffeine intake in the afternoon and evening, as well as stuffing oneself at supper, can decrease the quality of restful sleep throughout the night.   Bedtime and wake-up times should be consistent every night and morning so the body becomes used to a single routine, even on the weekends.  Engage in daily physical activity, but not 2-3 hours before bedtime.   No technology use (television, computer, iPad) 1-2 hours before bed.   Have a wind down routine (e.g., soft lights in the house, bath before bed, reduced fluid intake, songs, reading, less noise) to promote sleep readiness.   Visit the www.sleepfoundation.org for more strategies.      COGNITIVE HYGIENE     Engage in regular exercise, which increases alertness and arousal and can improve attention and focus.  Consider lower impact exercises, such as yoga or light walking.  Get a good nights sleep, as this can enhance alertness and cognition.  Eat healthy foods and balanced meals. It is  notable that research indicates certain nutrients may aid in brain function, such as B vitamins (especially B6, B12, and folic acid), antioxidants (such as vitamins C and E, and beta carotene), and Omega-3 fatty acids. Talk with your physician or nutritionist about whats right for you.   Keep your brain active. Find activities to stay mentally active, such as reading, games (cards, checkers), puzzles (crosswords, Sudoku, jig saw), crafts (models, woodworking), gardening, or participating in activities in the community.  Stay socially engaged. Continue staying active with your family and friends.      MEDICAL/SURGICAL COMORBIDITIES     All relevant medical comorbidities noted and managed by primary care physician and medical care team.          MISCELLANEOUS MEDICAL PROBLEMS       HEALTHY LIFESTYLE AND PREVENTATIVE CARE    Encouraged the patient to adhere to the age-appropriate health maintenance guidelines including screening tests and vaccinations.     Discussed the overall importance of healthy lifestyle, optimal weight, exercise, healthy diet, good sleep hygiene and avoiding drugs including smoking, alcohol and recreational drugs. The patient verbalized full understanding.       Advised the patient to follow COVID-19 prevention measures.       I spent 20 minutes more than 50% spent face to face with the patient    Time spent in counseling and coordination of care including discussions etiology of diagnosis, pathogenesis of diagnosis, prognosis of diagnosis,, diagnostic results, impression and recommendations, diagnostic studies, management, risks and benefits of treatment, instructions of disease self-management, treatment instructions, follow up requirements, patient and family counseling/involvement in care compliance with treatment regimen. All of the patient's questions were answered during this discussion.       Jose Ortega, MSN NP      Collaborating Provider: Etienne Guerrero MD, FAAN  Neurologist/Epileptologist

## 2025-04-22 ENCOUNTER — TELEPHONE (OUTPATIENT)
Dept: DIABETES | Facility: CLINIC | Age: 85
End: 2025-04-22
Payer: MEDICARE

## 2025-04-22 NOTE — TELEPHONE ENCOUNTER
Copied from CRM #[id number]. Topic: Medications - Medication Authorization  >> Apr 22, 2025  1:27 PM Cheryl wrote:  Type:  Pharmacy Calling to Clarify an RX    Name of Caller:shruti with [facility]  Pharmacy Name:  [facility]     Prescription Name:na  What do they need to clarify?:jo-ann  Best Call Back Number:[phone] ref#[id number]  Additional Information: requesting a call as auth request was sent for test strips

## 2025-04-25 ENCOUNTER — TELEPHONE (OUTPATIENT)
Dept: DIABETES | Facility: CLINIC | Age: 85
End: 2025-04-25
Payer: MEDICARE

## 2025-04-25 NOTE — TELEPHONE ENCOUNTER
----- Message from Grace sent at 4/25/2025 12:04 PM CDT -----  Contact: Saul  Mrs. Kirkpatrick needs a call back at [phone] for needing to speak with the nurse about some concerns she has.Thanks

## 2025-04-25 NOTE — TELEPHONE ENCOUNTER
Spoke with patient where she confirms that she is out of scripts after hospital visit pt wanted to know if her rx script was sent to her new mail order pharmacy where confirmed that it is in review and that if she hasn't heard anything from them by next week please contact us pt verbally understands and agrees

## 2025-05-01 ENCOUNTER — OFFICE VISIT (OUTPATIENT)
Dept: FAMILY MEDICINE | Facility: CLINIC | Age: 85
End: 2025-05-01
Payer: MEDICARE

## 2025-05-01 ENCOUNTER — LAB VISIT (OUTPATIENT)
Dept: LAB | Facility: HOSPITAL | Age: 85
End: 2025-05-01
Attending: INTERNAL MEDICINE
Payer: MEDICARE

## 2025-05-01 VITALS
HEIGHT: 65 IN | DIASTOLIC BLOOD PRESSURE: 72 MMHG | WEIGHT: 151 LBS | SYSTOLIC BLOOD PRESSURE: 130 MMHG | HEART RATE: 76 BPM | BODY MASS INDEX: 25.16 KG/M2

## 2025-05-01 DIAGNOSIS — I10 HYPERTENSION, ESSENTIAL: Chronic | ICD-10-CM

## 2025-05-01 DIAGNOSIS — N18.32 CHRONIC KIDNEY DISEASE, STAGE 3B: ICD-10-CM

## 2025-05-01 DIAGNOSIS — D64.9 ANEMIA, UNSPECIFIED TYPE: ICD-10-CM

## 2025-05-01 DIAGNOSIS — E11.22 DIABETES MELLITUS WITH STAGE 3 CHRONIC KIDNEY DISEASE: Chronic | ICD-10-CM

## 2025-05-01 DIAGNOSIS — K59.09 CHRONIC CONSTIPATION: ICD-10-CM

## 2025-05-01 DIAGNOSIS — N18.30 DIABETES MELLITUS WITH STAGE 3 CHRONIC KIDNEY DISEASE: Chronic | ICD-10-CM

## 2025-05-01 DIAGNOSIS — E53.8 B12 DEFICIENCY: ICD-10-CM

## 2025-05-01 DIAGNOSIS — E04.2 MULTINODULAR THYROID: Chronic | ICD-10-CM

## 2025-05-01 DIAGNOSIS — E11.3213 BOTH EYES AFFECTED BY MILD NONPROLIFERATIVE DIABETIC RETINOPATHY WITH MACULAR EDEMA, ASSOCIATED WITH TYPE 2 DIABETES MELLITUS: ICD-10-CM

## 2025-05-01 DIAGNOSIS — F41.8 MIXED ANXIETY DEPRESSIVE DISORDER: ICD-10-CM

## 2025-05-01 DIAGNOSIS — R53.81 DEBILITY: ICD-10-CM

## 2025-05-01 DIAGNOSIS — I27.20 PULMONARY HYPERTENSION: ICD-10-CM

## 2025-05-01 DIAGNOSIS — E16.2 HYPOGLYCEMIA: ICD-10-CM

## 2025-05-01 DIAGNOSIS — I63.00 CEREBROVASCULAR ACCIDENT (CVA) DUE TO THROMBOSIS OF PRECEREBRAL ARTERY: Chronic | ICD-10-CM

## 2025-05-01 DIAGNOSIS — I48.91 ATRIAL FIBRILLATION, UNSPECIFIED TYPE: ICD-10-CM

## 2025-05-01 DIAGNOSIS — K56.609 INTESTINAL OBSTRUCTION, UNSPECIFIED CAUSE, UNSPECIFIED WHETHER PARTIAL OR COMPLETE: Primary | ICD-10-CM

## 2025-05-01 PROBLEM — H40.9 GLAUCOMA: Status: ACTIVE | Noted: 2025-03-23

## 2025-05-01 LAB
ABSOLUTE EOSINOPHIL (OHS): 0.16 K/UL
ABSOLUTE MONOCYTE (OHS): 0.61 K/UL (ref 0.3–1)
ABSOLUTE NEUTROPHIL COUNT (OHS): 4.69 K/UL (ref 1.8–7.7)
ALBUMIN SERPL BCP-MCNC: 3.7 G/DL (ref 3.5–5.2)
ALBUMIN/CREAT UR: 125.5 UG/MG
ALP SERPL-CCNC: 69 UNIT/L (ref 40–150)
ALT SERPL W/O P-5'-P-CCNC: 12 UNIT/L (ref 10–44)
ANION GAP (OHS): 9 MMOL/L (ref 8–16)
AST SERPL-CCNC: 14 UNIT/L (ref 11–45)
BASOPHILS # BLD AUTO: 0.04 K/UL
BASOPHILS NFR BLD AUTO: 0.5 %
BILIRUB SERPL-MCNC: 0.5 MG/DL (ref 0.1–1)
BUN SERPL-MCNC: 10 MG/DL (ref 8–23)
CALCIUM SERPL-MCNC: 9.8 MG/DL (ref 8.7–10.5)
CHLORIDE SERPL-SCNC: 105 MMOL/L (ref 95–110)
CO2 SERPL-SCNC: 25 MMOL/L (ref 23–29)
CREAT SERPL-MCNC: 1.1 MG/DL (ref 0.5–1.4)
CREAT UR-MCNC: 55 MG/DL (ref 15–325)
ERYTHROCYTE [DISTWIDTH] IN BLOOD BY AUTOMATED COUNT: 16.3 % (ref 11.5–14.5)
FERRITIN SERPL-MCNC: 721.1 NG/ML (ref 20–300)
GFR SERPLBLD CREATININE-BSD FMLA CKD-EPI: 50 ML/MIN/1.73/M2
GLUCOSE SERPL-MCNC: 272 MG/DL (ref 70–110)
HCT VFR BLD AUTO: 29.3 % (ref 37–48.5)
HGB BLD-MCNC: 9.5 GM/DL (ref 12–16)
IMM GRANULOCYTES # BLD AUTO: 0.03 K/UL (ref 0–0.04)
IMM GRANULOCYTES NFR BLD AUTO: 0.4 % (ref 0–0.5)
IRON SATN MFR SERPL: 19 % (ref 20–50)
IRON SERPL-MCNC: 49 UG/DL (ref 30–160)
LYMPHOCYTES # BLD AUTO: 1.96 K/UL (ref 1–4.8)
MCH RBC QN AUTO: 28.5 PG (ref 27–31)
MCHC RBC AUTO-ENTMCNC: 32.4 G/DL (ref 32–36)
MCV RBC AUTO: 88 FL (ref 82–98)
MICROALBUMIN UR-MCNC: 69 UG/ML (ref ?–5000)
NUCLEATED RBC (/100WBC) (OHS): 0 /100 WBC
PLATELET # BLD AUTO: 222 K/UL (ref 150–450)
PMV BLD AUTO: 11.9 FL (ref 9.2–12.9)
POTASSIUM SERPL-SCNC: 3.6 MMOL/L (ref 3.5–5.1)
PROT SERPL-MCNC: 8 GM/DL (ref 6–8.4)
RBC # BLD AUTO: 3.33 M/UL (ref 4–5.4)
RELATIVE EOSINOPHIL (OHS): 2.1 %
RELATIVE LYMPHOCYTE (OHS): 26.2 % (ref 18–48)
RELATIVE MONOCYTE (OHS): 8.1 % (ref 4–15)
RELATIVE NEUTROPHIL (OHS): 62.7 % (ref 38–73)
SODIUM SERPL-SCNC: 139 MMOL/L (ref 136–145)
TIBC SERPL-MCNC: 252 UG/DL (ref 250–450)
TRANSFERRIN SERPL-MCNC: 170 MG/DL (ref 200–375)
VIT B12 SERPL-MCNC: 977 PG/ML (ref 210–950)
WBC # BLD AUTO: 7.49 K/UL (ref 3.9–12.7)

## 2025-05-01 PROCEDURE — 82043 UR ALBUMIN QUANTITATIVE: CPT

## 2025-05-01 PROCEDURE — 82607 VITAMIN B-12: CPT

## 2025-05-01 PROCEDURE — 84466 ASSAY OF TRANSFERRIN: CPT

## 2025-05-01 PROCEDURE — 36415 COLL VENOUS BLD VENIPUNCTURE: CPT

## 2025-05-01 PROCEDURE — 84075 ASSAY ALKALINE PHOSPHATASE: CPT

## 2025-05-01 PROCEDURE — 82728 ASSAY OF FERRITIN: CPT

## 2025-05-01 PROCEDURE — 99999 PR PBB SHADOW E&M-EST. PATIENT-LVL IV: CPT | Mod: PBBFAC,,, | Performed by: INTERNAL MEDICINE

## 2025-05-01 PROCEDURE — 85025 COMPLETE CBC W/AUTO DIFF WBC: CPT

## 2025-05-01 RX ORDER — BLOOD-GLUCOSE METER
EACH MISCELLANEOUS
COMMUNITY
Start: 2025-04-30

## 2025-05-01 NOTE — PROGRESS NOTES
".tccTransitional Care Note  Subjective:       Patient ID: Saul Mar is a 84 y.o. female.  Chief Complaint: Hospital Follow Up    Family and/or Caretaker present at visit?  No.  Diagnostic tests reviewed/disposition: I have reviewed all completed as well as pending diagnostic tests at the time of discharge.  Disease/illness education: yes  Home health/community services discussion/referrals: Patient has home health established at home .   Establishment or re-establishment of referral orders for community resources: No other necessary community resources.   Discussion with other health care providers: No discussion with other health care providers necessary.     Admitted March 2nd:  "84-year-old female initially presented to OSH on 3/3 with upper abdominal pain, nausea, vomiting, and constipation imaging concerning for small bowel obstruction. She was transferred here, admitted to surgery, and underwent NG tube decompression. Cardiology was consulted. She underwent small bowel follow-through on 3/4 and failed. She also developed an ELLYN on 3/4 which was managed with fluid resuscitation and improved on 3/8. On 3/5 she underwent robotic assisted lysis of adhesions converted to open ex lap with small bowel resection and anastomosis. She underwent NG clamp trial on 3/8 and passed so NG tube was pulled and she was started on clear liquid diet. She was advanced to full's and tolerating diet, she began to have flatus on 3/11. She had nausea overnight and a CT was obtained on 3/12 along with an elevation in her white count, CT showed free fluid and ileus, IR was consulted for drainage however did not find a drainable window. She has been on antibiotics and made n.p.o. again. On 3/13 she began to have vomiting again and redeveloped an ELLYN and again was fluid resuscitated, NG tube was replaced and she underwent several more days of decompression. On 3/14 she began to have bowel movements again and her leukocytosis " "improving. On 3/15 she passed a clamp trial and NG tube was again removed she was initiated on a diet and this time tolerated it. Her ELLYN improved again on 3/16. Her midline incision staples were removed on 3/17 however this time she developed anemia and was given a unit of pRBCs. 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 She was discharged to a skilled nursing facility on 3/19 with 2-week follow-up in general surgery clinic.    Significant Diagnostic Studies/Procedures: CT, robot-assisted lysis of adhesions converted to ex lap with small bowel resection and reanastomosis  Complications: Postoperative ileus, ELLYN"    Evidently went to SNF after that- no notes.    Then readmitted March 27:  "Reason for Hospitalization   84-year-old black female with A-fib on Xarelto and flecainide, CKD 3B, HTN, PAD, CVA, glaucoma; presented to our [facility] with hypoglycemia. She was admitted to ICU on dextrose drip and octreotide and blood sugars normalized. It is felt her hypoglycemia was related to use of glimepiride and NPH insulin at SNF facility from where she transferred. Blood sugars have remained stable we have discontinued NPH, glimepiride and HCTZ during this hospitalization. By my evaluation today she is stable, improved and appropriate for discharge to SNF.    Hypoglycemia  Patient initially admitted to ICU on dextrose drip and octreotide due to persistent hypoglycemia. Blood sugar levels stabilized and have remained stable since then. We have discontinued NPH insulin and glimepiride which were the underlying cause of patient's hypoglycemia. Will plan to resume metformin at discharge and if insulin is required would recommend basal insulin such as Lantus or Levemir to reduce risk of hypoglycemia related to NPH."    Went back to SNF, home since Aprill 11.  She has home health.  She lives alone.  She does not " "have family close by but does have some distant relatives who are available.  She does not feel that she needs any additional resources aside from home health which now has nursing and PT.    She is contemplating cataract surgery but given her 2 recent hospitalizations, is lying to defer on this for a few months until she recovers completely from her surgery.    Medications reviewed.  Her diabetes medications were adjusted where she was in the hospital.  As above, she is no longer on insulin, glimepiride and HCTZ.    Seen by Neuro for memory April 2025.    From what I can tell, it looks her Ballard was 30/30.    TRANSIENT NEUROLOGICAL EVENTS/ AMNESTIC/ MILD NEUROCOGNITIVE DISORDER/ GRIEF/  FAMILY STRESS/HX OF MULTIPLE STROKES/CAD/HLD/AFIB/DM/GLAUCOMA/[place]/CHRONIC BACK PAIN        EVALUATION      NO DMA recommended at this time     Recommend optimization of STRESS/ANXIETY       Explained to the patient and family that medications are intended to slow down the progression and not stop it or reverse the disease.The disease is relentless, progressive and so far cannot be controlled.      I counseled the patient and family that the rate of progression is extremely variable and the average (10 years) is an inaccurate measure.         HX OF MULTIPLE STROKES (RISK FACTORS AFIB, HLD, HTN DM)         Xarleto 15 mg po      Medical supportive care     Vascular Risk Factors (VRFs) stratification (BP, BS, BC control and Smoking Cessation) is the mainstay of stroke prevention (>90%). The benefit of antiplatelets is < 20% stroke risk reduction.           Healthy diet and exercise     Avoid driving."    Patient Active Problem List  Diagnosis    Degenerative disc disease    Colon polyp: tubular adenoma 5/13, repeated 2016 to be repeated 2021- declines colonoscopy and Gastro also does not recommend    Multinodular thyroid: thyroid u/s 7/16, stable 2017 and 2019, 2021, also 11/24    POAG (primary open-angle glaucoma) - Both Eyes    " Amaurosis fugax    Nuclear sclerosis - Right Eye    Eyelid myokymia    Cerebral microvascular disease: stroke ? 1999 elsewhere; TIA 10/13    Vitamin D insufficiency    Left ventricular diastolic dysfunction with preserved systolic function    Hypertension, essential    Gastroesophageal reflux disease without esophagitis    Diabetes mellitus with proteinuria    Aortic arch atherosclerosis    Abnormal ankle brachial index    Diabetes mellitus with stage 3 chronic kidney disease    Cerebrovascular accident (CVA) due to thrombosis of precerebral artery: 2017, also 1999    Sickle cell trait syndrome    Mixed anxiety depressive disorder    Diverticulosis of large intestine without hemorrhage    Atrial fibrillation    Hyperlipidemia associated with type 2 diabetes mellitus    Bilateral radiating leg pain    PAD (peripheral artery disease)    Carotid atherosclerosis    Spinal stenosis of lumbar region with neurogenic claudication    Lumbar radiculopathy, chronic    Gait disorder    Posture abnormality    Both eyes affected by 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, associated with type 2 diabetes mellitus    Low-tension glaucoma, right eye, mild stage    Low-tension glaucoma, left eye, moderate stage    Age-related nuclear cataract, right    Mild episode of recurrent major depressive disorder    Carotid artery disease    DM cataract    Diabetic glaucoma    Diabetic retinopathy with macular edema associated with type 2 diabetes mellitus    Pulmonary hypertension: 34 on echo 2021    Hearing loss    Chronic constipation    Cognitive complaints with normal exam    Decreased functional mobility and endurance    Tendinitis, de Quervain's    Chronic kidney disease, stage 3b    Mild neurocognitive disorder    Hiatal hernia: seen on EGD 2016    Bilateral carpal tunnel syndrome    Debility    Glaucoma    Hypoglycemia          HPI  Review of Systems   Constitutional:  Positive for fatigue.        Weight loss  since hospitalization, about 20 lb   HENT:  Negative for hearing loss.    Eyes:  Negative for visual disturbance.   Respiratory:  Negative for shortness of breath.    Cardiovascular:  Negative for chest pain.   Gastrointestinal:  Negative for abdominal pain, constipation and diarrhea.        Has a BM roughly every 2-3 days   Genitourinary:  Negative for dysuria, frequency and vaginal bleeding.   Musculoskeletal:  Negative for joint swelling.   Skin:  Negative for rash.   Neurological:  Negative for weakness, light-headedness and headaches.   Psychiatric/Behavioral:  Negative for confusion and sleep disturbance.        Objective:      Physical Exam  Vitals and nursing note reviewed.   Constitutional:       Appearance: She is well-developed.   HENT:      Head: Normocephalic and atraumatic.      Right Ear: External ear normal.      Left Ear: External ear normal.      Nose: Nose normal.      Mouth/Throat:      Pharynx: No oropharyngeal exudate.   Eyes:      General: No scleral icterus.     Extraocular Movements: Extraocular movements intact.      Conjunctiva/sclera: Conjunctivae normal.   Neck:      Thyroid: Thyromegaly present.      Vascular: No JVD.   Cardiovascular:      Rate and Rhythm: Normal rate and regular rhythm.      Heart sounds: Normal heart sounds. No murmur heard.     No gallop.   Pulmonary:      Effort: Pulmonary effort is normal. No respiratory distress.      Breath sounds: Normal breath sounds. No wheezing.   Abdominal:      General: Bowel sounds are normal. There is no distension.      Palpations: Abdomen is soft. There is no mass.      Tenderness: There is no abdominal tenderness. There is no guarding or rebound.      Comments: Healing midline abdominal scar   Musculoskeletal:         General: No tenderness. Normal range of motion.      Cervical back: Normal range of motion and neck supple.   Lymphadenopathy:      Cervical: No cervical adenopathy.   Skin:     General: Skin is warm.      Findings: No  erythema or rash.   Neurological:      General: No focal deficit present.      Mental Status: She is alert and oriented to person, place, and time.      Cranial Nerves: No cranial nerve deficit.      Coordination: Coordination normal.   Psychiatric:         Behavior: Behavior normal.         Thought Content: Thought content normal.         Judgment: Judgment normal.       Assessment:       1. Intestinal obstruction: 2025; s/p laparascopic surgery 3/25 outside [facility]    2. Cerebrovascular accident (CVA) due to thrombosis of precerebral artery    3. 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 Hypertension, essential    5. Chronic kidney disease, stage 3b    6. Diabetes mellitus with stage 3 chronic kidney disease    7. Debility    8. Chronic constipation    9. Pulmonary hypertension: 34 on echo 2021    10. Mixed anxiety depressive disorder    11. Atrial fibrillation, unspecified type    12. Hypoglycemia    13. Anemia, unspecified type    14. B12 deficiency    15. Multinodular thyroid: thyroid u/s 7/16, stable 2017 and 2019, 2021, also 11/24        Plan:       1. Intestinal obstruction: 2025; s/p laparascopic surgery 3/25 outside [facility]- recovering from this.  Alarm symptoms reviewed.  She will keep the General surgery follow-up.    2. Cerebrovascular accident (CVA) due to thrombosis of precerebral artery- in 2017 as I have previously documented.  Following in Neurology;  Appears to be stable currently.  Overview:  Last Assessment & Plan:   With history of stroke previously.  On Plavix and aspirin as well as a statin.  Presented with perioral numbness.  Initially thought to be more of a hypertensive urgency type presentation.  Patient's symptoms improved somewhat but was still present even after blood pressure corrected.  MRI obtained the old a tiny infarct right at the location of the facial nucleus.  Had no documented atrial  fibrillation on monitoring.  Undergo swallow study in the emergency room prior to taking p.o.  She had a previous reaction/allergy to a statin and therefore no statin will be provided at discharge.  Not a candidate for TPA due to her minor symptoms at presentation risk greater than benefit.  Discharged on antiplatelet therapy.      3. 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  Keep ophthalmology follow-up    4. Hypertension, essential:  Stable on her current regimen.  Call if readings greater than 140/80 on a regular basis  -     Comprehensive Metabolic Panel; Future; Expected date: 05/01/2025    5. Chronic kidney disease, stage 3b:  Gentle hydration, avoid nephrotoxins.  Labs within the next 2 months    6. Diabetes mellitus with stage 3 chronic kidney disease:  Medications adjusted.  Significant weight loss.  We will continue to monitor.  Last A1c was 7.5; will recheck within the next 2 months  -     Microalbumin/Creatinine Ratio, Urine; Future; Expected date: 05/01/2025    7. Debility- long discussion.  She lives alone.  She has some family support.  She is getting home health and home PT.  Future options reviewed, may need to consider clinical care coordination, currently declines.  Overview:  2/2 advanced age, comorbidities, and decreased activity residing in a long-term care facility.   -Participating in therapy 5 times weekly with goal of optimizing physical function and decreasing risk for falls.    -Goal is to discharge back to home where she was living independently.  Reports having family living close by will provide assistance when discharged    Discharged back to home on today.  Patient was able to ambulate independently with walker at discharge.  Follow-up with outpatient providers as scheduled      8. Chronic constipation:  Hydration, exercise as tolerated.  Alarm symptoms reviewed particularly with regard to her  recent bowel obstruction    9. Pulmonary hypertension: 34 on echo 2021:  Denies syncope, chest pain or shortness a breath.  Will continue to monitor  Overview:  7/22/2021 Echo  Summary    The left ventricle is normal in size with concentric remodeling and normal systolic function.  The estimated ejection fraction is 60%.  Grade I left ventricular diastolic dysfunction.  Normal right ventricular size with normal right ventricular systolic function.  Mild mitral regurgitation.  Mild tricuspid regurgitation.  Normal central venous pressure (3 mmHg).  The estimated PA systolic pressure is 34 mmHg.           10. Mixed anxiety depressive disorder:  Stable on regimen; does not desire further medication or intervention    11. Atrial fibrillation, unspecified type:  Stable on regimen, Keep Cardiology follow-up    12. Hypoglycemia:  Meds recently adjusted, will monitor closely    13. Anemia, unspecified type:  Aggravated by recent blood loss from surgery.  Will continue to monitor closely.  -     CBC Auto Differential; Future; Expected date: 05/01/2025  -     Ferritin; Future; Expected date: 05/01/2025  -     Iron and TIBC; Future; Expected date: 05/01/2025    14. B12 deficiency  -     Vitamin B12; Future; Expected date: 05/01/2025    15. Multinodular thyroid: thyroid u/s 7/16, stable 2017 and 2019, 2021, also 11/24  Overview:  US Soft Tissue 7/19/2016--- Multinodular thyroid.  None of nodules meet criteria for fine needle aspiration.    Due for diabetic eye exam and DEXA scan which will be scheduled  Urine and review  She declines COVID booster  Home safety issues, fall prevention discussed  I will review all studies and determine further tx depending on findings  Visit today manifests increased complexity associated with the care of the multiple chronic and episodic problems I addressed.  I am managing a longitudinal care plan of the patient due to the serious and complex problems listed above.

## 2025-05-05 ENCOUNTER — RESULTS FOLLOW-UP (OUTPATIENT)
Dept: FAMILY MEDICINE | Facility: CLINIC | Age: 85
End: 2025-05-05

## 2025-05-05 ENCOUNTER — TELEPHONE (OUTPATIENT)
Dept: FAMILY MEDICINE | Facility: CLINIC | Age: 85
End: 2025-05-05
Payer: MEDICARE

## 2025-05-05 DIAGNOSIS — D64.9 ANEMIA, UNSPECIFIED TYPE: Primary | ICD-10-CM

## 2025-05-05 RX ORDER — FERROUS GLUCONATE 324(38)MG
TABLET ORAL
COMMUNITY
Start: 2025-05-05

## 2025-05-13 DIAGNOSIS — E11.610 TYPE 2 DIABETES MELLITUS WITH DIABETIC NEUROPATHIC ARTHROPATHY: ICD-10-CM

## 2025-05-13 DIAGNOSIS — H40.1222 LOW-TENSION GLAUCOMA, LEFT EYE, MODERATE STAGE: ICD-10-CM

## 2025-05-13 DIAGNOSIS — H40.1211 LOW-TENSION GLAUCOMA, RIGHT EYE, MILD STAGE: ICD-10-CM

## 2025-05-13 RX ORDER — METFORMIN HYDROCHLORIDE 500 MG/1
500 TABLET, EXTENDED RELEASE ORAL DAILY
Qty: 90 TABLET | Refills: 0 | Status: SHIPPED | OUTPATIENT
Start: 2025-05-13

## 2025-05-13 RX ORDER — METFORMIN HYDROCHLORIDE 500 MG/1
500 TABLET, EXTENDED RELEASE ORAL DAILY
Qty: 90 TABLET | Refills: 0 | OUTPATIENT
Start: 2025-05-13

## 2025-05-13 RX ORDER — CARVEDILOL 12.5 MG/1
12.5 TABLET ORAL 2 TIMES DAILY
Qty: 180 TABLET | Refills: 3 | Status: SHIPPED | OUTPATIENT
Start: 2025-05-13

## 2025-05-13 RX ORDER — BRIMONIDINE TARTRATE 2 MG/ML
1 SOLUTION/ DROPS OPHTHALMIC 3 TIMES DAILY
Qty: 30 ML | Refills: 4 | Status: SHIPPED | OUTPATIENT
Start: 2025-05-13

## 2025-05-13 NOTE — TELEPHONE ENCOUNTER
Care Due:                  Date            Visit Type   Department     Provider  --------------------------------------------------------------------------------                                EP -                              PRIMARY      [facility]  Last Visit: 05-      CARE ([facility])   MEDICINE       Penny Randolph  Next Visit: None Scheduled  None         None Found                                                            Last  Test          Frequency    Reason                     Performed    Due Date  --------------------------------------------------------------------------------    HBA1C.......  6 months...  metFORMIN................  02- 08-    [facility] Embedded Care Due Messages. Reference number: [id number].   5/13/2025 11:46:30 AM CDT

## 2025-05-13 NOTE — TELEPHONE ENCOUNTER
Refill Routing Note   Medication(s) are not appropriate for processing by [facility] for the following reason(s):        No active prescription written by provider    [facility] action(s):  Defer               Appointments  past 12m or future 3m with PCP    Date Provider   Last Visit   5/1/2025 Penny Randolph MD   Next Visit   5/13/2025 Penny Randolph MD   ED visits in past 90 days: 0        Note composed:1:33 PM 05/13/2025

## 2025-05-13 NOTE — TELEPHONE ENCOUNTER
Spoke to pt and she went over all her medications with me on the phone and she only have carvedilol  , she didn't have Metoprolol   She wants to continue with the carvedilol

## 2025-05-13 NOTE — TELEPHONE ENCOUNTER
Please call her, I got a refill request for medication called carvedilol,    However, she is currently taking metoprolol- or at least it is listed on her chart    Please ask her which of those 2 medications she is currently taking, thank you

## 2025-05-13 NOTE — TELEPHONE ENCOUNTER
No care due was identified.  [facility] Embedded Care Due Messages. Reference number: [id number].   5/13/2025 11:53:20 AM CDT

## 2025-05-13 NOTE — TELEPHONE ENCOUNTER
No care due was identified.  [facility] Embedded Care Due Messages. Reference number: [id number].   5/13/2025 4:49:26 PM CDT

## 2025-05-14 ENCOUNTER — TELEPHONE (OUTPATIENT)
Dept: FAMILY MEDICINE | Facility: CLINIC | Age: 85
End: 2025-05-14
Payer: MEDICARE

## 2025-05-14 RX ORDER — INSULIN PUMP SYRINGE, 3 ML
EACH MISCELLANEOUS
Qty: 1 EACH | Refills: 0 | Status: SHIPPED | OUTPATIENT
Start: 2025-05-14 | End: 2026-05-14

## 2025-05-14 NOTE — TELEPHONE ENCOUNTER
Refill Decision Note   Saul Casimiro Zaid  is requesting a refill authorization.  Brief Assessment and Rationale for Refill:  Approve     Medication Therapy Plan:         Pharmacist review requested: Yes   Extended chart review required: Yes   Comments:     Note composed:10:21 PM 05/13/2025

## 2025-05-14 NOTE — TELEPHONE ENCOUNTER
Source   Saul Allan (Patient)    Subject   Saul Allan (Patient)    Topic   Appointments - Amb Referral      Summary   Amb Referral   Communication   Patient would like to get medical advice.      Symptoms (please be specific):   Yesi states she is faxing over an order from [facility] for Dr Randolph to sign      How long have you had these symptoms:      Would you like a call back, or a response through your [facility] portal?: [phone] Yesi with [facility]       Pharmacy name and phone # (copy from chart):        Comments:

## 2025-05-14 NOTE — TELEPHONE ENCOUNTER
Refill Routing Note   Medication(s) are not appropriate for processing by [facility] for the following reason(s):        Drug-disease interaction    [facility] action(s):  Defer        Medication Therapy Plan: Drug-Disease: metFORMIN and Diabetes mellitus with stage 3 chronic kidney disease; Chronic kidney disease, stage 3b    Pharmacist review requested: Yes     Appointments  past 12m or future 3m with PCP    Date Provider   Last Visit   5/1/2025 Penny Randolph MD   Next Visit   Visit date not found Penny Randolph MD   ED visits in past 90 days: 0        Note composed:8:10 PM 05/13/2025

## 2025-05-19 ENCOUNTER — TELEPHONE (OUTPATIENT)
Dept: FAMILY MEDICINE | Facility: CLINIC | Age: 85
End: 2025-05-19
Payer: MEDICARE

## 2025-05-19 NOTE — TELEPHONE ENCOUNTER
Spoke to pt Home Health and she will see if theta is something that they will be able to do if not she will notified pt of the next step     She will call back if other questions arise

## 2025-05-19 NOTE — TELEPHONE ENCOUNTER
Perhaps the home health nurse can give her some assistance but she will need an enema.  The only other alternative is emergency room in case she is impacted which can be an emergency

## 2025-05-19 NOTE — TELEPHONE ENCOUNTER
Pt states that she has not have a bowel movement in 9 days and she is taking the Mirlax stool softener Dulcolox and nothing is helping.Pt states she does not even have to urge to go the patient does have a Home Health Nurse coming over this afternoon, Pt states also she does not have a ride to get to a appt please advise

## 2025-05-19 NOTE — TELEPHONE ENCOUNTER
----- Message from Angelika sent at 5/19/2025  2:42 PM CDT -----  Type:  Needs Medical AdviceWho Called: Jose [facility] the patient rather a call back or a response via [facility]? callBest Call Back Number: [phone] Information: pt calling again regarding same issue as earlier states she still hasn't spoken with anyone in the office was told to call back after lunch

## 2025-05-19 NOTE — TELEPHONE ENCOUNTER
----- Message from Garret sent at 5/19/2025 11:07 AM CDT -----  Type:  Needs Medical AdviceWho Called: bob with [facility] Symptoms (please be specific):  Symptom: ConstipationOutcome: Schedule an appointment to be seen within 24 hours.Reason: Caller denied all higher acuity questionsThe caller rejected this outcome.How long has patient had these symptoms:  9 days Would the patient rather a call back or a response via [facility]? Call Best Call Back Number:  or [phone] Information: would like to know if pt can have some medication to assist with going to the restroom (pt stated she's taking laxatives everyday and nothing is helping) Caller stated in the pass the sup prep medication is the only thing that helped in the passBased on symptoms screen routed pt to be seen today however, pt doesn't have a ride today

## 2025-05-21 ENCOUNTER — TELEPHONE (OUTPATIENT)
Dept: FAMILY MEDICINE | Facility: CLINIC | Age: 85
End: 2025-05-21
Payer: MEDICARE

## 2025-05-21 ENCOUNTER — OFFICE VISIT (OUTPATIENT)
Dept: PODIATRY | Facility: CLINIC | Age: 85
End: 2025-05-21
Payer: MEDICARE

## 2025-05-21 ENCOUNTER — TELEPHONE (OUTPATIENT)
Dept: OPHTHALMOLOGY | Facility: CLINIC | Age: 85
End: 2025-05-21
Payer: MEDICARE

## 2025-05-21 VITALS — BODY MASS INDEX: 25.16 KG/M2 | WEIGHT: 151 LBS | HEIGHT: 65 IN

## 2025-05-21 DIAGNOSIS — L60.0 ONYCHOCRYPTOSIS: ICD-10-CM

## 2025-05-21 DIAGNOSIS — E11.49 TYPE II DIABETES MELLITUS WITH NEUROLOGICAL MANIFESTATIONS: ICD-10-CM

## 2025-05-21 DIAGNOSIS — B35.1 ONYCHOMYCOSIS DUE TO DERMATOPHYTE: ICD-10-CM

## 2025-05-21 DIAGNOSIS — E11.9 ENCOUNTER FOR COMPREHENSIVE DIABETIC FOOT EXAMINATION, TYPE 2 DIABETES MELLITUS: Primary | ICD-10-CM

## 2025-05-21 PROCEDURE — 99999 PR PBB SHADOW E&M-EST. PATIENT-LVL III: CPT | Mod: PBBFAC,,, | Performed by: PODIATRIST

## 2025-05-21 NOTE — PROGRESS NOTES
Subjective:     Patient ID: Saul Mar is a 85 y.o. female.    Chief Complaint: Nail Care (Diabetic pt wears slide on shoes, PCP Penny Randolph last seen 5/1/2025)    Saul is a 85 y.o. female who presents to the clinic for evaluation and treatment of high risk feet. 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 The patient's chief complaint is long thick toenails. This patient has documented high risk feet requiring routine maintenance secondary to diabetes mellitis and those secondary complications of diabetes, as mentioned..    PCP: Penny Randolph MD    Date Last Seen by PCP: 05/01/2025    Current shoe gear:  Affected Foot: Rx diabetic extra depth shoes and custom accommodative insoles     Unaffected Foot: Rx diabetic extra depth shoes and custom accommodative insoles    Hemoglobin A1C   Date Value Ref Range Status   02/05/2025 7.5 (H) 4.0 - 5.6 % Final     Comment:     ADA Screening Guidelines:  5.7-6.4%  Consistent with prediabetes  >or=6.5%  Consistent with diabetes    High levels of fetal hemoglobin interfere with the HbA1C  assay. Heterozygous hemoglobin variants (HbS, HgC, etc)do  not significantly interfere with this assay.   However, presence of multiple variants may affect accuracy.     10/21/2024 6.8 (H) 4.0 - 5.6 % Final     Comment:     ADA Screening Guidelines:  5.7-6.4%  Consistent with prediabetes  >or=6.5%  Consistent with diabetes    High levels of fetal hemoglobin interfere with the HbA1C  assay.  Heterozygous hemoglobin variants (HbS, HgC, etc)do  not significantly interfere with this assay.   However, presence of multiple variants may affect accuracy.     07/09/2024 8.3 (H) 4.0 - 5.6 % Final     Comment:     ADA Screening Guidelines:  5.7-6.4%  Consistent with prediabetes  >or=6.5%  Consistent with diabetes    High levels of fetal hemoglobin interfere with the HbA1C  assay. Heterozygous hemoglobin variants (HbS, HgC, etc)do  not significantly interfere with this assay.   However, presence of multiple variants may affect accuracy.         Patient Active Problem List   Diagnosis    Degenerative disc disease    Colon polyp: tubular adenoma 5/13, repeated 2016 to be repeated 2021- declines colonoscopy and Gastro also does not recommend    Multinodular thyroid: thyroid u/s 7/16, stable 2017 and 2019, 2021, also 11/24    POAG (primary open-angle glaucoma) - Both Eyes    Amaurosis fugax    Nuclear sclerosis - Right Eye    Eyelid myokymia    Cerebral microvascular disease: stroke ? 1999 elsewhere; TIA 10/13    Vitamin D insufficiency    Left ventricular diastolic dysfunction with preserved systolic function    Hypertension, essential    Gastroesophageal reflux disease without esophagitis    Diabetes mellitus with proteinuria    Aortic arch atherosclerosis    Abnormal ankle brachial index    Diabetes mellitus with stage 3 chronic kidney disease    Cerebrovascular accident (CVA) due to thrombosis of precerebral artery: 2017, also 1999    Sickle cell trait syndrome    Mixed anxiety depressive disorder    Diverticulosis of large intestine without hemorrhage    Atrial fibrillation    Hyperlipidemia associated with type 2 diabetes mellitus    Bilateral radiating leg pain    PAD (peripheral artery disease)    Carotid atherosclerosis    Spinal stenosis of lumbar region with neurogenic claudication    Lumbar radiculopathy, chronic    Gait disorder    Posture abnormality    Both eyes affected by mild nonproliferative  diabetic retinopathy with macular edema, associated with type 2 diabetes mellitus    Low-tension glaucoma, right eye, mild stage    Low-tension glaucoma, left eye, moderate stage    Age-related nuclear cataract, right    Mild episode of recurrent major depressive disorder    Carotid artery disease    DM cataract    Diabetic glaucoma    Diabetic retinopathy with macular edema associated with type 2 diabetes mellitus    Pulmonary hypertension: 34 on echo 2021    Hearing loss    Chronic constipation    Cognitive complaints with normal exam    Decreased functional mobility and endurance    Tendinitis, de Quervain's    Chronic kidney disease, stage 3b    Mild neurocognitive disorder    Hiatal hernia: seen on EGD 2016    Bilateral carpal tunnel syndrome    Debility    Glaucoma    Hypoglycemia       Medication List with Changes/Refills   Current Medications    ALBUTEROL (PROVENTIL/VENTOLIN HFA) 90 MCG/ACTUATION INHALER    INHALE 2 PUFFS INTO THE LUNGS EVERY 6 (SIX) HOURS AS NEEDED FOR WHEEZING. RESCUE    AMLODIPINE (NORVASC) 5 MG TABLET    Take 1 tablet (5 mg total) by mouth 2 (two) times daily.    ATORVASTATIN (LIPITOR) 80 MG TABLET    TAKE 1 TABLET BY MOUTH EVERY DAY    BLOOD SUGAR DIAGNOSTIC STRP    For use with insurance covered glucometer; test 2 x daily    BLOOD-GLUCOSE METER KIT    Use as directed - insurance preferred meter    BRIMONIDINE 0.2% (ALPHAGAN) 0.2 % DROP    Place 1 drop into both eyes 3 (three) times daily.    CARVEDILOL (COREG) 12.5 MG TABLET    Take 1 tablet (12.5 mg total) by mouth 2 (two) times daily.    CHOLECALCIFEROL, VITAMIN D3, (VITAMIN D3) 1,000 UNIT CAPSULE    Take 1 capsule (1,000 Units total) by mouth once daily.    DICLOFENAC SODIUM (VOLTAREN) 1 % GEL    Apply 2 g topically 2 (two) times daily.    ESTRADIOL (ESTRACE) 0.01 % (0.1 MG/GRAM) VAGINAL CREAM    Place 1 g vaginally 3 (three) times a week.    FERROUS GLUCONATE (FERGON) 324 MG TABLET    3-4 x weekly with OJ    FLECAINIDE (TAMBOCOR)  50 MG TAB    Take 1 tablet (50 mg total) by mouth 2 (two) times daily.    LEVALBUTEROL (XOPENEX HFA) 45 MCG/ACTUATION INHALER    INHALE 1-2 PUFFS INTO THE LUNGS EVERY 6 (SIX) HOURS AS NEEDED FOR WHEEZING. RESCUE    LINACLOTIDE (LINZESS) 145 MCG CAP CAPSULE    TAKE 1 CAPSULE (145 MCG TOTAL) BY MOUTH BEFORE BREAKFAST.    LOSARTAN (COZAAR) 50 MG TABLET    Take 1 tablet (50 mg total) by mouth 2 (two) times daily.    MECLIZINE (ANTIVERT) 25 MG TABLET    TAKE 1 TABLET (25 MG TOTAL) BY MOUTH DAILY AS NEEDED FOR DIZZINESS.    METFORMIN (GLUCOPHAGE-XR) 500 MG ER 24HR TABLET    Take 1 tablet (500 mg total) by mouth once daily.    ONETOUCH DELICA PLUS LANCET 33 GAUGE MISC    Apply topically.    XARELTO 15 MG TAB    TAKE 1 TABLET (15 MG TOTAL) BY MOUTH DAILY WITH DINNER OR EVENING MEAL.       Review of patient's allergies indicates:   Allergen Reactions    Pravachol [pravastatin] Nausea Only       Past Surgical History:   Procedure Laterality Date    CATARACT EXTRACTION W/  INTRAOCULAR LENS IMPLANT Left 02/27/2013    Dr. Taveras    COLONOSCOPY      COLONOSCOPY N/A 09/22/2016    Procedure: COLONOSCOPY;  Surgeon: Jd Ashton MD;  Location: [facility] ([address] [place] [address]);  Service: Endoscopy;  Laterality: N/A;  OK per Dr Randolph for pt to hold Plavix 5 days prior to procedure/see telephone encounter dated 8/29/16/svn    EPIDURAL STEROID INJECTION N/A 08/02/2023    Procedure: L4-5 interlaminar epidural steroid injection with right paramedian approach with RN IV sedation;  Surgeon: Chan Fonseca MD;  Location: [facility];  Service: Pain Management;  Laterality: N/A;    EYE SURGERY Bilateral 2002 approx    Laser for glaucoma    HYSTERECTOMY  1963    AMEENA/USO- fibroids; no cancer    OOPHORECTOMY  1963    unknown, only removed one    SELECTIVE INJECTION OF ANESTHETIC AGENT AROUND LUMBAR SPINAL NERVE ROOT BY TRANSFORAMINAL APPROACH Bilateral 06/17/2022    Procedure: Bilateral L4/5 TF JASKARAN with RN IV sedation;  Surgeon: Chan Fonseca,  MD;  Location: [facility];  Service: Pain Management;  Laterality: Bilateral;    SELECTIVE INJECTION OF ANESTHETIC AGENT AROUND LUMBAR SPINAL NERVE ROOT BY TRANSFORAMINAL APPROACH Bilateral 10/03/2022    Procedure: Bilateral L2-3 transforaminal epidural steroid injection with RN IV sedation;  Surgeon: Chan Fonseca MD;  Location: [facility];  Service: Pain Management;  Laterality: Bilateral;       Family History   Problem Relation Name Age of Onset    Stroke Mother      Mental illness Mother      Hypertension Mother      Stroke Father      Cancer Sister  68        gyn    Ovarian cancer Sister      Diabetes Maternal Grandmother      Heart disease Paternal Grandmother      Drug abuse Daughter      Cancer Maternal Aunt          type unknown    Cancer Maternal Uncle          type not known    Glaucoma Neg Hx      Macular degeneration Neg Hx      Alcohol abuse Neg Hx      COPD Neg Hx      Asthma Neg Hx      Amblyopia Neg Hx      Blindness Neg Hx      Cataracts Neg Hx      Retinal detachment Neg Hx      Strabismus Neg Hx         Social History     Socioeconomic History    Marital status:     Number of children: 1   Tobacco Use    Smoking status: Never     Passive exposure: Never    Smokeless tobacco: Never   Substance and Sexual Activity    Alcohol use: No    Drug use: No    Sexual activity: Not Currently     Partners: Male   Social History Narrative    , no pets or smokers in household. 1 Child who lives in nursing home. She has a grandson who lives in [place].. Retired from [employer] . Still drives. Does not have a Living Will or advanced directive.      Social Drivers of Health     Financial Resource Strain: Medium Risk (7/9/2024)    Overall Financial Resource Strain (CARDIA)     Difficulty of Paying Living Expenses: Somewhat hard   Food Insecurity: No Food Insecurity (3/23/2025)    Received from [facility] and Affiliates    Hunger  "Vital Sign     Worried About Running Out of Food in the Last Year: Never true     Ran Out of Food in the Last Year: Never true   Transportation Needs: No Transportation Needs (3/23/2025)    [facility]    PRAPARE - Transportation     Lack of Transportation (Medical): No     Lack of Transportation (Non-Medical): No   Physical Activity: Insufficiently Active (7/9/2024)    Exercise Vital Sign     Days of Exercise per Week: 3 days     Minutes of Exercise per Session: 40 min   Stress: No Stress Concern Present (7/9/2024)    [employer] - Occupational Stress Questionnaire     Feeling of Stress : Not at all   Housing Stability: Low Risk  (3/23/2025)    [facility]    Housing Stability Vital Sign     Unable to Pay for Housing in the Last Year: No     Number of Times Moved in the Last Year: 0     Homeless in the Last Year: No       Vitals:    05/21/25 1534   Weight: 68.5 kg (151 lb 0.2 oz)   Height: 5' 5" (1.651 m)   PainSc: 0-No pain       Hemoglobin A1C   Date Value Ref Range Status   02/05/2025 7.5 (H) 4.0 - 5.6 % Final     Comment:     ADA Screening Guidelines:  5.7-6.4%  Consistent with prediabetes  >or=6.5%  Consistent with diabetes    High levels of fetal hemoglobin interfere with the HbA1C  assay. Heterozygous hemoglobin variants (HbS, HgC, etc)do  not significantly interfere with this assay.   However, presence of multiple variants may affect accuracy.     10/21/2024 6.8 (H) 4.0 - 5.6 % Final     Comment:     ADA Screening Guidelines:  5.7-6.4%  Consistent with prediabetes  >or=6.5%  Consistent with diabetes    High levels of fetal hemoglobin interfere with the HbA1C  assay. Heterozygous hemoglobin variants (HbS, HgC, etc)do  not significantly interfere with this assay.   However, presence of multiple variants may " affect accuracy.     07/09/2024 8.3 (H) 4.0 - 5.6 % Final     Comment:     ADA Screening Guidelines:  5.7-6.4%  Consistent with prediabetes  >or=6.5%  Consistent with diabetes    High levels of fetal hemoglobin interfere with the HbA1C  assay. Heterozygous hemoglobin variants (HbS, HgC, etc)do  not significantly interfere with this assay.   However, presence of multiple variants may affect accuracy.         Review of Systems   Constitutional:  Negative for chills and fever.   Respiratory:  Negative for shortness of breath.    Cardiovascular:  Negative for chest pain, palpitations, orthopnea, claudication and leg swelling.   Gastrointestinal:  Negative for diarrhea, nausea and vomiting.   Musculoskeletal:  Negative for joint pain.   Skin:  Negative for rash.   Neurological:  Positive for tingling and sensory change.   Psychiatric/Behavioral: Negative.             Objective:      PHYSICAL EXAM: Apperance: Alert and orient in no distress,well developed, and with good attention to grooming and body habits  Patient presents ambulating in tennis shoes.  LOWER EXTREMITY EXAM:  VASCULAR: Dorsalis pedis pulses 0/4 left 1/4 right and Posterior Tibial pulses 0/4 left and 1/4 right. Capillary fill time <4 seconds bilateral. Mild edema observed bilateral. Varicosities present bilateral. Skin temperature of the lower extremities is warm to cool, proximal to distal. Hair growth absent bilateral.  DERMATOLOGICAL: No skin rashes, subcutaneous nodules, lesions, or ulcers observed bilateral. Nails 1,2,3,4,5, bilateral elongated, thickened, and discolored with subungual debris. Right hallux nail observed to be mildly incurvated at distal lateral borders and slight obstructed in the nail grooves with soft tissue. No purulent drainage, no odor, and no increased temperature observed to right hallux. Webspaces 1,2,3,4 bilateral clean, dry and without evidence of break in skin integrity. Mild hyperkeratotic tissue noted to bilateral hallux.    NEUROLOGICAL: Light touch, sharp-dull, proprioception all present and equal bilaterally.  Vibratory sensation diminished at bilateral hallux. Protective sensation absent at toe sites as tested with a [place]-Marjan 5.07 monofilament.   MUSCULOSKELETAL: Muscle strength is 5/5 for foot inverters, everters, plantarflexors, and dorsiflexors. Muscle tone is normal.           Assessment:       ICD-10-CM ICD-9-CM   1. Encounter for comprehensive diabetic foot examination, type 2 diabetes mellitus  E11.9 250.00   2. Type II diabetes mellitus with neurological manifestations  E11.49 250.60   3. Onychocryptosis  L60.0 703.0   4. Onychomycosis due to dermatophyte  B35.1 110.1         Plan:   Encounter for comprehensive diabetic foot examination, type 2 diabetes mellitus    Type II diabetes mellitus with neurological manifestations    Onychocryptosis    Onychomycosis due to dermatophyte    I counseled the patient on her conditions, regarding findings of my examination, my impressions, and usual treatment plan.   This visit spent on counseling and coordination of care.  Appointment spent on education about the diabetic foot, neuropathy, and prevention of limb loss.  Shoe inspection. Diabetic Foot Education. Patient reminded of the importance of good nutrition and blood sugar control to help prevent podiatric complications of diabetes. Patient instructed on proper foot hygeine. We discussed wearing proper shoe gear, daily foot inspections, never walking without protective shoe gear, never putting sharp instruments to feet.    With patient's permission, nails 1-5 bilateral were debrided/trimmed in length and thickness aggressively to their soft tissue attachment mechanically and with electric , removing all offending nail and debris. Patient relates relief following the procedure.  Patient  will continue to monitor the areas daily, inspect feet, wear protective shoe gear when ambulatory, moisturizer to maintain skin  integrity. Patient reminded of the importance of good nutrition and blood sugar control to help prevent podiatric complications of diabetes.  Patient to return 6 months or sooner if needed.        Zuleima Howell DPM  [facility]

## 2025-05-21 NOTE — TELEPHONE ENCOUNTER
----- Message from Radha sent at 5/21/2025 11:41 AM CDT -----  Type:  Needs Medical AdviceWho Called: pt MARIAMA CANO [[id number]]Would the patient rather a call back or a response via [facility]? Call back Best Call Back Number: [phone] Additional Information: pt requesting call back in regards to ER visit on yesterday 05/20 pt states has gotten issue resolved

## 2025-05-21 NOTE — TELEPHONE ENCOUNTER
Called to confirm surgery date with . Patient stated her pcp advised against surgery at this time do to extended hospital stay back in March. Patient surgery will be placed on hold and patient was advised she will need to schedule appointment with  before scheduling surgery and will need to be cleared by provider. Pt understood. No other questions were asked.

## 2025-05-27 ENCOUNTER — OFFICE VISIT (OUTPATIENT)
Dept: DIABETES | Facility: CLINIC | Age: 85
End: 2025-05-27
Payer: MEDICARE

## 2025-05-27 ENCOUNTER — LAB VISIT (OUTPATIENT)
Dept: LAB | Facility: HOSPITAL | Age: 85
End: 2025-05-27
Attending: NURSE PRACTITIONER
Payer: MEDICARE

## 2025-05-27 VITALS
WEIGHT: 153.25 LBS | BODY MASS INDEX: 25.5 KG/M2 | SYSTOLIC BLOOD PRESSURE: 126 MMHG | HEART RATE: 62 BPM | DIASTOLIC BLOOD PRESSURE: 61 MMHG

## 2025-05-27 DIAGNOSIS — E04.2 MULTINODULAR THYROID: Chronic | ICD-10-CM

## 2025-05-27 DIAGNOSIS — E16.2 HYPOGLYCEMIA: ICD-10-CM

## 2025-05-27 DIAGNOSIS — E11.3213 BOTH EYES AFFECTED BY MILD NONPROLIFERATIVE DIABETIC RETINOPATHY WITH MACULAR EDEMA, ASSOCIATED WITH TYPE 2 DIABETES MELLITUS: ICD-10-CM

## 2025-05-27 DIAGNOSIS — K59.09 CHRONIC CONSTIPATION: ICD-10-CM

## 2025-05-27 DIAGNOSIS — E11.22 DIABETES MELLITUS WITH STAGE 3 CHRONIC KIDNEY DISEASE: Chronic | ICD-10-CM

## 2025-05-27 DIAGNOSIS — N18.30 DIABETES MELLITUS WITH STAGE 3 CHRONIC KIDNEY DISEASE: Chronic | ICD-10-CM

## 2025-05-27 DIAGNOSIS — E11.49 TYPE II DIABETES MELLITUS WITH NEUROLOGICAL MANIFESTATIONS: Primary | ICD-10-CM

## 2025-05-27 DIAGNOSIS — E11.49 TYPE II DIABETES MELLITUS WITH NEUROLOGICAL MANIFESTATIONS: ICD-10-CM

## 2025-05-27 DIAGNOSIS — I48.91 ATRIAL FIBRILLATION, UNSPECIFIED TYPE: ICD-10-CM

## 2025-05-27 LAB
ANION GAP (OHS): 12 MMOL/L (ref 8–16)
BUN SERPL-MCNC: 20 MG/DL (ref 8–23)
CALCIUM SERPL-MCNC: 9.7 MG/DL (ref 8.7–10.5)
CHLORIDE SERPL-SCNC: 100 MMOL/L (ref 95–110)
CO2 SERPL-SCNC: 25 MMOL/L (ref 23–29)
CREAT SERPL-MCNC: 1.5 MG/DL (ref 0.5–1.4)
EAG (OHS): 183 MG/DL (ref 68–131)
GFR SERPLBLD CREATININE-BSD FMLA CKD-EPI: 34 ML/MIN/1.73/M2
GLUCOSE SERPL-MCNC: 208 MG/DL (ref 70–110)
GLUCOSE SERPL-MCNC: 219 MG/DL (ref 70–110)
HBA1C MFR BLD: 8 % (ref 4–5.6)
POTASSIUM SERPL-SCNC: 4.4 MMOL/L (ref 3.5–5.1)
SODIUM SERPL-SCNC: 137 MMOL/L (ref 136–145)
T4 FREE SERPL-MCNC: 1.24 NG/DL (ref 0.71–1.51)
TSH SERPL-ACNC: 3.44 UIU/ML (ref 0.4–4)

## 2025-05-27 PROCEDURE — 1101F PT FALLS ASSESS-DOCD LE1/YR: CPT | Mod: CPTII,S$GLB,, | Performed by: NURSE PRACTITIONER

## 2025-05-27 PROCEDURE — G2211 COMPLEX E/M VISIT ADD ON: HCPCS | Mod: S$GLB,,, | Performed by: NURSE PRACTITIONER

## 2025-05-27 PROCEDURE — 84439 ASSAY OF FREE THYROXINE: CPT

## 2025-05-27 PROCEDURE — 1125F AMNT PAIN NOTED PAIN PRSNT: CPT | Mod: CPTII,S$GLB,, | Performed by: NURSE PRACTITIONER

## 2025-05-27 PROCEDURE — 84443 ASSAY THYROID STIM HORMONE: CPT

## 2025-05-27 PROCEDURE — 99214 OFFICE O/P EST MOD 30 MIN: CPT | Mod: S$GLB,,, | Performed by: NURSE PRACTITIONER

## 2025-05-27 PROCEDURE — 3288F FALL RISK ASSESSMENT DOCD: CPT | Mod: CPTII,S$GLB,, | Performed by: NURSE PRACTITIONER

## 2025-05-27 PROCEDURE — 1159F MED LIST DOCD IN RCRD: CPT | Mod: CPTII,S$GLB,, | Performed by: NURSE PRACTITIONER

## 2025-05-27 PROCEDURE — 1160F RVW MEDS BY RX/DR IN RCRD: CPT | Mod: CPTII,S$GLB,, | Performed by: NURSE PRACTITIONER

## 2025-05-27 PROCEDURE — 36415 COLL VENOUS BLD VENIPUNCTURE: CPT

## 2025-05-27 PROCEDURE — 84132 ASSAY OF SERUM POTASSIUM: CPT

## 2025-05-27 PROCEDURE — 3078F DIAST BP <80 MM HG: CPT | Mod: CPTII,S$GLB,, | Performed by: NURSE PRACTITIONER

## 2025-05-27 PROCEDURE — 3074F SYST BP LT 130 MM HG: CPT | Mod: CPTII,S$GLB,, | Performed by: NURSE PRACTITIONER

## 2025-05-27 PROCEDURE — 99999 PR PBB SHADOW E&M-EST. PATIENT-LVL V: CPT | Mod: PBBFAC,,, | Performed by: NURSE PRACTITIONER

## 2025-05-27 PROCEDURE — 82962 GLUCOSE BLOOD TEST: CPT | Mod: S$GLB,,, | Performed by: NURSE PRACTITIONER

## 2025-05-27 PROCEDURE — 83036 HEMOGLOBIN GLYCOSYLATED A1C: CPT

## 2025-05-27 NOTE — PATIENT INSTRUCTIONS
Continue Metformin     Resume glimepiride 1/2 tablet before breakfast only    Check blood sugar before breakfast and supper - call if less than 80    Have a sugar making food (starch, fruit or milk) at every meal

## 2025-05-27 NOTE — PROGRESS NOTES
Patient ID: Saul Mar is a 85 y.o. female.  Patient's current PCP is Penny Randolph MD.     Chief Complaint: Diabetes Mellitus    HPI  Saul Mar is a 85 y.o. Black or  female presenting for a follow up for diabetes.     Patient has been diagnosed with type 2 diabetes since 30 years .  Recent diabetes related hospitalizations:none   Complications related to diabetes: nephropathy and retinopathy vascular disease  Previous diabetes education:yes, [facility] with [facility]   Occupation:Retired, lives alone    Current diet: Trying to eat 2- 3 meals per day. 1 Core protein drink per day per Dr Johnson, Gen [facility]. Weight down 20# since lov in 12/2024  Activity Level: None set due to recent health issues. Starting to do more now      Changes made at last Visit:  -     Discussed keeping juice or food item next to bed in case delayed in rising to prevent hypoglycemia  -     No change at this time    Current issues: Hospitalized 3/2/25 for SBO with bowel resection(robot-assisted lysis of adhesions converted to ex lap with small bowel resection and reanastomosis) then while in rehab went to ED 3/23/25 with hypoglycemia. Glimepiride stopped at that time. ED on 5/20/25 for constipation. BM not back to normal. Out of [place]. Taking iron daily.      CURRENT DM MEDICATIONS:   Diabetes Medications              metFORMIN (GLUCOPHAGE-XR) 500 MG ER 24hr tablet Take 1 tablet (500 mg total) by mouth once daily.     Past failed treatment(s) include:   Jardiance - cost prohibitive ($40 copay): dehydration/ELLYN  Glimepiride  - hypoglycemia    Meter/cgm: meter  Blood glucose testing is performed regularly.   Patient is testing 1-2 times per day  Any episodes of hypoglycemia? None after stopping glimepiride  Glucose Patterns: 168-239 across the day per meter memory    Her blood sugar in the clinic today was:  Lab Results   Component Value Date    POCGLU 219 (A) 05/27/2025         Lab Results    Component Value Date    HGBA1C 7.5 (H) 02/05/2025    HGBA1C 6.8 (H) 10/21/2024    HGBA1C 8.3 (H) 07/09/2024       Diabetes Management Status    Statin: Taking  ACE/ARB: Taking    Screening or Prevention Patient's value Goal Complete/Controlled?   HgA1C Testing and Control   Lab Results   Component Value Date    HGBA1C 7.5 (H) 02/05/2025      Annually/Less than 8% yes   Lipid profile : 07/09/2024 Annually Yes   LDL control Lab Results   Component Value Date    LDLCALC 106.8 07/09/2024    Annually/Less than 100 mg/dl  No   Nephropathy screening Lab Results   Component Value Date    LABMICR 69.0 05/01/2025     Lab Results   Component Value Date    PROTEINUA Negative 05/20/2025     Lab Results   Component Value Date    UTPCR Unable to calculate 09/21/2020      Annually Yes   Blood pressure BP Readings from Last 1 Encounters:   05/27/25 126/61    Less than 140/90 Yes   Dilated retinal exam : 06/04/2024 Annually Yes   Foot exam   11/25/24 Annually Yes       Preferred lab site: [facility]  Preferred visit site:[facility]    Labs reviewed and are noted below.    Lab Results   Component Value Date    WBC 7.49 05/01/2025    HGB Negative 05/20/2025    HCT 29.3 (L) 05/01/2025     05/01/2025    CHOL 172 07/09/2024    TRIG 51 07/09/2024    HDL 55 07/09/2024    LDLCALC 106.8 07/09/2024    ALT 12 05/01/2025    AST 14 05/01/2025     05/01/2025    K 3.6 05/01/2025     05/01/2025    ANIONGAP 9 05/01/2025    CREATININE 1.1 05/01/2025    ESTGFRAFRICA 45 03/26/2025    EGFRNONAA 42.8 (A) 03/02/2022    BUN 10 05/01/2025    CO2 25 05/01/2025    TSH 1.647 02/05/2025    INR 1.2 03/12/2025     (H) 05/01/2025    UTPCR Unable to calculate 09/21/2020     Lab Results   Component Value Date    CPEPTIDE 1.8 03/12/2009    FREET4 1.27 08/13/2015    TSH 1.647 02/05/2025    IRON 49 05/01/2025    TIBC 252 05/01/2025    FERRITIN 721.1 (H) 05/01/2025    WCZPLLES30 977 (H) 05/01/2025    .0 (H) 06/25/2020    CALCIUM 9.8 05/01/2025     "PHOS 3.5 02/05/2025     Lab Results   Component Value Date    CPEPTIDE 1.8 03/12/2009     No results found for: "GLUTAMICACID"  Glucose   Date Value Ref Range Status   05/01/2025 272 (H) 70 - 110 mg/dL Final   02/05/2025 127 (H) 70 - 110 mg/dL Final     Anion Gap   Date Value Ref Range Status   05/01/2025 9 8 - 16 mmol/L Final     eGFR if    Date Value Ref Range Status   03/02/2022 49.3 (A) >60 mL/min/1.73 m^2 Final     eGFR    Date Value Ref Range Status   03/26/2025 45 mL/min/1.73mSq Final     Comment:     In accordance with NKF-ASN Task Force recommendation, calculation based on the Chronic Kidney Disease Epidemiology Collaboration (CKD-EPI) equation without adjustment for race. eGFR adjusted for gender and age and calculated in ml/min/1.73mSquared. eGFR cannot be calculated if patient is under 18 years of age.     Reference Range:   >= 60 ml/min/1.73mSquared.     eGFR if non    Date Value Ref Range Status   03/02/2022 42.8 (A) >60 mL/min/1.73 m^2 Final     Comment:     Calculation used to obtain the estimated glomerular filtration  rate (eGFR) is the CKD-EPI equation.              Review of patient's allergies indicates:   Allergen Reactions    Pravachol [pravastatin] Nausea Only     Social History     Socioeconomic History    Marital status:     Number of children: 1   Tobacco Use    Smoking status: Never     Passive exposure: Never    Smokeless tobacco: Never   Substance and Sexual Activity    Alcohol use: No    Drug use: No    Sexual activity: Not Currently     Partners: Male   Social History Narrative    , no pets or smokers in household. 1 Child who lives in nursing home. She has a grandson who lives in [place].. Retired from [employer] . Still drives. Does not have a Living Will or advanced directive.      Social Drivers of Health     Financial Resource Strain: Medium Risk (7/9/2024)    Overall Financial Resource Strain (CARDIA)     Difficulty " of Paying Living Expenses: Somewhat hard   Food Insecurity: No Food Insecurity (3/23/2025)    Received from [facility] Its Subsidiaries and Affiliates    Hunger Vital Sign     Worried About Running Out of Food in the Last Year: Never true     Ran Out of Food in the Last Year: Never true   Transportation Needs: No Transportation Needs (3/23/2025)    Received from [facility] and Its Subsidi[facility] and Affiliates    PRAPARE - Transportation     Lack of Transportation (Medical): No     Lack of Transportation (Non-Medical): No   Physical Activity: Insufficiently Active (7/9/2024)    Exercise Vital Sign     Days of Exercise per Week: 3 days     Minutes of Exercise per Session: 40 min   Stress: No Stress Concern Present (7/9/2024)    [employer] [place] - Occupational Stress Questionnaire     Feeling of Stress : Not at all   Housing Stability: Low Risk  (3/23/2025)    [facility]    Housing Stability Vital Sign     Unable to Pay for Housing in the Last Year: No     Number of Times Moved in the Last Year: 0     Homeless in the Last Year: No     Past Medical History:   Diagnosis Date    A-fib     Atrial fibrillation 10/22/2018    Back pain     Cataract     Degenerative disc disease     Diverticulosis     colonoscopy 9/22/2016    GERD (gastroesophageal reflux disease)     Glaucoma     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   Eyes:  Negative for  blurred vision and double vision.   Respiratory:  Negative for shortness of breath.    Cardiovascular: Negative.    Gastrointestinal: Negative.    Genitourinary:  Negative for frequency.   Musculoskeletal:  Negative for myalgias.   Neurological:  Positive for sensory change.   Psychiatric/Behavioral: Negative.       Physical Exam  Vitals reviewed.   Constitutional:       General: She is not in acute distress.     Appearance: Normal appearance. She is normal weight.   Eyes:      Conjunctiva/sclera: Conjunctivae normal.      Pupils: Pupils are equal, round, and reactive to light.   Cardiovascular:      Rate and Rhythm: Normal rate and regular rhythm.      Pulses: Normal pulses.      Heart sounds: Normal heart sounds.   Pulmonary:      Effort: Pulmonary effort is normal.      Breath sounds: Normal breath sounds.   Abdominal:      General: Bowel sounds are normal. There is no distension.      Palpations: Abdomen is soft.   Musculoskeletal:      Right lower leg: No edema.      Left lower leg: No edema.   Skin:     General: Skin is warm and dry.   Neurological:      Mental Status: She is alert and oriented to person, place, and time.   Psychiatric:         Mood and Affect: Mood normal.           Assessment & Plan    Type II diabetes mellitus with neurological manifestations  -     POCT Glucose, Hand-Held Device  -     Hemoglobin A1C; Future; Expected date: 05/27/2025  -     Basic Metabolic Panel; Future; Expected date: 05/27/2025    Diabetes mellitus with stage 3 chronic kidney disease  -     POCT Glucose, Hand-Held Device  -     Hemoglobin A1C; Future; Expected date: 05/27/2025  -     Basic Metabolic Panel; Future; Expected date: 05/27/2025    Both eyes affected by 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, associated with type 2 diabetes mellitus  -     POCT Glucose, Hand-Held Device  -     Hemoglobin A1C; Future; Expected date: 05/27/2025  -     Basic Metabolic Panel; Future; Expected date:  05/27/2025    Hypoglycemia  -     POCT Glucose, Hand-Held Device  -     Hemoglobin A1C; Future; Expected date: 05/27/2025  -     Basic Metabolic Panel; Future; Expected date: 05/27/2025    Multinodular thyroid: thyroid u/s 7/16, stable 2017 and 2019, 2021, also 11/24  -     TSH; Future; Expected date: 05/27/2025  -     T4, Free; Future; Expected date: 05/27/2025    Chronic constipation- f/u with GI post bowel resection  -     Ambulatory referral/consult to Gastroenterology; Future; Expected date: 06/10/2025    Atrial fibrillation, unspecified type    Plan:  Continue Metformin   Resume glimepiride 1/2 tablet before breakfast only. Patient declines trial of low dose basal insulin in place of TANG.  Check blood sugar before breakfast and supper - call if less than 80  Have a sugar making food (starch, fruit or milk) at every meal      - Reviewed with patient:  The basic pathophysiology of Type 2 diabetes  Mechanism of action and action time of medications  Use of home glucose monitor/cgm  Basic diet/carbohydrate counting/avoiding simple sugars/plate method  Proper hydration   Risk of complications and preventive measures  When to call for assistance          Visit today included increased complexity associated with the care of the episodic problem hyperglycemia addressed and managing the longitudinal care of the patient due to the serious and/or complex managed problem(s) T2DM.

## 2025-06-03 ENCOUNTER — RESULTS FOLLOW-UP (OUTPATIENT)
Dept: DIABETES | Facility: CLINIC | Age: 85
End: 2025-06-03

## 2025-06-19 ENCOUNTER — NURSE TRIAGE (OUTPATIENT)
Dept: ADMINISTRATIVE | Facility: CLINIC | Age: 85
End: 2025-06-19
Payer: MEDICARE

## 2025-06-19 ENCOUNTER — OFFICE VISIT (OUTPATIENT)
Dept: INTERNAL MEDICINE | Facility: CLINIC | Age: 85
End: 2025-06-19
Payer: MEDICARE

## 2025-06-19 VITALS
OXYGEN SATURATION: 99 % | BODY MASS INDEX: 26.01 KG/M2 | DIASTOLIC BLOOD PRESSURE: 70 MMHG | HEART RATE: 68 BPM | SYSTOLIC BLOOD PRESSURE: 130 MMHG | TEMPERATURE: 97 F | WEIGHT: 156.31 LBS | RESPIRATION RATE: 20 BRPM

## 2025-06-19 DIAGNOSIS — K59.00 CONSTIPATION, UNSPECIFIED CONSTIPATION TYPE: Primary | ICD-10-CM

## 2025-06-19 DIAGNOSIS — N18.32 CHRONIC KIDNEY DISEASE, STAGE 3B: ICD-10-CM

## 2025-06-19 PROCEDURE — 3288F FALL RISK ASSESSMENT DOCD: CPT | Mod: CPTII,S$GLB,,

## 2025-06-19 PROCEDURE — 3075F SYST BP GE 130 - 139MM HG: CPT | Mod: CPTII,S$GLB,,

## 2025-06-19 PROCEDURE — 1101F PT FALLS ASSESS-DOCD LE1/YR: CPT | Mod: CPTII,S$GLB,,

## 2025-06-19 PROCEDURE — 99999 PR PBB SHADOW E&M-EST. PATIENT-LVL IV: CPT | Mod: PBBFAC,,,

## 2025-06-19 PROCEDURE — 1125F AMNT PAIN NOTED PAIN PRSNT: CPT | Mod: CPTII,S$GLB,,

## 2025-06-19 PROCEDURE — 1159F MED LIST DOCD IN RCRD: CPT | Mod: CPTII,S$GLB,,

## 2025-06-19 PROCEDURE — 3078F DIAST BP <80 MM HG: CPT | Mod: CPTII,S$GLB,,

## 2025-06-19 PROCEDURE — 99214 OFFICE O/P EST MOD 30 MIN: CPT | Mod: S$GLB,,,

## 2025-06-19 RX ORDER — GLYCERIN 1 G/1
1 SUPPOSITORY RECTAL
Qty: 50 SUPPOSITORY | Refills: 0 | Status: SHIPPED | OUTPATIENT
Start: 2025-06-19

## 2025-06-19 NOTE — PROGRESS NOTES
Patient ID: Saul Jaimes is a 85 y.o. female.    Chief Complaint: Constipation    History of Present Illness    CHIEF COMPLAINT:  - Ms. Casimiro jaimes presents with severe constipation, reporting no bowel movement for approximately seven days and inability to have a successful bowel movement despite multiple interventions.    HPI:  Ms. Casimiro jaimes reports constipation since hospital admission in April. She becomes constipated without the urge to use the bathroom. She attempted a self-administered enema at home, which was ineffective. Last night, she went to the emergency room where an x-ray revealed significant fecal impaction. The emergency room physician attempted manual disimpaction but was unable to reach the impacted stool. She was given a suppository and advised to contact her primary care physician.    She reports no abdominal pain, but mentions rectal pain. She has not had a bowel movement for about 7 days. She has been taking multiple medications for constipation including MiraLAX, Metamucil, lactulose, and Dulcolax, but none have been effective. She has also tried mineral oil and water enemas at home without success.    She has a history of constipation problems for years. She has been evaluated by a gastroenterologist in the past and has previously taken Linzess, though she does not recall its effectiveness. She reports leakage of brown fluid, which she attributes to her water intake.    She has significant discomfort due to the constipation and it has been a considerable time since she has had a normal bowel movement.    She denies taking any pain medication.      ROS:  General: -fever, -chills, -fatigue, -weight gain, -weight loss  Eyes: -vision changes, -redness, -discharge  ENT: -ear pain, -nasal congestion, -sore throat  Cardiovascular: -chest pain, -palpitations, -lower extremity edema  Respiratory: -cough, -shortness of breath  Gastrointestinal: -abdominal pain, -nausea, -vomiting,  -diarrhea, +constipation, -blood in stool, +change in bowel habits, +rectal pain  Genitourinary: -dysuria, -hematuria, -frequency  Musculoskeletal: -joint pain, -muscle pain  Skin: -rash, -lesion  Neurological: -headache, -dizziness, -numbness, -tingling  Psychiatric: -anxiety, -depression, -sleep difficulty         Pmh, Psh, Family Hx, Social Hx updated in Epic Tabs today.         5/1/2025     9:39 AM 7/9/2024    10:48 AM 5/16/2023    10:15 AM 2/8/2022     9:09 AM 10/31/2018     2:02 PM 9/12/2018     2:25 PM 8/2/2018     8:55 AM   Depression Patient Health Questionnaire   Over the last two weeks how often have you been bothered by little interest or pleasure in doing things Not at all Not at all Not at all Not at all  Several days  Several days  Not at all    Over the last two weeks how often have you been bothered by feeling down, depressed or hopeless Not at all Not at all Not at all Not at all  Not at all  Not at all  Not at all    PHQ-2 Total Score 0 0 0 0 1 1 0       Data saved with a previous flowsheet row definition       Active Problem List with Overview Notes    Diagnosis Date Noted    Debility 03/30/2025     2/2 advanced age, comorbidities, and decreased activity residing in a long-term care facility.   -Participating in therapy 5 times weekly with goal of optimizing physical function and decreasing risk for falls.    -Goal is to discharge back to home where she was living independently.  Reports having family living close by will provide assistance when discharged    Discharged back to home on today.  Patient was able to ambulate independently with walker at discharge.  Follow-up with outpatient providers as scheduled      Glaucoma 03/23/2025     Continue eyedrops      Hypoglycemia 03/23/2025    Bilateral carpal tunnel syndrome 01/02/2025    Hiatal hernia: seen on EGD 2016 11/05/2024    Mild neurocognitive disorder 05/08/2024    Chronic kidney disease, stage 3b 02/28/2024    Tendinitis, de Quervain's  02/13/2024    Decreased functional mobility and endurance 01/12/2024    Cognitive complaints with normal exam 12/06/2023    Hearing loss 05/16/2023    Chronic constipation 05/16/2023    Mild episode of recurrent major depressive disorder 05/15/2023    Carotid artery disease 05/15/2023    DM cataract 05/15/2023    Diabetic glaucoma 05/15/2023    Diabetic retinopathy with macular edema associated with type 2 diabetes mellitus 05/15/2023    Pulmonary hypertension: 34 on echo 2021 05/15/2023     7/22/2021 Echo  Summary    The left ventricle is normal in size with concentric remodeling and normal systolic function.  The estimated ejection fraction is 60%.  Grade I left ventricular diastolic dysfunction.  Normal right ventricular size with normal right ventricular systolic function.  Mild mitral regurgitation.  Mild tricuspid regurgitation.  Normal central venous pressure (3 mmHg).  The estimated PA systolic pressure is 34 mmHg.           Both eyes affected by 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, associated with type 2 diabetes mellitus 01/26/2023    Low-tension glaucoma, right eye, mild stage 01/26/2023    Low-tension glaucoma, left eye, moderate stage 01/26/2023    Age-related nuclear cataract, right 01/26/2023    Gait disorder 11/08/2022    Posture abnormality 11/08/2022    Lumbar radiculopathy, chronic 06/17/2022    Carotid atherosclerosis 02/08/2022      7/21      Spinal stenosis of lumbar region with neurogenic claudication 02/02/2022    PAD (peripheral artery disease)     Bilateral radiating leg pain 08/18/2021    Hyperlipidemia associated with type 2 diabetes mellitus 11/11/2018    Atrial fibrillation 10/22/2018    Mixed anxiety depressive disorder 03/27/2018    Diverticulosis of large intestine without hemorrhage 03/27/2018     colonoscopy 9/22/2016      Sickle cell trait syndrome 02/07/2018    Cerebrovascular accident (CVA) due to thrombosis of precerebral artery: 2017, also 1999  07/19/2017     Last Assessment & Plan:   With history of stroke previously.  On Plavix and aspirin as well as a statin.  Presented with perioral numbness.  Initially thought to be more of a hypertensive urgency type presentation.  Patient's symptoms improved somewhat but was still present even after blood pressure corrected.  MRI obtained the old a tiny infarct right at the location of the facial nucleus.  Had no documented atrial fibrillation on monitoring.  Undergo swallow study in the emergency room prior to taking p.o.  She had a previous reaction/allergy to a statin and therefore no statin will be provided at discharge.  Not a candidate for TPA due to her minor symptoms at presentation risk greater than benefit.  Discharged on antiplatelet therapy.      Aortic arch atherosclerosis 11/22/2016     CXR 10/30/2013---Atherosclerosis is seen in the aortic arch.      Abnormal ankle brachial index 11/22/2016     LORENA 10/2/2014---Resting LORENA:  The right ankle brachial index was 0.90 which suggests minimal right lower extremity arterial disease.   The left ankle brachial index was 0.93 which suggests minimal left lower extremity arterial disease.   Waveform analysis are compatible with the above findings.   Exercise LORENA:  Post exercise right ankle pressure was 198 mmHg, resulting in a right post-exercise LORENA of 0.98.   Post exercise left ankle pressure was 209 mmHg, resulting in a left post-exercise LORENA of 1.03.   TEST DESCRIPTION   The patient exercised for 2.4 mins. at 2 mph on a 12 degree incline, traveling a distance of 127 meters stopping due to shortness of breath.   CONCLUSIONS   Right:  The exercise component of the study does not suggest significant peripheral arterial disease in the right lower extremity.   Left:  The exercise component of the study does not suggest significant peripheral arterial disease in the left lower extremity.         Diabetes mellitus with stage 3 chronic kidney disease 11/22/2016     Diabetes mellitus with proteinuria 06/30/2016     Protein / creatinine ratio, urine 10/26/2016        Gastroesophageal reflux disease without esophagitis 08/13/2015     EGD 6/21/2016---Impression:           - Normal esophagus.                        - 1 cm hiatus hernia.                        - Normal stomach.                        - Normal ampulla and examined duodenum.                        - No specimens collected.                        - No cause for weight loss found in the upper                         intestinal tract.      Hypertension, essential 06/04/2015    Left ventricular diastolic dysfunction with preserved systolic function 12/23/2014     Echo 8/14/2014--- Normal LVEF 60-65%.  Normal RV systolic function .  Left ventricular diastolic dysfunction.   Aortic Valve: moderately sclerotic with normal leaflet mobility.No stenosis The peak gradient obtained across the aortic valve is 8.0 mmHg.  Tricuspid Valve:   trivial tricuspid regurgitation.      Vitamin D insufficiency 11/05/2014    Cerebral microvascular disease: stroke ? 1999 elsewhere; TIA 10/13 12/17/2013    Amaurosis fugax 11/25/2013    Nuclear sclerosis - Right Eye 11/25/2013    Eyelid myokymia 11/25/2013    POAG (primary open-angle glaucoma) - Both Eyes 01/10/2013    Degenerative disc disease     Colon polyp: tubular adenoma 5/13, repeated 2016 to be repeated 2021- declines colonoscopy and Gastro also does not recommend     Multinodular thyroid: thyroid u/s 7/16, stable 2017 and 2019, 2021, also 11/24      US Soft Tissue 7/19/2016--- Multinodular thyroid.  None of nodules meet criteria for fine needle aspiration.         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 Taveras    COLONOSCOPY      COLONOSCOPY N/A 09/22/2016    Procedure: COLONOSCOPY;  Surgeon: Jd Ashton MD;  Location: [facility] ENDO (4TH FLR);  Service: Endoscopy;  Laterality: N/A;  OK per Dr Randolph for pt to hold Plavix 5 days prior to procedure/see telephone encounter dated 8/29/16/svn    EPIDURAL STEROID INJECTION N/A 08/02/2023    Procedure: L4-5 interlaminar epidural steroid injection with right paramedian approach with RN IV sedation;  Surgeon: Chan Fonseca MD;  Location: [facility] PAIN MGT;  Service: Pain Management;  Laterality: N/A;    EYE SURGERY Bilateral 2002 approx    Laser for glaucoma    HYSTERECTOMY  1963    AMEENA/USO- fibroids; no cancer    OOPHORECTOMY  1963    unknown, only removed one    SELECTIVE INJECTION OF ANESTHETIC AGENT AROUND LUMBAR SPINAL NERVE ROOT BY TRANSFORAMINAL APPROACH Bilateral 06/17/2022    Procedure: Bilateral L4/5 TF JASKARAN with RN IV sedation;  Surgeon: Chan Fonseca MD;  Location: [facility] PAIN MGT;  Service: Pain Management;  Laterality: Bilateral;    SELECTIVE INJECTION OF ANESTHETIC AGENT AROUND LUMBAR SPINAL NERVE ROOT BY TRANSFORAMINAL APPROACH Bilateral 10/03/2022    Procedure: Bilateral L2-3 transforaminal epidural steroid injection with RN IV sedation;  Surgeon: Chan Fonseca MD;  Location: [facility] PAIN MGT;  Service: Pain Management;  Laterality: Bilateral;       Family History   Problem Relation Name Age of Onset    Stroke Mother      Mental illness Mother      Hypertension Mother      Stroke Father      Cancer Sister  68        gyn    Ovarian cancer Sister      Diabetes  Maternal Grandmother      Heart disease Paternal Grandmother      Drug abuse Daughter      Cancer Maternal Aunt          type unknown    Cancer Maternal Uncle          type not known    Glaucoma Neg Hx      Macular degeneration Neg Hx      Alcohol abuse Neg Hx      COPD Neg Hx      Asthma Neg Hx      Amblyopia Neg Hx      Blindness Neg Hx      Cataracts Neg Hx      Retinal detachment Neg Hx      Strabismus Neg Hx         Social History     Socioeconomic History    Marital status:     Number of children: 1   Tobacco Use    Smoking status: Never     Passive exposure: Never    Smokeless tobacco: Never   Substance and Sexual Activity    Alcohol use: No    Drug use: No    Sexual activity: Not Currently     Partners: Male   Social History Narrative    , no pets or smokers in household. 1 Child who lives in nursing home. She has a grandson who lives in [place].. Retired from [employer] . Still drives. Does not have a Living Will or advanced directive.      Social Drivers of Health     Financial Resource Strain: Medium Risk (7/9/2024)    Overall Financial Resource Strain (CARDIA)     Difficulty of Paying Living Expenses: Somewhat hard   Food Insecurity: No Food Insecurity (3/23/2025)    Received from [employer] [facility] [employer] [facility] and Its Subsidiaries and Affiliates    Hunger Vital Sign     Worried About Running Out of Food in the Last Year: Never true     Ran Out of Food in the Last Year: Never true   Transportation Needs: No Transportation Needs (3/23/2025)    [facility] [employer] [facility]    PRAPARE - Transportation     Lack of Transportation (Medical): No     Lack of Transportation (Non-Medical): No   Physical Activity: Insufficiently Active (7/9/2024)    Exercise Vital Sign     Days of Exercise per Week: 3 days     Minutes of Exercise per Session: 40 min   Stress: No Stress Concern Present (7/9/2024)    [place] [employer]  [place] [employer] - Occupational Stress Questionnaire     Feeling of Stress : Not at all   Housing Stability: Low Risk  (3/23/2025)    Received from [facility] and Its Subsidiaries and Affiliates    Housing Stability Vital Sign     Unable to Pay for Housing in the Last Year: No     Number of Times Moved in the Last Year: 0     Homeless in the Last Year: No       Medications Ordered Prior to Encounter[1]    Review of patient's allergies indicates:   Allergen Reactions    Pravachol [pravastatin] Nausea Only       General - Well developed, alert and oriented in NAD  HEENT - normocephalic, no evidence of trauma, sclera white, EOMI  Neck - full range of motion  COR - regular rate and rhythm without murmurs or gallops  Lungs - Clear  Abdomen - soft, non-tender  Ext - no cyanosis     Assessment:     1. Constipation, unspecified constipation type    2. Chronic kidney disease, stage 3b        Pertinent Labs:    Chemistry        Component Value Date/Time     05/27/2025 0758     03/26/2025 1323     02/05/2025 0852    K 4.4 05/27/2025 0758    K 4 03/26/2025 1323    K 4.3 02/05/2025 0852     05/27/2025 0758     03/26/2025 1323     02/05/2025 0852    CO2 25 05/27/2025 0758    CO2 28 03/26/2025 1323    CO2 24 02/05/2025 0852    BUN 20 05/27/2025 0758    CREATININE 1.5 (H) 05/27/2025 0758     (H) 05/27/2025 0758     (H) 02/05/2025 0852        Component Value Date/Time    CALCIUM 9.7 05/27/2025 0758    CALCIUM 8.1 (L) 03/26/2025 1323    CALCIUM 9.4 02/05/2025 0852    ALKPHOS 69 05/01/2025 1026    ALKPHOS 95 07/09/2024 0855    AST 14 05/01/2025 1026    AST 19 07/09/2024 0855    ALT 12 05/01/2025 1026    ALT 13 07/09/2024 0855    BILITOT 0.5 05/01/2025 1026    BILITOT 0.4 07/09/2024 0855    ESTGFRAFRICA 45 03/26/2025 1323    ESTGFRAFRICA 49.3 (A) 03/02/2022 0857    EGFRNONAA 42.8 (A) 03/02/2022 0857          Lab Results   Component  "Value Date    WBC 7.49 05/01/2025    HGB Negative 05/20/2025    HCT 29.3 (L) 05/01/2025    MCV 88 05/01/2025    MCH 28.5 05/01/2025    MCHC 32.4 05/01/2025    RDW 16.3 (H) 05/01/2025     05/01/2025    MPV 11.9 05/01/2025    PLTEST Appears normal 07/09/2024       Lab Results   Component Value Date    HGBA1C 8.0 (H) 05/27/2025    HGBA1C 7.5 (H) 02/05/2025    HGBA1C 6.8 (H) 10/21/2024     (H) 05/27/2025     Lab Results   Component Value Date    GLUCOSEU 250 (A) 05/20/2025    LDLCALC 106.8 07/09/2024     Lab Results   Component Value Date    TSH 3.438 05/27/2025     Lab Results   Component Value Date    CHOL 172 07/09/2024    CHOL 193 02/03/2023    CHOL 150 03/02/2022     Lab Results   Component Value Date    TRIG 51 07/09/2024    TRIG 51 02/03/2023    TRIG 50 03/02/2022     Lab Results   Component Value Date    HDL 55 07/09/2024    HDL 62 02/03/2023    HDL 50 03/02/2022     Lab Results   Component Value Date    LDLCALC 106.8 07/09/2024    LDLCALC 120.8 02/03/2023    LDLCALC 90.0 03/02/2022     No results found for: "NONHDLC"  Lab Results   Component Value Date    CHOLHDL 32.0 07/09/2024    CHOLHDL 32.1 02/03/2023    CHOLHDL 33.3 03/02/2022       The ASCVD Risk score (Zohreh DK, et al., 2019) failed to calculate for the following reasons:    The 2019 ASCVD risk score is only valid for ages 40 to 79    Risk score cannot be calculated because patient has a medical history suggesting prior/existing ASCVD    Plan:     Assessment & Plan    Severe constipation unresponsive to multiple OTC treatments.  Recent ER visit confirmed significant fecal impaction on XR.  Considered need for gastroenterology follow-up and potential colonoscopy given chronic nature of symptoms.  Discussed that Miralax is generally preferred over Dulcolax for long-term constipation management.    FECAL IMPACTION:  - Ms. Casimiro fiona reports constipation since April with no urge to use the bathroom and no bowel movement for the past 7 " days.  - X-ray at the emergency room confirmed fecal impaction.  - Ms. Casimiro jaimes has tried enemas and suppositories without success.  - Prescribed glycerin suppositories to address the current impaction.    CHRONIC CONSTIPATION:  - Ms. Casimiro jaimes has a history of chronic constipation for years, with multiple medications including MiraLAX, Metamucil, lactulose, and Dulcolax providing no improvement.  - Previously consulted with gastroenterology and tried Linzess therapy.  - Instructed to restart Linzess for ongoing management.  - Advised to continue Metamucil regularly for overall gut health.  - Recommend trial of magnesium glycinate as an additional supplement for constipation relief.    LONG-TERM USE OF ANTICOAGULANTS:  - Ms. Casimiro jaimes confirms taking anticoagulants and cardiac medications.    FOLLOW-UP AND REFERRALS:  - Referred patient to gastroenterology for further evaluation of chronic constipation and possible colonoscopy.    OTHER:  - Suggested trial of magnesium glycinate for cramps and sleep.         1. Constipation, unspecified constipation type  - linaCLOtide (LINZESS) 145 mcg Cap capsule; Take 1 capsule (145 mcg total) by mouth before breakfast.  Dispense: 30 capsule; Refill: 1  - glycerin adult suppository; Place 1 suppository rectally as needed for Constipation.  Dispense: 50 suppository; Refill: 0    2. Chronic kidney disease, stage 3b      Immunization History   Administered Date(s) Administered    COVID-19, MRNA, LN-S, PF (Pfizer) (Purple Cap) 02/04/2021, 02/25/2021, 12/15/2021    COVID-19, mRNA, LNP-S, bivalent booster, PF (PFIZER OMICRON) 09/17/2022    Influenza 11/01/2007, 10/20/2008, 10/05/2009    Influenza (FLUAD) - Quadrivalent - Adjuvanted - PF *Preferred* (65+) 10/06/2020, 09/28/2021, 11/08/2022, 02/20/2024    Influenza - Quadrivalent - High Dose - PF (65 years and older) 10/06/2020    Influenza - Quadrivalent - PF *Preferred* (6 months and older) 11/01/2007, 10/20/2008,  10/05/2009    Influenza - Trivalent - Fluzone High Dose - PF (65 years and older) 11/10/2011, 01/22/2013, 12/02/2013, 02/05/2015, 09/20/2016, 10/24/2017, 10/02/2018, 10/08/2019, 11/05/2024    MMR 01/14/2011    Pneumococcal Conjugate - 13 Valent 09/17/2015    Pneumococcal Polysaccharide - 23 Valent 05/06/2013    RSVpreF (Arexvy) 08/20/2024    Tdap 01/14/2011, 11/29/2021    Zoster Recombinant 06/27/2019, 07/05/2019, 12/23/2019       No orders of the defined types were placed in this encounter.      Portions of this note were generated by [employer].    Each patient to whom medical services by telemedicine are provided:  (1) informed of the relationship between the physician and patient and the respective role of any other health care provider with respect to management of the patient; and (2) notified that he or she may decline to receive medical services by telemedicine and may withdraw from such care at any time.    I spent a total of 32 minutes face to face and non-face to face on the date of this visit.This includes time preparing to see the patient (eg, review of tests, notes), obtaining and/or reviewing additional history from an independent historian and/or outside medical records, documenting clinical information in the electronic health record, independently interpreting results and/or communicating results to the patient/family/caregiver, or care coordinator.  Visit today included increased complexity associated with the care of the episodic problem addressed and managing the longitudinal care of the patient due to the serious and/or complex managed problem(s).      This note was generated with the assistance of ambient listening technology. Verbal consent was obtained by the patient and accompanying visitor(s) for the recording of patient appointment to facilitate this note. I attest to having reviewed and edited the generated note for accuracy, though some syntax or spelling errors may persist. Please  contact the author of this note for any clarification.      Chuckie Gooden MD         [1]   Current Outpatient Medications on File Prior to Visit   Medication Sig Dispense Refill    albuterol (PROVENTIL/VENTOLIN HFA) 90 mcg/actuation inhaler INHALE 2 PUFFS INTO THE LUNGS EVERY 6 (SIX) HOURS AS NEEDED FOR WHEEZING. RESCUE 54 g 3    amLODIPine (NORVASC) 5 MG tablet Take 1 tablet (5 mg total) by mouth 2 (two) times daily. 180 tablet 3    atorvastatin (LIPITOR) 80 MG tablet TAKE 1 TABLET BY MOUTH EVERY DAY 90 tablet 2    blood sugar diagnostic Strp For use with insurance covered glucometer; test 2 x daily 200 strip 3    blood-glucose meter kit Use as directed - insurance preferred meter 1 each 0    brimonidine 0.2% (ALPHAGAN) 0.2 % Drop Place 1 drop into both eyes 3 (three) times daily. 30 mL 4    carvediloL (COREG) 12.5 MG tablet Take 1 tablet (12.5 mg total) by mouth 2 (two) times daily. 180 tablet 3    cholecalciferol, vitamin D3, (VITAMIN D3) 1,000 unit capsule Take 1 capsule (1,000 Units total) by mouth once daily. 30 capsule 12    diclofenac sodium (VOLTAREN) 1 % Gel Apply 2 g topically 2 (two) times daily. 50 g 11    estradioL (ESTRACE) 0.01 % (0.1 mg/gram) vaginal cream Place 1 g vaginally 3 (three) times a week. 42.5 g 12    ferrous gluconate (FERGON) 324 MG tablet 3-4 x weekly with OJ      flecainide (TAMBOCOR) 50 MG Tab Take 1 tablet (50 mg total) by mouth 2 (two) times daily. 60 tablet 1    levalbuterol (XOPENEX HFA) 45 mcg/actuation inhaler INHALE 1-2 PUFFS INTO THE LUNGS EVERY 6 (SIX) HOURS AS NEEDED FOR WHEEZING. RESCUE 15 Inhaler 12    losartan (COZAAR) 50 MG tablet Take 1 tablet (50 mg total) by mouth 2 (two) times daily. 180 tablet 2    meclizine (ANTIVERT) 25 mg tablet TAKE 1 TABLET (25 MG TOTAL) BY MOUTH DAILY AS NEEDED FOR DIZZINESS. 30 tablet 0    metFORMIN (GLUCOPHAGE-XR) 500 MG ER 24hr tablet Take 1 tablet (500 mg total) by mouth once daily. 90 tablet 0    ONETOUCH DELICA PLUS LANCET 33 gauge  Misc Apply topically.      XARELTO 15 mg Tab TAKE 1 TABLET (15 MG TOTAL) BY MOUTH DAILY WITH DINNER OR EVENING MEAL. 30 tablet 6    [DISCONTINUED] linaCLOtide (LINZESS) 145 mcg Cap capsule TAKE 1 CAPSULE (145 MCG TOTAL) BY MOUTH BEFORE BREAKFAST. 30 capsule 1     Current Facility-Administered Medications on File Prior to Visit   Medication Dose Route Frequency Provider Last Rate Last Admin    sodium chloride 0.9% flush 10 mL  10 mL Intravenous PRN Lina Taveras MD

## 2025-06-19 NOTE — TELEPHONE ENCOUNTER
Spoke with pt who reports that she was seen in ER yesterday due to constipation. States it was attempted to digitally remove stool, but was unable to reach it. States that she has been constipated for 7 day. She denies abdominal pain, but reports having rectal pain. Has used enema,suppository. Has taken miralax, lactulose, and metamucil. Dispo is to be seen today in office. Appointment scheduled.   Reason for Disposition   Rectal pain or fullness from fecal impaction (rectum full of stool) and NOT better after SITZ bath, suppository or enema    Additional Information   Negative: Vomiting bile (green color)   Negative: Patient sounds very sick or weak to the triager   Negative: Constant abdominal pain lasting > 2 hours   Negative: Vomiting and abdomen looks much more swollen than usual    Protocols used: Constipation-A-OH

## 2025-06-21 ENCOUNTER — TELEPHONE (OUTPATIENT)
Dept: FAMILY MEDICINE | Facility: CLINIC | Age: 85
End: 2025-06-21
Payer: MEDICARE

## 2025-06-23 ENCOUNTER — PATIENT MESSAGE (OUTPATIENT)
Dept: DIABETES | Facility: CLINIC | Age: 85
End: 2025-06-23
Payer: MEDICARE

## 2025-06-23 DIAGNOSIS — E11.610 TYPE 2 DIABETES MELLITUS WITH DIABETIC NEUROPATHIC ARTHROPATHY: ICD-10-CM

## 2025-06-23 DIAGNOSIS — Z00.00 ENCOUNTER FOR MEDICARE ANNUAL WELLNESS EXAM: ICD-10-CM

## 2025-06-23 RX ORDER — ALBUTEROL SULFATE 90 UG/1
INHALANT RESPIRATORY (INHALATION)
Qty: 16 G | Refills: 0 | OUTPATIENT
Start: 2025-06-23

## 2025-06-23 RX ORDER — METFORMIN HYDROCHLORIDE 500 MG/1
500 TABLET, EXTENDED RELEASE ORAL
Qty: 90 TABLET | Refills: 1 | Status: SHIPPED | OUTPATIENT
Start: 2025-06-23

## 2025-06-23 NOTE — TELEPHONE ENCOUNTER
Refill Decision Note   Saul Mar  is requesting a refill authorization.  Brief Assessment and Rationale for Refill:  Quick Discontinue     Medication Therapy Plan:  Requesting new rx w/o info (sig, qty, refills, etc.)    Pharmacy is requesting new scripts for the following medications without required information, (sig/ frequency/qty/etc)     Comments: Pharmacies have been requesting medications for patients without required information, (sig, frequency, qty, etc.). In addition, requests are sent for medication(s) pt. are currently not taking, and medications patients have never taken.    We have spoken to the pharmacies about these request types and advised their teams previously that we are unable to assess these New Script requests and require all details for these requests. This is a known issue and has been reported.      Note composed:2:34 PM 06/23/2025

## 2025-06-23 NOTE — TELEPHONE ENCOUNTER
Refill Decision Note   Saul Mar  is requesting a refill authorization.  Brief Assessment and Rationale for Refill:  Approve     Medication Therapy Plan:  eGFR/CrCl data reviewed. Current dosage is within recommendations for patient's renal function.      Pharmacist review requested: Yes   Comments:     Note composed:11:01 AM 06/23/2025

## 2025-06-23 NOTE — TELEPHONE ENCOUNTER
No care due was identified.  [facility] Embedded Care Due Messages. Reference number: [id number].   6/23/2025 9:19:17 AM CDT

## 2025-06-23 NOTE — TELEPHONE ENCOUNTER
Refill Routing Note   Medication(s) are not appropriate for processing by [facility] for the following reason(s):        Required labs abnormal    [facility] action(s):  Defer           Pharmacist review requested: Yes     Appointments  past 12m or future 3m with PCP    Date Provider   Last Visit   5/1/2025 Penny Randolph MD   Next Visit   6/23/2025 Penny Randolph MD   ED visits in past 90 days: 0        Note composed:10:30 AM 06/23/2025

## 2025-06-23 NOTE — TELEPHONE ENCOUNTER
No care due was identified.  [facility] Embedded Care Due Messages. Reference number: [id number].   6/23/2025 10:00:32 AM CDT

## 2025-06-26 ENCOUNTER — OFFICE VISIT (OUTPATIENT)
Dept: GASTROENTEROLOGY | Facility: CLINIC | Age: 85
End: 2025-06-26
Payer: MEDICARE

## 2025-06-26 DIAGNOSIS — K59.09 CHRONIC CONSTIPATION: ICD-10-CM

## 2025-06-26 NOTE — PROGRESS NOTES
Clinic Follow Up:  [facility] Follow Up Note    Reason for Follow Up:  The encounter diagnosis was Chronic constipation.    PCP: Penny Randolph   [id number] / [place] [address]    The patient location is: [place]   The chief complaint leading to consultation is: constipation     Visit type: audiovisual    Face to Face time with patient: 10 minutes   15 minutes of total time spent on the encounter, which includes face to face time and non-face to face time preparing to see the patient (eg, review of tests), Obtaining and/or reviewing separately obtained history, Documenting clinical information in the electronic or other health record, Independently interpreting results (not separately reported) and communicating results to the patient/family/caregiver, or Care coordination (not separately reported).         Each patient to whom he or she provides medical services by telemedicine is:  (1) informed of the relationship between the physician and patient and the respective role of any other health care provider with respect to management of the patient; and (2) notified that he or she may decline to receive medical services by telemedicine and may withdraw from such care at any time.    Notes:       HPI:  This is a 85 y.o. female here for follow up of   Last seen 3 years ago.   Has chronic constipation that has worsened. She went to the ER for this twice this month. She was restarted on Linzess 145 mcg daily. She is feeling better since using enemas and taking Linzess.     Review of Systems   Gastrointestinal:  Positive for constipation.       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 Taveras    COLONOSCOPY      COLONOSCOPY N/A 09/22/2016    Procedure: COLONOSCOPY;  Surgeon: Jd Ashton MD;  Location: [facility] ENDO (4TH FLR);  Service: Endoscopy;  Laterality: N/A;  OK per Dr Randolph for pt to hold Plavix 5 days prior to procedure/see telephone encounter dated 8/29/16/svn    EPIDURAL STEROID INJECTION N/A 08/02/2023    Procedure: L4-5 interlaminar epidural steroid injection with right paramedian approach with RN IV sedation;  Surgeon: hCan Fonseca MD;  Location: [facility] PAIN MGT;  Service: Pain Management;  Laterality: N/A;    EYE SURGERY Bilateral 2002 approx    Laser for glaucoma    HYSTERECTOMY  1963    AMEENA/USO- fibroids; no cancer    OOPHORECTOMY  1963    unknown, only removed one    SELECTIVE INJECTION OF ANESTHETIC AGENT AROUND LUMBAR SPINAL NERVE ROOT BY TRANSFORAMINAL APPROACH Bilateral 06/17/2022    Procedure: Bilateral L4/5 TF JASKARAN with RN IV sedation;  Surgeon: Chan Fonseca MD;  Location: [facility] PAIN MGT;  Service: Pain Management;  Laterality: Bilateral;    SELECTIVE INJECTION OF ANESTHETIC AGENT AROUND LUMBAR SPINAL NERVE ROOT BY TRANSFORAMINAL APPROACH Bilateral 10/03/2022    Procedure: Bilateral L2-3 transforaminal epidural steroid injection with RN IV sedation;  Surgeon: Chan Fonseca MD;  Location: [facility] PAIN MGT;  Service: Pain Management;  Laterality: Bilateral;       Family History:   Family History   Problem Relation Name Age of Onset    Stroke Mother      Mental illness Mother      Hypertension Mother      Stroke Father      Cancer Sister  68        gyn     Ovarian cancer Sister      Diabetes Maternal Grandmother      Heart disease Paternal Grandmother      Drug abuse Daughter      Cancer Maternal Aunt          type unknown    Cancer Maternal Uncle          type not known    Glaucoma Neg Hx      Macular degeneration Neg Hx      Alcohol abuse Neg Hx      COPD Neg Hx      Asthma Neg Hx      Amblyopia Neg Hx      Blindness Neg Hx      Cataracts Neg Hx      Retinal detachment Neg Hx      Strabismus Neg Hx         Social History:   Social History[1]    Allergies:   Review of patient's allergies indicates:   Allergen Reactions    Pravachol [pravastatin] Nausea Only       Home Medications:  Medications Ordered Prior to Encounter[2]    There were no vitals taken for this visit.  There is no height or weight on file to calculate BMI.  Physical Exam    Labs: Pertinent labs reviewed.  CRC Screening:     Assessment:   1. Chronic constipation    Recommendations:   She should continue Linzess long term to control constipation.     Chronic constipation  -     Ambulatory referral/consult to Gastroenterology    Return to Clinic:  Follow up in about 1 year (around 6/26/2026), or if symptoms worsen or fail to improve.    Thank you for the opportunity to participate in the care of this patient.  IESHA Melgar             [1]   Social History  Tobacco Use    Smoking status: Never     Passive exposure: Never    Smokeless tobacco: Never   Substance Use Topics    Alcohol use: No    Drug use: No   [2]   Current Outpatient Medications on File Prior to Visit   Medication Sig Dispense Refill    albuterol (PROVENTIL/VENTOLIN HFA) 90 mcg/actuation inhaler INHALE 2 PUFFS INTO THE LUNGS EVERY 6 (SIX) HOURS AS NEEDED FOR WHEEZING. RESCUE 54 g 3    amLODIPine (NORVASC) 5 MG tablet Take 1 tablet (5 mg total) by mouth 2 (two) times daily. 180 tablet 3    atorvastatin (LIPITOR) 80 MG tablet TAKE 1 TABLET BY MOUTH EVERY DAY 90 tablet 2    blood sugar diagnostic Strp For use with insurance  covered glucometer; test 2 x daily 200 strip 3    blood-glucose meter kit Use as directed - insurance preferred meter 1 each 0    brimonidine 0.2% (ALPHAGAN) 0.2 % Drop Place 1 drop into both eyes 3 (three) times daily. 30 mL 4    carvediloL (COREG) 12.5 MG tablet Take 1 tablet (12.5 mg total) by mouth 2 (two) times daily. 180 tablet 3    cholecalciferol, vitamin D3, (VITAMIN D3) 1,000 unit capsule Take 1 capsule (1,000 Units total) by mouth once daily. 30 capsule 12    diclofenac sodium (VOLTAREN) 1 % Gel Apply 2 g topically 2 (two) times daily. 50 g 11    estradioL (ESTRACE) 0.01 % (0.1 mg/gram) vaginal cream Place 1 g vaginally 3 (three) times a week. 42.5 g 12    ferrous gluconate (FERGON) 324 MG tablet 3-4 x weekly with OJ      flecainide (TAMBOCOR) 50 MG Tab Take 1 tablet (50 mg total) by mouth 2 (two) times daily. 60 tablet 1    glycerin adult suppository Place 1 suppository rectally as needed for Constipation. 50 suppository 0    levalbuterol (XOPENEX HFA) 45 mcg/actuation inhaler INHALE 1-2 PUFFS INTO THE LUNGS EVERY 6 (SIX) HOURS AS NEEDED FOR WHEEZING. RESCUE 15 Inhaler 12    linaCLOtide (LINZESS) 145 mcg Cap capsule Take 1 capsule (145 mcg total) by mouth before breakfast. 30 capsule 1    losartan (COZAAR) 50 MG tablet Take 1 tablet (50 mg total) by mouth 2 (two) times daily. 180 tablet 2    meclizine (ANTIVERT) 25 mg tablet TAKE 1 TABLET (25 MG TOTAL) BY MOUTH DAILY AS NEEDED FOR DIZZINESS. 30 tablet 0    metFORMIN (GLUCOPHAGE-XR) 500 MG ER 24hr tablet TAKE 1 TABLET BY MOUTH ONCE  DAILY 90 tablet 1    ONETOUCH DELICA PLUS LANCET 33 gauge Misc Apply topically.      XARELTO 15 mg Tab TAKE 1 TABLET (15 MG TOTAL) BY MOUTH DAILY WITH DINNER OR EVENING MEAL. 30 tablet 6     Current Facility-Administered Medications on File Prior to Visit   Medication Dose Route Frequency Provider Last Rate Last Admin    sodium chloride 0.9% flush 10 mL  10 mL Intravenous PRN Lina Taveras MD

## 2025-07-01 ENCOUNTER — OFFICE VISIT (OUTPATIENT)
Dept: DIABETES | Facility: CLINIC | Age: 85
End: 2025-07-01
Payer: MEDICARE

## 2025-07-01 VITALS
DIASTOLIC BLOOD PRESSURE: 62 MMHG | WEIGHT: 153.44 LBS | BODY MASS INDEX: 25.53 KG/M2 | SYSTOLIC BLOOD PRESSURE: 132 MMHG

## 2025-07-01 DIAGNOSIS — K59.09 CHRONIC CONSTIPATION: ICD-10-CM

## 2025-07-01 DIAGNOSIS — E11.3213 BOTH EYES AFFECTED BY MILD NONPROLIFERATIVE DIABETIC RETINOPATHY WITH MACULAR EDEMA, ASSOCIATED WITH TYPE 2 DIABETES MELLITUS: ICD-10-CM

## 2025-07-01 DIAGNOSIS — I63.00 CEREBROVASCULAR ACCIDENT (CVA) DUE TO THROMBOSIS OF PRECEREBRAL ARTERY: Chronic | ICD-10-CM

## 2025-07-01 DIAGNOSIS — E11.69 HYPERLIPIDEMIA ASSOCIATED WITH TYPE 2 DIABETES MELLITUS: ICD-10-CM

## 2025-07-01 DIAGNOSIS — E11.49 TYPE II DIABETES MELLITUS WITH NEUROLOGICAL MANIFESTATIONS: Primary | ICD-10-CM

## 2025-07-01 DIAGNOSIS — E11.22 DIABETES MELLITUS WITH STAGE 3 CHRONIC KIDNEY DISEASE: Chronic | ICD-10-CM

## 2025-07-01 DIAGNOSIS — E78.5 HYPERLIPIDEMIA ASSOCIATED WITH TYPE 2 DIABETES MELLITUS: ICD-10-CM

## 2025-07-01 DIAGNOSIS — N18.30 DIABETES MELLITUS WITH STAGE 3 CHRONIC KIDNEY DISEASE: Chronic | ICD-10-CM

## 2025-07-01 DIAGNOSIS — I10 HYPERTENSION, ESSENTIAL: Chronic | ICD-10-CM

## 2025-07-01 LAB — GLUCOSE SERPL-MCNC: 158 MG/DL (ref 70–110)

## 2025-07-01 PROCEDURE — 99999 PR PBB SHADOW E&M-EST. PATIENT-LVL IV: CPT | Mod: PBBFAC,,, | Performed by: NURSE PRACTITIONER

## 2025-07-01 RX ORDER — GLIMEPIRIDE 4 MG/1
4 TABLET ORAL EVERY MORNING
COMMUNITY

## 2025-07-01 NOTE — PROGRESS NOTES
"    Patient ID: Saul Mar is a 85 y.o. female.  Patient's current PCP is Penny Randolph MD.     Chief Complaint: Diabetes Mellitus    HPI  Saul Mar is a 85 y.o. Black or  female presenting for a follow up for diabetes.     Patient has been diagnosed with type 2 diabetes since 30 years .  Complications related to diabetes: nephropathy and retinopathy vascular disease  Recent diabetes related hospitalizations: ED x 2 with severe constipation  Previous diabetes education:yes, [facility] with [place]   Occupation:Retired, lives alone    Current diet: Trying to eat 2- 3 meals per day. 1 Core protein drink per day per Dr Johnson, Gen Surgery.  Appetite improving now. Able to eat a variety of foods. Down 22# over the past year  Current weight: 153 on 7/1/25  Weight at LOV: 153 on 5/27/25  Weight at FOV: 175 on 7/16/24    Activity Level: Increasing activity. Walking in house and outside. Ambulates with walker      Changes made at last Visit:  Continue Metformin   Resume glimepiride 1/2 tablet before breakfast only. Patient declines trial of low dose basal insulin in place of TANG.  Check blood sugar before breakfast and supper - call if less than 80  Have a sugar making food (starch, fruit or milk) at every meal    Multinodular thyroid: thyroid u/s 7/16, stable 2017, 2019, 2021, and 11/24  -     TSH; Future; Expected date: 05/27/2025  -     T4, Free; Future; Expected date: 05/27/2025    Chronic constipation- f/u with GI post bowel resection  -     Ambulatory referral/consult to Gastroenterology; Future; Expected date: 06/10/2025      Current issues: ED visit x 2 since last visit for constipation. Now on Linzess. Doing "much better" per patient. Daily BM.     CURRENT DM MEDICATIONS:   Diabetes Medications              glimepiride (AMARYL) 4 MG tablet Take 4 mg by mouth every morning. 1/2 tablet in am    metFORMIN (GLUCOPHAGE-XR) 500 MG ER 24hr tablet TAKE 1 TABLET BY MOUTH ONCE  " DAILY       Past failed treatment(s) include:   Jardiance - cost prohibitive ($40 copay): dehydration/ELLYN  Glimepiride  - hypoglycemia    Meter/cgm: meter  Blood glucose testing is performed regularly.   Patient is testing 1-2 times per day  Any episodes of hypoglycemia? Denies since last visit  Glucose Patterns: 114-160 across the day. Testing mostly in am per patient report    Her blood sugar in the clinic today was:  Lab Results   Component Value Date    POCGLU 158 (A) 07/01/2025         Lab Results   Component Value Date    HGBA1C 8.0 (H) 05/27/2025    HGBA1C 7.5 (H) 02/05/2025    HGBA1C 6.8 (H) 10/21/2024       Diabetes Management Status    Statin: Taking  ACE/ARB: Taking    Screening or Prevention Patient's value Goal Complete/Controlled?   HgA1C Testing and Control   Lab Results   Component Value Date    HGBA1C 8.0 (H) 05/27/2025      Annually/Less than 8% yes   Lipid profile : 03/28/2025 Annually Yes   LDL control Lab Results   Component Value Date    LDLCALC 106.8 07/09/2024    Annually/Less than 100 mg/dl  No   Nephropathy screening Lab Results   Component Value Date    LABMICR 69.0 05/01/2025     Lab Results   Component Value Date    PROTEINUA Negative 05/20/2025     Lab Results   Component Value Date    UTPCR Unable to calculate 09/21/2020      Annually Yes   Blood pressure BP Readings from Last 1 Encounters:   07/01/25 132/62    Less than 140/90 Yes   Dilated retinal exam : 06/04/2024 Scheduled 9/22/25 Annually Yes   Foot exam   11/25/24 Annually Yes       Preferred lab site: [facility]  Preferred visit site:[facility]    Labs reviewed and are noted below.    Lab Results   Component Value Date    WBC 7.49 05/01/2025    HGB Negative 05/20/2025    HCT 29.3 (L) 05/01/2025     05/01/2025    CHOL 172 07/09/2024    TRIG 51 07/09/2024    HDL 55 07/09/2024    LDLCALC 106.8 07/09/2024    ALT 12 05/01/2025    AST 14 05/01/2025     05/27/2025    K 4.4 05/27/2025     05/27/2025    ANIONGAP 12 05/27/2025     "CREATININE 1.5 (H) 05/27/2025    ESTGFRAFRICA 45 03/26/2025    EGFRNONAA 42.8 (A) 03/02/2022    BUN 20 05/27/2025    CO2 25 05/27/2025    TSH 3.438 05/27/2025    INR 1.8 06/18/2025     (H) 05/27/2025    UTPCR Unable to calculate 09/21/2020     Lab Results   Component Value Date    CPEPTIDE 1.8 03/12/2009    FREET4 1.24 05/27/2025    TSH 3.438 05/27/2025    IRON 49 05/01/2025    TIBC 252 05/01/2025    FERRITIN 721.1 (H) 05/01/2025    YHVRZQCT46 977 (H) 05/01/2025    .0 (H) 06/25/2020    CALCIUM 9.7 05/27/2025    PHOS 3.5 02/05/2025     Lab Results   Component Value Date    CPEPTIDE 1.8 03/12/2009     No results found for: "GLUTAMICACID"  Glucose   Date Value Ref Range Status   05/27/2025 208 (H) 70 - 110 mg/dL Final   02/05/2025 127 (H) 70 - 110 mg/dL Final     Anion Gap   Date Value Ref Range Status   05/27/2025 12 8 - 16 mmol/L Final     eGFR if    Date Value Ref Range Status   03/02/2022 49.3 (A) >60 mL/min/1.73 m^2 Final     eGFR    Date Value Ref Range Status   03/26/2025 45 mL/min/1.73mSq Final     Comment:     In accordance with NKF-ASN Task Force recommendation, calculation based on the Chronic Kidney Disease Epidemiology Collaboration (CKD-EPI) equation without adjustment for race. eGFR adjusted for gender and age and calculated in ml/min/1.73mSquared. eGFR cannot be calculated if patient is under 18 years of age.     Reference Range:   >= 60 ml/min/1.73mSquared.     eGFR if non    Date Value Ref Range Status   03/02/2022 42.8 (A) >60 mL/min/1.73 m^2 Final     Comment:     Calculation used to obtain the estimated glomerular filtration  rate (eGFR) is the CKD-EPI equation.              Review of patient's allergies indicates:   Allergen Reactions    Pravachol [pravastatin] Nausea Only     Social History     Socioeconomic History    Marital status:     Number of children: 1   Tobacco Use    Smoking status: Never     Passive exposure: Never    " Smokeless tobacco: Never   Substance and Sexual Activity    Alcohol use: No    Drug use: No    Sexual activity: Not Currently     Partners: Male   Social History Narrative    , no pets or smokers in household. 1 Child who lives in nursing home. She has a grandson who lives in [place].. Retired from [employer] . Still drives. Does not have a Living Will or advanced directive.      Social Drivers of Health     Financial Resource Strain: Medium Risk (7/9/2024)    Overall Financial Resource Strain (CARDIA)     Difficulty of Paying Living Expenses: Somewhat hard   Food Insecurity: No Food Insecurity (3/23/2025)    Received from [facility] and Its Subsidiaries and Affiliates    Hunger Vital Sign     Worried About Running Out of Food in the Last Year: Never true     Ran Out of Food in the Last Year: Never true   Transportation Needs: No Transportation Needs (3/23/2025)    [facility] and Its Subsidiaries and Affiliates    PRAPARE - Transportation     Lack of Transportation (Medical): No     Lack of Transportation (Non-Medical): No   Physical Activity: Insufficiently Active (7/9/2024)    Exercise Vital Sign     Days of Exercise per Week: 3 days     Minutes of Exercise per Session: 40 min   Stress: No Stress Concern Present (7/9/2024)    [employer] [place] [facility] [employer] - Occupational Stress Questionnaire     Feeling of Stress : Not at all   Housing Stability: Low Risk  (3/23/2025)    Received from [facility] and Its Subsidnivioies and Affiliates    Housing Stability Vital Sign     Unable to Pay for Housing in the Last Year: No     Number of Times Moved in the Last Year: 0     Homeless in the Last Year: No     Past Medical History:   Diagnosis Date    A-fib     Atrial fibrillation 10/22/2018    Back pain     Cataract     Degenerative disc disease     Diverticulosis     colonoscopy  9/22/2016    GERD (gastroesophageal reflux disease)     Glaucoma     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   Eyes:  Negative for blurred vision and double vision.   Respiratory:  Negative for shortness of breath.    Cardiovascular: Negative.    Gastrointestinal: Negative.    Genitourinary:  Negative for frequency.   Musculoskeletal:  Negative for myalgias.   Neurological:  Positive for sensory change.   Psychiatric/Behavioral: Negative.       Physical Exam  Vitals reviewed.   Constitutional:       General: She is not in acute distress.     Appearance: Normal appearance. She is normal weight.   Eyes:      Conjunctiva/sclera: Conjunctivae normal.      Pupils: Pupils are equal, round, and reactive to light.   Cardiovascular:      Rate and Rhythm: Normal rate and regular rhythm.      Pulses: Normal pulses.      Heart sounds: Normal heart sounds.   Pulmonary:      Effort: Pulmonary effort is normal.      Breath sounds: Normal breath sounds.   Abdominal:      General: Bowel sounds are normal. There is no distension.      Palpations: Abdomen is soft.   Musculoskeletal:      Right lower leg: No edema.      Left lower leg: No edema.   Skin:     General: Skin is warm and dry.   Neurological:      Mental Status: She is alert and oriented to person, place, and time.   Psychiatric:         Mood and Affect: Mood normal.           Assessment & Plan    Type II diabetes mellitus with neurological manifestations  -     POCT Glucose, Hand-Held Device  -     No change at this time  -     Check blood sugar am and ac S    Diabetes mellitus  with stage 3 chronic kidney disease    Both eyes affected by 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, associated with type 2 diabetes mellitus    Cerebrovascular accident (CVA) due to thrombosis of precerebral artery: 2017, also 1999    Hypertension, essential - on ARB    Hyperlipidemia associated with type 2 diabetes mellitus- not on statin    Chronic constipation - on Linzess          - Reviewed with patient:  The basic pathophysiology of Type 2 diabetes  Mechanism of action and action time of medications  Use of home glucose monitor/cgm  Basic diet/carbohydrate counting/avoiding simple sugars/plate method  Proper hydration   Risk of complications and preventive measures  When to call for assistance          Visit today included increased complexity associated with the care of the episodic problem hyperglycemia addressed and managing the longitudinal care of the patient due to the serious and/or complex managed problem(s) T2DM.

## 2025-07-11 ENCOUNTER — TELEPHONE (OUTPATIENT)
Dept: FAMILY MEDICINE | Facility: CLINIC | Age: 85
End: 2025-07-11
Payer: MEDICARE

## 2025-07-11 NOTE — TELEPHONE ENCOUNTER
Copied from CRM #[id number]. Topic: Medications - Medication Refill  >> Jul 11, 2025 12:34 PM Windy wrote:  Type:  RX Refill Request    Who Called:  pt   Refill or New Rx: refill   RX Name and Strength: albuterol (PROVENTIL/VENTOLIN HFA) 90 mcg/actuation inhaler  Preferred Pharmacy with phone number:[facility] [address]  [address] [address]  Phone: [phone] Fax: [phone]  Local or Mail Order: local   Ordering Provider: emir   Would the patient rather a call back or a response via [facility]? Call   Best Call Back Number:[phone]   Additional Information:

## 2025-07-11 NOTE — TELEPHONE ENCOUNTER
No care due was identified.  [facility] Embedded Care Due Messages. Reference number: [id number].   7/11/2025 1:13:10 PM CDT

## 2025-07-11 NOTE — TELEPHONE ENCOUNTER
Copied from CRM #[id number]. Topic: General Inquiry - Patient Advice  >> Jul 11, 2025 11:15 AM Vilma wrote:  Pt Requesting Call Back    Who called: [facility]  Who called for pt:  Best call back #: [phone]   Add notes: caller said she would like to speak to someone regarding a fax she did not receive back; says she sent a fax on 6/27/25; says it's regardind the re certification with order #  of [id number]

## 2025-07-13 RX ORDER — ALBUTEROL SULFATE 90 UG/1
2 INHALANT RESPIRATORY (INHALATION) EVERY 6 HOURS PRN
Qty: 54 G | Refills: 3 | Status: SHIPPED | OUTPATIENT
Start: 2025-07-13

## 2025-07-21 ENCOUNTER — TELEPHONE (OUTPATIENT)
Dept: FAMILY MEDICINE | Facility: CLINIC | Age: 85
End: 2025-07-21
Payer: MEDICARE

## 2025-07-21 NOTE — TELEPHONE ENCOUNTER
Copied from CRM #[id number]. Topic: General Inquiry - Patient Advice  >> Jul 21, 2025 11:55 AM Radha wrote:  Type:  Needs Medical Advice    Who Called: pt [facility] leigha   Would the patient rather a call back or a response via [facility]? Call back   Best Call Back Number: [phone]  Additional Information: [facility] requesting a call back in regards to needing order signed for reverification

## 2025-07-21 NOTE — TELEPHONE ENCOUNTER
I do not understand what they need from me, is there form to complete?  Does she need an office visit?

## 2025-08-04 ENCOUNTER — TELEPHONE (OUTPATIENT)
Dept: FAMILY MEDICINE | Facility: CLINIC | Age: 85
End: 2025-08-04
Payer: MEDICARE

## 2025-08-04 NOTE — TELEPHONE ENCOUNTER
Spoke to pt and she would like to do a virtual visit then she will come in person at a later time

## 2025-08-04 NOTE — TELEPHONE ENCOUNTER
Copied from CRM #[id number]. Topic: General Inquiry - Patient Advice  >> Aug 4, 2025 12:45 PM Windy wrote:  Type:  Needs Medical Advice    Who Called:  Pt   Symptoms (please be specific):  left knee gave out, slight swelling around knee, trouble walking/ bending    How long has patient had these symptoms:  yesterday   Pharmacy name and phone #:  [facility] [address]  [address] [address]  Phone: [phone] Fax: [phone]  Would the patient rather a call back or a response via [facility]? Call   Best Call Back Number: [phone]   Additional Information:

## 2025-08-04 NOTE — TELEPHONE ENCOUNTER
Evidently, she was in the hospital again, please contact her to see is she willing and able to come to see me for a hospital follow-up?  I know she lives in [place] and it may be difficult for her to come to [place]

## 2025-08-05 ENCOUNTER — OFFICE VISIT (OUTPATIENT)
Dept: FAMILY MEDICINE | Facility: CLINIC | Age: 85
End: 2025-08-05
Payer: MEDICARE

## 2025-08-05 ENCOUNTER — LAB VISIT (OUTPATIENT)
Dept: LAB | Facility: HOSPITAL | Age: 85
End: 2025-08-05
Attending: PHYSICIAN ASSISTANT
Payer: MEDICARE

## 2025-08-05 DIAGNOSIS — D64.9 ANEMIA, UNSPECIFIED TYPE: ICD-10-CM

## 2025-08-05 DIAGNOSIS — N18.32 CHRONIC KIDNEY DISEASE, STAGE 3B: ICD-10-CM

## 2025-08-05 DIAGNOSIS — E11.22 DIABETES MELLITUS WITH STAGE 3 CHRONIC KIDNEY DISEASE: Chronic | ICD-10-CM

## 2025-08-05 DIAGNOSIS — M25.562 ACUTE PAIN OF LEFT KNEE: Primary | ICD-10-CM

## 2025-08-05 DIAGNOSIS — N18.30 DIABETES MELLITUS WITH STAGE 3 CHRONIC KIDNEY DISEASE: Chronic | ICD-10-CM

## 2025-08-05 PROBLEM — R29.3 POSTURE ABNORMALITY: Status: RESOLVED | Noted: 2022-11-08 | Resolved: 2025-08-05

## 2025-08-05 PROBLEM — M54.10 BILATERAL RADIATING LEG PAIN: Status: RESOLVED | Noted: 2021-08-18 | Resolved: 2025-08-05

## 2025-08-05 PROBLEM — E16.2 HYPOGLYCEMIA: Status: RESOLVED | Noted: 2025-03-23 | Resolved: 2025-08-05

## 2025-08-05 LAB
EAG (OHS): 163 MG/DL (ref 68–131)
ERYTHROCYTE [DISTWIDTH] IN BLOOD BY AUTOMATED COUNT: 15.7 % (ref 11.5–14.5)
FERRITIN SERPL-MCNC: 284 NG/ML (ref 20–300)
HBA1C MFR BLD: 7.3 % (ref 4–5.6)
HCT VFR BLD AUTO: 32.3 % (ref 37–48.5)
HGB BLD-MCNC: 10.3 GM/DL (ref 12–16)
IRON SATN MFR SERPL: 10 % (ref 20–50)
IRON SERPL-MCNC: 27 UG/DL (ref 30–160)
MCH RBC QN AUTO: 28.3 PG (ref 27–31)
MCHC RBC AUTO-ENTMCNC: 31.9 G/DL (ref 32–36)
MCV RBC AUTO: 89 FL (ref 82–98)
PLATELET # BLD AUTO: 194 K/UL (ref 150–450)
PMV BLD AUTO: 11.7 FL (ref 9.2–12.9)
RBC # BLD AUTO: 3.64 M/UL (ref 4–5.4)
TIBC SERPL-MCNC: 278 UG/DL (ref 250–450)
TRANSFERRIN SERPL-MCNC: 188 MG/DL (ref 200–375)
WBC # BLD AUTO: 5.75 K/UL (ref 3.9–12.7)

## 2025-08-05 PROCEDURE — 36415 COLL VENOUS BLD VENIPUNCTURE: CPT

## 2025-08-05 PROCEDURE — 1159F MED LIST DOCD IN RCRD: CPT | Mod: CPTII,95,, | Performed by: INTERNAL MEDICINE

## 2025-08-05 PROCEDURE — 85027 COMPLETE CBC AUTOMATED: CPT

## 2025-08-05 PROCEDURE — 1160F RVW MEDS BY RX/DR IN RCRD: CPT | Mod: CPTII,95,, | Performed by: INTERNAL MEDICINE

## 2025-08-05 PROCEDURE — 82728 ASSAY OF FERRITIN: CPT

## 2025-08-05 PROCEDURE — 84466 ASSAY OF TRANSFERRIN: CPT

## 2025-08-05 PROCEDURE — 98006 SYNCH AUDIO-VIDEO EST MOD 30: CPT | Mod: 95,,, | Performed by: INTERNAL MEDICINE

## 2025-08-05 PROCEDURE — 83036 HEMOGLOBIN GLYCOSYLATED A1C: CPT

## 2025-08-05 NOTE — PROGRESS NOTES
"The patient location is: [place]  The chief complaint leading to consultation is:  Left knee pain    Visit type: audiovisual    Face to Face time with patient: 20  30 minutes of total time spent on the encounter, which includes face to face time and non-face to face time preparing to see the patient (eg, review of tests), Obtaining and/or reviewing separately obtained history, Documenting clinical information in the electronic or other health record, Independently interpreting results (not separately reported) and communicating results to the patient/family/caregiver, or Care coordination (not separately reported).     Evidently, awoke with left knee pain on Saturday which was about a 10/10.  There was no trauma or injury.  She was unable to walk.  She went to the emergency room and was evaluated, exam revealed no obvious abnormality.  X-rays were completed and showed some mild DJD:  "XR Knee 4+ View Left   Final Result     No acute radiographic osseous abnormality. Mild degenerative changes. Small knee joint effusion. "    XR Knee 4+ View Left  Result Date: 8/3/2025  XR KNEE 4+ VIEW LEFT HISTORY: knee pain M25.562:Pain in left knee COMPARISON: None 4 views of the left knee was obtained. No acute fracture or dislocation. Mild degenerative changes in the medial and patellofemoral compartments. Patellar enthesophytes noted. No significant soft tissue abnormality. Small knee joint effusion.     No acute radiographic osseous abnormality. Mild degenerative changes. Small knee joint effusion. "    She states no treatment was given.  Options are limited given her CKD and the fact that she is on a blood thinner.    She states symptoms are slightly better but now pain is about a 7/10.  She has had no swelling in the calf.  The pain is mainly in the knee.  She does not know whether use ice or heat.    Diabetes meds were recently adjusted.  She had hypoglycemia when she was on metformin so she has stopped it.  She is only on " glimepiride currently.  She states her glucoses are typically in the 150 range.  She denies polydipsia, polyuria or unexplained weight loss.    She had labs today to evaluate anemia, those results are still pending.    Patient Active Problem List  Diagnosis    Degenerative disc disease    Colon polyp: tubular adenoma 5/13, repeated 2016 to be repeated 2021- declines colonoscopy and Gastro also does not recommend    Multinodular thyroid: thyroid u/s 7/16, stable 2017 and 2019, 2021, also 11/24    POAG (primary open-angle glaucoma) - Both Eyes    Amaurosis fugax    Nuclear sclerosis - Right Eye    Eyelid myokymia    Cerebral microvascular disease: stroke ? 1999 elsewhere; TIA 10/13    Vitamin D insufficiency    Left ventricular diastolic dysfunction with preserved systolic function    Hypertension, essential    Gastroesophageal reflux disease without esophagitis    Diabetes mellitus with proteinuria    Aortic arch atherosclerosis    Abnormal ankle brachial index    Diabetes mellitus with stage 3 chronic kidney disease    Cerebrovascular accident (CVA) due to thrombosis of precerebral artery: 2017, also 1999    Sickle cell trait syndrome    Mixed anxiety depressive disorder    Diverticulosis of large intestine without hemorrhage    Atrial fibrillation    Hyperlipidemia associated with type 2 diabetes mellitus    PAD (peripheral artery disease)    Carotid atherosclerosis    Spinal stenosis of lumbar region with neurogenic claudication    Lumbar radiculopathy, chronic    Gait disorder    Both eyes affected by 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, associated with type 2 diabetes mellitus    Low-tension glaucoma, right eye, mild stage    Low-tension glaucoma, left eye, moderate stage    Age-related nuclear cataract, right    Mild episode of recurrent major depressive disorder    Carotid artery disease    DM cataract    Diabetic glaucoma    Diabetic retinopathy with macular edema associated with  type 2 diabetes mellitus    Pulmonary hypertension: 34 on echo 2021    Hearing loss    Chronic constipation    Cognitive complaints with normal exam    Decreased functional mobility and endurance    Tendinitis, de Quervain's    Chronic kidney disease, stage 3b    Mild neurocognitive disorder    Hiatal hernia: seen on EGD 2016    Bilateral carpal tunnel syndrome    Debility    Glaucoma      Review of Systems   Constitutional:  Negative for fever and weight loss.   Cardiovascular:  Negative for chest pain.   Gastrointestinal:  Negative for abdominal pain.   Genitourinary:  Negative for dysuria and hematuria.   Musculoskeletal:  Positive for back pain and joint pain.        Chronic arthralgias, now with left knee pain   Neurological:  Negative for tingling, weakness and headaches.      Physical Exam  Constitutional:       Appearance: She is well-developed.   HENT:      Head: Normocephalic and atraumatic.   Eyes:      Extraocular Movements: Extraocular movements intact.      Conjunctiva/sclera: Conjunctivae normal.   Neck:      Thyroid: No thyromegaly.   Pulmonary:      Effort: No respiratory distress.   Neurological:      General: No focal deficit present.      Mental Status: She is alert and oriented to person, place, and time.   Psychiatric:         Mood and Affect: Mood normal.         Behavior: Behavior normal.         Thought Content: Thought content normal.         Judgment: Judgment normal.        1. Acute pain of left knee:  Rest, ice, compression, elevation; avoid bent knee activities.  Tylenol.  Alarm symptoms and ED cautions reviewed.  -     Ambulatory referral/consult to Orthopedics; Future; Expected date: 08/12/2025    2. Chronic kidney disease, stage 3b:  Stable, gentle hydration, avoid nephrotoxins    3. Diabetes mellitus with stage 3 chronic kidney disease:  Has been stable; check A1c today add review    4. Anemia, unspecified type:  Labs from today still pending    I will review all studies and  determine further tx depending on findings  I will review all studies and determine further tx depending on findings                 Each patient to whom he or she provides medical services by telemedicine is:  (1) informed of the relationship between the physician and patient and the respective role of any other health care provider with respect to management of the patient; and (2) notified that he or she may decline to receive medical services by telemedicine and may withdraw from such care at any time.    Notes:             Patient tells me that she tried to fill out a questionnaire for knee pain but what came up was back pain.  Results are related to her current knee issues, see details above.          Answers submitted by the patient for this visit:  Back Pain Questionnaire (Submitted on 8/5/2025)  Chief Complaint: Knee pain  Chronicity: chronic  Onset: in the past 7 days  Frequency: constantly  Progression since onset: slightly improving  Pain location: knee  Pain quality: aching  bladder incontinence: No  bowel incontinence: No  leg pain: No  numbness: No  paresis: No  paresthesias: No  pelvic pain: No  perianal numbness: No  genital pain: No  Pain severity: severe  Improvement on treatment: no relief

## 2025-08-06 ENCOUNTER — OFFICE VISIT (OUTPATIENT)
Dept: ORTHOPEDICS | Facility: CLINIC | Age: 85
End: 2025-08-06
Payer: MEDICARE

## 2025-08-06 VITALS — BODY MASS INDEX: 25.68 KG/M2 | WEIGHT: 154.13 LBS | HEIGHT: 65 IN

## 2025-08-06 DIAGNOSIS — M17.12 PRIMARY OSTEOARTHRITIS OF LEFT KNEE: Primary | ICD-10-CM

## 2025-08-06 DIAGNOSIS — M25.562 ACUTE PAIN OF LEFT KNEE: ICD-10-CM

## 2025-08-06 PROCEDURE — 1125F AMNT PAIN NOTED PAIN PRSNT: CPT | Mod: CPTII,S$GLB,, | Performed by: PHYSICIAN ASSISTANT

## 2025-08-06 PROCEDURE — 3288F FALL RISK ASSESSMENT DOCD: CPT | Mod: CPTII,S$GLB,, | Performed by: PHYSICIAN ASSISTANT

## 2025-08-06 PROCEDURE — 99999 PR PBB SHADOW E&M-EST. PATIENT-LVL IV: CPT | Mod: PBBFAC,,, | Performed by: PHYSICIAN ASSISTANT

## 2025-08-06 PROCEDURE — 1160F RVW MEDS BY RX/DR IN RCRD: CPT | Mod: CPTII,S$GLB,, | Performed by: PHYSICIAN ASSISTANT

## 2025-08-06 PROCEDURE — 20610 DRAIN/INJ JOINT/BURSA W/O US: CPT | Mod: LT,S$GLB,, | Performed by: PHYSICIAN ASSISTANT

## 2025-08-06 PROCEDURE — 1101F PT FALLS ASSESS-DOCD LE1/YR: CPT | Mod: CPTII,S$GLB,, | Performed by: PHYSICIAN ASSISTANT

## 2025-08-06 PROCEDURE — 1159F MED LIST DOCD IN RCRD: CPT | Mod: CPTII,S$GLB,, | Performed by: PHYSICIAN ASSISTANT

## 2025-08-06 PROCEDURE — 99214 OFFICE O/P EST MOD 30 MIN: CPT | Mod: 25,S$GLB,, | Performed by: PHYSICIAN ASSISTANT

## 2025-08-06 RX ORDER — BETAMETHASONE SODIUM PHOSPHATE AND BETAMETHASONE ACETATE 3; 3 MG/ML; MG/ML
6 INJECTION, SUSPENSION INTRA-ARTICULAR; INTRALESIONAL; INTRAMUSCULAR; SOFT TISSUE
Status: DISCONTINUED | OUTPATIENT
Start: 2025-08-06 | End: 2025-08-06 | Stop reason: HOSPADM

## 2025-08-06 RX ORDER — BUPIVACAINE HYDROCHLORIDE 5 MG/ML
5 INJECTION, SOLUTION PERINEURAL
Status: DISCONTINUED | OUTPATIENT
Start: 2025-08-06 | End: 2025-08-06 | Stop reason: HOSPADM

## 2025-08-06 RX ADMIN — BETAMETHASONE SODIUM PHOSPHATE AND BETAMETHASONE ACETATE 6 MG: 3; 3 INJECTION, SUSPENSION INTRA-ARTICULAR; INTRALESIONAL; INTRAMUSCULAR; SOFT TISSUE at 11:08

## 2025-08-06 RX ADMIN — BUPIVACAINE HYDROCHLORIDE 5 ML: 5 INJECTION, SOLUTION PERINEURAL at 11:08

## 2025-08-06 NOTE — PROCEDURES
Large Joint Aspiration/Injection: Left knee    Date/Time: 8/6/2025 11:00 AM    Performed by: Kevyn Biggs PA-C  Authorized by: Kevyn Biggs PA-C    Site marked: the procedure site was marked    Timeout: prior to procedure the correct patient, procedure, and site was verified    Local anesthesia used?: No    Local anesthetic:  Topical anesthetic  Anesthetic total (ml):  2      Details:  Needle Size:  25 G  Ultrasonic Guidance for needle placement?: No    Approach:  Lateral  Location:  Knee  Site:  L knee  Medications:  6 mg betamethasone acetate-betamethasone sodium phosphate 6 mg/mL; 5 mL BUPivacaine 0.5 % (5 mg/mL)  Aspirate amount (mL):  120  Patient tolerance:  Patient tolerated the procedure well with no immediate complications

## 2025-08-06 NOTE — PROGRESS NOTES
"Subjective:  Left knee pain       Patient ID: Saul Mar is a 85 y.o. female.    Chief Complaint: Pain of the Left Knee      HPI:  Ms. Kirkpatrick is a pleasant 85-year-old female with chief complaint of left knee pain.  She states the knee pain began on 08/02/2025.  She denies any injury or trauma or activities that she can think of that would have caused her to have discomfort.  She has tried ice which helps with the pain to a very small degree.  She did have x-rays performed at her primary care office and yielded the following results: " Mild degenerative changes in the medial and patellofemoral compartments. Patellar enthesophytes noted.   No significant soft tissue abnormality. Small knee joint effusion. No acute fracture or dislocation."She is reporting a pain of 9/10 on 0-10 pain scale.      Review of patient's allergies indicates:   Allergen Reactions    Pravachol [pravastatin] Nausea Only       Review of Systems   Constitutional:  Negative for chills, fatigue and fever.   Musculoskeletal:  Positive for arthralgias and gait problem. Negative for back pain, joint swelling, myalgias, neck pain and neck stiffness.   Skin:  Negative for color change, pallor, rash and wound.   Neurological:  Negative for weakness and numbness.         Medical History: The patient's past medical history, surgical history, social history, family history, medications, allergies, and 10-point review of systems were all reviewed and signed on the intake sheet. These were updated as necessary and placed in the electronic medical record.       Objective:     Vitals:    08/06/25 1055   Weight: 69.9 kg (154 lb 1.6 oz)   Height: 5' 5" (1.651 m)       Physical Exam    GENERAL: Well appearing, in no acute distress.  HEAD: Normocephalic and atraumatic.  ENT: External ears and nose grossly normal.  EYES: EOMI bilaterally  PULMONARY: Respirations are grossly even and non-labored.  NEURO: Awake, alert, and oriented x 3.  SKIN: No obvious " rashes appreciated.  PSYCH: Mood & affect are appropriate.    Detailed MSK exam:   Patient entered the back exam area using a motorized scooter.  She does ambulate small distances with a cane.  She is sitting in exam room in no acute distress with a pleasant demeanor.  Visual presentation of the left knee was unremarkable with no appreciated swelling, erythema, ecchymosis or warmth.  Active range of motion is very well-preserved and 0-130 degrees with mild patellofemoral crepitus appreciated throughout the range and mild stiffness appreciated at end range flexion.    Imaging:  No new x-rays were taken.  X-rays were taken on 08/03/2025 at her primary care office see results above in HPI.    Relevant imaging results were reviewed and interpreted by me and per my read as above.  This was discussed with the patient and / or family today.     Assessment:     1. Primary osteoarthritis of left knee    2. Acute pain of left knee          Plan:   1. Primary osteoarthritis of left knee  -     Large Joint Aspiration/Injection: Left knee    2. Acute pain of left knee  -     Ambulatory referral/consult to Orthopedics         Large Joint Aspiration/Injection: Left knee    Date/Time: 8/6/2025 11:00 AM    Performed by: Kevyn Biggs PA-C  Authorized by: Kevyn Biggs PA-C    Site marked: the procedure site was marked    Timeout: prior to procedure the correct patient, procedure, and site was verified    Local anesthesia used?: No    Local anesthetic:  Topical anesthetic  Anesthetic total (ml):  2      Details:  Needle Size:  25 G  Ultrasonic Guidance for needle placement?: No    Approach:  Lateral  Location:  Knee  Site:  L knee  Medications:  6 mg betamethasone acetate-betamethasone sodium phosphate 6 mg/mL; 5 mL BUPivacaine 0.5 % (5 mg/mL)  Aspirate amount (mL):  120  Patient tolerance:  Patient tolerated the procedure well with no immediate complications     Plan:  1.  Patient will return to clinic in 4 weeks for  reassessment.  2. Patient was given therapeutic exercises for osteoarthritis.  3. Activities as tolerated and encouraged as not to exacerbate current status.      No follow-ups on file.

## 2025-08-11 ENCOUNTER — TELEPHONE (OUTPATIENT)
Dept: FAMILY MEDICINE | Facility: CLINIC | Age: 85
End: 2025-08-11
Payer: MEDICARE

## 2025-08-11 DIAGNOSIS — E11.51 DIABETES MELLITUS TYPE 2 WITH PERIPHERAL ARTERY DISEASE: Primary | ICD-10-CM

## 2025-08-11 DIAGNOSIS — E61.1 IRON DEFICIENCY: ICD-10-CM

## 2025-08-11 DIAGNOSIS — E11.49 TYPE II DIABETES MELLITUS WITH NEUROLOGICAL MANIFESTATIONS: Primary | ICD-10-CM

## 2025-08-12 PROBLEM — I27.20 PULMONARY HYPERTENSION: Status: RESOLVED | Noted: 2023-05-15 | Resolved: 2025-08-12

## 2025-08-12 PROBLEM — N18.32 CHRONIC KIDNEY DISEASE, STAGE 3B: Status: RESOLVED | Noted: 2024-02-28 | Resolved: 2025-08-12

## 2025-08-12 PROBLEM — M65.4 TENDINITIS, DE QUERVAIN'S: Status: RESOLVED | Noted: 2024-02-13 | Resolved: 2025-08-12

## 2025-08-12 PROBLEM — Z79.01 CHRONIC ANTICOAGULATION: Status: ACTIVE | Noted: 2025-08-12

## 2025-08-13 RX ORDER — FERROUS GLUCONATE 324(38)MG
TABLET ORAL
Qty: 60 TABLET | Refills: 12 | COMMUNITY
Start: 2025-08-13

## 2025-08-14 ENCOUNTER — TELEPHONE (OUTPATIENT)
Dept: DIABETES | Facility: CLINIC | Age: 85
End: 2025-08-14
Payer: MEDICARE

## 2025-08-21 RX ORDER — GLIMEPIRIDE 4 MG/1
4 TABLET ORAL
Qty: 90 TABLET | Refills: 1 | Status: SHIPPED | OUTPATIENT
Start: 2025-08-21

## 2025-08-27 ENCOUNTER — PATIENT OUTREACH (OUTPATIENT)
Facility: OTHER | Age: 85
End: 2025-08-27
Payer: MEDICARE

## 2025-08-27 ENCOUNTER — TELEPHONE (OUTPATIENT)
Dept: ORTHOPEDICS | Facility: CLINIC | Age: 85
End: 2025-08-27
Payer: MEDICARE

## 2025-08-27 DIAGNOSIS — Z78.0 MENOPAUSE: ICD-10-CM

## 2025-09-03 ENCOUNTER — OFFICE VISIT (OUTPATIENT)
Dept: ORTHOPEDICS | Facility: CLINIC | Age: 85
End: 2025-09-03
Payer: MEDICARE

## 2025-09-03 VITALS — HEIGHT: 65 IN | BODY MASS INDEX: 24.17 KG/M2 | WEIGHT: 145.06 LBS

## 2025-09-03 DIAGNOSIS — M17.12 PRIMARY OSTEOARTHRITIS OF LEFT KNEE: ICD-10-CM

## 2025-09-03 DIAGNOSIS — M65.4 TENDINITIS, DE QUERVAIN'S: ICD-10-CM

## 2025-09-03 DIAGNOSIS — M25.562 ACUTE PAIN OF LEFT KNEE: Primary | ICD-10-CM

## 2025-09-03 PROCEDURE — 1125F AMNT PAIN NOTED PAIN PRSNT: CPT | Mod: CPTII,S$GLB,, | Performed by: PHYSICIAN ASSISTANT

## 2025-09-03 PROCEDURE — 99213 OFFICE O/P EST LOW 20 MIN: CPT | Mod: S$GLB,,, | Performed by: PHYSICIAN ASSISTANT

## 2025-09-03 PROCEDURE — 3288F FALL RISK ASSESSMENT DOCD: CPT | Mod: CPTII,S$GLB,, | Performed by: PHYSICIAN ASSISTANT

## 2025-09-03 PROCEDURE — 1101F PT FALLS ASSESS-DOCD LE1/YR: CPT | Mod: CPTII,S$GLB,, | Performed by: PHYSICIAN ASSISTANT

## 2025-09-03 PROCEDURE — 1159F MED LIST DOCD IN RCRD: CPT | Mod: CPTII,S$GLB,, | Performed by: PHYSICIAN ASSISTANT

## 2025-09-03 PROCEDURE — 1160F RVW MEDS BY RX/DR IN RCRD: CPT | Mod: CPTII,S$GLB,, | Performed by: PHYSICIAN ASSISTANT

## 2025-09-03 PROCEDURE — 99999 PR PBB SHADOW E&M-EST. PATIENT-LVL IV: CPT | Mod: PBBFAC,,, | Performed by: PHYSICIAN ASSISTANT

## 2025-09-03 RX ORDER — DICLOFENAC SODIUM 10 MG/G
2 GEL TOPICAL 2 TIMES DAILY
Qty: 50 G | Refills: 11 | Status: SHIPPED | OUTPATIENT
Start: 2025-09-03

## 2025-09-04 ENCOUNTER — TELEPHONE (OUTPATIENT)
Dept: FAMILY MEDICINE | Facility: CLINIC | Age: 85
End: 2025-09-04
Payer: MEDICARE

## 2025-09-05 ENCOUNTER — OFFICE VISIT (OUTPATIENT)
Dept: HOME HEALTH SERVICES | Facility: CLINIC | Age: 85
End: 2025-09-05
Payer: MEDICARE

## 2025-09-05 VITALS
HEIGHT: 65 IN | OXYGEN SATURATION: 98 % | WEIGHT: 145 LBS | HEART RATE: 60 BPM | SYSTOLIC BLOOD PRESSURE: 150 MMHG | DIASTOLIC BLOOD PRESSURE: 60 MMHG | BODY MASS INDEX: 24.16 KG/M2 | RESPIRATION RATE: 18 BRPM | TEMPERATURE: 97 F

## 2025-09-05 DIAGNOSIS — I48.91 ATRIAL FIBRILLATION, UNSPECIFIED TYPE: ICD-10-CM

## 2025-09-05 DIAGNOSIS — H40.1211 LOW-TENSION GLAUCOMA, RIGHT EYE, MILD STAGE: ICD-10-CM

## 2025-09-05 DIAGNOSIS — K59.09 CHRONIC CONSTIPATION: ICD-10-CM

## 2025-09-05 DIAGNOSIS — G56.03 BILATERAL CARPAL TUNNEL SYNDROME: ICD-10-CM

## 2025-09-05 DIAGNOSIS — Z00.00 ENCOUNTER FOR MEDICARE ANNUAL WELLNESS EXAM: ICD-10-CM

## 2025-09-05 DIAGNOSIS — I65.29 CAROTID ATHEROSCLEROSIS, UNSPECIFIED LATERALITY: ICD-10-CM

## 2025-09-05 DIAGNOSIS — M54.16 LUMBAR RADICULOPATHY, CHRONIC: ICD-10-CM

## 2025-09-05 DIAGNOSIS — H40.1130 PRIMARY OPEN ANGLE GLAUCOMA OF BOTH EYES, UNSPECIFIED GLAUCOMA STAGE: Chronic | ICD-10-CM

## 2025-09-05 DIAGNOSIS — K21.9 GASTROESOPHAGEAL REFLUX DISEASE WITHOUT ESOPHAGITIS: ICD-10-CM

## 2025-09-05 DIAGNOSIS — E11.51 DIABETES MELLITUS TYPE 2 WITH PERIPHERAL ARTERY DISEASE: ICD-10-CM

## 2025-09-05 DIAGNOSIS — E11.22 TYPE 2 DIABETES MELLITUS WITH STAGE 3B CHRONIC KIDNEY DISEASE, WITHOUT LONG-TERM CURRENT USE OF INSULIN: ICD-10-CM

## 2025-09-05 DIAGNOSIS — R53.81 DEBILITY: ICD-10-CM

## 2025-09-05 DIAGNOSIS — D57.3 SICKLE CELL TRAIT SYNDROME: ICD-10-CM

## 2025-09-05 DIAGNOSIS — I70.0 AORTIC ARCH ATHEROSCLEROSIS: Chronic | ICD-10-CM

## 2025-09-05 DIAGNOSIS — I51.89 LEFT VENTRICULAR DIASTOLIC DYSFUNCTION WITH PRESERVED SYSTOLIC FUNCTION: ICD-10-CM

## 2025-09-05 DIAGNOSIS — N18.32 TYPE 2 DIABETES MELLITUS WITH STAGE 3B CHRONIC KIDNEY DISEASE, WITHOUT LONG-TERM CURRENT USE OF INSULIN: ICD-10-CM

## 2025-09-05 DIAGNOSIS — K57.30 DIVERTICULOSIS OF LARGE INTESTINE WITHOUT HEMORRHAGE: ICD-10-CM

## 2025-09-05 DIAGNOSIS — M48.062 SPINAL STENOSIS OF LUMBAR REGION WITH NEUROGENIC CLAUDICATION: Primary | ICD-10-CM

## 2025-09-05 DIAGNOSIS — G31.84 MILD NEUROCOGNITIVE DISORDER: ICD-10-CM

## 2025-09-05 DIAGNOSIS — E11.3213 BOTH EYES AFFECTED BY MILD NONPROLIFERATIVE DIABETIC RETINOPATHY WITH MACULAR EDEMA, ASSOCIATED WITH TYPE 2 DIABETES MELLITUS: ICD-10-CM

## 2025-09-05 DIAGNOSIS — I15.2 HYPERTENSION ASSOCIATED WITH DIABETES: ICD-10-CM

## 2025-09-05 DIAGNOSIS — I63.00 CEREBROVASCULAR ACCIDENT (CVA) DUE TO THROMBOSIS OF PRECEREBRAL ARTERY: Chronic | ICD-10-CM

## 2025-09-05 DIAGNOSIS — R80.9 DIABETES MELLITUS WITH PROTEINURIA: Chronic | ICD-10-CM

## 2025-09-05 DIAGNOSIS — E11.69 HYPERLIPIDEMIA ASSOCIATED WITH TYPE 2 DIABETES MELLITUS: ICD-10-CM

## 2025-09-05 DIAGNOSIS — E11.59 HYPERTENSION ASSOCIATED WITH DIABETES: ICD-10-CM

## 2025-09-05 DIAGNOSIS — Z79.01 CHRONIC ANTICOAGULATION: ICD-10-CM

## 2025-09-05 DIAGNOSIS — H40.1222 LOW-TENSION GLAUCOMA, LEFT EYE, MODERATE STAGE: ICD-10-CM

## 2025-09-05 DIAGNOSIS — E78.5 HYPERLIPIDEMIA ASSOCIATED WITH TYPE 2 DIABETES MELLITUS: ICD-10-CM

## 2025-09-05 DIAGNOSIS — E11.36 DM CATARACT: ICD-10-CM

## 2025-09-05 DIAGNOSIS — H91.90 HEARING LOSS, UNSPECIFIED HEARING LOSS TYPE, UNSPECIFIED LATERALITY: ICD-10-CM

## 2025-09-05 DIAGNOSIS — E55.9 VITAMIN D INSUFFICIENCY: ICD-10-CM

## 2025-09-05 DIAGNOSIS — K63.5 POLYP OF COLON, UNSPECIFIED PART OF COLON, UNSPECIFIED TYPE: Chronic | ICD-10-CM

## 2025-09-05 DIAGNOSIS — E04.2 MULTINODULAR THYROID: Chronic | ICD-10-CM

## 2025-09-05 DIAGNOSIS — E11.29 DIABETES MELLITUS WITH PROTEINURIA: Chronic | ICD-10-CM

## 2025-09-05 PROBLEM — H25.11 AGE-RELATED NUCLEAR CATARACT, RIGHT: Status: RESOLVED | Noted: 2023-01-26 | Resolved: 2025-09-05

## 2025-09-05 PROBLEM — R41.9 COGNITIVE COMPLAINTS WITH NORMAL EXAM: Status: RESOLVED | Noted: 2023-12-06 | Resolved: 2025-09-05

## 2025-09-05 PROBLEM — F41.8 MIXED ANXIETY DEPRESSIVE DISORDER: Status: RESOLVED | Noted: 2018-03-27 | Resolved: 2025-09-05

## 2025-09-05 PROBLEM — F33.0 MILD EPISODE OF RECURRENT MAJOR DEPRESSIVE DISORDER: Status: RESOLVED | Noted: 2023-05-15 | Resolved: 2025-09-05

## 2025-09-05 PROBLEM — R26.9 GAIT DISORDER: Status: RESOLVED | Noted: 2022-11-08 | Resolved: 2025-09-05
